# Patient Record
Sex: MALE | Race: WHITE | NOT HISPANIC OR LATINO | Employment: OTHER | URBAN - METROPOLITAN AREA
[De-identification: names, ages, dates, MRNs, and addresses within clinical notes are randomized per-mention and may not be internally consistent; named-entity substitution may affect disease eponyms.]

---

## 2023-01-01 ENCOUNTER — HOSPITAL ENCOUNTER (OUTPATIENT)
Dept: CARDIOLOGY | Facility: CLINIC | Age: 73
Discharge: HOME | End: 2023-12-01
Payer: MEDICARE

## 2023-01-01 ENCOUNTER — OFFICE VISIT (OUTPATIENT)
Dept: PRIMARY CARE | Facility: CLINIC | Age: 73
End: 2023-01-01
Payer: MEDICARE

## 2023-01-01 ENCOUNTER — HOSPITAL ENCOUNTER (OUTPATIENT)
Dept: CARDIOLOGY | Facility: CLINIC | Age: 73
Discharge: HOME | End: 2023-11-30
Payer: MEDICARE

## 2023-01-01 ENCOUNTER — TELEPHONE (OUTPATIENT)
Dept: PRIMARY CARE | Facility: CLINIC | Age: 73
End: 2023-01-01
Payer: MEDICARE

## 2023-01-01 ENCOUNTER — TELEPHONE (OUTPATIENT)
Dept: HEMATOLOGY/ONCOLOGY | Facility: CLINIC | Age: 73
End: 2023-01-01
Payer: MEDICARE

## 2023-01-01 ENCOUNTER — HOSPITAL ENCOUNTER (OUTPATIENT)
Dept: RADIOLOGY | Facility: HOSPITAL | Age: 73
Discharge: HOME | End: 2023-12-08
Payer: MEDICARE

## 2023-01-01 ENCOUNTER — APPOINTMENT (OUTPATIENT)
Dept: RADIOLOGY | Facility: HOSPITAL | Age: 73
End: 2023-01-01
Payer: MEDICARE

## 2023-01-01 ENCOUNTER — APPOINTMENT (OUTPATIENT)
Dept: CARDIOLOGY | Facility: HOSPITAL | Age: 73
End: 2023-01-01
Payer: MEDICARE

## 2023-01-01 ENCOUNTER — APPOINTMENT (OUTPATIENT)
Dept: VASCULAR MEDICINE | Facility: HOSPITAL | Age: 73
End: 2023-01-01
Payer: MEDICARE

## 2023-01-01 ENCOUNTER — HOSPITAL ENCOUNTER (OUTPATIENT)
Dept: CARDIOLOGY | Facility: CLINIC | Age: 73
Discharge: HOME | End: 2023-12-19
Payer: MEDICARE

## 2023-01-01 ENCOUNTER — APPOINTMENT (OUTPATIENT)
Dept: PRIMARY CARE | Facility: CLINIC | Age: 73
End: 2023-01-01
Payer: MEDICARE

## 2023-01-01 ENCOUNTER — CLINICAL SUPPORT (OUTPATIENT)
Dept: PRIMARY CARE | Facility: CLINIC | Age: 73
End: 2023-01-01
Payer: MEDICARE

## 2023-01-01 ENCOUNTER — HOSPITAL ENCOUNTER (OUTPATIENT)
Dept: CARDIOLOGY | Facility: CLINIC | Age: 73
End: 2023-01-01
Payer: MEDICARE

## 2023-01-01 ENCOUNTER — HOSPITAL ENCOUNTER (EMERGENCY)
Facility: HOSPITAL | Age: 73
Discharge: OTHER INSTITUTION | End: 2023-12-27
Attending: EMERGENCY MEDICINE
Payer: MEDICARE

## 2023-01-01 ENCOUNTER — HOSPITAL ENCOUNTER (OUTPATIENT)
Dept: CARDIOLOGY | Facility: CLINIC | Age: 73
Discharge: HOME | End: 2023-10-26
Payer: MEDICARE

## 2023-01-01 ENCOUNTER — HOSPITAL ENCOUNTER (INPATIENT)
Facility: HOSPITAL | Age: 73
LOS: 5 days | Discharge: NURSING, REHAB OR LONG-TERM CARE | DRG: 177 | End: 2024-01-01
Attending: INTERNAL MEDICINE | Admitting: HOSPITALIST
Payer: MEDICARE

## 2023-01-01 ENCOUNTER — APPOINTMENT (OUTPATIENT)
Dept: CARDIOLOGY | Facility: HOSPITAL | Age: 73
DRG: 193 | End: 2023-01-01
Payer: MEDICARE

## 2023-01-01 ENCOUNTER — HOSPITAL ENCOUNTER (INPATIENT)
Facility: HOSPITAL | Age: 73
LOS: 5 days | Discharge: HOME | DRG: 193 | End: 2023-12-19
Attending: FAMILY MEDICINE | Admitting: INTERNAL MEDICINE
Payer: MEDICARE

## 2023-01-01 ENCOUNTER — OFFICE VISIT (OUTPATIENT)
Dept: HEMATOLOGY/ONCOLOGY | Facility: CLINIC | Age: 73
End: 2023-01-01
Payer: MEDICARE

## 2023-01-01 ENCOUNTER — TELEMEDICINE (OUTPATIENT)
Dept: HEMATOLOGY/ONCOLOGY | Facility: CLINIC | Age: 73
End: 2023-01-01
Payer: MEDICARE

## 2023-01-01 ENCOUNTER — APPOINTMENT (OUTPATIENT)
Dept: RADIOLOGY | Facility: HOSPITAL | Age: 73
DRG: 193 | End: 2023-01-01
Payer: MEDICARE

## 2023-01-01 ENCOUNTER — HOSPITAL ENCOUNTER (OUTPATIENT)
Dept: CARDIOLOGY | Facility: CLINIC | Age: 73
Discharge: HOME | End: 2023-12-22
Payer: MEDICARE

## 2023-01-01 ENCOUNTER — LAB (OUTPATIENT)
Dept: LAB | Facility: LAB | Age: 73
End: 2023-01-01
Payer: MEDICARE

## 2023-01-01 VITALS
SYSTOLIC BLOOD PRESSURE: 110 MMHG | HEIGHT: 69 IN | HEART RATE: 77 BPM | TEMPERATURE: 97 F | DIASTOLIC BLOOD PRESSURE: 66 MMHG | RESPIRATION RATE: 18 BRPM | WEIGHT: 166.45 LBS | BODY MASS INDEX: 24.65 KG/M2 | OXYGEN SATURATION: 95 %

## 2023-01-01 VITALS
WEIGHT: 155 LBS | DIASTOLIC BLOOD PRESSURE: 60 MMHG | SYSTOLIC BLOOD PRESSURE: 118 MMHG | RESPIRATION RATE: 16 BRPM | TEMPERATURE: 97.2 F | BODY MASS INDEX: 23.57 KG/M2 | OXYGEN SATURATION: 96 % | HEART RATE: 61 BPM

## 2023-01-01 VITALS
HEART RATE: 90 BPM | DIASTOLIC BLOOD PRESSURE: 64 MMHG | TEMPERATURE: 97.3 F | BODY MASS INDEX: 22.51 KG/M2 | RESPIRATION RATE: 16 BRPM | HEIGHT: 69 IN | SYSTOLIC BLOOD PRESSURE: 118 MMHG | WEIGHT: 152 LBS | OXYGEN SATURATION: 95 %

## 2023-01-01 VITALS
BODY MASS INDEX: 24.44 KG/M2 | HEART RATE: 108 BPM | RESPIRATION RATE: 27 BRPM | TEMPERATURE: 99.9 F | DIASTOLIC BLOOD PRESSURE: 67 MMHG | HEIGHT: 69 IN | OXYGEN SATURATION: 100 % | WEIGHT: 165 LBS | SYSTOLIC BLOOD PRESSURE: 99 MMHG

## 2023-01-01 VITALS
SYSTOLIC BLOOD PRESSURE: 118 MMHG | WEIGHT: 155 LBS | HEIGHT: 69 IN | HEART RATE: 87 BPM | BODY MASS INDEX: 22.96 KG/M2 | TEMPERATURE: 97.5 F | DIASTOLIC BLOOD PRESSURE: 64 MMHG | RESPIRATION RATE: 16 BRPM | OXYGEN SATURATION: 96 %

## 2023-01-01 VITALS
BODY MASS INDEX: 26.28 KG/M2 | TEMPERATURE: 97 F | RESPIRATION RATE: 18 BRPM | SYSTOLIC BLOOD PRESSURE: 119 MMHG | OXYGEN SATURATION: 95 % | HEIGHT: 66 IN | HEART RATE: 86 BPM | DIASTOLIC BLOOD PRESSURE: 73 MMHG | WEIGHT: 163.5 LBS

## 2023-01-01 DIAGNOSIS — Z95.810 BIVENTRICULAR ICD (IMPLANTABLE CARDIOVERTER-DEFIBRILLATOR) IN PLACE: ICD-10-CM

## 2023-01-01 DIAGNOSIS — Z79.4 TYPE 2 DIABETES MELLITUS WITH DIABETIC NEUROPATHY, WITH LONG-TERM CURRENT USE OF INSULIN (MULTI): Primary | ICD-10-CM

## 2023-01-01 DIAGNOSIS — R79.89 ELEVATED BRAIN NATRIURETIC PEPTIDE (BNP) LEVEL: ICD-10-CM

## 2023-01-01 DIAGNOSIS — Z95.810 BIVENTRICULAR ICD (IMPLANTABLE CARDIOVERTER-DEFIBRILLATOR) IN PLACE: Primary | ICD-10-CM

## 2023-01-01 DIAGNOSIS — E11.65 TYPE 2 DIABETES MELLITUS WITH HYPERGLYCEMIA, WITH LONG-TERM CURRENT USE OF INSULIN (MULTI): Primary | ICD-10-CM

## 2023-01-01 DIAGNOSIS — I50.9 CONGESTIVE HEART FAILURE, UNSPECIFIED HF CHRONICITY, UNSPECIFIED HEART FAILURE TYPE (MULTI): ICD-10-CM

## 2023-01-01 DIAGNOSIS — Z23 ENCOUNTER FOR IMMUNIZATION: ICD-10-CM

## 2023-01-01 DIAGNOSIS — J44.9 CHRONIC OBSTRUCTIVE PULMONARY DISEASE, UNSPECIFIED COPD TYPE (MULTI): ICD-10-CM

## 2023-01-01 DIAGNOSIS — D72.829 LEUKOCYTOSIS, UNSPECIFIED TYPE: ICD-10-CM

## 2023-01-01 DIAGNOSIS — R73.9 HYPERGLYCEMIA: ICD-10-CM

## 2023-01-01 DIAGNOSIS — I10 ESSENTIAL (PRIMARY) HYPERTENSION: ICD-10-CM

## 2023-01-01 DIAGNOSIS — D72.829 LEUKOCYTOSIS, UNSPECIFIED TYPE: Primary | ICD-10-CM

## 2023-01-01 DIAGNOSIS — N28.9 RENAL INSUFFICIENCY: ICD-10-CM

## 2023-01-01 DIAGNOSIS — I63.9 CEREBROVASCULAR ACCIDENT (CVA), UNSPECIFIED MECHANISM (MULTI): ICD-10-CM

## 2023-01-01 DIAGNOSIS — I25.5 ISCHEMIC CARDIOMYOPATHY: ICD-10-CM

## 2023-01-01 DIAGNOSIS — U07.1 PNEUMONIA DUE TO COVID-19 VIRUS: Primary | ICD-10-CM

## 2023-01-01 DIAGNOSIS — I21.4 NSTEMI (NON-ST ELEVATED MYOCARDIAL INFARCTION) (MULTI): Primary | ICD-10-CM

## 2023-01-01 DIAGNOSIS — E03.9 ACQUIRED HYPOTHYROIDISM: ICD-10-CM

## 2023-01-01 DIAGNOSIS — J18.9 PNEUMONIA OF BOTH LUNGS DUE TO INFECTIOUS ORGANISM, UNSPECIFIED PART OF LUNG: Primary | ICD-10-CM

## 2023-01-01 DIAGNOSIS — R79.89 ELEVATED TROPONIN: ICD-10-CM

## 2023-01-01 DIAGNOSIS — I42.9 CARDIOMYOPATHY, UNSPECIFIED TYPE (MULTI): ICD-10-CM

## 2023-01-01 DIAGNOSIS — Z79.4 TYPE 2 DIABETES MELLITUS WITH HYPERGLYCEMIA, WITH LONG-TERM CURRENT USE OF INSULIN (MULTI): Primary | ICD-10-CM

## 2023-01-01 DIAGNOSIS — R93.89 ABNORMAL CT OF THE CHEST: ICD-10-CM

## 2023-01-01 DIAGNOSIS — F17.210 CIGARETTE NICOTINE DEPENDENCE WITHOUT COMPLICATION: Primary | ICD-10-CM

## 2023-01-01 DIAGNOSIS — E11.40 TYPE 2 DIABETES MELLITUS WITH DIABETIC NEUROPATHY, WITH LONG-TERM CURRENT USE OF INSULIN (MULTI): Primary | ICD-10-CM

## 2023-01-01 DIAGNOSIS — Z79.4 LONG TERM (CURRENT) USE OF INSULIN (MULTI): ICD-10-CM

## 2023-01-01 DIAGNOSIS — J12.82 PNEUMONIA DUE TO COVID-19 VIRUS: Primary | ICD-10-CM

## 2023-01-01 DIAGNOSIS — F33.0 MILD EPISODE OF RECURRENT MAJOR DEPRESSIVE DISORDER (CMS-HCC): ICD-10-CM

## 2023-01-01 DIAGNOSIS — J18.9 PNEUMONIA DUE TO INFECTIOUS ORGANISM, UNSPECIFIED LATERALITY, UNSPECIFIED PART OF LUNG: ICD-10-CM

## 2023-01-01 DIAGNOSIS — N39.0 RECURRENT UTI: Primary | ICD-10-CM

## 2023-01-01 DIAGNOSIS — E11.65 UNCONTROLLED TYPE 2 DIABETES MELLITUS WITH HYPERGLYCEMIA (MULTI): Primary | ICD-10-CM

## 2023-01-01 DIAGNOSIS — Z79.4 TYPE 2 DIABETES MELLITUS WITH HYPERGLYCEMIA, WITH LONG-TERM CURRENT USE OF INSULIN (MULTI): ICD-10-CM

## 2023-01-01 DIAGNOSIS — R06.02 SHORTNESS OF BREATH: ICD-10-CM

## 2023-01-01 DIAGNOSIS — E03.9 HYPOTHYROIDISM, UNSPECIFIED TYPE: ICD-10-CM

## 2023-01-01 DIAGNOSIS — E11.65 TYPE 2 DIABETES MELLITUS WITH HYPERGLYCEMIA, WITH LONG-TERM CURRENT USE OF INSULIN (MULTI): ICD-10-CM

## 2023-01-01 DIAGNOSIS — Z95.5 HISTORY OF CORONARY ARTERY STENT PLACEMENT: ICD-10-CM

## 2023-01-01 DIAGNOSIS — E55.9 VITAMIN D DEFICIENCY: ICD-10-CM

## 2023-01-01 DIAGNOSIS — J44.9 CHRONIC OBSTRUCTIVE PULMONARY DISEASE, UNSPECIFIED COPD TYPE (MULTI): Primary | ICD-10-CM

## 2023-01-01 DIAGNOSIS — I21.4 NSTEMI (NON-ST ELEVATED MYOCARDIAL INFARCTION) (MULTI): ICD-10-CM

## 2023-01-01 DIAGNOSIS — J02.9 ACUTE PHARYNGITIS, UNSPECIFIED ETIOLOGY: Primary | ICD-10-CM

## 2023-01-01 DIAGNOSIS — I63.9 CEREBROVASCULAR ACCIDENT (CVA), UNSPECIFIED MECHANISM (MULTI): Primary | ICD-10-CM

## 2023-01-01 DIAGNOSIS — Z79.899 LONG TERM CURRENT USE OF ANTIARRHYTHMIC DRUG: ICD-10-CM

## 2023-01-01 DIAGNOSIS — N39.0 RECURRENT UTI: ICD-10-CM

## 2023-01-01 DIAGNOSIS — I50.23 ACUTE ON CHRONIC SYSTOLIC (CONGESTIVE) HEART FAILURE (MULTI): ICD-10-CM

## 2023-01-01 LAB
ALBUMIN SERPL BCP-MCNC: 3.2 G/DL (ref 3.4–5)
ALBUMIN SERPL BCP-MCNC: 3.2 G/DL (ref 3.4–5)
ALBUMIN SERPL BCP-MCNC: 3.5 G/DL (ref 3.4–5)
ALBUMIN SERPL BCP-MCNC: 3.6 G/DL (ref 3.4–5)
ALBUMIN SERPL-MCNC: 2.6 G/DL (ref 3.5–5)
ALBUMIN SERPL-MCNC: 3.7 G/DL (ref 3.5–5)
ALP BLD-CCNC: 179 U/L (ref 35–125)
ALP BLD-CCNC: 258 U/L (ref 35–125)
ALP SERPL-CCNC: 185 U/L (ref 33–136)
ALP SERPL-CCNC: 256 U/L (ref 33–136)
ALP SERPL-CCNC: 266 U/L (ref 33–136)
ALP SERPL-CCNC: 337 U/L (ref 33–136)
ALT SERPL W P-5'-P-CCNC: 4 U/L (ref 10–52)
ALT SERPL W P-5'-P-CCNC: 5 U/L (ref 10–52)
ALT SERPL-CCNC: 7 U/L (ref 5–40)
ALT SERPL-CCNC: <5 U/L (ref 5–40)
ANION GAP IN SER/PLAS: 15 MMOL/L (ref 10–20)
ANION GAP IN SER/PLAS: 20 MMOL/L (ref 10–20)
ANION GAP SERPL CALC-SCNC: 11 MMOL/L (ref 10–20)
ANION GAP SERPL CALC-SCNC: 13 MMOL/L (ref 10–20)
ANION GAP SERPL CALC-SCNC: 14 MMOL/L
ANION GAP SERPL CALC-SCNC: 15 MMOL/L
ANION GAP SERPL CALC-SCNC: 15 MMOL/L
ANION GAP SERPL CALC-SCNC: 16 MMOL/L
ANION GAP SERPL CALC-SCNC: 16 MMOL/L (ref 10–20)
ANION GAP SERPL CALC-SCNC: 16 MMOL/L (ref 10–20)
ANION GAP SERPL CALC-SCNC: 17 MMOL/L
ANION GAP SERPL CALC-SCNC: 17 MMOL/L (ref 10–20)
ANION GAP SERPL CALC-SCNC: 18 MMOL/L (ref 10–20)
ANION GAP SERPL CALC-SCNC: 19 MMOL/L (ref 10–20)
ANION GAP SERPL CALC-SCNC: 20 MMOL/L (ref 10–20)
AORTIC VALVE PEAK VELOCITY: 0.93
APTT PPP: 107.8 SECONDS (ref 22–32.5)
APTT PPP: 41.3 SECONDS (ref 22–32.5)
AST SERPL W P-5'-P-CCNC: 21 U/L (ref 9–39)
AST SERPL W P-5'-P-CCNC: 22 U/L (ref 9–39)
AST SERPL W P-5'-P-CCNC: 23 U/L (ref 9–39)
AST SERPL W P-5'-P-CCNC: 25 U/L (ref 9–39)
AST SERPL-CCNC: 28 U/L (ref 5–40)
AST SERPL-CCNC: 28 U/L (ref 5–40)
AV PEAK GRADIENT: 3.5
AVA (PEAK VEL): 2.41
BACTERIA BLD AEROBE CULT: ABNORMAL
BACTERIA BLD CULT: ABNORMAL
BACTERIA BLD CULT: NORMAL
BACTERIA SPEC RESP CULT: ABNORMAL
BASOPHILS # BLD AUTO: 0.02 X10*3/UL (ref 0–0.1)
BASOPHILS # BLD AUTO: 0.04 X10*3/UL (ref 0–0.1)
BASOPHILS # BLD AUTO: 0.04 X10*3/UL (ref 0–0.1)
BASOPHILS # BLD AUTO: 0.08 X10*3/UL (ref 0–0.1)
BASOPHILS (10*3/UL) IN BLOOD BY AUTOMATED COUNT: 0.06 X10E9/L (ref 0–0.1)
BASOPHILS (10*3/UL) IN BLOOD BY AUTOMATED COUNT: 0.08 X10E9/L (ref 0–0.1)
BASOPHILS NFR BLD AUTO: 0.2 %
BASOPHILS NFR BLD AUTO: 0.2 %
BASOPHILS NFR BLD AUTO: 0.3 %
BASOPHILS NFR BLD AUTO: 0.5 %
BASOPHILS/100 LEUKOCYTES IN BLOOD BY AUTOMATED COUNT: 0.4 % (ref 0–2)
BASOPHILS/100 LEUKOCYTES IN BLOOD BY AUTOMATED COUNT: 0.5 % (ref 0–2)
BILIRUB DIRECT SERPL-MCNC: 0.3 MG/DL (ref 0–0.2)
BILIRUB SERPL-MCNC: 0.4 MG/DL (ref 0.1–1.2)
BILIRUB SERPL-MCNC: 0.5 MG/DL (ref 0–1.2)
BILIRUB SERPL-MCNC: 0.6 MG/DL (ref 0.1–1.2)
BILIRUB SERPL-MCNC: 0.6 MG/DL (ref 0–1.2)
BILIRUB SERPL-MCNC: 0.9 MG/DL (ref 0–1.2)
BILIRUB SERPL-MCNC: 1.1 MG/DL (ref 0–1.2)
BNP SERPL-MCNC: 775 PG/ML (ref 0–99)
BNP SERPL-MCNC: 791 PG/ML (ref 0–99)
BNP SERPL-MCNC: 842 PG/ML (ref 0–99)
BNP SERPL-MCNC: 867 PG/ML (ref 0–99)
BUN SERPL-MCNC: 25 MG/DL (ref 8–25)
BUN SERPL-MCNC: 33 MG/DL (ref 6–23)
BUN SERPL-MCNC: 44 MG/DL (ref 6–23)
BUN SERPL-MCNC: 54 MG/DL (ref 6–23)
BUN SERPL-MCNC: 66 MG/DL (ref 6–23)
BUN SERPL-MCNC: 72 MG/DL (ref 8–25)
BUN SERPL-MCNC: 74 MG/DL (ref 8–25)
BUN SERPL-MCNC: 75 MG/DL (ref 6–23)
BUN SERPL-MCNC: 78 MG/DL (ref 8–25)
BUN SERPL-MCNC: 80 MG/DL (ref 8–25)
BUN SERPL-MCNC: 85 MG/DL (ref 6–23)
BUN SERPL-MCNC: 85 MG/DL (ref 6–23)
BUN SERPL-MCNC: 86 MG/DL (ref 6–23)
CALCIUM (MG/DL) IN SER/PLAS: 9.1 MG/DL (ref 8.6–10.3)
CALCIUM (MG/DL) IN SER/PLAS: 9.2 MG/DL (ref 8.6–10.3)
CALCIUM SERPL-MCNC: 7.7 MG/DL (ref 8.5–10.4)
CALCIUM SERPL-MCNC: 8.1 MG/DL (ref 8.5–10.4)
CALCIUM SERPL-MCNC: 8.2 MG/DL (ref 8.5–10.4)
CALCIUM SERPL-MCNC: 8.3 MG/DL (ref 8.5–10.4)
CALCIUM SERPL-MCNC: 8.3 MG/DL (ref 8.6–10.3)
CALCIUM SERPL-MCNC: 8.3 MG/DL (ref 8.6–10.3)
CALCIUM SERPL-MCNC: 8.4 MG/DL (ref 8.6–10.3)
CALCIUM SERPL-MCNC: 8.6 MG/DL (ref 8.6–10.3)
CALCIUM SERPL-MCNC: 8.6 MG/DL (ref 8.6–10.3)
CALCIUM SERPL-MCNC: 8.7 MG/DL (ref 8.6–10.3)
CALCIUM SERPL-MCNC: 8.8 MG/DL (ref 8.6–10.3)
CALCIUM SERPL-MCNC: 8.8 MG/DL (ref 8.6–10.3)
CALCIUM SERPL-MCNC: 9.2 MG/DL (ref 8.5–10.4)
CARBON DIOXIDE, TOTAL (MMOL/L) IN SER/PLAS: 25 MMOL/L (ref 21–32)
CARBON DIOXIDE, TOTAL (MMOL/L) IN SER/PLAS: 31 MMOL/L (ref 21–32)
CARDIAC TROPONIN I PNL SERPL HS: 207 NG/L (ref 0–20)
CARDIAC TROPONIN I PNL SERPL HS: 314 NG/L (ref 0–20)
CARDIAC TROPONIN I PNL SERPL HS: 62 NG/L (ref 0–20)
CHLORIDE (MMOL/L) IN SER/PLAS: 93 MMOL/L (ref 98–107)
CHLORIDE (MMOL/L) IN SER/PLAS: 97 MMOL/L (ref 98–107)
CHLORIDE SERPL-SCNC: 100 MMOL/L (ref 97–107)
CHLORIDE SERPL-SCNC: 93 MMOL/L (ref 98–107)
CHLORIDE SERPL-SCNC: 94 MMOL/L (ref 98–107)
CHLORIDE SERPL-SCNC: 95 MMOL/L (ref 97–107)
CHLORIDE SERPL-SCNC: 95 MMOL/L (ref 98–107)
CHLORIDE SERPL-SCNC: 95 MMOL/L (ref 98–107)
CHLORIDE SERPL-SCNC: 96 MMOL/L (ref 97–107)
CHLORIDE SERPL-SCNC: 97 MMOL/L (ref 97–107)
CHLORIDE SERPL-SCNC: 97 MMOL/L (ref 98–107)
CHLORIDE SERPL-SCNC: 98 MMOL/L (ref 97–107)
CHLORIDE SERPL-SCNC: 98 MMOL/L (ref 98–107)
CO2 SERPL-SCNC: 23 MMOL/L (ref 21–32)
CO2 SERPL-SCNC: 23 MMOL/L (ref 24–31)
CO2 SERPL-SCNC: 24 MMOL/L (ref 21–32)
CO2 SERPL-SCNC: 24 MMOL/L (ref 24–31)
CO2 SERPL-SCNC: 25 MMOL/L (ref 21–32)
CO2 SERPL-SCNC: 26 MMOL/L (ref 21–32)
CO2 SERPL-SCNC: 28 MMOL/L (ref 24–31)
CO2 SERPL-SCNC: 34 MMOL/L (ref 21–32)
CO2 SERPL-SCNC: 35 MMOL/L (ref 21–32)
CREAT SERPL-MCNC: 1.7 MG/DL (ref 0.4–1.6)
CREAT SERPL-MCNC: 1.9 MG/DL (ref 0.4–1.6)
CREAT SERPL-MCNC: 1.9 MG/DL (ref 0.4–1.6)
CREAT SERPL-MCNC: 1.91 MG/DL (ref 0.5–1.3)
CREAT SERPL-MCNC: 2.05 MG/DL (ref 0.5–1.3)
CREAT SERPL-MCNC: 2.06 MG/DL (ref 0.5–1.3)
CREAT SERPL-MCNC: 2.1 MG/DL (ref 0.4–1.6)
CREAT SERPL-MCNC: 2.12 MG/DL (ref 0.5–1.3)
CREAT SERPL-MCNC: 2.13 MG/DL (ref 0.5–1.3)
CREAT SERPL-MCNC: 2.16 MG/DL (ref 0.5–1.3)
CREAT SERPL-MCNC: 2.24 MG/DL (ref 0.5–1.3)
CREAT SERPL-MCNC: 2.25 MG/DL (ref 0.5–1.3)
CREAT SERPL-MCNC: 2.3 MG/DL (ref 0.4–1.6)
CREATININE (MG/DL) IN SER/PLAS: 1.96 MG/DL (ref 0.5–1.3)
CREATININE (MG/DL) IN SER/PLAS: 2.21 MG/DL (ref 0.5–1.3)
CREATININE (MG/DL) IN SER/PLAS: 2.27 MG/DL (ref 0.5–1.3)
CRP SERPL-MCNC: 2.53 MG/DL
D DIMER PPP FEU-MCNC: 623 NG/ML FEU
EJECTION FRACTION APICAL 4 CHAMBER: 28.3
EJECTION FRACTION: 29
ELECTRONICALLY SIGNED BY: NORMAL
EOSINOPHIL # BLD AUTO: 0 X10*3/UL (ref 0–0.4)
EOSINOPHIL # BLD AUTO: 0 X10*3/UL (ref 0–0.4)
EOSINOPHIL # BLD AUTO: 0.19 X10*3/UL (ref 0–0.4)
EOSINOPHIL # BLD AUTO: 0.39 X10*3/UL (ref 0–0.4)
EOSINOPHIL NFR BLD AUTO: 0 %
EOSINOPHIL NFR BLD AUTO: 0 %
EOSINOPHIL NFR BLD AUTO: 1.2 %
EOSINOPHIL NFR BLD AUTO: 2.2 %
EOSINOPHILS (10*3/UL) IN BLOOD BY AUTOMATED COUNT: 0.32 X10E9/L (ref 0–0.4)
EOSINOPHILS (10*3/UL) IN BLOOD BY AUTOMATED COUNT: 0.47 X10E9/L (ref 0–0.4)
EOSINOPHILS/100 LEUKOCYTES IN BLOOD BY AUTOMATED COUNT: 1.9 % (ref 0–6)
EOSINOPHILS/100 LEUKOCYTES IN BLOOD BY AUTOMATED COUNT: 2.7 % (ref 0–6)
ERYTHROCYTE DISTRIBUTION WIDTH (RATIO) BY AUTOMATED COUNT: 17.2 % (ref 11.5–14.5)
ERYTHROCYTE DISTRIBUTION WIDTH (RATIO) BY AUTOMATED COUNT: 17.7 % (ref 11.5–14.5)
ERYTHROCYTE MEAN CORPUSCULAR HEMOGLOBIN CONCENTRATION (G/DL) BY AUTOMATED: 31.2 G/DL (ref 32–36)
ERYTHROCYTE MEAN CORPUSCULAR HEMOGLOBIN CONCENTRATION (G/DL) BY AUTOMATED: 31.9 G/DL (ref 32–36)
ERYTHROCYTE MEAN CORPUSCULAR VOLUME (FL) BY AUTOMATED COUNT: 91 FL (ref 80–100)
ERYTHROCYTE MEAN CORPUSCULAR VOLUME (FL) BY AUTOMATED COUNT: 91 FL (ref 80–100)
ERYTHROCYTE [DISTWIDTH] IN BLOOD BY AUTOMATED COUNT: 15.7 % (ref 11.5–14.5)
ERYTHROCYTE [DISTWIDTH] IN BLOOD BY AUTOMATED COUNT: 15.8 % (ref 11.5–14.5)
ERYTHROCYTE [DISTWIDTH] IN BLOOD BY AUTOMATED COUNT: 15.9 % (ref 11.5–14.5)
ERYTHROCYTE [DISTWIDTH] IN BLOOD BY AUTOMATED COUNT: 15.9 % (ref 11.5–14.5)
ERYTHROCYTE [DISTWIDTH] IN BLOOD BY AUTOMATED COUNT: 16 % (ref 11.5–14.5)
ERYTHROCYTE [DISTWIDTH] IN BLOOD BY AUTOMATED COUNT: 16 % (ref 11.5–14.5)
ERYTHROCYTE [DISTWIDTH] IN BLOOD BY AUTOMATED COUNT: 16.1 % (ref 11.5–14.5)
ERYTHROCYTE [DISTWIDTH] IN BLOOD BY AUTOMATED COUNT: 16.2 % (ref 11.5–14.5)
ERYTHROCYTE [DISTWIDTH] IN BLOOD BY AUTOMATED COUNT: 16.2 % (ref 11.5–14.5)
ERYTHROCYTE [DISTWIDTH] IN BLOOD BY AUTOMATED COUNT: 16.3 % (ref 11.5–14.5)
ERYTHROCYTE [DISTWIDTH] IN BLOOD BY AUTOMATED COUNT: 16.4 % (ref 11.5–14.5)
ERYTHROCYTE [DISTWIDTH] IN BLOOD BY AUTOMATED COUNT: 16.5 % (ref 11.5–14.5)
ERYTHROCYTE [DISTWIDTH] IN BLOOD BY AUTOMATED COUNT: 16.5 % (ref 11.5–14.5)
ERYTHROCYTE [DISTWIDTH] IN BLOOD BY AUTOMATED COUNT: 16.9 % (ref 11.5–14.5)
ERYTHROCYTE [DISTWIDTH] IN BLOOD BY AUTOMATED COUNT: 18.1 % (ref 11.5–14.5)
ERYTHROCYTE [SEDIMENTATION RATE] IN BLOOD BY WESTERGREN METHOD: 63 MM/H (ref 0–20)
ERYTHROCYTES (10*6/UL) IN BLOOD BY AUTOMATED COUNT: 4.5 X10E12/L (ref 4.5–5.9)
ERYTHROCYTES (10*6/UL) IN BLOOD BY AUTOMATED COUNT: 4.69 X10E12/L (ref 4.5–5.9)
EST. AVERAGE GLUCOSE BLD GHB EST-MCNC: 258 MG/DL
EST. AVERAGE GLUCOSE BLD GHB EST-MCNC: 260 MG/DL
FERRITIN SERPL-MCNC: 103 NG/ML (ref 20–300)
FLUAV RNA RESP QL NAA+PROBE: NOT DETECTED
FLUAV RNA RESP QL NAA+PROBE: NOT DETECTED
FLUBV RNA RESP QL NAA+PROBE: NOT DETECTED
FLUBV RNA RESP QL NAA+PROBE: NOT DETECTED
GFR MALE: 30 ML/MIN/1.73M2
GFR MALE: 31 ML/MIN/1.73M2
GFR MALE: 36 ML/MIN/1.73M2
GFR SERPL CREATININE-BSD FRML MDRD: 29 ML/MIN/1.73M*2
GFR SERPL CREATININE-BSD FRML MDRD: 30 ML/MIN/1.73M*2
GFR SERPL CREATININE-BSD FRML MDRD: 30 ML/MIN/1.73M*2
GFR SERPL CREATININE-BSD FRML MDRD: 32 ML/MIN/1.73M*2
GFR SERPL CREATININE-BSD FRML MDRD: 33 ML/MIN/1.73M*2
GFR SERPL CREATININE-BSD FRML MDRD: 34 ML/MIN/1.73M*2
GFR SERPL CREATININE-BSD FRML MDRD: 34 ML/MIN/1.73M*2
GFR SERPL CREATININE-BSD FRML MDRD: 37 ML/MIN/1.73M*2
GFR SERPL CREATININE-BSD FRML MDRD: 42 ML/MIN/1.73M*2
GLUCOSE (MG/DL) IN SER/PLAS: 199 MG/DL (ref 74–99)
GLUCOSE (MG/DL) IN SER/PLAS: 430 MG/DL (ref 74–99)
GLUCOSE BLD MANUAL STRIP-MCNC: 158 MG/DL (ref 74–99)
GLUCOSE BLD MANUAL STRIP-MCNC: 179 MG/DL (ref 74–99)
GLUCOSE BLD MANUAL STRIP-MCNC: 179 MG/DL (ref 74–99)
GLUCOSE BLD MANUAL STRIP-MCNC: 197 MG/DL (ref 74–99)
GLUCOSE BLD MANUAL STRIP-MCNC: 213 MG/DL (ref 74–99)
GLUCOSE BLD MANUAL STRIP-MCNC: 235 MG/DL (ref 74–99)
GLUCOSE BLD MANUAL STRIP-MCNC: 243 MG/DL (ref 74–99)
GLUCOSE BLD MANUAL STRIP-MCNC: 252 MG/DL (ref 74–99)
GLUCOSE BLD MANUAL STRIP-MCNC: 282 MG/DL (ref 74–99)
GLUCOSE BLD MANUAL STRIP-MCNC: 286 MG/DL (ref 74–99)
GLUCOSE BLD MANUAL STRIP-MCNC: 297 MG/DL (ref 74–99)
GLUCOSE BLD MANUAL STRIP-MCNC: 301 MG/DL (ref 74–99)
GLUCOSE BLD MANUAL STRIP-MCNC: 302 MG/DL (ref 74–99)
GLUCOSE BLD MANUAL STRIP-MCNC: 317 MG/DL (ref 74–99)
GLUCOSE BLD MANUAL STRIP-MCNC: 321 MG/DL (ref 74–99)
GLUCOSE BLD MANUAL STRIP-MCNC: 340 MG/DL (ref 74–99)
GLUCOSE BLD MANUAL STRIP-MCNC: 343 MG/DL (ref 74–99)
GLUCOSE BLD MANUAL STRIP-MCNC: 353 MG/DL (ref 74–99)
GLUCOSE BLD MANUAL STRIP-MCNC: 377 MG/DL (ref 74–99)
GLUCOSE BLD MANUAL STRIP-MCNC: 384 MG/DL (ref 74–99)
GLUCOSE BLD MANUAL STRIP-MCNC: 400 MG/DL (ref 74–99)
GLUCOSE BLD MANUAL STRIP-MCNC: 422 MG/DL (ref 74–99)
GLUCOSE BLD MANUAL STRIP-MCNC: 433 MG/DL (ref 74–99)
GLUCOSE BLD MANUAL STRIP-MCNC: 437 MG/DL (ref 74–99)
GLUCOSE BLD MANUAL STRIP-MCNC: 445 MG/DL (ref 74–99)
GLUCOSE BLD MANUAL STRIP-MCNC: 493 MG/DL (ref 74–99)
GLUCOSE BLD MANUAL STRIP-MCNC: 537 MG/DL (ref 74–99)
GLUCOSE BLD MANUAL STRIP-MCNC: 553 MG/DL (ref 74–99)
GLUCOSE BLD MANUAL STRIP-MCNC: 577 MG/DL (ref 74–99)
GLUCOSE SERPL-MCNC: 176 MG/DL (ref 74–99)
GLUCOSE SERPL-MCNC: 208 MG/DL (ref 74–99)
GLUCOSE SERPL-MCNC: 258 MG/DL (ref 65–99)
GLUCOSE SERPL-MCNC: 299 MG/DL (ref 74–99)
GLUCOSE SERPL-MCNC: 311 MG/DL (ref 65–99)
GLUCOSE SERPL-MCNC: 350 MG/DL (ref 74–99)
GLUCOSE SERPL-MCNC: 352 MG/DL (ref 74–99)
GLUCOSE SERPL-MCNC: 354 MG/DL (ref 65–99)
GLUCOSE SERPL-MCNC: 384 MG/DL (ref 74–99)
GLUCOSE SERPL-MCNC: 394 MG/DL (ref 65–99)
GLUCOSE SERPL-MCNC: 425 MG/DL (ref 65–99)
GLUCOSE SERPL-MCNC: 496 MG/DL (ref 74–99)
GLUCOSE SERPL-MCNC: 628 MG/DL (ref 74–99)
GRAM STN SPEC: ABNORMAL
HBA1C MFR BLD: 10.6 %
HBA1C MFR BLD: 10.7 %
HBV SURFACE AB SER-ACNC: 3.1 MIU/ML
HBV SURFACE AG SERPL QL IA: NONREACTIVE
HCT VFR BLD AUTO: 35.7 % (ref 41–52)
HCT VFR BLD AUTO: 36.8 % (ref 41–52)
HCT VFR BLD AUTO: 37 % (ref 41–52)
HCT VFR BLD AUTO: 37 % (ref 41–52)
HCT VFR BLD AUTO: 37.1 % (ref 41–52)
HCT VFR BLD AUTO: 37.2 % (ref 41–52)
HCT VFR BLD AUTO: 37.5 % (ref 41–52)
HCT VFR BLD AUTO: 38 % (ref 41–52)
HCT VFR BLD AUTO: 38.5 % (ref 41–52)
HCT VFR BLD AUTO: 39.5 % (ref 41–52)
HCT VFR BLD AUTO: 39.6 % (ref 41–52)
HCT VFR BLD AUTO: 40.1 % (ref 41–52)
HCT VFR BLD AUTO: 42.1 % (ref 41–52)
HCT VFR BLD AUTO: 43.5 % (ref 41–52)
HCT VFR BLD AUTO: 50.7 % (ref 41–52)
HCV AB SER QL: NONREACTIVE
HEMATOCRIT (%) IN BLOOD BY AUTOMATED COUNT: 41.1 % (ref 41–52)
HEMATOCRIT (%) IN BLOOD BY AUTOMATED COUNT: 42.6 % (ref 41–52)
HEMOGLOBIN (G/DL) IN BLOOD: 13.1 G/DL (ref 13.5–17.5)
HEMOGLOBIN (G/DL) IN BLOOD: 13.3 G/DL (ref 13.5–17.5)
HGB BLD-MCNC: 11.8 G/DL (ref 13.5–17.5)
HGB BLD-MCNC: 11.8 G/DL (ref 13.5–17.5)
HGB BLD-MCNC: 11.9 G/DL (ref 13.5–17.5)
HGB BLD-MCNC: 12 G/DL (ref 13.5–17.5)
HGB BLD-MCNC: 12 G/DL (ref 13.5–17.5)
HGB BLD-MCNC: 12.1 G/DL (ref 13.5–17.5)
HGB BLD-MCNC: 12.3 G/DL (ref 13.5–17.5)
HGB BLD-MCNC: 12.5 G/DL (ref 13.5–17.5)
HGB BLD-MCNC: 12.5 G/DL (ref 13.5–17.5)
HGB BLD-MCNC: 12.8 G/DL (ref 13.5–17.5)
HGB BLD-MCNC: 13 G/DL (ref 13.5–17.5)
HGB BLD-MCNC: 13.3 G/DL (ref 13.5–17.5)
HGB BLD-MCNC: 13.6 G/DL (ref 13.5–17.5)
HGB BLD-MCNC: 13.7 G/DL (ref 13.5–17.5)
HGB BLD-MCNC: 16 G/DL (ref 13.5–17.5)
HIV 1+2 AB+HIV1 P24 AG SERPL QL IA: NONREACTIVE
IMM GRANULOCYTES # BLD AUTO: 0.05 X10*3/UL (ref 0–0.5)
IMM GRANULOCYTES # BLD AUTO: 0.09 X10*3/UL (ref 0–0.5)
IMM GRANULOCYTES # BLD AUTO: 0.1 X10*3/UL (ref 0–0.5)
IMM GRANULOCYTES # BLD AUTO: 0.26 X10*3/UL (ref 0–0.5)
IMM GRANULOCYTES NFR BLD AUTO: 0.3 % (ref 0–0.9)
IMM GRANULOCYTES NFR BLD AUTO: 0.6 % (ref 0–0.9)
IMM GRANULOCYTES NFR BLD AUTO: 0.8 % (ref 0–0.9)
IMM GRANULOCYTES NFR BLD AUTO: 1 % (ref 0–0.9)
IMMATURE GRANULOCYTES/100 LEUKOCYTES IN BLOOD BY AUTOMATED COUNT: 0.5 % (ref 0–0.9)
IMMATURE GRANULOCYTES/100 LEUKOCYTES IN BLOOD BY AUTOMATED COUNT: 0.5 % (ref 0–0.9)
IRON SATN MFR SERPL: 70 % (ref 25–45)
IRON SERPL-MCNC: 250 UG/DL (ref 35–150)
JAK2 V617F INTERPRETATION: NORMAL
JAK2 V617F RESULT: NOT DETECTED
LACTATE SERPL-SCNC: 1.4 MMOL/L (ref 0.4–2)
LACTATE SERPL-SCNC: 1.5 MMOL/L (ref 0.4–2)
LACTATE SERPL-SCNC: 3.6 MMOL/L (ref 0.4–2)
LEFT ATRIUM VOLUME AREA LENGTH INDEX BSA: 48.7
LEFT VENTRICLE INTERNAL DIMENSION DIASTOLE: 4.75 (ref 3.5–6)
LEFT VENTRICULAR OUTFLOW TRACT DIAMETER: 1.9
LEGIONELLA AG UR QL: NEGATIVE
LEUKOCYTES (10*3/UL) IN BLOOD BY AUTOMATED COUNT: 17 X10E9/L (ref 4.4–11.3)
LEUKOCYTES (10*3/UL) IN BLOOD BY AUTOMATED COUNT: 17.7 X10E9/L (ref 4.4–11.3)
LYMPHOCYTES # BLD AUTO: 0.31 X10*3/UL (ref 0.8–3)
LYMPHOCYTES # BLD AUTO: 0.54 X10*3/UL (ref 0.8–3)
LYMPHOCYTES # BLD AUTO: 3.15 X10*3/UL (ref 0.8–3)
LYMPHOCYTES # BLD AUTO: 4.48 X10*3/UL (ref 0.8–3)
LYMPHOCYTES (10*3/UL) IN BLOOD BY AUTOMATED COUNT: 3.7 X10E9/L (ref 0.8–3)
LYMPHOCYTES (10*3/UL) IN BLOOD BY AUTOMATED COUNT: 5.49 X10E9/L (ref 0.8–3)
LYMPHOCYTES NFR BLD AUTO: 1.2 %
LYMPHOCYTES NFR BLD AUTO: 20.3 %
LYMPHOCYTES NFR BLD AUTO: 25.8 %
LYMPHOCYTES NFR BLD AUTO: 4.1 %
LYMPHOCYTES/100 LEUKOCYTES IN BLOOD BY AUTOMATED COUNT: 21.7 % (ref 13–44)
LYMPHOCYTES/100 LEUKOCYTES IN BLOOD BY AUTOMATED COUNT: 31.1 % (ref 13–44)
MAGNESIUM SERPL-MCNC: 2.5 MG/DL (ref 1.6–3.1)
MCH RBC QN AUTO: 28.4 PG (ref 26–34)
MCH RBC QN AUTO: 28.6 PG (ref 26–34)
MCH RBC QN AUTO: 28.7 PG (ref 26–34)
MCH RBC QN AUTO: 28.9 PG (ref 26–34)
MCH RBC QN AUTO: 29.1 PG (ref 26–34)
MCH RBC QN AUTO: 29.2 PG (ref 26–34)
MCH RBC QN AUTO: 29.4 PG (ref 26–34)
MCH RBC QN AUTO: 29.4 PG (ref 26–34)
MCH RBC QN AUTO: 29.5 PG (ref 26–34)
MCH RBC QN AUTO: 29.7 PG (ref 26–34)
MCH RBC QN AUTO: 29.8 PG (ref 26–34)
MCHC RBC AUTO-ENTMCNC: 31.2 G/DL (ref 32–36)
MCHC RBC AUTO-ENTMCNC: 31.3 G/DL (ref 32–36)
MCHC RBC AUTO-ENTMCNC: 31.4 G/DL (ref 32–36)
MCHC RBC AUTO-ENTMCNC: 31.6 G/DL (ref 32–36)
MCHC RBC AUTO-ENTMCNC: 31.7 G/DL (ref 32–36)
MCHC RBC AUTO-ENTMCNC: 31.7 G/DL (ref 32–36)
MCHC RBC AUTO-ENTMCNC: 32.3 G/DL (ref 32–36)
MCHC RBC AUTO-ENTMCNC: 32.4 G/DL (ref 32–36)
MCHC RBC AUTO-ENTMCNC: 32.4 G/DL (ref 32–36)
MCHC RBC AUTO-ENTMCNC: 32.5 G/DL (ref 32–36)
MCHC RBC AUTO-ENTMCNC: 33.1 G/DL (ref 32–36)
MCHC RBC AUTO-ENTMCNC: 33.2 G/DL (ref 32–36)
MCHC RBC AUTO-ENTMCNC: 33.7 G/DL (ref 32–36)
MCHC RBC AUTO-ENTMCNC: 34 G/DL (ref 32–36)
MCHC RBC AUTO-ENTMCNC: 34.2 G/DL (ref 32–36)
MCV RBC AUTO: 85 FL (ref 80–100)
MCV RBC AUTO: 85 FL (ref 80–100)
MCV RBC AUTO: 87 FL (ref 80–100)
MCV RBC AUTO: 89 FL (ref 80–100)
MCV RBC AUTO: 90 FL (ref 80–100)
MCV RBC AUTO: 91 FL (ref 80–100)
MCV RBC AUTO: 91 FL (ref 80–100)
MCV RBC AUTO: 92 FL (ref 80–100)
MCV RBC AUTO: 92 FL (ref 80–100)
MCV RBC AUTO: 93 FL (ref 80–100)
MCV RBC AUTO: 93 FL (ref 80–100)
MCV RBC AUTO: 94 FL (ref 80–100)
MCV RBC AUTO: 96 FL (ref 80–100)
MITRAL VALVE E/E' RATIO: 18.56
MONOCYTES # BLD AUTO: 0.12 X10*3/UL (ref 0.05–0.8)
MONOCYTES # BLD AUTO: 1.18 X10*3/UL (ref 0.05–0.8)
MONOCYTES # BLD AUTO: 1.43 X10*3/UL (ref 0.05–0.8)
MONOCYTES # BLD AUTO: 1.58 X10*3/UL (ref 0.05–0.8)
MONOCYTES (10*3/UL) IN BLOOD BY AUTOMATED COUNT: 1.24 X10E9/L (ref 0.05–0.8)
MONOCYTES (10*3/UL) IN BLOOD BY AUTOMATED COUNT: 1.25 X10E9/L (ref 0.05–0.8)
MONOCYTES NFR BLD AUTO: 0.9 %
MONOCYTES NFR BLD AUTO: 10.2 %
MONOCYTES NFR BLD AUTO: 4.6 %
MONOCYTES NFR BLD AUTO: 8.2 %
MONOCYTES/100 LEUKOCYTES IN BLOOD BY AUTOMATED COUNT: 7 % (ref 2–10)
MONOCYTES/100 LEUKOCYTES IN BLOOD BY AUTOMATED COUNT: 7.3 % (ref 2–10)
MRSA DNA SPEC QL NAA+PROBE: NOT DETECTED
NEUTROPHILS # BLD AUTO: 10.47 X10*3/UL (ref 1.6–5.5)
NEUTROPHILS # BLD AUTO: 10.93 X10*3/UL (ref 1.6–5.5)
NEUTROPHILS # BLD AUTO: 12.48 X10*3/UL (ref 1.6–5.5)
NEUTROPHILS # BLD AUTO: 23.78 X10*3/UL (ref 1.6–5.5)
NEUTROPHILS (10*3/UL) IN BLOOD BY AUTOMATED COUNT: 10.32 X10E9/L (ref 1.6–5.5)
NEUTROPHILS (10*3/UL) IN BLOOD BY AUTOMATED COUNT: 11.62 X10E9/L (ref 1.6–5.5)
NEUTROPHILS NFR BLD AUTO: 63 %
NEUTROPHILS NFR BLD AUTO: 67.4 %
NEUTROPHILS NFR BLD AUTO: 93 %
NEUTROPHILS NFR BLD AUTO: 94 %
NEUTROPHILS/100 LEUKOCYTES IN BLOOD BY AUTOMATED COUNT: 58.2 % (ref 40–80)
NEUTROPHILS/100 LEUKOCYTES IN BLOOD BY AUTOMATED COUNT: 68.2 % (ref 40–80)
NRBC BLD-RTO: 0 /100 WBCS (ref 0–0)
NRBC BLD-RTO: 0.1 /100 WBCS (ref 0–0)
NRBC BLD-RTO: 0.2 /100 WBCS (ref 0–0)
NRBC BLD-RTO: 0.3 /100 WBCS (ref 0–0)
NRBC BLD-RTO: 0.6 /100 WBCS (ref 0–0)
NRBC BLD-RTO: 1 /100 WBCS (ref 0–0)
NRBC BLD-RTO: 1.1 /100 WBCS (ref 0–0)
NRBC BLD-RTO: 1.5 /100 WBCS (ref 0–0)
PLATELET # BLD AUTO: 350 X10*3/UL (ref 150–450)
PLATELET # BLD AUTO: 399 X10*3/UL (ref 150–450)
PLATELET # BLD AUTO: 407 X10*3/UL (ref 150–450)
PLATELET # BLD AUTO: 409 X10*3/UL (ref 150–450)
PLATELET # BLD AUTO: 430 X10*3/UL (ref 150–450)
PLATELET # BLD AUTO: 434 X10*3/UL (ref 150–450)
PLATELET # BLD AUTO: 452 X10*3/UL (ref 150–450)
PLATELET # BLD AUTO: 454 X10*3/UL (ref 150–450)
PLATELET # BLD AUTO: 463 X10*3/UL (ref 150–450)
PLATELET # BLD AUTO: 473 X10*3/UL (ref 150–450)
PLATELET # BLD AUTO: 485 X10*3/UL (ref 150–450)
PLATELET # BLD AUTO: 486 X10*3/UL (ref 150–450)
PLATELET # BLD AUTO: 493 X10*3/UL (ref 150–450)
PLATELET # BLD AUTO: 533 X10*3/UL (ref 150–450)
PLATELET # BLD AUTO: 539 X10*3/UL (ref 150–450)
PLATELETS (10*3/UL) IN BLOOD AUTOMATED COUNT: 648 X10E9/L (ref 150–450)
PLATELETS (10*3/UL) IN BLOOD AUTOMATED COUNT: 654 X10E9/L (ref 150–450)
PMV BLD AUTO: 11.4 FL (ref 7.5–11.5)
POTASSIUM (MMOL/L) IN SER/PLAS: 4.1 MMOL/L (ref 3.5–5.3)
POTASSIUM (MMOL/L) IN SER/PLAS: 4.6 MMOL/L (ref 3.5–5.3)
POTASSIUM SERPL-SCNC: 4 MMOL/L (ref 3.4–5.1)
POTASSIUM SERPL-SCNC: 4 MMOL/L (ref 3.5–5.3)
POTASSIUM SERPL-SCNC: 4.1 MMOL/L (ref 3.5–5.3)
POTASSIUM SERPL-SCNC: 4.2 MMOL/L (ref 3.5–5.3)
POTASSIUM SERPL-SCNC: 4.4 MMOL/L (ref 3.4–5.1)
POTASSIUM SERPL-SCNC: 4.6 MMOL/L (ref 3.4–5.1)
POTASSIUM SERPL-SCNC: 4.6 MMOL/L (ref 3.5–5.3)
POTASSIUM SERPL-SCNC: 4.6 MMOL/L (ref 3.5–5.3)
POTASSIUM SERPL-SCNC: 4.7 MMOL/L (ref 3.5–5.3)
POTASSIUM SERPL-SCNC: 4.8 MMOL/L (ref 3.5–5.3)
POTASSIUM SERPL-SCNC: 5 MMOL/L (ref 3.4–5.1)
POTASSIUM SERPL-SCNC: 5.1 MMOL/L (ref 3.5–5.3)
POTASSIUM SERPL-SCNC: 5.4 MMOL/L (ref 3.4–5.1)
PROCALCITONIN SERPL-MCNC: 0.48 NG/ML
PROCALCITONIN SERPL-MCNC: 0.53 NG/ML
PROT SERPL-MCNC: 5.7 G/DL (ref 5.9–7.9)
PROT SERPL-MCNC: 7 G/DL (ref 6.4–8.2)
PROT SERPL-MCNC: 7.1 G/DL (ref 5.9–7.9)
PROT SERPL-MCNC: 7.1 G/DL (ref 6.4–8.2)
PROT SERPL-MCNC: 7.1 G/DL (ref 6.4–8.2)
PROT SERPL-MCNC: 7.4 G/DL (ref 6.4–8.2)
RBC # BLD AUTO: 4.01 X10*6/UL (ref 4.5–5.9)
RBC # BLD AUTO: 4.04 X10*6/UL (ref 4.5–5.9)
RBC # BLD AUTO: 4.08 X10*6/UL (ref 4.5–5.9)
RBC # BLD AUTO: 4.12 X10*6/UL (ref 4.5–5.9)
RBC # BLD AUTO: 4.16 X10*6/UL (ref 4.5–5.9)
RBC # BLD AUTO: 4.16 X10*6/UL (ref 4.5–5.9)
RBC # BLD AUTO: 4.2 X10*6/UL (ref 4.5–5.9)
RBC # BLD AUTO: 4.25 X10*6/UL (ref 4.5–5.9)
RBC # BLD AUTO: 4.32 X10*6/UL (ref 4.5–5.9)
RBC # BLD AUTO: 4.34 X10*6/UL (ref 4.5–5.9)
RBC # BLD AUTO: 4.38 X10*6/UL (ref 4.5–5.9)
RBC # BLD AUTO: 4.64 X10*6/UL (ref 4.5–5.9)
RBC # BLD AUTO: 4.69 X10*6/UL (ref 4.5–5.9)
RBC # BLD AUTO: 4.71 X10*6/UL (ref 4.5–5.9)
RBC # BLD AUTO: 5.44 X10*6/UL (ref 4.5–5.9)
RBC MORPHOLOGY IN BLOOD: NORMAL
RIGHT VENTRICLE FREE WALL PEAK S': 9.79
RIGHT VENTRICLE PEAK SYSTOLIC PRESSURE: 43.2
RSV RNA RESP QL NAA+PROBE: NOT DETECTED
SARS-COV-2 RNA RESP QL NAA+PROBE: DETECTED
SARS-COV-2 RNA RESP QL NAA+PROBE: NOT DETECTED
SODIUM (MMOL/L) IN SER/PLAS: 135 MMOL/L (ref 136–145)
SODIUM (MMOL/L) IN SER/PLAS: 137 MMOL/L (ref 136–145)
SODIUM SERPL-SCNC: 130 MMOL/L (ref 136–145)
SODIUM SERPL-SCNC: 131 MMOL/L (ref 136–145)
SODIUM SERPL-SCNC: 132 MMOL/L (ref 136–145)
SODIUM SERPL-SCNC: 133 MMOL/L (ref 133–145)
SODIUM SERPL-SCNC: 133 MMOL/L (ref 133–145)
SODIUM SERPL-SCNC: 133 MMOL/L (ref 136–145)
SODIUM SERPL-SCNC: 134 MMOL/L (ref 136–145)
SODIUM SERPL-SCNC: 137 MMOL/L (ref 133–145)
SODIUM SERPL-SCNC: 137 MMOL/L (ref 136–145)
SODIUM SERPL-SCNC: 138 MMOL/L (ref 136–145)
SODIUM SERPL-SCNC: 140 MMOL/L (ref 133–145)
SODIUM SERPL-SCNC: 141 MMOL/L (ref 133–145)
SODIUM SERPL-SCNC: 141 MMOL/L (ref 136–145)
STAPHYLOCOCCUS SPEC CULT: NORMAL
T4 FREE SERPL-MCNC: 1.3 NG/DL (ref 0.9–1.7)
TIBC SERPL-MCNC: 357 UG/DL (ref 240–445)
TRICUSPID ANNULAR PLANE SYSTOLIC EXCURSION: 1.4
TROPONIN T SERPL-MCNC: 77 NG/L
TROPONIN T SERPL-MCNC: 85 NG/L
TROPONIN T SERPL-MCNC: 85 NG/L
TSH SERPL DL<=0.05 MIU/L-ACNC: 7.54 MIU/L (ref 0.27–4.2)
UIBC SERPL-MCNC: 107 UG/DL (ref 110–370)
UREA NITROGEN (MG/DL) IN SER/PLAS: 28 MG/DL (ref 6–23)
UREA NITROGEN (MG/DL) IN SER/PLAS: 39 MG/DL (ref 6–23)
URINE CULTURE: NORMAL
VANCOMYCIN SERPL-MCNC: 10.6 UG/ML (ref 10–20)
WBC # BLD AUTO: 13.3 X10*3/UL (ref 4.4–11.3)
WBC # BLD AUTO: 15.5 X10*3/UL (ref 4.4–11.3)
WBC # BLD AUTO: 17.4 X10*3/UL (ref 4.4–11.3)
WBC # BLD AUTO: 18.1 X10*3/UL (ref 4.4–11.3)
WBC # BLD AUTO: 18.2 X10*3/UL (ref 4.4–11.3)
WBC # BLD AUTO: 18.9 X10*3/UL (ref 4.4–11.3)
WBC # BLD AUTO: 19.5 X10*3/UL (ref 4.4–11.3)
WBC # BLD AUTO: 20.2 X10*3/UL (ref 4.4–11.3)
WBC # BLD AUTO: 20.3 X10*3/UL (ref 4.4–11.3)
WBC # BLD AUTO: 21.1 X10*3/UL (ref 4.4–11.3)
WBC # BLD AUTO: 21.2 X10*3/UL (ref 4.4–11.3)
WBC # BLD AUTO: 21.5 X10*3/UL (ref 4.4–11.3)
WBC # BLD AUTO: 21.6 X10*3/UL (ref 4.4–11.3)
WBC # BLD AUTO: 22.3 X10*3/UL (ref 4.4–11.3)
WBC # BLD AUTO: 25.6 X10*3/UL (ref 4.4–11.3)

## 2023-01-01 PROCEDURE — 99349 HOME/RES VST EST MOD MDM 40: CPT | Performed by: NURSE PRACTITIONER

## 2023-01-01 PROCEDURE — 87636 SARSCOV2 & INF A&B AMP PRB: CPT | Performed by: FAMILY MEDICINE

## 2023-01-01 PROCEDURE — 9420000001 HC RT PATIENT EDUCATION 5 MIN

## 2023-01-01 PROCEDURE — 93306 TTE W/DOPPLER COMPLETE: CPT | Mod: IPSPLIT

## 2023-01-01 PROCEDURE — 82947 ASSAY GLUCOSE BLOOD QUANT: CPT | Mod: IPSPLIT

## 2023-01-01 PROCEDURE — 3E0333Z INTRODUCTION OF ANTI-INFLAMMATORY INTO PERIPHERAL VEIN, PERCUTANEOUS APPROACH: ICD-10-PCS | Performed by: NURSE PRACTITIONER

## 2023-01-01 PROCEDURE — 80048 BASIC METABOLIC PNL TOTAL CA: CPT | Mod: IPSPLIT

## 2023-01-01 PROCEDURE — 2500000004 HC RX 250 GENERAL PHARMACY W/ HCPCS (ALT 636 FOR OP/ED): Mod: IPSPLIT

## 2023-01-01 PROCEDURE — 36415 COLL VENOUS BLD VENIPUNCTURE: CPT

## 2023-01-01 PROCEDURE — 2500000001 HC RX 250 WO HCPCS SELF ADMINISTERED DRUGS (ALT 637 FOR MEDICARE OP): Performed by: INTERNAL MEDICINE

## 2023-01-01 PROCEDURE — 97530 THERAPEUTIC ACTIVITIES: CPT | Mod: GP

## 2023-01-01 PROCEDURE — 94668 MNPJ CHEST WALL SBSQ: CPT | Mod: IPSPLIT

## 2023-01-01 PROCEDURE — 2500000002 HC RX 250 W HCPCS SELF ADMINISTERED DRUGS (ALT 637 FOR MEDICARE OP, ALT 636 FOR OP/ED): Performed by: HOSPITALIST

## 2023-01-01 PROCEDURE — 2500000004 HC RX 250 GENERAL PHARMACY W/ HCPCS (ALT 636 FOR OP/ED): Performed by: HOSPITALIST

## 2023-01-01 PROCEDURE — 36415 COLL VENOUS BLD VENIPUNCTURE: CPT | Performed by: HOSPITALIST

## 2023-01-01 PROCEDURE — 85027 COMPLETE CBC AUTOMATED: CPT | Mod: IPSPLIT

## 2023-01-01 PROCEDURE — S4991 NICOTINE PATCH NONLEGEND: HCPCS | Mod: IPSPLIT | Performed by: NURSE PRACTITIONER

## 2023-01-01 PROCEDURE — 2500000001 HC RX 250 WO HCPCS SELF ADMINISTERED DRUGS (ALT 637 FOR MEDICARE OP): Performed by: HOSPITALIST

## 2023-01-01 PROCEDURE — 36415 COLL VENOUS BLD VENIPUNCTURE: CPT | Mod: IPSPLIT

## 2023-01-01 PROCEDURE — G0008 ADMIN INFLUENZA VIRUS VAC: HCPCS | Performed by: REGISTERED NURSE

## 2023-01-01 PROCEDURE — 83880 ASSAY OF NATRIURETIC PEPTIDE: CPT | Performed by: EMERGENCY MEDICINE

## 2023-01-01 PROCEDURE — 94640 AIRWAY INHALATION TREATMENT: CPT

## 2023-01-01 PROCEDURE — 36415 COLL VENOUS BLD VENIPUNCTURE: CPT | Performed by: INTERNAL MEDICINE

## 2023-01-01 PROCEDURE — 1159F MED LIST DOCD IN RCRD: CPT | Performed by: NURSE PRACTITIONER

## 2023-01-01 PROCEDURE — 1200000002 HC GENERAL ROOM WITH TELEMETRY DAILY: Mod: IPSPLIT

## 2023-01-01 PROCEDURE — 82947 ASSAY GLUCOSE BLOOD QUANT: CPT

## 2023-01-01 PROCEDURE — 2500000004 HC RX 250 GENERAL PHARMACY W/ HCPCS (ALT 636 FOR OP/ED): Performed by: NURSE PRACTITIONER

## 2023-01-01 PROCEDURE — 97530 THERAPEUTIC ACTIVITIES: CPT | Mod: GO

## 2023-01-01 PROCEDURE — 3074F SYST BP LT 130 MM HG: CPT | Performed by: NURSE PRACTITIONER

## 2023-01-01 PROCEDURE — 2500000001 HC RX 250 WO HCPCS SELF ADMINISTERED DRUGS (ALT 637 FOR MEDICARE OP): Mod: IPSPLIT | Performed by: NURSE PRACTITIONER

## 2023-01-01 PROCEDURE — 2500000005 HC RX 250 GENERAL PHARMACY W/O HCPCS: Mod: IPSPLIT | Performed by: NURSE PRACTITIONER

## 2023-01-01 PROCEDURE — 2500000002 HC RX 250 W HCPCS SELF ADMINISTERED DRUGS (ALT 637 FOR MEDICARE OP, ALT 636 FOR OP/ED): Performed by: FAMILY MEDICINE

## 2023-01-01 PROCEDURE — 36415 COLL VENOUS BLD VENIPUNCTURE: CPT | Performed by: NURSE PRACTITIONER

## 2023-01-01 PROCEDURE — 2500000002 HC RX 250 W HCPCS SELF ADMINISTERED DRUGS (ALT 637 FOR MEDICARE OP, ALT 636 FOR OP/ED): Mod: IPSPLIT | Performed by: NURSE PRACTITIONER

## 2023-01-01 PROCEDURE — 2500000002 HC RX 250 W HCPCS SELF ADMINISTERED DRUGS (ALT 637 FOR MEDICARE OP, ALT 636 FOR OP/ED): Mod: IPSPLIT | Performed by: INTERNAL MEDICINE

## 2023-01-01 PROCEDURE — 80053 COMPREHEN METABOLIC PANEL: CPT | Performed by: FAMILY MEDICINE

## 2023-01-01 PROCEDURE — 2060000001 HC INTERMEDIATE ICU ROOM DAILY

## 2023-01-01 PROCEDURE — 94640 AIRWAY INHALATION TREATMENT: CPT | Mod: IPSPLIT

## 2023-01-01 PROCEDURE — 83540 ASSAY OF IRON: CPT | Performed by: NURSE PRACTITIONER

## 2023-01-01 PROCEDURE — 85730 THROMBOPLASTIN TIME PARTIAL: CPT | Performed by: HOSPITALIST

## 2023-01-01 PROCEDURE — 87040 BLOOD CULTURE FOR BACTERIA: CPT | Mod: GENLAB

## 2023-01-01 PROCEDURE — 96365 THER/PROPH/DIAG IV INF INIT: CPT

## 2023-01-01 PROCEDURE — 99285 EMERGENCY DEPT VISIT HI MDM: CPT | Performed by: FAMILY MEDICINE

## 2023-01-01 PROCEDURE — 83605 ASSAY OF LACTIC ACID: CPT | Performed by: EMERGENCY MEDICINE

## 2023-01-01 PROCEDURE — 71045 X-RAY EXAM CHEST 1 VIEW: CPT

## 2023-01-01 PROCEDURE — G0180 MD CERTIFICATION HHA PATIENT: HCPCS | Performed by: NURSE PRACTITIONER

## 2023-01-01 PROCEDURE — XW033E5 INTRODUCTION OF REMDESIVIR ANTI-INFECTIVE INTO PERIPHERAL VEIN, PERCUTANEOUS APPROACH, NEW TECHNOLOGY GROUP 5: ICD-10-PCS | Performed by: NURSE PRACTITIONER

## 2023-01-01 PROCEDURE — 2500000002 HC RX 250 W HCPCS SELF ADMINISTERED DRUGS (ALT 637 FOR MEDICARE OP, ALT 636 FOR OP/ED): Mod: IPSPLIT

## 2023-01-01 PROCEDURE — 99233 SBSQ HOSP IP/OBS HIGH 50: CPT | Performed by: HOSPITALIST

## 2023-01-01 PROCEDURE — S4991 NICOTINE PATCH NONLEGEND: HCPCS | Performed by: HOSPITALIST

## 2023-01-01 PROCEDURE — 99233 SBSQ HOSP IP/OBS HIGH 50: CPT | Performed by: INTERNAL MEDICINE

## 2023-01-01 PROCEDURE — 97110 THERAPEUTIC EXERCISES: CPT | Mod: GP,CQ,IPSPLIT

## 2023-01-01 PROCEDURE — 85027 COMPLETE CBC AUTOMATED: CPT | Performed by: HOSPITALIST

## 2023-01-01 PROCEDURE — 80053 COMPREHEN METABOLIC PANEL: CPT | Mod: IPSPLIT

## 2023-01-01 PROCEDURE — 2500000004 HC RX 250 GENERAL PHARMACY W/ HCPCS (ALT 636 FOR OP/ED): Performed by: FAMILY MEDICINE

## 2023-01-01 PROCEDURE — 87641 MR-STAPH DNA AMP PROBE: CPT | Performed by: HOSPITALIST

## 2023-01-01 PROCEDURE — 94760 N-INVAS EAR/PLS OXIMETRY 1: CPT

## 2023-01-01 PROCEDURE — 82248 BILIRUBIN DIRECT: CPT | Performed by: NURSE PRACTITIONER

## 2023-01-01 PROCEDURE — 97162 PT EVAL MOD COMPLEX 30 MIN: CPT | Mod: GP,IPSPLIT | Performed by: PHYSICAL THERAPIST

## 2023-01-01 PROCEDURE — 99223 1ST HOSP IP/OBS HIGH 75: CPT | Performed by: NURSE PRACTITIONER

## 2023-01-01 PROCEDURE — 83735 ASSAY OF MAGNESIUM: CPT | Performed by: HOSPITALIST

## 2023-01-01 PROCEDURE — 84145 PROCALCITONIN (PCT): CPT | Mod: GENLAB

## 2023-01-01 PROCEDURE — 84443 ASSAY THYROID STIM HORMONE: CPT

## 2023-01-01 PROCEDURE — 99215 OFFICE O/P EST HI 40 MIN: CPT | Mod: 25 | Performed by: NURSE PRACTITIONER

## 2023-01-01 PROCEDURE — 86803 HEPATITIS C AB TEST: CPT | Performed by: NURSE PRACTITIONER

## 2023-01-01 PROCEDURE — 2500000002 HC RX 250 W HCPCS SELF ADMINISTERED DRUGS (ALT 637 FOR MEDICARE OP, ALT 636 FOR OP/ED): Performed by: INTERNAL MEDICINE

## 2023-01-01 PROCEDURE — 85027 COMPLETE CBC AUTOMATED: CPT

## 2023-01-01 PROCEDURE — 99215 OFFICE O/P EST HI 40 MIN: CPT | Performed by: NURSE PRACTITIONER

## 2023-01-01 PROCEDURE — 84484 ASSAY OF TROPONIN QUANT: CPT | Performed by: EMERGENCY MEDICINE

## 2023-01-01 PROCEDURE — 80048 BASIC METABOLIC PNL TOTAL CA: CPT | Performed by: HOSPITALIST

## 2023-01-01 PROCEDURE — 3066F NEPHROPATHY DOC TX: CPT | Performed by: NURSE PRACTITIONER

## 2023-01-01 PROCEDURE — 99221 1ST HOSP IP/OBS SF/LOW 40: CPT | Performed by: INTERNAL MEDICINE

## 2023-01-01 PROCEDURE — 71045 X-RAY EXAM CHEST 1 VIEW: CPT | Performed by: RADIOLOGY

## 2023-01-01 PROCEDURE — 1126F AMNT PAIN NOTED NONE PRSNT: CPT | Performed by: NURSE PRACTITIONER

## 2023-01-01 PROCEDURE — 84484 ASSAY OF TROPONIN QUANT: CPT | Performed by: HOSPITALIST

## 2023-01-01 PROCEDURE — 2500000004 HC RX 250 GENERAL PHARMACY W/ HCPCS (ALT 636 FOR OP/ED): Mod: IPSPLIT | Performed by: NURSE PRACTITIONER

## 2023-01-01 PROCEDURE — 87449 NOS EACH ORGANISM AG IA: CPT | Mod: GENLAB | Performed by: NURSE PRACTITIONER

## 2023-01-01 PROCEDURE — 87081 CULTURE SCREEN ONLY: CPT | Mod: WESLAB | Performed by: HOSPITALIST

## 2023-01-01 PROCEDURE — 87389 HIV-1 AG W/HIV-1&-2 AB AG IA: CPT | Performed by: NURSE PRACTITIONER

## 2023-01-01 PROCEDURE — 80053 COMPREHEN METABOLIC PANEL: CPT | Performed by: NURSE PRACTITIONER

## 2023-01-01 PROCEDURE — 82374 ASSAY BLOOD CARBON DIOXIDE: CPT | Performed by: HOSPITALIST

## 2023-01-01 PROCEDURE — 85027 COMPLETE CBC AUTOMATED: CPT | Performed by: INTERNAL MEDICINE

## 2023-01-01 PROCEDURE — 71045 X-RAY EXAM CHEST 1 VIEW: CPT | Mod: FY

## 2023-01-01 PROCEDURE — 1160F RVW MEDS BY RX/DR IN RCRD: CPT | Performed by: NURSE PRACTITIONER

## 2023-01-01 PROCEDURE — 83880 ASSAY OF NATRIURETIC PEPTIDE: CPT | Mod: IPSPLIT

## 2023-01-01 PROCEDURE — 87637 SARSCOV2&INF A&B&RSV AMP PRB: CPT | Performed by: EMERGENCY MEDICINE

## 2023-01-01 PROCEDURE — 87040 BLOOD CULTURE FOR BACTERIA: CPT | Mod: GENLAB | Performed by: EMERGENCY MEDICINE

## 2023-01-01 PROCEDURE — 83036 HEMOGLOBIN GLYCOSYLATED A1C: CPT

## 2023-01-01 PROCEDURE — 80053 COMPREHEN METABOLIC PANEL: CPT | Performed by: EMERGENCY MEDICINE

## 2023-01-01 PROCEDURE — 3046F HEMOGLOBIN A1C LEVEL >9.0%: CPT | Performed by: NURSE PRACTITIONER

## 2023-01-01 PROCEDURE — 9420000001 HC RT PATIENT EDUCATION 5 MIN: Mod: IPSPLIT

## 2023-01-01 PROCEDURE — 99233 SBSQ HOSP IP/OBS HIGH 50: CPT | Performed by: NURSE PRACTITIONER

## 2023-01-01 PROCEDURE — 93005 ELECTROCARDIOGRAM TRACING: CPT

## 2023-01-01 PROCEDURE — 71250 CT THORAX DX C-: CPT

## 2023-01-01 PROCEDURE — 80053 COMPREHEN METABOLIC PANEL: CPT | Performed by: HOSPITALIST

## 2023-01-01 PROCEDURE — 94760 N-INVAS EAR/PLS OXIMETRY 1: CPT | Mod: IPSPLIT

## 2023-01-01 PROCEDURE — 83605 ASSAY OF LACTIC ACID: CPT | Mod: IPSPLIT | Performed by: NURSE PRACTITIONER

## 2023-01-01 PROCEDURE — 85025 COMPLETE CBC W/AUTO DIFF WBC: CPT | Performed by: EMERGENCY MEDICINE

## 2023-01-01 PROCEDURE — 84484 ASSAY OF TROPONIN QUANT: CPT | Performed by: INTERNAL MEDICINE

## 2023-01-01 PROCEDURE — 94664 DEMO&/EVAL PT USE INHALER: CPT

## 2023-01-01 PROCEDURE — 36415 COLL VENOUS BLD VENIPUNCTURE: CPT | Mod: IPSPLIT | Performed by: NURSE PRACTITIONER

## 2023-01-01 PROCEDURE — G0452 MOLECULAR PATHOLOGY INTERPR: HCPCS | Performed by: NURSE PRACTITIONER

## 2023-01-01 PROCEDURE — 3078F DIAST BP <80 MM HG: CPT | Performed by: NURSE PRACTITIONER

## 2023-01-01 PROCEDURE — 83880 ASSAY OF NATRIURETIC PEPTIDE: CPT | Mod: IPSPLIT | Performed by: NURSE PRACTITIONER

## 2023-01-01 PROCEDURE — 36415 COLL VENOUS BLD VENIPUNCTURE: CPT | Performed by: EMERGENCY MEDICINE

## 2023-01-01 PROCEDURE — 87205 SMEAR GRAM STAIN: CPT | Mod: GENLAB

## 2023-01-01 PROCEDURE — 94667 MNPJ CHEST WALL 1ST: CPT | Mod: IPSPLIT

## 2023-01-01 PROCEDURE — 97166 OT EVAL MOD COMPLEX 45 MIN: CPT | Mod: GO,IPSPLIT | Performed by: OCCUPATIONAL THERAPIST

## 2023-01-01 PROCEDURE — 99291 CRITICAL CARE FIRST HOUR: CPT | Mod: 59

## 2023-01-01 PROCEDURE — 85652 RBC SED RATE AUTOMATED: CPT | Performed by: NURSE PRACTITIONER

## 2023-01-01 PROCEDURE — 84439 ASSAY OF FREE THYROXINE: CPT

## 2023-01-01 PROCEDURE — 36415 COLL VENOUS BLD VENIPUNCTURE: CPT | Performed by: FAMILY MEDICINE

## 2023-01-01 PROCEDURE — 83036 HEMOGLOBIN GLYCOSYLATED A1C: CPT | Performed by: HOSPITALIST

## 2023-01-01 PROCEDURE — 80048 BASIC METABOLIC PNL TOTAL CA: CPT | Mod: IPSPLIT | Performed by: NURSE PRACTITIONER

## 2023-01-01 PROCEDURE — 97530 THERAPEUTIC ACTIVITIES: CPT | Mod: GP,CQ,IPSPLIT

## 2023-01-01 PROCEDURE — 85379 FIBRIN DEGRADATION QUANT: CPT | Performed by: EMERGENCY MEDICINE

## 2023-01-01 PROCEDURE — 82728 ASSAY OF FERRITIN: CPT | Performed by: NURSE PRACTITIONER

## 2023-01-01 PROCEDURE — 97166 OT EVAL MOD COMPLEX 45 MIN: CPT | Mod: GO

## 2023-01-01 PROCEDURE — 2500000005 HC RX 250 GENERAL PHARMACY W/O HCPCS: Performed by: NURSE PRACTITIONER

## 2023-01-01 PROCEDURE — 80053 COMPREHEN METABOLIC PANEL: CPT

## 2023-01-01 PROCEDURE — 86706 HEP B SURFACE ANTIBODY: CPT | Performed by: NURSE PRACTITIONER

## 2023-01-01 PROCEDURE — 99231 SBSQ HOSP IP/OBS SF/LOW 25: CPT

## 2023-01-01 PROCEDURE — 97163 PT EVAL HIGH COMPLEX 45 MIN: CPT | Mod: GP

## 2023-01-01 PROCEDURE — 83605 ASSAY OF LACTIC ACID: CPT | Performed by: FAMILY MEDICINE

## 2023-01-01 PROCEDURE — 99285 EMERGENCY DEPT VISIT HI MDM: CPT | Performed by: EMERGENCY MEDICINE

## 2023-01-01 PROCEDURE — 96375 TX/PRO/DX INJ NEW DRUG ADDON: CPT

## 2023-01-01 PROCEDURE — 97530 THERAPEUTIC ACTIVITIES: CPT | Mod: GO,IPSPLIT

## 2023-01-01 PROCEDURE — 85025 COMPLETE CBC W/AUTO DIFF WBC: CPT | Performed by: FAMILY MEDICINE

## 2023-01-01 PROCEDURE — 2500000005 HC RX 250 GENERAL PHARMACY W/O HCPCS: Mod: IPSPLIT

## 2023-01-01 PROCEDURE — 87340 HEPATITIS B SURFACE AG IA: CPT | Performed by: NURSE PRACTITIONER

## 2023-01-01 PROCEDURE — 2500000005 HC RX 250 GENERAL PHARMACY W/O HCPCS: Mod: JZ | Performed by: NURSE PRACTITIONER

## 2023-01-01 PROCEDURE — 82374 ASSAY BLOOD CARBON DIOXIDE: CPT | Mod: IPSPLIT | Performed by: NURSE PRACTITIONER

## 2023-01-01 PROCEDURE — 83880 ASSAY OF NATRIURETIC PEPTIDE: CPT | Performed by: FAMILY MEDICINE

## 2023-01-01 PROCEDURE — 85025 COMPLETE CBC W/AUTO DIFF WBC: CPT | Mod: IPSPLIT | Performed by: NURSE PRACTITIONER

## 2023-01-01 PROCEDURE — 99232 SBSQ HOSP IP/OBS MODERATE 35: CPT | Performed by: INTERNAL MEDICINE

## 2023-01-01 PROCEDURE — 85025 COMPLETE CBC W/AUTO DIFF WBC: CPT | Performed by: NURSE PRACTITIONER

## 2023-01-01 PROCEDURE — 80202 ASSAY OF VANCOMYCIN: CPT | Performed by: HOSPITALIST

## 2023-01-01 PROCEDURE — 97116 GAIT TRAINING THERAPY: CPT | Mod: GP,CQ,IPSPLIT

## 2023-01-01 PROCEDURE — 86140 C-REACTIVE PROTEIN: CPT | Performed by: NURSE PRACTITIONER

## 2023-01-01 PROCEDURE — 84145 PROCALCITONIN (PCT): CPT | Mod: GENLAB | Performed by: NURSE PRACTITIONER

## 2023-01-01 PROCEDURE — 1125F AMNT PAIN NOTED PAIN PRSNT: CPT | Performed by: NURSE PRACTITIONER

## 2023-01-01 PROCEDURE — 81270 JAK2 GENE: CPT | Performed by: NURSE PRACTITIONER

## 2023-01-01 RX ORDER — OXYBUTYNIN CHLORIDE 5 MG/1
5 TABLET, EXTENDED RELEASE ORAL DAILY
Qty: 30 TABLET | Refills: 11 | Status: SHIPPED | OUTPATIENT
Start: 2023-01-01 | End: 2024-01-01 | Stop reason: HOSPADM

## 2023-01-01 RX ORDER — DEXTROSE 50 % IN WATER (D50W) INTRAVENOUS SYRINGE
25
Status: DISCONTINUED | OUTPATIENT
Start: 2023-01-01 | End: 2023-01-01 | Stop reason: HOSPADM

## 2023-01-01 RX ORDER — GABAPENTIN 300 MG/1
1 CAPSULE ORAL NIGHTLY
COMMUNITY

## 2023-01-01 RX ORDER — DAPAGLIFLOZIN 10 MG/1
10 TABLET, FILM COATED ORAL DAILY
Status: DISCONTINUED | OUTPATIENT
Start: 2023-01-01 | End: 2023-01-01 | Stop reason: HOSPADM

## 2023-01-01 RX ORDER — GUAIFENESIN 600 MG/1
600 TABLET, EXTENDED RELEASE ORAL EVERY 12 HOURS PRN
Status: CANCELLED | OUTPATIENT
Start: 2023-01-01

## 2023-01-01 RX ORDER — AMOXICILLIN AND CLAVULANATE POTASSIUM 875; 125 MG/1; MG/1
1 TABLET, FILM COATED ORAL 2 TIMES DAILY
Qty: 10 TABLET | Refills: 0 | Status: SHIPPED | OUTPATIENT
Start: 2023-01-01 | End: 2023-01-01 | Stop reason: HOSPADM

## 2023-01-01 RX ORDER — INSULIN LISPRO 100 [IU]/ML
0-15 INJECTION, SOLUTION INTRAVENOUS; SUBCUTANEOUS
Status: DISCONTINUED | OUTPATIENT
Start: 2023-01-01 | End: 2024-01-01 | Stop reason: HOSPADM

## 2023-01-01 RX ORDER — PREDNISONE 10 MG/1
40 TABLET ORAL DAILY
Qty: 10 TABLET | Refills: 0 | Status: SHIPPED | OUTPATIENT
Start: 2023-01-01 | End: 2023-01-01 | Stop reason: HOSPADM

## 2023-01-01 RX ORDER — CEFUROXIME AXETIL 500 MG/1
500 TABLET ORAL 2 TIMES DAILY
Qty: 6 TABLET | Refills: 0 | Status: SHIPPED | OUTPATIENT
Start: 2023-01-01 | End: 2024-01-01 | Stop reason: HOSPADM

## 2023-01-01 RX ORDER — ALBUTEROL SULFATE 0.83 MG/ML
2.5 SOLUTION RESPIRATORY (INHALATION) EVERY 2 HOUR PRN
Status: DISCONTINUED | OUTPATIENT
Start: 2023-01-01 | End: 2023-01-01 | Stop reason: HOSPADM

## 2023-01-01 RX ORDER — ESOMEPRAZOLE MAGNESIUM 40 MG/1
1 CAPSULE, DELAYED RELEASE ORAL DAILY
COMMUNITY
Start: 2017-12-07 | End: 2023-01-01 | Stop reason: WASHOUT

## 2023-01-01 RX ORDER — SERTRALINE HYDROCHLORIDE 100 MG/1
100 TABLET, FILM COATED ORAL DAILY
Status: DISCONTINUED | OUTPATIENT
Start: 2023-01-01 | End: 2024-01-01 | Stop reason: HOSPADM

## 2023-01-01 RX ORDER — INSULIN GLARGINE 100 [IU]/ML
30 INJECTION, SOLUTION SUBCUTANEOUS EVERY 24 HOURS
Status: DISCONTINUED | OUTPATIENT
Start: 2023-01-01 | End: 2023-01-01

## 2023-01-01 RX ORDER — GUAIFENESIN 100 MG/5ML
200 SOLUTION ORAL EVERY 4 HOURS PRN
Status: DISCONTINUED | OUTPATIENT
Start: 2023-01-01 | End: 2024-01-01 | Stop reason: HOSPADM

## 2023-01-01 RX ORDER — SODIUM CHLORIDE 9 MG/ML
INJECTION, SOLUTION INTRAVENOUS
Status: COMPLETED
Start: 2023-01-01 | End: 2023-01-01

## 2023-01-01 RX ORDER — TRIAMCINOLONE ACETONIDE 1 MG/G
CREAM TOPICAL
COMMUNITY
End: 2024-01-01 | Stop reason: HOSPADM

## 2023-01-01 RX ORDER — FLUTICASONE PROPIONATE AND SALMETEROL 250; 50 UG/1; UG/1
1 POWDER RESPIRATORY (INHALATION) 2 TIMES DAILY
Status: DISCONTINUED | OUTPATIENT
Start: 2023-01-01 | End: 2023-01-01

## 2023-01-01 RX ORDER — QUETIAPINE FUMARATE 25 MG/1
25 TABLET, FILM COATED ORAL NIGHTLY
COMMUNITY
Start: 2021-11-22

## 2023-01-01 RX ORDER — CARBIDOPA AND LEVODOPA 25; 100 MG/1; MG/1
1 TABLET, EXTENDED RELEASE ORAL 3 TIMES DAILY
Status: DISCONTINUED | OUTPATIENT
Start: 2023-01-01 | End: 2024-01-01 | Stop reason: HOSPADM

## 2023-01-01 RX ORDER — IBUPROFEN 200 MG
1 TABLET ORAL EVERY 24 HOURS
COMMUNITY

## 2023-01-01 RX ORDER — INSULIN LISPRO 100 [IU]/ML
0-20 INJECTION, SOLUTION INTRAVENOUS; SUBCUTANEOUS
Status: DISCONTINUED | OUTPATIENT
Start: 2023-01-01 | End: 2023-01-01 | Stop reason: HOSPADM

## 2023-01-01 RX ORDER — CARBIDOPA AND LEVODOPA 25; 100 MG/1; MG/1
1 TABLET, EXTENDED RELEASE ORAL 3 TIMES DAILY
COMMUNITY

## 2023-01-01 RX ORDER — QUETIAPINE FUMARATE 25 MG/1
25 TABLET, FILM COATED ORAL NIGHTLY
Status: DISCONTINUED | OUTPATIENT
Start: 2023-01-01 | End: 2023-01-01 | Stop reason: HOSPADM

## 2023-01-01 RX ORDER — METHYLPREDNISOLONE 4 MG/1
TABLET ORAL
Qty: 21 TABLET | Refills: 0 | Status: SHIPPED | OUTPATIENT
Start: 2023-01-01 | End: 2024-01-01 | Stop reason: HOSPADM

## 2023-01-01 RX ORDER — ACETAMINOPHEN 325 MG/1
650 TABLET ORAL EVERY 4 HOURS PRN
Status: CANCELLED | OUTPATIENT
Start: 2023-01-01

## 2023-01-01 RX ORDER — DULOXETIN HYDROCHLORIDE 60 MG/1
1 CAPSULE, DELAYED RELEASE ORAL DAILY
COMMUNITY
Start: 2017-03-03 | End: 2023-01-01 | Stop reason: WASHOUT

## 2023-01-01 RX ORDER — DOCUSATE SODIUM 100 MG/1
100 CAPSULE, LIQUID FILLED ORAL 2 TIMES DAILY PRN
COMMUNITY

## 2023-01-01 RX ORDER — METFORMIN HYDROCHLORIDE 500 MG/1
2 TABLET ORAL
COMMUNITY
Start: 2017-12-19 | End: 2023-01-01 | Stop reason: WASHOUT

## 2023-01-01 RX ORDER — SILDENAFIL 50 MG/1
50 TABLET, FILM COATED ORAL
COMMUNITY
End: 2023-01-01 | Stop reason: WASHOUT

## 2023-01-01 RX ORDER — FUROSEMIDE 10 MG/ML
20 INJECTION INTRAMUSCULAR; INTRAVENOUS 2 TIMES DAILY
Status: CANCELLED | OUTPATIENT
Start: 2023-01-01

## 2023-01-01 RX ORDER — ALUMINUM HYDROXIDE, MAGNESIUM HYDROXIDE, AND SIMETHICONE 1200; 120; 1200 MG/30ML; MG/30ML; MG/30ML
30 SUSPENSION ORAL 4 TIMES DAILY PRN
Status: DISCONTINUED | OUTPATIENT
Start: 2023-01-01 | End: 2024-01-01 | Stop reason: HOSPADM

## 2023-01-01 RX ORDER — HEPARIN SODIUM 10000 [USP'U]/100ML
0-4000 INJECTION, SOLUTION INTRAVENOUS CONTINUOUS
Status: DISCONTINUED | OUTPATIENT
Start: 2023-01-01 | End: 2023-01-01 | Stop reason: ALTCHOICE

## 2023-01-01 RX ORDER — DOCUSATE SODIUM 100 MG/1
100 CAPSULE, LIQUID FILLED ORAL 2 TIMES DAILY PRN
Status: DISCONTINUED | OUTPATIENT
Start: 2023-01-01 | End: 2024-01-01 | Stop reason: HOSPADM

## 2023-01-01 RX ORDER — INSULIN LISPRO 100 [IU]/ML
0-5 INJECTION, SOLUTION INTRAVENOUS; SUBCUTANEOUS
Status: DISCONTINUED | OUTPATIENT
Start: 2023-01-01 | End: 2023-01-01

## 2023-01-01 RX ORDER — LEVOTHYROXINE SODIUM 25 UG/1
25 TABLET ORAL DAILY
Qty: 30 TABLET | Refills: 11 | Status: SHIPPED | OUTPATIENT
Start: 2023-01-01 | End: 2024-01-01 | Stop reason: HOSPADM

## 2023-01-01 RX ORDER — ALBUTEROL SULFATE 0.83 MG/ML
3 SOLUTION RESPIRATORY (INHALATION) 4 TIMES DAILY
Status: DISCONTINUED | OUTPATIENT
Start: 2023-01-01 | End: 2023-01-01 | Stop reason: SDUPTHER

## 2023-01-01 RX ORDER — INSULIN GLARGINE 100 [IU]/ML
35 INJECTION, SOLUTION SUBCUTANEOUS NIGHTLY
Status: DISCONTINUED | OUTPATIENT
Start: 2023-01-01 | End: 2023-01-01

## 2023-01-01 RX ORDER — DAPAGLIFLOZIN 10 MG/1
1 TABLET, FILM COATED ORAL DAILY
COMMUNITY
Start: 2023-01-04 | End: 2023-01-01 | Stop reason: SDUPTHER

## 2023-01-01 RX ORDER — FLASH GLUCOSE SCANNING READER
1 EACH MISCELLANEOUS AS NEEDED
COMMUNITY
Start: 2023-01-01

## 2023-01-01 RX ORDER — ROPINIROLE 0.25 MG/1
0.25 TABLET, FILM COATED ORAL 2 TIMES DAILY
COMMUNITY
End: 2023-01-01 | Stop reason: WASHOUT

## 2023-01-01 RX ORDER — FLUTICASONE PROPIONATE AND SALMETEROL 250; 50 UG/1; UG/1
1 POWDER RESPIRATORY (INHALATION) 2 TIMES DAILY
COMMUNITY
Start: 2023-01-01 | End: 2023-01-01

## 2023-01-01 RX ORDER — POLYETHYLENE GLYCOL 3350 17 G/17G
17 POWDER, FOR SOLUTION ORAL DAILY
Status: CANCELLED | OUTPATIENT
Start: 2023-01-01

## 2023-01-01 RX ORDER — ATORVASTATIN CALCIUM 40 MG/1
80 TABLET, FILM COATED ORAL DAILY
Status: DISCONTINUED | OUTPATIENT
Start: 2023-01-01 | End: 2023-01-01 | Stop reason: HOSPADM

## 2023-01-01 RX ORDER — FLUTICASONE PROPIONATE AND SALMETEROL 250; 50 UG/1; UG/1
1 POWDER RESPIRATORY (INHALATION) 2 TIMES DAILY
Qty: 1 EACH | Refills: 3 | Status: SHIPPED | OUTPATIENT
Start: 2023-01-01 | End: 2024-01-24

## 2023-01-01 RX ORDER — IBUPROFEN 200 MG
1 TABLET ORAL DAILY
COMMUNITY
Start: 2022-11-23 | End: 2023-01-01 | Stop reason: WASHOUT

## 2023-01-01 RX ORDER — CARISOPRODOL 350 MG/1
1 TABLET ORAL EVERY 6 HOURS PRN
COMMUNITY
Start: 2017-10-05 | End: 2023-01-01 | Stop reason: WASHOUT

## 2023-01-01 RX ORDER — SYRING-NEEDL,DISP,INSUL,0.3 ML 31GX15/64"
1 SYRINGE, EMPTY DISPOSABLE MISCELLANEOUS DAILY
Qty: 30 EACH | Refills: 3 | Status: SHIPPED | OUTPATIENT
Start: 2023-01-01 | End: 2024-01-24

## 2023-01-01 RX ORDER — VANCOMYCIN 750 MG/150ML
750 INJECTION, SOLUTION INTRAVENOUS EVERY 24 HOURS
Status: DISCONTINUED | OUTPATIENT
Start: 2023-01-01 | End: 2023-01-01

## 2023-01-01 RX ORDER — DAPAGLIFLOZIN 10 MG/1
10 TABLET, FILM COATED ORAL DAILY
Qty: 30 TABLET | Refills: 11 | Status: SHIPPED | OUTPATIENT
Start: 2023-01-01 | End: 2024-01-24

## 2023-01-01 RX ORDER — DEXTROSE MONOHYDRATE 100 MG/ML
0.3 INJECTION, SOLUTION INTRAVENOUS ONCE AS NEEDED
Status: DISCONTINUED | OUTPATIENT
Start: 2023-01-01 | End: 2024-01-01 | Stop reason: HOSPADM

## 2023-01-01 RX ORDER — METOPROLOL SUCCINATE 25 MG/1
12.5 TABLET, EXTENDED RELEASE ORAL DAILY
Qty: 45 TABLET | Refills: 3 | Status: SHIPPED | OUTPATIENT
Start: 2023-01-01 | End: 2024-01-01 | Stop reason: HOSPADM

## 2023-01-01 RX ORDER — METHYLPREDNISOLONE 4 MG/1
TABLET ORAL
Qty: 21 TABLET | Refills: 0 | Status: SHIPPED | OUTPATIENT
Start: 2023-01-01 | End: 2023-01-01 | Stop reason: SDUPTHER

## 2023-01-01 RX ORDER — INSULIN GLARGINE 100 [IU]/ML
45 INJECTION, SOLUTION SUBCUTANEOUS NIGHTLY
Status: DISCONTINUED | OUTPATIENT
Start: 2023-01-01 | End: 2024-01-01 | Stop reason: HOSPADM

## 2023-01-01 RX ORDER — ACETAMINOPHEN 325 MG/1
650 TABLET ORAL 2 TIMES DAILY
COMMUNITY
End: 2024-01-01 | Stop reason: HOSPADM

## 2023-01-01 RX ORDER — ACETAMINOPHEN 160 MG/5ML
650 SOLUTION ORAL EVERY 4 HOURS PRN
Status: CANCELLED | OUTPATIENT
Start: 2023-01-01

## 2023-01-01 RX ORDER — GABAPENTIN 300 MG/1
300 CAPSULE ORAL NIGHTLY
Status: DISCONTINUED | OUTPATIENT
Start: 2023-01-01 | End: 2023-01-01 | Stop reason: HOSPADM

## 2023-01-01 RX ORDER — HEPARIN SODIUM 5000 [USP'U]/ML
4000 INJECTION, SOLUTION INTRAVENOUS; SUBCUTANEOUS ONCE
Status: COMPLETED | OUTPATIENT
Start: 2023-01-01 | End: 2023-01-01

## 2023-01-01 RX ORDER — CARBIDOPA AND LEVODOPA 25; 100 MG/1; MG/1
1 TABLET, EXTENDED RELEASE ORAL 3 TIMES DAILY
Status: DISCONTINUED | OUTPATIENT
Start: 2023-01-01 | End: 2023-01-01 | Stop reason: HOSPADM

## 2023-01-01 RX ORDER — SYRINGE,SAFETY WITH NEEDLE,1ML 25GX1"
SYRINGE (EA) MISCELLANEOUS
COMMUNITY
Start: 2013-09-03 | End: 2023-01-01 | Stop reason: SDUPTHER

## 2023-01-01 RX ORDER — ONDANSETRON HYDROCHLORIDE 2 MG/ML
4 INJECTION, SOLUTION INTRAVENOUS EVERY 6 HOURS PRN
Status: DISCONTINUED | OUTPATIENT
Start: 2023-01-01 | End: 2024-01-01 | Stop reason: HOSPADM

## 2023-01-01 RX ORDER — QUETIAPINE FUMARATE 25 MG/1
25 TABLET, FILM COATED ORAL NIGHTLY
Status: DISCONTINUED | OUTPATIENT
Start: 2023-01-01 | End: 2024-01-01 | Stop reason: HOSPADM

## 2023-01-01 RX ORDER — PANTOPRAZOLE SODIUM 40 MG/1
40 TABLET, DELAYED RELEASE ORAL
Status: DISCONTINUED | OUTPATIENT
Start: 2023-01-01 | End: 2023-01-01 | Stop reason: HOSPADM

## 2023-01-01 RX ORDER — CYCLOBENZAPRINE HCL 10 MG
10 TABLET ORAL NIGHTLY
Status: DISCONTINUED | OUTPATIENT
Start: 2023-01-01 | End: 2024-01-01 | Stop reason: HOSPADM

## 2023-01-01 RX ORDER — TALC
6 POWDER (GRAM) TOPICAL DAILY
Status: CANCELLED | OUTPATIENT
Start: 2023-01-01

## 2023-01-01 RX ORDER — HEPARIN SODIUM 5000 [USP'U]/ML
INJECTION, SOLUTION INTRAVENOUS; SUBCUTANEOUS AS NEEDED
Status: DISCONTINUED | OUTPATIENT
Start: 2023-01-01 | End: 2023-01-01

## 2023-01-01 RX ORDER — FUROSEMIDE 40 MG/1
1 TABLET ORAL DAILY
COMMUNITY
Start: 2022-12-28 | End: 2024-01-01 | Stop reason: HOSPADM

## 2023-01-01 RX ORDER — BISACODYL 5 MG
100 TABLET, DELAYED RELEASE (ENTERIC COATED) ORAL AS NEEDED
COMMUNITY
End: 2023-01-01 | Stop reason: HOSPADM

## 2023-01-01 RX ORDER — PRASUGREL 10 MG/1
10 TABLET, FILM COATED ORAL DAILY
COMMUNITY
Start: 2014-05-05 | End: 2024-01-01 | Stop reason: HOSPADM

## 2023-01-01 RX ORDER — ALBUTEROL SULFATE 0.83 MG/ML
2.5 SOLUTION RESPIRATORY (INHALATION) EVERY 6 HOURS PRN
Status: DISCONTINUED | OUTPATIENT
Start: 2023-01-01 | End: 2023-01-01

## 2023-01-01 RX ORDER — AZITHROMYCIN 250 MG/1
TABLET, FILM COATED ORAL
Qty: 6 TABLET | Refills: 0 | Status: SHIPPED | OUTPATIENT
Start: 2023-01-01 | End: 2024-01-01 | Stop reason: HOSPADM

## 2023-01-01 RX ORDER — FLUTICASONE FUROATE AND VILANTEROL 100; 25 UG/1; UG/1
1 POWDER RESPIRATORY (INHALATION)
Status: DISCONTINUED | OUTPATIENT
Start: 2023-01-01 | End: 2024-01-01 | Stop reason: HOSPADM

## 2023-01-01 RX ORDER — ALBUTEROL SULFATE 0.83 MG/ML
3 SOLUTION RESPIRATORY (INHALATION)
Status: DISCONTINUED | OUTPATIENT
Start: 2023-01-01 | End: 2023-01-01

## 2023-01-01 RX ORDER — INSULIN HUMAN 100 [IU]/ML
10 INJECTION, SOLUTION PARENTERAL
COMMUNITY
Start: 2018-03-16 | End: 2023-01-01 | Stop reason: WASHOUT

## 2023-01-01 RX ORDER — ALBUTEROL SULFATE 0.83 MG/ML
2.5 SOLUTION RESPIRATORY (INHALATION) EVERY 6 HOURS PRN
COMMUNITY
Start: 2021-11-22 | End: 2023-01-01 | Stop reason: WASHOUT

## 2023-01-01 RX ORDER — PANTOPRAZOLE SODIUM 40 MG/1
40 TABLET, DELAYED RELEASE ORAL
Status: CANCELLED | OUTPATIENT
Start: 2023-01-01

## 2023-01-01 RX ORDER — ONDANSETRON 4 MG/1
4 TABLET, ORALLY DISINTEGRATING ORAL EVERY 8 HOURS PRN
Status: CANCELLED | OUTPATIENT
Start: 2023-01-01

## 2023-01-01 RX ORDER — ONDANSETRON HYDROCHLORIDE 2 MG/ML
4 INJECTION, SOLUTION INTRAVENOUS EVERY 8 HOURS PRN
Status: CANCELLED | OUTPATIENT
Start: 2023-01-01

## 2023-01-01 RX ORDER — INSULIN LISPRO 100 [IU]/ML
8 INJECTION, SOLUTION INTRAVENOUS; SUBCUTANEOUS
COMMUNITY
Start: 2018-06-13 | End: 2024-01-01 | Stop reason: HOSPADM

## 2023-01-01 RX ORDER — METOPROLOL SUCCINATE 25 MG/1
12.5 TABLET, EXTENDED RELEASE ORAL DAILY
Qty: 45 TABLET | Refills: 3 | Status: SHIPPED | OUTPATIENT
Start: 2023-01-01 | End: 2023-01-01 | Stop reason: SDUPTHER

## 2023-01-01 RX ORDER — LANCETS 26 GAUGE
EACH MISCELLANEOUS 3 TIMES DAILY
COMMUNITY
Start: 2014-07-02

## 2023-01-01 RX ORDER — ACETAMINOPHEN 325 MG/1
2 TABLET ORAL 2 TIMES DAILY
COMMUNITY
Start: 2022-01-28 | End: 2023-01-01 | Stop reason: HOSPADM

## 2023-01-01 RX ORDER — METOPROLOL SUCCINATE 25 MG/1
12.5 TABLET, EXTENDED RELEASE ORAL DAILY
Status: DISCONTINUED | OUTPATIENT
Start: 2023-01-01 | End: 2023-01-01

## 2023-01-01 RX ORDER — ATORVASTATIN CALCIUM 80 MG/1
80 TABLET, FILM COATED ORAL NIGHTLY
Status: DISCONTINUED | OUTPATIENT
Start: 2023-01-01 | End: 2024-01-01 | Stop reason: HOSPADM

## 2023-01-01 RX ORDER — METOPROLOL SUCCINATE 50 MG/1
1 TABLET, EXTENDED RELEASE ORAL DAILY
COMMUNITY
Start: 2017-09-01 | End: 2023-01-01 | Stop reason: WASHOUT

## 2023-01-01 RX ORDER — FLUTICASONE FUROATE AND VILANTEROL 200; 25 UG/1; UG/1
1 POWDER RESPIRATORY (INHALATION) DAILY
Status: DISCONTINUED | OUTPATIENT
Start: 2023-01-01 | End: 2023-01-01 | Stop reason: HOSPADM

## 2023-01-01 RX ORDER — GABAPENTIN 300 MG/1
300 CAPSULE ORAL NIGHTLY
Status: DISCONTINUED | OUTPATIENT
Start: 2023-01-01 | End: 2024-01-01 | Stop reason: HOSPADM

## 2023-01-01 RX ORDER — WARFARIN 7.5 MG/1
1 TABLET ORAL DAILY
COMMUNITY
Start: 2017-12-22 | End: 2023-01-01 | Stop reason: WASHOUT

## 2023-01-01 RX ORDER — DOXYCYCLINE 100 MG/1
100 TABLET ORAL DAILY
Qty: 5 TABLET | Refills: 0 | Status: SHIPPED | OUTPATIENT
Start: 2023-01-01 | End: 2023-01-01 | Stop reason: HOSPADM

## 2023-01-01 RX ORDER — ACETAMINOPHEN 650 MG/1
650 SUPPOSITORY RECTAL EVERY 4 HOURS PRN
Status: CANCELLED | OUTPATIENT
Start: 2023-01-01

## 2023-01-01 RX ORDER — INSULIN GLARGINE 100 [IU]/ML
14 INJECTION, SOLUTION SUBCUTANEOUS NIGHTLY
Status: DISCONTINUED | OUTPATIENT
Start: 2023-01-01 | End: 2023-01-01

## 2023-01-01 RX ORDER — CEFUROXIME AXETIL 500 MG/1
500 TABLET ORAL 2 TIMES DAILY
Qty: 20 TABLET | Refills: 0 | Status: SHIPPED | OUTPATIENT
Start: 2023-01-01 | End: 2024-01-01 | Stop reason: HOSPADM

## 2023-01-01 RX ORDER — LEVOTHYROXINE SODIUM 25 UG/1
25 TABLET ORAL
Status: DISCONTINUED | OUTPATIENT
Start: 2023-01-01 | End: 2024-01-01 | Stop reason: HOSPADM

## 2023-01-01 RX ORDER — FERROUS SULFATE 324(65)MG
1 TABLET, DELAYED RELEASE (ENTERIC COATED) ORAL DAILY
COMMUNITY
End: 2023-01-01 | Stop reason: WASHOUT

## 2023-01-01 RX ORDER — SYRING-NEEDL,DISP,INSUL,0.3 ML 31GX15/64"
1 SYRINGE, EMPTY DISPOSABLE MISCELLANEOUS DAILY
COMMUNITY
End: 2023-01-01 | Stop reason: SDUPTHER

## 2023-01-01 RX ORDER — PANTOPRAZOLE SODIUM 20 MG/1
20 TABLET, DELAYED RELEASE ORAL DAILY
Status: DISCONTINUED | OUTPATIENT
Start: 2023-01-01 | End: 2024-01-01 | Stop reason: HOSPADM

## 2023-01-01 RX ORDER — AMIODARONE HYDROCHLORIDE 200 MG/1
200 TABLET ORAL DAILY
Status: DISCONTINUED | OUTPATIENT
Start: 2023-01-01 | End: 2023-01-01

## 2023-01-01 RX ORDER — GLIMEPIRIDE 4 MG/1
1 TABLET ORAL 2 TIMES DAILY
COMMUNITY
Start: 2017-12-19 | End: 2023-01-01 | Stop reason: WASHOUT

## 2023-01-01 RX ORDER — IPRATROPIUM BROMIDE AND ALBUTEROL SULFATE 2.5; .5 MG/3ML; MG/3ML
3 SOLUTION RESPIRATORY (INHALATION)
Status: DISCONTINUED | OUTPATIENT
Start: 2023-01-01 | End: 2023-01-01

## 2023-01-01 RX ORDER — IBUPROFEN 200 MG
1 TABLET ORAL EVERY 24 HOURS
Status: DISCONTINUED | OUTPATIENT
Start: 2023-01-01 | End: 2023-01-01 | Stop reason: HOSPADM

## 2023-01-01 RX ORDER — IBUPROFEN 200 MG
1 TABLET ORAL EVERY 24 HOURS
Qty: 30 PATCH | Refills: 1 | Status: SHIPPED | OUTPATIENT
Start: 2023-01-01 | End: 2024-01-01 | Stop reason: HOSPADM

## 2023-01-01 RX ORDER — OXYBUTYNIN CHLORIDE 5 MG/1
5 TABLET ORAL 2 TIMES DAILY
Status: DISCONTINUED | OUTPATIENT
Start: 2023-01-01 | End: 2023-01-01

## 2023-01-01 RX ORDER — ONDANSETRON 4 MG/1
4 TABLET, ORALLY DISINTEGRATING ORAL 4 TIMES DAILY PRN
Status: DISCONTINUED | OUTPATIENT
Start: 2023-01-01 | End: 2024-01-01 | Stop reason: HOSPADM

## 2023-01-01 RX ORDER — OXYBUTYNIN CHLORIDE 5 MG/1
5 TABLET ORAL DAILY
Status: DISCONTINUED | OUTPATIENT
Start: 2023-01-01 | End: 2023-01-01 | Stop reason: HOSPADM

## 2023-01-01 RX ORDER — PANTOPRAZOLE SODIUM 40 MG/10ML
40 INJECTION, POWDER, LYOPHILIZED, FOR SOLUTION INTRAVENOUS
Status: CANCELLED | OUTPATIENT
Start: 2023-01-01

## 2023-01-01 RX ORDER — CYCLOBENZAPRINE HCL 10 MG
1 TABLET ORAL NIGHTLY
COMMUNITY
Start: 2018-11-30

## 2023-01-01 RX ORDER — FERROUS SULFATE 325(65) MG
325 TABLET ORAL
COMMUNITY
End: 2024-01-01 | Stop reason: HOSPADM

## 2023-01-01 RX ORDER — INSULIN GLARGINE 100 [IU]/ML
30 INJECTION, SOLUTION SUBCUTANEOUS EVERY EVENING
Start: 2023-01-01 | End: 2024-01-24

## 2023-01-01 RX ORDER — IPRATROPIUM BROMIDE AND ALBUTEROL SULFATE 2.5; .5 MG/3ML; MG/3ML
3 SOLUTION RESPIRATORY (INHALATION)
Status: DISCONTINUED | OUTPATIENT
Start: 2023-01-01 | End: 2024-01-01 | Stop reason: HOSPADM

## 2023-01-01 RX ORDER — GUAIFENESIN 600 MG/1
600 TABLET, EXTENDED RELEASE ORAL 2 TIMES DAILY PRN
Status: DISCONTINUED | OUTPATIENT
Start: 2023-01-01 | End: 2024-01-01 | Stop reason: HOSPADM

## 2023-01-01 RX ORDER — AMIODARONE HYDROCHLORIDE 200 MG/1
200 TABLET ORAL 2 TIMES DAILY
Status: DISCONTINUED | OUTPATIENT
Start: 2023-01-01 | End: 2024-01-01 | Stop reason: HOSPADM

## 2023-01-01 RX ORDER — METOPROLOL SUCCINATE 25 MG/1
0.5 TABLET, EXTENDED RELEASE ORAL DAILY
COMMUNITY
Start: 2023-01-04 | End: 2023-01-01 | Stop reason: SDUPTHER

## 2023-01-01 RX ORDER — ALBUTEROL SULFATE 90 UG/1
2 AEROSOL, METERED RESPIRATORY (INHALATION) EVERY 4 HOURS PRN
COMMUNITY
Start: 2016-04-07 | End: 2024-01-01 | Stop reason: HOSPADM

## 2023-01-01 RX ORDER — DEXTROSE 50 % IN WATER (D50W) INTRAVENOUS SYRINGE
25
Status: DISCONTINUED | OUTPATIENT
Start: 2023-01-01 | End: 2024-01-01 | Stop reason: HOSPADM

## 2023-01-01 RX ORDER — ROSUVASTATIN CALCIUM 10 MG/1
1 TABLET, COATED ORAL DAILY
COMMUNITY
Start: 2017-09-15 | End: 2023-01-01 | Stop reason: WASHOUT

## 2023-01-01 RX ORDER — INSULIN LISPRO 100 [IU]/ML
8 INJECTION, SOLUTION INTRAVENOUS; SUBCUTANEOUS
Status: DISCONTINUED | OUTPATIENT
Start: 2023-01-01 | End: 2023-01-01 | Stop reason: HOSPADM

## 2023-01-01 RX ORDER — IPRATROPIUM BROMIDE AND ALBUTEROL SULFATE 2.5; .5 MG/3ML; MG/3ML
3 SOLUTION RESPIRATORY (INHALATION) EVERY 6 HOURS
Status: DISCONTINUED | OUTPATIENT
Start: 2023-01-01 | End: 2023-01-01

## 2023-01-01 RX ORDER — NITROGLYCERIN 0.4 MG/1
TABLET SUBLINGUAL
COMMUNITY
End: 2023-01-01 | Stop reason: HOSPADM

## 2023-01-01 RX ORDER — BLOOD SUGAR DIAGNOSTIC
STRIP MISCELLANEOUS
COMMUNITY
Start: 2017-07-21 | End: 2024-01-01 | Stop reason: HOSPADM

## 2023-01-01 RX ORDER — ACETAMINOPHEN 500 MG
5 TABLET ORAL NIGHTLY PRN
Status: DISCONTINUED | OUTPATIENT
Start: 2023-01-01 | End: 2024-01-01 | Stop reason: HOSPADM

## 2023-01-01 RX ORDER — IPRATROPIUM BROMIDE AND ALBUTEROL SULFATE 2.5; .5 MG/3ML; MG/3ML
6 SOLUTION RESPIRATORY (INHALATION) ONCE
Status: COMPLETED | OUTPATIENT
Start: 2023-01-01 | End: 2023-01-01

## 2023-01-01 RX ORDER — FLUTICASONE FUROATE AND VILANTEROL 100; 25 UG/1; UG/1
1 POWDER RESPIRATORY (INHALATION) DAILY
COMMUNITY
End: 2023-01-01 | Stop reason: WASHOUT

## 2023-01-01 RX ORDER — PRASUGREL 10 MG/1
10 TABLET, FILM COATED ORAL DAILY
Status: DISCONTINUED | OUTPATIENT
Start: 2023-01-01 | End: 2024-01-01

## 2023-01-01 RX ORDER — METOPROLOL SUCCINATE 25 MG/1
12.5 TABLET, EXTENDED RELEASE ORAL DAILY
Status: DISCONTINUED | OUTPATIENT
Start: 2023-01-01 | End: 2023-01-01 | Stop reason: HOSPADM

## 2023-01-01 RX ORDER — IBUPROFEN 200 MG
1 TABLET ORAL EVERY 24 HOURS
Status: DISCONTINUED | OUTPATIENT
Start: 2023-01-01 | End: 2024-01-01 | Stop reason: HOSPADM

## 2023-01-01 RX ORDER — LEVOTHYROXINE SODIUM 25 UG/1
25 TABLET ORAL DAILY
Status: DISCONTINUED | OUTPATIENT
Start: 2023-01-01 | End: 2023-01-01 | Stop reason: HOSPADM

## 2023-01-01 RX ORDER — L. ACIDOPHILUS/L.BULGARICUS 1MM CELL
1 TABLET ORAL DAILY
Status: DISCONTINUED | OUTPATIENT
Start: 2023-01-01 | End: 2024-01-01 | Stop reason: HOSPADM

## 2023-01-01 RX ORDER — OMEPRAZOLE 40 MG/1
40 CAPSULE, DELAYED RELEASE ORAL
COMMUNITY

## 2023-01-01 RX ORDER — INSULIN GLARGINE 100 [IU]/ML
30 INJECTION, SOLUTION SUBCUTANEOUS EVERY EVENING
Qty: 15 ML | Refills: 1 | Status: ON HOLD | OUTPATIENT
Start: 2023-01-01 | End: 2023-01-01 | Stop reason: SDUPTHER

## 2023-01-01 RX ORDER — DAPAGLIFLOZIN 10 MG/1
10 TABLET, FILM COATED ORAL DAILY
Status: DISCONTINUED | OUTPATIENT
Start: 2023-01-01 | End: 2024-01-01 | Stop reason: HOSPADM

## 2023-01-01 RX ORDER — INSULIN GLARGINE 100 [IU]/ML
30 INJECTION, SOLUTION SUBCUTANEOUS NIGHTLY
Status: DISCONTINUED | OUTPATIENT
Start: 2023-01-01 | End: 2023-01-01 | Stop reason: HOSPADM

## 2023-01-01 RX ORDER — METOPROLOL SUCCINATE 25 MG/1
25 TABLET, EXTENDED RELEASE ORAL 2 TIMES DAILY
Status: DISCONTINUED | OUTPATIENT
Start: 2023-01-01 | End: 2024-01-01 | Stop reason: HOSPADM

## 2023-01-01 RX ORDER — GABAPENTIN 600 MG/1
1 TABLET ORAL 3 TIMES DAILY
COMMUNITY
Start: 2018-10-29 | End: 2023-01-01 | Stop reason: WASHOUT

## 2023-01-01 RX ORDER — ASPIRIN 81 MG/1
TABLET ORAL DAILY
COMMUNITY
End: 2023-01-01 | Stop reason: WASHOUT

## 2023-01-01 RX ORDER — GUAIFENESIN 600 MG/1
600 TABLET, EXTENDED RELEASE ORAL 2 TIMES DAILY
Status: DISCONTINUED | OUTPATIENT
Start: 2023-01-01 | End: 2023-01-01 | Stop reason: HOSPADM

## 2023-01-01 RX ORDER — FUROSEMIDE 40 MG/1
40 TABLET ORAL DAILY
Status: DISCONTINUED | OUTPATIENT
Start: 2023-01-01 | End: 2023-01-01 | Stop reason: HOSPADM

## 2023-01-01 RX ORDER — FLASH GLUCOSE SENSOR
1 KIT MISCELLANEOUS
COMMUNITY
Start: 2023-01-01

## 2023-01-01 RX ORDER — IPRATROPIUM BROMIDE AND ALBUTEROL SULFATE 2.5; .5 MG/3ML; MG/3ML
3 SOLUTION RESPIRATORY (INHALATION) 4 TIMES DAILY
Status: DISCONTINUED | OUTPATIENT
Start: 2023-01-01 | End: 2023-01-01 | Stop reason: HOSPADM

## 2023-01-01 RX ORDER — PEN NEEDLE, DIABETIC 32GX 5/32"
1 NEEDLE, DISPOSABLE MISCELLANEOUS 3 TIMES DAILY
COMMUNITY
Start: 2023-01-01

## 2023-01-01 RX ORDER — ONDANSETRON 4 MG/1
4 TABLET, ORALLY DISINTEGRATING ORAL EVERY 6 HOURS PRN
COMMUNITY
Start: 2023-01-01 | End: 2023-01-01 | Stop reason: HOSPADM

## 2023-01-01 RX ORDER — GUAIFENESIN/DEXTROMETHORPHAN 100-10MG/5
5 SYRUP ORAL EVERY 4 HOURS PRN
Status: CANCELLED | OUTPATIENT
Start: 2023-01-01

## 2023-01-01 RX ORDER — AMIODARONE HYDROCHLORIDE 200 MG/1
1 TABLET ORAL DAILY
COMMUNITY
Start: 2021-11-22 | End: 2024-01-01 | Stop reason: HOSPADM

## 2023-01-01 RX ORDER — AMOXICILLIN 250 MG/1
250 CAPSULE ORAL NIGHTLY
Qty: 90 CAPSULE | Refills: 1 | Status: SHIPPED | OUTPATIENT
Start: 2023-01-01 | End: 2024-01-01 | Stop reason: HOSPADM

## 2023-01-01 RX ORDER — CEFTRIAXONE 1 G/50ML
1 INJECTION, SOLUTION INTRAVENOUS EVERY 24 HOURS
Status: DISCONTINUED | OUTPATIENT
Start: 2023-01-01 | End: 2023-01-01 | Stop reason: HOSPADM

## 2023-01-01 RX ORDER — ROPINIROLE 0.5 MG/1
1 TABLET, FILM COATED ORAL 3 TIMES DAILY
COMMUNITY
Start: 2018-03-16 | End: 2023-01-01 | Stop reason: WASHOUT

## 2023-01-01 RX ORDER — SERTRALINE HYDROCHLORIDE 50 MG/1
100 TABLET, FILM COATED ORAL DAILY
Status: DISCONTINUED | OUTPATIENT
Start: 2023-01-01 | End: 2023-01-01 | Stop reason: HOSPADM

## 2023-01-01 RX ORDER — SYRINGE AND NEEDLE,INSULIN,1ML 31GX15/64"
SYRINGE, EMPTY DISPOSABLE MISCELLANEOUS 3 TIMES DAILY
COMMUNITY
Start: 2016-09-08

## 2023-01-01 RX ORDER — FERROUS SULFATE 325(65) MG
1 TABLET ORAL DAILY
COMMUNITY
End: 2023-01-01 | Stop reason: HOSPADM

## 2023-01-01 RX ORDER — AMIODARONE HYDROCHLORIDE 200 MG/1
100 TABLET ORAL DAILY
Status: DISCONTINUED | OUTPATIENT
Start: 2023-01-01 | End: 2023-01-01 | Stop reason: HOSPADM

## 2023-01-01 RX ORDER — LOSARTAN POTASSIUM 50 MG/1
1 TABLET ORAL DAILY
COMMUNITY
Start: 2018-03-19 | End: 2023-01-01 | Stop reason: WASHOUT

## 2023-01-01 RX ORDER — INSULIN LISPRO 100 [IU]/ML
0-10 INJECTION, SOLUTION INTRAVENOUS; SUBCUTANEOUS
Status: DISCONTINUED | OUTPATIENT
Start: 2023-01-01 | End: 2023-01-01

## 2023-01-01 RX ORDER — ACETAMINOPHEN 325 MG/1
650 TABLET ORAL 2 TIMES DAILY
Status: DISCONTINUED | OUTPATIENT
Start: 2023-01-01 | End: 2024-01-01 | Stop reason: HOSPADM

## 2023-01-01 RX ORDER — IPRATROPIUM BROMIDE AND ALBUTEROL SULFATE 2.5; .5 MG/3ML; MG/3ML
3 SOLUTION RESPIRATORY (INHALATION) EVERY 6 HOURS
COMMUNITY
Start: 2022-12-20

## 2023-01-01 RX ORDER — ALBUTEROL SULFATE 0.83 MG/ML
2.5 SOLUTION RESPIRATORY (INHALATION) EVERY 2 HOUR PRN
Status: DISCONTINUED | OUTPATIENT
Start: 2023-01-01 | End: 2024-01-01 | Stop reason: HOSPADM

## 2023-01-01 RX ORDER — AMOXICILLIN AND CLAVULANATE POTASSIUM 875; 125 MG/1; MG/1
1 TABLET, FILM COATED ORAL 2 TIMES DAILY
COMMUNITY
Start: 2023-01-01 | End: 2023-01-01 | Stop reason: SDUPTHER

## 2023-01-01 RX ORDER — FERROUS SULFATE 325(65) MG
1 TABLET ORAL DAILY
Status: DISCONTINUED | OUTPATIENT
Start: 2023-01-01 | End: 2023-01-01

## 2023-01-01 RX ORDER — SYRINGE,SAFETY WITH NEEDLE,1ML 25GX1"
1 SYRINGE (EA) MISCELLANEOUS DAILY
Qty: 100 EACH | Refills: 1 | Status: SHIPPED | OUTPATIENT
Start: 2023-01-01 | End: 2024-01-24

## 2023-01-01 RX ORDER — DEXTROSE MONOHYDRATE 100 MG/ML
0.3 INJECTION, SOLUTION INTRAVENOUS ONCE AS NEEDED
Status: DISCONTINUED | OUTPATIENT
Start: 2023-01-01 | End: 2023-01-01 | Stop reason: HOSPADM

## 2023-01-01 RX ORDER — INSULIN GLARGINE 100 [IU]/ML
14 INJECTION, SOLUTION SUBCUTANEOUS EVERY EVENING
COMMUNITY
Start: 2016-10-13 | End: 2023-01-01 | Stop reason: SDUPTHER

## 2023-01-01 RX ORDER — ACETAMINOPHEN 325 MG/1
650 TABLET ORAL EVERY 6 HOURS PRN
Status: DISCONTINUED | OUTPATIENT
Start: 2023-01-01 | End: 2024-01-01 | Stop reason: HOSPADM

## 2023-01-01 RX ORDER — FUROSEMIDE 20 MG/1
1 TABLET ORAL DAILY
COMMUNITY
End: 2023-01-01 | Stop reason: WASHOUT

## 2023-01-01 RX ORDER — IPRATROPIUM BROMIDE AND ALBUTEROL SULFATE 2.5; .5 MG/3ML; MG/3ML
3 SOLUTION RESPIRATORY (INHALATION) 3 TIMES DAILY
Status: CANCELLED | OUTPATIENT
Start: 2023-01-01

## 2023-01-01 RX ORDER — CYCLOBENZAPRINE HCL 5 MG
10 TABLET ORAL NIGHTLY
Status: DISCONTINUED | OUTPATIENT
Start: 2023-01-01 | End: 2023-01-01 | Stop reason: HOSPADM

## 2023-01-01 RX ORDER — FUROSEMIDE 80 MG/1
1 TABLET ORAL DAILY
COMMUNITY
Start: 2017-08-17 | End: 2023-01-01 | Stop reason: WASHOUT

## 2023-01-01 RX ORDER — INSULIN LISPRO 100 [IU]/ML
10 INJECTION, SOLUTION INTRAVENOUS; SUBCUTANEOUS ONCE
Status: COMPLETED | OUTPATIENT
Start: 2023-01-01 | End: 2023-01-01

## 2023-01-01 RX ORDER — DEXAMETHASONE 6 MG/1
6 TABLET ORAL DAILY
Status: CANCELLED | OUTPATIENT
Start: 2023-01-01 | End: 2024-01-01

## 2023-01-01 RX ORDER — CALCIUM CARBONATE/VITAMIN D3 500-10/5ML
LIQUID (ML) ORAL
COMMUNITY
End: 2023-01-01 | Stop reason: WASHOUT

## 2023-01-01 RX ORDER — DOXYCYCLINE 100 MG/1
100 CAPSULE ORAL 2 TIMES DAILY
Qty: 6 CAPSULE | Refills: 0 | Status: SHIPPED | OUTPATIENT
Start: 2023-01-01 | End: 2024-01-01 | Stop reason: HOSPADM

## 2023-01-01 RX ORDER — SERTRALINE HYDROCHLORIDE 100 MG/1
1 TABLET, FILM COATED ORAL DAILY
COMMUNITY
Start: 2017-07-18

## 2023-01-01 RX ORDER — AMOXICILLIN 250 MG/1
1 CAPSULE ORAL NIGHTLY
COMMUNITY
End: 2023-01-01 | Stop reason: HOSPADM

## 2023-01-01 RX ORDER — PRASUGREL 10 MG/1
10 TABLET, FILM COATED ORAL DAILY
Status: DISCONTINUED | OUTPATIENT
Start: 2023-01-01 | End: 2023-01-01 | Stop reason: HOSPADM

## 2023-01-01 RX ORDER — ATORVASTATIN CALCIUM 80 MG/1
1 TABLET, FILM COATED ORAL DAILY
COMMUNITY
Start: 2021-11-22 | End: 2024-01-01 | Stop reason: HOSPADM

## 2023-01-01 RX ORDER — OXYBUTYNIN CHLORIDE 5 MG/1
5 TABLET, EXTENDED RELEASE ORAL DAILY
Status: ON HOLD | COMMUNITY
Start: 2022-07-08 | End: 2023-01-01

## 2023-01-01 RX ORDER — DEXAMETHASONE SODIUM PHOSPHATE 10 MG/ML
6 INJECTION INTRAMUSCULAR; INTRAVENOUS EVERY 24 HOURS
Status: DISCONTINUED | OUTPATIENT
Start: 2023-01-01 | End: 2024-01-01 | Stop reason: HOSPADM

## 2023-01-01 RX ORDER — DOXYCYCLINE 100 MG/1
100 TABLET ORAL DAILY
COMMUNITY
Start: 2023-01-01 | End: 2023-01-01 | Stop reason: SDUPTHER

## 2023-01-01 RX ADMIN — LEVOTHYROXINE SODIUM 25 MCG: 25 TABLET ORAL at 09:17

## 2023-01-01 RX ADMIN — PERFLUTREN 1.5 ML OF DILUTION: 6.52 INJECTION, SUSPENSION INTRAVENOUS at 11:50

## 2023-01-01 RX ADMIN — SITAGLIPTIN 50 MG: 50 TABLET, FILM COATED ORAL at 09:43

## 2023-01-01 RX ADMIN — CARBIDOPA AND LEVODOPA 1 TABLET: 25; 100 TABLET, EXTENDED RELEASE ORAL at 01:18

## 2023-01-01 RX ADMIN — AMIODARONE HYDROCHLORIDE 200 MG: 200 TABLET ORAL at 08:39

## 2023-01-01 RX ADMIN — GUAIFENESIN 600 MG: 600 TABLET, EXTENDED RELEASE ORAL at 20:24

## 2023-01-01 RX ADMIN — GABAPENTIN 300 MG: 300 CAPSULE ORAL at 23:58

## 2023-01-01 RX ADMIN — DOXYCYCLINE 100 MG: 100 INJECTION, POWDER, LYOPHILIZED, FOR SOLUTION INTRAVENOUS at 11:00

## 2023-01-01 RX ADMIN — CEFTRIAXONE 1 G: 1 INJECTION, SOLUTION INTRAVENOUS at 09:00

## 2023-01-01 RX ADMIN — GUAIFENESIN 600 MG: 600 TABLET, EXTENDED RELEASE ORAL at 09:41

## 2023-01-01 RX ADMIN — SERTRALINE 100 MG: 100 TABLET, FILM COATED ORAL at 09:20

## 2023-01-01 RX ADMIN — AMIODARONE HYDROCHLORIDE 200 MG: 200 TABLET ORAL at 21:30

## 2023-01-01 RX ADMIN — LEVOTHYROXINE SODIUM 25 MCG: 25 TABLET ORAL at 09:55

## 2023-01-01 RX ADMIN — GUAIFENESIN 600 MG: 600 TABLET, EXTENDED RELEASE ORAL at 09:43

## 2023-01-01 RX ADMIN — IPRATROPIUM BROMIDE AND ALBUTEROL SULFATE 3 ML: 2.5; .5 SOLUTION RESPIRATORY (INHALATION) at 07:25

## 2023-01-01 RX ADMIN — FLUTICASONE FUROATE AND VILANTEROL TRIFENATATE 1 PUFF: 200; 25 POWDER RESPIRATORY (INHALATION) at 10:40

## 2023-01-01 RX ADMIN — ACETAMINOPHEN 650 MG: 325 TABLET ORAL at 23:58

## 2023-01-01 RX ADMIN — INSULIN GLARGINE 14 UNITS: 100 INJECTION, SOLUTION SUBCUTANEOUS at 23:49

## 2023-01-01 RX ADMIN — IPRATROPIUM BROMIDE AND ALBUTEROL SULFATE 3 ML: .5; 3 SOLUTION RESPIRATORY (INHALATION) at 09:19

## 2023-01-01 RX ADMIN — CYCLOBENZAPRINE HYDROCHLORIDE 10 MG: 5 TABLET, FILM COATED ORAL at 21:24

## 2023-01-01 RX ADMIN — SERTRALINE HYDROCHLORIDE 100 MG: 50 TABLET ORAL at 08:12

## 2023-01-01 RX ADMIN — QUETIAPINE FUMARATE 25 MG: 25 TABLET ORAL at 20:39

## 2023-01-01 RX ADMIN — IPRATROPIUM BROMIDE AND ALBUTEROL SULFATE 6 ML: .5; 3 SOLUTION RESPIRATORY (INHALATION) at 20:20

## 2023-01-01 RX ADMIN — PRASUGREL 10 MG: 10 TABLET, FILM COATED ORAL at 09:56

## 2023-01-01 RX ADMIN — INSULIN LISPRO 3 UNITS: 100 INJECTION, SOLUTION INTRAVENOUS; SUBCUTANEOUS at 18:06

## 2023-01-01 RX ADMIN — CYCLOBENZAPRINE HYDROCHLORIDE 10 MG: 5 TABLET, FILM COATED ORAL at 01:19

## 2023-01-01 RX ADMIN — SITAGLIPTIN 50 MG: 50 TABLET, FILM COATED ORAL at 09:20

## 2023-01-01 RX ADMIN — INSULIN LISPRO 20 UNITS: 100 INJECTION, SOLUTION INTRAVENOUS; SUBCUTANEOUS at 13:07

## 2023-01-01 RX ADMIN — IPRATROPIUM BROMIDE AND ALBUTEROL SULFATE 3 ML: .5; 3 SOLUTION RESPIRATORY (INHALATION) at 15:56

## 2023-01-01 RX ADMIN — AMIODARONE HYDROCHLORIDE 100 MG: 200 TABLET ORAL at 09:17

## 2023-01-01 RX ADMIN — LEVOTHYROXINE SODIUM 25 MCG: 25 TABLET ORAL at 09:40

## 2023-01-01 RX ADMIN — Medication 1 SPRAY: at 09:21

## 2023-01-01 RX ADMIN — PRASUGREL 10 MG: 10 TABLET, FILM COATED ORAL at 10:07

## 2023-01-01 RX ADMIN — INSULIN LISPRO 10 UNITS: 100 INJECTION, SOLUTION INTRAVENOUS; SUBCUTANEOUS at 21:14

## 2023-01-01 RX ADMIN — PIPERACILLIN SODIUM AND TAZOBACTAM SODIUM 3.38 G: 3; .375 INJECTION, SOLUTION INTRAVENOUS at 21:34

## 2023-01-01 RX ADMIN — CEFEPIME 1 G: 1 INJECTION, POWDER, FOR SOLUTION INTRAMUSCULAR; INTRAVENOUS at 08:46

## 2023-01-01 RX ADMIN — METHYLPREDNISOLONE SODIUM SUCCINATE 40 MG: 40 INJECTION, POWDER, FOR SOLUTION INTRAMUSCULAR; INTRAVENOUS at 22:21

## 2023-01-01 RX ADMIN — ATORVASTATIN CALCIUM 80 MG: 40 TABLET, FILM COATED ORAL at 09:41

## 2023-01-01 RX ADMIN — Medication 100 MG: at 13:18

## 2023-01-01 RX ADMIN — IPRATROPIUM BROMIDE AND ALBUTEROL SULFATE 3 ML: 2.5; .5 SOLUTION RESPIRATORY (INHALATION) at 11:24

## 2023-01-01 RX ADMIN — ATORVASTATIN CALCIUM 80 MG: 40 TABLET, FILM COATED ORAL at 09:44

## 2023-01-01 RX ADMIN — INSULIN LISPRO 8 UNITS: 100 INJECTION, SOLUTION INTRAVENOUS; SUBCUTANEOUS at 10:09

## 2023-01-01 RX ADMIN — AMIODARONE HYDROCHLORIDE 200 MG: 200 TABLET ORAL at 23:58

## 2023-01-01 RX ADMIN — PIPERACILLIN SODIUM AND TAZOBACTAM SODIUM 3.38 G: 3; .375 INJECTION, SOLUTION INTRAVENOUS at 10:51

## 2023-01-01 RX ADMIN — IPRATROPIUM BROMIDE AND ALBUTEROL SULFATE 3 ML: .5; 3 SOLUTION RESPIRATORY (INHALATION) at 20:53

## 2023-01-01 RX ADMIN — LEVOTHYROXINE SODIUM 25 MCG: 25 TABLET ORAL at 09:44

## 2023-01-01 RX ADMIN — INSULIN LISPRO 2 UNITS: 100 INJECTION, SOLUTION INTRAVENOUS; SUBCUTANEOUS at 12:09

## 2023-01-01 RX ADMIN — ACETAMINOPHEN 650 MG: 325 TABLET ORAL at 10:08

## 2023-01-01 RX ADMIN — QUETIAPINE FUMARATE 25 MG: 25 TABLET ORAL at 20:26

## 2023-01-01 RX ADMIN — METHYLPREDNISOLONE SODIUM SUCCINATE 40 MG: 40 INJECTION, POWDER, FOR SOLUTION INTRAMUSCULAR; INTRAVENOUS at 05:47

## 2023-01-01 RX ADMIN — CARBIDOPA AND LEVODOPA 1 TABLET: 25; 100 TABLET, EXTENDED RELEASE ORAL at 16:22

## 2023-01-01 RX ADMIN — AMIODARONE HYDROCHLORIDE 200 MG: 200 TABLET ORAL at 10:43

## 2023-01-01 RX ADMIN — INSULIN LISPRO 8 UNITS: 100 INJECTION, SOLUTION INTRAVENOUS; SUBCUTANEOUS at 13:09

## 2023-01-01 RX ADMIN — RIVAROXABAN 15 MG: 15 TABLET, FILM COATED ORAL at 16:00

## 2023-01-01 RX ADMIN — METHYLPREDNISOLONE SODIUM SUCCINATE 40 MG: 40 INJECTION, POWDER, FOR SOLUTION INTRAMUSCULAR; INTRAVENOUS at 06:43

## 2023-01-01 RX ADMIN — METHYLPREDNISOLONE SODIUM SUCCINATE 40 MG: 40 INJECTION, POWDER, FOR SOLUTION INTRAMUSCULAR; INTRAVENOUS at 06:34

## 2023-01-01 RX ADMIN — METOPROLOL SUCCINATE 25 MG: 25 TABLET, EXTENDED RELEASE ORAL at 23:58

## 2023-01-01 RX ADMIN — IPRATROPIUM BROMIDE AND ALBUTEROL SULFATE 3 ML: 2.5; .5 SOLUTION RESPIRATORY (INHALATION) at 11:38

## 2023-01-01 RX ADMIN — ACETAMINOPHEN 650 MG: 325 TABLET ORAL at 10:43

## 2023-01-01 RX ADMIN — CARBIDOPA AND LEVODOPA 1 TABLET: 25; 100 TABLET, EXTENDED RELEASE ORAL at 20:55

## 2023-01-01 RX ADMIN — ATORVASTATIN CALCIUM 80 MG: 80 TABLET, FILM COATED ORAL at 23:58

## 2023-01-01 RX ADMIN — SERTRALINE HYDROCHLORIDE 100 MG: 50 TABLET ORAL at 09:44

## 2023-01-01 RX ADMIN — IPRATROPIUM BROMIDE AND ALBUTEROL SULFATE 3 ML: 2.5; .5 SOLUTION RESPIRATORY (INHALATION) at 11:22

## 2023-01-01 RX ADMIN — ALBUTEROL SULFATE 2.5 MG: 2.5 SOLUTION RESPIRATORY (INHALATION) at 23:49

## 2023-01-01 RX ADMIN — DAPAGLIFLOZIN 10 MG: 10 TABLET, FILM COATED ORAL at 09:17

## 2023-01-01 RX ADMIN — CYCLOBENZAPRINE 10 MG: 10 TABLET, FILM COATED ORAL at 21:30

## 2023-01-01 RX ADMIN — SODIUM CHLORIDE 250 ML: 9 INJECTION, SOLUTION INTRAVENOUS at 03:02

## 2023-01-01 RX ADMIN — IPRATROPIUM BROMIDE AND ALBUTEROL SULFATE 3 ML: 2.5; .5 SOLUTION RESPIRATORY (INHALATION) at 07:28

## 2023-01-01 RX ADMIN — Medication 1 PATCH: at 22:51

## 2023-01-01 RX ADMIN — PRASUGREL 10 MG: 10 TABLET, FILM COATED ORAL at 09:18

## 2023-01-01 RX ADMIN — METOPROLOL SUCCINATE 12.5 MG: 25 TABLET, FILM COATED, EXTENDED RELEASE ORAL at 09:40

## 2023-01-01 RX ADMIN — INSULIN LISPRO 8 UNITS: 100 INJECTION, SOLUTION INTRAVENOUS; SUBCUTANEOUS at 08:17

## 2023-01-01 RX ADMIN — SITAGLIPTIN 50 MG: 50 TABLET, FILM COATED ORAL at 10:43

## 2023-01-01 RX ADMIN — DEXAMETHASONE SODIUM PHOSPHATE 6 MG: 10 INJECTION INTRAMUSCULAR; INTRAVENOUS at 23:59

## 2023-01-01 RX ADMIN — PRASUGREL 10 MG: 10 TABLET, FILM COATED ORAL at 09:28

## 2023-01-01 RX ADMIN — Medication 1 PATCH: at 21:21

## 2023-01-01 RX ADMIN — AMIODARONE HYDROCHLORIDE 100 MG: 200 TABLET ORAL at 09:57

## 2023-01-01 RX ADMIN — IPRATROPIUM BROMIDE AND ALBUTEROL SULFATE 3 ML: .5; 3 SOLUTION RESPIRATORY (INHALATION) at 09:30

## 2023-01-01 RX ADMIN — CYCLOBENZAPRINE HYDROCHLORIDE 10 MG: 5 TABLET, FILM COATED ORAL at 20:54

## 2023-01-01 RX ADMIN — PIPERACILLIN SODIUM AND TAZOBACTAM SODIUM 3.38 G: 3; .375 INJECTION, SOLUTION INTRAVENOUS at 16:54

## 2023-01-01 RX ADMIN — GUAIFENESIN 600 MG: 600 TABLET, EXTENDED RELEASE ORAL at 09:58

## 2023-01-01 RX ADMIN — GABAPENTIN 300 MG: 300 CAPSULE ORAL at 21:24

## 2023-01-01 RX ADMIN — CARBIDOPA AND LEVODOPA 1 TABLET: 25; 100 TABLET, EXTENDED RELEASE ORAL at 10:43

## 2023-01-01 RX ADMIN — Medication 1 TABLET: at 09:20

## 2023-01-01 RX ADMIN — DAPAGLIFLOZIN 10 MG: 10 TABLET, FILM COATED ORAL at 10:43

## 2023-01-01 RX ADMIN — DOXYCYCLINE 100 MG: 100 INJECTION, POWDER, LYOPHILIZED, FOR SOLUTION INTRAVENOUS at 22:21

## 2023-01-01 RX ADMIN — FLUTICASONE FUROATE AND VILANTEROL TRIFENATATE 1 PUFF: 200; 25 POWDER RESPIRATORY (INHALATION) at 09:30

## 2023-01-01 RX ADMIN — PANTOPRAZOLE SODIUM 20 MG: 20 TABLET, DELAYED RELEASE ORAL at 08:39

## 2023-01-01 RX ADMIN — GUAIFENESIN 600 MG: 600 TABLET, EXTENDED RELEASE ORAL at 09:18

## 2023-01-01 RX ADMIN — Medication 1 CAPSULE: at 09:17

## 2023-01-01 RX ADMIN — DOXYCYCLINE 100 MG: 100 INJECTION, POWDER, LYOPHILIZED, FOR SOLUTION INTRAVENOUS at 21:31

## 2023-01-01 RX ADMIN — INSULIN LISPRO 8 UNITS: 100 INJECTION, SOLUTION INTRAVENOUS; SUBCUTANEOUS at 09:16

## 2023-01-01 RX ADMIN — INSULIN GLARGINE 35 UNITS: 100 INJECTION, SOLUTION SUBCUTANEOUS at 21:55

## 2023-01-01 RX ADMIN — QUETIAPINE FUMARATE 25 MG: 25 TABLET ORAL at 01:18

## 2023-01-01 RX ADMIN — RIVAROXABAN 15 MG: 15 TABLET, FILM COATED ORAL at 17:11

## 2023-01-01 RX ADMIN — Medication: at 10:29

## 2023-01-01 RX ADMIN — FUROSEMIDE 40 MG: 40 TABLET ORAL at 09:17

## 2023-01-01 RX ADMIN — DAPAGLIFLOZIN 10 MG: 10 TABLET, FILM COATED ORAL at 08:38

## 2023-01-01 RX ADMIN — INSULIN LISPRO 8 UNITS: 100 INJECTION, SOLUTION INTRAVENOUS; SUBCUTANEOUS at 11:14

## 2023-01-01 RX ADMIN — PRASUGREL 10 MG: 10 TABLET, FILM COATED ORAL at 09:41

## 2023-01-01 RX ADMIN — ATORVASTATIN CALCIUM 80 MG: 80 TABLET, FILM COATED ORAL at 21:25

## 2023-01-01 RX ADMIN — METHYLPREDNISOLONE SODIUM SUCCINATE 40 MG: 40 INJECTION, POWDER, FOR SOLUTION INTRAMUSCULAR; INTRAVENOUS at 14:21

## 2023-01-01 RX ADMIN — INSULIN LISPRO 8 UNITS: 100 INJECTION, SOLUTION INTRAVENOUS; SUBCUTANEOUS at 16:31

## 2023-01-01 RX ADMIN — INSULIN LISPRO 8 UNITS: 100 INJECTION, SOLUTION INTRAVENOUS; SUBCUTANEOUS at 12:22

## 2023-01-01 RX ADMIN — DOXYCYCLINE 100 MG: 100 INJECTION, POWDER, LYOPHILIZED, FOR SOLUTION INTRAVENOUS at 22:40

## 2023-01-01 RX ADMIN — OXYBUTYNIN CHLORIDE 5 MG: 5 TABLET ORAL at 09:56

## 2023-01-01 RX ADMIN — Medication 1 TABLET: at 08:39

## 2023-01-01 RX ADMIN — CARBIDOPA AND LEVODOPA 1 TABLET: 25; 100 TABLET, EXTENDED RELEASE ORAL at 15:00

## 2023-01-01 RX ADMIN — AMIODARONE HYDROCHLORIDE 200 MG: 200 TABLET ORAL at 09:18

## 2023-01-01 RX ADMIN — PANTOPRAZOLE SODIUM 20 MG: 20 TABLET, DELAYED RELEASE ORAL at 10:07

## 2023-01-01 RX ADMIN — PIPERACILLIN SODIUM AND TAZOBACTAM SODIUM 3.38 G: 3; .375 INJECTION, SOLUTION INTRAVENOUS at 21:26

## 2023-01-01 RX ADMIN — PANTOPRAZOLE SODIUM 20 MG: 20 TABLET, DELAYED RELEASE ORAL at 10:43

## 2023-01-01 RX ADMIN — IPRATROPIUM BROMIDE AND ALBUTEROL SULFATE 3 ML: .5; 3 SOLUTION RESPIRATORY (INHALATION) at 15:46

## 2023-01-01 RX ADMIN — INSULIN LISPRO 6 UNITS: 100 INJECTION, SOLUTION INTRAVENOUS; SUBCUTANEOUS at 13:18

## 2023-01-01 RX ADMIN — FUROSEMIDE 40 MG: 40 TABLET ORAL at 09:44

## 2023-01-01 RX ADMIN — ACETAMINOPHEN 650 MG: 325 TABLET ORAL at 22:51

## 2023-01-01 RX ADMIN — IPRATROPIUM BROMIDE AND ALBUTEROL SULFATE 3 ML: .5; 3 SOLUTION RESPIRATORY (INHALATION) at 15:58

## 2023-01-01 RX ADMIN — METHYLPREDNISOLONE SODIUM SUCCINATE 40 MG: 40 INJECTION, POWDER, FOR SOLUTION INTRAMUSCULAR; INTRAVENOUS at 21:31

## 2023-01-01 RX ADMIN — PIPERACILLIN SODIUM AND TAZOBACTAM SODIUM 3.38 G: 3; .375 INJECTION, SOLUTION INTRAVENOUS at 10:09

## 2023-01-01 RX ADMIN — PIPERACILLIN SODIUM AND TAZOBACTAM SODIUM 3.38 G: 3; .375 INJECTION, SOLUTION INTRAVENOUS at 21:21

## 2023-01-01 RX ADMIN — Medication 1 TABLET: at 10:43

## 2023-01-01 RX ADMIN — PIPERACILLIN SODIUM AND TAZOBACTAM SODIUM 3.38 G: 3; .375 INJECTION, SOLUTION INTRAVENOUS at 03:44

## 2023-01-01 RX ADMIN — CARBIDOPA AND LEVODOPA 1 TABLET: 25; 100 TABLET, EXTENDED RELEASE ORAL at 23:58

## 2023-01-01 RX ADMIN — SITAGLIPTIN 50 MG: 50 TABLET, FILM COATED ORAL at 09:41

## 2023-01-01 RX ADMIN — FUROSEMIDE 40 MG: 40 TABLET ORAL at 09:40

## 2023-01-01 RX ADMIN — INSULIN LISPRO 8 UNITS: 100 INJECTION, SOLUTION INTRAVENOUS; SUBCUTANEOUS at 16:12

## 2023-01-01 RX ADMIN — SITAGLIPTIN 50 MG: 50 TABLET, FILM COATED ORAL at 09:19

## 2023-01-01 RX ADMIN — HEPARIN SODIUM 800 UNITS/HR: 10000 INJECTION, SOLUTION INTRAVENOUS at 22:56

## 2023-01-01 RX ADMIN — PIPERACILLIN SODIUM AND TAZOBACTAM SODIUM 3.38 G: 3; .375 INJECTION, SOLUTION INTRAVENOUS at 04:18

## 2023-01-01 RX ADMIN — INSULIN LISPRO 16 UNITS: 100 INJECTION, SOLUTION INTRAVENOUS; SUBCUTANEOUS at 10:08

## 2023-01-01 RX ADMIN — DOXYCYCLINE 100 MG: 100 INJECTION, POWDER, LYOPHILIZED, FOR SOLUTION INTRAVENOUS at 22:01

## 2023-01-01 RX ADMIN — METOPROLOL SUCCINATE 12.5 MG: 25 TABLET, FILM COATED, EXTENDED RELEASE ORAL at 09:44

## 2023-01-01 RX ADMIN — PANTOPRAZOLE SODIUM 40 MG: 40 TABLET, DELAYED RELEASE ORAL at 06:12

## 2023-01-01 RX ADMIN — CARBIDOPA AND LEVODOPA 1 TABLET: 25; 100 TABLET, EXTENDED RELEASE ORAL at 09:44

## 2023-01-01 RX ADMIN — GABAPENTIN 300 MG: 300 CAPSULE ORAL at 20:24

## 2023-01-01 RX ADMIN — LEVOTHYROXINE SODIUM 25 MCG: 0.03 TABLET ORAL at 06:16

## 2023-01-01 RX ADMIN — OXYBUTYNIN CHLORIDE 5 MG: 5 TABLET ORAL at 08:13

## 2023-01-01 RX ADMIN — CYCLOBENZAPRINE 10 MG: 10 TABLET, FILM COATED ORAL at 20:39

## 2023-01-01 RX ADMIN — Medication 100 MG: at 13:54

## 2023-01-01 RX ADMIN — APIXABAN 5 MG: 5 TABLET, FILM COATED ORAL at 21:30

## 2023-01-01 RX ADMIN — QUETIAPINE FUMARATE 25 MG: 25 TABLET ORAL at 20:24

## 2023-01-01 RX ADMIN — APIXABAN 5 MG: 5 TABLET, FILM COATED ORAL at 09:20

## 2023-01-01 RX ADMIN — PRASUGREL 10 MG: 10 TABLET, FILM COATED ORAL at 09:43

## 2023-01-01 RX ADMIN — PIPERACILLIN SODIUM AND TAZOBACTAM SODIUM 3.38 G: 3; .375 INJECTION, SOLUTION INTRAVENOUS at 16:58

## 2023-01-01 RX ADMIN — DEXAMETHASONE SODIUM PHOSPHATE 6 MG: 10 INJECTION INTRAMUSCULAR; INTRAVENOUS at 22:51

## 2023-01-01 RX ADMIN — Medication 1 PATCH: at 23:58

## 2023-01-01 RX ADMIN — ATORVASTATIN CALCIUM 80 MG: 80 TABLET, FILM COATED ORAL at 20:39

## 2023-01-01 RX ADMIN — VANCOMYCIN 750 MG: 750 INJECTION, SOLUTION INTRAVENOUS at 22:53

## 2023-01-01 RX ADMIN — Medication 100 MG: at 16:56

## 2023-01-01 RX ADMIN — IPRATROPIUM BROMIDE AND ALBUTEROL SULFATE 3 ML: .5; 3 SOLUTION RESPIRATORY (INHALATION) at 20:48

## 2023-01-01 RX ADMIN — SITAGLIPTIN 50 MG: 50 TABLET, FILM COATED ORAL at 08:39

## 2023-01-01 RX ADMIN — ACETAMINOPHEN 650 MG: 325 TABLET ORAL at 21:26

## 2023-01-01 RX ADMIN — PANTOPRAZOLE SODIUM 40 MG: 40 TABLET, DELAYED RELEASE ORAL at 07:00

## 2023-01-01 RX ADMIN — Medication 1 CAPSULE: at 09:58

## 2023-01-01 RX ADMIN — NICOTINE 1 PATCH: 14 PATCH, EXTENDED RELEASE TRANSDERMAL at 13:14

## 2023-01-01 RX ADMIN — SODIUM CHLORIDE: 0.9 INJECTION, SOLUTION INTRAVENOUS at 10:15

## 2023-01-01 RX ADMIN — GABAPENTIN 300 MG: 300 CAPSULE ORAL at 20:39

## 2023-01-01 RX ADMIN — ATORVASTATIN CALCIUM 80 MG: 40 TABLET, FILM COATED ORAL at 08:12

## 2023-01-01 RX ADMIN — OXYBUTYNIN CHLORIDE 5 MG: 5 TABLET ORAL at 09:41

## 2023-01-01 RX ADMIN — DAPAGLIFLOZIN 10 MG: 10 TABLET, FILM COATED ORAL at 10:09

## 2023-01-01 RX ADMIN — ACETAMINOPHEN 650 MG: 325 TABLET ORAL at 21:22

## 2023-01-01 RX ADMIN — SERTRALINE 100 MG: 100 TABLET, FILM COATED ORAL at 10:08

## 2023-01-01 RX ADMIN — NICOTINE 1 PATCH: 14 PATCH, EXTENDED RELEASE TRANSDERMAL at 13:05

## 2023-01-01 RX ADMIN — INSULIN GLARGINE 14 UNITS: 100 INJECTION, SOLUTION SUBCUTANEOUS at 21:40

## 2023-01-01 RX ADMIN — IPRATROPIUM BROMIDE AND ALBUTEROL SULFATE 3 ML: .5; 3 SOLUTION RESPIRATORY (INHALATION) at 21:22

## 2023-01-01 RX ADMIN — FLUTICASONE FUROATE AND VILANTEROL TRIFENATATE 1 PUFF: 200; 25 POWDER RESPIRATORY (INHALATION) at 09:19

## 2023-01-01 RX ADMIN — CARBIDOPA AND LEVODOPA 1 TABLET: 25; 100 TABLET, EXTENDED RELEASE ORAL at 21:30

## 2023-01-01 RX ADMIN — INSULIN LISPRO 5 UNITS: 100 INJECTION, SOLUTION INTRAVENOUS; SUBCUTANEOUS at 12:17

## 2023-01-01 RX ADMIN — ACETAMINOPHEN 650 MG: 325 TABLET ORAL at 09:18

## 2023-01-01 RX ADMIN — CARBIDOPA AND LEVODOPA 1 TABLET: 25; 100 TABLET, EXTENDED RELEASE ORAL at 09:18

## 2023-01-01 RX ADMIN — SODIUM CHLORIDE 250 ML: 9 INJECTION, SOLUTION INTRAVENOUS at 02:11

## 2023-01-01 RX ADMIN — METOPROLOL SUCCINATE 25 MG: 25 TABLET, EXTENDED RELEASE ORAL at 09:20

## 2023-01-01 RX ADMIN — QUETIAPINE FUMARATE 25 MG: 25 TABLET ORAL at 22:51

## 2023-01-01 RX ADMIN — CYCLOBENZAPRINE 10 MG: 10 TABLET, FILM COATED ORAL at 22:51

## 2023-01-01 RX ADMIN — CARBIDOPA AND LEVODOPA 1 TABLET: 25; 100 TABLET, EXTENDED RELEASE ORAL at 14:55

## 2023-01-01 RX ADMIN — FUROSEMIDE 40 MG: 40 TABLET ORAL at 08:12

## 2023-01-01 RX ADMIN — AMIODARONE HYDROCHLORIDE 100 MG: 200 TABLET ORAL at 09:43

## 2023-01-01 RX ADMIN — ATORVASTATIN CALCIUM 80 MG: 80 TABLET, FILM COATED ORAL at 21:30

## 2023-01-01 RX ADMIN — SITAGLIPTIN 50 MG: 50 TABLET, FILM COATED ORAL at 13:14

## 2023-01-01 RX ADMIN — APIXABAN 5 MG: 5 TABLET, FILM COATED ORAL at 20:39

## 2023-01-01 RX ADMIN — OXYBUTYNIN CHLORIDE 5 MG: 5 TABLET ORAL at 09:17

## 2023-01-01 RX ADMIN — GUAIFENESIN 600 MG: 600 TABLET, EXTENDED RELEASE ORAL at 21:00

## 2023-01-01 RX ADMIN — CEFTRIAXONE 1 G: 1 INJECTION, SOLUTION INTRAVENOUS at 08:12

## 2023-01-01 RX ADMIN — INSULIN GLARGINE 30 UNITS: 100 INJECTION, SOLUTION SUBCUTANEOUS at 20:23

## 2023-01-01 RX ADMIN — INSULIN LISPRO 4 UNITS: 100 INJECTION, SOLUTION INTRAVENOUS; SUBCUTANEOUS at 08:41

## 2023-01-01 RX ADMIN — INSULIN LISPRO 20 UNITS: 100 INJECTION, SOLUTION INTRAVENOUS; SUBCUTANEOUS at 09:13

## 2023-01-01 RX ADMIN — CARBIDOPA AND LEVODOPA 1 TABLET: 25; 100 TABLET, EXTENDED RELEASE ORAL at 08:38

## 2023-01-01 RX ADMIN — PIPERACILLIN SODIUM AND TAZOBACTAM SODIUM 3.38 G: 3; .375 INJECTION, SOLUTION INTRAVENOUS at 20:12

## 2023-01-01 RX ADMIN — INSULIN LISPRO 20 UNITS: 100 INJECTION, SOLUTION INTRAVENOUS; SUBCUTANEOUS at 08:16

## 2023-01-01 RX ADMIN — PRASUGREL 10 MG: 10 TABLET, FILM COATED ORAL at 10:43

## 2023-01-01 RX ADMIN — METHYLPREDNISOLONE SODIUM SUCCINATE 125 MG: 125 INJECTION, POWDER, FOR SOLUTION INTRAMUSCULAR; INTRAVENOUS at 20:12

## 2023-01-01 RX ADMIN — GABAPENTIN 300 MG: 300 CAPSULE ORAL at 20:55

## 2023-01-01 RX ADMIN — DAPAGLIFLOZIN 10 MG: 10 TABLET, FILM COATED ORAL at 09:43

## 2023-01-01 RX ADMIN — CYCLOBENZAPRINE 10 MG: 10 TABLET, FILM COATED ORAL at 21:25

## 2023-01-01 RX ADMIN — INSULIN LISPRO 4 UNITS: 100 INJECTION, SOLUTION INTRAVENOUS; SUBCUTANEOUS at 12:54

## 2023-01-01 RX ADMIN — DOXYCYCLINE 100 MG: 100 INJECTION, POWDER, LYOPHILIZED, FOR SOLUTION INTRAVENOUS at 12:23

## 2023-01-01 RX ADMIN — SERTRALINE 100 MG: 100 TABLET, FILM COATED ORAL at 08:39

## 2023-01-01 RX ADMIN — QUETIAPINE FUMARATE 25 MG: 25 TABLET ORAL at 23:57

## 2023-01-01 RX ADMIN — Medication 1 CAPSULE: at 08:12

## 2023-01-01 RX ADMIN — GUAIFENESIN 600 MG: 600 TABLET, EXTENDED RELEASE ORAL at 20:54

## 2023-01-01 RX ADMIN — METHYLPREDNISOLONE SODIUM SUCCINATE 40 MG: 40 INJECTION, POWDER, FOR SOLUTION INTRAMUSCULAR; INTRAVENOUS at 17:18

## 2023-01-01 RX ADMIN — NICOTINE 1 PATCH: 14 PATCH, EXTENDED RELEASE TRANSDERMAL at 12:27

## 2023-01-01 RX ADMIN — IPRATROPIUM BROMIDE AND ALBUTEROL SULFATE 3 ML: .5; 3 SOLUTION RESPIRATORY (INHALATION) at 10:11

## 2023-01-01 RX ADMIN — CYCLOBENZAPRINE HYDROCHLORIDE 10 MG: 5 TABLET, FILM COATED ORAL at 20:24

## 2023-01-01 RX ADMIN — PIPERACILLIN SODIUM AND TAZOBACTAM SODIUM 3.38 G: 3; .375 INJECTION, SOLUTION INTRAVENOUS at 16:16

## 2023-01-01 RX ADMIN — INSULIN LISPRO 10 UNITS: 100 INJECTION, SOLUTION INTRAVENOUS; SUBCUTANEOUS at 11:10

## 2023-01-01 RX ADMIN — Medication 1 CAPSULE: at 09:44

## 2023-01-01 RX ADMIN — APIXABAN 5 MG: 5 TABLET, FILM COATED ORAL at 10:43

## 2023-01-01 RX ADMIN — IPRATROPIUM BROMIDE AND ALBUTEROL SULFATE 3 ML: 2.5; .5 SOLUTION RESPIRATORY (INHALATION) at 07:45

## 2023-01-01 RX ADMIN — DAPAGLIFLOZIN 10 MG: 10 TABLET, FILM COATED ORAL at 08:12

## 2023-01-01 RX ADMIN — Medication 2 L/MIN: at 09:39

## 2023-01-01 RX ADMIN — METHYLPREDNISOLONE SODIUM SUCCINATE 40 MG: 40 INJECTION, POWDER, FOR SOLUTION INTRAMUSCULAR; INTRAVENOUS at 06:13

## 2023-01-01 RX ADMIN — CARBIDOPA AND LEVODOPA 1 TABLET: 25; 100 TABLET, EXTENDED RELEASE ORAL at 13:14

## 2023-01-01 RX ADMIN — NICOTINE 1 PATCH: 14 PATCH, EXTENDED RELEASE TRANSDERMAL at 11:14

## 2023-01-01 RX ADMIN — DOXYCYCLINE 100 MG: 100 INJECTION, POWDER, LYOPHILIZED, FOR SOLUTION INTRAVENOUS at 10:30

## 2023-01-01 RX ADMIN — INSULIN LISPRO 8 UNITS: 100 INJECTION, SOLUTION INTRAVENOUS; SUBCUTANEOUS at 12:23

## 2023-01-01 RX ADMIN — CARBIDOPA AND LEVODOPA 1 TABLET: 25; 100 TABLET, EXTENDED RELEASE ORAL at 20:24

## 2023-01-01 RX ADMIN — FUROSEMIDE 40 MG: 40 TABLET ORAL at 09:58

## 2023-01-01 RX ADMIN — INSULIN LISPRO 8 UNITS: 100 INJECTION, SOLUTION INTRAVENOUS; SUBCUTANEOUS at 17:23

## 2023-01-01 RX ADMIN — CYCLOBENZAPRINE HYDROCHLORIDE 10 MG: 5 TABLET, FILM COATED ORAL at 20:26

## 2023-01-01 RX ADMIN — CARBIDOPA AND LEVODOPA 1 TABLET: 25; 100 TABLET, EXTENDED RELEASE ORAL at 09:58

## 2023-01-01 RX ADMIN — DEXAMETHASONE SODIUM PHOSPHATE 6 MG: 10 INJECTION INTRAMUSCULAR; INTRAVENOUS at 21:32

## 2023-01-01 RX ADMIN — DAPAGLIFLOZIN 10 MG: 10 TABLET, FILM COATED ORAL at 09:56

## 2023-01-01 RX ADMIN — DEXAMETHASONE SODIUM PHOSPHATE 6 MG: 10 INJECTION INTRAMUSCULAR; INTRAVENOUS at 21:21

## 2023-01-01 RX ADMIN — SITAGLIPTIN 50 MG: 50 TABLET, FILM COATED ORAL at 09:55

## 2023-01-01 RX ADMIN — CARBIDOPA AND LEVODOPA 1 TABLET: 25; 100 TABLET, EXTENDED RELEASE ORAL at 14:21

## 2023-01-01 RX ADMIN — INSULIN LISPRO 5 UNITS: 100 INJECTION, SOLUTION INTRAVENOUS; SUBCUTANEOUS at 12:30

## 2023-01-01 RX ADMIN — METOPROLOL SUCCINATE 12.5 MG: 25 TABLET, EXTENDED RELEASE ORAL at 10:43

## 2023-01-01 RX ADMIN — INSULIN GLARGINE 45 UNITS: 100 INJECTION, SOLUTION SUBCUTANEOUS at 21:48

## 2023-01-01 RX ADMIN — IPRATROPIUM BROMIDE AND ALBUTEROL SULFATE 3 ML: 2.5; .5 SOLUTION RESPIRATORY (INHALATION) at 21:14

## 2023-01-01 RX ADMIN — GUAIFENESIN 600 MG: 600 TABLET, EXTENDED RELEASE ORAL at 21:24

## 2023-01-01 RX ADMIN — METOPROLOL SUCCINATE 12.5 MG: 25 TABLET, EXTENDED RELEASE ORAL at 08:39

## 2023-01-01 RX ADMIN — METOPROLOL SUCCINATE 12.5 MG: 25 TABLET, FILM COATED, EXTENDED RELEASE ORAL at 09:57

## 2023-01-01 RX ADMIN — CARBIDOPA AND LEVODOPA 1 TABLET: 25; 100 TABLET, EXTENDED RELEASE ORAL at 14:44

## 2023-01-01 RX ADMIN — CARBIDOPA AND LEVODOPA 1 TABLET: 25; 100 TABLET, EXTENDED RELEASE ORAL at 08:12

## 2023-01-01 RX ADMIN — Medication 1 CAPSULE: at 09:41

## 2023-01-01 RX ADMIN — INSULIN LISPRO 20 UNITS: 100 INJECTION, SOLUTION INTRAVENOUS; SUBCUTANEOUS at 11:13

## 2023-01-01 RX ADMIN — PIPERACILLIN SODIUM AND TAZOBACTAM SODIUM 3.38 G: 3; .375 INJECTION, SOLUTION INTRAVENOUS at 04:22

## 2023-01-01 RX ADMIN — SERTRALINE HYDROCHLORIDE 100 MG: 50 TABLET ORAL at 09:17

## 2023-01-01 RX ADMIN — PANTOPRAZOLE SODIUM 20 MG: 20 TABLET, DELAYED RELEASE ORAL at 09:20

## 2023-01-01 RX ADMIN — PANTOPRAZOLE SODIUM 40 MG: 40 TABLET, DELAYED RELEASE ORAL at 05:26

## 2023-01-01 RX ADMIN — CARBIDOPA AND LEVODOPA 1 TABLET: 25; 100 TABLET, EXTENDED RELEASE ORAL at 21:24

## 2023-01-01 RX ADMIN — SERTRALINE HYDROCHLORIDE 100 MG: 50 TABLET ORAL at 09:56

## 2023-01-01 RX ADMIN — LEVOTHYROXINE SODIUM 25 MCG: 0.03 TABLET ORAL at 05:38

## 2023-01-01 RX ADMIN — ATORVASTATIN CALCIUM 80 MG: 40 TABLET, FILM COATED ORAL at 09:18

## 2023-01-01 RX ADMIN — INSULIN LISPRO 8 UNITS: 100 INJECTION, SOLUTION INTRAVENOUS; SUBCUTANEOUS at 11:11

## 2023-01-01 RX ADMIN — APIXABAN 5 MG: 5 TABLET, FILM COATED ORAL at 21:24

## 2023-01-01 RX ADMIN — LEVOTHYROXINE SODIUM 25 MCG: 0.03 TABLET ORAL at 06:13

## 2023-01-01 RX ADMIN — CYCLOBENZAPRINE 10 MG: 10 TABLET, FILM COATED ORAL at 23:58

## 2023-01-01 RX ADMIN — CARBIDOPA AND LEVODOPA 1 TABLET: 25; 100 TABLET, EXTENDED RELEASE ORAL at 16:56

## 2023-01-01 RX ADMIN — INSULIN LISPRO 8 UNITS: 100 INJECTION, SOLUTION INTRAVENOUS; SUBCUTANEOUS at 17:18

## 2023-01-01 RX ADMIN — IPRATROPIUM BROMIDE AND ALBUTEROL SULFATE 3 ML: 2.5; .5 SOLUTION RESPIRATORY (INHALATION) at 19:53

## 2023-01-01 RX ADMIN — INSULIN LISPRO 3 UNITS: 100 INJECTION, SOLUTION INTRAVENOUS; SUBCUTANEOUS at 16:54

## 2023-01-01 RX ADMIN — CARBIDOPA AND LEVODOPA 1 TABLET: 25; 100 TABLET, EXTENDED RELEASE ORAL at 20:39

## 2023-01-01 RX ADMIN — QUETIAPINE FUMARATE 25 MG: 25 TABLET ORAL at 21:24

## 2023-01-01 RX ADMIN — SITAGLIPTIN 50 MG: 50 TABLET, FILM COATED ORAL at 08:12

## 2023-01-01 RX ADMIN — REMDESIVIR 200 MG: 100 INJECTION, POWDER, LYOPHILIZED, FOR SOLUTION INTRAVENOUS at 14:44

## 2023-01-01 RX ADMIN — GABAPENTIN 300 MG: 300 CAPSULE ORAL at 21:30

## 2023-01-01 RX ADMIN — LEVOTHYROXINE SODIUM 25 MCG: 0.03 TABLET ORAL at 05:43

## 2023-01-01 RX ADMIN — METOPROLOL SUCCINATE 25 MG: 25 TABLET, EXTENDED RELEASE ORAL at 21:30

## 2023-01-01 RX ADMIN — IPRATROPIUM BROMIDE AND ALBUTEROL SULFATE 3 ML: .5; 3 SOLUTION RESPIRATORY (INHALATION) at 21:16

## 2023-01-01 RX ADMIN — Medication 2 L/MIN: at 09:32

## 2023-01-01 RX ADMIN — APIXABAN 5 MG: 5 TABLET, FILM COATED ORAL at 23:58

## 2023-01-01 RX ADMIN — CEFTRIAXONE 1 G: 1 INJECTION, SOLUTION INTRAVENOUS at 09:59

## 2023-01-01 RX ADMIN — Medication 1 TABLET: at 10:08

## 2023-01-01 RX ADMIN — CEFEPIME 1 G: 1 INJECTION, POWDER, FOR SOLUTION INTRAMUSCULAR; INTRAVENOUS at 22:53

## 2023-01-01 RX ADMIN — INSULIN GLARGINE 14 UNITS: 100 INJECTION, SOLUTION SUBCUTANEOUS at 22:59

## 2023-01-01 RX ADMIN — DEXAMETHASONE SODIUM PHOSPHATE 6 MG: 10 INJECTION INTRAMUSCULAR; INTRAVENOUS at 21:24

## 2023-01-01 RX ADMIN — CARBIDOPA AND LEVODOPA 1 TABLET: 25; 100 TABLET, EXTENDED RELEASE ORAL at 21:00

## 2023-01-01 RX ADMIN — APIXABAN 5 MG: 5 TABLET, FILM COATED ORAL at 10:08

## 2023-01-01 RX ADMIN — INSULIN LISPRO 8 UNITS: 100 INJECTION, SOLUTION INTRAVENOUS; SUBCUTANEOUS at 16:14

## 2023-01-01 RX ADMIN — IPRATROPIUM BROMIDE AND ALBUTEROL SULFATE 3 ML: .5; 3 SOLUTION RESPIRATORY (INHALATION) at 10:29

## 2023-01-01 RX ADMIN — GUAIFENESIN 600 MG: 600 TABLET, EXTENDED RELEASE ORAL at 08:12

## 2023-01-01 RX ADMIN — RIVAROXABAN 15 MG: 15 TABLET, FILM COATED ORAL at 17:18

## 2023-01-01 RX ADMIN — METHYLPREDNISOLONE SODIUM SUCCINATE 40 MG: 40 INJECTION, POWDER, FOR SOLUTION INTRAMUSCULAR; INTRAVENOUS at 16:00

## 2023-01-01 RX ADMIN — DOXYCYCLINE 100 MG: 100 INJECTION, POWDER, LYOPHILIZED, FOR SOLUTION INTRAVENOUS at 09:36

## 2023-01-01 RX ADMIN — ACETAMINOPHEN 650 MG: 325 TABLET ORAL at 20:38

## 2023-01-01 RX ADMIN — IPRATROPIUM BROMIDE AND ALBUTEROL SULFATE 3 ML: .5; 3 SOLUTION RESPIRATORY (INHALATION) at 16:15

## 2023-01-01 RX ADMIN — LEVOTHYROXINE SODIUM 25 MCG: 25 TABLET ORAL at 08:13

## 2023-01-01 RX ADMIN — HEPARIN SODIUM 4000 UNITS: 5000 INJECTION, SOLUTION INTRAVENOUS; SUBCUTANEOUS at 22:57

## 2023-01-01 RX ADMIN — INSULIN GLARGINE 30 UNITS: 100 INJECTION, SOLUTION SUBCUTANEOUS at 20:55

## 2023-01-01 RX ADMIN — RIVAROXABAN 15 MG: 15 TABLET, FILM COATED ORAL at 16:12

## 2023-01-01 RX ADMIN — INSULIN LISPRO 8 UNITS: 100 INJECTION, SOLUTION INTRAVENOUS; SUBCUTANEOUS at 09:28

## 2023-01-01 RX ADMIN — PIPERACILLIN SODIUM AND TAZOBACTAM SODIUM 3.38 G: 3; .375 INJECTION, SOLUTION INTRAVENOUS at 23:58

## 2023-01-01 RX ADMIN — PIPERACILLIN SODIUM AND TAZOBACTAM SODIUM 3.38 G: 3; .375 INJECTION, SOLUTION INTRAVENOUS at 16:40

## 2023-01-01 RX ADMIN — ATORVASTATIN CALCIUM 80 MG: 40 TABLET, FILM COATED ORAL at 09:57

## 2023-01-01 RX ADMIN — GABAPENTIN 300 MG: 300 CAPSULE ORAL at 22:51

## 2023-01-01 RX ADMIN — PANTOPRAZOLE SODIUM 40 MG: 40 TABLET, DELAYED RELEASE ORAL at 06:43

## 2023-01-01 RX ADMIN — PANTOPRAZOLE SODIUM 40 MG: 40 TABLET, DELAYED RELEASE ORAL at 06:34

## 2023-01-01 RX ADMIN — INSULIN LISPRO 8 UNITS: 100 INJECTION, SOLUTION INTRAVENOUS; SUBCUTANEOUS at 12:55

## 2023-01-01 RX ADMIN — CARBIDOPA AND LEVODOPA 1 TABLET: 25; 100 TABLET, EXTENDED RELEASE ORAL at 09:17

## 2023-01-01 RX ADMIN — CARBIDOPA AND LEVODOPA 1 TABLET: 25; 100 TABLET, EXTENDED RELEASE ORAL at 23:10

## 2023-01-01 RX ADMIN — DAPAGLIFLOZIN 10 MG: 10 TABLET, FILM COATED ORAL at 09:41

## 2023-01-01 RX ADMIN — PRASUGREL 10 MG: 10 TABLET, FILM COATED ORAL at 08:12

## 2023-01-01 RX ADMIN — QUETIAPINE FUMARATE 25 MG: 25 TABLET ORAL at 21:30

## 2023-01-01 RX ADMIN — QUETIAPINE FUMARATE 25 MG: 25 TABLET ORAL at 20:54

## 2023-01-01 RX ADMIN — ACETAMINOPHEN 650 MG: 325 TABLET ORAL at 08:39

## 2023-01-01 RX ADMIN — FLUTICASONE FUROATE AND VILANTEROL TRIFENATATE 1 PUFF: 200; 25 POWDER RESPIRATORY (INHALATION) at 10:14

## 2023-01-01 RX ADMIN — DAPAGLIFLOZIN 10 MG: 10 TABLET, FILM COATED ORAL at 09:18

## 2023-01-01 RX ADMIN — SERTRALINE 100 MG: 100 TABLET, FILM COATED ORAL at 10:43

## 2023-01-01 RX ADMIN — NICOTINE 1 PATCH: 14 PATCH, EXTENDED RELEASE TRANSDERMAL at 11:48

## 2023-01-01 RX ADMIN — AMIODARONE HYDROCHLORIDE 200 MG: 200 TABLET ORAL at 10:07

## 2023-01-01 RX ADMIN — INSULIN LISPRO 10 UNITS: 100 INJECTION, SOLUTION INTRAVENOUS; SUBCUTANEOUS at 16:30

## 2023-01-01 RX ADMIN — METOPROLOL SUCCINATE 12.5 MG: 25 TABLET, FILM COATED, EXTENDED RELEASE ORAL at 09:19

## 2023-01-01 RX ADMIN — CARBIDOPA AND LEVODOPA 1 TABLET: 25; 100 TABLET, EXTENDED RELEASE ORAL at 15:59

## 2023-01-01 RX ADMIN — IPRATROPIUM BROMIDE AND ALBUTEROL SULFATE 3 ML: 2.5; .5 SOLUTION RESPIRATORY (INHALATION) at 07:11

## 2023-01-01 RX ADMIN — METHYLPREDNISOLONE SODIUM SUCCINATE 40 MG: 40 INJECTION, POWDER, FOR SOLUTION INTRAMUSCULAR; INTRAVENOUS at 22:01

## 2023-01-01 RX ADMIN — Medication 2 L/MIN: at 23:55

## 2023-01-01 RX ADMIN — INSULIN LISPRO 15 UNITS: 100 INJECTION, SOLUTION INTRAVENOUS; SUBCUTANEOUS at 10:10

## 2023-01-01 RX ADMIN — Medication 1 PATCH: at 21:32

## 2023-01-01 RX ADMIN — SERTRALINE HYDROCHLORIDE 100 MG: 50 TABLET ORAL at 09:41

## 2023-01-01 RX ADMIN — APIXABAN 5 MG: 5 TABLET, FILM COATED ORAL at 12:13

## 2023-01-01 RX ADMIN — CEFTRIAXONE 1 G: 1 INJECTION, SOLUTION INTRAVENOUS at 10:01

## 2023-01-01 RX ADMIN — AMIODARONE HYDROCHLORIDE 100 MG: 200 TABLET ORAL at 08:12

## 2023-01-01 RX ADMIN — METHYLPREDNISOLONE SODIUM SUCCINATE 40 MG: 40 INJECTION, POWDER, FOR SOLUTION INTRAMUSCULAR; INTRAVENOUS at 14:55

## 2023-01-01 RX ADMIN — METHYLPREDNISOLONE SODIUM SUCCINATE 40 MG: 40 INJECTION, POWDER, FOR SOLUTION INTRAMUSCULAR; INTRAVENOUS at 05:26

## 2023-01-01 RX ADMIN — GABAPENTIN 300 MG: 300 CAPSULE ORAL at 20:26

## 2023-01-01 RX ADMIN — Medication 1 PATCH: at 21:25

## 2023-01-01 RX ADMIN — PIPERACILLIN SODIUM AND TAZOBACTAM SODIUM 3.38 G: 3; .375 INJECTION, SOLUTION INTRAVENOUS at 10:52

## 2023-01-01 RX ADMIN — METOPROLOL SUCCINATE 12.5 MG: 25 TABLET, FILM COATED, EXTENDED RELEASE ORAL at 08:12

## 2023-01-01 RX ADMIN — AMIODARONE HYDROCHLORIDE 100 MG: 200 TABLET ORAL at 09:40

## 2023-01-01 RX ADMIN — INSULIN LISPRO 8 UNITS: 100 INJECTION, SOLUTION INTRAVENOUS; SUBCUTANEOUS at 09:30

## 2023-01-01 RX ADMIN — ALBUTEROL SULFATE 2.5 MG: 2.5 SOLUTION RESPIRATORY (INHALATION) at 23:24

## 2023-01-01 RX ADMIN — CARBIDOPA AND LEVODOPA 1 TABLET: 25; 100 TABLET, EXTENDED RELEASE ORAL at 09:40

## 2023-01-01 RX ADMIN — ATORVASTATIN CALCIUM 80 MG: 80 TABLET, FILM COATED ORAL at 22:51

## 2023-01-01 RX ADMIN — PRASUGREL 10 MG: 10 TABLET, FILM COATED ORAL at 08:39

## 2023-01-01 RX ADMIN — IPRATROPIUM BROMIDE AND ALBUTEROL SULFATE 3 ML: 2.5; .5 SOLUTION RESPIRATORY (INHALATION) at 11:29

## 2023-01-01 RX ADMIN — GABAPENTIN 300 MG: 300 CAPSULE ORAL at 01:18

## 2023-01-01 SDOH — HEALTH STABILITY: PHYSICAL HEALTH: ON AVERAGE, HOW MANY MINUTES DO YOU ENGAGE IN EXERCISE AT THIS LEVEL?: 0 MIN

## 2023-01-01 SDOH — SOCIAL STABILITY: SOCIAL INSECURITY: WERE YOU ABLE TO COMPLETE ALL THE BEHAVIORAL HEALTH SCREENINGS?: YES

## 2023-01-01 SDOH — SOCIAL STABILITY: SOCIAL INSECURITY: WITHIN THE LAST YEAR, HAVE YOU BEEN AFRAID OF YOUR PARTNER OR EX-PARTNER?: NO

## 2023-01-01 SDOH — ECONOMIC STABILITY: INCOME INSECURITY: IN THE LAST 12 MONTHS, WAS THERE A TIME WHEN YOU WERE NOT ABLE TO PAY THE MORTGAGE OR RENT ON TIME?: NO

## 2023-01-01 SDOH — ECONOMIC STABILITY: FOOD INSECURITY: WITHIN THE PAST 12 MONTHS, YOU WORRIED THAT YOUR FOOD WOULD RUN OUT BEFORE YOU GOT MONEY TO BUY MORE.: NEVER TRUE

## 2023-01-01 SDOH — SOCIAL STABILITY: SOCIAL NETWORK
IN A TYPICAL WEEK, HOW MANY TIMES DO YOU TALK ON THE PHONE WITH FAMILY, FRIENDS, OR NEIGHBORS?: MORE THAN THREE TIMES A WEEK

## 2023-01-01 SDOH — SOCIAL STABILITY: SOCIAL INSECURITY: DO YOU FEEL UNSAFE GOING BACK TO THE PLACE WHERE YOU ARE LIVING?: NO

## 2023-01-01 SDOH — SOCIAL STABILITY: SOCIAL INSECURITY: ARE YOU OR HAVE YOU BEEN THREATENED OR ABUSED PHYSICALLY, EMOTIONALLY, OR SEXUALLY BY ANYONE?: NO

## 2023-01-01 SDOH — SOCIAL STABILITY: SOCIAL INSECURITY: HAVE YOU HAD THOUGHTS OF HARMING ANYONE ELSE?: NO

## 2023-01-01 SDOH — SOCIAL STABILITY: SOCIAL NETWORK: HOW OFTEN DO YOU GET TOGETHER WITH FRIENDS OR RELATIVES?: MORE THAN THREE TIMES A WEEK

## 2023-01-01 SDOH — SOCIAL STABILITY: SOCIAL INSECURITY: DO YOU FEEL ANYONE HAS EXPLOITED OR TAKEN ADVANTAGE OF YOU FINANCIALLY OR OF YOUR PERSONAL PROPERTY?: NO

## 2023-01-01 SDOH — SOCIAL STABILITY: SOCIAL INSECURITY
WITHIN THE LAST YEAR, HAVE TO BEEN RAPED OR FORCED TO HAVE ANY KIND OF SEXUAL ACTIVITY BY YOUR PARTNER OR EX-PARTNER?: NO

## 2023-01-01 SDOH — ECONOMIC STABILITY: TRANSPORTATION INSECURITY
IN THE PAST 12 MONTHS, HAS THE LACK OF TRANSPORTATION KEPT YOU FROM MEDICAL APPOINTMENTS OR FROM GETTING MEDICATIONS?: NO

## 2023-01-01 SDOH — ECONOMIC STABILITY: TRANSPORTATION INSECURITY
IN THE PAST 12 MONTHS, HAS LACK OF TRANSPORTATION KEPT YOU FROM MEETINGS, WORK, OR FROM GETTING THINGS NEEDED FOR DAILY LIVING?: NO

## 2023-01-01 SDOH — SOCIAL STABILITY: SOCIAL INSECURITY: WITHIN THE LAST YEAR, HAVE YOU BEEN HUMILIATED OR EMOTIONALLY ABUSED IN OTHER WAYS BY YOUR PARTNER OR EX-PARTNER?: NO

## 2023-01-01 SDOH — ECONOMIC STABILITY: INCOME INSECURITY: HOW HARD IS IT FOR YOU TO PAY FOR THE VERY BASICS LIKE FOOD, HOUSING, MEDICAL CARE, AND HEATING?: NOT HARD AT ALL

## 2023-01-01 SDOH — SOCIAL STABILITY: SOCIAL INSECURITY: HAS ANYONE EVER THREATENED TO HURT YOUR FAMILY OR YOUR PETS?: NO

## 2023-01-01 SDOH — ECONOMIC STABILITY: FOOD INSECURITY: WITHIN THE PAST 12 MONTHS, THE FOOD YOU BOUGHT JUST DIDN'T LAST AND YOU DIDN'T HAVE MONEY TO GET MORE.: NEVER TRUE

## 2023-01-01 SDOH — SOCIAL STABILITY: SOCIAL INSECURITY: DOES ANYONE TRY TO KEEP YOU FROM HAVING/CONTACTING OTHER FRIENDS OR DOING THINGS OUTSIDE YOUR HOME?: NO

## 2023-01-01 SDOH — HEALTH STABILITY: MENTAL HEALTH: EXPERIENCED ANY OF THE FOLLOWING LIFE EVENTS: DEATH OF FAMILY/FRIEND

## 2023-01-01 SDOH — ECONOMIC STABILITY: INCOME INSECURITY: IN THE PAST 12 MONTHS, HAS THE ELECTRIC, GAS, OIL, OR WATER COMPANY THREATENED TO SHUT OFF SERVICE IN YOUR HOME?: NO

## 2023-01-01 SDOH — SOCIAL STABILITY: SOCIAL NETWORK: ARE YOU MARRIED, WIDOWED, DIVORCED, SEPARATED, NEVER MARRIED, OR LIVING WITH A PARTNER?: MARRIED

## 2023-01-01 SDOH — ECONOMIC STABILITY: HOUSING INSECURITY
IN THE LAST 12 MONTHS, WAS THERE A TIME WHEN YOU DID NOT HAVE A STEADY PLACE TO SLEEP OR SLEPT IN A SHELTER (INCLUDING NOW)?: NO

## 2023-01-01 SDOH — ECONOMIC STABILITY: HOUSING INSECURITY: IN THE LAST 12 MONTHS, HOW MANY PLACES HAVE YOU LIVED?: 1

## 2023-01-01 SDOH — HEALTH STABILITY: PHYSICAL HEALTH: ON AVERAGE, HOW MANY DAYS PER WEEK DO YOU ENGAGE IN MODERATE TO STRENUOUS EXERCISE (LIKE A BRISK WALK)?: 0 DAYS

## 2023-01-01 SDOH — SOCIAL STABILITY: SOCIAL INSECURITY: ABUSE: ADULT

## 2023-01-01 SDOH — SOCIAL STABILITY: SOCIAL INSECURITY
WITHIN THE LAST YEAR, HAVE YOU BEEN KICKED, HIT, SLAPPED, OR OTHERWISE PHYSICALLY HURT BY YOUR PARTNER OR EX-PARTNER?: NO

## 2023-01-01 SDOH — SOCIAL STABILITY: SOCIAL INSECURITY: ARE THERE ANY APPARENT SIGNS OF INJURIES/BEHAVIORS THAT COULD BE RELATED TO ABUSE/NEGLECT?: NO

## 2023-01-01 ASSESSMENT — ENCOUNTER SYMPTOMS
CONSTIPATION: 0
DIARRHEA: 0
PSYCHIATRIC NEGATIVE: 1
GASTROINTESTINAL NEGATIVE: 1
VOMITING: 0
FATIGUE: 1
BLOOD IN STOOL: 0
FEVER: 0
TROUBLE SWALLOWING: 0
DEPRESSION: 0
ENDOCRINE COMMENTS: POSITIVE FOR DIABETES
SHORTNESS OF BREATH: 1
ACTIVITY CHANGE: 1
RHINORRHEA: 0
CHEST TIGHTNESS: 0
ABDOMINAL PAIN: 0
COUGH: 0
FEVER: 0
WHEEZING: 1
ACTIVITY CHANGE: 1
OCCASIONAL FEELINGS OF UNSTEADINESS: 1
CHILLS: 0
DIFFICULTY URINATING: 0
COUGH: 1
SORE THROAT: 0
LOSS OF SENSATION IN FEET: 0
CHEST TIGHTNESS: 0
WHEEZING: 0
APPETITE CHANGE: 0
ROS SKIN COMMENTS: RIGHT PERIORBITAL ECCHYMOSIS
COLOR CHANGE: 1
LOSS OF SENSATION IN FEET: 1
HEADACHES: 0
CARDIOVASCULAR NEGATIVE: 1
OCCASIONAL FEELINGS OF UNSTEADINESS: 0
DIAPHORESIS: 0
HEMATOLOGIC/LYMPHATIC NEGATIVE: 1
CHILLS: 0
NAUSEA: 0
SINUS PRESSURE: 1
PALPITATIONS: 0
DIZZINESS: 0
ABDOMINAL PAIN: 0
DIFFICULTY URINATING: 0
TREMORS: 1
APPETITE CHANGE: 0
SHORTNESS OF BREATH: 0
SHORTNESS OF BREATH: 0
DYSURIA: 0
DEPRESSION: 1
NAUSEA: 0
FEVER: 1
CHILLS: 1
NUMBNESS: 1
COUGH: 1
DIARRHEA: 0
ARTHRALGIAS: 1
VOMITING: 0
LIGHT-HEADEDNESS: 0

## 2023-01-01 ASSESSMENT — COGNITIVE AND FUNCTIONAL STATUS - GENERAL
HELP NEEDED FOR BATHING: TOTAL
STANDING UP FROM CHAIR USING ARMS: A LOT
MOVING FROM LYING ON BACK TO SITTING ON SIDE OF FLAT BED WITH BEDRAILS: A LOT
HELP NEEDED FOR BATHING: TOTAL
WALKING IN HOSPITAL ROOM: TOTAL
MOVING FROM LYING ON BACK TO SITTING ON SIDE OF FLAT BED WITH BEDRAILS: A LITTLE
HELP NEEDED FOR BATHING: A LOT
EATING MEALS: A LITTLE
CLIMB 3 TO 5 STEPS WITH RAILING: A LOT
MOVING FROM LYING ON BACK TO SITTING ON SIDE OF FLAT BED WITH BEDRAILS: A LOT
TOILETING: A LITTLE
DAILY ACTIVITIY SCORE: 12
EATING MEALS: A LOT
WALKING IN HOSPITAL ROOM: TOTAL
CLIMB 3 TO 5 STEPS WITH RAILING: TOTAL
DRESSING REGULAR LOWER BODY CLOTHING: A LITTLE
TURNING FROM BACK TO SIDE WHILE IN FLAT BAD: A LOT
HELP NEEDED FOR BATHING: A LITTLE
TURNING FROM BACK TO SIDE WHILE IN FLAT BAD: A LOT
DRESSING REGULAR LOWER BODY CLOTHING: TOTAL
TURNING FROM BACK TO SIDE WHILE IN FLAT BAD: A LOT
PERSONAL GROOMING: TOTAL
DRESSING REGULAR UPPER BODY CLOTHING: A LOT
TOILETING: TOTAL
WALKING IN HOSPITAL ROOM: TOTAL
CLIMB 3 TO 5 STEPS WITH RAILING: TOTAL
WALKING IN HOSPITAL ROOM: TOTAL
MOBILITY SCORE: 10
HELP NEEDED FOR BATHING: A LOT
CLIMB 3 TO 5 STEPS WITH RAILING: TOTAL
DAILY ACTIVITIY SCORE: 6
MOVING TO AND FROM BED TO CHAIR: A LOT
DRESSING REGULAR UPPER BODY CLOTHING: A LOT
PERSONAL GROOMING: A LOT
PERSONAL GROOMING: A LOT
MOVING FROM LYING ON BACK TO SITTING ON SIDE OF FLAT BED WITH BEDRAILS: A LITTLE
DAILY ACTIVITIY SCORE: 13
PERSONAL GROOMING: A LOT
DRESSING REGULAR UPPER BODY CLOTHING: A LOT
DRESSING REGULAR LOWER BODY CLOTHING: TOTAL
CLIMB 3 TO 5 STEPS WITH RAILING: TOTAL
MOVING TO AND FROM BED TO CHAIR: TOTAL
MOVING FROM LYING ON BACK TO SITTING ON SIDE OF FLAT BED WITH BEDRAILS: A LOT
DAILY ACTIVITIY SCORE: 18
CLIMB 3 TO 5 STEPS WITH RAILING: TOTAL
STANDING UP FROM CHAIR USING ARMS: A LOT
STANDING UP FROM CHAIR USING ARMS: A LOT
MOVING FROM LYING ON BACK TO SITTING ON SIDE OF FLAT BED WITH BEDRAILS: A LITTLE
PERSONAL GROOMING: A LITTLE
STANDING UP FROM CHAIR USING ARMS: TOTAL
HELP NEEDED FOR BATHING: TOTAL
EATING MEALS: A LITTLE
PERSONAL GROOMING: A LITTLE
DAILY ACTIVITIY SCORE: 6
TURNING FROM BACK TO SIDE WHILE IN FLAT BAD: A LOT
TOILETING: TOTAL
CLIMB 3 TO 5 STEPS WITH RAILING: TOTAL
STANDING UP FROM CHAIR USING ARMS: TOTAL
MOBILITY SCORE: 12
DRESSING REGULAR UPPER BODY CLOTHING: TOTAL
STANDING UP FROM CHAIR USING ARMS: A LOT
EATING MEALS: A LITTLE
MOBILITY SCORE: 10
TOILETING: A LOT
DAILY ACTIVITIY SCORE: 8
STANDING UP FROM CHAIR USING ARMS: A LITTLE
HELP NEEDED FOR BATHING: A LOT
MOVING FROM LYING ON BACK TO SITTING ON SIDE OF FLAT BED WITH BEDRAILS: A LOT
HELP NEEDED FOR BATHING: A LOT
TOILETING: TOTAL
PERSONAL GROOMING: A LOT
MOBILITY SCORE: 8
HELP NEEDED FOR BATHING: A LOT
PERSONAL GROOMING: A LOT
PATIENT BASELINE BEDBOUND: NO
DRESSING REGULAR UPPER BODY CLOTHING: A LOT
WALKING IN HOSPITAL ROOM: TOTAL
TURNING FROM BACK TO SIDE WHILE IN FLAT BAD: A LOT
DRESSING REGULAR UPPER BODY CLOTHING: A LOT
DAILY ACTIVITIY SCORE: 11
DAILY ACTIVITIY SCORE: 13
MOVING FROM LYING ON BACK TO SITTING ON SIDE OF FLAT BED WITH BEDRAILS: A LOT
MOVING FROM LYING ON BACK TO SITTING ON SIDE OF FLAT BED WITH BEDRAILS: A LOT
MOBILITY SCORE: 10
WALKING IN HOSPITAL ROOM: TOTAL
STANDING UP FROM CHAIR USING ARMS: A LOT
DRESSING REGULAR LOWER BODY CLOTHING: TOTAL
EATING MEALS: A LITTLE
EATING MEALS: TOTAL
TURNING FROM BACK TO SIDE WHILE IN FLAT BAD: A LOT
MOBILITY SCORE: 8
MOBILITY SCORE: 11
MOVING TO AND FROM BED TO CHAIR: A LOT
TOILETING: TOTAL
TOILETING: A LOT
DRESSING REGULAR LOWER BODY CLOTHING: A LOT
STANDING UP FROM CHAIR USING ARMS: A LOT
DRESSING REGULAR LOWER BODY CLOTHING: A LOT
HELP NEEDED FOR BATHING: A LOT
TOILETING: A LOT
DAILY ACTIVITIY SCORE: 14
MOVING TO AND FROM BED TO CHAIR: A LOT
MOVING FROM LYING ON BACK TO SITTING ON SIDE OF FLAT BED WITH BEDRAILS: A LOT
PERSONAL GROOMING: TOTAL
MOVING FROM LYING ON BACK TO SITTING ON SIDE OF FLAT BED WITH BEDRAILS: A LOT
DRESSING REGULAR UPPER BODY CLOTHING: A LOT
DRESSING REGULAR LOWER BODY CLOTHING: TOTAL
MOVING TO AND FROM BED TO CHAIR: A LOT
STANDING UP FROM CHAIR USING ARMS: TOTAL
MOVING TO AND FROM BED TO CHAIR: A LOT
MOVING TO AND FROM BED TO CHAIR: A LITTLE
EATING MEALS: A LITTLE
MOBILITY SCORE: 14
DAILY ACTIVITIY SCORE: 13
PERSONAL GROOMING: A LOT
MOVING FROM LYING ON BACK TO SITTING ON SIDE OF FLAT BED WITH BEDRAILS: A LOT
DRESSING REGULAR LOWER BODY CLOTHING: TOTAL
MOVING TO AND FROM BED TO CHAIR: A LOT
MOVING TO AND FROM BED TO CHAIR: A LOT
MOVING TO AND FROM BED TO CHAIR: TOTAL
DRESSING REGULAR LOWER BODY CLOTHING: A LOT
PERSONAL GROOMING: TOTAL
PERSONAL GROOMING: A LITTLE
STANDING UP FROM CHAIR USING ARMS: A LOT
TOILETING: A LOT
CLIMB 3 TO 5 STEPS WITH RAILING: TOTAL
TOILETING: A LOT
DRESSING REGULAR UPPER BODY CLOTHING: A LITTLE
HELP NEEDED FOR BATHING: A LOT
TURNING FROM BACK TO SIDE WHILE IN FLAT BAD: A LITTLE
CLIMB 3 TO 5 STEPS WITH RAILING: TOTAL
TURNING FROM BACK TO SIDE WHILE IN FLAT BAD: A LITTLE
DRESSING REGULAR LOWER BODY CLOTHING: A LOT
TOILETING: TOTAL
MOVING FROM LYING ON BACK TO SITTING ON SIDE OF FLAT BED WITH BEDRAILS: A LOT
WALKING IN HOSPITAL ROOM: TOTAL
DAILY ACTIVITIY SCORE: 14
CLIMB 3 TO 5 STEPS WITH RAILING: A LITTLE
WALKING IN HOSPITAL ROOM: TOTAL
EATING MEALS: A LITTLE
TURNING FROM BACK TO SIDE WHILE IN FLAT BAD: A LOT
MOVING TO AND FROM BED TO CHAIR: A LOT
STANDING UP FROM CHAIR USING ARMS: A LOT
MOVING TO AND FROM BED TO CHAIR: A LOT
DRESSING REGULAR LOWER BODY CLOTHING: TOTAL
HELP NEEDED FOR BATHING: A LOT
DRESSING REGULAR UPPER BODY CLOTHING: A LOT
MOBILITY SCORE: 9
MOBILITY SCORE: 8
EATING MEALS: A LITTLE
DRESSING REGULAR UPPER BODY CLOTHING: TOTAL
CLIMB 3 TO 5 STEPS WITH RAILING: TOTAL
TOILETING: A LOT
HELP NEEDED FOR BATHING: A LOT
MOVING FROM LYING ON BACK TO SITTING ON SIDE OF FLAT BED WITH BEDRAILS: A LOT
PATIENT BASELINE BEDBOUND: UNABLE TO ASSESS AT THIS TIME
CLIMB 3 TO 5 STEPS WITH RAILING: TOTAL
TURNING FROM BACK TO SIDE WHILE IN FLAT BAD: A LOT
WALKING IN HOSPITAL ROOM: TOTAL
EATING MEALS: A LITTLE
DRESSING REGULAR LOWER BODY CLOTHING: TOTAL
TURNING FROM BACK TO SIDE WHILE IN FLAT BAD: A LOT
MOBILITY SCORE: 10
WALKING IN HOSPITAL ROOM: A LOT
MOVING TO AND FROM BED TO CHAIR: A LOT
EATING MEALS: A LITTLE
DAILY ACTIVITIY SCORE: 13
DRESSING REGULAR UPPER BODY CLOTHING: A LOT
WALKING IN HOSPITAL ROOM: TOTAL
TURNING FROM BACK TO SIDE WHILE IN FLAT BAD: A LITTLE
DAILY ACTIVITIY SCORE: 11
MOVING TO AND FROM BED TO CHAIR: TOTAL
DRESSING REGULAR LOWER BODY CLOTHING: TOTAL
CLIMB 3 TO 5 STEPS WITH RAILING: TOTAL
HELP NEEDED FOR BATHING: A LOT
WALKING IN HOSPITAL ROOM: A LOT
CLIMB 3 TO 5 STEPS WITH RAILING: TOTAL
STANDING UP FROM CHAIR USING ARMS: TOTAL
TURNING FROM BACK TO SIDE WHILE IN FLAT BAD: A LOT
MOBILITY SCORE: 10
MOBILITY SCORE: 11
EATING MEALS: A LITTLE
TOILETING: TOTAL
TURNING FROM BACK TO SIDE WHILE IN FLAT BAD: A LOT
WALKING IN HOSPITAL ROOM: TOTAL
DRESSING REGULAR LOWER BODY CLOTHING: A LOT
DRESSING REGULAR UPPER BODY CLOTHING: A LOT
EATING MEALS: TOTAL
MOBILITY SCORE: 12
PERSONAL GROOMING: A LITTLE
PERSONAL GROOMING: A LITTLE
MOVING TO AND FROM BED TO CHAIR: A LOT
MOBILITY SCORE: 24
DAILY ACTIVITIY SCORE: 11
CLIMB 3 TO 5 STEPS WITH RAILING: TOTAL
EATING MEALS: A LITTLE
WALKING IN HOSPITAL ROOM: A LITTLE
STANDING UP FROM CHAIR USING ARMS: A LOT
DRESSING REGULAR UPPER BODY CLOTHING: A LOT
DRESSING REGULAR UPPER BODY CLOTHING: TOTAL
WALKING IN HOSPITAL ROOM: A LOT
TOILETING: A LOT
STANDING UP FROM CHAIR USING ARMS: A LOT
MOBILITY SCORE: 20

## 2023-01-01 ASSESSMENT — PAIN SCALES - GENERAL
PAINLEVEL_OUTOF10: 0 - NO PAIN
PAINLEVEL: 0-NO PAIN
PAINLEVEL_OUTOF10: 5 - MODERATE PAIN
PAINLEVEL_OUTOF10: 0 - NO PAIN
PAINLEVEL: 0-NO PAIN
PAINLEVEL_OUTOF10: 0 - NO PAIN
PAINLEVEL_OUTOF10: 3
PAINLEVEL_OUTOF10: 0 - NO PAIN
PAINLEVEL_OUTOF10: 0 - NO PAIN
PAINLEVEL: 5
PAINLEVEL_OUTOF10: 0 - NO PAIN
PAINLEVEL_OUTOF10: 6

## 2023-01-01 ASSESSMENT — COLUMBIA-SUICIDE SEVERITY RATING SCALE - C-SSRS
1. IN THE PAST MONTH, HAVE YOU WISHED YOU WERE DEAD OR WISHED YOU COULD GO TO SLEEP AND NOT WAKE UP?: NO
1. IN THE PAST MONTH, HAVE YOU WISHED YOU WERE DEAD OR WISHED YOU COULD GO TO SLEEP AND NOT WAKE UP?: NO
6. HAVE YOU EVER DONE ANYTHING, STARTED TO DO ANYTHING, OR PREPARED TO DO ANYTHING TO END YOUR LIFE?: NO
1. IN THE PAST MONTH, HAVE YOU WISHED YOU WERE DEAD OR WISHED YOU COULD GO TO SLEEP AND NOT WAKE UP?: NO
2. HAVE YOU ACTUALLY HAD ANY THOUGHTS OF KILLING YOURSELF?: NO
1. IN THE PAST MONTH, HAVE YOU WISHED YOU WERE DEAD OR WISHED YOU COULD GO TO SLEEP AND NOT WAKE UP?: NO
2. HAVE YOU ACTUALLY HAD ANY THOUGHTS OF KILLING YOURSELF?: NO
6. HAVE YOU EVER DONE ANYTHING, STARTED TO DO ANYTHING, OR PREPARED TO DO ANYTHING TO END YOUR LIFE?: NO

## 2023-01-01 ASSESSMENT — ACTIVITIES OF DAILY LIVING (ADL)
TOILETING_ASSISTANCE: MAXIMAL
HEARING - LEFT EAR: FUNCTIONAL
TOILETING: DEPENDENT
BATHING_ASSISTANCE: MAXIMAL
FEEDING YOURSELF: NEEDS ASSISTANCE
WALKS IN HOME: DEPENDENT
LACK_OF_TRANSPORTATION: NO
ADL_ASSISTANCE: NEEDS ASSISTANCE
PATIENT'S MEMORY ADEQUATE TO SAFELY COMPLETE DAILY ACTIVITIES?: YES
ADEQUATE_TO_COMPLETE_ADL: YES
ADL_ASSISTANCE: NEEDS ASSISTANCE
GROOMING: DEPENDENT
GROOMING_ASSISTANCE: MINIMAL
JUDGMENT_ADEQUATE_SAFELY_COMPLETE_DAILY_ACTIVITIES: YES
BATHING: DEPENDENT
PATIENT'S MEMORY ADEQUATE TO SAFELY COMPLETE DAILY ACTIVITIES?: YES
LACK_OF_TRANSPORTATION: NO
FEEDING YOURSELF: NEEDS ASSISTANCE
ADEQUATE_TO_COMPLETE_ADL: YES
TOILETING: DEPENDENT
ADL_ASSISTANCE: NEEDS ASSISTANCE
DRESSING YOURSELF: DEPENDENT
BATHING_ASSISTANCE: MAXIMAL
GROOMING: DEPENDENT
ADL_ASSISTANCE: NEEDS ASSISTANCE
JUDGMENT_ADEQUATE_SAFELY_COMPLETE_DAILY_ACTIVITIES: YES
FEEDING: MINIMAL
HEARING - RIGHT EAR: FUNCTIONAL
DRESSING YOURSELF: DEPENDENT
HEARING - LEFT EAR: FUNCTIONAL
WALKS IN HOME: DEPENDENT
BATHING_ASSISTANCE: MAXIMAL
HEARING - RIGHT EAR: FUNCTIONAL
BATHING: DEPENDENT

## 2023-01-01 ASSESSMENT — PAIN DESCRIPTION - PAIN TYPE: TYPE: CHRONIC PAIN

## 2023-01-01 ASSESSMENT — LIFESTYLE VARIABLES
PRESCIPTION_ABUSE_PAST_12_MONTHS: NO
SUBSTANCE_ABUSE_PAST_12_MONTHS: NO
HOW MANY STANDARD DRINKS CONTAINING ALCOHOL DO YOU HAVE ON A TYPICAL DAY: PATIENT DOES NOT DRINK
SKIP TO QUESTIONS 9-10: 1
HOW OFTEN DO YOU HAVE 6 OR MORE DRINKS ON ONE OCCASION: NEVER
HOW OFTEN DO YOU HAVE A DRINK CONTAINING ALCOHOL: NEVER
AUDIT-C TOTAL SCORE: 0
AUDIT-C TOTAL SCORE: 0
HOW OFTEN DO YOU HAVE 6 OR MORE DRINKS ON ONE OCCASION: NEVER
SUBSTANCE_ABUSE_PAST_12_MONTHS: NO
AUDIT-C TOTAL SCORE: 0
HOW MANY STANDARD DRINKS CONTAINING ALCOHOL DO YOU HAVE ON A TYPICAL DAY: PATIENT DOES NOT DRINK
SKIP TO QUESTIONS 9-10: 1
AUDIT-C TOTAL SCORE: 0
PRESCIPTION_ABUSE_PAST_12_MONTHS: NO
HOW OFTEN DO YOU HAVE A DRINK CONTAINING ALCOHOL: NEVER

## 2023-01-01 ASSESSMENT — PATIENT HEALTH QUESTIONNAIRE - PHQ9
SUM OF ALL RESPONSES TO PHQ9 QUESTIONS 1 & 2: 1
1. LITTLE INTEREST OR PLEASURE IN DOING THINGS: NOT AT ALL
2. FEELING DOWN, DEPRESSED OR HOPELESS: SEVERAL DAYS
SUM OF ALL RESPONSES TO PHQ9 QUESTIONS 1 AND 2: 1
1. LITTLE INTEREST OR PLEASURE IN DOING THINGS: NOT AT ALL
10. IF YOU CHECKED OFF ANY PROBLEMS, HOW DIFFICULT HAVE THESE PROBLEMS MADE IT FOR YOU TO DO YOUR WORK, TAKE CARE OF THINGS AT HOME, OR GET ALONG WITH OTHER PEOPLE: NOT DIFFICULT AT ALL
SUM OF ALL RESPONSES TO PHQ9 QUESTIONS 1 & 2: 1
2. FEELING DOWN, DEPRESSED OR HOPELESS: SEVERAL DAYS
2. FEELING DOWN, DEPRESSED OR HOPELESS: SEVERAL DAYS
1. LITTLE INTEREST OR PLEASURE IN DOING THINGS: NOT AT ALL

## 2023-01-01 ASSESSMENT — PAIN - FUNCTIONAL ASSESSMENT

## 2023-01-01 ASSESSMENT — PAIN SCALES - WONG BAKER
WONGBAKER_NUMERICALRESPONSE: NO HURT
WONGBAKER_NUMERICALRESPONSE: NO HURT

## 2023-01-01 ASSESSMENT — PAIN DESCRIPTION - LOCATION
LOCATION: GENERALIZED
LOCATION: OTHER (COMMENT)

## 2023-09-01 PROBLEM — J44.9 COPD (CHRONIC OBSTRUCTIVE PULMONARY DISEASE) (MULTI): Status: ACTIVE | Noted: 2023-01-01

## 2023-09-01 PROBLEM — E46 PROTEIN-CALORIE MALNUTRITION (MULTI): Status: ACTIVE | Noted: 2023-01-01

## 2023-09-01 PROBLEM — I25.5 ISCHEMIC CARDIOMYOPATHY: Status: ACTIVE | Noted: 2023-01-01

## 2023-09-01 PROBLEM — R09.02 HYPOXIA: Status: ACTIVE | Noted: 2023-01-01

## 2023-09-01 PROBLEM — M48.061 DEGENERATIVE LUMBAR SPINAL STENOSIS: Status: ACTIVE | Noted: 2023-01-01

## 2023-09-01 PROBLEM — D64.9 ANEMIA: Status: ACTIVE | Noted: 2023-01-01

## 2023-09-01 PROBLEM — I95.9 LOW BLOOD PRESSURE: Status: ACTIVE | Noted: 2023-01-01

## 2023-09-01 PROBLEM — R55 SYNCOPE AND COLLAPSE: Status: ACTIVE | Noted: 2023-01-01

## 2023-09-01 PROBLEM — E11.319 DIABETIC RETINOPATHY (MULTI): Status: ACTIVE | Noted: 2023-01-01

## 2023-09-01 PROBLEM — I47.20 VENTRICULAR TACHYCARDIA (MULTI): Status: ACTIVE | Noted: 2023-01-01

## 2023-09-01 PROBLEM — E87.0 HYPERNATREMIA: Status: ACTIVE | Noted: 2023-01-01

## 2023-09-01 PROBLEM — A41.9 SEPSIS (MULTI): Status: ACTIVE | Noted: 2023-01-01

## 2023-09-01 PROBLEM — G25.81 RLS (RESTLESS LEGS SYNDROME): Status: ACTIVE | Noted: 2023-01-01

## 2023-09-01 PROBLEM — C32.9 CANCER OF LARYNX (MULTI): Status: ACTIVE | Noted: 2023-01-01

## 2023-09-01 PROBLEM — D72.829 LEUKOCYTOSIS: Status: ACTIVE | Noted: 2023-01-01

## 2023-09-01 PROBLEM — F29 PSYCHOTIC DISORDER (MULTI): Status: ACTIVE | Noted: 2023-01-01

## 2023-09-01 PROBLEM — E11.40 NEUROPATHY IN DIABETES (MULTI): Status: ACTIVE | Noted: 2023-01-01

## 2023-09-01 PROBLEM — E11.9 DIABETES MELLITUS (MULTI): Status: ACTIVE | Noted: 2023-01-01

## 2023-09-01 PROBLEM — I65.23 ATHEROSCLEROSIS OF BOTH CAROTID ARTERIES: Status: ACTIVE | Noted: 2023-01-01

## 2023-09-01 PROBLEM — F50.9 EATING DISORDER: Status: ACTIVE | Noted: 2023-01-01

## 2023-09-01 PROBLEM — F17.200 SMOKER: Status: ACTIVE | Noted: 2023-01-01

## 2023-09-01 PROBLEM — D75.839 THROMBOCYTOSIS: Status: ACTIVE | Noted: 2023-01-01

## 2023-09-01 PROBLEM — M54.16 LUMBAR RADICULOPATHY: Status: ACTIVE | Noted: 2023-01-01

## 2023-09-01 PROBLEM — R13.10 DYSPHAGIA: Status: ACTIVE | Noted: 2023-01-01

## 2023-09-01 PROBLEM — I42.9 CARDIOMYOPATHY (MULTI): Status: ACTIVE | Noted: 2023-01-01

## 2023-09-01 PROBLEM — G89.29 OTHER CHRONIC PAIN: Status: ACTIVE | Noted: 2023-01-01

## 2023-09-01 PROBLEM — I10 ESSENTIAL (PRIMARY) HYPERTENSION: Status: ACTIVE | Noted: 2023-01-01

## 2023-09-01 PROBLEM — J98.11 ATELECTASIS: Status: ACTIVE | Noted: 2023-01-01

## 2023-09-01 PROBLEM — I50.9 CONGESTIVE HEART FAILURE (MULTI): Status: ACTIVE | Noted: 2023-01-01

## 2023-09-01 PROBLEM — K21.9 GERD (GASTROESOPHAGEAL REFLUX DISEASE): Status: ACTIVE | Noted: 2023-01-01

## 2023-09-01 PROBLEM — I63.9 CVA (CEREBRAL VASCULAR ACCIDENT) (MULTI): Status: ACTIVE | Noted: 2023-01-01

## 2023-09-01 PROBLEM — I25.10 CORONARY ARTERIOSCLEROSIS: Status: ACTIVE | Noted: 2023-01-01

## 2023-09-01 PROBLEM — J96.21 ACUTE ON CHRONIC RESPIRATORY FAILURE WITH HYPOXEMIA (MULTI): Status: ACTIVE | Noted: 2023-01-01

## 2023-09-01 PROBLEM — I25.10 CVD (CARDIOVASCULAR DISEASE): Status: ACTIVE | Noted: 2023-01-01

## 2023-09-01 PROBLEM — M51.9 DISORDER OF INTERVERTEBRAL DISC OF LUMBAR SPINE: Status: ACTIVE | Noted: 2023-01-01

## 2023-09-01 PROBLEM — I25.10 CAD (CORONARY ARTERY DISEASE): Status: ACTIVE | Noted: 2023-01-01

## 2023-09-01 PROBLEM — F32.A DEPRESSION: Status: ACTIVE | Noted: 2023-01-01

## 2023-09-01 PROBLEM — I24.9: Status: ACTIVE | Noted: 2023-01-01

## 2023-09-01 PROBLEM — R94.30 CARDIAC LV EJECTION FRACTION <20%: Status: ACTIVE | Noted: 2023-01-01

## 2023-09-01 PROBLEM — M48.02 CERVICAL STENOSIS OF SPINE: Status: ACTIVE | Noted: 2023-01-01

## 2023-09-01 PROBLEM — G20.A1 PARKINSON'S DISEASE (MULTI): Status: ACTIVE | Noted: 2023-01-01

## 2023-09-01 PROBLEM — E78.5 HYPERLIPIDEMIA: Status: ACTIVE | Noted: 2023-01-01

## 2023-09-01 PROBLEM — Z95.5 HISTORY OF CORONARY ARTERY STENT PLACEMENT: Status: ACTIVE | Noted: 2023-01-01

## 2023-09-01 PROBLEM — I48.91 ATRIAL FIBRILLATION (MULTI): Status: ACTIVE | Noted: 2023-01-01

## 2023-09-01 PROBLEM — Z95.810 BIVENTRICULAR AUTOMATIC IMPLANTABLE CARDIOVERTER DEFIBRILLATOR IN SITU: Status: ACTIVE | Noted: 2023-01-01

## 2023-09-01 PROBLEM — M48.00 SPINAL STENOSIS: Status: ACTIVE | Noted: 2023-01-01

## 2023-09-01 PROBLEM — E11.9 TYPE 2 DIABETES MELLITUS WITHOUT COMPLICATION (MULTI): Status: ACTIVE | Noted: 2023-01-01

## 2023-09-01 PROBLEM — E55.9 VITAMIN D DEFICIENCY: Status: ACTIVE | Noted: 2023-01-01

## 2023-09-01 PROBLEM — I50.22 CHRONIC SYSTOLIC HEART FAILURE (MULTI): Status: ACTIVE | Noted: 2023-01-01

## 2023-09-01 PROBLEM — R73.09 BLOOD GLUCOSE ABNORMAL: Status: ACTIVE | Noted: 2023-01-01

## 2023-09-01 PROBLEM — U07.1 DISEASE DUE TO SEVERE ACUTE RESPIRATORY SYNDROME CORONAVIRUS 2 (SARS-COV-2): Status: ACTIVE | Noted: 2023-01-01

## 2023-10-11 NOTE — PROGRESS NOTES
Subjective   Patient ID: Hai Spaulding is a 73 y.o. male who presents for Follow-up (DM, COPD, Function).    House Calls:          Visit for 74 y/o male seen today in private home, accompanied by wife Jolene for routine follow up. He is sitting up in w/c this afternoon. Alert, oriented, able to answer all questions regarding his health. His spouse does assist with HPI as needed. Patient lives with his spouse and handicap son in a single story condo. He does not drive and will leave the house to go for a car ride but doesn't get out of the car. He has limited mobility and it is difficult to get out for medical appointments. He has difficulty going up and down steps. H/o CVA with right hemiparesis. His spouse assists with all ADL's including meal prep, medication administration, assistance with transfering, dressing, bathing/hygeine. Pt denies pain at present but report issues with chronic back and hip pain with neuropathy to bilateral legs. He has slid out of bed several times this year but denies any recent injuries. He denies appetite changes or signs of weight loss. Continues on mechanical soft diet with thickened liquids. Denies dysphagia or choking during meals.     DM- Has freestyle olaf 2. Lantus was recently increased to 30 units in the evenings. Wife reports improved glucose levels in the morning, mostly in the 120-150 range. His BG levels have ranged from 150s-250s throughout the day. He has not hit 300 recently. He has had 1-2 hypoglycemic events. This happened after he took his insulin but didn't eat at night. He denies dizziness, lightheadedness. Due for A1c today.     COPD- currently on room air. Will occasionally use his oxygen, mostly in the evenings about 1-2 times every 2 weeks. He was treated with Doxycycline for Pneumonia since last follow up. Reports improved cough and congestion. Smoking 1/2 PPD. Denies shortness of breath, wheezing.     Depression- mood stable. Feels sad at times, mostly  "related to health concerns and mobility status. Denies SI.     Home Visit:  Medically necessary due to: Illness or condition that results in activity lmitation or restriction that impacts the ability to leave home such as:, unsteady gait/poor balance.         Current Outpatient Medications:     BD Cynthia 2nd Gen Pen Needle 32 gauge x 5/32\" needle, Inject 1 each under the skin 3 times a day., Disp: , Rfl:     FreeStyle Joan 2 [place] misc, Inject 1 each under the skin if needed., Disp: , Rfl:     FreeStyle Joan 2 Sensor kit, Inject 1 each under the skin every 14 (fourteen) days., Disp: , Rfl:     ondansetron ODT (Zofran-ODT) 4 mg disintegrating tablet, Take 1 tablet (4 mg) by mouth every 6 hours if needed for nausea or vomiting., Disp: , Rfl:     acetaminophen (Tylenol) 325 mg tablet, Take 2 tablets (650 mg) by mouth twice a day., Disp: , Rfl:     albuterol (Ventolin HFA) 90 mcg/actuation inhaler, Inhale 2 puffs every 4 hours if needed., Disp: , Rfl:     amiodarone (Pacerone) 200 mg tablet, Take 0.5 tablets (100 mg) by mouth once daily., Disp: , Rfl:     amoxicillin (Amoxil) 250 mg capsule, Take 1 capsule (250 mg) by mouth once daily at bedtime., Disp: , Rfl:     atorvastatin (Lipitor) 80 mg tablet, Take 1 tablet (80 mg) by mouth once daily., Disp: , Rfl:     Autolet lancing device, 3 times a day., Disp: , Rfl:     carbidopa-levodopa (Sinemet CR)  mg ER tablet, Take 1 tablet by mouth 3 times a day as needed., Disp: , Rfl:     cyclobenzaprine (Flexeril) 10 mg tablet, Take 1 tablet (10 mg) by mouth once daily at bedtime., Disp: , Rfl:     dapagliflozin propanediol (Farxiga) 10 mg, Take 1 tablet (10 mg) by mouth once daily., Disp: 30 tablet, Rfl: 11    ferrous sulfate 325 (65 Fe) MG tablet, Take 1 tablet (325 mg) by mouth once daily., Disp: , Rfl:     fluticasone propion-salmeteroL (Advair Diskus) 250-50 mcg/dose diskus inhaler, Inhale 1 puff 2 times a day., Disp: , Rfl:     furosemide (Lasix) 40 mg tablet, Take 1 " "tablet (40 mg) by mouth once daily., Disp: , Rfl:     gabapentin (Neurontin) 300 mg capsule, Take 1 capsule (300 mg) by mouth once daily at bedtime., Disp: , Rfl:     insulin glargine (Lantus U-100 Insulin) 100 unit/mL injection, Inject 30 Units under the skin once daily in the evening., Disp: 15 mL, Rfl: 1    insulin lispro (HumaLOG KwikPen Insulin) 100 unit/mL injection, Inject 8 Units under the skin 3 times a day with meals. 8 units plus sliding scale three times a day with meals Subcutaneous three times a day, max dose 16u three times a day, Disp: , Rfl:     insulin syr/ndl U100 half lexie (BD Veo Insulin Syr, half unit,) 0.3 mL 31 gauge x 15/64\" syringe, 1 Needle once daily., Disp: , Rfl:     insulin syringe-needle U-100 (BD Veo Insulin Syringe UF) 1 mL 31 gauge x 15/64\" syringe, 3 times a day., Disp: , Rfl:     insulin syringe-needle U-100 31G X 5/16\" 1 mL syringe, Inject 1 each under the skin once daily. As directed daily, Disp: 100 each, Rfl: 1    ipratropium-albuteroL (Duo-Neb) 0.5-2.5 mg/3 mL nebulizer solution, Take 3 mL by nebulization every 6 hours., Disp: , Rfl:     Lactobacillus acidophilus (ACIDOPHILUS ORAL), As directed oral, Disp: , Rfl:     metoprolol succinate XL (Toprol-XL) 25 mg 24 hr tablet, Take 0.5 tablets (12.5 mg) by mouth once daily., Disp: , Rfl:     nitroglycerin (Nitrostat) 0.4 mg SL tablet, 1 tablet under the tongue Sublingual prn every 5 minutes x 3 doses as needed, Disp: , Rfl:     omeprazole (PriLOSEC) 40 mg DR capsule, Take 1 capsule (40 mg) by mouth once daily in the morning. Take before meals., Disp: , Rfl:     OneTouch Ultra Test strip, Test 3-4 times a day, Disp: , Rfl:     oxybutynin XL (Ditropan-XL) 5 mg 24 hr tablet, Take 1 tablet (5 mg) by mouth once daily., Disp: , Rfl:     oxygen (O2) therapy, 2 LPM throughout the night of sleep nasal canula daily, Disp: , Rfl:     prasugrel (Effient) 10 mg tablet, Take 1 tablet (10 mg) by mouth once daily., Disp: , Rfl:     QUEtiapine " "(SEROquel) 25 mg tablet, Take 1 tablet (25 mg) by mouth once daily at bedtime., Disp: , Rfl:     rivaroxaban (Xarelto) 15 mg tablet, Take 1 tablet (15 mg) by mouth once daily. With food, Disp: , Rfl:     sertraline (Zoloft) 100 mg tablet, Take 1 tablet (100 mg) by mouth once daily., Disp: , Rfl:     triamcinolone (Kenalog) 0.1 % cream, Apply topically. Apply topically to affected area prn, As Needed, Disp: , Rfl:      Review of Systems   Constitutional:  Negative for appetite change, chills and fever.   HENT:  Negative for trouble swallowing.    Respiratory:  Positive for cough (chronic). Negative for chest tightness, shortness of breath and wheezing.    Cardiovascular:  Negative for chest pain, palpitations and leg swelling.   Gastrointestinal:  Negative for abdominal pain, constipation, diarrhea, nausea and vomiting.   Endocrine:        Positive for diabetes   Genitourinary:  Negative for difficulty urinating.   Musculoskeletal:  Positive for arthralgias and gait problem.        Chronic lower back, right shoulder    Neurological:  Positive for numbness.   Psychiatric/Behavioral:          Positive for depression     Objective   /64 (BP Location: Left arm, Patient Position: Sitting, BP Cuff Size: Adult)   Pulse 90   Temp 36.3 °C (97.3 °F) (Temporal)   Resp 16   Ht 1.753 m (5' 9\")   Wt 68.9 kg (152 lb)   SpO2 95%   BMI 22.45 kg/m²     Physical Exam  Constitutional:       General: He is not in acute distress.     Appearance: Normal appearance.      Comments: Sitting up in w/c watching TV   HENT:      Head: Normocephalic and atraumatic.      Nose: Nose normal.      Mouth/Throat:      Mouth: Mucous membranes are moist.      Pharynx: Oropharynx is clear.   Eyes:      Extraocular Movements: Extraocular movements intact.      Pupils: Pupils are equal, round, and reactive to light.   Cardiovascular:      Rate and Rhythm: Normal rate and regular rhythm.      Pulses: Normal pulses.      Heart sounds: No murmur " heard.     No friction rub. No gallop.   Pulmonary:      Effort: Pulmonary effort is normal. No respiratory distress.      Breath sounds: Decreased air movement present. No wheezing, rhonchi or rales.   Abdominal:      General: Bowel sounds are normal.      Palpations: Abdomen is soft.      Tenderness: There is no abdominal tenderness.   Musculoskeletal:      Cervical back: Neck supple.      Right lower leg: No edema.      Left lower leg: No edema.   Neurological:      Mental Status: He is alert and oriented to person, place, and time.      Motor: Weakness (right hemiparesis) present.   Psychiatric:         Mood and Affect: Mood normal.         Behavior: Behavior normal.       Assessment/Plan   Diagnoses and all orders for this visit:  Type 2 diabetes mellitus with diabetic neuropathy, with long-term current use of insulin (CMS/[facility])  Comments:  chronic, glucose levels improving per wife, will check A1c today  Orders:  -     Hemoglobin A1c  Chronic obstructive pulmonary disease, unspecified COPD type (CMS/[facility])  Comments:  chronic, respiraotry status stable  Cerebrovascular accident (CVA), unspecified mechanism (CMS/[facility])  Comments:  chronic, right hemiparesis noted, w/c bound, requires assistance with ADLs  Essential (primary) hypertension  Comments:  chronic, vitals stable  Orders:  -     metoprolol succinate XL (Toprol-XL) 25 mg 24 hr tablet; Take 0.5 tablets (12.5 mg) by mouth once daily.  -     CBC  -     Comprehensive metabolic panel  Acquired hypothyroidism  Comments:  chronic, managed with levothyroxine, will check TSH level today  Orders:  -     Tsh With Reflex To Free T4 If Abnormal  Mild episode of recurrent major depressive disorder (CMS/[facility])  Comments:  chronic, mood stable, continue Sertraline, Seroquel     Routine labs obtained in home- CBC, CMP, A1c, TSH level- pt tolerated well  Marilyn Luna, KIP-CNP

## 2023-11-15 PROBLEM — F17.200 CURRENT SMOKER: Status: ACTIVE | Noted: 2022-01-29

## 2023-11-15 PROBLEM — G20.A1 PARKINSON'S DISEASE (MULTI): Status: ACTIVE | Noted: 2023-01-01

## 2023-11-15 PROBLEM — E11.9 TYPE 2 DIABETES MELLITUS WITHOUT COMPLICATIONS (MULTI): Status: ACTIVE | Noted: 2021-11-23

## 2023-11-15 PROBLEM — A41.9 SEPSIS (MULTI): Status: ACTIVE | Noted: 2021-11-18

## 2023-11-15 PROBLEM — R94.2 PULMONARY FUNCTION STUDIES ABNORMAL: Status: ACTIVE | Noted: 2023-01-01

## 2023-11-17 PROBLEM — I63.9 CEREBRAL INFARCTION, UNSPECIFIED (MULTI): Status: ACTIVE | Noted: 2021-11-23

## 2023-11-17 PROBLEM — Z79.4 LONG TERM (CURRENT) USE OF INSULIN (MULTI): Status: ACTIVE | Noted: 2023-01-01

## 2023-11-20 NOTE — PROGRESS NOTES
"Subjective   Patient ID: Hai Spaulding is a 73 y.o. male who presents for Follow-up (Chronic medical conditions).    Visit for 72 y/o male seen today in private home, accompanied by wife Jolene for routine follow up of multiple medical issues. He is sitting on recliner this afternoon watching TV. He is alert, oriented, able to answer all questions regarding his health. His spouse does assist with HPI as needed. Patient lives with his spouse and handicap son in a single story condo. He does not drive and does report difficulty getting out for medical appointments. He is w/c bound. Has H/o CVA with right hemiparesis. He is no longer working with therapy services. He reports that he was in his bathroom several weeks ago and missed the grab bar. He was off balance, ended up going down to the floor and his wife called EMS for a lift assist. No reported head injury or LOC. He did not require evaluation in the ED. There have been no other falls. His spouse assists with all ADL's including meal prep, medication administration, assistance with transfering, dressing, bathing and hygiene. No reported appetite changes or signs of weight loss. He continues on mechanical soft diet with thickened liquids. Denies dysphagia or choking during meals.     DM- patient has freestyle olaf 2. His Lantus was recently increased to 30 units in the evenings. Spouse reports that his glucose levels have improved in the mornings. He is typically been 100-150. He is also on Humalog with meals but spouse reports that she will hold the Humalog when he does not eat meals and just has \"snacks\". He admits to intermittent hypoglycemia, typically occurring if patient takes his insulin and then decides not to eat. His.Last A1c was 10.6. He was started on Januvia and it tolerating medication well.     COPD- currently on room air. Will occasionally use his oxygen, mostly in the evenings. Smoking 1/2 PPD. Admits to chronic cough. Denies shortness of " "breath, wheezing.      Hypothyroid- last TSH 7.54. Pt was started on Levothyroxine 25 mcg daily.     Leukocytosis- continues to be a concern. Last WBC 18.9. Referral was sent to hematology and patient has initial appointment scheduled 11/29. Wife reports that he has had elevated WBC for several years, unsure why. He does not have any active signs of infection. Denies fever, chills. Remains on Amoxicillin in the evenings for h/o recurrent UTIs. Denies any current urinary concerns.     Home Visit:  Medically necessary due to: Illness or condition that results in activity lmitation or restriction that impacts the ability to leave home such as:, unsteady gait/poor balance.         Current Outpatient Medications:     acetaminophen (Tylenol) 325 mg tablet, Take 2 tablets (650 mg) by mouth twice a day., Disp: , Rfl:     albuterol (Ventolin HFA) 90 mcg/actuation inhaler, Inhale 2 puffs every 4 hours if needed., Disp: , Rfl:     amiodarone (Pacerone) 200 mg tablet, Take 0.5 tablets (100 mg) by mouth once daily., Disp: , Rfl:     amoxicillin (Amoxil) 250 mg capsule, Take 1 capsule (250 mg) by mouth once daily at bedtime., Disp: , Rfl:     atorvastatin (Lipitor) 80 mg tablet, Take 1 tablet (80 mg) by mouth once daily., Disp: , Rfl:     Autolet lancing device, 3 times a day., Disp: , Rfl:     BD Cynthia 2nd Gen Pen Needle 32 gauge x 5/32\" needle, Inject 1 each under the skin 3 times a day., Disp: , Rfl:     carbidopa-levodopa (Sinemet CR)  mg ER tablet, Take 1 tablet by mouth 3 times a day as needed., Disp: , Rfl:     cyclobenzaprine (Flexeril) 10 mg tablet, Take 1 tablet (10 mg) by mouth once daily at bedtime., Disp: , Rfl:     dapagliflozin propanediol (Farxiga) 10 mg, Take 1 tablet (10 mg) by mouth once daily., Disp: 30 tablet, Rfl: 11    ferrous sulfate 325 (65 Fe) MG tablet, Take 1 tablet (325 mg) by mouth once daily., Disp: , Rfl:     fluticasone propion-salmeteroL (Advair Diskus) 250-50 mcg/dose diskus inhaler, INHALE " "ONE PUFF BY MOUTH TWO TIMES A DAY, Disp: 1 each, Rfl: 3    FreeStyle Joan 2 [place] misc, Inject 1 each under the skin if needed., Disp: , Rfl:     FreeStyle Joan 2 Sensor kit, Inject 1 each under the skin every 14 (fourteen) days., Disp: , Rfl:     furosemide (Lasix) 40 mg tablet, Take 1 tablet (40 mg) by mouth once daily., Disp: , Rfl:     gabapentin (Neurontin) 300 mg capsule, Take 1 capsule (300 mg) by mouth once daily at bedtime., Disp: , Rfl:     insulin glargine (Lantus U-100 Insulin) 100 unit/mL injection, Inject 30 Units under the skin once daily in the evening., Disp: 15 mL, Rfl: 1    insulin lispro (HumaLOG KwikPen Insulin) 100 unit/mL injection, Inject 8 Units under the skin 3 times a day with meals. 8 units plus sliding scale three times a day with meals Subcutaneous three times a day, max dose 16u three times a day, Disp: , Rfl:     insulin syr/ndl U100 half lexie (BD Veo Insulin Syr, half unit,) 0.3 mL 31 gauge x 15/64\" syringe, 1 Needle once daily., Disp: , Rfl:     insulin syringe-needle U-100 (BD Veo Insulin Syringe UF) 1 mL 31 gauge x 15/64\" syringe, 3 times a day., Disp: , Rfl:     insulin syringe-needle U-100 31G X 5/16\" 1 mL syringe, Inject 1 each under the skin once daily. As directed daily, Disp: 100 each, Rfl: 1    ipratropium-albuteroL (Duo-Neb) 0.5-2.5 mg/3 mL nebulizer solution, Take 3 mL by nebulization every 6 hours., Disp: , Rfl:     Lactobacillus acidophilus (ACIDOPHILUS ORAL), As directed oral, Disp: , Rfl:     levothyroxine (Synthroid, Levoxyl) 25 mcg tablet, Take 1 tablet (25 mcg) by mouth once daily., Disp: 30 tablet, Rfl: 11    metoprolol succinate XL (Toprol-XL) 25 mg 24 hr tablet, Take 0.5 tablets (12.5 mg) by mouth once daily., Disp: 45 tablet, Rfl: 3    nitroglycerin (Nitrostat) 0.4 mg SL tablet, 1 tablet under the tongue Sublingual prn every 5 minutes x 3 doses as needed, Disp: , Rfl:     omeprazole (PriLOSEC) 40 mg DR capsule, Take 1 capsule (40 mg) by mouth once daily in " the morning. Take before meals., Disp: , Rfl:     ondansetron ODT (Zofran-ODT) 4 mg disintegrating tablet, Take 1 tablet (4 mg) by mouth every 6 hours if needed for nausea or vomiting., Disp: , Rfl:     OneTouch Ultra Test strip, Test 3-4 times a day, Disp: , Rfl:     oxybutynin XL (Ditropan-XL) 5 mg 24 hr tablet, Take 1 tablet (5 mg) by mouth once daily., Disp: , Rfl:     oxygen (O2) therapy, 2 LPM throughout the night of sleep nasal canula daily, Disp: , Rfl:     prasugrel (Effient) 10 mg tablet, Take 1 tablet (10 mg) by mouth once daily., Disp: , Rfl:     QUEtiapine (SEROquel) 25 mg tablet, Take 1 tablet (25 mg) by mouth once daily at bedtime., Disp: , Rfl:     rivaroxaban (Xarelto) 15 mg tablet, Take 1 tablet (15 mg) by mouth once daily. With food, Disp: , Rfl:     sertraline (Zoloft) 100 mg tablet, Take 1 tablet (100 mg) by mouth once daily., Disp: , Rfl:     SITagliptin phosphate (Januvia) 50 mg tablet, Take 1 tablet (50 mg) by mouth once daily., Disp: 30 tablet, Rfl: 11    triamcinolone (Kenalog) 0.1 % cream, Apply topically. Apply topically to affected area prn, As Needed, Disp: , Rfl:      Review of Systems  Constitutional:  Negative for appetite change, chills and fever.   HENT:  Negative for trouble swallowing.    Respiratory:  Positive for chronic cough. Negative for chest tightness, shortness of breath and wheezing.    Cardiovascular:  Negative for chest pain, palpitations and leg swelling.   Gastrointestinal:  Negative for abdominal pain, constipation, diarrhea, nausea and vomiting.   Endocrine: Positive for diabetes  Genitourinary:  Negative for difficulty urinating.   Musculoskeletal: Positive for arthralgias, chronic lower back, right shoulder pain, gait problem.    Neurological:  Positive for numbness  Psychiatric/Behavioral: Positive for depression    Objective   /64 (BP Location: Left arm, Patient Position: Sitting, BP Cuff Size: Adult)   Pulse 87   Temp 36.4 °C (97.5 °F) (Temporal)    "Resp 16   Ht 1.753 m (5' 9\")   Wt 70.3 kg (155 lb)   SpO2 96%   BMI 22.89 kg/m²     Physical Exam  Constitutional:       General: He is not in acute distress.     Appearance: Normal appearance.      Comments: Sitting in recliner, legs dependent   HENT:      Head: Normocephalic and atraumatic.      Nose: Nose normal.      Mouth/Throat:      Mouth: Mucous membranes are moist.      Pharynx: Oropharynx is clear.   Eyes:      Extraocular Movements: Extraocular movements intact.      Pupils: Pupils are equal, round, and reactive to light.   Cardiovascular:      Rate and Rhythm: Normal rate and regular rhythm.      Pulses: Normal pulses.      Heart sounds: No murmur heard.     No friction rub. No gallop.   Pulmonary:      Effort: Pulmonary effort is normal. No respiratory distress.      Breath sounds: Decreased air movement. No wheezing, rhonchi or rales.   Abdominal:      General: Bowel sounds are normal.      Palpations: Abdomen is soft.      Tenderness: There is no abdominal tenderness.   Musculoskeletal:      Cervical back: Neck supple.      Right lower leg: No edema.      Left lower leg: No edema.   Neurological:      Mental Status: He is alert and oriented to person, place, and time.      Motor: weakness present. Right hemiparesis  Psychiatric:         Mood and Affect: Mood normal.         Behavior: Behavior normal.     Assessment/Plan   Diagnoses and all orders for this visit:  Type 2 diabetes mellitus with diabetic neuropathy, with long-term current use of insulin (CMS/[facility])  Comments:  chronic, uncontrolled. Pt to continue taking Januvia, Farxiga, Lantus and Humalog as ordered. He should try and reduce carb and sugar intake  Orders:  -     insulin syr/ndl U100 half lexie (BD Veo Insulin Syr, half unit,) 0.3 mL 31 gauge x 15/64\" syringe; 1 Needle once daily.  Cerebrovascular accident (CVA), unspecified mechanism (CMS/HCC)  Comments:  chronic, right weakness noted. No longer working with therapy. He is to continue " using walker with assistance or w/c when ambulating. Continue xarelto  Chronic obstructive pulmonary disease, unspecified COPD type (CMS/HCC)  Comments:  chronic, stable. Continue albuterol inhaler as needed, duonebs as needed, advair daily  Essential (primary) hypertension  Comments:  chronic, vitals stable, continue metoprolol  Acquired hypothyroidism  Comments:  chronic, tolerating levothyroxine well. Will check TSH at next follow up  Leukocytosis, unspecified type  Comments:  chronic. pt to see hematology next week as scheduled       Marilyn Luna, APRN-CNP
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
## 2023-11-29 NOTE — PROGRESS NOTES
"Patient here with wife for NPV with Bobby Buck NP; seen for elevated WBC's.    Patient presents in wheelchair.  Per wife patient has been in and out of the hospitals the last several years recovering from \"cardiogenic shock\".  Wife states patient did not have a stroke.      Per wife patient is weaker on right side than left side.  Visualized small areas of bruised looking skin areas on bilateral forearms.  On left forearm dime sized reddened area with pencil point size scab in center of area.  NP made aware.    Wife denies patient is on physical therapy at home; does see Nurse Practitioner through [facility].  Patient is seen once a month through this program.  Labs that were drawn by NP that created referral to Hematology.    Labs to be drawn today per NP order.    Patient to follow up in 1 month with a phone visit per NP.    No barriers to education noted, pt. Agreed to plan and verbalized understanding using teach back method       "

## 2023-12-06 NOTE — TELEPHONE ENCOUNTER
Yesterday pt was in bed all day with labored breathing. Per wife pt on on 2.5 L of oxygen and pulse ox has been around 90%. Pt has productive cough with yellow phlegm.

## 2023-12-13 NOTE — TELEPHONE ENCOUNTER
"Rosie Das calls requesting smoking patches be sent to [facility], [place] as patient has been diagnosed with pneumonia and is being treated for the same by the \"hemo\" team per Rosie Das.    "

## 2023-12-14 PROBLEM — J18.9 PNEUMONIA DUE TO INFECTIOUS ORGANISM, UNSPECIFIED LATERALITY, UNSPECIFIED PART OF LUNG: Status: ACTIVE | Noted: 2023-01-01

## 2023-12-15 NOTE — CARE PLAN
Problem: Pain - Adult  Goal: Verbalizes/displays adequate comfort level or baseline comfort level  12/15/2023 0046 by Barry Lind RN  Outcome: Progressing  12/14/2023 2338 by Barry Lind RN  Outcome: Progressing     Problem: Safety - Adult  Goal: Free from fall injury  12/15/2023 0357 by Barry Lind RN  Outcome: Progressing  12/15/2023 0340 by Barry Lind RN  Outcome: Progressing     Problem: Discharge Planning  Goal: Discharge to home or other facility with appropriate resources  Outcome: Progressing     Problem: Chronic Conditions and Co-morbidities  Goal: Patient's chronic conditions and co-morbidity symptoms are monitored and maintained or improved  Outcome: Progressing   The patient's goals for the shift include to get better    The clinical goals for the shift include maintain oxygen levels and improve

## 2023-12-15 NOTE — DISCHARGE INSTR - OTHER ORDERS
Thank you for choosing [facility] for your Health Care needs.  Also, thank you for allowing us to take you and your families preferences into account when determining your discharge plan.  Stay well!  Hope you have a healthy Christmas season and New Year!  Your Care Transitions Team Member: Christie Rachel Amanda or Robin [phone]

## 2023-12-15 NOTE — CARE PLAN
The patient's goals for the shift include to get better    The clinical goals for the shift include maintain oxygen levels and improve

## 2023-12-15 NOTE — PROGRESS NOTES
Physical Therapy    Physical Therapy Evaluation    Patient Name: Hai Spaulding  MRN: [id number]  Today's Date: 12/15/2023   Time Calculation  Start Time: 1210  Stop Time: 1230  Time Calculation (min): 20 min    Assessment/Plan   PT Assessment  PT Assessment Results: Decreased strength, Impaired balance, Decreased mobility, Decreased coordination  Rehab Prognosis: Good  End of Session Communication: Bedside nurse  Assessment Comment: Pleasant 73 y.o presents with imparied mobility and weakness. Pt. lives with wife (who assists at baseline) but currently requires maxA x 2 for mobility and would benefit from additional PT to address above  noted limitations and prevent further decline.  End of Session Patient Position: Up in chair, Alarm on (pillows placed on L side as pt. has heavy lean to L side)  IP OR SWING BED PT PLAN  Inpatient or Swing Bed: Inpatient  PT Plan  Treatment/Interventions: Bed mobility, Transfer training, Gait training, Stair training, Balance training, Strengthening, Neuromuscular re-education, Therapeutic exercise, Therapeutic activity, Home exercise program, Positioning, Postural re-education  PT Plan: Skilled PT  PT Frequency: 4 times per week  PT Discharge Recommendations: Moderate intensity level of continued care  PT Recommended Transfer Status: Assist x2  PT - OK to Discharge: Yes Based on completed evaluation and care plan recommendations, no barriers to discharge to next site of care        Subjective   General Visit Information:  General  Reason for Referral: pneumonia, weakness  Referred By: Colette Duffy PA-C  Past Medical History Relevant to Rehab: GERD, A-fib, CHF, CAD, COPD, ICD/pacemaker, previous Hodgkin's lymphoma, hyperlipidemia, and hypertension  Family/Caregiver Present: Yes  Co-Treatment: OT  Patient Position Received: Bed, 3 rail up  General Comment: viola TRIPATHI, 2L 02, heavy lean to L side  Home Living:  Home Living  Type of Home: House  Lives With: Spouse  Home Adaptive  Equipment: Hospital bed (Rollator)  Home Layout: One level  Home Access: Stairs to enter with rails (grab bars. Pt. goes up sideways)  Entrance Stairs-Rails:  (grab bar)  Entrance Stairs-Number of Steps: 1  Bathroom Shower/Tub: Tub/shower unit  Bathroom Toilet: Standard  Bathroom Equipment:  (bench, grab bar)  Home Living Comments: lift chair  Prior Level of Function:  Prior Function Per Pt/Caregiver Report  Level of [place]: Needs assistance with ADLs, Needs assistance with functional transfers  Receives Help From:  (wife)  ADL Assistance: Needs assistance  Ambulatory Assistance: Needs assistance (wife is next to her when he amb. with Rollator (down the hallway))  Precautions:  Precautions  Medical Precautions: Fall precautions  Vital Signs:  Vital Signs  Heart Rate: 98  SpO2: 94 %    Objective   Pain:  Pain Assessment  Pain Assessment: 0-10  Pain Score: 0 - No pain  Cognition:  Cognition  Overall Cognitive Status: Within Functional Limits    General Assessments:     Activity Tolerance  Endurance: Endurance does not limit participation in activity (+weakness)    Coordination  Movements are Fluid and Coordinated: No (heavy lean to L side during mobility (wife states this is his norm). Difficulty using his R UE (baseline))    Postural Control  Posture Comment: leans to L side    Static Sitting Balance  Static Sitting-Comment/Number of Minutes: poor. Heavy lean to L side  Dynamic Sitting Balance  Dynamic Sitting-Comments: poor. MaxA  to maintain upright position    Static Standing Balance  Static Standing-Comment/Number of Minutes: poor. MaxA x 2 to maintain balance  Dynamic Standing Balance  Dynamic Standing-Comments: fair; with BUE support and maxA x 2    Functional Assessments:  Bed Mobility  Bed Mobility: Yes  Bed Mobility 1  Level of Assistance 1: Moderate assistance (x2)  Bed Mobility Comments 1: maxA for scooting    Transfers  Transfer: Yes  Transfer 1  Technique 1: Sit to stand, Stand to sit  Transfer  Device 1: Walker, Gait belt  Transfer Level of Assistance 1:  (maxA x 2)  Transfers 2  Technique 2: Stand pivot  Transfer Device 2: Gait belt  Transfer Level of Assistance 2: Maximum verbal cues, +2    Ambulation/Gait Training  Ambulation/Gait Training Performed: Yes  Ambulation/Gait Training 1  Gait Support Devices: Gait belt  Assistance 1: Maximum assistance (x2)  Quality of Gait 1: Diminished heel strike, Inconsistent stride length  Comments/Distance (ft) 1: 3 steps to chair  Extremity/Trunk Assessments:  R shoulder impairment (at baseline) Defer to OT for further details  BLE ROM WFL. MMT; grossly 4-/5  Outcome Measures:  [facility] Basic Mobility  Turning from your back to your side while in a flat bed without using bedrails: A lot  Moving from lying on your back to sitting on the side of a flat bed without using bedrails: A lot  Moving to and from bed to chair (including a wheelchair): Total  Standing up from a chair using your arms (e.g. wheelchair or bedside chair): Total  To walk in hospital room: Total  Climbing 3-5 steps with railing: Total  Basic Mobility - Total Score: 8    Encounter Problems       Encounter Problems (Active)       Balance       LTG - Patient will maintain standing and sitting balance to allow for completion of daily activities       Start:  12/15/23    Expected End:  12/29/23            STG - Maintains dynamic sitting balance without upper extremity support >=5 min with SBA       Start:  12/15/23    Expected End:  12/29/23               Mobility       LTG - Patient will ambulate household distance with [place]        Start:  12/15/23    Expected End:  12/29/23            STG - Patient will ambulate up and down a curb/step with [place]       Start:  12/15/23    Expected End:  12/29/23               Pain - Adult          Transfers       STG - Transfer from bed to chair with [place] and WW        Start:  12/15/23    Expected End:  12/29/23            STG - Patient will perform bed mobility with SBA         Start:  12/15/23    Expected End:  12/29/23               Transfers       STG - Patient will transfer sit to and from stand with [place] and WW       Start:  12/15/23    Expected End:  12/29/23                   Education Documentation  Mobility Training, taught by Rosie Mon, PT at 12/15/2023  1:19 PM.  Learner: Family, Patient  Readiness: Acceptance  Method: Explanation  Response: Verbalizes Understanding  Comment: Educated pt. on PT POC    Education Comments  No comments found.

## 2023-12-15 NOTE — PROGRESS NOTES
TCC spoke with patient regarding discharge planning and home going needs. Patient lives  with his wife in a 1 story house.  He has DME:  Adjustable bed, walker, rollator, whelchair, walkin shower with shower chair/grab bars, CPAP, nebulizer, concentrator and uses 2L oxygen as needed and at night.  He has a pulse ox to monitor oxygen needs.  Wife confirmed.  She assists him with his ADLs.  Patient says he doesn't anticipate any home care needs.  He's been to rehab in the past and knows the exercises. He doesn't drive  but can get into the car-his wife drives. Confirmed with patient PCP is  Marilyn Luna .  Discharge Plan is for patient to discharge home when medically cleared.  TCC will continue to follow for changes in discharge planning needs.

## 2023-12-15 NOTE — H&P
History and Physical         Hai Spaulding 73 y.o. 1950     History Of Present Illness  Hai Spaulding is a 73 y.o. male presented to [facility] ED from home.  Chief complaint of worsening general  malaise related  to  cough and congestion for past several days.  Patient was seen at his PCP office and treated with Antibiotics. He continued to have increased SOB and General Malaise. His wife was very concerned with his respiratory issues and brought him to the ED.  On Exam patient  has diminished breath sounds on auscultation, mild crackles in bilateral fields, no wheezing or distress, conversational dyspnea, thorax symmetric, SpO2 92% on 2L NC. No accessory muscle use. No tachypnea.  Patient denies SOB Chest Pain, N/V/D        Past Medical History  Past Medical History:   Diagnosis Date    Acid reflux     Atrial fibrillation with RVR (CMS/[facility]) 10/2021    Congestive heart failure (CHF) (CMS/HCC) 10/2021    COPD (chronic obstructive pulmonary disease) (CMS/[facility])     COVID     Diabetes (CMS/HCC)     Dr. Rebollar    Diabetic eye exam (CMS/HCC) 05/08/2019    worsening retinopathy    Hodgkin lymphoma (CMS/[facility])     HTN (hypertension)     Hyperlipidemia     Larynx cancer (CMS/[facility])     Myocardial infarction (CMS/HCC)     Neuropathy     hands & feet        Surgical History  He has a past surgical history that includes Other surgical history; Other surgical history; CT angio abdomen pelvis w and or wo IV IV contrast (01/22/2022); Other surgical history; Tonsillectomy; Other surgical history; Cholecystectomy; Other surgical history; Tumor excision; Cardiac defibrillator placement (05/2022); IR injection epidural steroid; and Tumor removal.     Social History  Social History     Socioeconomic History    Marital status:      Spouse name: Not on file    Number of children: Not on file    Years of education: Not on file    Highest education level: Not on file   Occupational History    Not on file    Tobacco Use    Smoking status: Every Day     Packs/day: 0.50     Years: 60.00     Additional pack years: 0.00     Total pack years: 30.00     Types: Cigarettes     Passive exposure: Past    Smokeless tobacco: Never   Vaping Use    Vaping Use: Never used   Substance and Sexual Activity    Alcohol use: Never    Drug use: Never    Sexual activity: Not on file   Other Topics Concern    Not on file   Social History Narrative    Not on file     Social Determinants of Health     Financial Resource Strain: Low Risk  (12/14/2023)    Overall Financial Resource Strain (CARDIA)     Difficulty of Paying Living Expenses: Not hard at all   Food Insecurity: Not on file   Transportation Needs: No Transportation Needs (12/14/2023)    PRAPARE - Transportation     Lack of Transportation (Medical): No     Lack of Transportation (Non-Medical): No   Physical Activity: Inactive (12/14/2023)    Exercise Vital Sign     Days of Exercise per Week: 0 days     Minutes of Exercise per Session: 0 min   Stress: Not on file   Social Connections: Not on file   Intimate Partner Violence: Not on file   Housing Stability: Low Risk  (12/14/2023)    Housing Stability Vital Sign     Unable to Pay for Housing in the Last Year: No     Number of Places Lived in the Last Year: 1     Unstable Housing in the Last Year: No        Family History  Family History   Problem Relation Name Age of Onset    Diabetes Mother      Other (htn) Mother      Heart disease Mother      Diabetes Father      Heart disease Father      Other (htn) Father          Allergies  Not on File     Vital Signs  Temp:  [36.2 °C (97.2 °F)-36.8 °C (98.2 °F)] 36.8 °C (98.2 °F)  Heart Rate:  [69-88] 83  Resp:  [16-25] 21  BP: (107-123)/(63-77) 120/74  FiO2 (%):  [36 %] 36 %    Home Medications   Prior to Admission Medications   Prescriptions Last Dose Informant Patient Reported? Taking?   Autolet lancing device 12/14/2023  Yes No   Sig: 3 times a day.   BD Cynthia 2nd Gen Pen Needle 32 gauge x  "5/32\" needle   Yes No   Sig: Inject 1 each under the skin 3 times a day.   FreeStyle Joan 2 [place] misc   Yes No   Sig: Inject 1 each under the skin if needed.   FreeStyle Joan 2 Sensor kit   Yes No   Sig: Inject 1 each under the skin every 14 (fourteen) days.   Lactobacillus acidophilus (ACIDOPHILUS ORAL) 12/14/2023  Yes No   Sig: As directed oral   OneTouch Ultra Test strip Past Week  Yes No   Sig: Test 3-4 times a day   QUEtiapine (SEROquel) 25 mg tablet 12/13/2023  Yes No   Sig: Take 1 tablet (25 mg) by mouth once daily at bedtime.   SITagliptin phosphate (Januvia) 50 mg tablet 12/14/2023  No No   Sig: Take 1 tablet (50 mg) by mouth once daily.   acetaminophen (Tylenol) 325 mg tablet 12/14/2023  Yes No   Sig: Take 2 tablets (650 mg) by mouth twice a day.   albuterol (Ventolin HFA) 90 mcg/actuation inhaler 12/13/2023  Yes No   Sig: Inhale 2 puffs every 4 hours if needed.   amiodarone (Pacerone) 200 mg tablet 12/14/2023  Yes No   Sig: Take 0.5 tablets (100 mg) by mouth once daily.   amoxicillin (Amoxil) 250 mg capsule   Yes No   Sig: Take 1 capsule (250 mg) by mouth once daily at bedtime.   amoxicillin-pot clavulanate (Augmentin) 875-125 mg tablet 12/14/2023  No No   Sig: Take 1 tablet (875 mg) by mouth 2 times a day for 5 days.   atorvastatin (Lipitor) 80 mg tablet 12/13/2023  Yes No   Sig: Take 1 tablet (80 mg) by mouth once daily.   bisacodyl (Dulcolax) 5 mg EC tablet Past Month  Yes No   Sig: Take 20 tablets (100 mg) by mouth if needed for constipation. Do not crush, chew, or split.   carbidopa-levodopa (Sinemet CR)  mg ER tablet 12/14/2023  Yes No   Sig: Take 1 tablet by mouth 3 times a day as needed.   cyclobenzaprine (Flexeril) 10 mg tablet 12/13/2023  Yes No   Sig: Take 1 tablet (10 mg) by mouth once daily at bedtime.   dapagliflozin propanediol (Farxiga) 10 mg 12/14/2023  No No   Sig: Take 1 tablet (10 mg) by mouth once daily.   doxycycline (Adoxa) 100 mg tablet 12/14/2023  No No   Sig: Take 1 " "tablet (100 mg) by mouth once daily for 5 days. Take with a full glass of water and do not lie down for at least 30 minutes after   ferrous sulfate 325 (65 Fe) MG tablet More than a month  Yes No   Sig: Take 1 tablet by mouth once daily.   fluticasone propion-salmeteroL (Advair Diskus) 250-50 mcg/dose diskus inhaler 2023  No No   Sig: INHALE ONE PUFF BY MOUTH TWO TIMES A DAY   furosemide (Lasix) 40 mg tablet 2023  Yes No   Sig: Take 1 tablet (40 mg) by mouth once daily.   gabapentin (Neurontin) 300 mg capsule 2023  Yes No   Sig: Take 1 capsule (300 mg) by mouth once daily at bedtime.   insulin glargine (Lantus U-100 Insulin) 100 unit/mL injection 2023  No No   Sig: Inject 30 Units under the skin once daily in the evening.   Patient not taking: Reported on 2023   insulin lispro (HumaLOG KwikPen Insulin) 100 unit/mL injection 2023  Yes No   Sig: Inject 8 Units under the skin 3 times a day with meals. 8 units plus sliding scale three times a day with meals Subcutaneous three times a day, max dose 16u three times a day   insulin syr/ndl U100 half lexie (BD Veo Insulin Syr, half unit,) 0.3 mL 31 gauge x 15/64\" syringe 2023  No No   Si Needle once daily.   insulin syringe-needle U-100 (BD Veo Insulin Syringe UF) 1 mL 31 gauge x 15/64\" syringe 2023  Yes No   Sig: 3 times a day.   insulin syringe-needle U-100 31G X 5/16\" 1 mL syringe 2023  No No   Sig: Inject 1 each under the skin once daily. As directed daily   ipratropium-albuteroL (Duo-Neb) 0.5-2.5 mg/3 mL nebulizer solution 2023  Yes No   Sig: Take 3 mL by nebulization every 6 hours.   levothyroxine (Synthroid, Levoxyl) 25 mcg tablet 2023  No No   Sig: Take 1 tablet (25 mcg) by mouth once daily.   metoprolol succinate XL (Toprol-XL) 25 mg 24 hr tablet 2023  No No   Sig: Take 0.5 tablets (12.5 mg) by mouth once daily.   nicotine (Nicoderm CQ) 14 mg/24 hr patch 2023  No No   Sig: Place 1 " patch over 24 hours on the skin once every 24 hours.   nicotine (Nicoderm CQ) 21 mg/24 hr patch More than a month  Yes No   Sig: Place 1 patch on the skin once every 24 hours.   nitroglycerin (Nitrostat) 0.4 mg SL tablet More than a month  Yes No   Si tablet under the tongue Sublingual prn every 5 minutes x 3 doses as needed   omeprazole (PriLOSEC) 40 mg DR capsule 2023  Yes No   Sig: Take 1 capsule (40 mg) by mouth once daily in the morning. Take before meals.   ondansetron ODT (Zofran-ODT) 4 mg disintegrating tablet 2023  Yes No   Sig: Take 1 tablet (4 mg) by mouth every 6 hours if needed for nausea or vomiting.   oxybutynin XL (Ditropan-XL) 5 mg 24 hr tablet 2023  Yes No   Sig: Take 1 tablet (5 mg) by mouth once daily.   oxygen (O2) therapy 2023  Yes No   Si LPM throughout the night of sleep nasal canula daily   prasugrel (Effient) 10 mg tablet 2023  Yes No   Sig: Take 1 tablet (10 mg) by mouth once daily.   rivaroxaban (Xarelto) 15 mg tablet 2023  Yes No   Sig: Take 1 tablet (15 mg) by mouth once daily. With food   sertraline (Zoloft) 100 mg tablet 2023  Yes No   Sig: Take 1 tablet (100 mg) by mouth once daily.   triamcinolone (Kenalog) 0.1 % cream 2023  Yes No   Sig: Apply topically. Apply topically to affected area prn, As Needed      Facility-Administered Medications: None       New Hospital Orders  Current Facility-Administered Medications   Medication Dose Route Frequency Provider Last Rate Last Admin    albuterol 2.5 mg /3 mL (0.083 %) nebulizer solution 2.5 mg  2.5 mg nebulization q2h PRN Maddy Hernandez MD   2.5 mg at 23 1385    amiodarone (Pacerone) tablet 100 mg  100 mg oral Daily Rahul Raymond, APRN-CNP        atorvastatin (Lipitor) tablet 80 mg  80 mg oral Daily Rahul Raymond, APRN-CNP        azithromycin (Zithromax) in dextrose 5 % in water (D5W) 250 mL  mg  500 mg intravenous q24h KIP Dumont-CNP        carbidopa-levodopa  (Sinemet CR)  mg ER tablet 1 tablet  1 tablet oral TID SHAINA Dumont        cefTRIAXone (Rocephin) IVPB 1 g  1 g intravenous q24h SHAINA Dumont        cyclobenzaprine (Flexeril) tablet 10 mg  10 mg oral Nightly SHAINA Dumont        dapagliflozin propanediol (Farxiga) tablet 10 mg  10 mg oral Daily SHAINA Dumont        ferrous sulfate (325 mg ferrous sulfate) tablet 1 tablet  1 tablet oral Daily SHAINA Dumont        fluticasone propion-salmeteroL (Advair Diskus) 250-50 mcg/dose diskus inhaler 1 puff  1 puff inhalation BID SHAINA Dumont        furosemide (Lasix) tablet 40 mg  40 mg oral Daily SHAINA Dumont        gabapentin (Neurontin) capsule 300 mg  300 mg oral Nightly SHAINA Dumont        insulin lispro (HumaLOG) injection 8 Units  8 Units subcutaneous TID with meals SHAINA Dumont        ipratropium-albuteroL (Duo-Neb) 0.5-2.5 mg/3 mL nebulizer solution 3 mL  3 mL nebulization 4x daily Maddy Hernandez MD        lactobacillus acidophilus capsule 1 capsule  1 capsule oral Daily SHAINA Dumont        levothyroxine (Synthroid, Levoxyl) tablet 25 mcg  25 mcg oral Daily SHAINA Dumont        methylPREDNISolone sod succinate (PF) (SOLU-Medrol) 40 mg/mL injection 40 mg  40 mg intravenous q24h SHAINA Dumont        metoprolol succinate XL (Toprol-XL) 24 hr tablet 12.5 mg  12.5 mg oral Daily SHAINA Dumont        nicotine (Nicoderm CQ) 14 mg/24 hr patch 1 patch  1 patch transdermal q24h SHAINA Dumont        oxybutynin (Ditropan) tablet 5 mg  5 mg oral BID SHAINA Dumont        oxygen (O2) therapy   inhalation Continuous PRN - O2/gases SHAINA Dumont   2 L/min at 12/15/23 0000    oxygen DME/Hospice (O2) therapy  2 L/min inhalation Continuous Rahul Raymond, APRN-CNP        pantoprazole (ProtoNix) EC tablet 40 mg  40 mg oral Daily before breakfast Rahul Raymond,  APRN-CNP        prasugrel (Effient) tablet 10 mg  10 mg oral Daily Rahul TSAI Mandi, APRN-CNP        QUEtiapine (SEROquel) tablet 25 mg  25 mg oral Nightly Stu Mandi, APRN-CNP        rivaroxaban (Xarelto) tablet 15 mg  15 mg oral Daily with evening meal Rahul Raymond, APRN-CNP        sertraline (Zoloft) tablet 100 mg  100 mg oral Daily Rahul Raymond, APRN-CNP        SITagliptin phosphate (Januvia) tablet 50 mg  50 mg oral Daily Rahul Raymond, APRN-CNP        sodium chloride 0.9 % bolus 500 mL  500 mL intravenous Once Bruno Zapien MD               Review of Systems   Constitutional:  Positive for activity change, chills and fever.   HENT:  Positive for congestion and sinus pressure.    Respiratory:  Positive for cough, shortness of breath and wheezing.    Cardiovascular: Negative.    Gastrointestinal: Negative.    Genitourinary: Negative.    Musculoskeletal:  Positive for gait problem.   Skin: Negative.    Neurological:  Positive for tremors.   Hematological: Negative.    Psychiatric/Behavioral: Negative.             Physical Exam  Constitutional:       Appearance: Normal appearance. He is ill-appearing and toxic-appearing.   HENT:      Head: Normocephalic and atraumatic.      Mouth/Throat:      Mouth: Mucous membranes are dry.   Eyes:      Extraocular Movements: Extraocular movements intact.      Pupils: Pupils are equal, round, and reactive to light.   Cardiovascular:      Rate and Rhythm: Rhythm irregular.   Pulmonary:      Breath sounds: Rhonchi present.      Comments: Bilateral coarse crackles   Abdominal:      General: Bowel sounds are normal.      Palpations: Abdomen is soft.   Musculoskeletal:      Cervical back: Normal range of motion and neck supple.      Comments: Decreased ROM   Mild BUE Tremors    Skin:     Coloration: Skin is pale.      Comments: Scattered  ecchymosis   Skin tear on top of head    Neurological:      General: No focal deficit present.      Mental Status: He is alert and oriented to  "person, place, and time.   Psychiatric:         Mood and Affect: Mood normal.         Behavior: Behavior normal.            Last Recorded Vitals  Blood pressure 120/74, pulse 83, temperature 36.8 °C (98.2 °F), temperature source Temporal, resp. rate 21, height 1.753 m (5' 9\"), weight 74.4 kg (164 lb 0.4 oz), SpO2 95 %.    Relevant Results  Results for orders placed or performed during the hospital encounter of 12/14/23   CBC and Auto Differential   Result Value Ref Range    WBC 15.5 (H) 4.4 - 11.3 x10*3/uL    nRBC 0.0 0.0 - 0.0 /100 WBCs    RBC 4.20 (L) 4.50 - 5.90 x10*6/uL    Hemoglobin 12.5 (L) 13.5 - 17.5 g/dL    Hematocrit 40.1 (L) 41.0 - 52.0 %    MCV 96 80 - 100 fL    MCH 29.8 26.0 - 34.0 pg    MCHC 31.2 (L) 32.0 - 36.0 g/dL    RDW 16.9 (H) 11.5 - 14.5 %    Platelets 399 150 - 450 x10*3/uL    Neutrophils % 67.4 40.0 - 80.0 %    Immature Granulocytes %, Automated 0.6 0.0 - 0.9 %    Lymphocytes % 20.3 13.0 - 44.0 %    Monocytes % 10.2 2.0 - 10.0 %    Eosinophils % 1.2 0.0 - 6.0 %    Basophils % 0.3 0.0 - 2.0 %    Neutrophils Absolute 10.47 (H) 1.60 - 5.50 x10*3/uL    Immature Granulocytes Absolute, Automated 0.09 0.00 - 0.50 x10*3/uL    Lymphocytes Absolute 3.15 (H) 0.80 - 3.00 x10*3/uL    Monocytes Absolute 1.58 (H) 0.05 - 0.80 x10*3/uL    Eosinophils Absolute 0.19 0.00 - 0.40 x10*3/uL    Basophils Absolute 0.04 0.00 - 0.10 x10*3/uL   Comprehensive metabolic panel   Result Value Ref Range    Glucose 208 (H) 74 - 99 mg/dL    Sodium 137 136 - 145 mmol/L    Potassium 4.8 3.5 - 5.3 mmol/L    Chloride 97 (L) 98 - 107 mmol/L    Bicarbonate 34 (H) 21 - 32 mmol/L    Anion Gap 11 10 - 20 mmol/L    Urea Nitrogen 44 (H) 6 - 23 mg/dL    Creatinine 2.12 (H) 0.50 - 1.30 mg/dL    eGFR 32 (L) >60 mL/min/1.73m*2    Calcium 8.8 8.6 - 10.3 mg/dL    Albumin 3.2 (L) 3.4 - 5.0 g/dL    Alkaline Phosphatase 337 (H) 33 - 136 U/L    Total Protein 7.4 6.4 - 8.2 g/dL    AST 21 9 - 39 U/L    Bilirubin, Total 1.1 0.0 - 1.2 mg/dL    ALT 4 " (L) 10 - 52 U/L   Influenza A, and B PCR   Result Value Ref Range    Flu A Result Not Detected Not Detected    Flu B Result Not Detected Not Detected   SARS-CoV-2 RT PCR   Result Value Ref Range    Coronavirus 2019, PCR Not Detected Not Detected   B-Type Natriuretic Peptide   Result Value Ref Range     (H) 0 - 99 pg/mL   Lactate   Result Value Ref Range    Lactate 1.4 0.4 - 2.0 mmol/L        Imaging   XR chest 1 view    Result Date: 12/14/2023  Interpreted By:  Galen Lopez, STUDY: XR CHEST 1 VIEW;  12/14/2023 8:17 pm   INDICATION: Signs/Symptoms:sob.   COMPARISON: 07/10/2023   ACCESSION NUMBER(S): [id number]   ORDERING CLINICIAN: YADIRA VALE   FINDINGS: Pacemaker is seen. Heart size is enlarged. Bronchial thickening features suggesting bronchitis. Equivocal edema also noted. There does appear to be a right upper lobe opacity which is obscured by wires. Pneumonia not excluded. Lungs are hyperinflated       1.  Features of bronchitis and pulmonary edema. Lungs are hyperinflated . Equivocal right upper lobe nodular opacity. Follow-up is advised with two-view chest radiograph when feasible. Pneumonia can have this appearance       MACRO: None   Signed by: Galen Lopez 12/14/2023 8:50 PM Dictation workstation:   [id number]    CT chest wo IV contrast    Result Date: 12/9/2023  Interpreted By:  Ellis Rodriguez, STUDY: CT CHEST WO IV CONTRAST;  12/8/2023 12:39 pm   INDICATION: Signs/Symptoms:3 month follow up -  7 mm nodule superior segment left lower lobe. compare to August exam.   COMPARISON: 08/22/2023   ACCESSION NUMBER(S): [id number]   ORDERING CLINICIAN: JOSE ADKINS   TECHNIQUE: Helical data acquisition of the chest was obtained without the use of IV contrast. Images were reformatted in axial, coronal, and sagittal planes.   FINDINGS: POTENTIAL LIMITATIONS OF THE STUDY:   Lack of IV contrast   HEART AND VESSELS:   There are atherosclerotic calcifications of the aorta and its branches. The aorta is  unchanged in course and caliber.   The heart is unchanged in size.   No pericardial effusion is seen.   MEDIASTINUM AND CHRIS, LOWER NECK AND AXILLA: The visualized thyroid gland is within normal limits.   There is mild mediastinal lymphadenopathy. For example, precarinal lymph node measuring approximately 1.3 cm in short axis. Right paratracheal lymph node measuring approximately 1.2 cm in short axis. These are increased in size when compared to the previous study. No definite hilar lymphadenopathy, although evaluation is limited by lack of IV contrast.   Esophagus is stable in course and caliber.   LUNGS AND AIRWAYS: The trachea and central airways are patent. No endobronchial lesion.   There is partial collapse/consolidation of both lower lobes, worse on the left. Mild atelectasis/infiltrate is seen in the lingula. There are mild patchy nonspecific ground-glass opacities scattered within the lungs. There are emphysematous changes, most conspicuous in the upper lobes. There are small bilateral pleural effusions, slightly larger on the left.   UPPER ABDOMEN: The visualized subdiaphragmatic structures demonstrate no acute abnormality. Cholecystectomy. Stable mildly complex cystic lesion in the upper pole of the right kidney.   CHEST WALL AND OSSEOUS STRUCTURES: Degenerative changes. No acute process.       Partial collapse/consolidation of the lower lobes, worse on the left. Additional area of atelectasis or infiltrate in the lingula. Mild scattered patchy nonspecific ground-glass opacities in the lungs. Follow-up recommended to document resolution. Small bilateral pleural effusions, larger on the left.   MACRO: None.   Signed by: Ellis Rodriguez 12/9/2023 9:38 AM Dictation workstation:   [id number]       Assessment/Plan   Principal Problem:    Pneumonia due to infectious organism, unspecified laterality, unspecified part of lung  -Patient c/o worsening SOB and generalized malaise over past several day.   -CXR Features  of bronchitis and pulmonary edema. Lungs are hyperinflated . Equivocal right upper lobe nodular opacity. Follow-up is advised with two-view chest radiograph when feasible. Pneumonia can have this appearance  --COVID negative   - WBC 15.5  - Continue Azithromycin  -Continue Ceftriaxone    - Order SoluMedrol 40 mg IVP   - Mucinex prn   - DuoNebs q6h   - RT to evaluate and treat; additional inhalers/breathing treatments per RT   - Bronchial hygiene   - Wean O2 as tolerated, currently on 2L O2 via NC       ## CHF Hx CAD, Hx Hypertension, Afib      -ICM/HFrEF (LVEF 15-20%), CAD (s/p PCI with CONNER to RCA, LCX, and LAD)  -   -Continue  Lasix  Amiodarone  ASA, Atorvastatin, Carvedilol Isosorbide Warfarin   -Cardiac defibrillator placement (05/2022)    ## Parkinson's Disease  -Continue with Siminet Flexeril      ## DM-2 with Neuropathy      C/w  -Lispro Q  AC   -Neurontin       Phlebitis and thrombophlebitis:   Continue with Xeralto Effient     DVT Prophylaxis Lovenox   Fluids: PRN   Electrolytes: replace as needed  Nutrition: Diabetic Diet   Adjuncts: PIV    Total accumulated time spent face to face and not face to face preparing to see the patient, obtaining and reviewing separately obtained history; performing a medically appropriate examination and/or evaluation; counseling and educating the patient, family; ordering medications, tests, or procedures; referring and communicating with other health care professionals; documenting clinical information in the patient's medical record; independently interpreting results and communicating the results to the patient, family; and care coordination was 40 minutes      KIP Dumont-CNP
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
## 2023-12-15 NOTE — CARE PLAN
The patient's goals for the shift include to get better    The clinical goals for the shift include maintain spo2 with 2 lpm o2 via nc 93% or greater    Goal not met. Spo2 91% at times.

## 2023-12-15 NOTE — ED PROVIDER NOTES
HPI   Chief Complaint   Patient presents with    Illness     Pt states he does not know why he was sent to ED       73-year-old male with history of GERD, A-fib, CHF, CAD, COPD, ICD/pacemaker, previous Hodgkin's lymphoma, hyperlipidemia, and hypertension comes to the ED per his wife with concern of documented pneumonia on CT scan that was performed by his physician a few days earlier.  Patient reports that he was started on antibiotics yesterday and supplemental oxygen but feels symptoms continue to persist and his wife was concerned and she brought him to the ED.  Patient in the ED is alert, cooperative, appears anxious, comfortable, and in no distress.  Patient currently denies headache, neck pain, chest pain, back pain, abdominal pain, fevers, falls, recent travel, sick contacts, nausea/vomiting/diarrhea, dysuria, dizziness, and weakness.      History provided by:  Spouse and patient   used: No                        Christmas Coma Scale Score: 15                  Patient History   Past Medical History:   Diagnosis Date    Acid reflux     Atrial fibrillation with RVR (CMS/[facility]) 10/2021    Congestive heart failure (CHF) (CMS/[facility]) 10/2021    COPD (chronic obstructive pulmonary disease) (CMS/[facility])     COVID     Diabetes (CMS/[facility])     Dr. Rebollar    Diabetic eye exam (CMS/HCC) 05/08/2019    worsening retinopathy    Hodgkin lymphoma (CMS/[facility])     HTN (hypertension)     Hyperlipidemia     Larynx cancer (CMS/[facility])     Myocardial infarction (CMS/[facility])     Neuropathy     hands & feet     Past Surgical History:   Procedure Laterality Date    CARDIAC DEFIBRILLATOR PLACEMENT  05/2022    CHOLECYSTECTOMY      CT ABDOMEN PELVIS ANGIOGRAM W AND/OR WO IV CONTRAST  01/22/2022    CT ABDOMEN PELVIS ANGIOGRAM W AND/OR WO IV CONTRAST 1/22/2022 [facility]    IR INJECTION EPIDURAL STEROID      Dr. Torres    OTHER SURGICAL HISTORY      Splenectomy    OTHER SURGICAL HISTORY      Larynx surgery    OTHER SURGICAL  HISTORY      chemotherapy    OTHER SURGICAL HISTORY      lymph node resection    OTHER SURGICAL HISTORY      heart stent x4    TONSILLECTOMY      TUMOR EXCISION      neck    TUMOR REMOVAL      throat     Family History   Problem Relation Name Age of Onset    Diabetes Mother      Other (htn) Mother      Heart disease Mother      Diabetes Father      Heart disease Father      Other (htn) Father       Social History     Tobacco Use    Smoking status: Every Day     Packs/day: 0.50     Years: 60.00     Additional pack years: 0.00     Total pack years: 30.00     Types: Cigarettes     Passive exposure: Past    Smokeless tobacco: Never   Vaping Use    Vaping Use: Never used   Substance Use Topics    Alcohol use: Never    Drug use: Never       Physical Exam   ED Triage Vitals   Temp Heart Rate Resp BP   12/14/23 1853 12/14/23 1853 12/14/23 1853 12/14/23 1853   36.2 °C (97.2 °F) 84 16 111/73      SpO2 Temp Source Heart Rate Source Patient Position   12/14/23 1853 12/14/23 1853 12/14/23 1853 12/14/23 2252   90 % Temporal Monitor Lying      BP Location FiO2 (%)     12/14/23 1853 12/14/23 2252     Right arm 36 %       Physical Exam  Vitals and nursing note reviewed.   Constitutional:       General: He is not in acute distress.     Appearance: He is well-developed.   HENT:      Head: Normocephalic and atraumatic.   Eyes:      Conjunctiva/sclera: Conjunctivae normal.   Cardiovascular:      Rate and Rhythm: Normal rate and regular rhythm.      Pulses: Normal pulses.      Heart sounds: Normal heart sounds, S1 normal and S2 normal. No murmur heard.  Pulmonary:      Effort: Pulmonary effort is normal. Tachypnea present. No respiratory distress.      Breath sounds: Decreased air movement present. Decreased breath sounds present. No wheezing.   Abdominal:      Palpations: Abdomen is soft.      Tenderness: There is no abdominal tenderness.   Musculoskeletal:         General: No swelling.      Cervical back: Neck supple.   Skin:      General: Skin is warm and dry.      Capillary Refill: Capillary refill takes less than 2 seconds.   Neurological:      General: No focal deficit present.      Mental Status: He is alert and oriented to person, place, and time.      GCS: GCS eye subscore is 4. GCS verbal subscore is 5. GCS motor subscore is 6.   Psychiatric:         Mood and Affect: Mood normal.         ED Course & MDM   Diagnoses as of 12/14/23 2302   Pneumonia due to infectious organism, unspecified laterality, unspecified part of lung   Renal insufficiency   Hyperglycemia     Labs Reviewed   CBC WITH AUTO DIFFERENTIAL - Abnormal       Result Value    WBC 15.5 (*)     nRBC 0.0      RBC 4.20 (*)     Hemoglobin 12.5 (*)     Hematocrit 40.1 (*)     MCV 96      MCH 29.8      MCHC 31.2 (*)     RDW 16.9 (*)     Platelets 399      Neutrophils % 67.4      Immature Granulocytes %, Automated 0.6      Lymphocytes % 20.3      Monocytes % 10.2      Eosinophils % 1.2      Basophils % 0.3      Neutrophils Absolute 10.47 (*)     Immature Granulocytes Absolute, Automated 0.09      Lymphocytes Absolute 3.15 (*)     Monocytes Absolute 1.58 (*)     Eosinophils Absolute 0.19      Basophils Absolute 0.04     COMPREHENSIVE METABOLIC PANEL - Abnormal    Glucose 208 (*)     Sodium 137      Potassium 4.8      Chloride 97 (*)     Bicarbonate 34 (*)     Anion Gap 11      Urea Nitrogen 44 (*)     Creatinine 2.12 (*)     eGFR 32 (*)     Calcium 8.8      Albumin 3.2 (*)     Alkaline Phosphatase 337 (*)     Total Protein 7.4      AST 21      Bilirubin, Total 1.1      ALT 4 (*)    B-TYPE NATRIURETIC PEPTIDE - Abnormal     (*)     Narrative:        <100 pg/mL - Heart failure unlikely  100-299 pg/mL - Intermediate probability of acute heart                  failure exacerbation. Correlate with clinical                  context and patient history.    >=300 pg/mL - Heart Failure likely. Correlate with clinical                  context and patient history.    BNP testing is  performed using different testing methodology at [facility] than at [facility]. Direct result comparisons should only be made within the same method.      INFLUENZA A AND B PCR - Normal    Flu A Result Not Detected      Flu B Result Not Detected      Narrative:     This assay is an in vitro diagnostic multiplex nucleic acid amplification test for the detection and discrimination of Influenza A & B from nasopharyngeal specimens, and has been validated for use at [facility]. Negative results do not preclude Influenza A/B infections, and should not be used as the sole basis for diagnosis, treatment, or other management decisions. If Influenza A/B and RSV PCR results are negative, testing for Parainfluenza virus, Adenovirus and Metapneumovirus is routinely performed for [facility] pediatric oncology and intensive care inpatients, and is available on other patients by placing an add-on request.   SARS-COV-2 PCR, SYMPTOMATIC - Normal    Coronavirus 2019, PCR Not Detected      Narrative:     This assay has received FDA Emergency Use Authorization (EUA) and is only authorized for the duration of time that circumstances exist to justify the authorization of the emergency use of in vitro diagnostic tests for the detection of SARS-CoV-2 virus and/or diagnosis of COVID-19 infection under section 564(b)(1) of the Act, 21 U.S.C. 360bbb-3(b)(1). This assay is an in vitro diagnostic nucleic acid amplification test for the qualitative detection of SARS-CoV-2 from nasopharyngeal specimens and has been validated for use at [facility]. Negative results do not preclude COVID-19 infections and should not be used as the sole basis for diagnosis, treatment, or other management decisions.     LACTATE - Normal    Lactate 1.4      Narrative:     Venipuncture immediately after or during the administration of Metamizole may lead to falsely low results. Testing should be  performed immediately  prior to Metamizole dosing.   LEGIONELLA ANTIGEN, URINE   CBC WITH AUTO DIFFERENTIAL   BASIC METABOLIC PANEL   LACTATE   PROCALCITONIN   B-TYPE NATRIURETIC PEPTIDE     XR chest 1 view   Final Result   1.  Features of bronchitis and pulmonary edema. Lungs are   hyperinflated . Equivocal right upper lobe nodular opacity. Follow-up   is advised with two-view chest radiograph when feasible. Pneumonia   can have this appearance                  MACRO:   None        Signed by: Galen Lopez 12/14/2023 8:50 PM   Dictation workstation:   [id number]        1910 -- NSR rate 79.  Normal axis.  Normal intervals.  No ST-T changes or signs of ischemia.  Normal EKG with no findings of STEMI. Unchanged compared to old EKG    Medical Decision Making  Patient upon arrival to the ED appeared to be anxious and uncomfortable but in no distress with stable vital signs.  Discussed with patient/family presenting complaints and clinical findings.  Reviewed with them patient's epic and counseled them on pneumonia and appropriate approach to management/treatments.  After assessment and evaluation IV fluids are started, labs sent, imaging ordered, EKG reviewed, start on aerosol treatments, given IV Solu-Medrol, placed on supplemental oxygen, placed on cardiac monitor, and observed.  After treatment and a period of rest patient appeared to be more comfortable, oxygenation was appropriate on supplemental oxygen, patient had no new physical plaints, vital signs were appropriate, and vital results were reviewed discussed with patient/family.  At this time after discussion it was recommended patient be admitted for further care and treatment and patient was agreeable with plan of care.  Case discussed with on-call for medicine at [facility] and after discussion was agreed patient admission to the floor for further care and treatment.  Patient stable and transferred to the floor.    Amount and/or Complexity of Data  Reviewed  External Data Reviewed: labs, radiology, ECG and notes.  Labs: ordered. Decision-making details documented in ED Course.  Radiology: ordered. Decision-making details documented in ED Course.  ECG/medicine tests: ordered and independent interpretation performed. Decision-making details documented in ED Course.    Risk  Decision regarding hospitalization.        Procedure  Procedures     Bruno Zapien MD  12/14/23 6134

## 2023-12-15 NOTE — CARE PLAN
Problem: Pain - Adult  Goal: Verbalizes/displays adequate comfort level or baseline comfort level  Outcome: Progressing     Problem: Safety - Adult  Goal: Free from fall injury  Outcome: Progressing     Problem: Discharge Planning  Goal: Discharge to home or other facility with appropriate resources  Outcome: Progressing     Problem: Chronic Conditions and Co-morbidities  Goal: Patient's chronic conditions and co-morbidity symptoms are monitored and maintained or improved  Outcome: Progressing   The patient's goals for the shift include to get better    The clinical goals for the shift include maintain oxygen levels and improve

## 2023-12-15 NOTE — PROGRESS NOTES
Occupational Therapy    Evaluation    Patient Name: Hai Spaulding  MRN: [id number]  Today's Date: 12/15/2023  Time Calculation  Start Time: 1211  Stop Time: 1231  Time Calculation (min): 20 min    Assessment  IP OT Assessment  OT Assessment: Pt. is a 73 y.o. male referred to OT for imparied self-care s/p admisison for pneumonia. Pt displays decreased mobility, balance, transfers, and basic hygiene. Pt. would benefit from skilled OT to address.  Prognosis: Good  Barriers to Discharge: None  Evaluation/Treatment Tolerance: Patient tolerated treatment well  Medical Staff Made Aware: Yes  End of Session Communication: Bedside nurse  End of Session Patient Position: Up in chair, Alarm on (pillows placed to negate L lateral lean.)  Plan:  Treatment Interventions: ADL retraining, Functional transfer training, Endurance training, Patient/family training, Compensatory technique education, Neuromuscular reeducation  OT Frequency: 3 times per week  OT Discharge Recommendations: Moderate intensity level of continued care  OT Recommended Transfer Status: Maximum assist, Assist of 2  OT - OK to Discharge: Yes (Based on completed evaluation and care plan recommendations, no barriers to discharge to next site of care)    Subjective   Current Problem:  1. Pneumonia due to infectious organism, unspecified laterality, unspecified part of lung        2. Renal insufficiency        3. Hyperglycemia          General:  General  Reason for Referral: impaired self-care d/t admission for pneumonia  Referred By: SUGEY Appiah  Past Medical History Relevant to Rehab: A fib with RVR, CHF, COPD, COVID, DM, Retinopathy, Hodgkin Lymphoma, HTN, HLD, larynx CA, MI, neuropathy  Family/Caregiver Present: Yes  Co-Treatment: PT  Patient Position Received: Bed, 3 rail up, Alarm off, not on at start of session  General Comment: IV, 2LPM, heavy lean to L side.  Precautions:  Medical Precautions: Fall precautions  Vital Signs:  Heart Rate: 98  Heart  Rate Source: Monitor  SpO2: 94 %  Pain:  Pain Assessment  Pain Assessment: 0-10  Pain Score: 0 - No pain    Objective   Cognition:  Overall Cognitive Status: Within Functional Limits  Home Living:  Type of Home: House  Lives With: Spouse  Home Adaptive Equipment: Hospital bed, Other (Comment) (rollator)  Home Layout: One level  Home Access: Stairs to enter with rails  Entrance Stairs-Rails: Both (3 rails total)  Entrance Stairs-Number of Steps: 1  Bathroom Shower/Tub: Tub/shower unit  Bathroom Toilet: Standard  Bathroom Equipment: Tub transfer bench, Grab bars in shower, Grab bars around toilet  Home Living Comments: lfit chair   Prior Function:  Level of [place]: Needs assistance with ADLs, Needs assistance with functional transfers  Receives Help From: Family  ADL Assistance: Needs assistance (spouse assists heavily with all tasks)  Bath: Maximal  Toileting: Maximal  Dressing: Maximal  Grooming: Minimal  Feeding: Minimal  Homemaking Assistance: Needs assistance  Ambulatory Assistance: Needs assistance  Transfers: Stand by  Hand Dominance: Right (d/t deficits wtih R hand now using L hand)  IADL History:  Homemaking Responsibilities: No  ADL:  Eating Assistance: Minimal  Grooming Assistance: Moderate (anticpated)  Bathing Assistance: Maximal (anticipated)  UE Dressing Assistance: Maximal (anticipated)  LE Dressing Assistance: Total  Toileting Assistance with Device: Maximal  Activity Tolerance:  Endurance: Tolerates less than 10 min exercise, no significant change in vital signs, Decreased tolerance for upright activites  Bed Mobility/Transfers: Bed Mobility  Bed Mobility: Yes  Bed Mobility 1  Bed Mobility 1: Supine to sitting  Level of Assistance 1: Moderate assistance  Bed Mobility Comments 1: MAX A for scooting; ;MAX VC ing d/t visual deficits and need for better hand placement    Transfers  Transfer: Yes  Transfer 1  Transfer From 1: Bed to  Transfer to 1: Chair with arms  Technique 1: Sit to stand, Stand  to sit  Transfer Device 1: Walker  Transfer Level of Assistance 1: Maximum assistance, +2  Ambulation/Gait Training:  Ambulation/Gait Training  Ambulation/Gait Training Performed: Yes  Ambulation/Gait Training 1  Surface 1: Level tile  Device 1: Rolling walker  Gait Support Devices: Gait belt  Assistance 1: Maximum assistance (+2)  Comments/Distance (ft) 1: Poor balance, heavy L lateral lean, decreased stride  Sitting Balance:  Static Sitting Balance  Static Sitting-Balance Support: Feet supported  Static Sitting-Level of Assistance: Maximum assistance  Dynamic Sitting Balance  Dynamic Sitting-Balance Support: Feet supported  Dynamic Sitting-Comments: unable to balance  Standing Balance:  Static Standing Balance  Static Standing-Balance Support: Bilateral upper extremity supported  Static Standing-Level of Assistance: Maximum assistance (+2)  Static Standing-Comment/Number of Minutes: heavy L lean  Dynamic Standing Balance  Dynamic Standing-Balance Support: Bilateral upper extremity supported  Dynamic Standing-Comments: MAX A +2; heavy L lean  IADL's:   Homemaking Responsibilities: No  Vision: Vision - Basic Assessment  Current Vision: Wears glasses all the time  Strength:  Strength Comments: no use of RUE, LUE appears Wfl  Coordination:  Movements are Fluid and Coordinated: No   Hand Function:  Hand Function  Gross Grasp: Functional (L hand)  Extremities: RUE   RUE : Exceptions to WFL (reports nonfunctional) and LUE   LUE: Exceptions to WFL    Outcome Measures: [facility] Daily Activity  Putting on and taking off regular lower body clothing: Total  Bathing (including washing, rinsing, drying): A lot  Putting on and taking off regular upper body clothing: A lot  Toileting, which includes using toilet, bedpan or urinal: A lot  Taking care of personal grooming such as brushing teeth: A little  Eating Meals: A little  Daily Activity - Total Score: 13      Education Documentation  Precautions, taught by Brooke Ponce OT at  12/15/2023  2:16 PM.  Learner: Patient  Readiness: Acceptance  Method: Explanation  Response: Needs Reinforcement  Comment: Edu on POC and DC rec. Edu on hand placement for transfers.    Home Exercise Program, taught by Brooke Ponce OT at 12/15/2023  2:16 PM.  Learner: Patient  Readiness: Acceptance  Method: Explanation  Response: Needs Reinforcement  Comment: Edu on POC and DC rec. Edu on hand placement for transfers.    ADL Training, taught by Brooke Ponce OT at 12/15/2023  2:16 PM.  Learner: Patient  Readiness: Acceptance  Method: Explanation  Response: Needs Reinforcement  Comment: Edu on POC and DC rec. Edu on hand placement for transfers.    Education Comments  No comments found.      Goals:   Encounter Problems       Encounter Problems (Active)       ADLs       Patient will complete daily grooming tasks brushing teeth, shaving, and washing face/hair with modified independent level of assistance and PRN adaptive equipment while edge of bed .       Start:  12/15/23    Expected End:  12/29/23            Patient will complete toileting including hygiene clothing management/hygiene with moderate assist level of assistance and raised toilet seat, grab bars, and bedside commode.       Start:  12/15/23    Expected End:  12/29/23               BALANCE       Patient will tolerate sitting for 3 minutes to with MIN A In order to improve functional activity tolerance for ADL tasks.       Start:  12/15/23    Expected End:  12/29/23               TRANSFERS       Patient will complete sit to stand transfer with moderate assist level of assistance and front wheeled walker in order to improve safety and prepare for out of bed mobility.       Start:  12/15/23    Expected End:  12/29/23

## 2023-12-15 NOTE — NURSING NOTE
Notified MIGUEL ÁNGEL Stallings CNP patient was not going to eat dinner when insulin was due. MIGUEL ÁNGEL Stallings said to hold insulin sliding scale and order for 8 units.

## 2023-12-15 NOTE — PROGRESS NOTES
"Hai Spaulding is a 73 y.o. male on day 1 of admission presenting with Pneumonia due to infectious organism, unspecified laterality, unspecified part of lung.    Subjective     No overnight events reported.     Patient resting comfortably on 2L O2 this morning. He is a very poor historian and states he is not sure why he came in to the hospital. He denies any fevers, chills, chest pain, shortness of breath, cough, abdominal pain, nausea, vomiting, diarrhea, or dysuria. He says he has been eating and drinking normally for him. He does have home O2 but it is unclear how many liters the patient wears or if he wears it continuously. Discussed plan of care with patient, all questions answered.        Objective     Physical Exam  Constitutional:       General: He is not in acute distress.  HENT:      Head: Normocephalic and atraumatic.      Mouth/Throat:      Mouth: Mucous membranes are moist.   Eyes:      Conjunctiva/sclera: Conjunctivae normal.   Cardiovascular:      Rate and Rhythm: Normal rate and regular rhythm.   Pulmonary:      Breath sounds: Rales present.   Abdominal:      General: There is no distension.      Palpations: Abdomen is soft.      Tenderness: There is no abdominal tenderness.   Musculoskeletal:         General: No swelling.   Skin:     General: Skin is warm and dry.   Neurological:      Mental Status: He is alert and oriented to person, place, and time.   Psychiatric:         Mood and Affect: Mood normal.         Behavior: Behavior normal.         Last Recorded Vitals  Blood pressure 112/60, pulse 83, temperature 36.6 °C (97.9 °F), temperature source Temporal, resp. rate 18, height 1.753 m (5' 9\"), weight 74.4 kg (164 lb 0.4 oz), SpO2 94 %.  Intake/Output last 3 Shifts:  I/O last 3 completed shifts:  In: 170 (2.3 mL/kg) [P.O.:120; IV Piggyback:50]  Out: - (0 mL/kg)   Weight: 74.4 kg     Relevant Results              Scheduled medications  amiodarone, 100 mg, oral, Daily  atorvastatin, 80 mg, oral, " Daily  carbidopa-levodopa, 1 tablet, oral, TID  cefTRIAXone, 1 g, intravenous, q24h  cyclobenzaprine, 10 mg, oral, Nightly  dapagliflozin propanediol, 10 mg, oral, Daily  doxycycline, 100 mg, intravenous, q12h  ferrous sulfate (325 mg ferrous sulfate), 1 tablet, oral, Daily  fluticasone furoate-vilanteroL, 1 puff, inhalation, Daily  furosemide, 40 mg, oral, Daily  gabapentin, 300 mg, oral, Nightly  guaiFENesin, 600 mg, oral, BID  insulin lispro, 0-10 Units, subcutaneous, TID with meals  insulin lispro, 8 Units, subcutaneous, TID with meals  ipratropium-albuteroL, 3 mL, nebulization, 4x daily  lactobacillus acidophilus, 1 capsule, oral, Daily  levothyroxine, 25 mcg, oral, Daily  methylPREDNISolone sodium succinate (PF), 40 mg, intravenous, q8h  metoprolol succinate XL, 12.5 mg, oral, Daily  nicotine, 1 patch, transdermal, q24h  [START ON 12/16/2023] oxybutynin, 5 mg, oral, Daily  pantoprazole, 40 mg, oral, Daily before breakfast  prasugrel, 10 mg, oral, Daily  QUEtiapine, 25 mg, oral, Nightly  rivaroxaban, 15 mg, oral, Daily with evening meal  sertraline, 100 mg, oral, Daily  SITagliptin phosphate, 50 mg, oral, Daily  sodium chloride 0.9%, , ,       Continuous medications  oxygen DME/Hospice, 2 L/min, Last Rate: 2 L/min (12/15/23 0015)      PRN medications  PRN medications: albuterol, dextrose 10 % in water (D10W), dextrose, glucagon, oxygen, sodium chloride 0.9%    Results for orders placed or performed during the hospital encounter of 12/14/23 (from the past 24 hour(s))   CBC and Auto Differential   Result Value Ref Range    WBC 15.5 (H) 4.4 - 11.3 x10*3/uL    nRBC 0.0 0.0 - 0.0 /100 WBCs    RBC 4.20 (L) 4.50 - 5.90 x10*6/uL    Hemoglobin 12.5 (L) 13.5 - 17.5 g/dL    Hematocrit 40.1 (L) 41.0 - 52.0 %    MCV 96 80 - 100 fL    MCH 29.8 26.0 - 34.0 pg    MCHC 31.2 (L) 32.0 - 36.0 g/dL    RDW 16.9 (H) 11.5 - 14.5 %    Platelets 399 150 - 450 x10*3/uL    Neutrophils % 67.4 40.0 - 80.0 %    Immature Granulocytes %,  Automated 0.6 0.0 - 0.9 %    Lymphocytes % 20.3 13.0 - 44.0 %    Monocytes % 10.2 2.0 - 10.0 %    Eosinophils % 1.2 0.0 - 6.0 %    Basophils % 0.3 0.0 - 2.0 %    Neutrophils Absolute 10.47 (H) 1.60 - 5.50 x10*3/uL    Immature Granulocytes Absolute, Automated 0.09 0.00 - 0.50 x10*3/uL    Lymphocytes Absolute 3.15 (H) 0.80 - 3.00 x10*3/uL    Monocytes Absolute 1.58 (H) 0.05 - 0.80 x10*3/uL    Eosinophils Absolute 0.19 0.00 - 0.40 x10*3/uL    Basophils Absolute 0.04 0.00 - 0.10 x10*3/uL   Comprehensive metabolic panel   Result Value Ref Range    Glucose 208 (H) 74 - 99 mg/dL    Sodium 137 136 - 145 mmol/L    Potassium 4.8 3.5 - 5.3 mmol/L    Chloride 97 (L) 98 - 107 mmol/L    Bicarbonate 34 (H) 21 - 32 mmol/L    Anion Gap 11 10 - 20 mmol/L    Urea Nitrogen 44 (H) 6 - 23 mg/dL    Creatinine 2.12 (H) 0.50 - 1.30 mg/dL    eGFR 32 (L) >60 mL/min/1.73m*2    Calcium 8.8 8.6 - 10.3 mg/dL    Albumin 3.2 (L) 3.4 - 5.0 g/dL    Alkaline Phosphatase 337 (H) 33 - 136 U/L    Total Protein 7.4 6.4 - 8.2 g/dL    AST 21 9 - 39 U/L    Bilirubin, Total 1.1 0.0 - 1.2 mg/dL    ALT 4 (L) 10 - 52 U/L   B-Type Natriuretic Peptide   Result Value Ref Range     (H) 0 - 99 pg/mL   Influenza A, and B PCR   Result Value Ref Range    Flu A Result Not Detected Not Detected    Flu B Result Not Detected Not Detected   SARS-CoV-2 RT PCR   Result Value Ref Range    Coronavirus 2019, PCR Not Detected Not Detected   Lactate   Result Value Ref Range    Lactate 1.4 0.4 - 2.0 mmol/L   CBC and Auto Differential   Result Value Ref Range    WBC 13.3 (H) 4.4 - 11.3 x10*3/uL    nRBC 0.2 (H) 0.0 - 0.0 /100 WBCs    RBC 4.01 (L) 4.50 - 5.90 x10*6/uL    Hemoglobin 11.9 (L) 13.5 - 17.5 g/dL    Hematocrit 37.5 (L) 41.0 - 52.0 %    MCV 94 80 - 100 fL    MCH 29.7 26.0 - 34.0 pg    MCHC 31.7 (L) 32.0 - 36.0 g/dL    RDW 16.3 (H) 11.5 - 14.5 %    Platelets 493 (H) 150 - 450 x10*3/uL    Neutrophils % 94.0 40.0 - 80.0 %    Immature Granulocytes %, Automated 0.8 0.0 -  0.9 %    Lymphocytes % 4.1 13.0 - 44.0 %    Monocytes % 0.9 2.0 - 10.0 %    Eosinophils % 0.0 0.0 - 6.0 %    Basophils % 0.2 0.0 - 2.0 %    Neutrophils Absolute 12.48 (H) 1.60 - 5.50 x10*3/uL    Immature Granulocytes Absolute, Automated 0.10 0.00 - 0.50 x10*3/uL    Lymphocytes Absolute 0.54 (L) 0.80 - 3.00 x10*3/uL    Monocytes Absolute 0.12 0.05 - 0.80 x10*3/uL    Eosinophils Absolute 0.00 0.00 - 0.40 x10*3/uL    Basophils Absolute 0.02 0.00 - 0.10 x10*3/uL   Basic Metabolic Panel   Result Value Ref Range    Glucose 350 (H) 74 - 99 mg/dL    Sodium 133 (L) 136 - 145 mmol/L    Potassium 5.1 3.5 - 5.3 mmol/L    Chloride 94 (L) 98 - 107 mmol/L    Bicarbonate 25 21 - 32 mmol/L    Anion Gap 19 10 - 20 mmol/L    Urea Nitrogen 54 (H) 6 - 23 mg/dL    Creatinine 2.13 (H) 0.50 - 1.30 mg/dL    eGFR 32 (L) >60 mL/min/1.73m*2    Calcium 8.7 8.6 - 10.3 mg/dL   Lactate   Result Value Ref Range    Lactate 1.5 0.4 - 2.0 mmol/L   B-Type Natriuretic Peptide   Result Value Ref Range     (H) 0 - 99 pg/mL   POCT GLUCOSE   Result Value Ref Range    POCT Glucose 317 (H) 74 - 99 mg/dL     XR chest 1 view    Result Date: 12/14/2023  Interpreted By:  Galen Lopez, STUDY: XR CHEST 1 VIEW;  12/14/2023 8:17 pm   INDICATION: Signs/Symptoms:sob.   COMPARISON: 07/10/2023   ACCESSION NUMBER(S): [id number]   ORDERING CLINICIAN: YADIRA VALE   FINDINGS: Pacemaker is seen. Heart size is enlarged. Bronchial thickening features suggesting bronchitis. Equivocal edema also noted. There does appear to be a right upper lobe opacity which is obscured by wires. Pneumonia not excluded. Lungs are hyperinflated       1.  Features of bronchitis and pulmonary edema. Lungs are hyperinflated . Equivocal right upper lobe nodular opacity. Follow-up is advised with two-view chest radiograph when feasible. Pneumonia can have this appearance       MACRO: None   Signed by: Galen Lopez 12/14/2023 8:50 PM Dictation workstation:   [id number]    CT chest wo  IV contrast    Result Date: 12/9/2023  Interpreted By:  Ellis Rodriguez, STUDY: CT CHEST WO IV CONTRAST;  12/8/2023 12:39 pm   INDICATION: Signs/Symptoms:3 month follow up -  7 mm nodule superior segment left lower lobe. compare to August exam.   COMPARISON: 08/22/2023   ACCESSION NUMBER(S): [id number]   ORDERING CLINICIAN: JOSE ADKINS   TECHNIQUE: Helical data acquisition of the chest was obtained without the use of IV contrast. Images were reformatted in axial, coronal, and sagittal planes.   FINDINGS: POTENTIAL LIMITATIONS OF THE STUDY:   Lack of IV contrast   HEART AND VESSELS:   There are atherosclerotic calcifications of the aorta and its branches. The aorta is unchanged in course and caliber.   The heart is unchanged in size.   No pericardial effusion is seen.   MEDIASTINUM AND CHRIS, LOWER NECK AND AXILLA: The visualized thyroid gland is within normal limits.   There is mild mediastinal lymphadenopathy. For example, precarinal lymph node measuring approximately 1.3 cm in short axis. Right paratracheal lymph node measuring approximately 1.2 cm in short axis. These are increased in size when compared to the previous study. No definite hilar lymphadenopathy, although evaluation is limited by lack of IV contrast.   Esophagus is stable in course and caliber.   LUNGS AND AIRWAYS: The trachea and central airways are patent. No endobronchial lesion.   There is partial collapse/consolidation of both lower lobes, worse on the left. Mild atelectasis/infiltrate is seen in the lingula. There are mild patchy nonspecific ground-glass opacities scattered within the lungs. There are emphysematous changes, most conspicuous in the upper lobes. There are small bilateral pleural effusions, slightly larger on the left.   UPPER ABDOMEN: The visualized subdiaphragmatic structures demonstrate no acute abnormality. Cholecystectomy. Stable mildly complex cystic lesion in the upper pole of the right kidney.   CHEST WALL AND OSSEOUS  "STRUCTURES: Degenerative changes. No acute process.       Partial collapse/consolidation of the lower lobes, worse on the left. Additional area of atelectasis or infiltrate in the lingula. Mild scattered patchy nonspecific ground-glass opacities in the lungs. Follow-up recommended to document resolution. Small bilateral pleural effusions, larger on the left.   MACRO: None.   Signed by: Ellis Rodriguez 12/9/2023 9:38 AM Dictation workstation:   [id number]              Assessment/Plan   Principal Problem:    Pneumonia due to infectious organism, unspecified laterality, unspecified part of lung    #Acute hypoxic respiratory failure 2/2 community acquired pneumonia   #COPD, in acute exacerbation  #Tobacco use  - Patient was started on Augmentin by his PCP with no significant improvement  - Received Zosyn x 1 dose in the ED   - WBC 15.5 > 13.3   - Lactate 1.4 on admission   - COVID, flu A/B negative   - Procal, BCx, sputum culture pending   - CXR:  Features of bronchitis and pulmonary edema. Lungs are   hyperinflated . Equivocal right upper lobe nodular opacity. Follow-up   is advised with two-view chest radiograph when feasible. Pneumonia   can have this appearance    - Continue ceftriaxone (day 2)   - Azithromycin discontinued (patient is on amiodarone); start doxycycline (day 1)   - Solumedrol IVP q8h   - DuoNebs TID   - Mucinex BID   - Continue Breo   - RT eval and treat protocol  - Bronchial hygiene  - Monitor SpO2 continuously   - Baseline O2 unclear; patient has home O2 but has told different providers that he wears anywhere from 2-4L \" but not all the time\"   - Wean O2 as tolerated; patient currently on 2L O2   - Nicotine patch ordered, patient currently smokes 1/2 ppd  - Encourage tobacco cessation on discharge    #HFrEF, concern for acute exacerbation  -  > 842  - Echo from January 2023 reviewed, LVEF 35-40% with ischemic cardiomyopathy   - Resume home Lasix 40 mg PO every day   - Strict I&Os  - Daily " weights  - 1800 mL daily fluid restriction, 2g Na diet  - Wean oxygen as tolerated; patient currently on 2L O2   - Continuous telemetry monitoring   - Cardiology consulted, appreciate recs     #Atrial fibrillation  #S/p ICD   #HTN  #HLD  #CAD   - Continue amiodarone, atorvastatin, metoprolol succinate, prasugrel, and rivaroxaban   - Telemetry monitoring  - 2g Na diet  - Cardiology consulted, appreciate recs     #Parkinson's disease   #H/o CVA   - Continue carbidopa-levodopa, quetiapine, and cyclobenzaprine   - Delirium precautions: Frequent reorientation, day/night normalization, shades open for sunlight, provide glasses/hearing aids as needed   - PT/OT evals    #CKD   - Cr 2.12 > 2.13  - Baseline Cr 1.7 - 2.2 per chart review  - Avoid nephrotoxins/renally dose meds as needed  - Daily BMP to monitor renal function    #T2DM with neuropathy   - Continue home dapagliflozin, gabapentin, sitagliptin, Humalog TID with meals   - SSI three times a day before meals   - Hypoglycemia protocol  - Accuchecks ac/hs/prn  - Diabetic diet   - HbA1c 10.6 in October 2023    #Hypothyroidism   - Continue levothyroxine    #Anemia, iron deficiency   - Patient no longer on iron supplementation per hem/onc   - H/H stable, 11.9/37.5 today  - No active bleeding on exam   - Daily CBC to trend H/H     #GERD  - Continue PPI    #Depression   - Continue sertraline      DVT PPx: Rivaroxaban    GI PPx: Protonix   Diet: Diabetic, 2g Na, 1800 mL FR   Code Status: Full code     Disposition: Patient requires inpatient management at this time.     Total accumulated time spent face to face and not face to face preparing to see the patient, obtaining and reviewing separately obtained history; performing a medically appropriate examination and/or evaluation; counseling and educating the patient, family; ordering medications, tests, or procedures; referring and communicating with other health care professionals; documenting clinical information in the  patient's medical record; independently interpreting results and communicating the results to the patient, family; and care coordination was 60 minutes.     Colette Duffy PA-C

## 2023-12-16 NOTE — DISCHARGE SUMMARY
"Discharge Diagnosis  Pneumonia due to infectious organism, unspecified laterality, unspecified part of lung    Issues Requiring Follow-Up  Patient signing out against medical advice  Needs to see primary care doctor early next week      Discharge Meds     Your medication list        START taking these medications        Instructions Last Dose Given Next Dose Due   insulin glargine 100 unit/mL injection  Commonly known as: Lantus U-100 Insulin      Inject 30 Units under the skin once daily in the evening.       predniSONE 10 mg tablet  Commonly known as: Deltasone      Take 4 tablets (40 mg) by mouth once daily for 5 days.              CONTINUE taking these medications        Instructions Last Dose Given Next Dose Due   ACIDOPHILUS ORAL           amiodarone 200 mg tablet  Commonly known as: Pacerone           amoxicillin-pot clavulanate 875-125 mg tablet  Commonly known as: Augmentin      Take 1 tablet (875 mg) by mouth 2 times a day for 5 days.       atorvastatin 80 mg tablet  Commonly known as: Lipitor           Autolet lancing device           BD Cynthia 2nd Gen Pen Needle 32 gauge x 5/32\" needle  Generic drug: pen needle, diabetic           BD Veo Insulin Syr (half unit) 0.3 mL 31 gauge x 15/64\" syringe  Generic drug: insulin syr/ndl U100 half lexie      1 Needle once daily.       BD Veo Insulin Syringe UF 1 mL 31 gauge x 15/64\" syringe  Generic drug: insulin syringe-needle U-100           insulin syringe-needle U-100 31G X 5/16\" 1 mL syringe      Inject 1 each under the skin once daily. As directed daily       carbidopa-levodopa  mg ER tablet  Commonly known as: Sinemet CR           cyclobenzaprine 10 mg tablet  Commonly known as: Flexeril           dapagliflozin propanediol 10 mg  Commonly known as: Farxiga      Take 1 tablet (10 mg) by mouth once daily.       doxycycline 100 mg tablet  Commonly known as: Adoxa      Take 1 tablet (100 mg) by mouth once daily for 5 days. Take with a full glass of water and do " not lie down for at least 30 minutes after       Effient 10 mg tablet  Generic drug: prasugrel           ferrous sulfate (325 mg ferrous sulfate) tablet           fluticasone propion-salmeteroL 250-50 mcg/dose diskus inhaler  Commonly known as: Advair Diskus      INHALE ONE PUFF BY MOUTH TWO TIMES A DAY       FreeStyle Joan 2 [place] misc  Generic drug: FreeStyle Joan reader           FreeStyle Joan 2 Sensor kit  Generic drug: FreeStyle Joan sensor system           furosemide 40 mg tablet  Commonly known as: Lasix           gabapentin 300 mg capsule  Commonly known as: Neurontin           HumaLOG KwikPen Insulin 100 unit/mL injection  Generic drug: insulin lispro           ipratropium-albuteroL 0.5-2.5 mg/3 mL nebulizer solution  Commonly known as: Duo-Neb           levothyroxine 25 mcg tablet  Commonly known as: Synthroid, Levoxyl      Take 1 tablet (25 mcg) by mouth once daily.       metoprolol succinate XL 25 mg 24 hr tablet  Commonly known as: Toprol-XL      Take 0.5 tablets (12.5 mg) by mouth once daily.       nicotine 21 mg/24 hr patch  Commonly known as: Nicoderm CQ           nicotine 14 mg/24 hr patch  Commonly known as: Nicoderm CQ      Place 1 patch over 24 hours on the skin once every 24 hours.       omeprazole 40 mg DR capsule  Commonly known as: PriLOSEC           OneTouch Ultra Test strip  Generic drug: blood sugar diagnostic           oxybutynin XL 5 mg 24 hr tablet  Commonly known as: Ditropan-XL           oxygen DME/Hospice therapy  Commonly known as: O2           QUEtiapine 25 mg tablet  Commonly known as: SEROquel           SITagliptin phosphate 50 mg tablet  Commonly known as: Januvia      Take 1 tablet (50 mg) by mouth once daily.       triamcinolone 0.1 % cream  Commonly known as: Kenalog           Ventolin HFA 90 mcg/actuation inhaler  Generic drug: albuterol           Xarelto 15 mg tablet  Generic drug: rivaroxaban           Zoloft 100 mg tablet  Generic drug: sertraline                   STOP taking these medications      acetaminophen 325 mg tablet  Commonly known as: Tylenol        amoxicillin 250 mg capsule  Commonly known as: Amoxil        bisacodyl 5 mg EC tablet  Commonly known as: Dulcolax        Nitrostat 0.4 mg SL tablet  Generic drug: nitroglycerin        ondansetron ODT 4 mg disintegrating tablet  Commonly known as: Zofran-ODT                  Where to Get Your Medications        These medications were sent to [id number] - [place], [place] - [address]  [address] [address]      Phone: [phone]   predniSONE 10 mg tablet         Test Results Pending At Discharge  Pending Labs       Order Current Status    Respiratory Culture/Smear In process    Blood Culture Preliminary result    Blood Culture Preliminary result            Hospital Course   Hai Spaulding is a 73 y.o. male presented to [facility] ED from home.  Chief complaint of worsening general  malaise related  to  cough and congestion for past several days.  Patient was seen at his PCP office and treated with Antibiotics. He continued to have increased SOB and General Malaise. His wife was very concerned with his respiratory issues and brought him to the ED.     #Acute hypoxic respiratory failure 2/2 community acquired pneumonia   #COPD, in acute exacerbation  #Tobacco use  - Patient was started on Augmentin  and Doxy by his PCP with no significant improvement  - Received Zosyn x 1 dose in the ED   - was on Rocephin Doxy here. Will resume Augmentin and Doxy here  - Lactate 1.4 on admission   - COVID, flu A/B negative   - Procal, BCx, sputum culture pending   - CXR:  in ED showed Features of bronchitis and pulmonary edema. Lungs are   hyperinflated . Equivocal right upper lobe nodular opacity. Follow-up   is advised with two-view chest radiograph when feasible.   - discharge on oral steroids  - Continue Breo   - Baseline O2 unclear; patient has home O2 but has told different providers that  "he wears anywhere from 2-4L \" but not all the time\"   - Wean O2 as tolerated; patient currently on 2L O2   - Nicotine patch ordered, patient currently smokes 1/2 ppd  - Encourage tobacco cessation on discharge  - pt signing out ama assuming risks of same     #HFrEF, concern for acute exacerbation  -  > 842  - Echo from January 2023 reviewed, LVEF 35-40% with ischemic cardiomyopathy   - Resume home Lasix 40 mg PO every day   - Strict I&Os  - Daily weights  - 1800 mL daily fluid restriction, 2g Na diet  - Wean oxygen as tolerated; patient currently on 2L O2   - Continuous telemetry monitoring   - Cardiology consulted      #Atrial fibrillation  #S/p ICD   #HTN  #HLD  #CAD   - Continue amiodarone, atorvastatin, metoprolol succinate, prasugrel, and rivaroxaban   - Telemetry monitoring  - 2g Na diet    #Parkinson's disease   #H/o CVA   - Continue carbidopa-levodopa, quetiapine, and cyclobenzaprine   - Delirium precautions: Frequent reorientation, day/night normalization, shades open for sunlight, provide glasses/hearing aids as needed   - PT/OT evals     #CKD   - Baseline Cr 1.7 - 2.2 per chart review  - Avoid nephrotoxins/renally dose meds as needed     #T2DM with neuropathy   - Continue home dapagliflozin, gabapentin, sitagliptin, Humalog TID with meals   - SSI three times a day before meals   - Hypoglycemia protocol  - Accuchecks ac/hs/prn  - Diabetic diet   - HbA1c 10.6 in October 2023     #Hypothyroidism   - Continue levothyroxine     #Anemia, iron deficiency   - Patient no longer on iron supplementation per hem/onc   - H/H stable     #GERD  - Continue PPI     #Depression   - Continue sertraline         DVT PPx: Rivaroxaban    GI PPx: Protonix   Diet: Diabetic, 2g Na, 1800 mL FR   Code Status: Full code     Signing out ama    Pertinent Physical Exam At Time of Discharge  Physical Exam  Vitals and nursing note reviewed.   Constitutional:       Appearance: Normal appearance. He is normal weight.   HENT:      " Head: Normocephalic and atraumatic.      Right Ear: Tympanic membrane normal.      Left Ear: Tympanic membrane normal.      Nose: Congestion present.      Mouth/Throat:      Mouth: Mucous membranes are moist.   Eyes:      Extraocular Movements: Extraocular movements intact.      Conjunctiva/sclera: Conjunctivae normal.      Pupils: Pupils are equal, round, and reactive to light.   Cardiovascular:      Rate and Rhythm: Normal rate and regular rhythm.      Pulses: Normal pulses.      Heart sounds: Normal heart sounds.   Pulmonary:      Effort: Pulmonary effort is normal.      Breath sounds: Wheezing and rhonchi present.   Abdominal:      General: Bowel sounds are normal.      Palpations: Abdomen is soft.   Musculoskeletal:         General: Normal range of motion.   Skin:     General: Skin is warm and dry.      Capillary Refill: Capillary refill takes less than 2 seconds.   Neurological:      General: No focal deficit present.      Mental Status: He is alert and oriented to person, place, and time.   Psychiatric:         Mood and Affect: Mood normal.         Behavior: Behavior normal.         Thought Content: Thought content normal.         Judgment: Judgment normal.         Outpatient Follow-Up  Future Appointments   Date Time Provider Department [place]   12/26/2023 11:00 AM SHAINA Chamorro [id number]   1/12/2024  1:00 PM SHAINA Corcoran [id number] None         SHAINA Pastor  Total accumulated time spent face to face and not face to face preparing to see the patient, obtaining and reviewing separately obtained history; performing a medically appropriate examination and/or evaluation; counseling and educating the patient, family; ordering medications, tests, or procedures; referring and communicating with other health care professionals; documenting clinical information in the patient's medical record; independently interpreting results and communicating the results to the  patient, family; and care coordination was 45 minutes.

## 2023-12-16 NOTE — PROGRESS NOTES
Occupational Therapy    Occupational Therapy Treatment    Name: Hai Spaudling  MRN: [id number]  : 1950  Date: 23  Time Calculation  Start Time: 1150  Stop Time: 1235  Time Calculation (min): 45 min    Assessment:  OT Assessment:Good participation in repeated sit to stand transfers, static standing balance & stand pivot transfers from the bedside chair to a wheelchair.  Pt. required anywhere from modAx2 & cues to maxAx2 & cues for sit to stand from the bedside chair with a FWW & use of dycem to stand on in order to prevent the pt.'s feet from sliding forward while transitioning from sit to stand.  Pt. was able to perform static standing balance at FWW level with Taurus to modA & cues after initial assistance was provided to achieve an upright posture  Plan:  Treatment Interventions: ADL retraining, Functional transfer training, Endurance training, Patient/family training, Cognitive reorientation, Neuromuscular reeducation, Compensatory technique education  OT Frequency: 3 times per week  OT Discharge Recommendations: Moderate intensity level of continued care  OT Recommended Transfer Status: Assist of 2  OT - OK to Discharge: Yes Based on completed evaluation and care plan recommendations, no barriers to discharge to next site of care      Subjective   Previous Visit Info:  OT Last Visit  OT Received On: 23  General:  General  Reason for Referral: Impaired self care skills & functioanl transfers due to pneumonia  Past Medical History Relevant to Rehab: A fib with RVR, CHF, COPD, COVID, DM, Retinopathy, Hodgkin Lymphoma, HTN, HLD, larynx CA, MI, neuropathy  Family/Caregiver Present: Yes  Caregiver Feedback: Pt.'s spouse was present & provided information on transfer techniques that she uses with the pt. at home.  Pt.'s spouse reported that she will bring the pt.'s shoes in from home. Pt.'s son is presently also hospitalized per pt.'s spouse report.  Prior to Session Communication: Bedside  nurse  Patient Position Received: Up in chair, Alarm on  Preferred Learning Style: visual  Precautions:  Precautions Comment: safety precautions, fall risk, chair alarm, O2, R hemiparesis, telemetry  Vitals:  Vital Signs  Heart Rate: 84  Heart Rate Source: Monitor  SpO2: 97 %  Patient Position: Sitting  Pain Assessment:  Pain Assessment  Pain Assessment: 0-10  Pain Score: 0 - No pain     Objective   Activities of Daily Living: Grooming  Grooming Where Assessed: Bedside recliner chair  Grooming Comments: Pt. participated in partial grooming including washing his face & oral hygiene care with use of a mouth swab with Close S & cues.    Bed Mobility/Transfers: Transfers  Transfer: Yes  Transfer 1  Transfer From 1: bedside chair to wheelchair(Modified stand pivot transfer) with maxAx2 & cues.  Technique 1: Stand pivot, Sit to stand, Stand to sit  Transfer Device 1: Gait belt, Walker  Trials/Comments 1: Utilized a FWW for sit to stand transfers, however pt. performed modified stand pivot transfers to the wheelchair without a device with maxAx2 & cues without use of the FWW.  Transfers 2  Transfer Level of Assistance 2:  Repeated sit to stand transfers to reinforce nataliia over of transfer techniques & to facilitate increased ease with transfers with the pt. requiring anywhere from modAx2 & cues to maxAx2 & cues from the bedside chair(Lower chair height) with use of a FWW..  Pt. used his R UE for support on the FWW & his L UE to push off from his sitting surface with max reminders to do so.  Trials/Comments 2: Repeated sit to stand transfers from the bedside chair.  Donned the pt.'s slippers prior to performing transfers & blocked the pt.'s feet to prevent the pt.'s feet from sliding when transitioning from sit to stand. Despite this,the pt.'s feet slid forward.  OT provided the pt. with a piece of dycem to stand on to prevent his feet from sliding forward during sit to stand with positive results.     Transfers  3  Trials/Comments 3: Max cues for safety with hand placement & for placement of B LE's for appropriate base of support    Standing Balance:  Static Standing Balance  Static Standing-Level of Assistance: Pt. demonstrated static standing balance at FWW level with modA x1 & cues to Taurus & cues for 30 seconds to 45 seconds at maximum before requiring a seated rest break after provided with initial assistance to achieve an upright posture.    Outcome Measures:  [facility] Daily Activity  Putting on and taking off regular lower body clothing: Total  Bathing (including washing, rinsing, drying): A lot  Putting on and taking off regular upper body clothing: A lot  Toileting, which includes using toilet, bedpan or urinal: Total  Taking care of personal grooming such as brushing teeth: A little  Eating Meals: A little  Daily Activity - Total Score: 12      Education Documentation  Precautions, taught by Minda Fernando OT at 12/16/2023  1:45 PM.  Learner: Significant Other, Patient  Readiness: Acceptance  Method: Explanation, Demonstration  Response: Demonstrated Understanding, Needs Reinforcement  Comment: Pt.'s spouse was present to observe & participate in transfers with OT & her spouse.    ADL Training, taught by Minda Fernando OT at 12/16/2023  1:45 PM.  Learner: Significant Other, Patient  Readiness: Acceptance  Method: Explanation, Demonstration  Response: Demonstrated Understanding, Needs Reinforcement  Comment: Pt.'s spouse was present to observe & participate in transfers with OT & her spouse.      Goals:  Encounter Problems       Encounter Problems (Active)       ADLs       Patient will complete daily grooming tasks brushing teeth, shaving, and washing face/hair with modified independent level of assistance and PRN adaptive equipment while edge of bed . (Progressing)       Start:  12/15/23    Expected End:  12/29/23            Patient will complete toileting including hygiene clothing management/hygiene with moderate  assist level of assistance and raised toilet seat, grab bars, and bedside commode. (Progressing)       Start:  12/15/23    Expected End:  12/29/23               BALANCE       Patient will tolerate sitting for 3 minutes to with MIN A In order to improve functional activity tolerance for ADL tasks. (Progressing)       Start:  12/15/23    Expected End:  12/29/23               Balance       LTG - Patient will maintain standing and sitting balance to allow for completion of daily activities       Start:  12/15/23    Expected End:  12/29/23            STG - Maintains dynamic sitting balance without upper extremity support >=5 min with SBA       Start:  12/15/23    Expected End:  12/29/23                 Transfers       STG - Patient will transfer sit to and from stand with Taurus and WW       Start:  12/15/23    Expected End:  12/29/23

## 2023-12-16 NOTE — CARE PLAN
The patient's goals for the shift include to get better    The clinical goals for the shift include remain fall free      Problem: Pain - Adult  Goal: Verbalizes/displays adequate comfort level or baseline comfort level  12/16/2023 0047 by Barry Lind RN  Outcome: Progressing  12/16/2023 0044 by Barry Lind RN  Outcome: Progressing     Problem: Safety - Adult  Goal: Free from fall injury  12/16/2023 0047 by Barry Lind RN  Outcome: Progressing  12/16/2023 0044 by Barry Lind RN  Outcome: Progressing     Problem: Discharge Planning  Goal: Discharge to home or other facility with appropriate resources  Outcome: Progressing     Problem: Chronic Conditions and Co-morbidities  Goal: Patient's chronic conditions and co-morbidity symptoms are monitored and maintained or improved  Outcome: Progressing     Problem: Skin  Goal: Participates in plan/prevention/treatment measures  Outcome: Progressing  Goal: Prevent/manage excess moisture  Outcome: Progressing  Goal: Prevent/minimize sheer/friction injuries  Outcome: Progressing  Goal: Promote skin healing  Outcome: Progressing

## 2023-12-16 NOTE — PROGRESS NOTES
Hai Spaulding is a 73 y.o. male on day 2 of admission presenting with Pneumonia due to infectious organism, unspecified laterality, unspecified part of lung.      Subjective     Hai was laying in bed, denies having any shortness of breath at this time, He verbalized that he is eager to go home.     Review of systems:  Constitutional: negative for fever, chills, or malaise  Neuro: negative for dizziness, headache, numbness, tingling  ENT: Negative for nasal congestion or sore throat  Resp: negative for shortness of breath, cough, or wheezing  CV: negative for chest pain, palpitations  GI: negative for abd pain, nausea, vomiting or diarrhea  : negative for dysuria, frequency, or urgency  Skin: negative for lesions, wounds, or rash  Musculoskeletal: Negative for weakness, myalgia, or arthralgia  Endocrine: Negative for polyuria or polydipsia         Objective   Constitutional: Well developed, awake/alert/oriented x3, no distress, alert and cooperative  Eyes: PERRL, EOMI, clear sclera  ENMT: mucous membranes moist, no apparent injury, no lesions seen  Head/Neck: Neck supple, no apparent injury, thyroid without mass or tenderness, No JVD, trachea midline, no bruits  Respiratory/Thorax: Patent airways, CTAB, normal breath sounds with good chest expansion, thorax symmetric  Cardiovascular: Regular, rate and rhythm, no murmurs, 2+ equal pulses of the extremities, normal S 1and S 2  Gastrointestinal: Nondistended, soft, non-tender, no rebound tenderness or guarding, no masses palpable, no organomegaly, +BS, no bruits  Musculoskeletal: ROM intact, no joint swelling, normal strength  Extremities: normal extremities, no cyanosis edema, contusions or wounds, no clubbing  Neurological: alert and oriented x3, intact senses, motor, response and reflexes, normal strength  Lymphatic: No significant lymphadenopathy  Psychological: Appropriate mood and behavior  Skin: Warm and dry, no lesions, no rashes      Last Recorded  "Vitals  /64 (BP Location: Right arm, Patient Position: Lying)   Pulse 79   Temp 36.5 °C (97.7 °F) (Temporal)   Resp 18   Ht 1.753 m (5' 9\")   Wt 73.6 kg (162 lb 4.1 oz)   SpO2 95%   BMI 23.96 kg/m²     Intake/Output last 3 Shifts:  I/O last 3 completed shifts:  In: 680 (9.2 mL/kg) [P.O.:480; IV Piggyback:200]  Out: - (0 mL/kg)   Weight: 73.6 kg   No intake/output data recorded.    Relevant Results  Scheduled medications  amiodarone, 100 mg, oral, Daily  atorvastatin, 80 mg, oral, Daily  carbidopa-levodopa, 1 tablet, oral, TID  cefTRIAXone, 1 g, intravenous, q24h  cyclobenzaprine, 10 mg, oral, Nightly  dapagliflozin propanediol, 10 mg, oral, Daily  doxycycline, 100 mg, intravenous, q12h  fluticasone furoate-vilanteroL, 1 puff, inhalation, Daily  furosemide, 40 mg, oral, Daily  gabapentin, 300 mg, oral, Nightly  guaiFENesin, 600 mg, oral, BID  insulin lispro, 0-10 Units, subcutaneous, TID with meals  insulin lispro, 8 Units, subcutaneous, TID with meals  ipratropium-albuteroL, 3 mL, nebulization, 4x daily  lactobacillus acidophilus, 1 capsule, oral, Daily  levothyroxine, 25 mcg, oral, Daily  methylPREDNISolone sodium succinate (PF), 40 mg, intravenous, q8h  metoprolol succinate XL, 12.5 mg, oral, Daily  nicotine, 1 patch, transdermal, q24h  oxybutynin, 5 mg, oral, Daily  pantoprazole, 40 mg, oral, Daily before breakfast  prasugrel, 10 mg, oral, Daily  QUEtiapine, 25 mg, oral, Nightly  rivaroxaban, 15 mg, oral, Daily with evening meal  sertraline, 100 mg, oral, Daily  SITagliptin phosphate, 50 mg, oral, Daily      Continuous medications  oxygen DME/Hospice, 2 L/min, Last Rate: 2 L/min (12/15/23 0015)      PRN medications  PRN medications: albuterol, dextrose 10 % in water (D10W), dextrose, glucagon, oxygen    Results for orders placed or performed during the hospital encounter of 12/14/23 (from the past 24 hour(s))   Procalcitonin   Result Value Ref Range    Procalcitonin 0.48 (H) <=0.07 ng/mL   Blood " Culture    Specimen: Peripheral Venipuncture; Blood culture   Result Value Ref Range    Blood Culture Loaded on Instrument - Culture in progress    Blood Culture    Specimen: Peripheral Venipuncture; Blood culture   Result Value Ref Range    Blood Culture Loaded on Instrument - Culture in progress    POCT GLUCOSE   Result Value Ref Range    POCT Glucose 301 (H) 74 - 99 mg/dL   POCT GLUCOSE   Result Value Ref Range    POCT Glucose 252 (H) 74 - 99 mg/dL   POCT GLUCOSE   Result Value Ref Range    POCT Glucose 433 (H) 74 - 99 mg/dL   CBC   Result Value Ref Range    WBC 18.1 (H) 4.4 - 11.3 x10*3/uL    nRBC 0.1 (H) 0.0 - 0.0 /100 WBCs    RBC 4.16 (L) 4.50 - 5.90 x10*6/uL    Hemoglobin 12.1 (L) 13.5 - 17.5 g/dL    Hematocrit 38.5 (L) 41.0 - 52.0 %    MCV 93 80 - 100 fL    MCH 29.1 26.0 - 34.0 pg    MCHC 31.4 (L) 32.0 - 36.0 g/dL    RDW 16.1 (H) 11.5 - 14.5 %    Platelets 539 (H) 150 - 450 x10*3/uL   POCT GLUCOSE   Result Value Ref Range    POCT Glucose 537 (H) 74 - 99 mg/dL       XR chest 1 view   Final Result   1.  Features of bronchitis and pulmonary edema. Lungs are   hyperinflated . Equivocal right upper lobe nodular opacity. Follow-up   is advised with two-view chest radiograph when feasible. Pneumonia   can have this appearance                  MACRO:   None        Signed by: Galen Lopez 12/14/2023 8:50 PM   Dictation workstation:   [id number]      Transthoracic Echo (TTE) Complete    (Results Pending)       No echocardiogram results found for the past 12 months         Assessment/Plan   Principal Problem:    Pneumonia due to infectious organism, unspecified laterality, unspecified part of lung      Chronic systolic CHF, clinically euvolemic  COPD  Severe CAD not an interventional candidate, s/p PCI  Chronic kidney disease  Moderate LV systolic dysfunction, EF around 35%  PAF  NSVT  BIVICD in situ     Advise  Agree with broad spectrum antibiotics for pneumonia, steroid, bronchodilators for COPD  exacerbation  Continue oral loop diuretics  Due to renal dysfunction, low BP unlikely to tolerate Entresto or Spironolactone  May benefit from Verquovo which can be started as OP  Advise to quit smoking  Recheck echocardiogram    Caty Ellis, APRN-CNP

## 2023-12-16 NOTE — PROGRESS NOTES
Physical Therapy                 Therapy Communication Note    Patient Name: Hai Spaulding  MRN: [id number]  Today's Date: 12/16/2023     Discipline: Physical Therapy    Missed Visit Reason: Missed Visit Reason: Patient refused    Missed Time: Attempt    Comment: Pt. Refused therapy, reported he had done enough today already with OT. And his IV was going to be changed because  it infiltrated.

## 2023-12-16 NOTE — CARE PLAN
The patient's goals for the shift include to get better    The clinical goals for the shift include maintain spo2 with 2 lpm o2 via nc 93% or greater      Problem: Pain - Adult  Goal: Verbalizes/displays adequate comfort level or baseline comfort level  Outcome: Progressing     Problem: Safety - Adult  Goal: Free from fall injury  Outcome: Progressing     Problem: Discharge Planning  Goal: Discharge to home or other facility with appropriate resources  Outcome: Progressing     Problem: Chronic Conditions and Co-morbidities  Goal: Patient's chronic conditions and co-morbidity symptoms are monitored and maintained or improved  Outcome: Progressing

## 2023-12-16 NOTE — CARE PLAN
Problem: Pain - Adult  Goal: Verbalizes/displays adequate comfort level or baseline comfort level  12/16/2023 0047 by Barry Lind RN  Outcome: Progressing  12/16/2023 0044 by Barry Lind RN  Outcome: Progressing     Problem: Safety - Adult  Goal: Free from fall injury  12/16/2023 0047 by Barry Lind RN  Outcome: Progressing  12/16/2023 0044 by Barry Lind RN  Outcome: Progressing     Problem: Discharge Planning  Goal: Discharge to home or other facility with appropriate resources  Outcome: Progressing     Problem: Chronic Conditions and Co-morbidities  Goal: Patient's chronic conditions and co-morbidity symptoms are monitored and maintained or improved  Outcome: Progressing   The patient's goals for the shift include to get better    The clinical goals for the shift include remain fall free

## 2023-12-17 NOTE — PROGRESS NOTES
Subjective Data:  No cardiopulmonary symptoms    Overnight Events:    None     Objective Data:  Last Recorded Vitals:  Vitals:    12/17/23 0100 12/17/23 0434 12/17/23 0659 12/17/23 0932   BP: 113/64  122/67    BP Location: Right arm  Right arm    Patient Position: Lying  Lying    Pulse: 74  72    Resp: 16  18    Temp: 36.1 °C (97 °F)  36.5 °C (97.7 °F)    TempSrc: Temporal  Temporal    SpO2: 95%  94% 93%   Weight:  75.4 kg (166 lb 3.6 oz)     Height:           Last Labs:  CBC - 12/17/2023:  7:22 AM  18.2 12.8 533    39.6      CMP - 12/17/2023:  7:22 AM  8.6 7.1 25 --- 0.6   _ 3.2 5 256      PTT - 12/31/2022:  3:11 PM  2.3   26.7 44     TROPHS   Date/Time Value Ref Range Status   08/25/2022 06:05 PM 24 0 - 20 ng/L Final     Comment:     .  Less than 99th percentile of normal range cutoff-  Female and children under 18 years old <14 ng/L; Male <21 ng/L: Negative  Repeat testing should be performed if clinically indicated.   .  Female and children under 18 years old 14-50 ng/L; Male 21-50 ng/L:  Consistent with possible cardiac damage and possible increased clinical   risk. Serial measurements may help to assess extent of myocardial damage.   .  >50 ng/L: Consistent with cardiac damage, increased clinical risk and  myocardial infarction. Serial measurements may help assess extent of   myocardial damage.   .   NOTE: Children less than 1 year old may have higher baseline troponin   levels and results should be interpreted in conjunction with the overall   clinical context.   .  NOTE: Troponin I testing is performed using a different   testing methodology at [facility] than at other   [facility]. Direct result comparisons should only   be made within the same method.     08/25/2022 03:40 PM 28 0 - 20 ng/L Final     Comment:     .  Less than 99th percentile of normal range cutoff-  Female and children under 18 years old <14 ng/L; Male <21 ng/L: Negative  Repeat testing should be performed if clinically  indicated.   .  Female and children under 18 years old 14-50 ng/L; Male 21-50 ng/L:  Consistent with possible cardiac damage and possible increased clinical   risk. Serial measurements may help to assess extent of myocardial damage.   .  >50 ng/L: Consistent with cardiac damage, increased clinical risk and  myocardial infarction. Serial measurements may help assess extent of   myocardial damage.   .   NOTE: Children less than 1 year old may have higher baseline troponin   levels and results should be interpreted in conjunction with the overall   clinical context.   .  NOTE: Troponin I testing is performed using a different   testing methodology at [facility] than at other   [facility]. Direct result comparisons should only   be made within the same method.     08/17/2022 09:45 PM 39 0 - 20 ng/L Final     Comment:     .  Less than 99th percentile of normal range cutoff-  Female and children under 18 years old <14 ng/L; Male <21 ng/L: Negative  Repeat testing should be performed if clinically indicated.   .  Female and children under 18 years old 14-50 ng/L; Male 21-50 ng/L:  Consistent with possible cardiac damage and possible increased clinical   risk. Serial measurements may help to assess extent of myocardial damage.   .  >50 ng/L: Consistent with cardiac damage, increased clinical risk and  myocardial infarction. Serial measurements may help assess extent of   myocardial damage.   .   NOTE: Children less than 1 year old may have higher baseline troponin   levels and results should be interpreted in conjunction with the overall   clinical context.   .  NOTE: Troponin I testing is performed using a different   testing methodology at [facility] than at other   [facility]. Direct result comparisons should only   be made within the same method.       BNP   Date/Time Value Ref Range Status   12/17/2023 07:22  0 - 99 pg/mL Final   12/15/2023 06:06  0 - 99 pg/mL Final      HGBA1C   Date/Time Value Ref Range Status   10/11/2023 01:15 PM 10.6 See below % Final   06/02/2023 02:40 PM 10.5 4.0 - 6.0 % Final     Comment:     Hemoglobin A1C levels are related to mean blood glucose during the   preceding 2-3 months. The relationship table below may be used as a   general guide. Each 1% increase in HGB A1C is a reflection of an   increase in mean glucose of approximately 30 mg/dl.   Reference: Diabetes Care, volume 29, supplement 1 Jan. 2006                        HGB A1C ................. Approx. Mean Glucose   _______________________________________________   6%   ...............................  120 mg/dl   7%   ...............................  150 mg/dl   8%   ...............................  180 mg/dl   9%   ...............................  210 mg/dl   10%  ...............................  240 mg/dl  Performed at [address] [address]     08/26/2022 05:45 AM 9.4 % Final     Comment:          Diagnosis of Diabetes-Adults   Non-Diabetic: < or = 5.6%   Increased risk for developing diabetes: 5.7-6.4%   Diagnostic of diabetes: > or = 6.5%  .       Monitoring of Diabetes                Age (y)     Therapeutic Goal (%)   Adults:          >18           <7.0   Pediatrics:    13-18           <7.5                   7-12           <8.0                   0- 6            7.5-8.5   American Diabetes Association. Diabetes Care 33(S1), Jan 2010.     12/07/2021 06:00 AM 8.6 4.7 - 6.4 % Final     Comment:     Interpretation:     Standardized A1c  Good control or normal:  4-6% ( mg/dL avg)  Moderate control:        6.1-8.0% (120-180 mg/dL avg)  Poor control:            >8.0% (180 mg/dL avg)  With 4% as a baseline, each 1% increase = 30 mg/dL increase in average   glucose.  Taken from DCCT (Diabetes Control Complications Trial)     LDLCALC   Date/Time Value Ref Range Status   06/02/2023 02:40 PM 39 65 - 130 MG/DL Final   10/08/2019 12:15 PM 96 65 - 130 MG/DL Final     VLDL    Date/Time Value Ref Range Status   10/22/2021 08:07 PM 18 0 - 40 mg/dL Final   09/28/2020 04:12 PM 46 0 - 40 mg/dL Final   10/02/2018 02:40 PM 50 0 - 40 mg/dL Final      Last I/O:  I/O last 3 completed shifts:  In: 730 (9.7 mL/kg) [P.O.:600; IV Piggyback:130]  Out: 800 (10.6 mL/kg) [Urine:800 (0.3 mL/kg/hr)]  Weight: 75.4 kg       Inpatient Medications:  Scheduled medications   Medication Dose Route Frequency    amiodarone  100 mg oral Daily    atorvastatin  80 mg oral Daily    carbidopa-levodopa  1 tablet oral TID    cefTRIAXone  1 g intravenous q24h    cyclobenzaprine  10 mg oral Nightly    dapagliflozin propanediol  10 mg oral Daily    doxycycline  100 mg intravenous q12h    fluticasone furoate-vilanteroL  1 puff inhalation Daily    furosemide  40 mg oral Daily    gabapentin  300 mg oral Nightly    guaiFENesin  600 mg oral BID    insulin glargine  30 Units subcutaneous Nightly    insulin lispro  0-20 Units subcutaneous TID with meals    insulin lispro  8 Units subcutaneous TID with meals    ipratropium-albuteroL  3 mL nebulization 4x daily    lactobacillus acidophilus  1 capsule oral Daily    levothyroxine  25 mcg oral Daily    methylPREDNISolone sodium succinate (PF)  40 mg intravenous q8h    metoprolol succinate XL  12.5 mg oral Daily    nicotine  1 patch transdermal q24h    oxybutynin  5 mg oral Daily    pantoprazole  40 mg oral Daily before breakfast    prasugrel  10 mg oral Daily    QUEtiapine  25 mg oral Nightly    rivaroxaban  15 mg oral Daily with evening meal    sertraline  100 mg oral Daily    SITagliptin phosphate  50 mg oral Daily     PRN medications   Medication    albuterol    dextrose 10 % in water (D10W)    dextrose    glucagon    oxygen     Continuous Medications   Medication Dose Last Rate    oxygen DME/Hospice  2 L/min 2 L/min (12/15/23 0015)       Physical Exam:  Constitutional: Well developed, awake/alert/oriented x3, no distress, alert and cooperative  Eyes: PERRL, EOMI, clear  sclera  ENMT: mucous membranes moist, no apparent injury, no lesions seen  Head/Neck: Neck supple, no apparent injury, thyroid without mass or tenderness, No JVD, trachea midline, no bruits  Respiratory/Thorax: Patent airways, CTAB, normal breath sounds with good chest expansion, thorax symmetric  Cardiovascular: Regular, rate and rhythm, no murmurs, 2+ equal pulses of the extremities, normal S 1and S 2  Gastrointestinal: Nondistended, soft, non-tender, no rebound tenderness or guarding, no masses palpable, no organomegaly, +BS, no bruits  Musculoskeletal: ROM intact, no joint swelling, normal strength  Extremities: Right arm swollen, no lower extremity edema  Neurological: alert and oriented x3, intact senses, motor, response and reflexes, normal strength  Lymphatic: No significant lymphadenopathy  Psychological: Appropriate mood and behavior  Skin: Warm and dry, no lesions, no rashes      Assessment/Plan     Chronic systolic CHF, clinically euvolemic  COPD  Severe CAD not an interventional candidate, s/p PCI  Chronic kidney disease  Moderate LV systolic dysfunction, EF around 35%  PAF  NSVT  BIVICD in situ     Advise  Agree with broad spectrum antibiotics for pneumonia, steroid, bronchodilators for COPD exacerbation  Continue oral loop diuretics  Due to renal dysfunction, low BP unlikely to tolerate Entresto or Spironolactone  May benefit from Verquovo which can be started as OP  Advise to quit smoking  Recheck echocardiogram       Code Status:  No Order    I spent 15 minutes in the professional and overall care of this patient.        Escobar Mendez MD

## 2023-12-17 NOTE — NURSING NOTE
Assumed care of pt. No needs c/o or distress. Call light In reach.    2300: No change to assessment from previous. Call light in reach.    0300: No change to assessment from previous. Call light in reach.

## 2023-12-17 NOTE — PROGRESS NOTES
"Hai Spaulding is a 73 y.o. male on day 3 of admission presenting with Pneumonia due to infectious organism, unspecified laterality, unspecified part of lung.    Subjective   Patient resting in bed visiting with his wife and son.  Patient does report he is feeling slightly better but continues to have fatigue.  Discussed blood sugars being elevated, wife reports patient is frequently over 300 in the mornings at home and they have been working on adjusting his medications.  Patient educated on importance of IV antibiotics and agrees to remain in the hospital for care.         Objective     Physical Exam  Constitutional:       Appearance: He is ill-appearing.   HENT:      Head: Normocephalic.      Nose: Nose normal.      Mouth/Throat:      Mouth: Mucous membranes are moist.   Eyes:      Extraocular Movements: Extraocular movements intact.   Cardiovascular:      Rate and Rhythm: Normal rate and regular rhythm.      Pulses: Normal pulses.      Heart sounds: Normal heart sounds.   Pulmonary:      Effort: Pulmonary effort is normal.      Breath sounds: Wheezing and rhonchi present.   Abdominal:      General: Bowel sounds are normal.      Palpations: Abdomen is soft.   Musculoskeletal:      Comments: Generalized Weakness   Skin:     General: Skin is warm and dry.      Capillary Refill: Capillary refill takes less than 2 seconds.   Neurological:      Mental Status: He is alert. Mental status is at baseline.      Motor: Weakness present.      Gait: Gait abnormal.   Psychiatric:         Mood and Affect: Mood normal.         Behavior: Behavior normal.         Last Recorded Vitals  Blood pressure 122/67, pulse 72, temperature 36.5 °C (97.7 °F), temperature source Temporal, resp. rate 18, height 1.753 m (5' 9\"), weight 75.4 kg (166 lb 3.6 oz), SpO2 94 %.  Intake/Output last 3 Shifts:  I/O last 3 completed shifts:  In: 730 (9.7 mL/kg) [P.O.:600; IV Piggyback:130]  Out: 800 (10.6 mL/kg) [Urine:800 (0.3 mL/kg/hr)]  Weight: 75.4 kg "     Relevant Results  Scheduled medications  amiodarone, 100 mg, oral, Daily  atorvastatin, 80 mg, oral, Daily  carbidopa-levodopa, 1 tablet, oral, TID  cefTRIAXone, 1 g, intravenous, q24h  cyclobenzaprine, 10 mg, oral, Nightly  dapagliflozin propanediol, 10 mg, oral, Daily  doxycycline, 100 mg, intravenous, q12h  fluticasone furoate-vilanteroL, 1 puff, inhalation, Daily  furosemide, 40 mg, oral, Daily  gabapentin, 300 mg, oral, Nightly  guaiFENesin, 600 mg, oral, BID  insulin glargine, 30 Units, subcutaneous, Nightly  insulin lispro, 0-20 Units, subcutaneous, TID with meals  insulin lispro, 8 Units, subcutaneous, TID with meals  ipratropium-albuteroL, 3 mL, nebulization, 4x daily  lactobacillus acidophilus, 1 capsule, oral, Daily  levothyroxine, 25 mcg, oral, Daily  methylPREDNISolone sodium succinate (PF), 40 mg, intravenous, q8h  metoprolol succinate XL, 12.5 mg, oral, Daily  nicotine, 1 patch, transdermal, q24h  oxybutynin, 5 mg, oral, Daily  pantoprazole, 40 mg, oral, Daily before breakfast  prasugrel, 10 mg, oral, Daily  QUEtiapine, 25 mg, oral, Nightly  rivaroxaban, 15 mg, oral, Daily with evening meal  sertraline, 100 mg, oral, Daily  SITagliptin phosphate, 50 mg, oral, Daily      Continuous medications  oxygen DME/Hospice, 2 L/min, Last Rate: 2 L/min (12/15/23 0015)      PRN medications  PRN medications: albuterol, dextrose 10 % in water (D10W), dextrose, glucagon, oxygen    Results for orders placed or performed during the hospital encounter of 12/14/23 (from the past 24 hour(s))   POCT GLUCOSE   Result Value Ref Range    POCT Glucose 553 (H) 74 - 99 mg/dL   POCT GLUCOSE   Result Value Ref Range    POCT Glucose 577 (H) 74 - 99 mg/dL   POCT GLUCOSE   Result Value Ref Range    POCT Glucose 493 (H) 74 - 99 mg/dL   POCT GLUCOSE   Result Value Ref Range    POCT Glucose 437 (H) 74 - 99 mg/dL   POCT GLUCOSE   Result Value Ref Range    POCT Glucose 445 (H) 74 - 99 mg/dL   CBC   Result Value Ref Range    WBC 18.2  (H) 4.4 - 11.3 x10*3/uL    nRBC 0.2 (H) 0.0 - 0.0 /100 WBCs    RBC 4.34 (L) 4.50 - 5.90 x10*6/uL    Hemoglobin 12.8 (L) 13.5 - 17.5 g/dL    Hematocrit 39.6 (L) 41.0 - 52.0 %    MCV 91 80 - 100 fL    MCH 29.5 26.0 - 34.0 pg    MCHC 32.3 32.0 - 36.0 g/dL    RDW 15.9 (H) 11.5 - 14.5 %    Platelets 533 (H) 150 - 450 x10*3/uL   Basic metabolic panel   Result Value Ref Range    Glucose 496 (HH) 74 - 99 mg/dL    Sodium 131 (L) 136 - 145 mmol/L    Potassium 4.6 3.5 - 5.3 mmol/L    Chloride 95 (L) 98 - 107 mmol/L    Bicarbonate 23 21 - 32 mmol/L    Anion Gap 18 10 - 20 mmol/L    Urea Nitrogen 85 (H) 6 - 23 mg/dL    Creatinine 2.24 (H) 0.50 - 1.30 mg/dL    eGFR 30 (L) >60 mL/min/1.73m*2    Calcium 8.6 8.6 - 10.3 mg/dL   B-type natriuretic peptide   Result Value Ref Range     (H) 0 - 99 pg/mL     XR chest 1 view    Result Date: 12/14/2023  Interpreted By:  Galen Lopez, STUDY: XR CHEST 1 VIEW;  12/14/2023 8:17 pm   INDICATION: Signs/Symptoms:sob.   COMPARISON: 07/10/2023   ACCESSION NUMBER(S): [id number]   ORDERING CLINICIAN: YADIRA VALE   FINDINGS: Pacemaker is seen. Heart size is enlarged. Bronchial thickening features suggesting bronchitis. Equivocal edema also noted. There does appear to be a right upper lobe opacity which is obscured by wires. Pneumonia not excluded. Lungs are hyperinflated       1.  Features of bronchitis and pulmonary edema. Lungs are hyperinflated . Equivocal right upper lobe nodular opacity. Follow-up is advised with two-view chest radiograph when feasible. Pneumonia can have this appearance       MACRO: None   Signed by: Galen Lopez 12/14/2023 8:50 PM Dictation workstation:   [id number]    CT chest wo IV contrast    Result Date: 12/9/2023  Interpreted By:  Ellis Rodriguez, STUDY: CT CHEST WO IV CONTRAST;  12/8/2023 12:39 pm   INDICATION: Signs/Symptoms:3 month follow up -  7 mm nodule superior segment left lower lobe. compare to August exam.   COMPARISON: 08/22/2023   ACCESSION  NUMBER(S): [id number]   ORDERING CLINICIAN: JOSE ADKINS   TECHNIQUE: Helical data acquisition of the chest was obtained without the use of IV contrast. Images were reformatted in axial, coronal, and sagittal planes.   FINDINGS: POTENTIAL LIMITATIONS OF THE STUDY:   Lack of IV contrast   HEART AND VESSELS:   There are atherosclerotic calcifications of the aorta and its branches. The aorta is unchanged in course and caliber.   The heart is unchanged in size.   No pericardial effusion is seen.   MEDIASTINUM AND CHRIS, LOWER NECK AND AXILLA: The visualized thyroid gland is within normal limits.   There is mild mediastinal lymphadenopathy. For example, precarinal lymph node measuring approximately 1.3 cm in short axis. Right paratracheal lymph node measuring approximately 1.2 cm in short axis. These are increased in size when compared to the previous study. No definite hilar lymphadenopathy, although evaluation is limited by lack of IV contrast.   Esophagus is stable in course and caliber.   LUNGS AND AIRWAYS: The trachea and central airways are patent. No endobronchial lesion.   There is partial collapse/consolidation of both lower lobes, worse on the left. Mild atelectasis/infiltrate is seen in the lingula. There are mild patchy nonspecific ground-glass opacities scattered within the lungs. There are emphysematous changes, most conspicuous in the upper lobes. There are small bilateral pleural effusions, slightly larger on the left.   UPPER ABDOMEN: The visualized subdiaphragmatic structures demonstrate no acute abnormality. Cholecystectomy. Stable mildly complex cystic lesion in the upper pole of the right kidney.   CHEST WALL AND OSSEOUS STRUCTURES: Degenerative changes. No acute process.       Partial collapse/consolidation of the lower lobes, worse on the left. Additional area of atelectasis or infiltrate in the lingula. Mild scattered patchy nonspecific ground-glass opacities in the lungs. Follow-up recommended  "to document resolution. Small bilateral pleural effusions, larger on the left.   MACRO: None.   Signed by: Ellis Rodriguez 12/9/2023 9:38 AM Dictation workstation:   [id number]       Assessment/Plan   Principal Problem:    Pneumonia due to infectious organism, unspecified laterality, unspecified part of lung    #Acute hypoxic respiratory failure 2/2 community acquired pneumonia   #COPD, in acute exacerbation  #Tobacco use  - Patient was started on Augmentin by his PCP with no significant improvement  - Received Zosyn x 1 dose in the ED   - WBC 15.5 at admission  ID consulted  - Lactate 1.4 on admission   - COVID, flu A/B negative   - Procal, BCx, sputum culture pending   - CXR:  Features of bronchitis and pulmonary edema. Lungs are   hyperinflated . Equivocal right upper lobe nodular opacity. Follow-up   is advised with two-view chest radiograph when feasible. Pneumonia   can have this appearance    - Continue ceftriaxone   - Azithromycin discontinued (patient is on amiodarone); start doxycycline   - Solumedrol IVP q8h   - DuoNebs TID   - Mucinex BID   - Continue Breo   - RT eval and treat protocol  - Bronchial hygiene  - Monitor SpO2 continuously   - Baseline O2 unclear; patient has home O2 but has told different providers that he wears anywhere from 2-4L \" but not all the time\"   - Wean O2 as tolerated; patient currently on 2L O2   - Nicotine patch ordered, patient currently smokes 1/2 ppd  - Encourage tobacco cessation on discharge     #HFrEF, concern for acute exacerbation  -  > 842 > 775  - Echo from January 2023 reviewed, LVEF 35-40% with ischemic cardiomyopathy   - Resume home Lasix 40 mg PO every day   - Strict I&Os  - Daily weights  - 1800 mL daily fluid restriction, 2g Na diet  - Wean oxygen as tolerated; patient currently on 2L O2   - Continuous telemetry monitoring   - Cardiology consulted, appreciate recs      #Atrial fibrillation  #S/p ICD   #HTN  #HLD  #CAD   - Continue amiodarone, atorvastatin, " metoprolol succinate, prasugrel, and rivaroxaban   - Telemetry monitoring  - 2g Na diet  - Cardiology consulted, appreciate recs      #Parkinson's disease   #H/o CVA   - Continue carbidopa-levodopa, quetiapine, and cyclobenzaprine   - Delirium precautions: Frequent reorientation, day/night normalization, shades open for sunlight, provide glasses/hearing aids as needed   - PT/OT evals     #CKD   - Cr 2.12 > 2.13 >  2.24  - Baseline Cr 1.7 - 2.2 per chart review  - Avoid nephrotoxins/renally dose meds as needed  - Daily BMP to monitor renal function     #T2DM with neuropathy   - Continue home dapagliflozin, gabapentin, sitagliptin, Humalog TID with meals   - SSI three times a day before meals   - Hypoglycemia protocol  - Accuchecks ac/hs/prn  - Diabetic diet   - HbA1c 10.6 in October 2023  - Increased SSI to 0-20 unit scale  -Re-ordered home lantus dose of 30 units subcutaneous at HS    #Hypothyroidism   - Continue levothyroxine     #Anemia, iron deficiency   - Patient no longer on iron supplementation per hem/onc   - RBC/H/H stable, 4.01/11.9/37.5 > 4.34/12.8/39.6  - No active bleeding on exam   - Daily CBC to trend H/H      #GERD  - Continue PPI     #Depression   - Continue sertraline         DVT PPx: Rivaroxaban    GI PPx: Protonix   Diet: Diabetic, 2g Na, 1800 mL FR   Code Status: Full code     Pt requires inpatient stay at this time.  Total accumulated time spent face to face and not face to face preparing to see the patient, obtaining and reviewing separately obtained history; performing a medically appropriate examination and/or evaluation; counseling and educating the patient, family; ordering medications, tests, or procedures; referring and communicating with other health care professionals; documenting clinical information in the patient's medical record; independently interpreting results and communicating the results to the patient, family; and care coordination was 45 minutes.        Laurel Garg,  APRN-CNP

## 2023-12-17 NOTE — CARE PLAN
Problem: Safety - Adult  Goal: Free from fall injury  Outcome: Progressing     Problem: Chronic Conditions and Co-morbidities  Goal: Patient's chronic conditions and co-morbidity symptoms are monitored and maintained or improved  Outcome: Progressing     Problem: Skin  Goal: Prevent/manage excess moisture  Outcome: Progressing     Problem: Skin  Goal: Prevent/minimize sheer/friction injuries  Outcome: Progressing   The patient's goals for the shift include to get better    The clinical goals for the shift include sats > 92% on 2 liters. Pt will tolerate antibiotics  Pt tolerating antibiotics. Sats 94% on 2 liters. Slept wel. Uneventful night

## 2023-12-17 NOTE — CARE PLAN
acute hypoxic respiratory failure   acute versusChronic kidney disease  Pneumonia        Continue ceftriaxone  Continue doxycycline  Monitor renal function  Supportive care  Monitor temperature  and WBC

## 2023-12-17 NOTE — PROGRESS NOTES
"Hai Spaulding is a 73 y.o. male on day 2 of admission presenting with Pneumonia due to infectious organism, unspecified laterality, unspecified part of lung.    Subjective   I changed my mind I guess I will stay for treatment. I need to be there for my wife and son who is now a patient.  My wife can take care of me I don't know why she wants me to stay longer but I know that I am weak.      Objective     Physical Exam  Vitals and nursing note reviewed.   Constitutional:       Appearance: Normal appearance.   HENT:      Head: Normocephalic and atraumatic.      Right Ear: Tympanic membrane normal.      Left Ear: Tympanic membrane normal.      Nose: Congestion present.      Mouth/Throat:      Mouth: Mucous membranes are moist.   Eyes:      Extraocular Movements: Extraocular movements intact.      Conjunctiva/sclera: Conjunctivae normal.      Pupils: Pupils are equal, round, and reactive to light.   Cardiovascular:      Rate and Rhythm: Normal rate and regular rhythm.      Pulses: Normal pulses.      Heart sounds: Normal heart sounds.   Pulmonary:      Effort: Pulmonary effort is normal.      Breath sounds: Wheezing present.   Abdominal:      General: Bowel sounds are normal.      Palpations: Abdomen is soft.   Musculoskeletal:         General: Normal range of motion.      Cervical back: Normal range of motion and neck supple.   Skin:     General: Skin is warm.      Capillary Refill: Capillary refill takes less than 2 seconds.   Neurological:      Mental Status: He is alert. Mental status is at baseline.   Psychiatric:         Mood and Affect: Mood normal.         Behavior: Behavior normal.         Last Recorded Vitals  Blood pressure 113/76, pulse 76, temperature 36.2 °C (97.2 °F), temperature source Temporal, resp. rate 17, height 1.753 m (5' 9\"), weight 73.6 kg (162 lb 4.1 oz), SpO2 96 %.  Intake/Output last 3 Shifts:  I/O last 3 completed shifts:  In: 990 (13.5 mL/kg) [P.O.:840; IV Piggyback:150]  Out: 400 (5.4 " "mL/kg) [Urine:400 (0.2 mL/kg/hr)]  Weight: 73.6 kg     Relevant Results                             Assessment/Plan   Principal Problem:    Pneumonia due to infectious organism, unspecified laterality, unspecified part of lung    Pneumonia due to infectious organism, unspecified laterality, unspecified part of lung     #Acute hypoxic respiratory failure 2/2 community acquired pneumonia   #COPD, in acute exacerbation  #Tobacco use  - Patient was started on Augmentin by his PCP with no significant improvement  - Received Zosyn x 1 dose in the ED   - WBC 15.5 at admission  ID consulted  - Lactate 1.4 on admission   - COVID, flu A/B negative   - Procal, BCx, sputum culture pending   - CXR:  Features of bronchitis and pulmonary edema. Lungs are   hyperinflated . Equivocal right upper lobe nodular opacity. Follow-up   is advised with two-view chest radiograph when feasible. Pneumonia   can have this appearance    - Continue ceftriaxone (day 3)   - Azithromycin discontinued (patient is on amiodarone); start doxycycline (day 2)   - Solumedrol IVP q8h   - DuoNebs TID   - Mucinex BID   - Continue Breo   - RT eval and treat protocol  - Bronchial hygiene  - Monitor SpO2 continuously   - Baseline O2 unclear; patient has home O2 but has told different providers that he wears anywhere from 2-4L \" but not all the time\"   - Wean O2 as tolerated; patient currently on 2L O2   - Nicotine patch ordered, patient currently smokes 1/2 ppd  - Encourage tobacco cessation on discharge     #HFrEF, concern for acute exacerbation  -  > 842  - Echo from January 2023 reviewed, LVEF 35-40% with ischemic cardiomyopathy   - Resume home Lasix 40 mg PO every day   - Strict I&Os  - Daily weights  - 1800 mL daily fluid restriction, 2g Na diet  - Wean oxygen as tolerated; patient currently on 2L O2   - Continuous telemetry monitoring   - Cardiology consulted, appreciate recs      #Atrial fibrillation  #S/p ICD   #HTN  #HLD  #CAD   - Continue " amiodarone, atorvastatin, metoprolol succinate, prasugrel, and rivaroxaban   - Telemetry monitoring  - 2g Na diet  - Cardiology consulted, appreciate recs      #Parkinson's disease   #H/o CVA   - Continue carbidopa-levodopa, quetiapine, and cyclobenzaprine   - Delirium precautions: Frequent reorientation, day/night normalization, shades open for sunlight, provide glasses/hearing aids as needed   - PT/OT evals     #CKD   - Cr 2.12 > 2.13  - Baseline Cr 1.7 - 2.2 per chart review  - Avoid nephrotoxins/renally dose meds as needed  - Daily BMP to monitor renal function     #T2DM with neuropathy   - Continue home dapagliflozin, gabapentin, sitagliptin, Humalog TID with meals   - SSI three times a day before meals   - Hypoglycemia protocol  - Accuchecks ac/hs/prn  - Diabetic diet   - HbA1c 10.6 in October 2023     #Hypothyroidism   - Continue levothyroxine     #Anemia, iron deficiency   - Patient no longer on iron supplementation per hem/onc   - H/H stable, 11.9/37.5 today  - No active bleeding on exam   - Daily CBC to trend H/H      #GERD  - Continue PPI     #Depression   - Continue sertraline         DVT PPx: Rivaroxaban    GI PPx: Protonix   Diet: Diabetic, 2g Na, 1800 mL FR   Code Status: Full code      Disposition: Patient requires inpatient management at this time.      Total accumulated time spent face to face and not face to face preparing to see the patient, obtaining and reviewing separately obtained history; performing a medically appropriate examination and/or evaluation; counseling and educating the patient, family; ordering medications, tests, or procedures; referring and communicating with other health care professionals; documenting clinical information in the patient's medical record; independently interpreting results and communicating the results to the patient, family; and care coordination was 60 minutes.                 Christina Stallings, APRN-CNP

## 2023-12-18 PROBLEM — N39.0 URINARY TRACT INFECTION, SITE NOT SPECIFIED: Status: RESOLVED | Noted: 2023-01-01 | Resolved: 2023-01-01

## 2023-12-18 PROBLEM — N39.0 URINARY TRACT INFECTION: Status: RESOLVED | Noted: 2023-01-04 | Resolved: 2023-01-01

## 2023-12-18 PROBLEM — J69.0 ASPIRATION PNEUMONIA (MULTI): Status: RESOLVED | Noted: 2021-11-23 | Resolved: 2023-01-01

## 2023-12-18 PROBLEM — J95.4: Status: RESOLVED | Noted: 2021-10-25 | Resolved: 2023-01-01

## 2023-12-18 PROBLEM — R13.10 DYSPHAGIA: Status: ACTIVE | Noted: 2021-11-24

## 2023-12-18 PROBLEM — J15.69 PNEUMONIA DUE TO GRAM-NEGATIVE BACTERIA (MULTI): Status: RESOLVED | Noted: 2023-01-01 | Resolved: 2023-01-01

## 2023-12-18 PROBLEM — J69.8: Status: RESOLVED | Noted: 2021-11-25 | Resolved: 2023-01-01

## 2023-12-18 NOTE — PROGRESS NOTES
Wife is now requesting [facility] therapy for patient.   She said they've had them before and PCP [place] over saw the [facility].  Referral initiated via [facility].  [facility] Following.      2:20 pm  [facility] has accepted patient for home care needs.  [facility] following for discharge orders and will send updates when able.

## 2023-12-18 NOTE — CONSULTS
Inpatient consult to Infectious Diseases  Consult performed by: Frank Escalante MD  Consult ordered by: KIP Pastor-CNP            Primary MD: KIP Corcoran-CNP    Reason For Consult   pneumonia    History Of Present Illness  Hai Spaulding is a 73 y.o. male presenting with  cough and congestion.  Onset was a couple of days ago.  It was gradual, with interval worsening.  He was seen at his primary care physician's office and was placed on antibiotic therapy.  He had interval worsening on came to the hospital for further evaluation and monitoring.  Was found to be hypoxic and is on supplemental oxygen.  He denies any fever or chills.  Denies any chest pain.  He is on ceftriaxone and doxycycline  Chest imaging was remarkable for groundglass opacities.     Past Medical History  He has a past medical history of Acid reflux, Atrial fibrillation with RVR (CMS/[facility]) (10/2021), Congestive heart failure (CHF) (CMS/[facility]) (10/2021), COPD (chronic obstructive pulmonary disease) (CMS/[facility]), COVID, Diabetes (CMS/[facility]), Diabetic eye exam (CMS/[facility]) (05/08/2019), Hodgkin lymphoma (CMS/[facility]), HTN (hypertension), Hyperlipidemia, Larynx cancer (CMS/[facility]), Myocardial infarction (CMS/[facility]), and Neuropathy.    Surgical History  He has a past surgical history that includes Other surgical history; Other surgical history; CT angio abdomen pelvis w and or wo IV IV contrast (01/22/2022); Other surgical history; Tonsillectomy; Other surgical history; Cholecystectomy; Other surgical history; Tumor excision; Cardiac defibrillator placement (05/2022); IR injection epidural steroid; and Tumor removal.     Social History     Occupational History    Not on file   Tobacco Use    Smoking status: Every Day     Packs/day: 0.50     Years: 60.00     Additional pack years: 0.00     Total pack years: 30.00     Types: Cigarettes     Passive exposure: Past    Smokeless tobacco: Never   Vaping Use    Vaping Use: Never used   Substance and  Sexual Activity    Alcohol use: Never    Drug use: Never    Sexual activity: Not on file     Travel History   Travel since 11/18/23    No documented travel since 11/18/23        Service:  Branch Years Served Period   [employer]       Comments:        Family History  Family History   Problem Relation Name Age of Onset    Diabetes Mother      Other (htn) Mother      Heart disease Mother      Diabetes Father      Heart disease Father      Other (htn) Father       Allergies  Patient has no allergy information on record.     Immunization History   Administered Date(s) Administered    Flu vaccine (IIV4), preservative free *Check age/dose* 10/21/2015    Flu vaccine, quadrivalent, high-dose, preservative free, age 65y+ (FLUZONE) 09/30/2020, 11/15/2023    Influenza, High Dose Seasonal, Preservative Free 11/30/2018, 11/18/2019    Influenza, Unspecified 11/16/2005, 10/02/2009, 11/27/2012, 09/30/2020    Influenza, injectable, quadrivalent 10/28/2014    Influenza, seasonal, injectable 11/14/2011, 10/07/2013    Influenza, trivalent, adjuvanted 11/15/2019    Moderna SARS-CoV-2 Vaccination 03/13/2021, 04/10/2021    Pfizer Purple Cap SARS-CoV-2 04/19/2022    Pneumococcal conjugate vaccine, 13-valent (PREVNAR 13) 11/05/2015    Pneumococcal polysaccharide vaccine, 23-valent, age 2 years and older (PNEUMOVAX 23) 11/27/2012, 10/08/2019     Medications  Home medications:  Medications Prior to Admission   Medication Sig Dispense Refill Last Dose    acetaminophen (Tylenol) 325 mg tablet Take 2 tablets (650 mg) by mouth twice a day.   12/14/2023    albuterol (Ventolin HFA) 90 mcg/actuation inhaler Inhale 2 puffs every 4 hours if needed.   12/13/2023    amiodarone (Pacerone) 200 mg tablet Take 0.5 tablets (100 mg) by mouth once daily.   12/14/2023    amoxicillin (Amoxil) 250 mg capsule Take 1 capsule (250 mg) by mouth once daily at bedtime.       amoxicillin-pot clavulanate (Augmentin) 875-125 mg tablet Take 1 tablet (875 mg) by mouth 2  "times a day for 5 days. 10 tablet 0 12/14/2023    atorvastatin (Lipitor) 80 mg tablet Take 1 tablet (80 mg) by mouth once daily.   12/13/2023    Autolet lancing device 3 times a day.   12/14/2023    BD Cynthia 2nd Gen Pen Needle 32 gauge x 5/32\" needle Inject 1 each under the skin 3 times a day.       bisacodyl (Dulcolax) 5 mg EC tablet Take 20 tablets (100 mg) by mouth if needed for constipation. Do not crush, chew, or split.   Past Month    carbidopa-levodopa (Sinemet CR)  mg ER tablet Take 1 tablet by mouth 3 times a day as needed.   12/14/2023    cyclobenzaprine (Flexeril) 10 mg tablet Take 1 tablet (10 mg) by mouth once daily at bedtime.   12/13/2023    dapagliflozin propanediol (Farxiga) 10 mg Take 1 tablet (10 mg) by mouth once daily. 30 tablet 11 12/14/2023    doxycycline (Adoxa) 100 mg tablet Take 1 tablet (100 mg) by mouth once daily for 5 days. Take with a full glass of water and do not lie down for at least 30 minutes after 5 tablet 0 12/14/2023    ferrous sulfate 325 (65 Fe) MG tablet Take 1 tablet by mouth once daily.   More than a month    fluticasone propion-salmeteroL (Advair Diskus) 250-50 mcg/dose diskus inhaler INHALE ONE PUFF BY MOUTH TWO TIMES A DAY 1 each 3 12/14/2023    FreeStyle Joan 2 [place] misc Inject 1 each under the skin if needed.       FreeStyle Joan 2 Sensor kit Inject 1 each under the skin every 14 (fourteen) days.       furosemide (Lasix) 40 mg tablet Take 1 tablet (40 mg) by mouth once daily.   12/13/2023    gabapentin (Neurontin) 300 mg capsule Take 1 capsule (300 mg) by mouth once daily at bedtime.   12/13/2023    insulin glargine (Lantus U-100 Insulin) 100 unit/mL injection Inject 30 Units under the skin once daily in the evening. 15 mL 1 12/13/2023    insulin lispro (HumaLOG KwikPen Insulin) 100 unit/mL injection Inject 8 Units under the skin 3 times a day with meals. 8 units plus sliding scale three times a day with meals Subcutaneous three times a day, max dose 16u " "three times a day   12/13/2023    insulin syr/ndl U100 half lexie (BD Veo Insulin Syr, half unit,) 0.3 mL 31 gauge x 15/64\" syringe 1 Needle once daily. 30 each 3 12/13/2023    insulin syringe-needle U-100 (BD Veo Insulin Syringe UF) 1 mL 31 gauge x 15/64\" syringe 3 times a day.   12/13/2023    insulin syringe-needle U-100 31G X 5/16\" 1 mL syringe Inject 1 each under the skin once daily. As directed daily 100 each 1 12/13/2023    ipratropium-albuteroL (Duo-Neb) 0.5-2.5 mg/3 mL nebulizer solution Take 3 mL by nebulization every 6 hours.   12/14/2023    Lactobacillus acidophilus (ACIDOPHILUS ORAL) As directed oral   12/14/2023    levothyroxine (Synthroid, Levoxyl) 25 mcg tablet Take 1 tablet (25 mcg) by mouth once daily. 30 tablet 11 12/13/2023    metoprolol succinate XL (Toprol-XL) 25 mg 24 hr tablet Take 0.5 tablets (12.5 mg) by mouth once daily. 45 tablet 3 12/14/2023    nicotine (Nicoderm CQ) 14 mg/24 hr patch Place 1 patch over 24 hours on the skin once every 24 hours. 30 patch 1 12/14/2023    nicotine (Nicoderm CQ) 21 mg/24 hr patch Place 1 patch on the skin once every 24 hours.   More than a month    nitroglycerin (Nitrostat) 0.4 mg SL tablet 1 tablet under the tongue Sublingual prn every 5 minutes x 3 doses as needed   More than a month    omeprazole (PriLOSEC) 40 mg DR capsule Take 1 capsule (40 mg) by mouth once daily in the morning. Take before meals.   12/14/2023    ondansetron ODT (Zofran-ODT) 4 mg disintegrating tablet Take 1 tablet (4 mg) by mouth every 6 hours if needed for nausea or vomiting.   12/13/2023    OneTouch Ultra Test strip Test 3-4 times a day   Past Week    oxybutynin XL (Ditropan-XL) 5 mg 24 hr tablet Take 1 tablet (5 mg) by mouth once daily.   12/14/2023    oxygen (O2) therapy 2 LPM throughout the night of sleep nasal canula daily   12/14/2023    prasugrel (Effient) 10 mg tablet Take 1 tablet (10 mg) by mouth once daily.   12/14/2023    QUEtiapine (SEROquel) 25 mg tablet Take 1 tablet (25 " mg) by mouth once daily at bedtime.   12/13/2023    rivaroxaban (Xarelto) 15 mg tablet Take 1 tablet (15 mg) by mouth once daily. With food   12/13/2023    sertraline (Zoloft) 100 mg tablet Take 1 tablet (100 mg) by mouth once daily.   12/13/2023    SITagliptin phosphate (Januvia) 50 mg tablet Take 1 tablet (50 mg) by mouth once daily. 30 tablet 11 12/14/2023    triamcinolone (Kenalog) 0.1 % cream Apply topically. Apply topically to affected area prn, As Needed   12/13/2023     Current medications:  Scheduled medications  amiodarone, 100 mg, oral, Daily  atorvastatin, 80 mg, oral, Daily  carbidopa-levodopa, 1 tablet, oral, TID  cefTRIAXone, 1 g, intravenous, q24h  cyclobenzaprine, 10 mg, oral, Nightly  dapagliflozin propanediol, 10 mg, oral, Daily  doxycycline, 100 mg, intravenous, q12h  fluticasone furoate-vilanteroL, 1 puff, inhalation, Daily  furosemide, 40 mg, oral, Daily  gabapentin, 300 mg, oral, Nightly  guaiFENesin, 600 mg, oral, BID  insulin glargine, 30 Units, subcutaneous, Nightly  insulin lispro, 0-20 Units, subcutaneous, TID with meals  insulin lispro, 8 Units, subcutaneous, TID with meals  ipratropium-albuteroL, 3 mL, nebulization, 4x daily  lactobacillus acidophilus, 1 capsule, oral, Daily  levothyroxine, 25 mcg, oral, Daily  methylPREDNISolone sodium succinate (PF), 40 mg, intravenous, q8h  metoprolol succinate XL, 12.5 mg, oral, Daily  nicotine, 1 patch, transdermal, q24h  oxybutynin, 5 mg, oral, Daily  pantoprazole, 40 mg, oral, Daily before breakfast  prasugrel, 10 mg, oral, Daily  QUEtiapine, 25 mg, oral, Nightly  rivaroxaban, 15 mg, oral, Daily with evening meal  sertraline, 100 mg, oral, Daily  SITagliptin phosphate, 50 mg, oral, Daily      Continuous medications  oxygen DME/Hospice, 2 L/min, Last Rate: 2 L/min (12/15/23 0015)      PRN medications  PRN medications: albuterol, dextrose 10 % in water (D10W), dextrose, glucagon, oxygen    Review of Systems   All other systems reviewed and are  negative.       Objective  Range of Vitals (last 24 hours)  Heart Rate:  [63-87]   Temp:  [36.5 °C (97.7 °F)-36.6 °C (97.9 °F)]   Resp:  [18-20]   BP: (102-115)/(53-63)   Weight:  [75.2 kg (165 lb 12.6 oz)]   SpO2:  [93 %-97 %]   Daily Weight  12/18/23 : 75.2 kg (165 lb 12.6 oz)    Body mass index is 24.48 kg/m².     Physical Exam  Constitutional:       Appearance: Normal appearance.   HENT:      Head: Normocephalic and atraumatic.      Nose: Nose normal.   Eyes:      Extraocular Movements: Extraocular movements intact.      Conjunctiva/sclera: Conjunctivae normal.   Pulmonary:      Breath sounds: Decreased breath sounds present.   Abdominal:      General: Abdomen is flat. Bowel sounds are normal.      Palpations: Abdomen is soft.   Skin:     General: Skin is warm and dry.   Neurological:      Mental Status: He is alert.          Relevant Results  Outside Hospital Results    Labs  Results from last 72 hours   Lab Units 12/18/23  0537 12/17/23  0722 12/16/23  0553   WBC AUTO x10*3/uL 19.5* 18.2* 18.1*   HEMOGLOBIN g/dL 12.0* 12.8* 12.1*   HEMATOCRIT % 37.0* 39.6* 38.5*   PLATELETS AUTO x10*3/uL 486* 533* 539*     Results from last 72 hours   Lab Units 12/18/23  0537 12/17/23  0722 12/16/23  0553   SODIUM mmol/L 132* 131* 130*   POTASSIUM mmol/L 4.1 4.6 4.6   CHLORIDE mmol/L 95* 95* 93*   CO2 mmol/L 25 23 25   BUN mg/dL 86* 85* 75*   CREATININE mg/dL 2.16* 2.24* 2.25*   GLUCOSE mg/dL 384* 496* 628*   CALCIUM mg/dL 8.3* 8.6 8.6   ANION GAP mmol/L 16 18 17   EGFR mL/min/1.73m*2 32* 30* 30*     Results from last 72 hours   Lab Units 12/16/23  0553   ALK PHOS U/L 256*   BILIRUBIN TOTAL mg/dL 0.6   PROTEIN TOTAL g/dL 7.1   ALT U/L 5*   AST U/L 25   ALBUMIN g/dL 3.2*     Estimated Creatinine Clearance: 30.9 mL/min (A) (by C-G formula based on SCr of 2.16 mg/dL (H)).  C-Reactive Protein   Date Value Ref Range Status   11/29/2023 2.53 (H) <1.00 mg/dL Final     CRP   Date Value Ref Range Status   12/31/2022 39.72 (A) mg/dL  Final     Comment:     REF VALUE  < 1.00     12/14/2021 2.16 (A) mg/dL Final     Comment:     REF VALUE  < 1.00       Sedimentation Rate   Date Value Ref Range Status   11/29/2023 63 (H) 0 - 20 mm/h Final   12/31/2022 92 (H) 0 - 20 mm/h Final     Comment:     Please note new reference ranges as of 5/9/2022.   06/14/2019 11 0 - 20 mm/h Final     HIV 1/2 Antigen/Antibody Screen with Reflex to Confirmation   Date Value Ref Range Status   11/29/2023 Nonreactive Nonreactive Final     Hepatitis C AB   Date Value Ref Range Status   11/29/2023 Nonreactive Nonreactive Final     Comment:     Results from patients taking biotin supplements or receiving high-dose biotin therapy should be interpreted with caution due to possible interference with this test. Providers may contact their local laboratory for further information.     Microbiology  Susceptibility data from last 90 days.  Collected Specimen Info Organism   12/15/23 Fluid from SPUTUM Candida albicans   12/15/23 Blood culture from Peripheral Venipuncture Coagulase negative staphylococcus     Reviewed   Imaging  XR chest 1 view    Result Date: 12/14/2023  Interpreted By:  Galen Lopez, STUDY: XR CHEST 1 VIEW;  12/14/2023 8:17 pm   INDICATION: Signs/Symptoms:sob.   COMPARISON: 07/10/2023   ACCESSION NUMBER(S): [id number]   ORDERING CLINICIAN: YADIRA VALE   FINDINGS: Pacemaker is seen. Heart size is enlarged. Bronchial thickening features suggesting bronchitis. Equivocal edema also noted. There does appear to be a right upper lobe opacity which is obscured by wires. Pneumonia not excluded. Lungs are hyperinflated       1.  Features of bronchitis and pulmonary edema. Lungs are hyperinflated . Equivocal right upper lobe nodular opacity. Follow-up is advised with two-view chest radiograph when feasible. Pneumonia can have this appearance       MACRO: None   Signed by: Galen Lopez 12/14/2023 8:50 PM Dictation workstation:   [id number]    CT chest wo IV  contrast    Result Date: 12/9/2023  Interpreted By:  Ellis Rodriguez, STUDY: CT CHEST WO IV CONTRAST;  12/8/2023 12:39 pm   INDICATION: Signs/Symptoms:3 month follow up -  7 mm nodule superior segment left lower lobe. compare to August exam.   COMPARISON: 08/22/2023   ACCESSION NUMBER(S): [id number]   ORDERING CLINICIAN: JOSE ADKINS   TECHNIQUE: Helical data acquisition of the chest was obtained without the use of IV contrast. Images were reformatted in axial, coronal, and sagittal planes.   FINDINGS: POTENTIAL LIMITATIONS OF THE STUDY:   Lack of IV contrast   HEART AND VESSELS:   There are atherosclerotic calcifications of the aorta and its branches. The aorta is unchanged in course and caliber.   The heart is unchanged in size.   No pericardial effusion is seen.   MEDIASTINUM AND CHRIS, LOWER NECK AND AXILLA: The visualized thyroid gland is within normal limits.   There is mild mediastinal lymphadenopathy. For example, precarinal lymph node measuring approximately 1.3 cm in short axis. Right paratracheal lymph node measuring approximately 1.2 cm in short axis. These are increased in size when compared to the previous study. No definite hilar lymphadenopathy, although evaluation is limited by lack of IV contrast.   Esophagus is stable in course and caliber.   LUNGS AND AIRWAYS: The trachea and central airways are patent. No endobronchial lesion.   There is partial collapse/consolidation of both lower lobes, worse on the left. Mild atelectasis/infiltrate is seen in the lingula. There are mild patchy nonspecific ground-glass opacities scattered within the lungs. There are emphysematous changes, most conspicuous in the upper lobes. There are small bilateral pleural effusions, slightly larger on the left.   UPPER ABDOMEN: The visualized subdiaphragmatic structures demonstrate no acute abnormality. Cholecystectomy. Stable mildly complex cystic lesion in the upper pole of the right kidney.   CHEST WALL AND OSSEOUS  STRUCTURES: Degenerative changes. No acute process.       Partial collapse/consolidation of the lower lobes, worse on the left. Additional area of atelectasis or infiltrate in the lingula. Mild scattered patchy nonspecific ground-glass opacities in the lungs. Follow-up recommended to document resolution. Small bilateral pleural effusions, larger on the left.   MACRO: None.   Signed by: Ellis Rodriguez 12/9/2023 9:38 AM Dictation workstation:   [id number]      Assessment/Plan   acute hypoxic respiratory failure   leukocytosis-multifactorial   acute versusChronic kidney disease  Pneumonia           Continue ceftriaxone  Continue doxycycline   supplemental oxygen as needed  Monitor renal function  Supportive care  Monitor temperature  and WBC   long-term plan is 7 days of antibiotic therapy-can change to oral upon discharge    Frank Escalante MD

## 2023-12-18 NOTE — PROGRESS NOTES
"Hai Spaulding is a 73 y.o. male on day 4 of admission presenting with Pneumonia due to infectious organism, unspecified laterality, unspecified part of lung.    Subjective   0800 Patient states he is feeling better and would like to go home.  Wife not present, patient agrees to discuss with wife this afternoon regarding discharge and caring for patient at home.       1300 Patient and wife agree to stay for one more day of antibiotics, plan on discharge tomorrow.      Objective     Physical Exam  HENT:      Head: Normocephalic.      Nose: Nose normal.      Mouth/Throat:      Mouth: Mucous membranes are moist.   Eyes:      Extraocular Movements: Extraocular movements intact.   Cardiovascular:      Rate and Rhythm: Normal rate and regular rhythm.      Pulses: Normal pulses.      Heart sounds: Normal heart sounds.   Pulmonary:      Effort: Pulmonary effort is normal.      Breath sounds: Wheezing present.   Abdominal:      General: Bowel sounds are normal.      Palpations: Abdomen is soft.   Musculoskeletal:      Cervical back: Normal range of motion.      Comments: Generalized Weakness   Skin:     General: Skin is warm and dry.      Capillary Refill: Capillary refill takes less than 2 seconds.   Neurological:      Mental Status: He is alert. Mental status is at baseline.      Motor: Weakness present.      Gait: Gait abnormal.   Psychiatric:         Mood and Affect: Mood normal.         Behavior: Behavior normal.         Last Recorded Vitals  Blood pressure 112/63, pulse 63, temperature 36.6 °C (97.9 °F), temperature source Temporal, resp. rate 18, height 1.753 m (5' 9\"), weight 75.2 kg (165 lb 12.6 oz), SpO2 94 %.  Intake/Output last 3 Shifts:  I/O last 3 completed shifts:  In: 1480 (19.7 mL/kg) [P.O.:1220; IV Piggyback:260]  Out: 1850 (24.6 mL/kg) [Urine:1850 (0.7 mL/kg/hr)]  Weight: 75.2 kg     Relevant Results  Scheduled medications  amiodarone, 100 mg, oral, Daily  atorvastatin, 80 mg, oral, " Daily  carbidopa-levodopa, 1 tablet, oral, TID  cefTRIAXone, 1 g, intravenous, q24h  cyclobenzaprine, 10 mg, oral, Nightly  dapagliflozin propanediol, 10 mg, oral, Daily  doxycycline, 100 mg, intravenous, q12h  fluticasone furoate-vilanteroL, 1 puff, inhalation, Daily  furosemide, 40 mg, oral, Daily  gabapentin, 300 mg, oral, Nightly  guaiFENesin, 600 mg, oral, BID  insulin glargine, 30 Units, subcutaneous, Nightly  insulin lispro, 0-20 Units, subcutaneous, TID with meals  insulin lispro, 8 Units, subcutaneous, TID with meals  ipratropium-albuteroL, 3 mL, nebulization, 4x daily  lactobacillus acidophilus, 1 capsule, oral, Daily  levothyroxine, 25 mcg, oral, Daily  methylPREDNISolone sodium succinate (PF), 40 mg, intravenous, q8h  metoprolol succinate XL, 12.5 mg, oral, Daily  nicotine, 1 patch, transdermal, q24h  oxybutynin, 5 mg, oral, Daily  pantoprazole, 40 mg, oral, Daily before breakfast  prasugrel, 10 mg, oral, Daily  QUEtiapine, 25 mg, oral, Nightly  rivaroxaban, 15 mg, oral, Daily with evening meal  sertraline, 100 mg, oral, Daily  SITagliptin phosphate, 50 mg, oral, Daily      Continuous medications  oxygen DME/Hospice, 2 L/min, Last Rate: 2 L/min (12/15/23 0015)      PRN medications  PRN medications: albuterol, dextrose 10 % in water (D10W), dextrose, glucagon, oxygen    Results for orders placed or performed during the hospital encounter of 12/14/23 (from the past 24 hour(s))   POCT GLUCOSE   Result Value Ref Range    POCT Glucose 377 (H) 74 - 99 mg/dL   POCT GLUCOSE   Result Value Ref Range    POCT Glucose 213 (H) 74 - 99 mg/dL   POCT GLUCOSE   Result Value Ref Range    POCT Glucose 179 (H) 74 - 99 mg/dL   CBC   Result Value Ref Range    WBC 19.5 (H) 4.4 - 11.3 x10*3/uL    nRBC 0.2 (H) 0.0 - 0.0 /100 WBCs    RBC 4.04 (L) 4.50 - 5.90 x10*6/uL    Hemoglobin 12.0 (L) 13.5 - 17.5 g/dL    Hematocrit 37.0 (L) 41.0 - 52.0 %    MCV 92 80 - 100 fL    MCH 29.7 26.0 - 34.0 pg    MCHC 32.4 32.0 - 36.0 g/dL    RDW  15.9 (H) 11.5 - 14.5 %    Platelets 486 (H) 150 - 450 x10*3/uL   Basic metabolic panel   Result Value Ref Range    Glucose 384 (H) 74 - 99 mg/dL    Sodium 132 (L) 136 - 145 mmol/L    Potassium 4.1 3.5 - 5.3 mmol/L    Chloride 95 (L) 98 - 107 mmol/L    Bicarbonate 25 21 - 32 mmol/L    Anion Gap 16 10 - 20 mmol/L    Urea Nitrogen 86 (H) 6 - 23 mg/dL    Creatinine 2.16 (H) 0.50 - 1.30 mg/dL    eGFR 32 (L) >60 mL/min/1.73m*2    Calcium 8.3 (L) 8.6 - 10.3 mg/dL   POCT GLUCOSE   Result Value Ref Range    POCT Glucose 343 (H) 74 - 99 mg/dL     XR chest 1 view    Result Date: 12/14/2023  Interpreted By:  Galen Lopez, STUDY: XR CHEST 1 VIEW;  12/14/2023 8:17 pm   INDICATION: Signs/Symptoms:sob.   COMPARISON: 07/10/2023   ACCESSION NUMBER(S): [id number]   ORDERING CLINICIAN: YADIRA VALE   FINDINGS: Pacemaker is seen. Heart size is enlarged. Bronchial thickening features suggesting bronchitis. Equivocal edema also noted. There does appear to be a right upper lobe opacity which is obscured by wires. Pneumonia not excluded. Lungs are hyperinflated       1.  Features of bronchitis and pulmonary edema. Lungs are hyperinflated . Equivocal right upper lobe nodular opacity. Follow-up is advised with two-view chest radiograph when feasible. Pneumonia can have this appearance       MACRO: None   Signed by: Galen Lopez 12/14/2023 8:50 PM Dictation workstation:   [id number]    CT chest wo IV contrast    Result Date: 12/9/2023  Interpreted By:  Ellis Rodriguez, STUDY: CT CHEST WO IV CONTRAST;  12/8/2023 12:39 pm   INDICATION: Signs/Symptoms:3 month follow up -  7 mm nodule superior segment left lower lobe. compare to August exam.   COMPARISON: 08/22/2023   ACCESSION NUMBER(S): [id number]   ORDERING CLINICIAN: JOSE ADKINS   TECHNIQUE: Helical data acquisition of the chest was obtained without the use of IV contrast. Images were reformatted in axial, coronal, and sagittal planes.   FINDINGS: POTENTIAL LIMITATIONS OF THE STUDY:    Lack of IV contrast   HEART AND VESSELS:   There are atherosclerotic calcifications of the aorta and its branches. The aorta is unchanged in course and caliber.   The heart is unchanged in size.   No pericardial effusion is seen.   MEDIASTINUM AND CHRIS, LOWER NECK AND AXILLA: The visualized thyroid gland is within normal limits.   There is mild mediastinal lymphadenopathy. For example, precarinal lymph node measuring approximately 1.3 cm in short axis. Right paratracheal lymph node measuring approximately 1.2 cm in short axis. These are increased in size when compared to the previous study. No definite hilar lymphadenopathy, although evaluation is limited by lack of IV contrast.   Esophagus is stable in course and caliber.   LUNGS AND AIRWAYS: The trachea and central airways are patent. No endobronchial lesion.   There is partial collapse/consolidation of both lower lobes, worse on the left. Mild atelectasis/infiltrate is seen in the lingula. There are mild patchy nonspecific ground-glass opacities scattered within the lungs. There are emphysematous changes, most conspicuous in the upper lobes. There are small bilateral pleural effusions, slightly larger on the left.   UPPER ABDOMEN: The visualized subdiaphragmatic structures demonstrate no acute abnormality. Cholecystectomy. Stable mildly complex cystic lesion in the upper pole of the right kidney.   CHEST WALL AND OSSEOUS STRUCTURES: Degenerative changes. No acute process.       Partial collapse/consolidation of the lower lobes, worse on the left. Additional area of atelectasis or infiltrate in the lingula. Mild scattered patchy nonspecific ground-glass opacities in the lungs. Follow-up recommended to document resolution. Small bilateral pleural effusions, larger on the left.   MACRO: None.   Signed by: Ellis Rodriguez 12/9/2023 9:38 AM Dictation workstation:   [id number]       Assessment/Plan   Principal Problem:    Pneumonia due to infectious organism,  "unspecified laterality, unspecified part of lung    #Acute hypoxic respiratory failure 2/2 community acquired pneumonia   #COPD, in acute exacerbation  #Tobacco use  - Patient was started on Augmentin by his PCP with no significant improvement  - Received Zosyn x 1 dose in the ED   - WBC 15.5 at admission  ID consulted > 19.5 on steroids.  - Lactate 1.4 on admission   - COVID, flu A/B negative   - Procal, BCx, sputum culture pending   - CXR:  Features of bronchitis and pulmonary edema. Lungs are   hyperinflated . Equivocal right upper lobe nodular opacity. Follow-up   is advised with two-view chest radiograph when feasible. Pneumonia   can have this appearance    - Continue ceftriaxone   - Azithromycin discontinued (patient is on amiodarone); start doxycycline   - Solumedrol IVP q8h > Decreased to Q 12  - DuoNebs TID   - Mucinex BID   - Continue Breo   - RT eval and treat protocol  - Bronchial hygiene  - Monitor SpO2 continuously   - Baseline O2 unclear; patient has home O2 but has told different providers that he wears anywhere from 2-4L \" but not all the time\"   - Wean O2 as tolerated; patient currently on 2L O2   - Nicotine patch ordered, patient currently smokes 1/2 ppd  - Encourage tobacco cessation on discharge  -Plan for discharge tomorrow.     #HFrEF, concern for acute exacerbation  -  > 842 > 775  - Echo from January 2023 reviewed, LVEF 35-40% with ischemic cardiomyopathy   - Resume home Lasix 40 mg PO every day   - Strict I&Os  - Daily weights  - 1800 mL daily fluid restriction, 2g Na diet  - Wean oxygen as tolerated; patient currently on 2L O2   - Continuous telemetry monitoring   - Cardiology consulted, appreciate recs      #Atrial fibrillation  #S/p ICD   #HTN  #HLD  #CAD   - Continue amiodarone, atorvastatin, metoprolol succinate, prasugrel, and rivaroxaban   - Telemetry monitoring  - 2g Na diet  - Cardiology consulted, appreciate recs      #Parkinson's disease   #H/o CVA   - Continue " carbidopa-levodopa, quetiapine, and cyclobenzaprine   - Delirium precautions: Frequent reorientation, day/night normalization, shades open for sunlight, provide glasses/hearing aids as needed   - PT/OT evals     #CKD   - Cr 2.12 > 2.13 >  2.24 > 2.16  - Baseline Cr 1.7 - 2.2 per chart review  - Avoid nephrotoxins/renally dose meds as needed  - Daily BMP to monitor renal function     #T2DM with neuropathy   - Continue home dapagliflozin, gabapentin, sitagliptin, Humalog TID with meals   - SSI three times a day before meals   - Hypoglycemia protocol  - Accuchecks ac/hs/prn  - Diabetic diet   - HbA1c 10.6 in October 2023  - Increased SSI to 0-20 unit scale  -Re-ordered home lantus dose of 30 units subcutaneous at HS     #Hypothyroidism   - Continue levothyroxine     #Anemia, iron deficiency   - Patient no longer on iron supplementation per hem/onc   - RBC/H/H stable, 4.01/11.9/37.5 > 4.34/12.8/39.6 > 4.04/12/37  - No active bleeding on exam   - Daily CBC to trend H/H      #GERD  - Continue PPI     #Depression   - Continue sertraline         DVT PPx: Rivaroxaban    GI PPx: Protonix   Diet: Diabetic, 2g Na, 1800 mL FR   Code Status: Full code      Pt requires inpatient stay at this time.  Total accumulated time spent face to face and not face to face preparing to see the patient, obtaining and reviewing separately obtained history; performing a medically appropriate examination and/or evaluation; counseling and educating the patient, family; ordering medications, tests, or procedures; referring and communicating with other health care professionals; documenting clinical information in the patient's medical record; independently interpreting results and communicating the results to the patient, family; and care coordination was 45 minutes.      Laurel Garg, KIP-CNP

## 2023-12-18 NOTE — CARE PLAN
Problem: Pain - Adult  Goal: Verbalizes/displays adequate comfort level or baseline comfort level  Outcome: Progressing     Problem: Safety - Adult  Goal: Free from fall injury  Outcome: Progressing     Problem: Chronic Conditions and Co-morbidities  Goal: Patient's chronic conditions and co-morbidity symptoms are monitored and maintained or improved  Outcome: Progressing   The patient's goals for the shift include to get better    The clinical goals for the shift include remain fall free    Pt tolerates antibiotics. Slept well overnight. Sats in the upper 90's. Uneventful

## 2023-12-18 NOTE — PROGRESS NOTES
Physical Therapy    Physical Therapy Treatment    Patient Name: Hai Spaulding  MRN: [id number]  Today's Date: 12/18/2023  Time Calculation  Start Time: 0840  Stop Time: 0910  Time Calculation (min): 30 min     Assessment/Plan   PT Assessment  PT Assessment Results: Decreased strength, Impaired balance, Decreased mobility, Decreased coordination  End of Session Communication: Bedside nurse, PCT/NA/CTA  End of Session Patient Position: Up in chair, Alarm on (Patient up in wheelchair.)  PT Plan  Inpatient/Swing Bed or Outpatient: Inpatient  PT Plan  Treatment/Interventions: Bed mobility, Transfer training, Gait training, Therapeutic exercise  PT Plan: Skilled PT  PT Frequency: 4 times per week  PT Discharge Recommendations: Moderate intensity level of continued care  PT Recommended Transfer Status: Assist x2  PT - OK to Discharge: Yes    General Visit Information:   PT  Visit  PT Received On: 12/18/23     Precautions:  Precautions  Medical Precautions: Fall precautions  Objective   Pain:  Pain Assessment  Pain Assessment: 0-10  Pain Score: 0 - No pain  Cognition:  Cognition  Overall Cognitive Status: Within Functional Limits  Treatments:  Therapeutic Exercise  Therapeutic Exercise Performed: Yes (see note) Patient performed the following to facilitate increased strength to promote independence with transfers and allow progression of ambulation to allow for safe return home:  Laqs, hip flexion seated, hip abduction, hip adduction, toe raises, heel raises, each x  20.  Vcs required for proper technique with hip flexion, good follow through with cues.    Bed Mobility  Bed Mobility: Yes  Bed Mobility 1  Bed Mobility 1: Supine to sitting  Level of Assistance 1: Moderate assistance    Ambulation/Gait Training  Ambulation/Gait Training Performed: Yes  Ambulation/Gait Training 1  Surface 1: Level tile  Device 1: Rolling walker  Gait Support Devices: Gait belt  Assistance 1: Moderate assistance (assist of 2)  Quality of Gait 1:  Decreased step length, Forward flexed posture, Antalgic (Slime slow, posture forward. Patient able to stand and take a few steps to get to the chair with assist of 2. Patient had difficulty advancing his feet and required assist to turn.)  Comments/Distance (ft) 1: 6  Transfers  Transfer: Yes  Transfer 1  Technique 1: Sit to stand, Stand to sit  Transfer Device 1: Walker, Gait belt  Transfer Level of Assistance 1: Moderate assistance, +2    Outcome Measures:  [facility] Basic Mobility  Turning from your back to your side while in a flat bed without using bedrails: A lot  Moving from lying on your back to sitting on the side of a flat bed without using bedrails: A lot  Moving to and from bed to chair (including a wheelchair): A lot  Standing up from a chair using your arms (e.g. wheelchair or bedside chair): A lot  To walk in hospital room: A lot  Climbing 3-5 steps with railing: Total  Basic Mobility - Total Score: 11    Education Documentation  Precautions, taught by Margy Lai PTA at 12/18/2023 10:13 AM.  Learner: Patient  Readiness: Acceptance  Method: Explanation  Response: Verbalizes Understanding  Comment: Educated patient on safe use of fww.    Home Exercise Program, taught by Margy Lai PTA at 12/18/2023 10:13 AM.  Learner: Patient  Readiness: Acceptance  Method: Explanation  Response: Verbalizes Understanding  Comment: Educated patient on safe use of fww.    Mobility Training, taught by Margy Lai PTA at 12/18/2023 10:13 AM.  Learner: Patient  Readiness: Acceptance  Method: Explanation  Response: Verbalizes Understanding  Comment: Educated patient on safe use of fww.    Education Comments  No comments found.      Encounter Problems       Encounter Problems (Active)       Balance       LTG - Patient will maintain standing and sitting balance to allow for completion of daily activities (Progressing)       Start:  12/15/23    Expected End:  12/29/23            STG - Maintains dynamic  sitting balance without upper extremity support >=5 min with SBA (Progressing)       Start:  12/15/23    Expected End:  12/29/23               Mobility       LTG - Patient will ambulate household distance with [place]  (Progressing)       Start:  12/15/23    Expected End:  12/29/23            STG - Patient will ambulate up and down a curb/step with [place] (Progressing)       Start:  12/15/23    Expected End:  12/29/23               Pain - Adult          Transfers       STG - Transfer from bed to chair with Taurus and WW  (Progressing)       Start:  12/15/23    Expected End:  12/29/23            STG - Patient will perform bed mobility with SBA  (Progressing)       Start:  12/15/23    Expected End:  12/29/23               Transfers       STG - Patient will transfer sit to and from stand with Taurus and WW (Progressing)       Start:  12/15/23    Expected End:  12/29/23

## 2023-12-18 NOTE — CARE PLAN
The patient's goals for the shift include to get better    The clinical goals for the shift include Pt will be up in chair for 2 meals today  Pt was up in wheelchair today. Able to feed self with good set up. Appetite good. Lungs with wheezes and some rhonchi scattered. No c/o pain. Using pure wick when voiding.  PT/OT worked with pt. Blood sugars elevated--covered with insulin.

## 2023-12-19 NOTE — CARE PLAN
The patient's goals for the shift include to get better    The clinical goals for the shift include maintain spo2 levels>93%    Discharged home with spouse. Will continue ATB/steroids. Reviewed discharge medications with spouse. Aware of all follow up appointments. All questions answered.

## 2023-12-19 NOTE — PROGRESS NOTES
Physical Therapy    Physical Therapy Treatment    Patient Name: Hai Spaulding  MRN: [id number]  Today's Date: 12/19/2023  Time Calculation  Start Time: 1122  Stop Time: 1154  Time Calculation (min): 32 min     Assessment/Plan   PT Assessment  PT Assessment Results: Decreased strength, Decreased endurance, Impaired balance, Decreased mobility  End of Session Communication: Bedside nurse, PCT/NA/CTA  End of Session Patient Position: Up in chair (Patient up in wheelchair with spouse pusing to sons room.)  PT Plan  Inpatient/Swing Bed or Outpatient: Inpatient  PT Plan  Treatment/Interventions: Bed mobility, Transfer training  PT Plan: Skilled PT  PT Frequency: 4 times per week  PT Discharge Recommendations: Moderate intensity level of continued care  PT Recommended Transfer Status: Assist x2  PT - OK to Discharge: Yes    General Visit Information:   PT  Visit  PT Received On: 12/19/23     Precautions:  Precautions  Medical Precautions: Fall precautions  Vital Signs:     Objective   Pain:  Pain Assessment  Pain Assessment: 0-10  Pain Score: 0 - No pain  Cognition:  Cognition  Overall Cognitive Status: Within Functional Limits  Treatments:  Bed Mobility  Bed Mobility: Yes  Bed Mobility 1  Bed Mobility 1: Supine to sitting  Level of Assistance 1: Maximum assistance (Max assist of 1 to  initiate and complete supine to sit. Intermittent external support required to maintain sitting balance.)  Bed Mobility Comments 1:  (Pts spouse requesting to perform transfer to simulate the home process. Spouse unable to complete transfer without assist from therapist. Spouse states that she is not concerned about managing at home, will call for a lift assist if needed.)    Transfers  Transfer: Yes  Transfer 1  Transfer From 1: Bed to, Wheelchair to  Transfer Device 1: Gait belt  Transfer Level of Assistance 1: Maximum assistance (Max assist of 1 and mod assist of 1 required to complete safe transfer.)    Outcome Measures:  [facility] Basic  Mobility  Turning from your back to your side while in a flat bed without using bedrails: A lot  Moving from lying on your back to sitting on the side of a flat bed without using bedrails: A lot  Moving to and from bed to chair (including a wheelchair): A lot  Standing up from a chair using your arms (e.g. wheelchair or bedside chair): A lot  To walk in hospital room: A lot  Climbing 3-5 steps with railing: Total  Basic Mobility - Total Score: 11    Education Documentation  Precautions, taught by Margy Lai PTA at 12/19/2023 12:44 PM.  Learner: Patient  Readiness: Acceptance  Method: Explanation  Response: Verbalizes Understanding  Comment: Educated patient on proper hand placement for safe transfer.    Home Exercise Program, taught by Margy Lai PTA at 12/19/2023 12:44 PM.  Learner: Patient  Readiness: Acceptance  Method: Explanation  Response: Verbalizes Understanding  Comment: Educated patient on proper hand placement for safe transfer.    Mobility Training, taught by Margy Lai PTA at 12/19/2023 12:44 PM.  Learner: Patient  Readiness: Acceptance  Method: Explanation  Response: Verbalizes Understanding  Comment: Educated patient on proper hand placement for safe transfer.    Education Comments  No comments found.      Encounter Problems       Encounter Problems (Active)       Balance       LTG - Patient will maintain standing and sitting balance to allow for completion of daily activities (Progressing)       Start:  12/15/23    Expected End:  12/29/23            STG - Maintains dynamic sitting balance without upper extremity support >=5 min with SBA (Progressing)       Start:  12/15/23    Expected End:  12/29/23               Mobility       LTG - Patient will ambulate household distance with [place]  (Progressing)       Start:  12/15/23    Expected End:  12/29/23            STG - Patient will ambulate up and down a curb/step with [place] (Progressing)       Start:  12/15/23    Expected  End:  12/29/23               Pain - Adult          Transfers       STG - Transfer from bed to chair with Taurus and WW  (Progressing)       Start:  12/15/23    Expected End:  12/29/23            STG - Patient will perform bed mobility with SBA  (Progressing)       Start:  12/15/23    Expected End:  12/29/23               Transfers       STG - Patient will transfer sit to and from stand with Taurus and WW (Progressing)       Start:  12/15/23    Expected End:  12/29/23

## 2023-12-19 NOTE — DISCHARGE INSTRUCTIONS
Pneumonia in adults    The Basics  Written by the doctors and editors at [facility]  What is pneumonia? -- Pneumonia is an infection of the lungs that causes coughing, fever, and trouble breathing (figure 1). It is a serious illness, especially in young children, people older than 65, and people with other health problems. Pneumonia can be caused by bacteria, viruses, and other germs.  What are the symptoms of pneumonia? -- Common symptoms include:  ?Cough  ?Fever (temperature higher than 100.4°F or 38°C)  ?Trouble breathing  ?Pain when taking a deep breath  ?Fast heartbeat  ?Shaking chills  When people with pneumonia cough, they often cough up phlegm or mucus.  Should I see a doctor or nurse? -- Yes, if you think that you might have pneumonia, see a doctor or nurse as soon as possible. Pneumonia can be mild. But it can also be very serious, especially if you do not get it treated quickly. See your doctor or nurse right away if:  ?Your cough keeps getting worse.  ?You start having trouble breathing when doing everyday tasks or when resting.  ?You have chest pain when you breathe.  ?You feel suddenly worse after getting better from a cold or the flu.  ?You have a weakened immune system, for example because you have an HIV infection, had an organ transplant or stem cell (bone marrow) transplant, or take medicines that suppress the immune system.  ?You already have a serious lung disease, such as chronic obstructive pulmonary disease or emphysema.  ?You are 65 years of age or older.  If your doctor or nurse thinks that you might have pneumonia, they will probably take an X-ray of your chest. Taking a chest X-ray is the best way to tell if you have pneumonia.  How is pneumonia treated? -- It depends on the cause:  ?Pneumonia that is caused by bacteria is treated with antibiotics. These medicines kill the germs that cause pneumonia. Most people can take antibiotic pills at home, but some people need to be treated in the  "hospital. Take all of your antibiotics, even if you feel better before you finish them.  ?Pneumonia from some viruses, like those that cause the flu or COVID-19, is treated with an \"antiviral\" medicine. For other types of viral pneumonia, there is no specific treatment.  How soon will I feel better? -- You should start to feel better 3 to 5 days after you start taking antibiotics. Most people can go back to their normal routine within a week of starting treatment. Even so, you might feel tired or have a cough for a month or longer after you get treated. Although this cough can take a while to go away, it is usually milder than when you first got sick.  How should I take care of myself until I recover? -- Get lots of rest, and drink lots of fluids.  If you don't need to stay in the hospital, your doctor or nurse might want to see you or talk to you a few days after you begin treatment. This is to make sure that your pneumonia is getting better. They might also want to see you after you get better to make sure that everything is back to normal.  If your symptoms do not improve or get worse after starting treatment, tell your doctor or nurse.  What can I do to keep from getting pneumonia again? -- Wash your hands often with soap and water (figure 2). This will help protect you from germs and help prevent spreading illness.  There is also a vaccine that protects against the most common type of bacterial pneumonia. But the pneumonia vaccine is not recommended for everyone. Ask your doctor if you should have it. You should get the flu and COVID-19 vaccines every year.  If you smoke, quitting will help prevent pneumonia. Quitting smoking will also improve your overall health.  All topics are updated as new evidence becomes available and our peer review process is complete.  This topic retrieved from [employer] on: Nov 28, 2023.  Topic 34312 Version 23.0  Release: 31.4.2 - C31.331  © 2023 UpToDate, Inc. and/or its affiliates. " All rights reserved.  figure 1: Pneumonia    figure 2: How to wash your hands

## 2023-12-19 NOTE — CARE PLAN
Problem: Pain - Adult  Goal: Verbalizes/displays adequate comfort level or baseline comfort level  12/19/2023 0051 by Barry Lind RN  Outcome: Progressing  12/19/2023 0047 by Barry Lind RN  Outcome: Progressing     Problem: Safety - Adult  Goal: Free from fall injury  12/19/2023 0051 by Barry Lind RN  Outcome: Progressing  12/19/2023 0047 by Barry Lind RN  Outcome: Progressing     Problem: Skin  Goal: Decreased wound size/increased tissue granulation at next dressing change  Outcome: Progressing  Goal: Participates in plan/prevention/treatment measures  Outcome: Progressing  Goal: Prevent/manage excess moisture  Outcome: Progressing  Goal: Prevent/minimize sheer/friction injuries  Outcome: Progressing     Problem: Diabetes  Goal: Achieve decreasing blood glucose levels by end of shift  Outcome: Progressing   The patient's goals for the shift include to get better    The clinical goals for the shift include Pt will be up in chair for 2 meals today    O

## 2023-12-19 NOTE — CARE PLAN
The patient's goals for the shift include to get better    The clinical goals for the shift include Pt will be up in chair for 2 meals today

## 2023-12-19 NOTE — DISCHARGE SUMMARY
"Discharge Diagnosis  Pneumonia due to infectious organism, unspecified laterality, unspecified part of lung    Issues Requiring Follow-Up    Discharge Meds     Your medication list        START taking these medications        Instructions Last Dose Given Next Dose Due   cefuroxime 500 mg tablet  Commonly known as: Ceftin      Take 1 tablet (500 mg) by mouth 2 times a day for 3 days.       doxycycline 100 mg capsule  Commonly known as: Vibramycin      Take 1 capsule (100 mg) by mouth 2 times a day for 3 days. Take with at least 8 ounces (large glass) of water, do not lie down for 30 minutes after       methylPREDNISolone 4 mg tablets  Commonly known as: Medrol Dospak      Take as directed on package.              CONTINUE taking these medications        Instructions Last Dose Given Next Dose Due   ACIDOPHILUS ORAL           amiodarone 200 mg tablet  Commonly known as: Pacerone           atorvastatin 80 mg tablet  Commonly known as: Lipitor           Autolet lancing device           BD Cynthia 2nd Gen Pen Needle 32 gauge x 5/32\" needle  Generic drug: pen needle, diabetic           BD Veo Insulin Syr (half unit) 0.3 mL 31 gauge x 15/64\" syringe  Generic drug: insulin syr/ndl U100 half lexie      1 Needle once daily.       BD Veo Insulin Syringe UF 1 mL 31 gauge x 15/64\" syringe  Generic drug: insulin syringe-needle U-100           insulin syringe-needle U-100 31G X 5/16\" 1 mL syringe      Inject 1 each under the skin once daily. As directed daily       carbidopa-levodopa  mg ER tablet  Commonly known as: Sinemet CR           cyclobenzaprine 10 mg tablet  Commonly known as: Flexeril           dapagliflozin propanediol 10 mg  Commonly known as: Farxiga      Take 1 tablet (10 mg) by mouth once daily.       Effient 10 mg tablet  Generic drug: prasugrel           fluticasone propion-salmeteroL 250-50 mcg/dose diskus inhaler  Commonly known as: Advair Diskus      INHALE ONE PUFF BY MOUTH TWO TIMES A DAY       FreeStyle " Joan 2 [place] misc  Generic drug: FreeStyle Joan reader           FreeStyle Joan 2 Sensor kit  Generic drug: FreeStyle Joan sensor system           furosemide 40 mg tablet  Commonly known as: Lasix           gabapentin 300 mg capsule  Commonly known as: Neurontin           HumaLOG KwikPen Insulin 100 unit/mL injection  Generic drug: insulin lispro           insulin glargine 100 unit/mL injection  Commonly known as: Lantus U-100 Insulin      Inject 30 Units under the skin once daily in the evening.       ipratropium-albuteroL 0.5-2.5 mg/3 mL nebulizer solution  Commonly known as: Duo-Neb           levothyroxine 25 mcg tablet  Commonly known as: Synthroid, Levoxyl      Take 1 tablet (25 mcg) by mouth once daily.       metoprolol succinate XL 25 mg 24 hr tablet  Commonly known as: Toprol-XL      Take 0.5 tablets (12.5 mg) by mouth once daily.       nicotine 21 mg/24 hr patch  Commonly known as: Nicoderm CQ           nicotine 14 mg/24 hr patch  Commonly known as: Nicoderm CQ      Place 1 patch over 24 hours on the skin once every 24 hours.       omeprazole 40 mg DR capsule  Commonly known as: PriLOSEC           OneTouch Ultra Test strip  Generic drug: blood sugar diagnostic           oxybutynin XL 5 mg 24 hr tablet  Commonly known as: Ditropan-XL      TAKE ONE TABLET BY MOUTH ONCE DAILY       oxygen DME/Hospice therapy  Commonly known as: O2           QUEtiapine 25 mg tablet  Commonly known as: SEROquel           SITagliptin phosphate 50 mg tablet  Commonly known as: Januvia      Take 1 tablet (50 mg) by mouth once daily.       triamcinolone 0.1 % cream  Commonly known as: Kenalog           Ventolin HFA 90 mcg/actuation inhaler  Generic drug: albuterol           Xarelto 15 mg tablet  Generic drug: rivaroxaban           Zoloft 100 mg tablet  Generic drug: sertraline                  STOP taking these medications      acetaminophen 325 mg tablet  Commonly known as: Tylenol        amoxicillin 250 mg capsule  Commonly  known as: Amoxil        amoxicillin-pot clavulanate 875-125 mg tablet  Commonly known as: Augmentin        bisacodyl 5 mg EC tablet  Commonly known as: Dulcolax        doxycycline 100 mg tablet  Commonly known as: Adoxa        ferrous sulfate (325 mg ferrous sulfate) tablet        Nitrostat 0.4 mg SL tablet  Generic drug: nitroglycerin        ondansetron ODT 4 mg disintegrating tablet  Commonly known as: Zofran-ODT                  Where to Get Your Medications        These medications were sent to [id number] - [place], [place] - [address]  [address] [address]      Phone: [phone]   cefuroxime 500 mg tablet  doxycycline 100 mg capsule  methylPREDNISolone 4 mg tablets  oxybutynin XL 5 mg 24 hr tablet       Information about where to get these medications is not yet available    Ask your nurse or doctor about these medications  insulin glargine 100 unit/mL injection         Test Results Pending At Discharge  Pending Labs       Order Current Status    Blood Culture Preliminary result    Blood Culture Preliminary result            Hospital Course     HPI:    Hai Spaulding is a 73 y.o. male presented to [facility] ED from home.  Chief complaint of worsening general  malaise related  to  cough and congestion for past several days.  Patient was seen at his PCP office and treated with Antibiotics. He continued to have increased SOB and General Malaise. His wife was very concerned with his respiratory issues and brought him to the ED.  On Exam patient  has diminished breath sounds on auscultation, mild crackles in bilateral fields, no wheezing or distress, conversational dyspnea, thorax symmetric, SpO2 92% on 2L NC. No accessory muscle use. No tachypnea.  Patient denies SOB Chest Pain, N/V/D     Hospital Course:    #Acute hypoxic respiratory failure 2/2 community acquired pneumonia (improving)   #COPD, in acute exacerbation  #Tobacco use  - Patient was started on Augmentin by his  "PCP with no significant improvement  - Received Zosyn x 1 dose in the ED   - WBC 15.5 on admission > 22.3 today (on steroids)   - Lactate 1.4 on admission   - COVID, flu A/B negative   - Procal 0.48  - BCx: coagulase negative staphylococcus x 1 set, no growth x 3 days on second set   - CXR:  Features of bronchitis and pulmonary edema. Lungs are   hyperinflated . Equivocal right upper lobe nodular opacity. Follow-up   is advised with two-view chest radiograph when feasible. Pneumonia   can have this appearance    - Continue ceftriaxone   - Azithromycin discontinued (patient is on amiodarone); start doxycycline   - Solumedrol IVP q8h > Decreased to Q 12  - DuoNebs TID   - Mucinex BID   - Continue Breo   - RT eval and treat protocol  - Bronchial hygiene  - Monitor SpO2 continuously   - Baseline O2 unclear; patient has home O2 but has told different providers that he wears anywhere from 2-4L \" but not all the time\"   - Wean O2 as tolerated; patient currently on 2L O2   - Nicotine patch ordered, patient currently smokes 1/2 ppd  - Encourage tobacco cessation on discharge     #HFrEF, concern for acute exacerbation  -  > 842 > 775  - Echo from January 2023 reviewed, LVEF 35-40% with ischemic cardiomyopathy   - Resume home Lasix 40 mg PO every day   - Strict I&Os  - Daily weights  - 1800 mL daily fluid restriction, 2g Na diet  - Wean oxygen as tolerated; patient currently on 2L O2   - Continuous telemetry monitoring   - Cardiology consulted, repeat echo completed prior to discharge      #Atrial fibrillation  #S/p ICD   #HTN  #HLD  #CAD   - Continue amiodarone, atorvastatin, metoprolol succinate, prasugrel, and rivaroxaban   - Telemetry monitoring  - 2g Na diet  - Cardiology consulted, appreciate recs      #Parkinson's disease   #H/o CVA   - Continue carbidopa-levodopa, quetiapine, and cyclobenzaprine   - Delirium precautions: Frequent reorientation, day/night normalization, shades open for sunlight, provide " glasses/hearing aids as needed   - PT/OT evals     #CKD   - Cr 2.12 > 2.13 >  2.24 > 2.16 >2.06   - Baseline Cr 1.7 - 2.2 per chart review  - Avoid nephrotoxins/renally dose meds as needed  - Daily BMP to monitor renal function     #T2DM with neuropathy   - Continue home dapagliflozin, gabapentin, sitagliptin, Humalog TID with meals   - SSI three times a day before meals   - Hypoglycemia protocol  - Accuchecks ac/hs/prn  - Diabetic diet   - HbA1c 10.6 in October 2023  - Re-ordered home lantus dose of 30 units subcutaneous at HS     #Hypothyroidism   - Continue levothyroxine     #Anemia, iron deficiency   - Patient no longer on iron supplementation per hem/onc   - H/H stable, 12.0/37.0 on day of discharge   - No active bleeding on exam   - Daily CBC to trend H/H      #GERD  - Continue PPI     #Depression   - Continue sertraline         DVT PPx: Rivaroxaban    GI PPx: Protonix   Diet: Diabetic, 2g Na, 1800 mL FR   Code Status: Full code     Disposition: Patient stable for discharge home with home health care. He is discharged on cefuroxime and doxycycline to complete a total of 7 days of antibiotics per ID recs. He is also discharged on a steroid taper pack. He remains on 2L O2 with no respiratory distress. He will follow up with his PCP on discharge.     Total cumulative time spent in preparation of this discharge including documentation review, coordination of care with the medical team including PT/SW/care coordinators and treating consultants, discussion with patient and pertinent family members and finalization of prescriptions, follow-up appointments, and this discharge summary was approximately 45 minutes.     Pertinent Physical Exam At Time of Discharge  Physical Exam  Constitutional:       General: He is not in acute distress.  HENT:      Head: Normocephalic and atraumatic.      Mouth/Throat:      Mouth: Mucous membranes are moist.   Eyes:      Conjunctiva/sclera: Conjunctivae normal.   Cardiovascular:       Rate and Rhythm: Normal rate and regular rhythm.   Pulmonary:      Breath sounds: Rales present.      Comments: Diminished breath sounds  Abdominal:      General: There is no distension.      Palpations: Abdomen is soft.      Tenderness: There is no abdominal tenderness.   Musculoskeletal:         General: No swelling.   Skin:     General: Skin is warm and dry.   Neurological:      Mental Status: He is alert and oriented to person, place, and time.   Psychiatric:         Mood and Affect: Mood normal.         Behavior: Behavior normal.         Outpatient Follow-Up  Future Appointments   Date Time Provider Department [place]   12/26/2023 11:00 AM Bobby Buck APRN-CNP [id number] [facility]   1/12/2024  1:00 PM Marilyn Luna APRN-CNP [id number] None         Colette Duffy PA-C

## 2023-12-19 NOTE — PROGRESS NOTES
Sent updates to [facility] and informed them of patient's discharge today.      10:34 PM  Per [facility],  Yes, willing to accept patient I have a issue with a therapist at this time to see him, it may be more than a week before i can get someone out to see him, if the wife does not want to wait for a phys. therapist to come see him then you will need to send to another agency i am sorry, things did not work out with a therapist today like we had planned to have someone by end of week. so it may be late next week before i can get a phys. therapist to see him and we do not have OT  at this time either if he needs that . please let me know soon as you can thanks Meera again i am sorry.    [facility] spoke with wife and she said that is fine for them to start next week. They are familiar with [facility].  Updated [facility].

## 2023-12-20 NOTE — TELEPHONE ENCOUNTER
Patient discharged 12/19/23 from [facility] to home. Phoned wife in follow up to schedule a [facility] post acute visit, no answer at this time, LMOM with [facility] office phone number.

## 2023-12-20 NOTE — TELEPHONE ENCOUNTER
Patient's wife returned call, [facility] visit scheduled with Leti Lazaro NP 12/22/23 at 12:30 pm. Wife stated patient was prescribed Doxycycline, Cefuroxime and steroids upon hospital discharge. She stated he a has 3 more days of antibiotics.   She stated she accidentally threw out his Amoxicillin maintenance prescription for recurrent UTI. She reported it is 250 mg daily. She stated the [facility] RN visited today and advised her to call for a refill as this medication has been effective. She requested a prescription to [facility] in [place].

## 2023-12-26 NOTE — TELEPHONE ENCOUNTER
Contacted patient today for follow-up/lab review telehealth visit.   Patient was seen here for consultation on November 29, 2023 for leukocytosis.  This is a longstanding history in the presence of chronic infections, status post splenectomy, active smoking history.  Please see consult note for complete past medical history.  Since her consult visit patient has been hospitalized on 2 occasions initially December 14 of December 19 for pneumonia after failing outpatient treatment.  He has completed his oral antibiotics.  He was then seen in the emergency room on December 22 after sustaining a fall out of his wheelchair (wife reports nasal fracture, he hematoma healing areas of ecchymosis).  Most recently he tested positive for COVID-19 his wife is managing him at home.  He is on 2 to 3 L of oxygen via nasal cannula keeping his pulse ox around 91%.  He has not had any fevers.  Improving oral intake.    December 29, 2023  WBC 17.4, hemoglobin 13.6, hematocrit 43.5, plts 434,000  RUBIO-2 Not Detected  Flow Cytometry - ? Yet not drawn  CRP 2.53, ESR 63  Viral studies - negative     December 19, 2023  WBC 22.3, hemoglobin 12.0, hematocrit 37.0, plts 454,000    I would like to repeat CBCd in 2 to 3 weeks as we move further away from pneumonia and COVID infection. At that time I would also like to draw NSG panel     His wife reports home health care NP will be coming out on January 12, 2023.  I will contact her to see if labs may be drawn at home. Otherwise we will have patient go to [facility].  They still hope to transfer his care to [facility] closer to their home.

## 2023-12-26 NOTE — TELEPHONE ENCOUNTER
Pt's wife states pt tested positive for Covid yesterday. Pt has been in bed since Saturday and is sleeping most of the day. Wife is giving breathing treatments and keeping pt on 2L of oxygen. She states when she takes his oxygen off to give him a breathing treatment his oxygen will drop to around 87%. Denies fever, chills, or worse cough than normal. Wife is wondering if there is anything else she should be giving.

## 2023-12-26 NOTE — TELEPHONE ENCOUNTER
Pharmacy called stating pt is on amiodarone which is contraindicated with zpak. The two together can cause arrhythmia. Do you want to change med?

## 2023-12-27 PROBLEM — R06.02 SHORTNESS OF BREATH: Status: ACTIVE | Noted: 2023-01-01

## 2023-12-27 PROBLEM — J12.82 PNEUMONIA DUE TO COVID-19 VIRUS: Status: ACTIVE | Noted: 2023-01-01

## 2023-12-27 PROBLEM — I21.4 NSTEMI (NON-ST ELEVATED MYOCARDIAL INFARCTION) (MULTI): Status: ACTIVE | Noted: 2023-01-01

## 2023-12-27 PROBLEM — R79.89 ELEVATED TROPONIN: Status: ACTIVE | Noted: 2023-01-01

## 2023-12-27 NOTE — ED NOTES
Patients ETA for  is 8:40 [address] bed [address]  # [phone]     Yulia Montague RN  12/27/23 3477

## 2023-12-27 NOTE — ED PROVIDER NOTES
[facility]  ED  Provider Note  12/27/2023  7:52 AM  AC05/AC05                          History of Present Illness:   Hai Spaulding is a 73 y.o. male presenting to the ED for dyspnea, beginning last night.  The complaint has been persistent, mild in severity, and worsened by coughing.  Patient is a 73 old male who is known to be COVID-positive.  Both his wife and handicapped son have COVID at home as well.  He takes oxygen at 2 L nasal cannula at home and his pulse ox is 96%. The nurse reports he denies any sputum production, chest pain or pedal edema.  He has had a previous stroke with right-sided paresis, congestive heart failure and previous MI.      Review of Systems:   Pertinent positives and review of systems as noted above.  Remaining 10 review of systems is negative or noncontributory to today's episode of care.  Review of Systems       --------------------------------------------- PAST HISTORY ---------------------------------------------  Past Medical History:  has a past medical history of Acid reflux, Aspiration pneumonia (CMS/Formerly [facility]) (11/23/2021), Aspiration pneumonia resulting from a procedure (10/25/2021), Atrial fibrillation with RVR (CMS/Formerly [facility]) (10/2021), Congestive heart failure (CHF) (CMS/Formerly [facility]) (10/2021), COPD (chronic obstructive pulmonary disease) (CMS/Formerly [facility]), COVID, Diabetes (CMS/[facility]ly [facility]), Diabetic eye exam (CMS/Formerly [facility]) (05/08/2019), Hodgkin lymphoma (CMS/[facility]ly [facility]), HTN (hypertension), Hyperlipidemia, Larynx cancer (CMS/[facility]ly [facility]), Myocardial infarction (CMS/[facility]ly [facility]), Neuropathy, Pneumonia due to gram-negative bacteria (01/01/2023), Pneumonitis due to inhalation of other solids and liquids (CMS/[facility]ly [facility]) (11/25/2021), Urinary tract infection (01/04/2023), and Urinary tract infection, site not specified (12/18/2023).    Past Surgical History:  has a past surgical history that includes Other surgical history; Other surgical history; CT angio abdomen pelvis w and or wo IV IV contrast (01/22/2022); Other surgical  "history; Tonsillectomy; Other surgical history; Cholecystectomy; Other surgical history; Tumor excision; Cardiac defibrillator placement (05/2022); IR injection epidural steroid; and Tumor removal.    Social History:  reports that he has been smoking cigarettes. He has a 30.00 pack-year smoking history. He has been exposed to tobacco smoke. He has never used smokeless tobacco. He reports that he does not drink alcohol and does not use drugs.    Family History: family history includes Diabetes in his father and mother; Heart disease in his father and mother; htn in his father and mother. Unless otherwise noted, family history is non contributory    Patient's Medications   New Prescriptions    No medications on file   Previous Medications    ALBUTEROL (VENTOLIN HFA) 90 MCG/ACTUATION INHALER    Inhale 2 puffs every 4 hours if needed.    AMIODARONE (PACERONE) 200 MG TABLET    Take 0.5 tablets (100 mg) by mouth once daily.    AMOXICILLIN (AMOXIL) 250 MG CAPSULE    Take 1 capsule (250 mg) by mouth once daily at bedtime.    ATORVASTATIN (LIPITOR) 80 MG TABLET    Take 1 tablet (80 mg) by mouth once daily.    AUTOLET LANCING DEVICE    3 times a day.    AZITHROMYCIN (ZITHROMAX) 250 MG TABLET    Take 2 tablets (500 mg) by mouth once daily for 1 day, THEN 1 tablet (250 mg) once daily for 4 days. Take 2 tabs (500 mg) by mouth today, than 1 daily for 4 days..    BD JUAN F 2ND GEN PEN NEEDLE 32 GAUGE X 5/32\" NEEDLE    Inject 1 each under the skin 3 times a day.    CARBIDOPA-LEVODOPA (SINEMET CR)  MG ER TABLET    Take 1 tablet by mouth 3 times a day as needed.    CEFUROXIME (CEFTIN) 500 MG TABLET    Take 1 tablet (500 mg) by mouth 2 times a day for 10 days.    CYCLOBENZAPRINE (FLEXERIL) 10 MG TABLET    Take 1 tablet (10 mg) by mouth once daily at bedtime.    DAPAGLIFLOZIN PROPANEDIOL (FARXIGA) 10 MG    Take 1 tablet (10 mg) by mouth once daily.    FLUTICASONE PROPION-SALMETEROL (ADVAIR DISKUS) 250-50 MCG/DOSE DISKUS INHALER    " "INHALE ONE PUFF BY MOUTH TWO TIMES A DAY    FREESTYLE SESAR 2 READER MISC    Inject 1 each under the skin if needed.    FREESTYLE SESAR 2 SENSOR KIT    Inject 1 each under the skin every 14 (fourteen) days.    FUROSEMIDE (LASIX) 40 MG TABLET    Take 1 tablet (40 mg) by mouth once daily.    GABAPENTIN (NEURONTIN) 300 MG CAPSULE    Take 1 capsule (300 mg) by mouth once daily at bedtime.    INSULIN GLARGINE (LANTUS U-100 INSULIN) 100 UNIT/ML INJECTION    Inject 30 Units under the skin once daily in the evening.    INSULIN LISPRO (HUMALOG KWIKPEN INSULIN) 100 UNIT/ML INJECTION    Inject 8 Units under the skin 3 times a day with meals. 8 units plus sliding scale three times a day with meals Subcutaneous three times a day, max dose 16u three times a day    INSULIN SYR/NDL U100 HALF JESSICA (BD VEO INSULIN SYR, HALF UNIT,) 0.3 ML 31 GAUGE X 15/64\" SYRINGE    1 Needle once daily.    INSULIN SYRINGE-NEEDLE U-100 (BD VEO INSULIN SYRINGE UF) 1 ML 31 GAUGE X 15/64\" SYRINGE    3 times a day.    INSULIN SYRINGE-NEEDLE U-100 31G X 5/16\" 1 ML SYRINGE    Inject 1 each under the skin once daily. As directed daily    IPRATROPIUM-ALBUTEROL (DUO-NEB) 0.5-2.5 MG/3 ML NEBULIZER SOLUTION    Take 3 mL by nebulization every 6 hours.    LACTOBACILLUS ACIDOPHILUS (ACIDOPHILUS ORAL)    As directed oral    LEVOTHYROXINE (SYNTHROID, LEVOXYL) 25 MCG TABLET    Take 1 tablet (25 mcg) by mouth once daily.    METHYLPREDNISOLONE (MEDROL DOSPAK) 4 MG TABLETS    Take as directed on package.    METOPROLOL SUCCINATE XL (TOPROL-XL) 25 MG 24 HR TABLET    Take 0.5 tablets (12.5 mg) by mouth once daily.    NICOTINE (NICODERM CQ) 14 MG/24 HR PATCH    Place 1 patch over 24 hours on the skin once every 24 hours.    NICOTINE (NICODERM CQ) 21 MG/24 HR PATCH    Place 1 patch on the skin once every 24 hours.    OMEPRAZOLE (PRILOSEC) 40 MG DR CAPSULE    Take 1 capsule (40 mg) by mouth once daily in the morning. Take before meals.    ONETOUCH ULTRA TEST STRIP    Test 3-4 " times a day    OXYBUTYNIN XL (DITROPAN-XL) 5 MG 24 HR TABLET    TAKE ONE TABLET BY MOUTH ONCE DAILY    OXYGEN (O2) THERAPY    2 LPM throughout the night of sleep nasal canula daily    PRASUGREL (EFFIENT) 10 MG TABLET    Take 1 tablet (10 mg) by mouth once daily.    QUETIAPINE (SEROQUEL) 25 MG TABLET    Take 1 tablet (25 mg) by mouth once daily at bedtime.    RIVAROXABAN (XARELTO) 15 MG TABLET    Take 1 tablet (15 mg) by mouth once daily. With food    SERTRALINE (ZOLOFT) 100 MG TABLET    Take 1 tablet (100 mg) by mouth once daily.    SITAGLIPTIN PHOSPHATE (JANUVIA) 50 MG TABLET    Take 1 tablet (50 mg) by mouth once daily.    TRIAMCINOLONE (KENALOG) 0.1 % CREAM    Apply topically. Apply topically to affected area prn, As Needed   Modified Medications    No medications on file   Discontinued Medications    No medications on file      The patient’s home medications have been reviewed.    Allergies: Patient has no known allergies.    -------------------------------------------------- RESULTS -------------------------------------------------  All laboratory and radiology results have been personally reviewed by myself   LABS:  Labs Reviewed   TROPONIN I, HIGH SENSITIVITY - Abnormal       Result Value    Troponin I, High Sensitivity 62 (*)     Narrative:     Less than 99th percentile of normal range cutoff-  Female and children under 18 years old <14 ng/L; Male <21 ng/L: Negative  Repeat testing should be performed if clinically indicated.     Female and children under 18 years old 14-50 ng/L; Male 21-50 ng/L:  Consistent with possible cardiac damage and possible increased clinical   risk. Serial measurements may help to assess extent of myocardial damage.     >50 ng/L: Consistent with cardiac damage, increased clinical risk and  myocardial infarction. Serial measurements may help assess extent of   myocardial damage.      NOTE: Children less than 1 year old may have higher baseline troponin   levels and results should be  interpreted in conjunction with the overall   clinical context.     NOTE: Troponin I testing is performed using a different   testing methodology at [facility] than at other   [facility] hospitals. Direct result comparisons should only   be made within the same method.   LACTATE - Abnormal    Lactate 3.6 (*)     Narrative:     Venipuncture immediately after or during the administration of Metamizole may lead to falsely low results. Testing should be performed immediately  prior to Metamizole dosing.   COMPREHENSIVE METABOLIC PANEL - Abnormal    Glucose 352 (*)     Sodium 138      Potassium 4.7      Chloride 97 (*)     Bicarbonate 26      Anion Gap 20      Urea Nitrogen 66 (*)     Creatinine 2.05 (*)     eGFR 34 (*)     Calcium 8.8      Albumin 3.5      Alkaline Phosphatase 266 (*)     Total Protein 7.0      AST 23      Bilirubin, Total 0.9      ALT 5 (*)    CBC WITH AUTO DIFFERENTIAL - Abnormal    WBC 25.6 (*)     nRBC 0.3 (*)     RBC 4.69      Hemoglobin 13.7      Hematocrit 42.1      MCV 90      MCH 29.2      MCHC 32.5      RDW 16.2 (*)     Platelets 473 (*)     Neutrophils % 93.0      Immature Granulocytes %, Automated 1.0 (*)     Lymphocytes % 1.2      Monocytes % 4.6      Eosinophils % 0.0      Basophils % 0.2      Neutrophils Absolute 23.78 (*)     Immature Granulocytes Absolute, Automated 0.26      Lymphocytes Absolute 0.31 (*)     Monocytes Absolute 1.18 (*)     Eosinophils Absolute 0.00      Basophils Absolute 0.04     SARS-COV-2 AND INFLUENZA A/B PCR - Abnormal    Flu A Result Not Detected      Flu B Result Not Detected      Coronavirus 2019, PCR Detected (*)     Narrative:     This assay has received FDA Emergency Use Authorization (EUA) and  is only authorized for the duration of time that circumstances exist to justify the authorization of the emergency use of in vitro diagnostic tests for the detection of SARS-CoV-2 virus and/or diagnosis of COVID-19 infection under section 564(b)(1) of the  Act, 21 U.S.C. 360bbb-3(b)(1). Testing for SARS-CoV-2 is only recommended for patients who meet current clinical and/or epidemiological criteria as defined by federal, state, or local public health directives. This assay is an in vitro diagnostic nucleic acid amplification test for the qualitative detection of SARS-CoV-2, Influenza A, and Influenza B from nasopharyngeal specimens and has been validated for use at [facility]. Negative results do not preclude COVID-19 infections or Influenza A/B infections, and should not be used as the sole basis for diagnosis, treatment, or other management decisions. If Influenza A/B and RSV PCR results are negative, testing for Parainfluenza virus, Adenovirus and Metapneumovirus is routinely performed for [facility] pediatric oncology and intensive care inpatients, and is available on other patients by placing an add-on request.    B-TYPE NATRIURETIC PEPTIDE - Abnormal     (*)     Narrative:        <100 pg/mL - Heart failure unlikely  100-299 pg/mL - Intermediate probability of acute heart                  failure exacerbation. Correlate with clinical                  context and patient history.    >=300 pg/mL - Heart Failure likely. Correlate with clinical                  context and patient history.    BNP testing is performed using different testing methodology at [facility] than at [facility]. Direct result comparisons should only be made within the same method.      D-DIMER, NON VTE - Abnormal    D-Dimer Non VTE, Quant (ng/mL FEU) 623 (*)     Narrative:     The D-Dimer assay is reported in ng/mL Fibrinogen Equivalent Units (FEU). The results of this assay should NOT be used for the exclusion of Deep Vein Thrombosis and/or Pulmonary Embolism.   SERIAL TROPONIN-INITIAL - Abnormal    Troponin I, High Sensitivity 207 (*)     Narrative:     Less than 99th percentile of normal range cutoff-  Female and children under 18 years old  <14 ng/L; Male <21 ng/L: Negative  Repeat testing should be performed if clinically indicated.     Female and children under 18 years old 14-50 ng/L; Male 21-50 ng/L:  Consistent with possible cardiac damage and possible increased clinical   risk. Serial measurements may help to assess extent of myocardial damage.     >50 ng/L: Consistent with cardiac damage, increased clinical risk and  myocardial infarction. Serial measurements may help assess extent of   myocardial damage.      NOTE: Children less than 1 year old may have higher baseline troponin   levels and results should be interpreted in conjunction with the overall   clinical context.     NOTE: Troponin I testing is performed using a different   testing methodology at [facility] than at other   [facility]. Direct result comparisons should only   be made within the same method.   SERIAL TROPONIN, 1 HOUR - Abnormal    Troponin I, High Sensitivity 314 (*)     Narrative:     Less than 99th percentile of normal range cutoff-  Female and children under 18 years old <14 ng/L; Male <21 ng/L: Negative  Repeat testing should be performed if clinically indicated.     Female and children under 18 years old 14-50 ng/L; Male 21-50 ng/L:  Consistent with possible cardiac damage and possible increased clinical   risk. Serial measurements may help to assess extent of myocardial damage.     >50 ng/L: Consistent with cardiac damage, increased clinical risk and  myocardial infarction. Serial measurements may help assess extent of   myocardial damage.      NOTE: Children less than 1 year old may have higher baseline troponin   levels and results should be interpreted in conjunction with the overall   clinical context.     NOTE: Troponin I testing is performed using a different   testing methodology at [facility] than at other   [facility]. Direct result comparisons should only   be made within the same method.   RSV PCR - Normal    RSV PCR  Not Detected      Narrative:     This assay is an FDA-cleared, in vitro diagnostic nucleic acid amplification test for the detection of RSV from nasopharyngeal specimens, and has been validated for use at [facility]. Negative results do not preclude RSV infections, and should not be used as the sole basis for diagnosis, treatment, or other management decisions. If Influenza A/B and RSV PCR results are negative, testing for Parainfluenza virus, Adenovirus and Metapneumovirus is routinely performed for pediatric oncology and intensive care inpatients at [facility], and is available on other patients by placing an add-on request.       BLOOD CULTURE   URINE CULTURE   BLOOD CULTURE   TROPONIN SERIES- (INITIAL, 1 HR)    Narrative:     The following orders were created for panel order Troponin Series, (0, 1 HR).  Procedure                               Abnormality         Status                     ---------                               -----------         ------                     Troponin I, High Sensiti...[[id number]]  Abnormal            Final result               Troponin, High Sensitivi...[[id number]]  Abnormal            Final result                 Please view results for these tests on the individual orders.   URINALYSIS WITH REFLEX CULTURE AND MICROSCOPIC    Narrative:     The following orders were created for panel order Urinalysis with Reflex Culture and Microscopic.  Procedure                               Abnormality         Status                     ---------                               -----------         ------                     Urinalysis with Reflex C...[[id number]]                                                 Extra Urine Gray Tube[[id number]]                                                         Please view results for these tests on the individual orders.   URINALYSIS WITH REFLEX CULTURE AND MICROSCOPIC   EXTRA URINE GRAY TUBE   LACTATE       EKG shows a wide-complex  "tachycardia at 136 bpm, premature supraventricular complexes are noted with occasional premature ventricular complexes as well.  Nonspecific interventricular block possible anteroseptal infarct age undetermined, lateral T wave inversions.  The QRS complexes widened since last EKG.  Sinus rhythm is no longer present.  Interpreted by ARY Pedroza MD    RADIOLOGY:  Interpreted by Radiologist.  XR chest 1 view   Final Result   1. Progressive bilateral predominantly perihilar alveolar opacities   consistent with pulmonary edema or pneumonia.   2. Progressive volume loss and atelectasis left lung base.        MACRO:   None        Signed by: Anahi Fernando 12/27/2023 8:50 AM   Dictation workstation:   [id number]      EKG 1 shows a sinus rhythm at 79 bpm, nonspecific ST changes.  Interpreted by ARY Pedroza MD    Encounter Date: 12/14/23   ECG 12 lead   Result Value    Ventricular Rate 79    Atrial Rate 79    WY Interval 150    QRS Duration 104    QT Interval 348    QTC Calculation(Bazett) 399    P Axis 86    R Axis 39    T Axis 105    QRS Count 13    Q Onset 209    P Onset 134    P Offset 193    T Offset 383    QTC Fredericia 381    Narrative    Normal sinus rhythm  Nonspecific T wave abnormality  Abnormal ECG  When compared with ECG of 31-DEC-2022 15:09,  Previous ECG has undetermined rhythm, needs review  QRS duration has decreased  Nonspecific T wave abnormality has replaced inverted T waves in Inferior leads  QT has shortened   EKG #2 shows an undetermined rhythm with left bundle branch block, occasional PVC, no acute ST elevations.  MMM ARY Pedroza MD  ------------------------- NURSING NOTES AND VITALS REVIEWED ---------------------------   The nursing notes within the ED encounter and vital signs as below have been reviewed.   BP (!) 113/91 (BP Location: Right arm, Patient Position: Lying)   Pulse (!) 130   Temp 37.7 °C (99.9 °F) (Rectal)   Resp 23   Ht 1.753 m (5' 9\")   Wt 74.8 kg (165 lb)   SpO2 96%   BMI " 24.37 kg/m²   Oxygen Saturation Interpretation: Normal      ---------------------------------------------------PHYSICAL EXAM--------------------------------------  Physical Exam   Constitutional/General: Alert, patient answered questions with the nurse but did not answer questions for the doctor.  Head: Normocephalic and atraumatic  Eyes: PERRL, EOMI, conjunctiva normal, sclera non icteric  Mouth: Oropharynx clear, handling secretions, no trismus, no asymmetry of the posterior oropharynx or uvular edema  Neck: Supple, full ROM, non tender to palpation in the midline, no stridor, no crepitus, no meningeal signs  Respiratory: Lungs basilar rales are present, no wheezes or rhonchi. Not in respiratory distress  Cardiovascular:  Regular rate. Regular rhythm. No murmurs, gallops, or rubs. 2+ distal pulses no pedal edema, no JVD  Chest: No chest wall tenderness  GI:  Abdomen Soft, Non tender, Non distended.  +BS. No organomegaly, no palpable masses,  No rebound, guarding, or rigidity.   Musculoskeletal: Moves all extremities x 4. Warm and well perfused, no clubbing, cyanosis, or edema. Capillary refill <3 seconds  Integument: skin warm and dry. No rashes.   Lymphatic: no lymphadenopathy noted  Neurologic: Patient not talking at this time.  He does not move his right side.  He does follow commands  Psychiatric: Normal Affect    Procedures    ------------------------------ ED COURSE/MEDICAL DECISION MAKING----------------------    Medical Decision Making:   Patient initial EKG showed a undetermined rhythm with wide QRS complexes and PVCs.  Initial troponin was in the 60s.  His repeat troponin is greater than 200.  He denies chest pain.  He has been short of breath with his pneumonia and coughing.  His BNP is also elevated.  Patient will require transfer for care of his NSTEMI.  He is COVID-positive.  The transfer center has been called.  Patient's wife request to be transferred to [facility] where he sees Dr. Barrios and   Shaka.  Dr. Mitchell the [facility] accept the patient for admission.  The patient is on  Xarelto and was not given heparin.    Diagnoses as of 12/27/23 1533   Pneumonia due to COVID-19 virus   Elevated troponin   Shortness of breath   Elevated brain natriuretic peptide (BNP) level   NSTEMI (non-ST elevated myocardial infarction) (CMS/[facility])      Counseling:   The emergency provider has spoken with the patient and discussed today’s results, in addition to providing specific details for the plan of care and counseling regarding the diagnosis and prognosis.  Questions are answered at this time and they are agreeable with the plan.      --------------------------------- IMPRESSION AND DISPOSITION ---------------------------------        IMPRESSION  1. Pneumonia due to COVID-19 virus    2. Elevated troponin    3. Shortness of breath    4. Elevated brain natriuretic peptide (BNP) level    5. NSTEMI (non-ST elevated myocardial infarction) (CMS/[facility])        DISPOSITION  Disposition: Admit to telemetry  Patient condition is fair      CRITICAL CARE TIME     CRITICAL CARE :  The high probability of clinically significant deterioration in the patient’s condition required my highest level of medical decision making and my highest level of preparedness to intervene emergently.   I provided minutes 40 of critical care, not including other billable services/procedures.    The patient was reevaluated/re-examined multiple times during the visit. Critical care time includes management at bedside, discussion with other providers and consultants, family counseling and answering questions, and documentation. Care involves decision making of high complexity to assess, manipulate, and support vital organ system failure and/or to prevent further life threatening deterioration of the patient's condition. Failure to initiate these interventions on an urgent basis would likely result in sudden, clinically significant or life threatening  deterioration in the patient's condition of NSTEMI, COVID-19 pneumonia, congestive heart failure       Srinath Pedroza MD  12/27/23 1715       Srinath Pedroza MD  12/27/23 1728

## 2023-12-28 NOTE — PROGRESS NOTES
Occupational Therapy                 Therapy Communication Note    Patient Name: Hai Spaulding  MRN: [id number]  Today's Date: 12/28/2023     Discipline: Occupational Therapy    Missed Visit Reason: Missed Visit Reason: Patient placed on medical hold (.8;  skilled therapy services contraindicated for PTT greater than 106.)    Missed Time: Attempt    Comment:

## 2023-12-28 NOTE — CONSULTS
Inpatient consult to Infectious Diseases  Consult performed by: Charlotte Snider APRN-CNP  Consult ordered by: Seema Bailey DO  Reason for consult: COVID, bacterial pneumonia versus covid pneumonitis        Primary MD: Marilyn Luna, KIP-CNP    History Of Present Illness  Hai Spaulding is a 73 y.o. male presenting with shortness of breath and weakness.  Onset was a few days prior to admission-constant gradual with interval worsening.  He presented to the ED for further evaluation and management.  He was discharge recently from [facility], he was treated for pneumonia.  He states he was recovering well until the last few days.  He fell in his garage, states he was tired and laying in bed for several days.  He then developed shortness of breath, was using his oxygen continuously which he has for as needed purposes only.  He tested positive for coronavirus.  Troponins were elevated, cardiology is following.  He is on 4LNC.  CXR showed pulmonary edema versus pneumonia.  He is afebrile. Denies fever or chills.  Denies SOB-states it is better. Denies a cough currently. Denies chest pain.  Denies nausea, vomiting, diarrhea, abdominal pain.  He was started on vancomycin/cefepime for pneumonia and dexamethasone for hypoxia.       Past Medical History  He has a past medical history of Acid reflux, Aspiration pneumonia (CMS/[facility]) (11/23/2021), Aspiration pneumonia resulting from a procedure (10/25/2021), Atrial fibrillation with RVR (CMS/[facility]) (10/2021), Congestive heart failure (CHF) (CMS/[facility]) (10/2021), COPD (chronic obstructive pulmonary disease) (CMS/[facility]), COVID, Diabetes (CMS/[facility]), Diabetic eye exam (CMS/[facility]) (05/08/2019), Hodgkin lymphoma (CMS/[facility]), HTN (hypertension), Hyperlipidemia, Larynx cancer (CMS/[facility]), Myocardial infarction (CMS/[facility]), Neuropathy, Pneumonia due to gram-negative bacteria (01/01/2023), Pneumonitis due to inhalation of other solids and liquids (CMS/[facility]) (11/25/2021), Urinary tract  infection (01/04/2023), and Urinary tract infection, site not specified (12/18/2023).    Surgical History  He has a past surgical history that includes Other surgical history; Other surgical history; CT angio abdomen pelvis w and or wo IV IV contrast (01/22/2022); Other surgical history; Tonsillectomy; Other surgical history; Cholecystectomy; Other surgical history; Tumor excision; Cardiac defibrillator placement (05/2022); IR injection epidural steroid; and Tumor removal.     Social History     Occupational History    Not on file   Tobacco Use    Smoking status: Every Day     Packs/day: 0.50     Years: 60.00     Additional pack years: 0.00     Total pack years: 30.00     Types: Cigarettes     Passive exposure: Past    Smokeless tobacco: Never   Vaping Use    Vaping Use: Never used   Substance and Sexual Activity    Alcohol use: Never    Drug use: Never    Sexual activity: Not on file     Travel History   Travel since 11/28/23    No documented travel since 11/28/23        Service:  Branch Years Served Period   [employer]       Comments:          Family History  Family History   Problem Relation Name Age of Onset    Diabetes Mother      Other (htn) Mother      Heart disease Mother      Diabetes Father      Heart disease Father      Other (htn) Father       Allergies  Patient has no known allergies.     Immunization History   Administered Date(s) Administered    Flu vaccine (IIV4), preservative free *Check age/dose* 10/21/2015    Flu vaccine, quadrivalent, high-dose, preservative free, age 65y+ (FLUZONE) 09/30/2020, 11/15/2023    Influenza, High Dose Seasonal, Preservative Free 11/30/2018, 11/18/2019    Influenza, Unspecified 11/16/2005, 10/02/2009, 11/27/2012, 09/30/2020    Influenza, injectable, quadrivalent 10/28/2014    Influenza, seasonal, injectable 11/14/2011, 10/07/2013    Influenza, trivalent, adjuvanted 11/15/2019    Moderna SARS-CoV-2 Vaccination 03/13/2021, 04/10/2021    Pfizer Purple Cap SARS-CoV-2  "2022    Pneumococcal conjugate vaccine, 13-valent (PREVNAR 13) 2015    Pneumococcal polysaccharide vaccine, 23-valent, age 2 years and older (PNEUMOVAX 23) 2012, 10/08/2019     Medications  Home medications:  Medications Prior to Admission   Medication Sig Dispense Refill Last Dose    acetaminophen (Tylenol) 325 mg tablet Take 2 tablets (650 mg) by mouth 2 times a day.   2023    albuterol (Ventolin HFA) 90 mcg/actuation inhaler Inhale 2 puffs every 4 hours if needed.       amiodarone (Pacerone) 200 mg tablet Take 1 tablet (200 mg) by mouth once daily.   2023    amoxicillin (Amoxil) 250 mg capsule Take 1 capsule (250 mg) by mouth once daily at bedtime. 90 capsule 1 2023    atorvastatin (Lipitor) 80 mg tablet Take 1 tablet (80 mg) by mouth once daily.   2023    Autolet lancing device 3 times a day.       azithromycin (Zithromax) 250 mg tablet Take 2 tablets (500 mg) by mouth once daily for 1 day, THEN 1 tablet (250 mg) once daily for 4 days. Take 2 tabs (500 mg) by mouth today, than 1 daily for 4 days.. 6 tablet 0 2023    BD Cynthia 2nd Gen Pen Needle 32 gauge x 32\" needle Inject 1 each under the skin 3 times a day.       carbidopa-levodopa (Sinemet CR)  mg ER tablet Take 1 tablet by mouth 3 times a day.   2023    [] cefuroxime (Ceftin) 500 mg tablet Take 1 tablet (500 mg) by mouth 2 times a day for 3 days. 6 tablet 0     cefuroxime (Ceftin) 500 mg tablet Take 1 tablet (500 mg) by mouth 2 times a day for 10 days. 20 tablet 0     cyclobenzaprine (Flexeril) 10 mg tablet Take 1 tablet (10 mg) by mouth once daily at bedtime.   2023    dapagliflozin propanediol (Farxiga) 10 mg Take 1 tablet (10 mg) by mouth once daily. 30 tablet 11 2023    docusate sodium (Colace) 100 mg capsule Take 1 capsule (100 mg) by mouth 2 times a day as needed for constipation.       [] doxycycline (Vibramycin) 100 mg capsule Take 1 capsule (100 mg) by mouth 2 " "times a day for 3 days. Take with at least 8 ounces (large glass) of water, do not lie down for 30 minutes after 6 capsule 0     ferrous sulfate, 325 mg ferrous sulfate, tablet Take 1 tablet by mouth once daily with breakfast.   12/27/2023    fluticasone propion-salmeteroL (Advair Diskus) 250-50 mcg/dose diskus inhaler INHALE ONE PUFF BY MOUTH TWO TIMES A DAY 1 each 3 12/27/2023    FreeStyle Joan 2 [place] misc Inject 1 each under the skin if needed.       FreeStyle Joan 2 Sensor kit Inject 1 each under the skin every 14 (fourteen) days.       furosemide (Lasix) 40 mg tablet Take 1 tablet (40 mg) by mouth once daily.   12/26/2023    gabapentin (Neurontin) 300 mg capsule Take 1 capsule (300 mg) by mouth once daily at bedtime.   12/26/2023    insulin glargine (Lantus U-100 Insulin) 100 unit/mL injection Inject 30 Units under the skin once daily in the evening. (Patient taking differently: Inject 14 Units under the skin once daily in the evening.)   12/26/2023    insulin lispro (HumaLOG KwikPen Insulin) 100 unit/mL injection Inject 8 Units under the skin 3 times a day with meals. 8 units plus sliding scale three times a day with meals Subcutaneous three times a day, max dose 16u three times a day   12/26/2023    insulin syr/ndl U100 half lexie (BD Veo Insulin Syr, half unit,) 0.3 mL 31 gauge x 15/64\" syringe 1 Needle once daily. 30 each 3     insulin syringe-needle U-100 (BD Veo Insulin Syringe UF) 1 mL 31 gauge x 15/64\" syringe 3 times a day.       insulin syringe-needle U-100 31G X 5/16\" 1 mL syringe Inject 1 each under the skin once daily. As directed daily 100 each 1     ipratropium-albuteroL (Duo-Neb) 0.5-2.5 mg/3 mL nebulizer solution Take 3 mL by nebulization every 6 hours.       Lactobacillus acidophilus (ACIDOPHILUS ORAL) As directed oral   12/27/2023    levothyroxine (Synthroid, Levoxyl) 25 mcg tablet Take 1 tablet (25 mcg) by mouth once daily. 30 tablet 11 12/26/2023    methylPREDNISolone (Medrol Dospak) 4 " mg tablets Take as directed on package. 21 tablet 0 Unknown    metoprolol succinate XL (Toprol-XL) 25 mg 24 hr tablet Take 0.5 tablets (12.5 mg) by mouth once daily. 45 tablet 3 12/27/2023    nicotine (Nicoderm CQ) 14 mg/24 hr patch Place 1 patch over 24 hours on the skin once every 24 hours. (Patient not taking: Reported on 12/27/2023) 30 patch 1 Not Taking    nicotine (Nicoderm CQ) 21 mg/24 hr patch Place 1 patch on the skin once every 24 hours.   12/26/2023    omeprazole (PriLOSEC) 40 mg DR capsule Take 1 capsule (40 mg) by mouth once daily in the morning. Take before meals.   12/27/2023    OneTouch Ultra Test strip Test 3-4 times a day       oxybutynin XL (Ditropan-XL) 5 mg 24 hr tablet TAKE ONE TABLET BY MOUTH ONCE DAILY 30 tablet 11 12/26/2023    oxygen (O2) therapy 2 LPM throughout the night of sleep nasal canula daily       prasugrel (Effient) 10 mg tablet Take 1 tablet (10 mg) by mouth once daily.   12/26/2023    QUEtiapine (SEROquel) 25 mg tablet Take 1 tablet (25 mg) by mouth once daily at bedtime.   12/26/2023    rivaroxaban (Xarelto) 15 mg tablet Take 1 tablet (15 mg) by mouth once daily. With food   12/27/2023    sertraline (Zoloft) 100 mg tablet Take 1 tablet (100 mg) by mouth once daily.   12/27/2023    SITagliptin phosphate (Januvia) 50 mg tablet Take 1 tablet (50 mg) by mouth once daily. 30 tablet 11 Unknown    triamcinolone (Kenalog) 0.1 % cream Apply topically. Apply topically to affected area prn, As Needed   Unknown     Current medications:  Scheduled medications  acetaminophen, 650 mg, oral, BID  amiodarone, 200 mg, oral, Daily  apixaban, 5 mg, oral, BID  atorvastatin, 80 mg, oral, Nightly  carbidopa-levodopa, 1 tablet, oral, TID  cyclobenzaprine, 10 mg, oral, Nightly  dapagliflozin propanediol, 10 mg, oral, Daily  dexAMETHasone, 6 mg, intravenous, q24h  [Held by provider] fluticasone furoate-vilanteroL, 1 puff, inhalation, Daily  gabapentin, 300 mg, oral, Nightly  insulin glargine, 14 Units,  "subcutaneous, Nightly  insulin lispro, 0-5 Units, subcutaneous, TID with meals  ipratropium-albuteroL, 3 mL, nebulization, TID  lactobacillus acidophilus, 1 tablet, oral, Daily  levothyroxine, 25 mcg, oral, Daily  metoprolol succinate XL, 12.5 mg, oral, Daily  nicotine, 1 patch, transdermal, q24h  pantoprazole, 20 mg, oral, Daily  piperacillin-tazobactam, 3.375 g, intravenous, q6h  prasugrel, 10 mg, oral, Daily  QUEtiapine, 25 mg, oral, Nightly  remdesivir, 200 mg, intravenous, Once   Followed by  [START ON 12/29/2023] remdesivir, 100 mg, intravenous, q24h  sertraline, 100 mg, oral, Daily  SITagliptin phosphate, 50 mg, oral, Daily      Continuous medications     PRN medications  PRN medications: acetaminophen, albuterol, alum-mag hydroxide-simeth, dextrose 10 % in water (D10W), dextrose, docusate sodium, glucagon, guaiFENesin, guaiFENesin, melatonin, ondansetron, ondansetron ODT, sodium chloride    Review of Systems   Constitutional:  Positive for activity change and fatigue. Negative for appetite change, chills, diaphoresis and fever.   HENT:  Negative for congestion, rhinorrhea and sore throat.    Respiratory:  Negative for cough, chest tightness and shortness of breath.    Cardiovascular:  Negative for chest pain and leg swelling.   Gastrointestinal:  Negative for abdominal pain, blood in stool, diarrhea, nausea and vomiting.   Genitourinary:  Negative for difficulty urinating and dysuria.   Skin:  Positive for color change.        Right periorbital ecchymosis   Neurological:  Negative for dizziness, light-headedness and headaches.   All other systems reviewed and are negative.         Objective  Range of Vitals (last 24 hours)  Heart Rate:  []   Temp:  [35.6 °C (96.1 °F)-37 °C (98.6 °F)]   Resp:  [19-28]   BP: ()/(43-73)   Height:  [175.3 cm (5' 9.02\")]   Weight:  [67.2 kg (148 lb 2.4 oz)-68.9 kg (151 lb 12.8 oz)]   SpO2:  [95 %-100 %]   Daily Weight  12/28/23 : 68.9 kg (151 lb 12.8 oz)    Body mass " index is 22.41 kg/m².     Physical Exam  Constitutional:       Appearance: Normal appearance.   HENT:      Head: Normocephalic.      Nose: Nose normal.      Mouth/Throat:      Mouth: Mucous membranes are moist.      Pharynx: Oropharynx is clear.   Eyes:      Extraocular Movements: Extraocular movements intact.      Conjunctiva/sclera: Conjunctivae normal.      Comments: Right periorbital ecchymosis   Cardiovascular:      Rate and Rhythm: Tachycardia present. Rhythm irregular.   Pulmonary:      Effort: Pulmonary effort is normal.      Breath sounds: Normal breath sounds. No wheezing or rhonchi.   Abdominal:      General: Bowel sounds are normal.      Palpations: Abdomen is soft.      Tenderness: There is no abdominal tenderness.   Musculoskeletal:         General: Normal range of motion.      Cervical back: Normal range of motion and neck supple.   Skin:     General: Skin is warm and dry.      Comments: Right anterior lower leg with hemosiderin staining   Neurological:      General: No focal deficit present.      Mental Status: He is alert and oriented to person, place, and time.   Psychiatric:         Mood and Affect: Mood normal.         Behavior: Behavior normal.              Relevant Results  Outside Hospital Results  Yes - [facility]  Labs  Results from last 72 hours   Lab Units 12/28/23  0548 12/27/23  2159 12/27/23  0840   WBC AUTO x10*3/uL 21.2* 21.5* 25.6*   HEMOGLOBIN g/dL 12.3* 11.8* 13.7   HEMATOCRIT % 37.1* 37.2* 42.1   PLATELETS AUTO x10*3/uL 452* 463* 473*   NEUTROS PCT AUTO %  --   --  93.0   LYMPHS PCT AUTO %  --   --  1.2   MONOS PCT AUTO %  --   --  4.6   EOS PCT AUTO %  --   --  0.0     Results from last 72 hours   Lab Units 12/28/23  0548 12/27/23  0840   SODIUM mmol/L 141 138   POTASSIUM mmol/L 5.0 4.7   CHLORIDE mmol/L 100 97*   CO2 mmol/L 24 26   BUN mg/dL 72* 66*   CREATININE mg/dL 1.90* 2.05*   GLUCOSE mg/dL 394* 352*   CALCIUM mg/dL 8.3* 8.8   ANION GAP mmol/L 17 20   EGFR mL/min/1.73m*2 37*  34*     Results from last 72 hours   Lab Units 12/28/23  0548 12/27/23  0840   ALK PHOS U/L 258* 266*   BILIRUBIN TOTAL mg/dL 0.6 0.9   BILIRUBIN DIRECT mg/dL 0.3*  --    PROTEIN TOTAL g/dL 5.7* 7.0   ALT U/L <5* 5*   AST U/L 28 23   ALBUMIN g/dL 2.6* 3.5     Estimated Creatinine Clearance: 34.2 mL/min (A) (by C-G formula based on SCr of 1.9 mg/dL (H)).  C-Reactive Protein   Date Value Ref Range Status   11/29/2023 2.53 (H) <1.00 mg/dL Final     CRP   Date Value Ref Range Status   12/31/2022 39.72 (A) mg/dL Final     Comment:     REF VALUE  < 1.00     12/14/2021 2.16 (A) mg/dL Final     Comment:     REF VALUE  < 1.00       Sedimentation Rate   Date Value Ref Range Status   11/29/2023 63 (H) 0 - 20 mm/h Final   12/31/2022 92 (H) 0 - 20 mm/h Final     Comment:     Please note new reference ranges as of 5/9/2022.   06/14/2019 11 0 - 20 mm/h Final     HIV 1/2 Antigen/Antibody Screen with Reflex to Confirmation   Date Value Ref Range Status   11/29/2023 Nonreactive Nonreactive Final     Hepatitis C AB   Date Value Ref Range Status   11/29/2023 Nonreactive Nonreactive Final     Comment:     Results from patients taking biotin supplements or receiving high-dose biotin therapy should be interpreted with caution due to possible interference with this test. Providers may contact their local laboratory for further information.     Microbiology  Susceptibility data from last 90 days.  Collected Specimen Info Organism   12/15/23 Fluid from SPUTUM Candida albicans   12/15/23 Blood culture from Peripheral Venipuncture Coagulase negative staphylococcus   MRSA PCR negative  Blood cultures pending       Imaging  ECG 12 lead    Result Date: 12/27/2023  Undetermined rhythm Left bundle branch block Abnormal ECG When compared with ECG of 27-DEC-2023 07:49, (unconfirmed) Current undetermined rhythm precludes rhythm comparison, needs review    ECG 12 Lead    Result Date: 12/27/2023  Wide QRS tachycardia with Premature supraventricular  complexes and with occasional Premature ventricular complexes Nonspecific intraventricular block Cannot rule out Anteroseptal infarct , age undetermined T wave abnormality, consider inferolateral ischemia Abnormal ECG When compared with ECG of 14-DEC-2023 19:10, (unconfirmed) Wide QRS tachycardia has replaced Sinus rhythm Vent. rate has increased BY  57 BPM    XR chest 1 view    Result Date: 12/27/2023  Interpreted By:  Anahi Fernando, STUDY: XR CHEST 1 VIEW;  12/27/2023 8:43 am   INDICATION: Signs/Symptoms:Dypnea.   COMPARISON: 12/14/2023   ACCESSION NUMBER(S): [id number]   ORDERING CLINICIAN: SYLVIA MOORE   FINDINGS: CARDIOMEDIASTINAL SILHOUETTE: The heart is enlarged. There is a left subclavian pacemaker-AICD with a single tip in the right atrium and two tips in the right ventricle.   LUNGS: There are progressive perihilar alveolar opacities, likely pulmonary edema but could also represent pneumonia. There is left basilar atelectasis and volume loss which has progressed. There is no pleural fluid.   ABDOMEN: No remarkable upper abdominal findings.     BONES: No acute osseous changes.       1. Progressive bilateral predominantly perihilar alveolar opacities consistent with pulmonary edema or pneumonia. 2. Progressive volume loss and atelectasis left lung base.   MACRO: None   Signed by: Anahi Fernando 12/27/2023 8:50 AM Dictation workstation:   [id number]     Assessment/Plan   Acute respiratory failure-4LNC  Coronavirus infection  Possible bacterial pneumonia  Elevated troponin-cardiology following    Continue dexamethasone-10 days  Begin remdesivir-up to 5 days while inpatient  Discontinue vancomycin-MRSA PCR negative  Discontinue cefepime  Begin IV zosyn for possible pneumonia  Follow-up blood cultures  Monitor WBC and temperature  Monitor renal and hepatic function  Oxygen as needed  Supportive care    Discussed with Dr Ava PABON Staff, APRN-CNP

## 2023-12-28 NOTE — PROGRESS NOTES
Hai Spaulding is a 73 y.o. male on day 1 of admission presenting with NSTEMI (non-ST elevated myocardial infarction) (CMS/HCC).      Subjective   Patient states he is feeling better.  No shortness of breath at rest.  Cough improved.  Denies chest pain.       Objective   Pt is NAD.  Cooperative with exam.  In no distress at rest.  On 2 L of oxygen  A, Ox3.  Face is symmetrical, periorbital hematoma bilaterally, more on the right side  Skin - no lesions.  Lungs: clear to auscultations B/L.  Minute bilateral no wheezes, rales, rhonchi.  Heart: irregular S1S2.  Abdomen: soft, NT, ND. BS positive.  Extr.: no edema, cords, cyanosis.  Moves all extr.   Last Recorded Vitals  /67 (BP Location: Right arm, Patient Position: Lying)   Pulse 107   Temp 37 °C (98.6 °F) (Temporal)   Resp 21   Wt 68.9 kg (151 lb 12.8 oz)   SpO2 99%   Intake/Output last 3 Shifts:    Intake/Output Summary (Last 24 hours) at 12/28/2023 1211  Last data filed at 12/28/2023 0120  Gross per 24 hour   Intake 219.2 ml   Output --   Net 219.2 ml       Admission Weight  Weight: 67.2 kg (148 lb 2.4 oz) (12/27/23 2049)    Daily Weight  12/28/23 : 68.9 kg (151 lb 12.8 oz)    Image Results  ECG 12 lead  Undetermined rhythm  Left bundle branch block  Abnormal ECG  When compared with ECG of 27-DEC-2023 07:49, (unconfirmed)  Current undetermined rhythm precludes rhythm comparison, needs review  ECG 12 Lead  Wide QRS tachycardia with Premature supraventricular complexes and with occasional Premature ventricular complexes  Nonspecific intraventricular block  Cannot rule out Anteroseptal infarct , age undetermined  T wave abnormality, consider inferolateral ischemia  Abnormal ECG  When compared with ECG of 14-DEC-2023 19:10, (unconfirmed)  Wide QRS tachycardia has replaced Sinus rhythm  Vent. rate has increased BY  57 BPM  XR chest 1 view  Narrative: Interpreted By:  Anahi Fernando,   STUDY:  XR CHEST 1 VIEW;  12/27/2023 8:43 am       INDICATION:  Signs/Symptoms:Dypnea.      COMPARISON:  12/14/2023      ACCESSION NUMBER(S):  [id number]      ORDERING CLINICIAN:  SYLVIA MOORE      FINDINGS:  CARDIOMEDIASTINAL SILHOUETTE:  The heart is enlarged.  There is a left subclavian pacemaker-AICD with a single tip in the  right atrium and two tips in the right ventricle.      LUNGS:  There are progressive perihilar alveolar opacities, likely pulmonary  edema but could also represent pneumonia. There is left basilar  atelectasis and volume loss which has progressed. There is no pleural  fluid.      ABDOMEN:  No remarkable upper abdominal findings.          BONES:  No acute osseous changes.      Impression: 1. Progressive bilateral predominantly perihilar alveolar opacities  consistent with pulmonary edema or pneumonia.  2. Progressive volume loss and atelectasis left lung base.      MACRO:  None      Signed by: Anahi Fernanod 12/27/2023 8:50 AM  Dictation workstation:   [id number]          Relevant Results               Assessment/Plan                  Principal Problem:    NSTEMI (non-ST elevated myocardial infarction) (CMS/HCC)  Likely, time to non-STEMI due to increased oxygen demand from COVID and acute respiratory failure with hypoxemia.  Cardiology consulted.  COVID-19.  Continue dexamethasone.  ID consulted for consideration of possible remdesivir  Pneumonia, possible bacterial.  Continue antibacterial antibiotics  Acute on chronic respiratory failure.  Improved.  Continue supplementary oxygen.  Chronic renal failure, slightly improved.  Monitor renal function.  Atrial fibrillation, chronic.  Continue anticoagulation.  Stop heparin drip.  Recent fall, nasal bone fracture.  Ischemic cardiomyopathy, the most recent ejection fraction 30 to 35% 1 week ago.  Status post pacemaker.  Time spent with patient 31 minutes.                Merlene Burleson MD

## 2023-12-28 NOTE — PROGRESS NOTES
"Vancomycin Dosing by Pharmacy- FOLLOW UP    Hai Spaulding is a 73 y.o. year old male who Pharmacy has been consulted for vancomycin dosing for pneumonia. Based on the patient's indication and renal status this patient is being dosed based on a goal AUC of 400-600.     Renal function is currently stable.    Current vancomycin dose: 750 mg given every 24 hours    Estimated vancomycin AUC on current dose: 448 mg/L.hr     Visit Vitals  /67 (BP Location: Right arm, Patient Position: Lying)   Pulse 107   Temp 37 °C (98.6 °F) (Temporal)   Resp 21        Lab Results   Component Value Date    CREATININE 1.90 (H) 12/28/2023    CREATININE 2.05 (H) 12/27/2023    CREATININE 2.06 (H) 12/19/2023    CREATININE 2.16 (H) 12/18/2023        Patient weight is No results found for: \"PTWEIGHT\"    No results found for: \"CULTURE\"     I/O last 3 completed shifts:  In: 219.2 (3.2 mL/kg) [I.V.:19.2 (0.3 mL/kg); IV Piggyback:200]  Out: - (0 mL/kg)   Weight: 68.9 kg   [unfilled]    Lab Results   Component Value Date    PATIENTTEMP 37.0 01/02/2023    PATIENTTEMP 37.0 01/01/2023    PATIENTTEMP 37.0 12/31/2022        Assessment/Plan    Within goal AUC range. Continue current vancomycin regimen.    This dosing regimen is predicted by [employer] to result in the following pharmacokinetic parameters:  Loading dose: N/A  Regimen: 750 mg IV every 24 hours.  Start time: 10:53 on 12/28/2023  Exposure target: AUC24 (range)400-600 mg/L.hr   AUC24,ss: 448 mg/L.hr  Probability of AUC24 > 400: 70 %  Ctrough,ss: 14 mg/L  Probability of Ctrough,ss > 20: 8 %  Probability of nephrotoxicity (Lodise JOHN 2009): 9 %    The next level will be obtained on 1/4 at 0500. May be obtained sooner if clinically indicated.   Will continue to monitor renal function daily while on vancomycin and order serum creatinine at least every 48 hours if not already ordered.  Follow for continued vancomycin needs, clinical response, and signs/symptoms of toxicity.       Chloe TIDWELL" [place], PharmD

## 2023-12-28 NOTE — H&P
"History Of Present Illness  Hai Spaulding is a 73 y.o. male with history of coronary artery disease, COPD, A-fib, chronic systolic heart failure, diabetes mellitus, history of laryngeal cancer, skin lymphoma, who presents to the emergency room with shortness of breath.  Patient had a recent admission to [facility] for pneumonia.  He was admitted for about 1 week starting on December 14.  He was treated with IV antibiotics.  He was discharged home.  His wife, who provides much of the history, says he was doing well post hospital stay.  He finished his antibiotics.  On December 22 he tripped in the garage, and landed on his face.  He broke some bones in his nose.  She reports that later that day he was feeling fatigued.  Says that he did not get out of bed much over the weekend.  [place] Eve he stayed in bed all day.  He was complaining of feeling short of breath and was requiring oxygen.  He has 2 L nasal cannula at home which he uses as needed.  His wife says he had not been using his oxygen at all since he was discharged from the hospital.  He started wearing the oxygen continuously on Saturday or Sunday.  On Monday the patient and his wife both tested positive for COVID-19 at home.  She called his primary care doctor who sent a Medrol Dosepak and antibiotics.  Patient continued to not eat really anything.  He continued to complain of shortness of breath.  Pulse ox was 87% on room air but otherwise she would keep him on oxygen.  He reports a cough with productive yellow sputum, but patient and his wife both say this is normal for him.  He denied any specific chest pain.  Apparently earlier today he was telling his wife \"help me, help me\".  She ultimately called 911.  In [place] ER patient was found to have elevated troponin levels.  He was transferred here for cardiology assessment.  He normally follows with Dr. Frank.    Of note, patient's wife says that his hematologist is recently working him up " for CLL.     Past Medical History  He has a past medical history of Acid reflux, Aspiration pneumonia (CMS/HCC) (11/23/2021), Aspiration pneumonia resulting from a procedure (10/25/2021), Atrial fibrillation with RVR (CMS/HCC) (10/2021), Congestive heart failure (CHF) (CMS/HCC) (10/2021), COPD (chronic obstructive pulmonary disease) (CMS/HCC), COVID, Diabetes (CMS/HCC), Diabetic eye exam (CMS/HCC) (05/08/2019), Hodgkin lymphoma (CMS/HCC), HTN (hypertension), Hyperlipidemia, Larynx cancer (CMS/HCC), Myocardial infarction (CMS/HCC), Neuropathy, Pneumonia due to gram-negative bacteria (01/01/2023), Pneumonitis due to inhalation of other solids and liquids (CMS/HCC) (11/25/2021), Urinary tract infection (01/04/2023), and Urinary tract infection, site not specified (12/18/2023).    Surgical History  He has a past surgical history that includes Other surgical history; Other surgical history; CT angio abdomen pelvis w and or wo IV IV contrast (01/22/2022); Other surgical history; Tonsillectomy; Other surgical history; Cholecystectomy; Other surgical history; Tumor excision; Cardiac defibrillator placement (05/2022); IR injection epidural steroid; and Tumor removal.     Social History  He reports that he has been smoking cigarettes. He has a 30.00 pack-year smoking history. He has been exposed to tobacco smoke. He has never used smokeless tobacco. He reports that he does not drink alcohol and does not use drugs.    Family History  Family History   Problem Relation Name Age of Onset    Diabetes Mother      Other (htn) Mother      Heart disease Mother      Diabetes Father      Heart disease Father      Other (htn) Father          Allergies  Patient has no known allergies.    Review of Systems  General: + fatigue, + malaise, no fevers/chills   HENT: no rhinorrhea, no sore throat, no ear pain   Eyes: no change in vision, denies eye pain or discharge   Lungs: + SOB, + cough, no hemoptysis   CV: no chest pain, no palpitations, no  leg edema   Abd: no nausea/vomiting, no constipation/diarrhea, no abdominal pain   : no dysuria, no frequency, no nocturia, no flank pain   Endocrine: no polydipsia/polyuria, no hot or cold intolerance   Neuro: no headaches, no syncope, no seizures   MSK: no back pain, no neck pain, no joint problems   Psych: no anxiety, no depression, no hallucinations       Physical Exam    General: alert, no diaphoresis   HENT: mucous membranes moist, external ears normal, no rhinorrhea.  Hoarse voice   Eyes: no icterus or injection, no discharge   Lungs: Scattered rhonchi   Heart: Irregular and fast, no LE edema BL   GI: abdomen soft, nontender, nondistended, BS present   MSK: no joint effusion or deformity   Skin: no rashes, erythema, or ecchymosis   Neuro: grossly normal cognition, motor strength, sensation     Last Recorded Vitals  /68 (BP Location: Right arm, Patient Position: Sitting)   Pulse (!) 115   Temp 36.9 °C (98.5 °F) (Oral)   Resp 22   Wt 67.2 kg (148 lb 2.4 oz)   SpO2 97%     Relevant Results      Results for orders placed or performed during the hospital encounter of 12/27/23 (from the past 24 hour(s))   CBC   Result Value Ref Range    WBC 21.5 (H) 4.4 - 11.3 x10*3/uL    nRBC 0.2 (H) 0.0 - 0.0 /100 WBCs    RBC 4.16 (L) 4.50 - 5.90 x10*6/uL    Hemoglobin 11.8 (L) 13.5 - 17.5 g/dL    Hematocrit 37.2 (L) 41.0 - 52.0 %    MCV 89 80 - 100 fL    MCH 28.4 26.0 - 34.0 pg    MCHC 31.7 (L) 32.0 - 36.0 g/dL    RDW 16.0 (H) 11.5 - 14.5 %    Platelets 463 (H) 150 - 450 x10*3/uL     Scheduled medications  acetaminophen, 650 mg, oral, BID  [START ON 12/28/2023] amiodarone, 200 mg, oral, Daily  atorvastatin, 80 mg, oral, Nightly  carbidopa-levodopa, 1 tablet, oral, TID  cefepime, 1 g, intravenous, q12h  cyclobenzaprine, 10 mg, oral, Nightly  [START ON 12/28/2023] dapagliflozin propanediol, 10 mg, oral, Daily  dexAMETHasone, 6 mg, intravenous, q24h  [Held by provider] fluticasone furoate-vilanteroL, 1 puff,  inhalation, Daily  gabapentin, 300 mg, oral, Nightly  heparin (porcine), 4,000 Units, intravenous, Once  insulin glargine, 14 Units, subcutaneous, Nightly  [START ON 12/28/2023] insulin lispro, 0-5 Units, subcutaneous, TID with meals  [START ON 12/28/2023] ipratropium-albuteroL, 3 mL, nebulization, q6h  [START ON 12/28/2023] lactobacillus acidophilus, 1 tablet, oral, Daily  [START ON 12/28/2023] levothyroxine, 25 mcg, oral, Daily  [START ON 12/28/2023] metoprolol succinate XL, 12.5 mg, oral, Daily  nicotine, 1 patch, transdermal, q24h  [START ON 12/28/2023] pantoprazole, 20 mg, oral, Daily  [START ON 12/28/2023] prasugrel, 10 mg, oral, Daily  QUEtiapine, 25 mg, oral, Nightly  [START ON 12/28/2023] sertraline, 100 mg, oral, Daily  [START ON 12/28/2023] SITagliptin phosphate, 50 mg, oral, Daily  vancomycin-diluent combo no.1, 750 mg, intravenous, q24h      Continuous medications  heparin, 0-4,000 Units/hr      PRN medications  PRN medications: acetaminophen, albuterol, dextrose 10 % in water (D10W), dextrose, docusate sodium, glucagon, heparin (porcine), ondansetron       Assessment/Plan   Principal Problem:    NSTEMI (non-ST elevated myocardial infarction) (CMS/[facility])    NSTEMI  -Troponins have gone from 62-->207-->314.  Could be element of demand ischemia/NSTEMI type II although difficult to tell right now and patient certainly with significant coronary disease and risk factors.  Will check another troponin now.  -Patient's wife tells me that patient had a heart attack in 2021, had a heart cath, ultimately sent downtown for open heart surgery.  He developed cardiogenic shock and plan for surgery was aborted.  Patient has been told that he can never have surgery.  -Normally on prasugrel and Xarelto  -Start heparin drip.  Continue prasugrel  -Consult cardiology--patient follows with Dr. Frank  -Currently chest pain-free    Acute on chronic respiratory failure with hypoxia  -Normally wears 2 L nasal cannula as  needed at night; currently on 4 L nasal cannula with pulse ox 100%.  Patient is not at his baseline oxygen status  -Secondary to COVID pneumonitis versus pneumonia, see below    COVID  -COVID-positive at home on 1225.  Likely his infection started a few days before this.  Start Decadron.  Consult ID  -X-ray showing multifocal opacities--consistent with COVID pneumonitis versus a bacterial multifocal pneumonia    Bacterial pneumonia  -Suspected.  Difficult to ascertain what is COVID pneumonitis versus bacterial pneumonia.  He has risk factors for secondary bacterial infection but also recently had pneumonia that was treated in the hospital from 12/14-12/20.  Chest x-ray is certainly worse in appearance today than previous.  -Consult ID  -Cover broadly with vancomycin and cefepime.  Just completed outpatient course of azithromycin.  -He had sputum culture on last hospitalization showing mostly yeast    Chronic systolic heart failure  -, which is around where his proBNP's normally are  -I feel that his heart failure is compensated at this time, will monitor closely especially given concern for NSTEMI    COPD  -Without exacerbation at the moment.  Getting Decadron regardless for his COVID  -Continue nebulizers and Advair    Diabetes mellitus type 2  -Continue Lantus, sliding scale, Accu-Cheks    CKD stage III  -Baseline creatinine is around 2.  He is at this currently.  Will monitor    Leukocytosis  -Patient with a chronic marked leukocytosis over the past year.  His oncologist plans on working him up for CLL once he recovers from his present illness  -Will monitor     Parkinson disease  -The wife tells me that when he misses a dose of carbidopa levodopa, he tends to get confused, hallucinations, nightmares  -Resume carbidopa levodopa    Code status: full code; confirmed with patient and wife    critical care time: 55 min         Seema Bailey DO

## 2023-12-28 NOTE — PROGRESS NOTES
Physical Therapy                 Therapy Communication Note    Patient Name: Hai Spaulding  MRN: [id number]  Today's Date: 12/28/2023     Discipline: Physical Therapy    Missed Visit Reason: Missed Visit Reason: Patient placed on medical hold (.8;  skilled physical therapy services contraindicated for PTT greater than 106.)    Missed Time: Cancel

## 2023-12-28 NOTE — PROGRESS NOTES
"Vancomycin Dosing by Pharmacy- INITIAL    Hai Spaulding is a 73 y.o. year old male who Pharmacy has been consulted for vancomycin dosing for pneumonia. Based on the patient's indication and renal status this patient will be dosed based on a goal AUC of 400-600.     Renal function is currently stable.    Visit Vitals  /68 (BP Location: Right arm, Patient Position: Sitting)   Pulse (!) 115   Temp 36.9 °C (98.5 °F) (Oral)   Resp 22        Lab Results   Component Value Date    CREATININE 2.05 (H) 12/27/2023    CREATININE 2.06 (H) 12/19/2023    CREATININE 2.16 (H) 12/18/2023    CREATININE 2.24 (H) 12/17/2023        Patient weight is No results found for: \"PTWEIGHT\"    No results found for: \"CULTURE\"     No intake/output data recorded.  [unfilled]    Lab Results   Component Value Date    PATIENTTEMP 37.0 01/02/2023    PATIENTTEMP 37.0 01/01/2023    PATIENTTEMP 37.0 12/31/2022          Assessment/Plan     Patient will not be given a loading dose.  Will initiate vancomycin maintenance,  750 mg every 24 hours.    This dosing regimen is predicted by [employer] to result in the following pharmacokinetic parameters:    Loading dose: N/A  Regimen: 750 mg IV every 24 hours.  Start time: 21:56 on 12/27/2023  Exposure target: AUC24 (range)400-600 mg/L.hr   AUC24,ss: 443 mg/L.hr  Probability of AUC24 > 400: 63 %  Ctrough,ss: 14.7 mg/L  Probability of Ctrough,ss > 20: 17 %  Probability of nephrotoxicity (Lodise JOHN 2009): 10 %    Follow-up level will be ordered on 12/28 at 0500 unless clinically indicated sooner.  Will continue to monitor renal function daily while on vancomycin and order serum creatinine at least every 48 hours if not already ordered.  Follow for continued vancomycin needs, clinical response, and signs/symptoms of toxicity.       Brooke Rojas, PharmD   "

## 2023-12-28 NOTE — CONSULTS
Vancomycin Dosing by Pharmacy- Cessation of Therapy    Consult to pharmacy for vancomycin dosing has been discontinued by the prescriber, pharmacy will sign off at this time.    Please call pharmacy if there are further questions or re-enter a consult if vancomycin is resumed.     Chloe Bear, GalaD

## 2023-12-28 NOTE — CARE PLAN
The patient's goals for the shift include rest    The clinical goals for the shift include safety    Over the shift, the patient did not make progress toward the following goals. Barriers to progression include none. Recommendations to address these barriers include assist with ADLs  Problem: Pain - Adult  Goal: Verbalizes/displays adequate comfort level or baseline comfort level  Outcome: Progressing     Problem: Safety - Adult  Goal: Free from fall injury  Outcome: Progressing     Problem: Discharge Planning  Goal: Discharge to home or other facility with appropriate resources  Outcome: Progressing     Problem: Diabetes  Goal: Achieve decreasing blood glucose levels by end of shift  Outcome: Progressing  Goal: Increase stability of blood glucose readings by end of shift  Outcome: Progressing  Goal: Decrease in ketones present in urine by end of shift  Outcome: Progressing  Goal: Maintain electrolyte levels within acceptable range throughout shift  Outcome: Progressing  Goal: Maintain glucose levels >70mg/dl to <250mg/dl throughout shift  Outcome: Progressing  Goal: No changes in neurological exam by end of shift  Outcome: Progressing  Goal: Learn about and adhere to nutrition recommendations by end of shift  Outcome: Progressing  Goal: Vital signs within normal range for age by end of shift  Outcome: Progressing  Goal: Increase self care and/or family involovement by end of shift  Outcome: Progressing  Goal: Receive DSME education by end of shift  Outcome: Progressing     Problem: Skin  Goal: Decreased wound size/increased tissue granulation at next dressing change  Outcome: Progressing  Goal: Participates in plan/prevention/treatment measures  Outcome: Progressing  Goal: Prevent/manage excess moisture  Outcome: Progressing  Flowsheets (Taken 12/28/2023 1054)  Prevent/manage excess moisture:   Cleanse incontinence/protect with barrier cream   Monitor for/manage infection if present   Moisturize dry skin  Goal:  Prevent/minimize sheer/friction injuries  Outcome: Progressing  Goal: Promote/optimize nutrition  Outcome: Progressing  Goal: Promote skin healing  Outcome: Progressing   .

## 2023-12-28 NOTE — PROGRESS NOTES
[facility] spoke with the pts wife Cata over the phone. Cata states pt with recent hospitalization at [facility] and at [place] they made the joint decision to decline stay at SNF and he went home with [facility]. Cata now stating that she believes it is a good idea for the pt to go to SNF due to his deconditioned state, she states the pt will likely not be on board with this idea initially and states she will talk to him about it. Await PT/OT eval. In the meantime Cata has given the ok for referral to [facility] as pt has been there before. [facility]. No precert would be needed.      12/28/23 4782   Discharge Planning   Patient expects to be discharged to: Per wife SNF - [facility] if bed available, no precert would be needed   Does the patient need discharge transport arranged? Yes   RoundTrip coordination needed? Yes

## 2023-12-28 NOTE — CARE PLAN
The patient's goals for the shift include      The clinical goals for the shift include rest    Over the shift, the patient did not make progress toward the following goals. Barriers to progression include N/A new pt. Recommendations to address these barriers include rest.

## 2023-12-28 NOTE — CONSULTS
Inpatient consult to Cardiology  Consult performed by: Martín Lakhani MD  Consult ordered by: Seema Bailey DO        History Of Present Illness:    Hai Spaulding is a 73 y.o. male Now with a COVID-positive.  History of COPD, A-fib, chronic systolic heart for with diabetes.  Patient was seen in the hospital recently with pneumonia went home.  Patient hydrated IV antibiotic now brought by family due to complaint of shortness of breath dyspnea exertion patient become hypoxic.  Continue wearing oxygen now with a positive for COVID-19 including his wife.  Patient was started on Medrol Dosepak and antibiotic.  No worsening symptoms no active chest pain tightness patient does coughing with yellow sputum.  Elevated troponin more likely due to a possible underlying COVID-19 as well as CKD.  So far stable cardiac wise.  History of CLL.  Currently in isolation.  Discussed with the nurse about treatment plans and goals.  On monitor patient lying A-fib RVR with IVCD.    Last Recorded Vitals:  Vitals:    12/28/23 0326 12/28/23 0718 12/28/23 0728 12/28/23 0800   BP: 95/72   100/67   BP Location: Right arm   Right arm   Patient Position: Lying   Lying   Pulse: 99 (!) 115  107   Resp: 20 19  21   Temp: 36 °C (96.8 °F) 36.1 °C (97 °F)  37 °C (98.6 °F)   TempSrc: Oral Temporal  Temporal   SpO2: 99% 100% 99% 100%   Weight: 68.9 kg (151 lb 12.8 oz)      Height:           Past Medical History:  He has a past medical history of Acid reflux, Aspiration pneumonia (CMS/Formerly [facility]) (11/23/2021), Aspiration pneumonia resulting from a procedure (10/25/2021), Atrial fibrillation with RVR (CMS/Formerly [facility]) (10/2021), Congestive heart failure (CHF) (CMS/Formerly [facility]) (10/2021), COPD (chronic obstructive pulmonary disease) (CMS/Formerly [facility]), COVID, Diabetes (CMS/Formerly [facility]), Diabetic eye exam (CMS/Formerly [facility]) (05/08/2019), Hodgkin lymphoma (CMS/Formerly [facility]), HTN (hypertension), Hyperlipidemia, Larynx cancer (CMS/Formerly [facility]), Myocardial infarction (CMS/Formerly [facility]), Neuropathy, Pneumonia due to  gram-negative bacteria (01/01/2023), Pneumonitis due to inhalation of other solids and liquids (CMS/HCC) (11/25/2021), Urinary tract infection (01/04/2023), and Urinary tract infection, site not specified (12/18/2023).    Past Surgical History:  He has a past surgical history that includes Other surgical history; Other surgical history; CT angio abdomen pelvis w and or wo IV IV contrast (01/22/2022); Other surgical history; Tonsillectomy; Other surgical history; Cholecystectomy; Other surgical history; Tumor excision; Cardiac defibrillator placement (05/2022); IR injection epidural steroid; and Tumor removal.      Social History:  He reports that he has been smoking cigarettes. He has a 30.00 pack-year smoking history. He has been exposed to tobacco smoke. He has never used smokeless tobacco. He reports that he does not drink alcohol and does not use drugs.    Family History:  Family History   Problem Relation Name Age of Onset    Diabetes Mother      Other (htn) Mother      Heart disease Mother      Diabetes Father      Heart disease Father      Other (htn) Father          Allergies:  Patient has no known allergies.    Inpatient Medications:  Scheduled medications   Medication Dose Route Frequency    acetaminophen  650 mg oral BID    amiodarone  200 mg oral Daily    apixaban  5 mg oral BID    atorvastatin  80 mg oral Nightly    carbidopa-levodopa  1 tablet oral TID    cefepime  1 g intravenous q12h    cyclobenzaprine  10 mg oral Nightly    dapagliflozin propanediol  10 mg oral Daily    dexAMETHasone  6 mg intravenous q24h    [Held by provider] fluticasone furoate-vilanteroL  1 puff inhalation Daily    gabapentin  300 mg oral Nightly    insulin glargine  14 Units subcutaneous Nightly    insulin lispro  0-5 Units subcutaneous TID with meals    ipratropium-albuteroL  3 mL nebulization TID    lactobacillus acidophilus  1 tablet oral Daily    levothyroxine  25 mcg oral Daily    metoprolol succinate XL  12.5 mg oral  "Daily    nicotine  1 patch transdermal q24h    pantoprazole  20 mg oral Daily    prasugrel  10 mg oral Daily    QUEtiapine  25 mg oral Nightly    sertraline  100 mg oral Daily    SITagliptin phosphate  50 mg oral Daily    vancomycin-diluent combo no.1  750 mg intravenous q24h     Outpatient Medications:  Current Outpatient Medications   Medication Instructions    acetaminophen (TYLENOL) 650 mg, oral, 2 times daily    albuterol (Ventolin HFA) 90 mcg/actuation inhaler 2 puffs, inhalation, Every 4 hours PRN    amiodarone (Pacerone) 200 mg tablet 1 tablet, oral, Daily    amoxicillin (AMOXIL) 250 mg, oral, Nightly    atorvastatin (Lipitor) 80 mg tablet 1 tablet, oral, Daily    Autolet lancing device 3 times daily    azithromycin (Zithromax) 250 mg tablet Take 2 tablets (500 mg) by mouth once daily for 1 day, THEN 1 tablet (250 mg) once daily for 4 days. Take 2 tabs (500 mg) by mouth today, than 1 daily for 4 days..    BD Cynthia 2nd Gen Pen Needle 32 gauge x 5/32\" needle 1 each, subcutaneous, 3 times daily    carbidopa-levodopa (Sinemet CR)  mg ER tablet 1 tablet, oral, 3 times daily    cefuroxime (CEFTIN) 500 mg, oral, 2 times daily    cyclobenzaprine (Flexeril) 10 mg tablet 1 tablet, oral, Nightly    dapagliflozin propanediol (FARXIGA) 10 mg, oral, Daily    docusate sodium (COLACE) 100 mg, oral, 2 times daily PRN    ferrous sulfate (325 mg ferrous sulfate) 325 mg, oral, Daily with breakfast    fluticasone propion-salmeteroL (Advair Diskus) 250-50 mcg/dose diskus inhaler 1 puff, 2 times daily    FreeStyle Joan 2 [place] misc 1 each, subcutaneous, As needed    FreeStyle Joan 2 Sensor kit 1 each, subcutaneous, Every 14 days    furosemide (Lasix) 40 mg tablet 1 tablet, oral, Daily    gabapentin (Neurontin) 300 mg capsule 1 capsule, oral, Nightly    insulin glargine (LANTUS U-100 INSULIN) 30 Units, subcutaneous, Every evening    insulin lispro (HUMALOG KWIKPEN INSULIN) 8 Units, subcutaneous, 3 times daily with meals, 8 " "units plus sliding scale three times a day with meals Subcutaneous three times a day, max dose 16u three times a day<BR>    insulin syr/ndl U100 half lexie (BD Veo Insulin Syr, half unit,) 0.3 mL 31 gauge x 15/64\" syringe 1 Needle, miscellaneous, Daily    insulin syringe-needle U-100 (BD Veo Insulin Syringe UF) 1 mL 31 gauge x 15/64\" syringe 3 times daily    insulin syringe-needle U-100 31G X 5/16\" 1 mL syringe 1 each, subcutaneous, Daily, As directed daily    ipratropium-albuteroL (Duo-Neb) 0.5-2.5 mg/3 mL nebulizer solution 3 mL, nebulization, Every 6 hours    Lactobacillus acidophilus (ACIDOPHILUS ORAL) As directed oral    levothyroxine (SYNTHROID, LEVOXYL) 25 mcg, oral, Daily    methylPREDNISolone (Medrol Dospak) 4 mg tablets Take as directed on package.    metoprolol succinate XL (TOPROL-XL) 12.5 mg, oral, Daily    nicotine (Nicoderm CQ) 14 mg/24 hr patch 1 patch, transdermal, Every 24 hours    nicotine (Nicoderm CQ) 21 mg/24 hr patch 1 patch, transdermal, Every 24 hours    omeprazole (PRILOSEC) 40 mg, oral, Daily before breakfast    OneTouch Ultra Test strip Test 3-4 times a day    oxybutynin XL (DITROPAN-XL) 5 mg, oral, Daily    oxygen (O2) therapy 2 LPM throughout the night of sleep nasal canula daily    prasugrel (EFFIENT) 10 mg, oral, Daily    QUEtiapine (SEROQUEL) 25 mg, oral, Nightly    rivaroxaban (XARELTO) 15 mg, oral, Daily, With food    sertraline (Zoloft) 100 mg tablet 1 tablet, oral, Daily    SITagliptin phosphate (JANUVIA) 50 mg, oral, Daily    triamcinolone (Kenalog) 0.1 % cream Topical, Apply topically to affected area prn, As Needed       Physical Exam:  Patient was seen via window to conserve PPE as well as discussed with the nurse Fareed about treatment plans goals.  Monitor review as well as lab reviewed.  Last Labs:  CBC - 12/28/2023:  5:48 AM  21.2 12.3 452    37.1      CMP - 12/28/2023:  5:48 AM  8.3 5.7 28 --- 0.6   _ 2.6 <5 258      PTT - 12/28/2023:  5:48 AM  2.3   26.7 107.8 "     Troponin I, High Sensitivity   Date/Time Value Ref Range Status   12/27/2023 03:22  (HH) 0 - 20 ng/L Final     Comment:     Previous result verified on 12/27/2023 0920 on specimen/case [id number] called with component TRPHS for procedure Troponin I, High Sensitivity with value 62 ng/L.   12/27/2023 11:56  (HH) 0 - 20 ng/L Final     Comment:     Previous result verified on 12/27/2023 0920 on specimen/case [id number] called with component TRPHS for procedure Troponin I, High Sensitivity with value 62 ng/L.   12/27/2023 08:40 AM 62 (HH) 0 - 20 ng/L Final     BNP   Date/Time Value Ref Range Status   12/27/2023 10:04  (H) 0 - 99 pg/mL Final   12/17/2023 07:22  (H) 0 - 99 pg/mL Final     LDL Calculated   Date/Time Value Ref Range Status   06/02/2023 02:40 PM 39 (L) 65 - 130 MG/DL Final   10/08/2019 12:15 PM 96 65 - 130 MG/DL Final     VLDL   Date/Time Value Ref Range Status   10/22/2021 08:07 PM 18 0 - 40 mg/dL Final   09/28/2020 04:12 PM 46 (H) 0 - 40 mg/dL Final   10/02/2018 02:40 PM 50 (H) 0 - 40 mg/dL Final          ECG 12 lead  Undetermined rhythm  Left bundle branch block  Abnormal ECG  When compared with ECG of 27-DEC-2023 07:49, (unconfirmed)  Current undetermined rhythm precludes rhythm comparison, needs review  ECG 12 Lead  Wide QRS tachycardia with Premature supraventricular complexes and with occasional Premature ventricular complexes  Nonspecific intraventricular block  Cannot rule out Anteroseptal infarct , age undetermined  T wave abnormality, consider inferolateral ischemia  Abnormal ECG  When compared with ECG of 14-DEC-2023 19:10, (unconfirmed)  Wide QRS tachycardia has replaced Sinus rhythm  Vent. rate has increased BY  57 BPM  XR chest 1 view  Narrative: Interpreted By:  Anahi Fernando,   STUDY:  XR CHEST 1 VIEW;  12/27/2023 8:43 am      INDICATION:  Signs/Symptoms:Dypnea.      COMPARISON:  12/14/2023      ACCESSION NUMBER(S):  [id number]      ORDERING  CLINICIAN:  SYLVIA MOORE      FINDINGS:  CARDIOMEDIASTINAL SILHOUETTE:  The heart is enlarged.  There is a left subclavian pacemaker-AICD with a single tip in the  right atrium and two tips in the right ventricle.      LUNGS:  There are progressive perihilar alveolar opacities, likely pulmonary  edema but could also represent pneumonia. There is left basilar  atelectasis and volume loss which has progressed. There is no pleural  fluid.      ABDOMEN:  No remarkable upper abdominal findings.          BONES:  No acute osseous changes.      Impression: 1. Progressive bilateral predominantly perihilar alveolar opacities  consistent with pulmonary edema or pneumonia.  2. Progressive volume loss and atelectasis left lung base.      MACRO:  None      Signed by: Anahi Fernando 12/27/2023 8:50 AM  Dictation workstation:   [id number]      Principal Problem:    NSTEMI (non-ST elevated myocardial infarction) (CMS/HCC)    Assessment/Plan   Patient has about history of COVID-19.  Patient with now with CKD with A-fib with left bundle branch block EKG  Patient more likely demand ischemia type II MI.  Type II acute/MI.  No active chest pain tightness patient currently on Effient and Xarelto.  Will discontinue Xarelto and start patients on Eliquis 5 mg p.o. twice daily.  Discontinue heparin protocol.  No aggressive cardiac intervention.  Continue treatment for underlying hypoxia as well as a COVID 19.  Continue steroids and antibiotic.  X-ray showing multiple obesity consistent with COVID pneumonitis.  Advised patient for CHF diet to avoid SALTY 6/equals 1,000MG per DAY, Advised patient to watch out for diarrhea, dehydration and dizziness with CHF care plan. Advised patient for CHF diet to avoid SALTY 6/equals 1,000MG per DAY, Guideline directed medical therapy for CHF and CMP.  Critical care time is spent at bedside includes review of diagnostic tests, labs, and radiographs, serial assessments and management of hemodynamics,  EKGs, old echoes, cardiac work-up and coordination of care.  Assessment, impression and plans are reflected in the note above as well as the orders.    Code Status:  Full Code  I spent 45 minutes in the professional and overall care of this patient.  Martín Lakhani MD

## 2023-12-29 NOTE — NURSING NOTE
Patient repositioned.  HOB elevated.  Fresh water at bedside.  RT completed breathing tx.  Wants to trial on RA.  95% on RA at this time.  Encouraged to order breakfast.    AM assessment completed.

## 2023-12-29 NOTE — NURSING NOTE
Assumed care of patient.  Covid isolation in place.  Patient 98% on 2L oxygen via n/c.  Appears to be asleep.  No visible distress observed.

## 2023-12-29 NOTE — PROGRESS NOTES
Hai Spaulding is a 73 y.o. male on day 2 of admission presenting with NSTEMI (non-ST elevated myocardial infarction) (CMS/HCC).      Subjective   Patient states he is feeling better.  No shortness of breath at rest.  Cough improved.  Denies chest pain.       Objective   Pt is NAD.  Cooperative with exam.  In no distress at rest.  Currently on room air with pulse ox 96% at rest  A, Ox3.  Face is symmetrical, periorbital hematoma bilaterally, more on the right side  Skin - no lesions.  Lungs: clear to auscultations B/L.  Minute bilateral no wheezes, rales, rhonchi.  Heart: irregular S1S2.  Abdomen: soft, NT, ND. BS positive.  Extr.: no edema, cords, cyanosis.  Moves all extr.   Last Recorded Vitals  /68 (BP Location: Left arm, Patient Position: Lying)   Pulse 95   Temp 36 °C (96.8 °F) (Temporal)   Resp 19   Wt 68.4 kg (150 lb 12.7 oz)   SpO2 100%   Intake/Output last 3 Shifts:    Intake/Output Summary (Last 24 hours) at 12/29/2023 1000  Last data filed at 12/29/2023 0452  Gross per 24 hour   Intake 579.47 ml   Output --   Net 579.47 ml         Admission Weight  Weight: 67.2 kg (148 lb 2.4 oz) (12/27/23 2049)    Daily Weight  12/29/23 : 68.4 kg (150 lb 12.7 oz)    Image Results  ECG 12 lead  Undetermined rhythm  Left bundle branch block  Abnormal ECG  When compared with ECG of 27-DEC-2023 07:49, (unconfirmed)  Current undetermined rhythm precludes rhythm comparison, needs review  ECG 12 Lead  Wide QRS tachycardia with Premature supraventricular complexes and with occasional Premature ventricular complexes  Nonspecific intraventricular block  Cannot rule out Anteroseptal infarct , age undetermined  T wave abnormality, consider inferolateral ischemia  Abnormal ECG  When compared with ECG of 14-DEC-2023 19:10, (unconfirmed)  Wide QRS tachycardia has replaced Sinus rhythm  Vent. rate has increased BY  57 BPM  XR chest 1 view  Narrative: Interpreted By:  Anahi Fernando,   STUDY:  XR CHEST 1 VIEW;  12/27/2023  8:43 am      INDICATION:  Signs/Symptoms:Dypnea.      COMPARISON:  12/14/2023      ACCESSION NUMBER(S):  [id number]      ORDERING CLINICIAN:  SYLVIA MOORE      FINDINGS:  CARDIOMEDIASTINAL SILHOUETTE:  The heart is enlarged.  There is a left subclavian pacemaker-AICD with a single tip in the  right atrium and two tips in the right ventricle.      LUNGS:  There are progressive perihilar alveolar opacities, likely pulmonary  edema but could also represent pneumonia. There is left basilar  atelectasis and volume loss which has progressed. There is no pleural  fluid.      ABDOMEN:  No remarkable upper abdominal findings.          BONES:  No acute osseous changes.      Impression: 1. Progressive bilateral predominantly perihilar alveolar opacities  consistent with pulmonary edema or pneumonia.  2. Progressive volume loss and atelectasis left lung base.      MACRO:  None      Signed by: Anahi Fernando 12/27/2023 8:50 AM  Dictation workstation:   [id number]          Relevant Results               Assessment/Plan                  Principal Problem:    NSTEMI (non-ST elevated myocardial infarction) (CMS/HCC)  Likely, time to non-STEMI due to increased oxygen demand from COVID and acute respiratory failure with hypoxemia.  Cardiology consulted -no plans for intervention  COVID-19.  Continue dexamethasone.  ID consulted for consideration of possible remdesivir  Pneumonia, possible bacterial.  Continue antibacterial antibiotics. Zosyn.  Acute on chronic respiratory failure.  Improved.  Continue supplementary oxygen.  Chronic renal failure, slightly improved.  Monitor renal function.  Atrial fibrillation, chronic.  Continue anticoagulation.  Stop heparin drip.  Recent fall, nasal bone fracture.  Ischemic cardiomyopathy, the most recent ejection fraction 30 to 35% 1 week ago.  Status post pacemaker.    Leukocytosis, patient may have chronic leukocytic leukemia according to his wife.  Will monitor renal function and  CBC.  Patient's wife wants patient to go to rehab.  Discussed with patient.                Merlene Burleson MD

## 2023-12-29 NOTE — CARE PLAN
The patient's goals for the shift include rest    The clinical goals for the shift include discharge planning    Over the shift, the patient did not make progress toward the following goals. Barriers to progression include waiting for facility to accept. Recommendations to address these barriers include involve .

## 2023-12-29 NOTE — NURSING NOTE
Spoke with wife and update given.  Patient resting quietly in bed. Offers no complaints of pain/discomfort.  Remains on RA at this time with a POX greater than 93%.

## 2023-12-29 NOTE — NURSING NOTE
Patient resting quietly in bed.  Continues on IV ATB therapy with no adverse side effects.  Denies flu-like symptoms.  Repositioning provided.  Call light in reach.

## 2023-12-29 NOTE — PROGRESS NOTES
Hai Spaulding is a 73 y.o. male on day 2 of admission presenting with NSTEMI (non-ST elevated myocardial infarction) (CMS/HCC).    Subjective   Interval History:   Room not entered-limit exposure  Patient observed remotely  On low-flow oxygen      Review of Systems    Objective   Range of Vitals (last 24 hours)  Heart Rate:  []   Temp:  [35.6 °C (96.1 °F)-37.5 °C (99.5 °F)]   Resp:  [19-28]   BP: ()/(43-77)   Weight:  [68.4 kg (150 lb 12.7 oz)]   SpO2:  [94 %-100 %]   Daily Weight  12/29/23 : 68.4 kg (150 lb 12.7 oz)    Body mass index is 22.26 kg/m².    Physical Exam  Awake, alert    Antibiotics  acetaminophen (Tylenol) tablet    docusate sodium (Colace) capsule  nicotine (Nicoderm CQ) 14 mg/24 hr patch 1 patch  acetaminophen (Tylenol) tablet 650 mg  amiodarone (Pacerone) tablet 200 mg  albuterol 2.5 mg /3 mL (0.083 %) nebulizer solution 2.5 mg  atorvastatin (Lipitor) tablet 80 mg  carbidopa-levodopa (Sinemet CR)  mg ER tablet 1 tablet  cyclobenzaprine (Flexeril) tablet 10 mg  dapagliflozin propanediol (Farxiga) tablet 10 mg  docusate sodium (Colace) capsule 100 mg  fluticasone furoate-vilanteroL (Breo Ellipta) 100-25 mcg/dose inhaler 1 puff  gabapentin (Neurontin) capsule 300 mg  ipratropium-albuteroL (Duo-Neb) 0.5-2.5 mg/3 mL nebulizer solution 3 mL  lactobacillus acidophilus tablet 1 tablet  levothyroxine (Synthroid, Levoxyl) tablet 25 mcg  metoprolol succinate XL (Toprol-XL) 24 hr tablet 12.5 mg  pantoprazole (ProtoNix) EC tablet 20 mg  QUEtiapine (SEROquel) tablet 25 mg  sertraline (Zoloft) tablet 100 mg  SITagliptin phosphate (Januvia) tablet 50 mg  prasugrel (Effient) tablet 10 mg  heparin (porcine) injection 4,000 Units  heparin 25,000 Units in dextrose 5% 250 mL (100 Units/mL) infusion (premix)  heparin (porcine) injection 1,500-3,000 Units  dextrose 50 % injection 25 g  glucagon (Glucagen) injection 1 mg  dextrose 10 % in water (D10W) infusion  insulin glargine (Lantus) injection 14  Units  insulin lispro (HumaLOG) injection 0-5 Units  acetaminophen (Tylenol) tablet 650 mg  ondansetron (Zofran) injection 4 mg  dexAMETHasone (PF) (Decadron) injection 6 mg  cefepime (Maxipime) 1 g in dextrose 5 % 50 mL IV  vancomycin-diluent combo no.1 (Xellia) IVPB 750 mg  ipratropium-albuteroL (Duo-Neb) 0.5-2.5 mg/3 mL nebulizer solution 3 mL  albuterol 2.5 mg /3 mL (0.083 %) nebulizer solution 2.5 mg  apixaban (Eliquis) tablet 5 mg  sodium chloride (Ocean) 0.65 % nasal spray 1 spray  guaiFENesin (Robitussin) 100 mg/5 mL syrup 200 mg  melatonin tablet 5 mg  alum-mag hydroxide-simeth (Mylanta) 200-200-20 mg/5 mL oral suspension 30 mL  guaiFENesin (Mucinex) 12 hr tablet 600 mg  ondansetron ODT (Zofran-ODT) disintegrating tablet 4 mg  remdesivir (Veklury) 200 mg in sodium chloride 0.9% 250 mL IV  remdesivir (Veklury) 100 mg in sodium chloride 0.9% 250 mL IV  piperacillin-tazobactam-dextrose (Zosyn) IV 3.375 g      Relevant Results  Labs  Results from last 72 hours   Lab Units 12/29/23  0541 12/28/23  0548 12/27/23  2159 12/27/23  0840   WBC AUTO x10*3/uL 21.6* 21.2* 21.5* 25.6*   HEMOGLOBIN g/dL 11.8* 12.3* 11.8* 13.7   HEMATOCRIT % 35.7* 37.1* 37.2* 42.1   PLATELETS AUTO x10*3/uL 407 452* 463* 473*   NEUTROS PCT AUTO %  --   --   --  93.0   LYMPHS PCT AUTO %  --   --   --  1.2   MONOS PCT AUTO %  --   --   --  4.6   EOS PCT AUTO %  --   --   --  0.0     Results from last 72 hours   Lab Units 12/29/23  0541 12/28/23  0548 12/27/23  0840   SODIUM mmol/L 137 141 138   POTASSIUM mmol/L 4.6 5.0 4.7   CHLORIDE mmol/L 98 100 97*   CO2 mmol/L 23* 24 26   BUN mg/dL 80* 72* 66*   CREATININE mg/dL 1.90* 1.90* 2.05*   GLUCOSE mg/dL 354* 394* 352*   CALCIUM mg/dL 8.1* 8.3* 8.8   ANION GAP mmol/L 16 17 20   EGFR mL/min/1.73m*2 37* 37* 34*     Results from last 72 hours   Lab Units 12/28/23  0548 12/27/23  0840   ALK PHOS U/L 258* 266*   BILIRUBIN TOTAL mg/dL 0.6 0.9   BILIRUBIN DIRECT mg/dL 0.3*  --    PROTEIN TOTAL g/dL 5.7*  7.0   ALT U/L <5* 5*   AST U/L 28 23   ALBUMIN g/dL 2.6* 3.5     Estimated Creatinine Clearance: 34 mL/min (A) (by C-G formula based on SCr of 1.9 mg/dL (H)).  C-Reactive Protein   Date Value Ref Range Status   11/29/2023 2.53 (H) <1.00 mg/dL Final     CRP   Date Value Ref Range Status   12/31/2022 39.72 (A) mg/dL Final     Comment:     REF VALUE  < 1.00     12/14/2021 2.16 (A) mg/dL Final     Comment:     REF VALUE  < 1.00       Microbiology  Susceptibility data from last 14 days.  Collected Specimen Info Organism   12/15/23 Fluid from SPUTUM Candida albicans   12/15/23 Blood culture from Peripheral Venipuncture Coagulase negative staphylococcus       Imaging  ECG 12 lead    Result Date: 12/27/2023  Undetermined rhythm Left bundle branch block Abnormal ECG When compared with ECG of 27-DEC-2023 07:49, (unconfirmed) Current undetermined rhythm precludes rhythm comparison, needs review    ECG 12 Lead    Result Date: 12/27/2023  Wide QRS tachycardia with Premature supraventricular complexes and with occasional Premature ventricular complexes Nonspecific intraventricular block Cannot rule out Anteroseptal infarct , age undetermined T wave abnormality, consider inferolateral ischemia Abnormal ECG When compared with ECG of 14-DEC-2023 19:10, (unconfirmed) Wide QRS tachycardia has replaced Sinus rhythm Vent. rate has increased BY  57 BPM    XR chest 1 view    Result Date: 12/27/2023  Interpreted By:  Anahi Fernando, STUDY: XR CHEST 1 VIEW;  12/27/2023 8:43 am   INDICATION: Signs/Symptoms:Dypnea.   COMPARISON: 12/14/2023   ACCESSION NUMBER(S): [id number]   ORDERING CLINICIAN: SYLVIA MOORE   FINDINGS: CARDIOMEDIASTINAL SILHOUETTE: The heart is enlarged. There is a left subclavian pacemaker-AICD with a single tip in the right atrium and two tips in the right ventricle.   LUNGS: There are progressive perihilar alveolar opacities, likely pulmonary edema but could also represent pneumonia. There is left basilar  atelectasis and volume loss which has progressed. There is no pleural fluid.   ABDOMEN: No remarkable upper abdominal findings.     BONES: No acute osseous changes.       1. Progressive bilateral predominantly perihilar alveolar opacities consistent with pulmonary edema or pneumonia. 2. Progressive volume loss and atelectasis left lung base.   MACRO: None   Signed by: Anahi Martinesier 12/27/2023 8:50 AM Dictation workstation:   [id number]    CT FACIAL BONE/TITO WO IVCON    Result Date: 12/22/2023  * * *Final Report* * * DATE OF EXAM: Dec 22 2023  5:57PM   ASC   0507  -  CT FACIAL BONE/TITO WO IVCON  / ACCESSION #  [id number] PROCEDURE REASON: Maxillofacial pain      * * * * Physician Interpretation * * * *  EXAMINATION:  CT BRAIN WO IVCON, CT FACIAL BONE/TITO WO IVCON, CT CERVICAL SPINE WO IVCON CLINICAL HISTORY: Head trauma, moderate-severe (accession [id number]), Maxillofacial pain, Nasal fracture suspected (accession [id number]), Spine fracture, cervical, traumatic (accession [id number]) TECHNIQUE:  Serial axial images without IV contrast were obtained from the vertex to the foramen magnum.  CT of the cervical spine was also performed with axial sections from the skull base through the thoracic inlet. CT of the face was performed without IV contrast and multiplanar reconstructions were generated. MQ:  CTBWO_3 CT Dose-Length Product (DLP): 1565  mGy*cm CT Dose Reduction Employed: Iterative recon (accession [id number]), Automated exposure control(AEC) and iterative recon (accession [id number]) COMPARISON:  None. RESULT: CT HEAD: No acute intracranial hemorrhage or extra-axial fluid collection. No hydrocephalus, mass effect, or herniation. No acute ischemic infarct detected.  Mild background of white matter hypoattenuation consistent with chronic microvascular ischemic change. Unremarkable dural venous sinus attenuation. No acute osseous abnormality. Clear visualized paranasal sinuses and tympanomastoid cavities. CT face:  Soft Tissues:  Soft tissue swelling is present at the nose and along the right forehead/periorbital region. Facial bones:  Mildly comminuted bilateral nasal bone fractures are present. Orbits:  No evidence of an acute fracture.  The globes are intact.  The soft tissue planes of the orbits are maintained. Paranasal Sinuses:  The paranasal sinuses are clear. Foreign Bodies:  No evidence of radiopaque foreign bodies. Other:  No evidence of a remote fracture.  No lytic or blastic process seen in the facial bones. CT cervical spine: There is reversal of the normal cervical lordosis. There is no fracture or dislocation. Severe multilevel degenerative changes are present.  There is severe spinal canal stenosis at the C4-C5 level from a large posterior disc osteophyte complex.  Moderate spinal canal stenosis present at the C3-C4 level from a posterior disc osteophyte complex.  Severe bilateral neural foraminal narrowing is present at the C3-C4, C4-C5, and C5-C6 levels. Centrilobular emphysema.    IMPRESSION: 1.  No acute intracranial abnormality. 2.  No cervical spine fracture or traumatic subluxation.  Severe multilevel degenerative change. 3.  Nasal bone fractures with adjacent soft tissue swelling. : [facility]   Transcribe Date/Time: Dec 22 2023  6:05P Dictated by : MAIRA STEWART MD This examination was interpreted and the report reviewed and electronically signed by: MAIRA STEWART MD on Dec 22 2023  6:17PM  EST    CT CERVICAL SPINE WO IVCON    Result Date: 12/22/2023  * * *Final Report* * * DATE OF EXAM: Dec 22 2023  5:57PM   ASC   0505  -  CT CERVICAL SPINE WO IVCON  / ACCESSION #  [id number] PROCEDURE REASON: Spine fracture, cervical, traumatic      * * * * Physician Interpretation * * * *  EXAMINATION:  CT BRAIN WO IVCON, CT FACIAL BONE/TITO WO IVCON, CT CERVICAL SPINE WO IVCON CLINICAL HISTORY: Head trauma, moderate-severe (accession [id number]), Maxillofacial pain, Nasal fracture suspected (accession  [id number]), Spine fracture, cervical, traumatic (accession [id number]) TECHNIQUE:  Serial axial images without IV contrast were obtained from the vertex to the foramen magnum.  CT of the cervical spine was also performed with axial sections from the skull base through the thoracic inlet. CT of the face was performed without IV contrast and multiplanar reconstructions were generated. MQ:  CTBWO_3 CT Dose-Length Product (DLP): 1565  mGy*cm CT Dose Reduction Employed: Iterative recon (accession [id number]), Automated exposure control(AEC) and iterative recon (accession [id number]) COMPARISON:  None. RESULT: CT HEAD: No acute intracranial hemorrhage or extra-axial fluid collection. No hydrocephalus, mass effect, or herniation. No acute ischemic infarct detected.  Mild background of white matter hypoattenuation consistent with chronic microvascular ischemic change. Unremarkable dural venous sinus attenuation. No acute osseous abnormality. Clear visualized paranasal sinuses and tympanomastoid cavities. CT face: Soft Tissues:  Soft tissue swelling is present at the nose and along the right forehead/periorbital region. Facial bones:  Mildly comminuted bilateral nasal bone fractures are present. Orbits:  No evidence of an acute fracture.  The globes are intact.  The soft tissue planes of the orbits are maintained. Paranasal Sinuses:  The paranasal sinuses are clear. Foreign Bodies:  No evidence of radiopaque foreign bodies. Other:  No evidence of a remote fracture.  No lytic or blastic process seen in the facial bones. CT cervical spine: There is reversal of the normal cervical lordosis. There is no fracture or dislocation. Severe multilevel degenerative changes are present.  There is severe spinal canal stenosis at the C4-C5 level from a large posterior disc osteophyte complex.  Moderate spinal canal stenosis present at the C3-C4 level from a posterior disc osteophyte complex.  Severe bilateral neural foraminal narrowing is  present at the C3-C4, C4-C5, and C5-C6 levels. Centrilobular emphysema.    IMPRESSION: 1.  No acute intracranial abnormality. 2.  No cervical spine fracture or traumatic subluxation.  Severe multilevel degenerative change. 3.  Nasal bone fractures with adjacent soft tissue swelling. : [facility]   Transcribe Date/Time: Dec 22 2023  6:05P Dictated by : MAIRA STEWART MD This examination was interpreted and the report reviewed and electronically signed by: MAIRA STEWART MD on Dec 22 2023  6:17PM  EST    CT BRAIN WO IVCON    Result Date: 12/22/2023  * * *Final Report* * * DATE OF EXAM: Dec 22 2023  5:57PM   ASC   0504  -  CT BRAIN WO IVCON   / ACCESSION #  [id number] PROCEDURE REASON: Head trauma, moderate-severe      * * * * Physician Interpretation * * * *  EXAMINATION:  CT BRAIN WO IVCON, CT FACIAL BONE/TITO WO IVCON, CT CERVICAL SPINE WO IVCON CLINICAL HISTORY: Head trauma, moderate-severe (accession [id number]), Maxillofacial pain, Nasal fracture suspected (accession [id number]), Spine fracture, cervical, traumatic (accession [id number]) TECHNIQUE:  Serial axial images without IV contrast were obtained from the vertex to the foramen magnum.  CT of the cervical spine was also performed with axial sections from the skull base through the thoracic inlet. CT of the face was performed without IV contrast and multiplanar reconstructions were generated. MQ:  CTBWO_3 CT Dose-Length Product (DLP): 1565  mGy*cm CT Dose Reduction Employed: Iterative recon (accession [id number]), Automated exposure control(AEC) and iterative recon (accession [id number]) COMPARISON:  None. RESULT: CT HEAD: No acute intracranial hemorrhage or extra-axial fluid collection. No hydrocephalus, mass effect, or herniation. No acute ischemic infarct detected.  Mild background of white matter hypoattenuation consistent with chronic microvascular ischemic change. Unremarkable dural venous sinus attenuation. No acute osseous abnormality. Clear  visualized paranasal sinuses and tympanomastoid cavities. CT face: Soft Tissues:  Soft tissue swelling is present at the nose and along the right forehead/periorbital region. Facial bones:  Mildly comminuted bilateral nasal bone fractures are present. Orbits:  No evidence of an acute fracture.  The globes are intact.  The soft tissue planes of the orbits are maintained. Paranasal Sinuses:  The paranasal sinuses are clear. Foreign Bodies:  No evidence of radiopaque foreign bodies. Other:  No evidence of a remote fracture.  No lytic or blastic process seen in the facial bones. CT cervical spine: There is reversal of the normal cervical lordosis. There is no fracture or dislocation. Severe multilevel degenerative changes are present.  There is severe spinal canal stenosis at the C4-C5 level from a large posterior disc osteophyte complex.  Moderate spinal canal stenosis present at the C3-C4 level from a posterior disc osteophyte complex.  Severe bilateral neural foraminal narrowing is present at the C3-C4, C4-C5, and C5-C6 levels. Centrilobular emphysema.    IMPRESSION: 1.  No acute intracranial abnormality. 2.  No cervical spine fracture or traumatic subluxation.  Severe multilevel degenerative change. 3.  Nasal bone fractures with adjacent soft tissue swelling. : [facility]   Transcribe Date/Time: Dec 22 2023  6:05P Dictated by : MAIRA STEWART MD This examination was interpreted and the report reviewed and electronically signed by: MAIRA STEWART MD on Dec 22 2023  6:17PM  EST    ECG 12 lead    Result Date: 12/21/2023  Normal sinus rhythm Nonspecific T wave abnormality Abnormal ECG When compared with ECG of 31-DEC-2022 15:09, Previous ECG has undetermined rhythm, needs review QRS duration has decreased Nonspecific T wave abnormality has replaced inverted T waves in Inferior leads QT has shortened    Transthoracic Echo (TTE) Complete    Result Date: 12/19/2023   [facility], [address],  [place] [address]              Tel [phone] and Fax [phone] TRANSTHORACIC ECHOCARDIOGRAM REPORT  Patient Name:      IJEOMA CRUZ PERLA     Reading Physician:    [id number] Escobar Mendez MD Study Date:        12/18/2023          Ordering Provider:    [id number] GUNNAR HUGO MRN/PID:           [id number]            Fellow: Accession#:        [id number]        Nurse:                Mattie Sosa RN Date of Birth/Age: 1950 / 73 years Sonographer:          Cruz Wood SAMSON Gender:            M                   Additional Staff: Height:            175.26 cm           Admit Date: Weight:            74.84 kg            Admission Status:     Inpatient - Routine BSA:               1.90 m2             Encounter#:           [id number]                                        Department Location:  [facility] Blood Pressure: 112 /63 mmHg Study Type:    TRANSTHORACIC ECHO (TTE) COMPLETE Diagnosis/ICD: Cardiomyopathy, unspecified-I42.9 Indication:    Cardiomyopathy CPT Code:      Echo Complete w Full Doppler-[id number] Patient History: Diabetes:          Yes Pertinent History: A-Fib, CAD, CVA, HTN, Cancer, COPD and Syncope. Ischemic CM,                    cardiac stent. Study Detail: The following Echo studies were performed: 2D, M-Mode, Doppler and               color flow. Technically challenging study due to body habitus.               Definity used as a contrast agent for endocardial border               definition. Total contrast used for this procedure was 1.5 mL via               IV push. The patient was awake.  PHYSICIAN INTERPRETATION: Left Ventricle: The left ventricular systolic function is moderately to severely decreased, with an estimated ejection fraction of 30-35%. Wall motion is abnormal. The left ventricular cavity size is  mildly dilated. There is mild concentric left ventricular hypertrophy. Findings are consistent with ischemic cardiomyopathy. Spectral Doppler shows an impaired relaxation pattern of left ventricular diastolic filling. LV Wall Scoring: The mid inferolateral segment is akinetic. The mid and apical anterior wall, basal and mid anterolateral wall, mid anteroseptal segment, basal inferolateral segment, apical lateral segment, basal inferior segment, and apex are hypokinetic. All remaining scored segments are normal. Left Atrium: The left atrium is mildly dilated. Right Ventricle: The right ventricle is mildly enlarged. There is mildly reduced right ventricular systolic function. A device is visualized in the right ventricle. Right Atrium: The right atrium is mildly dilated. Aortic Valve: The aortic valve is trileaflet. There is mild aortic valve cusp calcification. There is no evidence of aortic valve regurgitation. The peak instantaneous gradient of the aortic valve is 3.5 mmHg. Mitral Valve: The mitral valve is normal in structure. There is mild mitral valve regurgitation. Tricuspid Valve: The tricuspid valve is structurally normal. There is mild tricuspid regurgitation. Pulmonic Valve: The pulmonic valve is structurally normal. There is physiologic pulmonic valve regurgitation. Pericardium: There is a trivial to small pericardial effusion. Aorta: The aortic root is normal. Pulmonary Artery: The tricuspid regurgitant velocity is 3.17 m/s, and with an estimated right atrial pressure of 3 mmHg, the estimated pulmonary artery pressure is mildly elevated with the RVSP at 43.2 mmHg. Pulmonary Veins: There is blunted systolic flow in the pulmonary veins. Systemic Veins: The inferior vena cava appears mildly dilated.  CONCLUSIONS:  1. Left ventricular systolic function is moderately to severely decreased with a 30-35% estimated ejection fraction.  2. Multiple segmental abnormalities exist. See findings.  3. Spectral Doppler  shows an impaired relaxation pattern of left ventricular diastolic filling.  4. There is ischemic cardiomyopathy.  5. There is mildly reduced right ventricular systolic function.  6. Mild pulmonary HTN. CVP is elevated. QUANTITATIVE DATA SUMMARY: 2D MEASUREMENTS:                          Normal Ranges: Ao Root d:     2.60 cm   (2.0-3.7cm) LAs:           4.60 cm   (2.7-4.0cm) IVSd:          1.02 cm   (0.6-1.1cm) LVPWd:         0.96 cm   (0.6-1.1cm) LVIDd:         4.75 cm   (3.9-5.9cm) LVIDs:         4.22 cm LV Mass Index: 86.6 g/m2 LV % FS        11.2 % LA VOLUME:                               Normal Ranges: LA Vol A4C:        104.7 ml   (22+/-6mL/m2) LA Vol A2C:        69.5 ml LA Vol BP:         92.7 ml LA Vol Index A4C:  55.0ml/m2 LA Vol Index A2C:  36.5 ml/m2 LA Vol Index BP:   48.7 ml/m2 LA Area A4C:       28.3 cm2 LA Area A2C:       21.2 cm2 LA Major Axis A4C: 6.5 cm LA Major Axis A2C: 5.5 cm LA Volume Index:   47.0 ml/m2 RA VOLUME BY A/L METHOD:                               Normal Ranges: RA Vol A4C:        62.8 ml    (8.3-19.5ml) RA Vol Index A4C:  33.0 ml/m2 RA Area A4C:       20.7 cm2 RA Major Axis A4C: 5.8 cm M-MODE MEASUREMENTS:                  Normal Ranges: Ao Root: 2.60 cm (2.0-3.7cm) LAs:     4.50 cm (2.7-4.0cm) AORTA MEASUREMENTS:                    Normal Ranges: Asc Ao, d: 2.90 cm (2.1-3.4cm) LV SYSTOLIC FUNCTION BY 2D PLANIMETRY (MOD):                     Normal Ranges: EF-A4C View: 28.3 % (>=55%) EF-A2C View: 30.5 % EF-Biplane:  29.5 % LV DIASTOLIC FUNCTION:                            Normal Ranges: MV Peak E:      0.93 m/s   (0.7-1.2 m/s) MV e'           0.05 m/s   (>8.0) MV lateral e'   0.05 m/s MV medial e'    0.05 m/s E/e' Ratio:     18.56      (<8.0) PulmV Sys Chet:  24.60 cm/s PulmV English Chet: 88.80 cm/s PulmV S/D Chet:  0.30 MITRAL VALVE:                 Normal Ranges: MV DT: 201 msec (150-240msec) AORTIC VALVE:                         Normal Ranges: AoV Vmax:      0.93 m/s (<=1.7m/s) AoV  Peak PG:   3.5 mmHg (<20mmHg) LVOT Max Chet:  0.79 m/s (<=1.1m/s) LVOT VTI:      16.30 cm LVOT Diameter: 1.90 cm  (1.8-2.4cm) AoV Area,Vmax: 2.41 cm2 (2.5-4.5cm2)  RIGHT VENTRICLE: RV Basal 3.70 cm RV Mid   2.90 cm RV Major 7.3 cm TAPSE:   13.7 mm RV s'    0.10 m/s TRICUSPID VALVE/RVSP:                             Normal Ranges: Peak TR Velocity: 3.17 m/s RV Syst Pressure: 43.2 mmHg (< 30mmHg) IVC Diam:         2.20 cm PULMONIC VALVE:                      Normal Ranges: PV Max Chet: 0.7 m/s  (0.6-0.9m/s) PV Max P.1 mmHg Pulmonary Veins: PulmV English Chet: 88.80 cm/s PulmV S/D Chet:  0.30 PulmV Sys Chet:  24.60 cm/s  [id number] Escobar Mendez MD Electronically signed on 2023 at 5:23:07 PM  Wall Scoring  ** Final **     XR chest 1 view    Result Date: 2023  Interpreted By:  Galen Lopez, STUDY: XR CHEST 1 VIEW;  2023 8:17 pm   INDICATION: Signs/Symptoms:sob.   COMPARISON: 07/10/2023   ACCESSION NUMBER(S): [id number]   ORDERING CLINICIAN: YADIRA VALE   FINDINGS: Pacemaker is seen. Heart size is enlarged. Bronchial thickening features suggesting bronchitis. Equivocal edema also noted. There does appear to be a right upper lobe opacity which is obscured by wires. Pneumonia not excluded. Lungs are hyperinflated       1.  Features of bronchitis and pulmonary edema. Lungs are hyperinflated . Equivocal right upper lobe nodular opacity. Follow-up is advised with two-view chest radiograph when feasible. Pneumonia can have this appearance       MACRO: None   Signed by: Galen Lopez 2023 8:50 PM Dictation workstation:   [id number]    CT chest wo IV contrast    Result Date: 2023  Interpreted By:  Ellis Rodriguez, STUDY: CT CHEST WO IV CONTRAST;  2023 12:39 pm   INDICATION: Signs/Symptoms:3 month follow up -  7 mm nodule superior segment left lower lobe. compare to August exam.   COMPARISON: 2023   ACCESSION NUMBER(S): [id number]   ORDERING CLINICIAN: JOSE ADKINS   TECHNIQUE:  Helical data acquisition of the chest was obtained without the use of IV contrast. Images were reformatted in axial, coronal, and sagittal planes.   FINDINGS: POTENTIAL LIMITATIONS OF THE STUDY:   Lack of IV contrast   HEART AND VESSELS:   There are atherosclerotic calcifications of the aorta and its branches. The aorta is unchanged in course and caliber.   The heart is unchanged in size.   No pericardial effusion is seen.   MEDIASTINUM AND CHRIS, LOWER NECK AND AXILLA: The visualized thyroid gland is within normal limits.   There is mild mediastinal lymphadenopathy. For example, precarinal lymph node measuring approximately 1.3 cm in short axis. Right paratracheal lymph node measuring approximately 1.2 cm in short axis. These are increased in size when compared to the previous study. No definite hilar lymphadenopathy, although evaluation is limited by lack of IV contrast.   Esophagus is stable in course and caliber.   LUNGS AND AIRWAYS: The trachea and central airways are patent. No endobronchial lesion.   There is partial collapse/consolidation of both lower lobes, worse on the left. Mild atelectasis/infiltrate is seen in the lingula. There are mild patchy nonspecific ground-glass opacities scattered within the lungs. There are emphysematous changes, most conspicuous in the upper lobes. There are small bilateral pleural effusions, slightly larger on the left.   UPPER ABDOMEN: The visualized subdiaphragmatic structures demonstrate no acute abnormality. Cholecystectomy. Stable mildly complex cystic lesion in the upper pole of the right kidney.   CHEST WALL AND OSSEOUS STRUCTURES: Degenerative changes. No acute process.       Partial collapse/consolidation of the lower lobes, worse on the left. Additional area of atelectasis or infiltrate in the lingula. Mild scattered patchy nonspecific ground-glass opacities in the lungs. Follow-up recommended to document resolution. Small bilateral pleural effusions, larger on the  left.   MACRO: None.   Signed by: Ellis Rodriguez 12/9/2023 9:38 AM Dictation workstation:   [id number]     Assessment/Plan     Acute respiratory failure-4LNC  Coronavirus infection  Chronic kidney disease stage III  Possible bacterial pneumonia  Elevated troponin-cardiology following     Continue dexamethasone-10 days  Continue remdesivir-up to 5 days while inpatient  Continue IV zosyn for possible pneumonia  Follow-up blood cultures  Droplet plus precautions  Monitor WBC and temperature  Monitor renal and hepatic function  Oxygen as needed  Supportive care    Frank Escalante MD

## 2023-12-29 NOTE — CARE PLAN
The patient's goals for the shift include rest    The clinical goals for the shift include monitor respiratory status and rest    Over the shift, the patient did not make progress toward the following goals. Barriers to progression include n/a. Recommendations to address these barriers include rest.

## 2023-12-29 NOTE — PROGRESS NOTES
Pt to be started on remdesivir on this day, to have 4 doses. [facility] spoke with Karen in admissions for [facility], they are ready to accept the pt, anticipate pt might dc to them on 1/2, if sooner will need to reach out to on call care coordinator who can make facility aware.      12/29/23 1140   Discharge Planning   Patient expects to be discharged to: [facility] - no precert needed

## 2023-12-29 NOTE — CARE PLAN
Problem: Respiratory  Goal: Minimize anxiety/maximize coping throughout shift  Outcome: Progressing  Goal: Minimal/no exertional discomfort or dyspnea this shift  Outcome: Progressing  Goal: No signs of respiratory distress (eg. Use of accessory muscles. Peds grunting)  Outcome: Progressing  Goal: Verbalize decreased shortness of breath this shift  Outcome: Progressing

## 2023-12-29 NOTE — PROGRESS NOTES
Physical Therapy    Physical Therapy Evaluation & Treatment    Patient Name: Hai Spaulding  MRN: [id number]  Today's Date: 12/29/2023   Time Calculation  Start Time: 1016  Stop Time: 1040  Time Calculation (min): 24 min    Assessment/Plan   PT Assessment  PT Assessment Results: Decreased endurance, Decreased range of motion, Decreased strength, Impaired balance, Decreased mobility, Decreased coordination, Decreased cognition, Impaired judgement, Decreased safety awareness, Impaired vision, Impaired hearing, Impaired sensation, Impaired tone, Decreased skin integrity, Pain  Rehab Prognosis: Good  Evaluation/Treatment Tolerance: Patient tolerated treatment well, Patient limited by fatigue  Medical Staff Made Aware: Yes  Strengths: Ability to acquire knowledge, Access to adaptive/assistive products, Attitude of self  Barriers to Participation: Comorbidities  End of Session Communication: Bedside nurse  Assessment Comment: The patient is a 73 y.o. male admitted to the hospital for chest pain and COVID+. The patient currently requires maxA for transfers and activity; pt would benefit from skilled therapy services to address residual functional deficits and decrease risk of falls. Pt is appropriate for moderate intensity rehab services on DC.  End of Session Patient Position: Bed, 3 rail up, Alarm on   IP OR SWING BED PT PLAN  Inpatient or Swing Bed: Inpatient  PT Plan  Treatment/Interventions: Bed mobility, Transfer training, Gait training, Balance training, Neuromuscular re-education, Strengthening, Endurance training, Range of motion, Therapeutic exercise, Therapeutic activity, Home exercise program  PT Plan: Skilled PT  PT Frequency: 4 times per week  PT Discharge Recommendations: Moderate intensity level of continued care  Equipment Recommended upon Discharge: Wheeled walker, Wheelchair  PT Recommended Transfer Status: Assist x2  PT - OK to Discharge: Yes (to next appropriate level of care.)      Subjective      General Visit Information:  General  Reason for Referral: mobility impairment  Referred By: Dr. Benjy MD  Past Medical History Relevant to Rehab: CVA, Parkinson's, anemia, CAD, spinal stenosis, CA, a-fib, neuropathy  Co-Treatment: OT  Co-Treatment Reason: Medically/physically complex initial eval  Prior to Session Communication: Bedside nurse  Patient Position Received: Bed, 3 rail up, Alarm on  Preferred Learning Style: verbal, visual  General Comment: Pt is a 72 yo male admitted to the hospital with SOB and elevated troponin levels. Pt apparently tested positive for COVID on Monday with his spouse.  Home Living:  Home Living  Type of Home: House  Lives With: Spouse  Home Adaptive Equipment: Hospital bed (rollator)  Home Layout: One level  Home Access: Stairs to enter with rails  Entrance Stairs-Rails: Right (grab bar)  Entrance Stairs-Number of Steps: 1  Bathroom Shower/Tub: Tub/shower unit  Bathroom Toilet: Standard  Bathroom Equipment: Grab bars in shower, Tub transfer bench  Home Living Comments: chair lift  Prior Level of Function:  Prior Function Per Pt/Caregiver Report  Level of [place]: Needs assistance with ADLs, Needs assistance with functional transfers  Receives Help From: Family (spouse)  ADL Assistance: Needs assistance (spouse assists as needed; able to feed self)  Homemaking Assistance: Needs assistance (spouse completes)  Ambulatory Assistance: Needs assistance (supervision with rollator for household distance)  Prior Function Comments: pt reports h/o recent falls.  Precautions:  Precautions  Medical Precautions: Fall precautions, Infection precautions (COVID+)  Precautions Comment: +COVID droplet precautions  Vital Signs:  Vital Signs  Heart Rate: (!) 115 (to 123 bpm with minimal activity)  Heart Rate Source: Monitor  Resp: 22  SpO2: 95 %  BP: 114/57  MAP (mmHg): 73  BP Location: Left arm  BP Method: Automatic  Patient Position: Lying    Objective   Pain:  Pain Assessment  Pain  Assessment: 0-10  Pain Score: 0 - No pain  Cognition:  Cognition  Overall Cognitive Status: Impaired  Orientation Level: Disoriented to time  Insight: Moderate    General Assessments:  General Observation  General Observation: Pt very pleasant; HR elevates to 120s with minimal activity. SpO2 to high 80s with recovery to 94%. VCs for pursed lip breathing. Pt denies SOB.     Activity Tolerance  Endurance: Decreased tolerance for upright activites    Sensation  Sensation Comment: decreased sensation Rt hand    Strength  Strength Comments: BLE grossly 3/5  Coordination  Coordination Comment: Increased time and effort for mobility initiation    Postural Control  Posture Comment: Heavy Lt trunk lean in supine and in seated EOB position requiring CGA to modA to maintain midline.    Static Sitting Balance  Static Sitting-Balance Support: Left upper extremity supported (on bedside)  Static Sitting-Level of Assistance: Contact guard, Moderate assistance  Static Sitting-Comment/Number of Minutes: Pt tolerated approx. >6 min sitting EOB; Heavy Lt trunk lean with poor trunk control.    Static Standing Balance  Static Standing-Balance Support: Bilateral upper extremity supported (HHA)  Static Standing-Level of Assistance: Maximum assistance  Static Standing-Comment/Number of Minutes: MaxA for static standing at bedside.  Functional Assessments:  Bed Mobility  Bed Mobility: Yes  Bed Mobility 1  Bed Mobility 1: Supine to sitting, Sitting to supine  Level of Assistance 1: Maximum assistance  Bed Mobility Comments 1: Assist for trunk up/down and BLE. Increased time and effort for completion. Difficulty utilizing RUE.    Transfers  Transfer: Yes  Transfer 1  Transfer From 1: Bed to, Stand to  Transfer to 1: Stand, Bed  Technique 1: Sit to stand, Stand to sit  Transfer Level of Assistance 1: Hand held assistance, Maximum assistance  Trials/Comments 1: MaxA for completion of sit <> stand. VCs for safe sequencing. Tolerance to stand  limited due to c/o fatigue and weakness.  Extremity/Trunk Assessments:  RLE   RLE : Exceptions to WFL  Strength RLE  RLE Overall Strength: Deficits (grossly 3-3+/5)  LLE   LLE : Exceptions to WFL  Strength LLE  LLE Overall Strength: Deficits (grossly 3-3+/5)  Treatments:  Bed Mobility  Bed Mobility: Yes  Bed Mobility 1  Bed Mobility 1: Supine to sitting, Sitting to supine  Level of Assistance 1: Maximum assistance  Bed Mobility Comments 1: Assist for trunk up/down and BLE. Increased time and effort for completion. Difficulty utilizing RUE.    Transfers  Transfer: Yes  Transfer 1  Transfer From 1: Bed to, Stand to  Transfer to 1: Stand, Bed  Technique 1: Sit to stand, Stand to sit  Transfer Level of Assistance 1: Hand held assistance, Maximum assistance  Trials/Comments 1: MaxA for completion of sit <> stand. VCs for safe sequencing. Tolerance to stand limited due to c/o fatigue and weakness.  Outcome Measures:  [facility] Basic Mobility  Turning from your back to your side while in a flat bed without using bedrails: A lot  Moving from lying on your back to sitting on the side of a flat bed without using bedrails: A lot  Moving to and from bed to chair (including a wheelchair): A lot  Standing up from a chair using your arms (e.g. wheelchair or bedside chair): A lot  To walk in hospital room: Total  Climbing 3-5 steps with railing: Total  Basic Mobility - Total Score: 10    Encounter Problems       Encounter Problems (Active)       PT Problem       The patient will demonstrate an overall strength of 4/5 in BLE to assist with completion of functional mobility.   (Progressing)       Start:  12/29/23    Expected End:  01/29/24            The patient will demonstrate good static and dynamic standing balance during activity with LRAD.  (Progressing)       Start:  12/29/23    Expected End:  01/29/24            The patient will be able to complete bed mobility at a [place] level with use of bed rails.   (Progressing)       Start:   12/29/23    Expected End:  01/29/24            The patient will be able to complete safe transfer of sit <> stand with minimal VC at a [place] level.   (Progressing)       Start:  12/29/23    Expected End:  01/29/24            The patient will be able to ambulate at a [place] level for 30ftx1 with RW.  (Progressing)       Start:  12/29/23    Expected End:  01/29/24               Pain - Adult              Education Documentation  Precautions, taught by Conchita Garg PT at 12/29/2023  1:05 PM.  Learner: Patient  Readiness: Acceptance  Method: Explanation  Response: Verbalizes Understanding, Needs Reinforcement    Mobility Training, taught by Conchita Garg PT at 12/29/2023  1:05 PM.  Learner: Patient  Readiness: Acceptance  Method: Explanation  Response: Verbalizes Understanding, Needs Reinforcement    Education Comments  No comments found.

## 2023-12-29 NOTE — PROGRESS NOTES
Occupational Therapy    Evaluation/Treatment    Patient Name: Hai Spaulding  MRN: [id number]  : 1950  Today's Date: 23  Time Calculation  Start Time: 1015  Stop Time: 1040  Time Calculation (min): 25 min       Assessment:  OT Assessment: Pt presents on eval with increased overall weakness, poor activity tolerance, cognitive deficits noted, RUE deficits at baseline, and poor overall balance affecting his self-care and functional transfers/mobility. Pt will benefit from continued skilled OT to address these deficits.  Prognosis: Fair  Evaluation/Treatment Tolerance: Patient limited by fatigue  End of Session Communication: Bedside nurse  End of Session Patient Position: Bed, 3 rail up, Alarm on (Needs in reach.)  OT Assessment Results: Decreased ADL status, Decreased upper extremity range of motion, Decreased upper extremity strength, Decreased safe judgment during ADL, Decreased cognition, Decreased endurance, Decreased fine motor control, Decreased functional mobility, Decreased gross motor control, Decreased trunk control for functional activities    Plan:  Treatment Interventions: ADL retraining, Functional transfer training, UE strengthening/ROM, Endurance training, Cognitive reorientation, Patient/family training, Equipment evaluation/education, Neuromuscular reeducation, Fine motor coordination activities, Compensatory technique education, Continued evaluation  OT Frequency: 4 times per week  OT Discharge Recommendations: Moderate intensity level of continued care  OT - OK to Discharge: Yes      Subjective     General:   OT Received On: 23  General  Reason for Referral: NSTEMI, CP, weakness, impaired ADL's, COVID  Referred By: Dr. Benjy MD  Past Medical History Relevant to Rehab: CVA, Parkinson's, anemia, CAD, spinal stenosis, CA, a-fib, neuropathy  Family/Caregiver Present: No  Co-Treatment: PT  Co-Treatment Reason: Medically/physically complex initial eval  Prior to Session  Communication: Bedside nurse  Patient Position Received: Bed, 3 rail up, Alarm on  General Comment: Pt is a 72 yo male admitted to the hospital with SOB and elevated troponin levels. Pt apparently tested positive for COVID on Monday with his spouse.    Precautions:  Medical Precautions: Fall precautions, Infection precautions  Precautions Comment: +COVID droplet precautions    Vital Signs:  Heart Rate: (!) 115  SpO2: 95 %  BP: 91/67    Pain:  Pain Assessment  Pain Assessment: 0-10  Pain Score: 0 - No pain    Objective     Cognition:  Overall Cognitive Status: Impaired  Orientation Level: Disoriented to time  Insight: Moderate    Home Living:  Type of Home: House  Lives With: Spouse  Home Adaptive Equipment: Hospital bed, Other (Comment) (rollator)  Home Layout: One level  Home Access: Stairs to enter with rails  Entrance Stairs-Rails:  (1 grab rail)  Entrance Stairs-Number of Steps: 1  Bathroom Shower/Tub: Tub/shower unit  Bathroom Toilet: Standard  Bathroom Equipment: Tub transfer bench, Grab bars in shower  Home Living Comments: Pt also owns a chair lift.    Prior Function:  Level of [place]: Needs assistance with ADLs, Needs assistance with homemaking  Receives Help From: Family (spouse)  ADL Assistance: Needs assistance (pt only able to feed self)  Homemaking Assistance: Needs assistance  Ambulatory Assistance: Needs assistance (Pt was apparently independent using a rollator prior to recent admits.)  Hand Dominance: Left    ADL:  Eating Assistance: Minimal  Grooming Assistance: Maximal  Bathing Assistance: Maximal  UE Dressing Assistance: Maximal  LE Dressing Assistance: Total  Toileting Assistance with Device: Total    Activity Tolerance:  Activity Tolerance Comments: impaired due to increased overall weakness and COVID diagnosis    Bed Mobility/Transfers: Bed Mobility  Bed Mobility:  (Pt completed supine<>sit in bed with max A x2 required due to increased overall weakness and R sided  deficits.)    Transfers  Transfer:  (Pt completed sit to stand from the EOB with max A due to increased overall weakness and poor standing balance.)    Sitting Balance:  Static Sitting Balance  Static Sitting-Balance Support: Bilateral upper extremity supported  Static Sitting-Level of Assistance: Minimum assistance, Contact guard  Static Sitting-Comment/Number of Minutes: 5 minutes with lateral leaning to the left needing tactile assist to correct intermittently    Therapy/Activity: Therapeutic Activity  Therapeutic Activity Performed:  (See bed mobility, transfers, and static sitting balance.)    Sensation:  Sensation Comment: BUYADIEL's WFL    Strength:  Strength Comments: RUE impaired with rigidity and pt tends to keep UE in extension at elbow at rest (LUE 4-/5)    Coordination:  Coordination Comment: RUE impaired, LUE WFL     Hand Function:  Hand Function  Gross Grasp: Impaired (RUE)  Coordination: Impaired (RUE)      Outcome Measures: [facility] Daily Activity  Putting on and taking off regular lower body clothing: Total  Bathing (including washing, rinsing, drying): A lot  Putting on and taking off regular upper body clothing: A lot  Toileting, which includes using toilet, bedpan or urinal: Total  Taking care of personal grooming such as brushing teeth: A lot  Eating Meals: A little  Daily Activity - Total Score: 11      Education Documentation  Home Exercise Program, taught by Hola Donald Jr., OT at 12/29/2023  1:17 PM.  Learner: Patient  Readiness: Acceptance  Method: Explanation  Response: Needs Reinforcement    ADL Training, taught by Hola Donald Jr., OT at 12/29/2023  1:17 PM.  Learner: Patient  Readiness: Acceptance  Method: Explanation  Response: Needs Reinforcement    Education Comments  No comments found.         Goals:  Encounter Problems       Encounter Problems (Active)       OT Goals       Pt will complete all functional transfers and mobility with min A using a device as recommended by skilled PT.  (Progressing)       Start:  12/29/23    Expected End:  01/26/24            Pt will complete self-feeding with setup using AE as needed. (Progressing)       Start:  12/29/23    Expected End:  01/26/24            Pt will complete all grooming tasks with min A using AE as needed. (Progressing)       Start:  12/29/23    Expected End:  01/26/24            Pt will tolerate sitting EOB unsupported for at least 10-15 minutes with F+ balance in order to enhance ADL's. (Progressing)       Start:  12/29/23    Expected End:  01/26/24

## 2023-12-29 NOTE — PROGRESS NOTES
Subjective Data:  Patient currently COVID-19 positive for type II MI.  Underlying atrial fibrillation continue rate control medication.  Somewhat better with steroid.  Patient with history of stroke with right-sided hemiparesis.  Chest x-ray suggestive of progressive bilateral perihilar irregular opacity pneumonia versus CHF.  Overnight Events:    None  Objective   Last Recorded Vitals  /73 (BP Location: Left arm, Patient Position: Sitting)   Pulse (!) 115   Temp 35.6 °C (96.1 °F) (Temporal)   Resp 20   Wt 68.4 kg (150 lb 12.7 oz)   SpO2 96%     Intake/Output Summary (Last 24 hours) at 12/29/2023 1633  Last data filed at 12/29/2023 1354  Gross per 24 hour   Intake 699.47 ml   Output --   Net 699.47 ml     Physical Exam:  Patient was seen via window.  Discussed with nurse Parisi about the treatment plans goal adjust medication.  Image Results  ECG 12 lead  Undetermined rhythm  Left bundle branch block  Abnormal ECG  When compared with ECG of 27-DEC-2023 07:49, (unconfirmed)  Current undetermined rhythm precludes rhythm comparison, needs review  ECG 12 Lead  Wide QRS tachycardia with Premature supraventricular complexes and with occasional Premature ventricular complexes  Nonspecific intraventricular block  Cannot rule out Anteroseptal infarct , age undetermined  T wave abnormality, consider inferolateral ischemia  Abnormal ECG  When compared with ECG of 14-DEC-2023 19:10, (unconfirmed)  Wide QRS tachycardia has replaced Sinus rhythm  Vent. rate has increased BY  57 BPM  XR chest 1 view  Narrative: Interpreted By:  Anahi Fernando,   STUDY:  XR CHEST 1 VIEW;  12/27/2023 8:43 am      INDICATION:  Signs/Symptoms:Dypnea.      COMPARISON:  12/14/2023      ACCESSION NUMBER(S):  [id number]      ORDERING CLINICIAN:  SYLVIA MARINODAMICHAELLE      FINDINGS:  CARDIOMEDIASTINAL SILHOUETTE:  The heart is enlarged.  There is a left subclavian pacemaker-AICD with a single tip in the  right atrium and two tips in the right  ventricle.      LUNGS:  There are progressive perihilar alveolar opacities, likely pulmonary  edema but could also represent pneumonia. There is left basilar  atelectasis and volume loss which has progressed. There is no pleural  fluid.      ABDOMEN:  No remarkable upper abdominal findings.          BONES:  No acute osseous changes.      Impression: 1. Progressive bilateral predominantly perihilar alveolar opacities  consistent with pulmonary edema or pneumonia.  2. Progressive volume loss and atelectasis left lung base.      MACRO:  None      Signed by: Anahi Fernando 12/27/2023 8:50 AM  Dictation workstation:   [id number]    Last Labs:  CBC - 12/29/2023:  5:41 AM  21.6 11.8 407    35.7      CMP - 12/29/2023:  5:41 AM  8.1 5.7 28 --- 0.6   _ 2.6 <5 258      PTT - 12/28/2023:  5:48 AM  2.3   26.7 107.8     Inpatient Medications:  Scheduled medications   Medication Dose Route Frequency    acetaminophen  650 mg oral BID    amiodarone  200 mg oral BID    apixaban  5 mg oral BID    atorvastatin  80 mg oral Nightly    carbidopa-levodopa  1 tablet oral TID    cyclobenzaprine  10 mg oral Nightly    dapagliflozin propanediol  10 mg oral Daily    dexAMETHasone  6 mg intravenous q24h    [Held by provider] fluticasone furoate-vilanteroL  1 puff inhalation Daily    gabapentin  300 mg oral Nightly    insulin glargine  14 Units subcutaneous Nightly    insulin lispro  0-5 Units subcutaneous TID with meals    ipratropium-albuteroL  3 mL nebulization TID    lactobacillus acidophilus  1 tablet oral Daily    levothyroxine  25 mcg oral Daily    metoprolol succinate XL  25 mg oral BID    nicotine  1 patch transdermal q24h    pantoprazole  20 mg oral Daily    piperacillin-tazobactam  3.375 g intravenous q6h    prasugrel  10 mg oral Daily    QUEtiapine  25 mg oral Nightly    remdesivir  100 mg intravenous q24h    sertraline  100 mg oral Daily    SITagliptin phosphate  50 mg oral Daily     Principal Problem:    NSTEMI (non-ST elevated  myocardial infarction) (CMS/[facility])    Assessment/Plan   Patient history of nonresolution MI.  More likely type II MI with demand ischemia.  Medical therapy at the moment.  Continue statin therapy, Eliquis, rate control occasion.  Continue antibiotic for the pneumonia possible.  Continue rate control medication for atrial fibrillation.  Discontinue heparin drip and start patients on Eliquis.  Will follow as needed.  Critical care time is spent at bedside includes review of diagnostic tests, labs, and radiographs, serial assessments and management of hemodynamics, EKGs, old echoes, cardiac work-up and coordination of care.  Assessment, impression and plans are reflected in the note above as well as the orders.    Code Status:  Full Code  I spent 30 minutes in the professional and overall care of this patient.  Martín Lakhani MD

## 2023-12-30 NOTE — NURSING NOTE
Assumed care of patient from dayshift nurse. Patient resting comfortably in bed at this time with no complaints of pain and or discomfort. Fluids infusing through rt peripheral IV at KVO. On room air at this time. Bed in lowest position and locked, bed alarm on and functioning, and call bell and personal belongings within reach. No further interventions at this time.

## 2023-12-30 NOTE — PROGRESS NOTES
Hai Spaulding is a 73 y.o. male on day 3 of admission presenting with NSTEMI (non-ST elevated myocardial infarction) (CMS/HCC).    Subjective   Interval History:   Afebrile, no chills  Confused  Nonproductive cough  No chest pain  No nausea vomiting        Review of Systems   All other systems reviewed and are negative.      Objective   Range of Vitals (last 24 hours)  Heart Rate:  []   Temp:  [35.6 °C (96.1 °F)-36.9 °C (98.4 °F)]   Resp:  [16-22]   BP: ()/(48-73)   SpO2:  [95 %-98 %]   Daily Weight  12/29/23 : 68.4 kg (150 lb 12.7 oz)    Body mass index is 22.26 kg/m².    Physical Exam  Constitutional:       Appearance: Normal appearance.   HENT:      Head: Normocephalic.      Nose: Nose normal.      Mouth/Throat:      Mouth: Mucous membranes are moist.      Pharynx: Oropharynx is clear.   Eyes:      Extraocular Movements: Extraocular movements intact.      Conjunctiva/sclera: Conjunctivae normal.      Comments: Right periorbital ecchymosis   Cardiovascular:      Rate and Rhythm: Tachycardia present. Rhythm irregular.   Pulmonary:      Effort: Pulmonary effort is normal.      Breath sounds: Normal breath sounds. No wheezing or rhonchi.   Abdominal:      General: Bowel sounds are normal.      Palpations: Abdomen is soft.      Tenderness: There is no abdominal tenderness.   Musculoskeletal:         General: Normal range of motion.      Cervical back: Normal range of motion and neck supple.   Skin:     General: Skin is warm and dry.      Comments: Right anterior lower leg with hemosiderin staining   Neurological:      General: No focal deficit present.      Mental Status: He is alert and oriented to person, place, and time.   Psychiatric:         Mood and Affect: Mood normal.         Behavior: Behavior normal.     Antibiotics  acetaminophen (Tylenol) tablet    docusate sodium (Colace) capsule  nicotine (Nicoderm CQ) 14 mg/24 hr patch 1 patch  acetaminophen (Tylenol) tablet 650 mg  amiodarone (Pacerone)  tablet 200 mg  albuterol 2.5 mg /3 mL (0.083 %) nebulizer solution 2.5 mg  atorvastatin (Lipitor) tablet 80 mg  carbidopa-levodopa (Sinemet CR)  mg ER tablet 1 tablet  cyclobenzaprine (Flexeril) tablet 10 mg  dapagliflozin propanediol (Farxiga) tablet 10 mg  docusate sodium (Colace) capsule 100 mg  fluticasone furoate-vilanteroL (Breo Ellipta) 100-25 mcg/dose inhaler 1 puff  gabapentin (Neurontin) capsule 300 mg  ipratropium-albuteroL (Duo-Neb) 0.5-2.5 mg/3 mL nebulizer solution 3 mL  lactobacillus acidophilus tablet 1 tablet  levothyroxine (Synthroid, Levoxyl) tablet 25 mcg  metoprolol succinate XL (Toprol-XL) 24 hr tablet 12.5 mg  pantoprazole (ProtoNix) EC tablet 20 mg  QUEtiapine (SEROquel) tablet 25 mg  sertraline (Zoloft) tablet 100 mg  SITagliptin phosphate (Januvia) tablet 50 mg  prasugrel (Effient) tablet 10 mg  heparin (porcine) injection 4,000 Units  heparin 25,000 Units in dextrose 5% 250 mL (100 Units/mL) infusion (premix)  heparin (porcine) injection 1,500-3,000 Units  dextrose 50 % injection 25 g  glucagon (Glucagen) injection 1 mg  dextrose 10 % in water (D10W) infusion  insulin glargine (Lantus) injection 14 Units  insulin lispro (HumaLOG) injection 0-5 Units  acetaminophen (Tylenol) tablet 650 mg  ondansetron (Zofran) injection 4 mg  dexAMETHasone (PF) (Decadron) injection 6 mg  cefepime (Maxipime) 1 g in dextrose 5 % 50 mL IV  vancomycin-diluent combo no.1 (Xellia) IVPB 750 mg  ipratropium-albuteroL (Duo-Neb) 0.5-2.5 mg/3 mL nebulizer solution 3 mL  albuterol 2.5 mg /3 mL (0.083 %) nebulizer solution 2.5 mg  apixaban (Eliquis) tablet 5 mg  sodium chloride (Ocean) 0.65 % nasal spray 1 spray  guaiFENesin (Robitussin) 100 mg/5 mL syrup 200 mg  melatonin tablet 5 mg  alum-mag hydroxide-simeth (Mylanta) 200-200-20 mg/5 mL oral suspension 30 mL  guaiFENesin (Mucinex) 12 hr tablet 600 mg  ondansetron ODT (Zofran-ODT) disintegrating tablet 4 mg  remdesivir (Veklury) 200 mg in sodium chloride 0.9%  250 mL IV  remdesivir (Veklury) 100 mg in sodium chloride 0.9% 250 mL IV  piperacillin-tazobactam-dextrose (Zosyn) IV 3.375 g  amiodarone (Pacerone) tablet 200 mg  metoprolol succinate XL (Toprol-XL) 24 hr tablet 25 mg      Relevant Results  Labs  Results from last 72 hours   Lab Units 12/30/23  0533 12/30/23  0030 12/29/23  0541   WBC AUTO x10*3/uL 20.2* 21.1* 21.6*   HEMOGLOBIN g/dL 13.3* 13.0* 11.8*   HEMATOCRIT % 39.5* 38.0* 35.7*   PLATELETS AUTO x10*3/uL 430 409 407     Results from last 72 hours   Lab Units 12/30/23  0533 12/29/23  0541 12/28/23  0548   SODIUM mmol/L 133 137 141   POTASSIUM mmol/L 4.4 4.6 5.0   CHLORIDE mmol/L 95* 98 100   CO2 mmol/L 23* 23* 24   BUN mg/dL 74* 80* 72*   CREATININE mg/dL 2.10* 1.90* 1.90*   GLUCOSE mg/dL 258* 354* 394*   CALCIUM mg/dL 8.2* 8.1* 8.3*   ANION GAP mmol/L 15 16 17   EGFR mL/min/1.73m*2 33* 37* 37*     Results from last 72 hours   Lab Units 12/28/23  0548   ALK PHOS U/L 258*   BILIRUBIN TOTAL mg/dL 0.6   BILIRUBIN DIRECT mg/dL 0.3*   PROTEIN TOTAL g/dL 5.7*   ALT U/L <5*   AST U/L 28   ALBUMIN g/dL 2.6*     Estimated Creatinine Clearance: 30.8 mL/min (A) (by C-G formula based on SCr of 2.1 mg/dL (H)).  C-Reactive Protein   Date Value Ref Range Status   11/29/2023 2.53 (H) <1.00 mg/dL Final     CRP   Date Value Ref Range Status   12/31/2022 39.72 (A) mg/dL Final     Comment:     REF VALUE  < 1.00     12/14/2021 2.16 (A) mg/dL Final     Comment:     REF VALUE  < 1.00       Microbiology  Reviewed  Imaging  ECG 12 lead    Result Date: 12/27/2023  Undetermined rhythm Left bundle branch block Abnormal ECG When compared with ECG of 27-DEC-2023 07:49, (unconfirmed) Current undetermined rhythm precludes rhythm comparison, needs review    ECG 12 Lead    Result Date: 12/27/2023  Wide QRS tachycardia with Premature supraventricular complexes and with occasional Premature ventricular complexes Nonspecific intraventricular block Cannot rule out Anteroseptal infarct , age  undetermined T wave abnormality, consider inferolateral ischemia Abnormal ECG When compared with ECG of 14-DEC-2023 19:10, (unconfirmed) Wide QRS tachycardia has replaced Sinus rhythm Vent. rate has increased BY  57 BPM    XR chest 1 view    Result Date: 12/27/2023  Interpreted By:  Anahi Fernando, STUDY: XR CHEST 1 VIEW;  12/27/2023 8:43 am   INDICATION: Signs/Symptoms:Dypnea.   COMPARISON: 12/14/2023   ACCESSION NUMBER(S): [id number]   ORDERING CLINICIAN: SYLVIA MOORE   FINDINGS: CARDIOMEDIASTINAL SILHOUETTE: The heart is enlarged. There is a left subclavian pacemaker-AICD with a single tip in the right atrium and two tips in the right ventricle.   LUNGS: There are progressive perihilar alveolar opacities, likely pulmonary edema but could also represent pneumonia. There is left basilar atelectasis and volume loss which has progressed. There is no pleural fluid.   ABDOMEN: No remarkable upper abdominal findings.     BONES: No acute osseous changes.       1. Progressive bilateral predominantly perihilar alveolar opacities consistent with pulmonary edema or pneumonia. 2. Progressive volume loss and atelectasis left lung base.   MACRO: None   Signed by: Anahi Fernando 12/27/2023 8:50 AM Dictation workstation:   [id number]    CT FACIAL BONE/TITO WO IVCON    Result Date: 12/22/2023  * * *Final Report* * * DATE OF EXAM: Dec 22 2023  5:57PM   ASC   0507  -  CT FACIAL BONE/TITO WO IVCON  / ACCESSION #  [id number] PROCEDURE REASON: Maxillofacial pain      * * * * Physician Interpretation * * * *  EXAMINATION:  CT BRAIN WO IVCON, CT FACIAL BONE/TITO WO IVCON, CT CERVICAL SPINE WO IVCON CLINICAL HISTORY: Head trauma, moderate-severe (accession [id number]), Maxillofacial pain, Nasal fracture suspected (accession [id number]), Spine fracture, cervical, traumatic (accession [id number]) TECHNIQUE:  Serial axial images without IV contrast were obtained from the vertex to the foramen magnum.  CT of the cervical spine was also  performed with axial sections from the skull base through the thoracic inlet. CT of the face was performed without IV contrast and multiplanar reconstructions were generated. MQ:  CTBWO_3 CT Dose-Length Product (DLP): 1565  mGy*cm CT Dose Reduction Employed: Iterative recon (accession [id number]), Automated exposure control(AEC) and iterative recon (accession [id number]) COMPARISON:  None. RESULT: CT HEAD: No acute intracranial hemorrhage or extra-axial fluid collection. No hydrocephalus, mass effect, or herniation. No acute ischemic infarct detected.  Mild background of white matter hypoattenuation consistent with chronic microvascular ischemic change. Unremarkable dural venous sinus attenuation. No acute osseous abnormality. Clear visualized paranasal sinuses and tympanomastoid cavities. CT face: Soft Tissues:  Soft tissue swelling is present at the nose and along the right forehead/periorbital region. Facial bones:  Mildly comminuted bilateral nasal bone fractures are present. Orbits:  No evidence of an acute fracture.  The globes are intact.  The soft tissue planes of the orbits are maintained. Paranasal Sinuses:  The paranasal sinuses are clear. Foreign Bodies:  No evidence of radiopaque foreign bodies. Other:  No evidence of a remote fracture.  No lytic or blastic process seen in the facial bones. CT cervical spine: There is reversal of the normal cervical lordosis. There is no fracture or dislocation. Severe multilevel degenerative changes are present.  There is severe spinal canal stenosis at the C4-C5 level from a large posterior disc osteophyte complex.  Moderate spinal canal stenosis present at the C3-C4 level from a posterior disc osteophyte complex.  Severe bilateral neural foraminal narrowing is present at the C3-C4, C4-C5, and C5-C6 levels. Centrilobular emphysema.    IMPRESSION: 1.  No acute intracranial abnormality. 2.  No cervical spine fracture or traumatic subluxation.  Severe multilevel  degenerative change. 3.  Nasal bone fractures with adjacent soft tissue swelling. : [facility]   Transcribe Date/Time: Dec 22 2023  6:05P Dictated by : MAIRA STEWART MD This examination was interpreted and the report reviewed and electronically signed by: MAIRA STEWART MD on Dec 22 2023  6:17PM  EST    CT CERVICAL SPINE WO IVCON    Result Date: 12/22/2023  * * *Final Report* * * DATE OF EXAM: Dec 22 2023  5:57PM   ASC   0505  -  CT CERVICAL SPINE WO IVCON  / ACCESSION #  [id number] PROCEDURE REASON: Spine fracture, cervical, traumatic      * * * * Physician Interpretation * * * *  EXAMINATION:  CT BRAIN WO IVCON, CT FACIAL BONE/TITO WO IVCON, CT CERVICAL SPINE WO IVCON CLINICAL HISTORY: Head trauma, moderate-severe (accession [id number]), Maxillofacial pain, Nasal fracture suspected (accession [id number]), Spine fracture, cervical, traumatic (accession [id number]) TECHNIQUE:  Serial axial images without IV contrast were obtained from the vertex to the foramen magnum.  CT of the cervical spine was also performed with axial sections from the skull base through the thoracic inlet. CT of the face was performed without IV contrast and multiplanar reconstructions were generated. MQ:  CTBWO_3 CT Dose-Length Product (DLP): 1565  mGy*cm CT Dose Reduction Employed: Iterative recon (accession [id number]), Automated exposure control(AEC) and iterative recon (accession [id number]) COMPARISON:  None. RESULT: CT HEAD: No acute intracranial hemorrhage or extra-axial fluid collection. No hydrocephalus, mass effect, or herniation. No acute ischemic infarct detected.  Mild background of white matter hypoattenuation consistent with chronic microvascular ischemic change. Unremarkable dural venous sinus attenuation. No acute osseous abnormality. Clear visualized paranasal sinuses and tympanomastoid cavities. CT face: Soft Tissues:  Soft tissue swelling is present at the nose and along the right forehead/periorbital region. Facial  bones:  Mildly comminuted bilateral nasal bone fractures are present. Orbits:  No evidence of an acute fracture.  The globes are intact.  The soft tissue planes of the orbits are maintained. Paranasal Sinuses:  The paranasal sinuses are clear. Foreign Bodies:  No evidence of radiopaque foreign bodies. Other:  No evidence of a remote fracture.  No lytic or blastic process seen in the facial bones. CT cervical spine: There is reversal of the normal cervical lordosis. There is no fracture or dislocation. Severe multilevel degenerative changes are present.  There is severe spinal canal stenosis at the C4-C5 level from a large posterior disc osteophyte complex.  Moderate spinal canal stenosis present at the C3-C4 level from a posterior disc osteophyte complex.  Severe bilateral neural foraminal narrowing is present at the C3-C4, C4-C5, and C5-C6 levels. Centrilobular emphysema.    IMPRESSION: 1.  No acute intracranial abnormality. 2.  No cervical spine fracture or traumatic subluxation.  Severe multilevel degenerative change. 3.  Nasal bone fractures with adjacent soft tissue swelling. : [facility]   Transcribe Date/Time: Dec 22 2023  6:05P Dictated by : MAIRA STEWART MD This examination was interpreted and the report reviewed and electronically signed by: MAIRA STEWART MD on Dec 22 2023  6:17PM  EST    CT BRAIN WO IVCON    Result Date: 12/22/2023  * * *Final Report* * * DATE OF EXAM: Dec 22 2023  5:57PM   ASC   0504  -  CT BRAIN WO IVCON   / ACCESSION #  [id number] PROCEDURE REASON: Head trauma, moderate-severe      * * * * Physician Interpretation * * * *  EXAMINATION:  CT BRAIN WO IVCON, CT FACIAL BONE/TITO WO IVCON, CT CERVICAL SPINE WO IVCON CLINICAL HISTORY: Head trauma, moderate-severe (accession [id number]), Maxillofacial pain, Nasal fracture suspected (accession [id number]), Spine fracture, cervical, traumatic (accession [id number]) TECHNIQUE:  Serial axial images without IV contrast were obtained from  the vertex to the foramen magnum.  CT of the cervical spine was also performed with axial sections from the skull base through the thoracic inlet. CT of the face was performed without IV contrast and multiplanar reconstructions were generated. MQ:  CTBWO_3 CT Dose-Length Product (DLP): 1565  mGy*cm CT Dose Reduction Employed: Iterative recon (accession [id number]), Automated exposure control(AEC) and iterative recon (accession [id number]) COMPARISON:  None. RESULT: CT HEAD: No acute intracranial hemorrhage or extra-axial fluid collection. No hydrocephalus, mass effect, or herniation. No acute ischemic infarct detected.  Mild background of white matter hypoattenuation consistent with chronic microvascular ischemic change. Unremarkable dural venous sinus attenuation. No acute osseous abnormality. Clear visualized paranasal sinuses and tympanomastoid cavities. CT face: Soft Tissues:  Soft tissue swelling is present at the nose and along the right forehead/periorbital region. Facial bones:  Mildly comminuted bilateral nasal bone fractures are present. Orbits:  No evidence of an acute fracture.  The globes are intact.  The soft tissue planes of the orbits are maintained. Paranasal Sinuses:  The paranasal sinuses are clear. Foreign Bodies:  No evidence of radiopaque foreign bodies. Other:  No evidence of a remote fracture.  No lytic or blastic process seen in the facial bones. CT cervical spine: There is reversal of the normal cervical lordosis. There is no fracture or dislocation. Severe multilevel degenerative changes are present.  There is severe spinal canal stenosis at the C4-C5 level from a large posterior disc osteophyte complex.  Moderate spinal canal stenosis present at the C3-C4 level from a posterior disc osteophyte complex.  Severe bilateral neural foraminal narrowing is present at the C3-C4, C4-C5, and C5-C6 levels. Centrilobular emphysema.    IMPRESSION: 1.  No acute intracranial abnormality. 2.  No cervical  spine fracture or traumatic subluxation.  Severe multilevel degenerative change. 3.  Nasal bone fractures with adjacent soft tissue swelling. : [facility]   Transcribe Date/Time: Dec 22 2023  6:05P Dictated by : MAIRA STEWART MD This examination was interpreted and the report reviewed and electronically signed by: MAIRA STEWART MD on Dec 22 2023  6:17PM  EST    ECG 12 lead    Result Date: 12/21/2023  Normal sinus rhythm Nonspecific T wave abnormality Abnormal ECG When compared with ECG of 31-DEC-2022 15:09, Previous ECG has undetermined rhythm, needs review QRS duration has decreased Nonspecific T wave abnormality has replaced inverted T waves in Inferior leads QT has shortened    Transthoracic Echo (TTE) Complete    Result Date: 12/19/2023   [facility], [address] [place] [address]              Tel [phone] and Fax [phone] TRANSTHORACIC ECHOCARDIOGRAM REPORT  Patient Name:      IJEOMA CRUZ PERLA     Reading Physician:    [id number] Escobar Mendez MD Study Date:        12/18/2023          Ordering Provider:    [id number] GUNNAR HUGO MRN/PID:           [id number]            Fellow: Accession#:        [id number]        Nurse:                Mattie Sosa RN Date of Birth/Age: 1950 / 73 years Sonographer:          Cruz Wood RDCS Gender:            M                   Additional Staff: Height:            175.26 cm           Admit Date: Weight:            74.84 kg            Admission Status:     Inpatient - Routine BSA:               1.90 m2             Encounter#:           [id number]                                        Department Location:  [facility] Blood Pressure: 112 /63 mmHg Study Type:    TRANSTHORACIC ECHO (TTE) COMPLETE Diagnosis/ICD: Cardiomyopathy, unspecified-I42.9  Indication:    Cardiomyopathy CPT Code:      Echo Complete w Full Doppler-61450 Patient History: Diabetes:          Yes Pertinent History: A-Fib, CAD, CVA, HTN, Cancer, COPD and Syncope. Ischemic CM,                    cardiac stent. Study Detail: The following Echo studies were performed: 2D, M-Mode, Doppler and               color flow. Technically challenging study due to body habitus.               Definity used as a contrast agent for endocardial border               definition. Total contrast used for this procedure was 1.5 mL via               IV push. The patient was awake.  PHYSICIAN INTERPRETATION: Left Ventricle: The left ventricular systolic function is moderately to severely decreased, with an estimated ejection fraction of 30-35%. Wall motion is abnormal. The left ventricular cavity size is mildly dilated. There is mild concentric left ventricular hypertrophy. Findings are consistent with ischemic cardiomyopathy. Spectral Doppler shows an impaired relaxation pattern of left ventricular diastolic filling. LV Wall Scoring: The mid inferolateral segment is akinetic. The mid and apical anterior wall, basal and mid anterolateral wall, mid anteroseptal segment, basal inferolateral segment, apical lateral segment, basal inferior segment, and apex are hypokinetic. All remaining scored segments are normal. Left Atrium: The left atrium is mildly dilated. Right Ventricle: The right ventricle is mildly enlarged. There is mildly reduced right ventricular systolic function. A device is visualized in the right ventricle. Right Atrium: The right atrium is mildly dilated. Aortic Valve: The aortic valve is trileaflet. There is mild aortic valve cusp calcification. There is no evidence of aortic valve regurgitation. The peak instantaneous gradient of the aortic valve is 3.5 mmHg. Mitral Valve: The mitral valve is normal in structure. There is mild mitral valve regurgitation. Tricuspid Valve: The tricuspid valve is  structurally normal. There is mild tricuspid regurgitation. Pulmonic Valve: The pulmonic valve is structurally normal. There is physiologic pulmonic valve regurgitation. Pericardium: There is a trivial to small pericardial effusion. Aorta: The aortic root is normal. Pulmonary Artery: The tricuspid regurgitant velocity is 3.17 m/s, and with an estimated right atrial pressure of 3 mmHg, the estimated pulmonary artery pressure is mildly elevated with the RVSP at 43.2 mmHg. Pulmonary Veins: There is blunted systolic flow in the pulmonary veins. Systemic Veins: The inferior vena cava appears mildly dilated.  CONCLUSIONS:  1. Left ventricular systolic function is moderately to severely decreased with a 30-35% estimated ejection fraction.  2. Multiple segmental abnormalities exist. See findings.  3. Spectral Doppler shows an impaired relaxation pattern of left ventricular diastolic filling.  4. There is ischemic cardiomyopathy.  5. There is mildly reduced right ventricular systolic function.  6. Mild pulmonary HTN. CVP is elevated. QUANTITATIVE DATA SUMMARY: 2D MEASUREMENTS:                          Normal Ranges: Ao Root d:     2.60 cm   (2.0-3.7cm) LAs:           4.60 cm   (2.7-4.0cm) IVSd:          1.02 cm   (0.6-1.1cm) LVPWd:         0.96 cm   (0.6-1.1cm) LVIDd:         4.75 cm   (3.9-5.9cm) LVIDs:         4.22 cm LV Mass Index: 86.6 g/m2 LV % FS        11.2 % LA VOLUME:                               Normal Ranges: LA Vol A4C:        104.7 ml   (22+/-6mL/m2) LA Vol A2C:        69.5 ml LA Vol BP:         92.7 ml LA Vol Index A4C:  55.0ml/m2 LA Vol Index A2C:  36.5 ml/m2 LA Vol Index BP:   48.7 ml/m2 LA Area A4C:       28.3 cm2 LA Area A2C:       21.2 cm2 LA Major Axis A4C: 6.5 cm LA Major Axis A2C: 5.5 cm LA Volume Index:   47.0 ml/m2 RA VOLUME BY A/L METHOD:                               Normal Ranges: RA Vol A4C:        62.8 ml    (8.3-19.5ml) RA Vol Index A4C:  33.0 ml/m2 RA Area A4C:       20.7 cm2 RA Major Axis  A4C: 5.8 cm M-MODE MEASUREMENTS:                  Normal Ranges: Ao Root: 2.60 cm (2.0-3.7cm) LAs:     4.50 cm (2.7-4.0cm) AORTA MEASUREMENTS:                    Normal Ranges: Asc Ao, d: 2.90 cm (2.1-3.4cm) LV SYSTOLIC FUNCTION BY 2D PLANIMETRY (MOD):                     Normal Ranges: EF-A4C View: 28.3 % (>=55%) EF-A2C View: 30.5 % EF-Biplane:  29.5 % LV DIASTOLIC FUNCTION:                            Normal Ranges: MV Peak E:      0.93 m/s   (0.7-1.2 m/s) MV e'           0.05 m/s   (>8.0) MV lateral e'   0.05 m/s MV medial e'    0.05 m/s E/e' Ratio:     18.56      (<8.0) PulmV Sys Chet:  24.60 cm/s PulmV English Chet: 88.80 cm/s PulmV S/D Chet:  0.30 MITRAL VALVE:                 Normal Ranges: MV DT: 201 msec (150-240msec) AORTIC VALVE:                         Normal Ranges: AoV Vmax:      0.93 m/s (<=1.7m/s) AoV Peak PG:   3.5 mmHg (<20mmHg) LVOT Max Chet:  0.79 m/s (<=1.1m/s) LVOT VTI:      16.30 cm LVOT Diameter: 1.90 cm  (1.8-2.4cm) AoV Area,Vmax: 2.41 cm2 (2.5-4.5cm2)  RIGHT VENTRICLE: RV Basal 3.70 cm RV Mid   2.90 cm RV Major 7.3 cm TAPSE:   13.7 mm RV s'    0.10 m/s TRICUSPID VALVE/RVSP:                             Normal Ranges: Peak TR Velocity: 3.17 m/s RV Syst Pressure: 43.2 mmHg (< 30mmHg) IVC Diam:         2.20 cm PULMONIC VALVE:                      Normal Ranges: PV Max Chet: 0.7 m/s  (0.6-0.9m/s) PV Max P.1 mmHg Pulmonary Veins: PulmV English Chet: 88.80 cm/s PulmV S/D Chet:  0.30 PulmV Sys Chet:  24.60 cm/s  [id number] Escobar Mendez MD Electronically signed on 2023 at 5:23:07 PM  Wall Scoring  ** Final **     XR chest 1 view    Result Date: 2023  Interpreted By:  Galen Lopez, STUDY: XR CHEST 1 VIEW;  2023 8:17 pm   INDICATION: Signs/Symptoms:sob.   COMPARISON: 07/10/2023   ACCESSION NUMBER(S): [id number]   ORDERING CLINICIAN: YADIRA VALE   FINDINGS: Pacemaker is seen. Heart size is enlarged. Bronchial thickening features suggesting bronchitis. Equivocal edema also noted. There  does appear to be a right upper lobe opacity which is obscured by wires. Pneumonia not excluded. Lungs are hyperinflated       1.  Features of bronchitis and pulmonary edema. Lungs are hyperinflated . Equivocal right upper lobe nodular opacity. Follow-up is advised with two-view chest radiograph when feasible. Pneumonia can have this appearance       MACRO: None   Signed by: Galen Lopez 12/14/2023 8:50 PM Dictation workstation:   [id number]    CT chest wo IV contrast    Result Date: 12/9/2023  Interpreted By:  Ellis Rodriguez, STUDY: CT CHEST WO IV CONTRAST;  12/8/2023 12:39 pm   INDICATION: Signs/Symptoms:3 month follow up -  7 mm nodule superior segment left lower lobe. compare to August exam.   COMPARISON: 08/22/2023   ACCESSION NUMBER(S): [id number]   ORDERING CLINICIAN: JOSE ADKINS   TECHNIQUE: Helical data acquisition of the chest was obtained without the use of IV contrast. Images were reformatted in axial, coronal, and sagittal planes.   FINDINGS: POTENTIAL LIMITATIONS OF THE STUDY:   Lack of IV contrast   HEART AND VESSELS:   There are atherosclerotic calcifications of the aorta and its branches. The aorta is unchanged in course and caliber.   The heart is unchanged in size.   No pericardial effusion is seen.   MEDIASTINUM AND CHRIS, LOWER NECK AND AXILLA: The visualized thyroid gland is within normal limits.   There is mild mediastinal lymphadenopathy. For example, precarinal lymph node measuring approximately 1.3 cm in short axis. Right paratracheal lymph node measuring approximately 1.2 cm in short axis. These are increased in size when compared to the previous study. No definite hilar lymphadenopathy, although evaluation is limited by lack of IV contrast.   Esophagus is stable in course and caliber.   LUNGS AND AIRWAYS: The trachea and central airways are patent. No endobronchial lesion.   There is partial collapse/consolidation of both lower lobes, worse on the left. Mild atelectasis/infiltrate  is seen in the lingula. There are mild patchy nonspecific ground-glass opacities scattered within the lungs. There are emphysematous changes, most conspicuous in the upper lobes. There are small bilateral pleural effusions, slightly larger on the left.   UPPER ABDOMEN: The visualized subdiaphragmatic structures demonstrate no acute abnormality. Cholecystectomy. Stable mildly complex cystic lesion in the upper pole of the right kidney.   CHEST WALL AND OSSEOUS STRUCTURES: Degenerative changes. No acute process.       Partial collapse/consolidation of the lower lobes, worse on the left. Additional area of atelectasis or infiltrate in the lingula. Mild scattered patchy nonspecific ground-glass opacities in the lungs. Follow-up recommended to document resolution. Small bilateral pleural effusions, larger on the left.   MACRO: None.   Signed by: Ellis Rodriguez 12/9/2023 9:38 AM Dictation workstation:   [id number]     Assessment/Plan   Acute respiratory [id number], on room air  Coronavirus infection-improving  Possible bacterial pneumonia  Elevated troponin-cardiology following     Continue dexamethasone-10 days  Continue remdesivir-up to 5 days while inpatient  Continue IV zosyn for possible pneumonia-continue to Augmentin to complete total of 7 days  Follow-up blood cultures  Monitor WBC and temperature  Monitor renal and hepatic function  Oxygen as needed  Supportive care  Droplet plus precautions    Frank Escalante MD

## 2023-12-30 NOTE — PROGRESS NOTES
Hai Spaulding is a 73 y.o. male on day 3 of admission presenting with NSTEMI (non-ST elevated myocardial infarction) (CMS/HCC).      Subjective   Patient states he is feeling better.  No shortness of breath at rest.  Cough improved.  Denies chest pain.   Stable on room air at rest.    Objective   Pt is NAD.  Patient's wife is at bedside.  Cooperative with exam.  In no distress at rest.  Currently on room air with pulse ox 96% at rest  A, Ox3.  Face is symmetrical, periorbital hematoma bilaterally, more on the right side  Skin - no lesions.  Lungs: clear to auscultations B/L.  Minute bilateral no wheezes, rales, rhonchi.  Heart: irregular S1S2.  Abdomen: soft, NT, ND. BS positive.  Extr.: no edema, cords, cyanosis.  Moves all extr.   Last Recorded Vitals  BP 91/53 (BP Location: Left arm, Patient Position: Lying)   Pulse 104   Temp 36.4 °C (97.5 °F) (Temporal)   Resp 19   Wt 68.4 kg (150 lb 12.7 oz)   SpO2 97%   Intake/Output last 3 Shifts:    Intake/Output Summary (Last 24 hours) at 12/30/2023 1414  Last data filed at 12/30/2023 0701  Gross per 24 hour   Intake 240 ml   Output 800 ml   Net -560 ml         Admission Weight  Weight: 67.2 kg (148 lb 2.4 oz) (12/27/23 2049)    Daily Weight  12/29/23 : 68.4 kg (150 lb 12.7 oz)    Image Results  ECG 12 lead  Undetermined rhythm  Left bundle branch block  Abnormal ECG  When compared with ECG of 27-DEC-2023 07:49, (unconfirmed)  Current undetermined rhythm precludes rhythm comparison, needs review  ECG 12 Lead  Wide QRS tachycardia with Premature supraventricular complexes and with occasional Premature ventricular complexes  Nonspecific intraventricular block  Cannot rule out Anteroseptal infarct , age undetermined  T wave abnormality, consider inferolateral ischemia  Abnormal ECG  When compared with ECG of 14-DEC-2023 19:10, (unconfirmed)  Wide QRS tachycardia has replaced Sinus rhythm  Vent. rate has increased BY  57 BPM  XR chest 1 view  Narrative: Interpreted By:   Anahi Fernando,   STUDY:  XR CHEST 1 VIEW;  12/27/2023 8:43 am      INDICATION:  Signs/Symptoms:Dypnea.      COMPARISON:  12/14/2023      ACCESSION NUMBER(S):  [id number]      ORDERING CLINICIAN:  SYLVIA MOORE      FINDINGS:  CARDIOMEDIASTINAL SILHOUETTE:  The heart is enlarged.  There is a left subclavian pacemaker-AICD with a single tip in the  right atrium and two tips in the right ventricle.      LUNGS:  There are progressive perihilar alveolar opacities, likely pulmonary  edema but could also represent pneumonia. There is left basilar  atelectasis and volume loss which has progressed. There is no pleural  fluid.      ABDOMEN:  No remarkable upper abdominal findings.          BONES:  No acute osseous changes.      Impression: 1. Progressive bilateral predominantly perihilar alveolar opacities  consistent with pulmonary edema or pneumonia.  2. Progressive volume loss and atelectasis left lung base.      MACRO:  None      Signed by: Anahi Fernando 12/27/2023 8:50 AM  Dictation workstation:   [id number]          Relevant Results               Assessment/Plan                  Principal Problem:    NSTEMI (non-ST elevated myocardial infarction) (CMS/HCC)  Likely, time to non-STEMI due to increased oxygen demand from COVID and acute respiratory failure with hypoxemia.  Cardiology consulted -no plans for intervention  COVID-19.  Continue dexamethasone.  ID consulted for consideration of possible remdesivir  Pneumonia, possible bacterial.  Continue antibacterial antibiotics. Zosyn.  Acute on chronic respiratory failure.  Improved.  Continue supplementary oxygen.  Chronic renal failure, slightly improved.  Monitor renal function.  Atrial fibrillation, chronic.  Continue anticoagulation.  Stop heparin drip.  Recent fall, nasal bone fracture.  Ischemic cardiomyopathy, the most recent ejection fraction 30 to 35% 1 week ago.  Status post pacemaker.    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  Will monitor renal function and CBC.  Patient's wife wants patient to go to rehab.  Discussed with patient.    12/30: Patient improved.  Stable on room air.  Renal function is about patient's baseline.  Plan is to acute rehab on Tuesday.  Continue antibiotics, followed by ID.  Blood sugar is high due to steroids and acute illness.  Will increase insulin.            Merlene Burleson MD

## 2023-12-30 NOTE — CARE PLAN
Problem: Pain - Adult  Goal: Verbalizes/displays adequate comfort level or baseline comfort level  Outcome: Progressing     Problem: Safety - Adult  Goal: Free from fall injury  Outcome: Progressing     Problem: Discharge Planning  Goal: Discharge to home or other facility with appropriate resources  Outcome: Progressing     Problem: Diabetes  Goal: Achieve decreasing blood glucose levels by end of shift  Outcome: Progressing  Goal: Increase stability of blood glucose readings by end of shift  Outcome: Progressing  Goal: Decrease in ketones present in urine by end of shift  Outcome: Progressing  Goal: Maintain electrolyte levels within acceptable range throughout shift  Outcome: Progressing  Goal: Maintain glucose levels >70mg/dl to <250mg/dl throughout shift  Outcome: Progressing  Goal: No changes in neurological exam by end of shift  Outcome: Progressing  Goal: Learn about and adhere to nutrition recommendations by end of shift  Outcome: Progressing  Goal: Vital signs within normal range for age by end of shift  Outcome: Progressing  Goal: Increase self care and/or family involovement by end of shift  Outcome: Progressing  Goal: Receive DSME education by end of shift  Outcome: Progressing     Problem: Skin  Goal: Decreased wound size/increased tissue granulation at next dressing change  Outcome: Progressing  Goal: Participates in plan/prevention/treatment measures  Outcome: Progressing  Goal: Prevent/manage excess moisture  Outcome: Progressing  Goal: Prevent/minimize sheer/friction injuries  Outcome: Progressing  Goal: Promote/optimize nutrition  Outcome: Progressing  Goal: Promote skin healing  Outcome: Progressing     Problem: Respiratory  Goal: Minimize anxiety/maximize coping throughout shift  Outcome: Progressing  Goal: Minimal/no exertional discomfort or dyspnea this shift  Outcome: Progressing  Goal: No signs of respiratory distress (eg. Use of accessory muscles. Peds grunting)  Outcome:  Progressing  Goal: Verbalize decreased shortness of breath this shift  Outcome: Progressing   The patient's goals for the shift include rest    The clinical goals for the shift include monitor bp

## 2023-12-30 NOTE — CARE PLAN
Problem: Pain - Adult  Goal: Verbalizes/displays adequate comfort level or baseline comfort level  Outcome: Progressing     Problem: Safety - Adult  Goal: Free from fall injury  Outcome: Progressing     Problem: Discharge Planning  Goal: Discharge to home or other facility with appropriate resources  Outcome: Progressing     Problem: Diabetes  Goal: Achieve decreasing blood glucose levels by end of shift  Outcome: Progressing  Goal: Increase stability of blood glucose readings by end of shift  Outcome: Progressing  Goal: Decrease in ketones present in urine by end of shift  Outcome: Progressing  Goal: Maintain electrolyte levels within acceptable range throughout shift  Outcome: Progressing  Goal: Maintain glucose levels >70mg/dl to <250mg/dl throughout shift  Outcome: Progressing  Goal: No changes in neurological exam by end of shift  Outcome: Progressing  Goal: Learn about and adhere to nutrition recommendations by end of shift  Outcome: Progressing  Goal: Vital signs within normal range for age by end of shift  Outcome: Progressing  Goal: Increase self care and/or family involovement by end of shift  Outcome: Progressing  Goal: Receive DSME education by end of shift  Outcome: Progressing     Problem: Skin  Goal: Decreased wound size/increased tissue granulation at next dressing change  Outcome: Progressing  Goal: Participates in plan/prevention/treatment measures  Outcome: Progressing  Goal: Prevent/manage excess moisture  Outcome: Progressing  Goal: Prevent/minimize sheer/friction injuries  Outcome: Progressing  Goal: Promote/optimize nutrition  Outcome: Progressing  Goal: Promote skin healing  Outcome: Progressing     Problem: Respiratory  Goal: Minimize anxiety/maximize coping throughout shift  Outcome: Progressing  Goal: Minimal/no exertional discomfort or dyspnea this shift  Outcome: Progressing  Goal: No signs of respiratory distress (eg. Use of accessory muscles. Peds grunting)  Outcome:  Progressing  Goal: Verbalize decreased shortness of breath this shift  Outcome: Progressing   The patient's goals for the shift include rest    The clinical goals for the shift include discharge planning    Over the shift, the patient did not make progress toward the following goals. Barriers to progression include n/a. Recommendations to address these barriers include n/a.

## 2023-12-31 NOTE — PROGRESS NOTES
Hai Spaulding is a 73 y.o. male on day 4 of admission presenting with NSTEMI (non-ST elevated myocardial infarction) (CMS/HCC).      Subjective     No worsening shortness of breath.  Based on 8 L oxygen, but taken off I am in the room and saturating 97% on room air.         Objective     Last Recorded Vitals  BP 96/64 (BP Location: Left arm, Patient Position: Sitting)   Pulse 90   Temp 36 °C (96.8 °F) (Temporal)   Resp 18   Wt 71.4 kg (157 lb 4.8 oz)   SpO2 95%   Intake/Output last 3 Shifts:    Intake/Output Summary (Last 24 hours) at 12/31/2023 1219  Last data filed at 12/31/2023 0448  Gross per 24 hour   Intake 220 ml   Output 800 ml   Net -580 ml       Admission Weight  Weight: 67.2 kg (148 lb 2.4 oz) (12/27/23 2049)    Daily Weight  12/31/23 : 71.4 kg (157 lb 4.8 oz)    Image Results  ECG 12 lead  Undetermined rhythm  Left bundle branch block  Abnormal ECG  When compared with ECG of 27-DEC-2023 07:49, (unconfirmed)  Current undetermined rhythm precludes rhythm comparison, needs review  ECG 12 Lead  Wide QRS tachycardia with Premature supraventricular complexes and with occasional Premature ventricular complexes  Nonspecific intraventricular block  Cannot rule out Anteroseptal infarct , age undetermined  T wave abnormality, consider inferolateral ischemia  Abnormal ECG  When compared with ECG of 14-DEC-2023 19:10, (unconfirmed)  Wide QRS tachycardia has replaced Sinus rhythm  Vent. rate has increased BY  57 BPM  XR chest 1 view  Narrative: Interpreted By:  Anahi Fernando,   STUDY:  XR CHEST 1 VIEW;  12/27/2023 8:43 am      INDICATION:  Signs/Symptoms:Dypnea.      COMPARISON:  12/14/2023      ACCESSION NUMBER(S):  [id number]      ORDERING CLINICIAN:  SYLVIA MOORE      FINDINGS:  CARDIOMEDIASTINAL SILHOUETTE:  The heart is enlarged.  There is a left subclavian pacemaker-AICD with a single tip in the  right atrium and two tips in the right ventricle.      LUNGS:  There are progressive perihilar  alveolar opacities, likely pulmonary  edema but could also represent pneumonia. There is left basilar  atelectasis and volume loss which has progressed. There is no pleural  fluid.      ABDOMEN:  No remarkable upper abdominal findings.          BONES:  No acute osseous changes.      Impression: 1. Progressive bilateral predominantly perihilar alveolar opacities  consistent with pulmonary edema or pneumonia.  2. Progressive volume loss and atelectasis left lung base.      MACRO:  None      Signed by: Anahi Fernando 12/27/2023 8:50 AM  Dictation workstation:   [id number]      Physical Exam  Constitutional:       Appearance: He is ill-appearing.   HENT:      Head: Normocephalic.      Right Ear: External ear normal.      Left Ear: External ear normal.      Mouth/Throat:      Mouth: Mucous membranes are dry.   Cardiovascular:      Rate and Rhythm: Normal rate.   Pulmonary:      Effort: No respiratory distress.      Breath sounds: Normal breath sounds.   Abdominal:      General: Abdomen is flat.   Skin:     General: Skin is warm.   Neurological:      General: No focal deficit present.   Psychiatric:         Mood and Affect: Mood normal.         Relevant Results               Assessment/Plan                  Principal Problem:    NSTEMI (non-ST elevated myocardial infarction) (CMS/[facility])    Acute hypoxemic respiratory failure  -Back to room air.  Resolved.    COVID-19  -Stable.  Continue dexamethasone.  Remdesivir per infectious disease.    Question of bacterial pneumonia  -Zosyn per infectious disease.    Atrial fibrillation  -Heart rate controlled  -Continue anticoagulation.    Systolic heart failure  -No exacerbation.  -EF 30 to 35%.  -Status post AICD.    Leukocytosis  -Mild, in setting of dexamethasone  -Monitor.    Hyperglycemia  -And diabetes mellitus  -Worsened by dexamethasone  -Increase Lantus.    From medical standpoint, once facility arranged, okay for discharge.              Maurice Cha,

## 2023-12-31 NOTE — PROGRESS NOTES
Subjective Data:  Patient is doing better.  Denies having chest pain.  Still complain of shortness of breath    Overnight Events:    Overnight reviewed no events     Objective Data:  Last Recorded Vitals:  Vitals:    12/31/23 0031 12/31/23 0356 12/31/23 0800 12/31/23 0901   BP: (!) 73/46 100/65  96/64   BP Location: Left arm Left arm  Left arm   Patient Position: Lying Lying  Sitting   Pulse: 94 86  90   Resp: 16 20  18   Temp: 36.3 °C (97.3 °F) 36 °C (96.8 °F)  36 °C (96.8 °F)   TempSrc: Oral Axillary  Temporal   SpO2: 98% 98% 97% 100%   Weight:  71.4 kg (157 lb 4.8 oz)     Height:           Last Labs:  CBC - 12/31/2023:  5:49 AM  20.3 12.5 350    36.8      CMP - 12/31/2023:  5:49 AM  7.7 5.7 28 --- 0.6   _ 2.6 <5 258      PTT - 12/28/2023:  5:48 AM  2.3   26.7 107.8     TROPHS   Date/Time Value Ref Range Status   12/27/2023 03:22  0 - 20 ng/L Final     Comment:     Previous result verified on 12/27/2023 0920 on specimen/case [id number] [facility] Troponin I, High Sensitivity with value 62 ng/L.   12/27/2023 11:56  0 - 20 ng/L Final     Comment:     Previous result verified on 12/27/2023 0920 on specimen/case [id number] called with component [facility] for procedure Troponin I, High Sensitivity with value 62 ng/L.   12/27/2023 08:40 AM 62 0 - 20 ng/L Final     BNP   Date/Time Value Ref Range Status   12/27/2023 10:04  0 - 99 pg/mL Final   12/17/2023 07:22  0 - 99 pg/mL Final     HGBA1C   Date/Time Value Ref Range Status   12/27/2023 09:59 PM 10.7 See below % Final   10/11/2023 01:15 PM 10.6 See below % Final     LDLCALC   Date/Time Value Ref Range Status   06/02/2023 02:40 PM 39 65 - 130 MG/DL Final   10/08/2019 12:15 PM 96 65 - 130 MG/DL Final     VLDL   Date/Time Value Ref Range Status   10/22/2021 08:07 PM 18 0 - 40 mg/dL Final   09/28/2020 04:12 PM 46 0 - 40 mg/dL Final   10/02/2018 02:40 PM 50 0 - 40 mg/dL Final      Last I/O:  I/O last 3 completed  shifts:  In: 220 (3.1 mL/kg) [P.O.:120; IV Piggyback:100]  Out: 1600 (22.4 mL/kg) [Urine:1600 (0.6 mL/kg/hr)]  Weight: 71.4 kg     Past Cardiology Tests (Last 3 Years):  EKG:  ECG 12 lead 12/27/2023 (Preliminary)      ECG 12 Lead 12/27/2023 (Preliminary)      ECG 12 lead 12/14/2023 (Preliminary)    Echo:  Transthoracic Echo (TTE) Complete 12/18/2023    Ejection Fractions:  EF   Date/Time Value Ref Range Status   12/18/2023 12:10 PM 29       Cath:  No results found for this or any previous visit from the past 1095 days.    Stress Test:  No results found for this or any previous visit from the past 1095 days.    Cardiac Imaging:  XR CHEST ABDOMEN FOR OG NG PLACEMENT 11/26/2021      XR CHEST ABDOMEN FOR OG NG PLACEMENT 11/26/2021      Inpatient Medications:  Scheduled medications   Medication Dose Route Frequency    acetaminophen  650 mg oral BID    amiodarone  200 mg oral BID    apixaban  5 mg oral BID    atorvastatin  80 mg oral Nightly    carbidopa-levodopa  1 tablet oral TID    cyclobenzaprine  10 mg oral Nightly    dapagliflozin propanediol  10 mg oral Daily    dexAMETHasone  6 mg intravenous q24h    [Held by provider] fluticasone furoate-vilanteroL  1 puff inhalation Daily    gabapentin  300 mg oral Nightly    insulin glargine  35 Units subcutaneous Nightly    insulin lispro  0-15 Units subcutaneous TID with meals    ipratropium-albuteroL  3 mL nebulization TID    lactobacillus acidophilus  1 tablet oral Daily    levothyroxine  25 mcg oral Daily    metoprolol succinate XL  25 mg oral BID    nicotine  1 patch transdermal q24h    pantoprazole  20 mg oral Daily    piperacillin-tazobactam  3.375 g intravenous q6h    prasugrel  10 mg oral Daily    QUEtiapine  25 mg oral Nightly    remdesivir  100 mg intravenous q24h    sertraline  100 mg oral Daily    SITagliptin phosphate  50 mg oral Daily     PRN medications   Medication    acetaminophen    albuterol    alum-mag hydroxide-simeth    dextrose 10 % in water (D10W)     dextrose    docusate sodium    glucagon    guaiFENesin    guaiFENesin    melatonin    ondansetron    ondansetron ODT    sodium chloride     Continuous Medications   Medication Dose Last Rate       Physical Exam:  General: Patient is in no acute distress.  HEENT: atraumatic normocephalic.  Neck: is supple jugular venous pressure within normal limits no thyromegaly.  Cardiovascular regular rate and rhythm normal heart sounds no murmurs rubs or gallops.  Lungs: clear to auscultation bilaterally.  Abdomen: is soft nontender.  Extremities warm to touch no edema.       Assessment/Plan   1 elevated troponin.  Patient has mild elevated troponin but flat.  Reviewed his last cardiac cath which was couple years ago he did have significant LAD and left circumflex disease but I am not sure how it was treated.  He had after that ICD CRT by Dr. Matt.  Probably patient was not candidate for open heart surgery.  He is currently on prasugrel and addition of Eliquis for atrial fibrillation.  I will stop prasugrel and switch him to clopidogrel.  We will address the issue of coronary artery disease.  Currently has no chest pain EKG showing paced rhythm nondiagnostic.  Will monitor.    2.  Chronic systolic heart failure.  Patient has chronic systolic heart failure ejection fraction of 30 to 35%.  Currently with COVID-19 infection.  Will check NT-proBNP.  Will optimize his treatment.    3.  Hypertension.    4.  Hyperlipidemia continue high intensity statin.    Thank you for allowing to participate in his care  Peripheral IV 12/27/23 20 G Left Forearm (Active)   Site Assessment Clean;Dry;Intact 12/31/23 0000   Dressing Status Clean;Dry;Occlusive 12/31/23 0000   Number of days: 4       Peripheral IV 12/27/23 22 G Proximal;Right;Anterior Forearm (Active)   Site Assessment Clean;Dry;Intact 12/31/23 0000   Dressing Status Clean;Dry;Occlusive 12/31/23 0000   Number of days: 4       Code Status:  Full Code    Cinda Pedersen MD

## 2023-12-31 NOTE — CARE PLAN
The patient's goals for the shift include rest    The clinical goals for the shift include monitor oxygen status and rest      Problem: Pain - Adult  Goal: Verbalizes/displays adequate comfort level or baseline comfort level  Outcome: Progressing     Problem: Safety - Adult  Goal: Free from fall injury  Outcome: Progressing     Problem: Discharge Planning  Goal: Discharge to home or other facility with appropriate resources  Outcome: Progressing     Problem: Diabetes  Goal: Achieve decreasing blood glucose levels by end of shift  Outcome: Progressing  Goal: Increase stability of blood glucose readings by end of shift  Outcome: Progressing  Goal: Decrease in ketones present in urine by end of shift  Outcome: Progressing  Goal: Maintain electrolyte levels within acceptable range throughout shift  Outcome: Progressing  Goal: Maintain glucose levels >70mg/dl to <250mg/dl throughout shift  Outcome: Progressing  Goal: No changes in neurological exam by end of shift  Outcome: Progressing  Goal: Learn about and adhere to nutrition recommendations by end of shift  Outcome: Progressing  Goal: Vital signs within normal range for age by end of shift  Outcome: Progressing  Goal: Increase self care and/or family involovement by end of shift  Outcome: Progressing  Goal: Receive DSME education by end of shift  Outcome: Progressing     Problem: Skin  Goal: Decreased wound size/increased tissue granulation at next dressing change  Outcome: Progressing  Goal: Participates in plan/prevention/treatment measures  Outcome: Progressing  Goal: Prevent/manage excess moisture  Outcome: Progressing  Flowsheets (Taken 12/31/2023 0071)  Prevent/manage excess moisture:   Monitor for/manage infection if present   Moisturize dry skin  Goal: Prevent/minimize sheer/friction injuries  Outcome: Progressing  Goal: Promote/optimize nutrition  Outcome: Progressing  Goal: Promote skin healing  Outcome: Progressing     Problem: Respiratory  Goal: Minimize  anxiety/maximize coping throughout shift  Outcome: Progressing  Goal: Minimal/no exertional discomfort or dyspnea this shift  Outcome: Progressing  Goal: No signs of respiratory distress (eg. Use of accessory muscles. Peds grunting)  Outcome: Progressing  Goal: Verbalize decreased shortness of breath this shift  Outcome: Progressing

## 2023-12-31 NOTE — CARE PLAN
Problem: Respiratory  Goal: No signs of respiratory distress (eg. Use of accessory muscles. Peds grunting)  Outcome: Progressing  Goal: Verbalize decreased shortness of breath this shift  Outcome: Progressing

## 2023-12-31 NOTE — PROGRESS NOTES
Hai Spaulding is a 73 y.o. male on day 4 of admission presenting with NSTEMI (non-ST elevated myocardial infarction) (CMS/HCC).    Subjective   Interval History:        room not entered-limit exposure  Patient observed  Afebrile  Resting comfortably    Review of Systems    Objective   Range of Vitals (last 24 hours)  Heart Rate:  []   Temp:  [36 °C (96.8 °F)-36.4 °C (97.5 °F)]   Resp:  [16-20]   BP: ()/(46-68)   Weight:  [71.4 kg (157 lb 4.8 oz)]   SpO2:  [92 %-98 %]   Daily Weight  12/31/23 : 71.4 kg (157 lb 4.8 oz)    Body mass index is 23.22 kg/m².    Physical Exam   awake, alert    Antibiotics  acetaminophen (Tylenol) tablet    docusate sodium (Colace) capsule  nicotine (Nicoderm CQ) 14 mg/24 hr patch 1 patch  acetaminophen (Tylenol) tablet 650 mg  amiodarone (Pacerone) tablet 200 mg  albuterol 2.5 mg /3 mL (0.083 %) nebulizer solution 2.5 mg  atorvastatin (Lipitor) tablet 80 mg  carbidopa-levodopa (Sinemet CR)  mg ER tablet 1 tablet  cyclobenzaprine (Flexeril) tablet 10 mg  dapagliflozin propanediol (Farxiga) tablet 10 mg  docusate sodium (Colace) capsule 100 mg  fluticasone furoate-vilanteroL (Breo Ellipta) 100-25 mcg/dose inhaler 1 puff  gabapentin (Neurontin) capsule 300 mg  ipratropium-albuteroL (Duo-Neb) 0.5-2.5 mg/3 mL nebulizer solution 3 mL  lactobacillus acidophilus tablet 1 tablet  levothyroxine (Synthroid, Levoxyl) tablet 25 mcg  metoprolol succinate XL (Toprol-XL) 24 hr tablet 12.5 mg  pantoprazole (ProtoNix) EC tablet 20 mg  QUEtiapine (SEROquel) tablet 25 mg  sertraline (Zoloft) tablet 100 mg  SITagliptin phosphate (Januvia) tablet 50 mg  prasugrel (Effient) tablet 10 mg  heparin (porcine) injection 4,000 Units  heparin 25,000 Units in dextrose 5% 250 mL (100 Units/mL) infusion (premix)  heparin (porcine) injection 1,500-3,000 Units  dextrose 50 % injection 25 g  glucagon (Glucagen) injection 1 mg  dextrose 10 % in water (D10W) infusion  insulin glargine (Lantus) injection 14  Units  insulin lispro (HumaLOG) injection 0-5 Units  acetaminophen (Tylenol) tablet 650 mg  ondansetron (Zofran) injection 4 mg  dexAMETHasone (PF) (Decadron) injection 6 mg  cefepime (Maxipime) 1 g in dextrose 5 % 50 mL IV  vancomycin-diluent combo no.1 (Xellia) IVPB 750 mg  ipratropium-albuteroL (Duo-Neb) 0.5-2.5 mg/3 mL nebulizer solution 3 mL  albuterol 2.5 mg /3 mL (0.083 %) nebulizer solution 2.5 mg  apixaban (Eliquis) tablet 5 mg  sodium chloride (Ocean) 0.65 % nasal spray 1 spray  guaiFENesin (Robitussin) 100 mg/5 mL syrup 200 mg  melatonin tablet 5 mg  alum-mag hydroxide-simeth (Mylanta) 200-200-20 mg/5 mL oral suspension 30 mL  guaiFENesin (Mucinex) 12 hr tablet 600 mg  ondansetron ODT (Zofran-ODT) disintegrating tablet 4 mg  remdesivir (Veklury) 200 mg in sodium chloride 0.9% 250 mL IV  remdesivir (Veklury) 100 mg in sodium chloride 0.9% 250 mL IV  piperacillin-tazobactam-dextrose (Zosyn) IV 3.375 g  amiodarone (Pacerone) tablet 200 mg  metoprolol succinate XL (Toprol-XL) 24 hr tablet 25 mg  insulin glargine (Lantus) injection 35 Units  insulin lispro (HumaLOG) injection 0-15 Units      Relevant Results  Labs  Results from last 72 hours   Lab Units 12/31/23  0549 12/30/23  0533 12/30/23  0030   WBC AUTO x10*3/uL 20.3* 20.2* 21.1*   HEMOGLOBIN g/dL 12.5* 13.3* 13.0*   HEMATOCRIT % 36.8* 39.5* 38.0*   PLATELETS AUTO x10*3/uL 350 430 409     Results from last 72 hours   Lab Units 12/31/23  0549 12/30/23  0533 12/29/23  0541   SODIUM mmol/L 133 133 137   POTASSIUM mmol/L 4.0 4.4 4.6   CHLORIDE mmol/L 96* 95* 98   CO2 mmol/L 23* 23* 23*   BUN mg/dL 78* 74* 80*   CREATININE mg/dL 2.30* 2.10* 1.90*   GLUCOSE mg/dL 425* 258* 354*   CALCIUM mg/dL 7.7* 8.2* 8.1*   ANION GAP mmol/L 14 15 16   EGFR mL/min/1.73m*2 29* 33* 37*         Estimated Creatinine Clearance: 29 mL/min (A) (by C-G formula based on SCr of 2.3 mg/dL (H)).  C-Reactive Protein   Date Value Ref Range Status   11/29/2023 2.53 (H) <1.00 mg/dL Final      CRP   Date Value Ref Range Status   12/31/2022 39.72 (A) mg/dL Final     Comment:     REF VALUE  < 1.00     12/14/2021 2.16 (A) mg/dL Final     Comment:     REF VALUE  < 1.00       Microbiology   reviewed  Imaging  ECG 12 lead    Result Date: 12/27/2023  Undetermined rhythm Left bundle branch block Abnormal ECG When compared with ECG of 27-DEC-2023 07:49, (unconfirmed) Current undetermined rhythm precludes rhythm comparison, needs review    ECG 12 Lead    Result Date: 12/27/2023  Wide QRS tachycardia with Premature supraventricular complexes and with occasional Premature ventricular complexes Nonspecific intraventricular block Cannot rule out Anteroseptal infarct , age undetermined T wave abnormality, consider inferolateral ischemia Abnormal ECG When compared with ECG of 14-DEC-2023 19:10, (unconfirmed) Wide QRS tachycardia has replaced Sinus rhythm Vent. rate has increased BY  57 BPM    XR chest 1 view    Result Date: 12/27/2023  Interpreted By:  Anahi Fernando, STUDY: XR CHEST 1 VIEW;  12/27/2023 8:43 am   INDICATION: Signs/Symptoms:Dypnea.   COMPARISON: 12/14/2023   ACCESSION NUMBER(S): [id number]   ORDERING CLINICIAN: SYLVIA MOORE   FINDINGS: CARDIOMEDIASTINAL SILHOUETTE: The heart is enlarged. There is a left subclavian pacemaker-AICD with a single tip in the right atrium and two tips in the right ventricle.   LUNGS: There are progressive perihilar alveolar opacities, likely pulmonary edema but could also represent pneumonia. There is left basilar atelectasis and volume loss which has progressed. There is no pleural fluid.   ABDOMEN: No remarkable upper abdominal findings.     BONES: No acute osseous changes.       1. Progressive bilateral predominantly perihilar alveolar opacities consistent with pulmonary edema or pneumonia. 2. Progressive volume loss and atelectasis left lung base.   MACRO: None   Signed by: Anahi Fernando 12/27/2023 8:50 AM Dictation workstation:   [id number]    CT FACIAL  BONE/TITO WO IVCON    Result Date: 12/22/2023  * * *Final Report* * * DATE OF EXAM: Dec 22 2023  5:57PM   ASC   0507  -  CT FACIAL BONE/TITO WO IVCON  / ACCESSION #  [id number] PROCEDURE REASON: Maxillofacial pain      * * * * Physician Interpretation * * * *  EXAMINATION:  CT BRAIN WO IVCON, CT FACIAL BONE/TITO WO IVCON, CT CERVICAL SPINE WO IVCON CLINICAL HISTORY: Head trauma, moderate-severe (accession [id number]), Maxillofacial pain, Nasal fracture suspected (accession [id number]), Spine fracture, cervical, traumatic (accession [id number]) TECHNIQUE:  Serial axial images without IV contrast were obtained from the vertex to the foramen magnum.  CT of the cervical spine was also performed with axial sections from the skull base through the thoracic inlet. CT of the face was performed without IV contrast and multiplanar reconstructions were generated. MQ:  CTBWO_3 CT Dose-Length Product (DLP): 1565  mGy*cm CT Dose Reduction Employed: Iterative recon (accession [id number]), Automated exposure control(AEC) and iterative recon (accession [id number]) COMPARISON:  None. RESULT: CT HEAD: No acute intracranial hemorrhage or extra-axial fluid collection. No hydrocephalus, mass effect, or herniation. No acute ischemic infarct detected.  Mild background of white matter hypoattenuation consistent with chronic microvascular ischemic change. Unremarkable dural venous sinus attenuation. No acute osseous abnormality. Clear visualized paranasal sinuses and tympanomastoid cavities. CT face: Soft Tissues:  Soft tissue swelling is present at the nose and along the right forehead/periorbital region. Facial bones:  Mildly comminuted bilateral nasal bone fractures are present. Orbits:  No evidence of an acute fracture.  The globes are intact.  The soft tissue planes of the orbits are maintained. Paranasal Sinuses:  The paranasal sinuses are clear. Foreign Bodies:  No evidence of radiopaque foreign bodies. Other:  No evidence of a remote  fracture.  No lytic or blastic process seen in the facial bones. CT cervical spine: There is reversal of the normal cervical lordosis. There is no fracture or dislocation. Severe multilevel degenerative changes are present.  There is severe spinal canal stenosis at the C4-C5 level from a large posterior disc osteophyte complex.  Moderate spinal canal stenosis present at the C3-C4 level from a posterior disc osteophyte complex.  Severe bilateral neural foraminal narrowing is present at the C3-C4, C4-C5, and C5-C6 levels. Centrilobular emphysema.    IMPRESSION: 1.  No acute intracranial abnormality. 2.  No cervical spine fracture or traumatic subluxation.  Severe multilevel degenerative change. 3.  Nasal bone fractures with adjacent soft tissue swelling. : [facility]   Transcribe Date/Time: Dec 22 2023  6:05P Dictated by : MAIRA STEWART MD This examination was interpreted and the report reviewed and electronically signed by: MAIRA STEWART MD on Dec 22 2023  6:17PM  EST    CT CERVICAL SPINE WO IVCON    Result Date: 12/22/2023  * * *Final Report* * * DATE OF EXAM: Dec 22 2023  5:57PM   ASC   0505  -  CT CERVICAL SPINE WO IVCON  / ACCESSION #  [id number] PROCEDURE REASON: Spine fracture, cervical, traumatic      * * * * Physician Interpretation * * * *  EXAMINATION:  CT BRAIN WO IVCON, CT FACIAL BONE/TITO WO IVCON, CT CERVICAL SPINE WO IVCON CLINICAL HISTORY: Head trauma, moderate-severe (accession [id number]), Maxillofacial pain, Nasal fracture suspected (accession [id number]), Spine fracture, cervical, traumatic (accession [id number]) TECHNIQUE:  Serial axial images without IV contrast were obtained from the vertex to the foramen magnum.  CT of the cervical spine was also performed with axial sections from the skull base through the thoracic inlet. CT of the face was performed without IV contrast and multiplanar reconstructions were generated. MQ:  CTBWO_3 CT Dose-Length Product (DLP): 1565  mGy*cm CT Dose  Reduction Employed: Iterative recon (accession [id number]), Automated exposure control(AEC) and iterative recon (accession [id number]) COMPARISON:  None. RESULT: CT HEAD: No acute intracranial hemorrhage or extra-axial fluid collection. No hydrocephalus, mass effect, or herniation. No acute ischemic infarct detected.  Mild background of white matter hypoattenuation consistent with chronic microvascular ischemic change. Unremarkable dural venous sinus attenuation. No acute osseous abnormality. Clear visualized paranasal sinuses and tympanomastoid cavities. CT face: Soft Tissues:  Soft tissue swelling is present at the nose and along the right forehead/periorbital region. Facial bones:  Mildly comminuted bilateral nasal bone fractures are present. Orbits:  No evidence of an acute fracture.  The globes are intact.  The soft tissue planes of the orbits are maintained. Paranasal Sinuses:  The paranasal sinuses are clear. Foreign Bodies:  No evidence of radiopaque foreign bodies. Other:  No evidence of a remote fracture.  No lytic or blastic process seen in the facial bones. CT cervical spine: There is reversal of the normal cervical lordosis. There is no fracture or dislocation. Severe multilevel degenerative changes are present.  There is severe spinal canal stenosis at the C4-C5 level from a large posterior disc osteophyte complex.  Moderate spinal canal stenosis present at the C3-C4 level from a posterior disc osteophyte complex.  Severe bilateral neural foraminal narrowing is present at the C3-C4, C4-C5, and C5-C6 levels. Centrilobular emphysema.    IMPRESSION: 1.  No acute intracranial abnormality. 2.  No cervical spine fracture or traumatic subluxation.  Severe multilevel degenerative change. 3.  Nasal bone fractures with adjacent soft tissue swelling. : ALISSA   Transcribe Date/Time: Dec 22 2023  6:05P Dictated by : MAIRA STEWART MD This examination was interpreted and the report reviewed and  electronically signed by: MAIRA STEWART MD on Dec 22 2023  6:17PM  EST    CT BRAIN WO IVCON    Result Date: 12/22/2023  * * *Final Report* * * DATE OF EXAM: Dec 22 2023  5:57PM   ASC   0504  -  CT BRAIN WO IVCON   / ACCESSION #  [id number] PROCEDURE REASON: Head trauma, moderate-severe      * * * * Physician Interpretation * * * *  EXAMINATION:  CT BRAIN WO IVCON, CT FACIAL BONE/TITO WO IVCON, CT CERVICAL SPINE WO IVCON CLINICAL HISTORY: Head trauma, moderate-severe (accession [id number]), Maxillofacial pain, Nasal fracture suspected (accession [id number]), Spine fracture, cervical, traumatic (accession [id number]) TECHNIQUE:  Serial axial images without IV contrast were obtained from the vertex to the foramen magnum.  CT of the cervical spine was also performed with axial sections from the skull base through the thoracic inlet. CT of the face was performed without IV contrast and multiplanar reconstructions were generated. MQ:  CTBWO_3 CT Dose-Length Product (DLP): 1565  mGy*cm CT Dose Reduction Employed: Iterative recon (accession [id number]), Automated exposure control(AEC) and iterative recon (accession [id number]) COMPARISON:  None. RESULT: CT HEAD: No acute intracranial hemorrhage or extra-axial fluid collection. No hydrocephalus, mass effect, or herniation. No acute ischemic infarct detected.  Mild background of white matter hypoattenuation consistent with chronic microvascular ischemic change. Unremarkable dural venous sinus attenuation. No acute osseous abnormality. Clear visualized paranasal sinuses and tympanomastoid cavities. CT face: Soft Tissues:  Soft tissue swelling is present at the nose and along the right forehead/periorbital region. Facial bones:  Mildly comminuted bilateral nasal bone fractures are present. Orbits:  No evidence of an acute fracture.  The globes are intact.  The soft tissue planes of the orbits are maintained. Paranasal Sinuses:  The paranasal sinuses are clear. Foreign Bodies:  No  evidence of radiopaque foreign bodies. Other:  No evidence of a remote fracture.  No lytic or blastic process seen in the facial bones. CT cervical spine: There is reversal of the normal cervical lordosis. There is no fracture or dislocation. Severe multilevel degenerative changes are present.  There is severe spinal canal stenosis at the C4-C5 level from a large posterior disc osteophyte complex.  Moderate spinal canal stenosis present at the C3-C4 level from a posterior disc osteophyte complex.  Severe bilateral neural foraminal narrowing is present at the C3-C4, C4-C5, and C5-C6 levels. Centrilobular emphysema.    IMPRESSION: 1.  No acute intracranial abnormality. 2.  No cervical spine fracture or traumatic subluxation.  Severe multilevel degenerative change. 3.  Nasal bone fractures with adjacent soft tissue swelling. : [facility]   Transcribe Date/Time: Dec 22 2023  6:05P Dictated by : MAIRA STEWART MD This examination was interpreted and the report reviewed and electronically signed by: MAIRA STEWART MD on Dec 22 2023  6:17PM  EST    ECG 12 lead    Result Date: 12/21/2023  Normal sinus rhythm Nonspecific T wave abnormality Abnormal ECG When compared with ECG of 31-DEC-2022 15:09, Previous ECG has undetermined rhythm, needs review QRS duration has decreased Nonspecific T wave abnormality has replaced inverted T waves in Inferior leads QT has shortened    Transthoracic Echo (TTE) Complete    Result Date: 12/19/2023   [facility], [address] [address]              Tel [phone] and Fax [phone] TRANSTHORACIC ECHOCARDIOGRAM REPORT  Patient Name:      IJEOMA DUNCAN     Reading Physician:    [id number] Escobar Mendez MD Study Date:        12/18/2023          Ordering Provider:    [id number] GUNNAR HUGO MRN/PID:           [id number]             Fellow: Accession#:        [id number]        Nurse:                Mattie Sosa RN Date of Birth/Age: 1950 / 73 years Sonographer:          Cruz Wood RDCS Gender:            M                   Additional Staff: Height:            175.26 cm           Admit Date: Weight:            74.84 kg            Admission Status:     Inpatient - Routine BSA:               1.90 m2             Encounter#:           [id number]                                        Department Location:  [facility] Blood Pressure: 112 /63 mmHg Study Type:    TRANSTHORACIC ECHO (TTE) COMPLETE Diagnosis/ICD: Cardiomyopathy, unspecified-I42.9 Indication:    Cardiomyopathy CPT Code:      Echo Complete w Full Doppler-22068 Patient History: Diabetes:          Yes Pertinent History: A-Fib, CAD, CVA, HTN, Cancer, COPD and Syncope. Ischemic CM,                    cardiac stent. Study Detail: The following Echo studies were performed: 2D, M-Mode, Doppler and               color flow. Technically challenging study due to body habitus.               Definity used as a contrast agent for endocardial border               definition. Total contrast used for this procedure was 1.5 mL via               IV push. The patient was awake.  PHYSICIAN INTERPRETATION: Left Ventricle: The left ventricular systolic function is moderately to severely decreased, with an estimated ejection fraction of 30-35%. Wall motion is abnormal. The left ventricular cavity size is mildly dilated. There is mild concentric left ventricular hypertrophy. Findings are consistent with ischemic cardiomyopathy. Spectral Doppler shows an impaired relaxation pattern of left ventricular diastolic filling. LV Wall Scoring: The mid inferolateral segment is akinetic. The mid and apical anterior wall, basal and mid anterolateral wall, mid anteroseptal segment, basal inferolateral segment, apical lateral segment, basal inferior  segment, and apex are hypokinetic. All remaining scored segments are normal. Left Atrium: The left atrium is mildly dilated. Right Ventricle: The right ventricle is mildly enlarged. There is mildly reduced right ventricular systolic function. A device is visualized in the right ventricle. Right Atrium: The right atrium is mildly dilated. Aortic Valve: The aortic valve is trileaflet. There is mild aortic valve cusp calcification. There is no evidence of aortic valve regurgitation. The peak instantaneous gradient of the aortic valve is 3.5 mmHg. Mitral Valve: The mitral valve is normal in structure. There is mild mitral valve regurgitation. Tricuspid Valve: The tricuspid valve is structurally normal. There is mild tricuspid regurgitation. Pulmonic Valve: The pulmonic valve is structurally normal. There is physiologic pulmonic valve regurgitation. Pericardium: There is a trivial to small pericardial effusion. Aorta: The aortic root is normal. Pulmonary Artery: The tricuspid regurgitant velocity is 3.17 m/s, and with an estimated right atrial pressure of 3 mmHg, the estimated pulmonary artery pressure is mildly elevated with the RVSP at 43.2 mmHg. Pulmonary Veins: There is blunted systolic flow in the pulmonary veins. Systemic Veins: The inferior vena cava appears mildly dilated.  CONCLUSIONS:  1. Left ventricular systolic function is moderately to severely decreased with a 30-35% estimated ejection fraction.  2. Multiple segmental abnormalities exist. See findings.  3. Spectral Doppler shows an impaired relaxation pattern of left ventricular diastolic filling.  4. There is ischemic cardiomyopathy.  5. There is mildly reduced right ventricular systolic function.  6. Mild pulmonary HTN. CVP is elevated. QUANTITATIVE DATA SUMMARY: 2D MEASUREMENTS:                          Normal Ranges: Ao Root d:     2.60 cm   (2.0-3.7cm) LAs:           4.60 cm   (2.7-4.0cm) IVSd:          1.02 cm   (0.6-1.1cm) LVPWd:         0.96 cm    (0.6-1.1cm) LVIDd:         4.75 cm   (3.9-5.9cm) LVIDs:         4.22 cm LV Mass Index: 86.6 g/m2 LV % FS        11.2 % LA VOLUME:                               Normal Ranges: LA Vol A4C:        104.7 ml   (22+/-6mL/m2) LA Vol A2C:        69.5 ml LA Vol BP:         92.7 ml LA Vol Index A4C:  55.0ml/m2 LA Vol Index A2C:  36.5 ml/m2 LA Vol Index BP:   48.7 ml/m2 LA Area A4C:       28.3 cm2 LA Area A2C:       21.2 cm2 LA Major Axis A4C: 6.5 cm LA Major Axis A2C: 5.5 cm LA Volume Index:   47.0 ml/m2 RA VOLUME BY A/L METHOD:                               Normal Ranges: RA Vol A4C:        62.8 ml    (8.3-19.5ml) RA Vol Index A4C:  33.0 ml/m2 RA Area A4C:       20.7 cm2 RA Major Axis A4C: 5.8 cm M-MODE MEASUREMENTS:                  Normal Ranges: Ao Root: 2.60 cm (2.0-3.7cm) LAs:     4.50 cm (2.7-4.0cm) AORTA MEASUREMENTS:                    Normal Ranges: Asc Ao, d: 2.90 cm (2.1-3.4cm) LV SYSTOLIC FUNCTION BY 2D PLANIMETRY (MOD):                     Normal Ranges: EF-A4C View: 28.3 % (>=55%) EF-A2C View: 30.5 % EF-Biplane:  29.5 % LV DIASTOLIC FUNCTION:                            Normal Ranges: MV Peak E:      0.93 m/s   (0.7-1.2 m/s) MV e'           0.05 m/s   (>8.0) MV lateral e'   0.05 m/s MV medial e'    0.05 m/s E/e' Ratio:     18.56      (<8.0) PulmV Sys Chet:  24.60 cm/s PulmV English Chet: 88.80 cm/s PulmV S/D Chet:  0.30 MITRAL VALVE:                 Normal Ranges: MV DT: 201 msec (150-240msec) AORTIC VALVE:                         Normal Ranges: AoV Vmax:      0.93 m/s (<=1.7m/s) AoV Peak PG:   3.5 mmHg (<20mmHg) LVOT Max Chet:  0.79 m/s (<=1.1m/s) LVOT VTI:      16.30 cm LVOT Diameter: 1.90 cm  (1.8-2.4cm) AoV Area,Vmax: 2.41 cm2 (2.5-4.5cm2)  RIGHT VENTRICLE: RV Basal 3.70 cm RV Mid   2.90 cm RV Major 7.3 cm TAPSE:   13.7 mm RV s'    0.10 m/s TRICUSPID VALVE/RVSP:                             Normal Ranges: Peak TR Velocity: 3.17 m/s RV Syst Pressure: 43.2 mmHg (< 30mmHg) IVC Diam:         2.20 cm PULMONIC VALVE:                       Normal Ranges: PV Max Chet: 0.7 m/s  (0.6-0.9m/s) PV Max P.1 mmHg Pulmonary Veins: PulmV English Chet: 88.80 cm/s PulmV S/D Chet:  0.30 PulmV Sys Chet:  24.60 cm/s  [id number] Escobar Mendez MD Electronically signed on 2023 at 5:23:07 PM  Wall Scoring  ** Final **     XR chest 1 view    Result Date: 2023  Interpreted By:  Galen Lopez, STUDY: XR CHEST 1 VIEW;  2023 8:17 pm   INDICATION: Signs/Symptoms:sob.   COMPARISON: 07/10/2023   ACCESSION NUMBER(S): [id number]   ORDERING CLINICIAN: YADIRA VALE   FINDINGS: Pacemaker is seen. Heart size is enlarged. Bronchial thickening features suggesting bronchitis. Equivocal edema also noted. There does appear to be a right upper lobe opacity which is obscured by wires. Pneumonia not excluded. Lungs are hyperinflated       1.  Features of bronchitis and pulmonary edema. Lungs are hyperinflated . Equivocal right upper lobe nodular opacity. Follow-up is advised with two-view chest radiograph when feasible. Pneumonia can have this appearance       MACRO: None   Signed by: Galen Lopez 2023 8:50 PM Dictation workstation:   [id number]    CT chest wo IV contrast    Result Date: 2023  Interpreted By:  Ellis Rodriguez, STUDY: CT CHEST WO IV CONTRAST;  2023 12:39 pm   INDICATION: Signs/Symptoms:3 month follow up -  7 mm nodule superior segment left lower lobe. compare to August exam.   COMPARISON: 2023   ACCESSION NUMBER(S): [id number]   ORDERING CLINICIAN: JOSE ADKINS   TECHNIQUE: Helical data acquisition of the chest was obtained without the use of IV contrast. Images were reformatted in axial, coronal, and sagittal planes.   FINDINGS: POTENTIAL LIMITATIONS OF THE STUDY:   Lack of IV contrast   HEART AND VESSELS:   There are atherosclerotic calcifications of the aorta and its branches. The aorta is unchanged in course and caliber.   The heart is unchanged in size.   No pericardial effusion is seen.   MEDIASTINUM AND  CHRIS, LOWER NECK AND AXILLA: The visualized thyroid gland is within normal limits.   There is mild mediastinal lymphadenopathy. For example, precarinal lymph node measuring approximately 1.3 cm in short axis. Right paratracheal lymph node measuring approximately 1.2 cm in short axis. These are increased in size when compared to the previous study. No definite hilar lymphadenopathy, although evaluation is limited by lack of IV contrast.   Esophagus is stable in course and caliber.   LUNGS AND AIRWAYS: The trachea and central airways are patent. No endobronchial lesion.   There is partial collapse/consolidation of both lower lobes, worse on the left. Mild atelectasis/infiltrate is seen in the lingula. There are mild patchy nonspecific ground-glass opacities scattered within the lungs. There are emphysematous changes, most conspicuous in the upper lobes. There are small bilateral pleural effusions, slightly larger on the left.   UPPER ABDOMEN: The visualized subdiaphragmatic structures demonstrate no acute abnormality. Cholecystectomy. Stable mildly complex cystic lesion in the upper pole of the right kidney.   CHEST WALL AND OSSEOUS STRUCTURES: Degenerative changes. No acute process.       Partial collapse/consolidation of the lower lobes, worse on the left. Additional area of atelectasis or infiltrate in the lingula. Mild scattered patchy nonspecific ground-glass opacities in the lungs. Follow-up recommended to document resolution. Small bilateral pleural effusions, larger on the left.   MACRO: None.   Signed by: Ellis Rodriguez 12/9/2023 9:38 AM Dictation workstation:   [id number]     Assessment/Plan   Acute respiratory [id number], on room air  Coronavirus infection-improving  Possible bacterial pneumonia  Elevated troponin-cardiology following     Continue dexamethasone-10 days  Continue remdesivir-up to 5 days while inpatient  Continue IV zosyn for possible pneumonia-continue to Augmentin to complete total  of 7 days-stop on 1/3/2024  Follow-up blood cultures  Monitor WBC and temperature  Monitor renal and hepatic function  Oxygen as needed  Supportive care  Droplet plus precautions      Frank Escalante MD

## 2024-01-01 ENCOUNTER — HOSPITAL ENCOUNTER (EMERGENCY)
Facility: HOSPITAL | Age: 74
Discharge: OTHER INSTITUTION | End: 2024-01-04
Attending: EMERGENCY MEDICINE
Payer: MEDICARE

## 2024-01-01 ENCOUNTER — APPOINTMENT (OUTPATIENT)
Dept: RADIOLOGY | Facility: HOSPITAL | Age: 74
DRG: 189 | End: 2024-01-01
Payer: MEDICARE

## 2024-01-01 ENCOUNTER — APPOINTMENT (OUTPATIENT)
Dept: PRIMARY CARE | Facility: CLINIC | Age: 74
End: 2024-01-01
Payer: MEDICARE

## 2024-01-01 ENCOUNTER — HOSPITAL ENCOUNTER (OUTPATIENT)
Dept: CARDIOLOGY | Facility: CLINIC | Age: 74
Discharge: HOME | End: 2024-01-04
Payer: MEDICARE

## 2024-01-01 ENCOUNTER — HOSPITAL ENCOUNTER (INPATIENT)
Facility: HOSPITAL | Age: 74
LOS: 1 days | Discharge: HOSPICE | DRG: 871 | End: 2024-01-20
Attending: ANESTHESIOLOGY | Admitting: ANESTHESIOLOGY
Payer: MEDICARE

## 2024-01-01 ENCOUNTER — APPOINTMENT (OUTPATIENT)
Dept: CARDIOLOGY | Facility: HOSPITAL | Age: 74
End: 2024-01-01
Payer: MEDICARE

## 2024-01-01 ENCOUNTER — APPOINTMENT (OUTPATIENT)
Dept: RADIOLOGY | Facility: HOSPITAL | Age: 74
End: 2024-01-01
Payer: MEDICARE

## 2024-01-01 ENCOUNTER — NURSING HOME VISIT (OUTPATIENT)
Dept: POST ACUTE CARE | Facility: EXTERNAL LOCATION | Age: 74
End: 2024-01-01
Payer: MEDICARE

## 2024-01-01 ENCOUNTER — HOSPITAL ENCOUNTER (INPATIENT)
Facility: HOSPITAL | Age: 74
LOS: 16 days | Discharge: HOSPICE | DRG: 189 | End: 2024-01-20
Attending: STUDENT IN AN ORGANIZED HEALTH CARE EDUCATION/TRAINING PROGRAM | Admitting: ANESTHESIOLOGY
Payer: MEDICARE

## 2024-01-01 VITALS
HEIGHT: 69 IN | RESPIRATION RATE: 18 BRPM | BODY MASS INDEX: 23.3 KG/M2 | HEART RATE: 87 BPM | WEIGHT: 157.3 LBS | OXYGEN SATURATION: 92 % | TEMPERATURE: 98.1 F | DIASTOLIC BLOOD PRESSURE: 62 MMHG | SYSTOLIC BLOOD PRESSURE: 100 MMHG

## 2024-01-01 VITALS
RESPIRATION RATE: 24 BRPM | OXYGEN SATURATION: 90 % | HEART RATE: 101 BPM | DIASTOLIC BLOOD PRESSURE: 48 MMHG | SYSTOLIC BLOOD PRESSURE: 91 MMHG

## 2024-01-01 VITALS
TEMPERATURE: 98.6 F | HEIGHT: 69 IN | HEART RATE: 117 BPM | BODY MASS INDEX: 24.03 KG/M2 | RESPIRATION RATE: 39 BRPM | OXYGEN SATURATION: 93 % | DIASTOLIC BLOOD PRESSURE: 55 MMHG | WEIGHT: 162.26 LBS | SYSTOLIC BLOOD PRESSURE: 83 MMHG

## 2024-01-01 VITALS
OXYGEN SATURATION: 100 % | TEMPERATURE: 98.6 F | HEART RATE: 97 BPM | DIASTOLIC BLOOD PRESSURE: 65 MMHG | WEIGHT: 167.33 LBS | BODY MASS INDEX: 24.78 KG/M2 | RESPIRATION RATE: 19 BRPM | HEIGHT: 69 IN | SYSTOLIC BLOOD PRESSURE: 102 MMHG

## 2024-01-01 VITALS
HEART RATE: 80 BPM | RESPIRATION RATE: 18 BRPM | SYSTOLIC BLOOD PRESSURE: 124 MMHG | TEMPERATURE: 98.4 F | DIASTOLIC BLOOD PRESSURE: 82 MMHG

## 2024-01-01 DIAGNOSIS — R53.1 WEAKNESS: ICD-10-CM

## 2024-01-01 DIAGNOSIS — A04.72 C. DIFFICILE DIARRHEA: ICD-10-CM

## 2024-01-01 DIAGNOSIS — J96.00 ACUTE RESPIRATORY FAILURE (MULTI): Primary | ICD-10-CM

## 2024-01-01 DIAGNOSIS — I95.89 HYPOTENSION DUE TO HYPOVOLEMIA: ICD-10-CM

## 2024-01-01 DIAGNOSIS — I50.9 CONGESTIVE HEART FAILURE, UNSPECIFIED HF CHRONICITY, UNSPECIFIED HEART FAILURE TYPE (MULTI): ICD-10-CM

## 2024-01-01 DIAGNOSIS — Z91.81 AT RISK FOR FALLING: ICD-10-CM

## 2024-01-01 DIAGNOSIS — J96.90 RESPIRATORY FAILURE, UNSPECIFIED CHRONICITY, UNSPECIFIED WHETHER WITH HYPOXIA OR HYPERCAPNIA (MULTI): ICD-10-CM

## 2024-01-01 DIAGNOSIS — J96.01 ACUTE RESPIRATORY FAILURE WITH HYPOXIA (MULTI): ICD-10-CM

## 2024-01-01 DIAGNOSIS — I48.91 ATRIAL FIBRILLATION, UNSPECIFIED TYPE (MULTI): ICD-10-CM

## 2024-01-01 DIAGNOSIS — J18.9 PNEUMONIA DUE TO INFECTIOUS ORGANISM, UNSPECIFIED LATERALITY, UNSPECIFIED PART OF LUNG: Primary | ICD-10-CM

## 2024-01-01 DIAGNOSIS — E11.9 TYPE 2 DIABETES MELLITUS WITHOUT COMPLICATION, WITH LONG-TERM CURRENT USE OF INSULIN (MULTI): ICD-10-CM

## 2024-01-01 DIAGNOSIS — Z79.51 COPD ON LONG-TERM INHALED STEROID THERAPY (MULTI): ICD-10-CM

## 2024-01-01 DIAGNOSIS — I25.2 HISTORY OF ACUTE MYOCARDIAL INFARCTION: ICD-10-CM

## 2024-01-01 DIAGNOSIS — J44.9 COPD ON LONG-TERM INHALED STEROID THERAPY (MULTI): ICD-10-CM

## 2024-01-01 DIAGNOSIS — E78.5 HYPERLIPIDEMIA, UNSPECIFIED HYPERLIPIDEMIA TYPE: ICD-10-CM

## 2024-01-01 DIAGNOSIS — J18.9 PNEUMONIA DUE TO INFECTIOUS ORGANISM, UNSPECIFIED LATERALITY, UNSPECIFIED PART OF LUNG: ICD-10-CM

## 2024-01-01 DIAGNOSIS — R06.00 ACUTE DYSPNEA: Primary | ICD-10-CM

## 2024-01-01 DIAGNOSIS — I50.9 HEART FAILURE, UNSPECIFIED HF CHRONICITY, UNSPECIFIED HEART FAILURE TYPE (MULTI): ICD-10-CM

## 2024-01-01 DIAGNOSIS — G62.9 NEUROPATHY: ICD-10-CM

## 2024-01-01 DIAGNOSIS — I10 HYPERTENSION, UNSPECIFIED TYPE: ICD-10-CM

## 2024-01-01 DIAGNOSIS — E86.1 HYPOTENSION DUE TO HYPOVOLEMIA: ICD-10-CM

## 2024-01-01 DIAGNOSIS — Z79.4 TYPE 2 DIABETES MELLITUS WITHOUT COMPLICATION, WITH LONG-TERM CURRENT USE OF INSULIN (MULTI): ICD-10-CM

## 2024-01-01 DIAGNOSIS — R79.89 ELEVATED TROPONIN I LEVEL: ICD-10-CM

## 2024-01-01 LAB
ALBUMIN SERPL-MCNC: 2.1 G/DL (ref 3.5–5)
ALBUMIN SERPL-MCNC: 2.2 G/DL (ref 3.5–5)
ALBUMIN SERPL-MCNC: 2.2 G/DL (ref 3.5–5)
ALBUMIN SERPL-MCNC: 2.4 G/DL (ref 3.5–5)
ALBUMIN SERPL-MCNC: 2.5 G/DL (ref 3.5–5)
ALBUMIN SERPL-MCNC: 2.7 G/DL (ref 3.5–5)
ALP BLD-CCNC: 173 U/L (ref 35–125)
ALP BLD-CCNC: 265 U/L (ref 35–125)
ALP BLD-CCNC: 281 U/L (ref 35–125)
ALP BLD-CCNC: 398 U/L (ref 35–125)
ALP BLD-CCNC: 464 U/L (ref 35–125)
ALP BLD-CCNC: 497 U/L (ref 35–125)
ALP BLD-CCNC: 506 U/L (ref 35–125)
ALP BLD-CCNC: 560 U/L (ref 35–125)
ALT SERPL-CCNC: <5 U/L (ref 5–40)
AMMONIA PLAS-SCNC: 47 UMOL/L (ref 16–53)
ANION GAP BLDA CALCULATED.4IONS-SCNC: 12 MMO/L (ref 10–25)
ANION GAP BLDA CALCULATED.4IONS-SCNC: 2 MMO/L (ref 10–25)
ANION GAP BLDA CALCULATED.4IONS-SCNC: 7 MMO/L (ref 10–25)
ANION GAP BLDA CALCULATED.4IONS-SCNC: 9 MMO/L (ref 10–25)
ANION GAP SERPL CALC-SCNC: 10 MMOL/L
ANION GAP SERPL CALC-SCNC: 11 MMOL/L
ANION GAP SERPL CALC-SCNC: 12 MMOL/L
ANION GAP SERPL CALC-SCNC: 13 MMOL/L
ANION GAP SERPL CALC-SCNC: 14 MMOL/L
ANION GAP SERPL CALC-SCNC: 14 MMOL/L
ANION GAP SERPL CALC-SCNC: 15 MMOL/L
ANION GAP SERPL CALC-SCNC: 15 MMOL/L (ref 10–20)
ANION GAP SERPL CALC-SCNC: 9 MMOL/L
APPARATUS: ABNORMAL
APPEARANCE UR: ABNORMAL
ARTERIAL PATENCY WRIST A: POSITIVE
AST SERPL-CCNC: 13 U/L (ref 5–40)
AST SERPL-CCNC: 15 U/L (ref 5–40)
AST SERPL-CCNC: 19 U/L (ref 5–40)
AST SERPL-CCNC: 19 U/L (ref 5–40)
AST SERPL-CCNC: 20 U/L (ref 5–40)
AST SERPL-CCNC: 23 U/L (ref 5–40)
AST SERPL-CCNC: 25 U/L (ref 5–40)
AST SERPL-CCNC: 36 U/L (ref 5–40)
ATRIAL RATE: 150 BPM
ATRIAL RATE: 468 BPM
ATRIAL RATE: 56 BPM
ATRIAL RATE: 61 BPM
ATRIAL RATE: 79 BPM
ATRIAL RATE: 84 BPM
BACTERIA BLD CULT: NORMAL
BACTERIA BLD CULT: NORMAL
BACTERIA FLD CULT: NORMAL
BACTERIA UR CULT: ABNORMAL
BASE EXCESS BLDA CALC-SCNC: -0.7 MMOL/L (ref -2–3)
BASE EXCESS BLDA CALC-SCNC: -1.4 MMOL/L (ref -2–3)
BASE EXCESS BLDA CALC-SCNC: -1.8 MMOL/L (ref -2–3)
BASE EXCESS BLDA CALC-SCNC: 0.3 MMOL/L (ref -2–3)
BASE EXCESS BLDA CALC-SCNC: 4.7 MMOL/L (ref -2–3)
BASOPHILS # BLD AUTO: 0.02 X10*3/UL (ref 0–0.1)
BASOPHILS # BLD AUTO: 0.03 X10*3/UL (ref 0–0.1)
BASOPHILS # BLD MANUAL: 0 X10*3/UL (ref 0–0.1)
BASOPHILS NFR BLD AUTO: 0.1 %
BASOPHILS NFR BLD AUTO: 0.2 %
BASOPHILS NFR BLD MANUAL: 0 %
BILIRUB SERPL-MCNC: 0.3 MG/DL (ref 0.1–1.2)
BILIRUB SERPL-MCNC: 0.3 MG/DL (ref 0.1–1.2)
BILIRUB SERPL-MCNC: 0.4 MG/DL (ref 0.1–1.2)
BILIRUB SERPL-MCNC: 0.7 MG/DL (ref 0.1–1.2)
BILIRUB SERPL-MCNC: 0.8 MG/DL (ref 0.1–1.2)
BILIRUB SERPL-MCNC: 0.9 MG/DL (ref 0.1–1.2)
BILIRUB UR STRIP.AUTO-MCNC: NEGATIVE MG/DL
BNP SERPL-MCNC: 969 PG/ML (ref 0–99)
BODY TEMPERATURE: 37 DEGREES CELSIUS
BODY TEMPERATURE: ABNORMAL
BUN SERPL-MCNC: 34 MG/DL (ref 8–25)
BUN SERPL-MCNC: 35 MG/DL (ref 8–25)
BUN SERPL-MCNC: 38 MG/DL (ref 8–25)
BUN SERPL-MCNC: 40 MG/DL (ref 8–25)
BUN SERPL-MCNC: 42 MG/DL (ref 8–25)
BUN SERPL-MCNC: 42 MG/DL (ref 8–25)
BUN SERPL-MCNC: 43 MG/DL (ref 8–25)
BUN SERPL-MCNC: 44 MG/DL (ref 8–25)
BUN SERPL-MCNC: 45 MG/DL (ref 8–25)
BUN SERPL-MCNC: 47 MG/DL (ref 8–25)
BUN SERPL-MCNC: 47 MG/DL (ref 8–25)
BUN SERPL-MCNC: 49 MG/DL (ref 8–25)
BUN SERPL-MCNC: 52 MG/DL (ref 8–25)
BUN SERPL-MCNC: 63 MG/DL (ref 6–23)
BUN SERPL-MCNC: 71 MG/DL (ref 8–25)
BUN SERPL-MCNC: 72 MG/DL (ref 8–25)
BURR CELLS BLD QL SMEAR: ABNORMAL
C DIF TOX TCDA+TCDB STL QL NAA+PROBE: DETECTED
C DIF TOX TCDA+TCDB STL QL NAA+PROBE: NOT DETECTED
C DIFF TOX A+B STL QL IA: NEGATIVE
CA-I BLDA-SCNC: 1.08 MMOL/L (ref 1.1–1.33)
CA-I BLDA-SCNC: 1.14 MMOL/L (ref 1.1–1.33)
CA-I BLDA-SCNC: 1.17 MMOL/L (ref 1.1–1.33)
CA-I BLDA-SCNC: 1.18 MMOL/L (ref 1.1–1.33)
CALCIUM SERPL-MCNC: 7.4 MG/DL (ref 8.5–10.4)
CALCIUM SERPL-MCNC: 7.4 MG/DL (ref 8.5–10.4)
CALCIUM SERPL-MCNC: 7.5 MG/DL (ref 8.5–10.4)
CALCIUM SERPL-MCNC: 7.5 MG/DL (ref 8.5–10.4)
CALCIUM SERPL-MCNC: 7.6 MG/DL (ref 8.5–10.4)
CALCIUM SERPL-MCNC: 7.6 MG/DL (ref 8.5–10.4)
CALCIUM SERPL-MCNC: 7.6 MG/DL (ref 8.6–10.3)
CALCIUM SERPL-MCNC: 7.7 MG/DL (ref 8.5–10.4)
CALCIUM SERPL-MCNC: 7.8 MG/DL (ref 8.5–10.4)
CALCIUM SERPL-MCNC: 8 MG/DL (ref 8.5–10.4)
CARDIAC TROPONIN I PNL SERPL HS: 148 NG/L (ref 0–20)
CARDIAC TROPONIN I PNL SERPL HS: 161 NG/L (ref 0–20)
CARDIAC TROPONIN I PNL SERPL HS: 161 NG/L (ref 0–20)
CARDIAC TROPONIN I PNL SERPL HS: 162 NG/L (ref 0–20)
CHLORIDE BLDA-SCNC: 101 MMOL/L (ref 98–107)
CHLORIDE BLDA-SCNC: 108 MMOL/L (ref 98–107)
CHLORIDE BLDA-SCNC: 110 MMOL/L (ref 98–107)
CHLORIDE BLDA-SCNC: 111 MMOL/L (ref 98–107)
CHLORIDE SERPL-SCNC: 100 MMOL/L (ref 97–107)
CHLORIDE SERPL-SCNC: 101 MMOL/L (ref 98–107)
CHLORIDE SERPL-SCNC: 102 MMOL/L (ref 97–107)
CHLORIDE SERPL-SCNC: 104 MMOL/L (ref 97–107)
CHLORIDE SERPL-SCNC: 105 MMOL/L (ref 97–107)
CHLORIDE SERPL-SCNC: 106 MMOL/L (ref 97–107)
CHLORIDE SERPL-SCNC: 108 MMOL/L (ref 97–107)
CHLORIDE SERPL-SCNC: 108 MMOL/L (ref 97–107)
CHLORIDE SERPL-SCNC: 109 MMOL/L (ref 97–107)
CHLORIDE SERPL-SCNC: 110 MMOL/L (ref 97–107)
CHLORIDE SERPL-SCNC: 111 MMOL/L (ref 97–107)
CHLORIDE SERPL-SCNC: 113 MMOL/L (ref 97–107)
CHLORIDE SERPL-SCNC: 97 MMOL/L (ref 97–107)
CHLORIDE SERPL-SCNC: 99 MMOL/L (ref 97–107)
CLARITY FLD: CLEAR
CO2 SERPL-SCNC: 21 MMOL/L (ref 24–31)
CO2 SERPL-SCNC: 22 MMOL/L (ref 24–31)
CO2 SERPL-SCNC: 23 MMOL/L (ref 24–31)
CO2 SERPL-SCNC: 24 MMOL/L (ref 21–32)
CO2 SERPL-SCNC: 24 MMOL/L (ref 24–31)
CO2 SERPL-SCNC: 25 MMOL/L (ref 24–31)
CO2 SERPL-SCNC: 26 MMOL/L (ref 24–31)
CO2 SERPL-SCNC: 26 MMOL/L (ref 24–31)
COLOR FLD: NORMAL
COLOR UR: ABNORMAL
CREAT SERPL-MCNC: 1.8 MG/DL (ref 0.4–1.6)
CREAT SERPL-MCNC: 1.9 MG/DL (ref 0.4–1.6)
CREAT SERPL-MCNC: 1.9 MG/DL (ref 0.4–1.6)
CREAT SERPL-MCNC: 2 MG/DL (ref 0.4–1.6)
CREAT SERPL-MCNC: 2 MG/DL (ref 0.4–1.6)
CREAT SERPL-MCNC: 2.1 MG/DL (ref 0.4–1.6)
CREAT SERPL-MCNC: 2.3 MG/DL (ref 0.4–1.6)
CREAT SERPL-MCNC: 2.4 MG/DL (ref 0.4–1.6)
CREAT SERPL-MCNC: 2.5 MG/DL (ref 0.4–1.6)
CREAT SERPL-MCNC: 2.61 MG/DL (ref 0.5–1.3)
EGFRCR SERPLBLD CKD-EPI 2021: 27 ML/MIN/1.73M*2
EGFRCR SERPLBLD CKD-EPI 2021: 27 ML/MIN/1.73M*2
EGFRCR SERPLBLD CKD-EPI 2021: 28 ML/MIN/1.73M*2
EGFRCR SERPLBLD CKD-EPI 2021: 29 ML/MIN/1.73M*2
EGFRCR SERPLBLD CKD-EPI 2021: 33 ML/MIN/1.73M*2
EGFRCR SERPLBLD CKD-EPI 2021: 33 ML/MIN/1.73M*2
EGFRCR SERPLBLD CKD-EPI 2021: 35 ML/MIN/1.73M*2
EGFRCR SERPLBLD CKD-EPI 2021: 35 ML/MIN/1.73M*2
EGFRCR SERPLBLD CKD-EPI 2021: 37 ML/MIN/1.73M*2
EGFRCR SERPLBLD CKD-EPI 2021: 37 ML/MIN/1.73M*2
EGFRCR SERPLBLD CKD-EPI 2021: 39 ML/MIN/1.73M*2
EOSINOPHIL # BLD AUTO: 0 X10*3/UL (ref 0–0.4)
EOSINOPHIL # BLD AUTO: 0.01 X10*3/UL (ref 0–0.4)
EOSINOPHIL # BLD AUTO: 0.02 X10*3/UL (ref 0–0.4)
EOSINOPHIL # BLD AUTO: 0.02 X10*3/UL (ref 0–0.4)
EOSINOPHIL # BLD AUTO: 0.03 X10*3/UL (ref 0–0.4)
EOSINOPHIL # BLD AUTO: 0.04 X10*3/UL (ref 0–0.4)
EOSINOPHIL # BLD AUTO: 0.04 X10*3/UL (ref 0–0.4)
EOSINOPHIL # BLD AUTO: 0.07 X10*3/UL (ref 0–0.4)
EOSINOPHIL # BLD MANUAL: 0.19 X10*3/UL (ref 0–0.4)
EOSINOPHIL NFR BLD AUTO: 0 %
EOSINOPHIL NFR BLD AUTO: 0.1 %
EOSINOPHIL NFR BLD AUTO: 0.3 %
EOSINOPHIL NFR BLD AUTO: 0.6 %
EOSINOPHIL NFR BLD MANUAL: 1 %
EPAP CMH2O: 5 CM H2O
ERYTHROCYTE [DISTWIDTH] IN BLOOD BY AUTOMATED COUNT: 16.3 % (ref 11.5–14.5)
ERYTHROCYTE [DISTWIDTH] IN BLOOD BY AUTOMATED COUNT: 17.4 % (ref 11.5–14.5)
ERYTHROCYTE [DISTWIDTH] IN BLOOD BY AUTOMATED COUNT: 17.8 % (ref 11.5–14.5)
ERYTHROCYTE [DISTWIDTH] IN BLOOD BY AUTOMATED COUNT: 18.1 % (ref 11.5–14.5)
ERYTHROCYTE [DISTWIDTH] IN BLOOD BY AUTOMATED COUNT: 18.5 % (ref 11.5–14.5)
ERYTHROCYTE [DISTWIDTH] IN BLOOD BY AUTOMATED COUNT: 18.6 % (ref 11.5–14.5)
ERYTHROCYTE [DISTWIDTH] IN BLOOD BY AUTOMATED COUNT: 18.9 % (ref 11.5–14.5)
ERYTHROCYTE [DISTWIDTH] IN BLOOD BY AUTOMATED COUNT: 19.1 % (ref 11.5–14.5)
ERYTHROCYTE [DISTWIDTH] IN BLOOD BY AUTOMATED COUNT: 19.2 % (ref 11.5–14.5)
ERYTHROCYTE [DISTWIDTH] IN BLOOD BY AUTOMATED COUNT: 19.4 % (ref 11.5–14.5)
ERYTHROCYTE [DISTWIDTH] IN BLOOD BY AUTOMATED COUNT: 19.5 % (ref 11.5–14.5)
ERYTHROCYTE [DISTWIDTH] IN BLOOD BY AUTOMATED COUNT: 19.6 % (ref 11.5–14.5)
ERYTHROCYTE [DISTWIDTH] IN BLOOD BY AUTOMATED COUNT: 19.7 % (ref 11.5–14.5)
ERYTHROCYTE [DISTWIDTH] IN BLOOD BY AUTOMATED COUNT: 20 % (ref 11.5–14.5)
ERYTHROCYTE [DISTWIDTH] IN BLOOD BY AUTOMATED COUNT: 20.2 % (ref 11.5–14.5)
ERYTHROCYTE [DISTWIDTH] IN BLOOD BY AUTOMATED COUNT: 20.6 % (ref 11.5–14.5)
FLUAV RNA RESP QL NAA+PROBE: NOT DETECTED
FLUBV RNA RESP QL NAA+PROBE: NOT DETECTED
GFR SERPL CREATININE-BSD FRML MDRD: 25 ML/MIN/1.73M*2
GFR SERPL CREATININE-BSD FRML MDRD: 27 ML/MIN/1.73M*2
GFR SERPL CREATININE-BSD FRML MDRD: 29 ML/MIN/1.73M*2
GFR SERPL CREATININE-BSD FRML MDRD: 29 ML/MIN/1.73M*2
GFR SERPL CREATININE-BSD FRML MDRD: 33 ML/MIN/1.73M*2
GLUCOSE BLD MANUAL STRIP-MCNC: 101 MG/DL (ref 74–99)
GLUCOSE BLD MANUAL STRIP-MCNC: 106 MG/DL (ref 74–99)
GLUCOSE BLD MANUAL STRIP-MCNC: 108 MG/DL (ref 74–99)
GLUCOSE BLD MANUAL STRIP-MCNC: 110 MG/DL (ref 74–99)
GLUCOSE BLD MANUAL STRIP-MCNC: 111 MG/DL (ref 74–99)
GLUCOSE BLD MANUAL STRIP-MCNC: 112 MG/DL (ref 74–99)
GLUCOSE BLD MANUAL STRIP-MCNC: 113 MG/DL (ref 74–99)
GLUCOSE BLD MANUAL STRIP-MCNC: 113 MG/DL (ref 74–99)
GLUCOSE BLD MANUAL STRIP-MCNC: 114 MG/DL (ref 74–99)
GLUCOSE BLD MANUAL STRIP-MCNC: 115 MG/DL (ref 74–99)
GLUCOSE BLD MANUAL STRIP-MCNC: 117 MG/DL (ref 74–99)
GLUCOSE BLD MANUAL STRIP-MCNC: 122 MG/DL (ref 74–99)
GLUCOSE BLD MANUAL STRIP-MCNC: 126 MG/DL (ref 74–99)
GLUCOSE BLD MANUAL STRIP-MCNC: 127 MG/DL (ref 74–99)
GLUCOSE BLD MANUAL STRIP-MCNC: 127 MG/DL (ref 74–99)
GLUCOSE BLD MANUAL STRIP-MCNC: 128 MG/DL (ref 74–99)
GLUCOSE BLD MANUAL STRIP-MCNC: 130 MG/DL (ref 74–99)
GLUCOSE BLD MANUAL STRIP-MCNC: 131 MG/DL (ref 74–99)
GLUCOSE BLD MANUAL STRIP-MCNC: 136 MG/DL (ref 74–99)
GLUCOSE BLD MANUAL STRIP-MCNC: 137 MG/DL (ref 74–99)
GLUCOSE BLD MANUAL STRIP-MCNC: 137 MG/DL (ref 74–99)
GLUCOSE BLD MANUAL STRIP-MCNC: 142 MG/DL (ref 74–99)
GLUCOSE BLD MANUAL STRIP-MCNC: 143 MG/DL (ref 74–99)
GLUCOSE BLD MANUAL STRIP-MCNC: 150 MG/DL (ref 74–99)
GLUCOSE BLD MANUAL STRIP-MCNC: 152 MG/DL (ref 74–99)
GLUCOSE BLD MANUAL STRIP-MCNC: 153 MG/DL (ref 74–99)
GLUCOSE BLD MANUAL STRIP-MCNC: 158 MG/DL (ref 74–99)
GLUCOSE BLD MANUAL STRIP-MCNC: 159 MG/DL (ref 74–99)
GLUCOSE BLD MANUAL STRIP-MCNC: 161 MG/DL (ref 74–99)
GLUCOSE BLD MANUAL STRIP-MCNC: 165 MG/DL (ref 74–99)
GLUCOSE BLD MANUAL STRIP-MCNC: 166 MG/DL (ref 74–99)
GLUCOSE BLD MANUAL STRIP-MCNC: 170 MG/DL (ref 74–99)
GLUCOSE BLD MANUAL STRIP-MCNC: 171 MG/DL (ref 74–99)
GLUCOSE BLD MANUAL STRIP-MCNC: 172 MG/DL (ref 74–99)
GLUCOSE BLD MANUAL STRIP-MCNC: 172 MG/DL (ref 74–99)
GLUCOSE BLD MANUAL STRIP-MCNC: 174 MG/DL (ref 74–99)
GLUCOSE BLD MANUAL STRIP-MCNC: 174 MG/DL (ref 74–99)
GLUCOSE BLD MANUAL STRIP-MCNC: 176 MG/DL (ref 74–99)
GLUCOSE BLD MANUAL STRIP-MCNC: 177 MG/DL (ref 74–99)
GLUCOSE BLD MANUAL STRIP-MCNC: 185 MG/DL (ref 74–99)
GLUCOSE BLD MANUAL STRIP-MCNC: 191 MG/DL (ref 74–99)
GLUCOSE BLD MANUAL STRIP-MCNC: 195 MG/DL (ref 74–99)
GLUCOSE BLD MANUAL STRIP-MCNC: 200 MG/DL (ref 74–99)
GLUCOSE BLD MANUAL STRIP-MCNC: 202 MG/DL (ref 74–99)
GLUCOSE BLD MANUAL STRIP-MCNC: 205 MG/DL (ref 74–99)
GLUCOSE BLD MANUAL STRIP-MCNC: 206 MG/DL (ref 74–99)
GLUCOSE BLD MANUAL STRIP-MCNC: 209 MG/DL (ref 74–99)
GLUCOSE BLD MANUAL STRIP-MCNC: 215 MG/DL (ref 74–99)
GLUCOSE BLD MANUAL STRIP-MCNC: 217 MG/DL (ref 74–99)
GLUCOSE BLD MANUAL STRIP-MCNC: 225 MG/DL (ref 74–99)
GLUCOSE BLD MANUAL STRIP-MCNC: 233 MG/DL (ref 74–99)
GLUCOSE BLD MANUAL STRIP-MCNC: 237 MG/DL (ref 74–99)
GLUCOSE BLD MANUAL STRIP-MCNC: 241 MG/DL (ref 74–99)
GLUCOSE BLD MANUAL STRIP-MCNC: 241 MG/DL (ref 74–99)
GLUCOSE BLD MANUAL STRIP-MCNC: 246 MG/DL (ref 74–99)
GLUCOSE BLD MANUAL STRIP-MCNC: 247 MG/DL (ref 74–99)
GLUCOSE BLD MANUAL STRIP-MCNC: 250 MG/DL (ref 74–99)
GLUCOSE BLD MANUAL STRIP-MCNC: 250 MG/DL (ref 74–99)
GLUCOSE BLD MANUAL STRIP-MCNC: 257 MG/DL (ref 74–99)
GLUCOSE BLD MANUAL STRIP-MCNC: 276 MG/DL (ref 74–99)
GLUCOSE BLD MANUAL STRIP-MCNC: 281 MG/DL (ref 74–99)
GLUCOSE BLD MANUAL STRIP-MCNC: 296 MG/DL (ref 74–99)
GLUCOSE BLD MANUAL STRIP-MCNC: 356 MG/DL (ref 74–99)
GLUCOSE BLD MANUAL STRIP-MCNC: 38 MG/DL (ref 74–99)
GLUCOSE BLD MANUAL STRIP-MCNC: 53 MG/DL (ref 74–99)
GLUCOSE BLD MANUAL STRIP-MCNC: 56 MG/DL (ref 74–99)
GLUCOSE BLD MANUAL STRIP-MCNC: 65 MG/DL (ref 74–99)
GLUCOSE BLD MANUAL STRIP-MCNC: 70 MG/DL (ref 74–99)
GLUCOSE BLD MANUAL STRIP-MCNC: 85 MG/DL (ref 74–99)
GLUCOSE BLD MANUAL STRIP-MCNC: 92 MG/DL (ref 74–99)
GLUCOSE BLD MANUAL STRIP-MCNC: 92 MG/DL (ref 74–99)
GLUCOSE BLD MANUAL STRIP-MCNC: 98 MG/DL (ref 74–99)
GLUCOSE BLD MANUAL STRIP-MCNC: 99 MG/DL (ref 74–99)
GLUCOSE BLDA-MCNC: 115 MG/DL (ref 74–99)
GLUCOSE BLDA-MCNC: 205 MG/DL (ref 74–99)
GLUCOSE BLDA-MCNC: 218 MG/DL (ref 74–99)
GLUCOSE BLDA-MCNC: 33 MG/DL (ref 74–99)
GLUCOSE FLD-MCNC: 193 MG/DL
GLUCOSE SERPL-MCNC: 132 MG/DL (ref 65–99)
GLUCOSE SERPL-MCNC: 132 MG/DL (ref 65–99)
GLUCOSE SERPL-MCNC: 136 MG/DL (ref 65–99)
GLUCOSE SERPL-MCNC: 138 MG/DL (ref 65–99)
GLUCOSE SERPL-MCNC: 143 MG/DL (ref 65–99)
GLUCOSE SERPL-MCNC: 151 MG/DL (ref 65–99)
GLUCOSE SERPL-MCNC: 153 MG/DL (ref 65–99)
GLUCOSE SERPL-MCNC: 156 MG/DL (ref 65–99)
GLUCOSE SERPL-MCNC: 161 MG/DL (ref 65–99)
GLUCOSE SERPL-MCNC: 171 MG/DL (ref 65–99)
GLUCOSE SERPL-MCNC: 189 MG/DL (ref 65–99)
GLUCOSE SERPL-MCNC: 192 MG/DL (ref 65–99)
GLUCOSE SERPL-MCNC: 200 MG/DL (ref 65–99)
GLUCOSE SERPL-MCNC: 209 MG/DL (ref 65–99)
GLUCOSE SERPL-MCNC: 219 MG/DL (ref 65–99)
GLUCOSE SERPL-MCNC: 287 MG/DL (ref 65–99)
GLUCOSE SERPL-MCNC: 81 MG/DL (ref 65–99)
GLUCOSE SERPL-MCNC: 90 MG/DL (ref 74–99)
GLUCOSE UR STRIP.AUTO-MCNC: ABNORMAL MG/DL
GRAM STN SPEC: NORMAL
GRAM STN SPEC: NORMAL
HCO3 BLDA-SCNC: 22.8 MMOL/L (ref 22–26)
HCO3 BLDA-SCNC: 23.7 MMOL/L (ref 22–26)
HCO3 BLDA-SCNC: 24.2 MMOL/L (ref 22–26)
HCO3 BLDA-SCNC: 25.4 MMOL/L (ref 22–26)
HCO3 BLDA-SCNC: 32 MMOL/L (ref 22–26)
HCT VFR BLD AUTO: 30.9 % (ref 41–52)
HCT VFR BLD AUTO: 31.5 % (ref 41–52)
HCT VFR BLD AUTO: 33.6 % (ref 41–52)
HCT VFR BLD AUTO: 34 % (ref 41–52)
HCT VFR BLD AUTO: 34.7 % (ref 41–52)
HCT VFR BLD AUTO: 36.5 % (ref 41–52)
HCT VFR BLD AUTO: 36.5 % (ref 41–52)
HCT VFR BLD AUTO: 37.1 % (ref 41–52)
HCT VFR BLD AUTO: 37.2 % (ref 41–52)
HCT VFR BLD AUTO: 37.2 % (ref 41–52)
HCT VFR BLD AUTO: 37.4 % (ref 41–52)
HCT VFR BLD AUTO: 37.9 % (ref 41–52)
HCT VFR BLD AUTO: 38.6 % (ref 41–52)
HCT VFR BLD AUTO: 39.6 % (ref 41–52)
HCT VFR BLD AUTO: 40 % (ref 41–52)
HCT VFR BLD AUTO: 40.2 % (ref 41–52)
HCT VFR BLD AUTO: 42.2 % (ref 41–52)
HCT VFR BLD EST: 37 % (ref 41–52)
HCT VFR BLD EST: 38 % (ref 41–52)
HCT VFR BLD EST: 39 % (ref 41–52)
HCT VFR BLD EST: 43 % (ref 41–52)
HGB BLD-MCNC: 10.4 G/DL (ref 13.5–17.5)
HGB BLD-MCNC: 10.5 G/DL (ref 13.5–17.5)
HGB BLD-MCNC: 11.2 G/DL (ref 13.5–17.5)
HGB BLD-MCNC: 12 G/DL (ref 13.5–17.5)
HGB BLD-MCNC: 12.1 G/DL (ref 13.5–17.5)
HGB BLD-MCNC: 12.3 G/DL (ref 13.5–17.5)
HGB BLD-MCNC: 12.4 G/DL (ref 13.5–17.5)
HGB BLD-MCNC: 12.8 G/DL (ref 13.5–17.5)
HGB BLD-MCNC: 12.8 G/DL (ref 13.5–17.5)
HGB BLD-MCNC: 13.2 G/DL (ref 13.5–17.5)
HGB BLD-MCNC: 13.9 G/DL (ref 13.5–17.5)
HGB BLDA-MCNC: 12.3 G/DL (ref 13.5–17.5)
HGB BLDA-MCNC: 12.6 G/DL (ref 13.5–17.5)
HGB BLDA-MCNC: 12.9 G/DL (ref 13.5–17.5)
HGB BLDA-MCNC: 14.2 G/DL (ref 13.5–17.5)
HOLD SPECIMEN: NORMAL
HYALINE CASTS #/AREA URNS AUTO: ABNORMAL /LPF
HYPOCHROMIA BLD QL SMEAR: ABNORMAL
IMM GRANULOCYTES # BLD AUTO: 0.09 X10*3/UL (ref 0–0.5)
IMM GRANULOCYTES # BLD AUTO: 0.1 X10*3/UL (ref 0–0.5)
IMM GRANULOCYTES # BLD AUTO: 0.11 X10*3/UL (ref 0–0.5)
IMM GRANULOCYTES # BLD AUTO: 0.11 X10*3/UL (ref 0–0.5)
IMM GRANULOCYTES # BLD AUTO: 0.12 X10*3/UL (ref 0–0.5)
IMM GRANULOCYTES # BLD AUTO: 0.13 X10*3/UL (ref 0–0.5)
IMM GRANULOCYTES # BLD AUTO: 0.13 X10*3/UL (ref 0–0.5)
IMM GRANULOCYTES # BLD AUTO: 0.14 X10*3/UL (ref 0–0.5)
IMM GRANULOCYTES # BLD AUTO: 0.18 X10*3/UL (ref 0–0.5)
IMM GRANULOCYTES # BLD AUTO: 0.18 X10*3/UL (ref 0–0.5)
IMM GRANULOCYTES # BLD AUTO: 0.2 X10*3/UL (ref 0–0.5)
IMM GRANULOCYTES NFR BLD AUTO: 0.6 % (ref 0–0.9)
IMM GRANULOCYTES NFR BLD AUTO: 0.7 % (ref 0–0.9)
IMM GRANULOCYTES NFR BLD AUTO: 0.8 % (ref 0–0.9)
IMM GRANULOCYTES NFR BLD AUTO: 0.9 % (ref 0–0.9)
IMM GRANULOCYTES NFR BLD AUTO: 1 % (ref 0–0.9)
IMM GRANULOCYTES NFR BLD AUTO: 1 % (ref 0–0.9)
IMM GRANULOCYTES NFR BLD AUTO: 1.1 % (ref 0–0.9)
IMM GRANULOCYTES NFR BLD AUTO: 1.2 % (ref 0–0.9)
IMM GRANULOCYTES NFR BLD AUTO: 1.5 % (ref 0–0.9)
INHALED O2 CONCENTRATION: 36 %
INHALED O2 CONCENTRATION: 44 %
INHALED O2 CONCENTRATION: 50 %
INR PPP: 2.3 (ref 0.9–1.1)
IPAP CMH2O: 15 CM H2O
KETONES UR STRIP.AUTO-MCNC: NEGATIVE MG/DL
LABORATORY COMMENT REPORT: NORMAL
LABORATORY COMMENT REPORT: NORMAL
LACTATE BLDA-SCNC: 0.9 MMOL/L (ref 0.4–2)
LACTATE BLDA-SCNC: 1.3 MMOL/L (ref 0.4–2)
LACTATE BLDA-SCNC: 1.4 MMOL/L (ref 0.4–2)
LACTATE BLDA-SCNC: 2 MMOL/L (ref 0.4–2)
LACTATE SERPL-SCNC: 1 MMOL/L (ref 0.4–2)
LDH FLD L TO P-CCNC: 63 U/L
LEUKOCYTE ESTERASE UR QL STRIP.AUTO: ABNORMAL
LIPASE SERPL-CCNC: <16 U/L (ref 16–63)
LYMPHOCYTES # BLD AUTO: 0.32 X10*3/UL (ref 0.8–3)
LYMPHOCYTES # BLD AUTO: 0.71 X10*3/UL (ref 0.8–3)
LYMPHOCYTES # BLD AUTO: 0.73 X10*3/UL (ref 0.8–3)
LYMPHOCYTES # BLD AUTO: 0.85 X10*3/UL (ref 0.8–3)
LYMPHOCYTES # BLD AUTO: 0.88 X10*3/UL (ref 0.8–3)
LYMPHOCYTES # BLD AUTO: 0.99 X10*3/UL (ref 0.8–3)
LYMPHOCYTES # BLD AUTO: 1.15 X10*3/UL (ref 0.8–3)
LYMPHOCYTES # BLD AUTO: 1.61 X10*3/UL (ref 0.8–3)
LYMPHOCYTES # BLD MANUAL: 0.19 X10*3/UL (ref 0.8–3)
LYMPHOCYTES # BLD MANUAL: 45 %
LYMPHOCYTES NFR BLD AUTO: 1.4 %
LYMPHOCYTES NFR BLD AUTO: 13.2 %
LYMPHOCYTES NFR BLD AUTO: 3.8 %
LYMPHOCYTES NFR BLD AUTO: 5.5 %
LYMPHOCYTES NFR BLD AUTO: 5.8 %
LYMPHOCYTES NFR BLD AUTO: 6.1 %
LYMPHOCYTES NFR BLD AUTO: 7 %
LYMPHOCYTES NFR BLD AUTO: 7.3 %
LYMPHOCYTES NFR BLD AUTO: 7.7 %
LYMPHOCYTES NFR BLD AUTO: 9.2 %
LYMPHOCYTES NFR BLD MANUAL: 1 %
LYMPHOCYTES NFR FLD MANUAL: 43 %
MACROPHAGES NFR FLD MANUAL: 19 %
MAGNESIUM SERPL-MCNC: 1.9 MG/DL (ref 1.6–3.1)
MAGNESIUM SERPL-MCNC: 2.3 MG/DL (ref 1.6–3.1)
MAGNESIUM SERPL-MCNC: 2.4 MG/DL (ref 1.6–3.1)
MCH RBC QN AUTO: 28.3 PG (ref 26–34)
MCH RBC QN AUTO: 28.4 PG (ref 26–34)
MCH RBC QN AUTO: 28.5 PG (ref 26–34)
MCH RBC QN AUTO: 28.5 PG (ref 26–34)
MCH RBC QN AUTO: 28.6 PG (ref 26–34)
MCH RBC QN AUTO: 28.8 PG (ref 26–34)
MCH RBC QN AUTO: 28.9 PG (ref 26–34)
MCH RBC QN AUTO: 29 PG (ref 26–34)
MCHC RBC AUTO-ENTMCNC: 31.8 G/DL (ref 32–36)
MCHC RBC AUTO-ENTMCNC: 31.9 G/DL (ref 32–36)
MCHC RBC AUTO-ENTMCNC: 32.1 G/DL (ref 32–36)
MCHC RBC AUTO-ENTMCNC: 32.1 G/DL (ref 32–36)
MCHC RBC AUTO-ENTMCNC: 32.3 G/DL (ref 32–36)
MCHC RBC AUTO-ENTMCNC: 32.3 G/DL (ref 32–36)
MCHC RBC AUTO-ENTMCNC: 32.9 G/DL (ref 32–36)
MCHC RBC AUTO-ENTMCNC: 33 G/DL (ref 32–36)
MCHC RBC AUTO-ENTMCNC: 33.1 G/DL (ref 32–36)
MCHC RBC AUTO-ENTMCNC: 33.1 G/DL (ref 32–36)
MCHC RBC AUTO-ENTMCNC: 33.3 G/DL (ref 32–36)
MCHC RBC AUTO-ENTMCNC: 33.3 G/DL (ref 32–36)
MCHC RBC AUTO-ENTMCNC: 33.7 G/DL (ref 32–36)
MCHC RBC AUTO-ENTMCNC: 33.7 G/DL (ref 32–36)
MCV RBC AUTO: 84 FL (ref 80–100)
MCV RBC AUTO: 85 FL (ref 80–100)
MCV RBC AUTO: 86 FL (ref 80–100)
MCV RBC AUTO: 87 FL (ref 80–100)
MCV RBC AUTO: 88 FL (ref 80–100)
MCV RBC AUTO: 89 FL (ref 80–100)
MCV RBC AUTO: 89 FL (ref 80–100)
MCV RBC AUTO: 91 FL (ref 80–100)
MONOCYTES # BLD AUTO: 0.3 X10*3/UL (ref 0.05–0.8)
MONOCYTES # BLD AUTO: 0.44 X10*3/UL (ref 0.05–0.8)
MONOCYTES # BLD AUTO: 0.61 X10*3/UL (ref 0.05–0.8)
MONOCYTES # BLD AUTO: 0.71 X10*3/UL (ref 0.05–0.8)
MONOCYTES # BLD AUTO: 0.8 X10*3/UL (ref 0.05–0.8)
MONOCYTES # BLD AUTO: 0.93 X10*3/UL (ref 0.05–0.8)
MONOCYTES # BLD AUTO: 0.98 X10*3/UL (ref 0.05–0.8)
MONOCYTES # BLD AUTO: 1.02 X10*3/UL (ref 0.05–0.8)
MONOCYTES # BLD AUTO: 1.06 X10*3/UL (ref 0.05–0.8)
MONOCYTES # BLD AUTO: 1.44 X10*3/UL (ref 0.05–0.8)
MONOCYTES # BLD MANUAL: 0.19 X10*3/UL (ref 0.05–0.8)
MONOCYTES NFR BLD AUTO: 1.3 %
MONOCYTES NFR BLD AUTO: 11.8 %
MONOCYTES NFR BLD AUTO: 2.7 %
MONOCYTES NFR BLD AUTO: 3.6 %
MONOCYTES NFR BLD AUTO: 5.6 %
MONOCYTES NFR BLD AUTO: 6.3 %
MONOCYTES NFR BLD AUTO: 6.6 %
MONOCYTES NFR BLD AUTO: 6.8 %
MONOCYTES NFR BLD AUTO: 8.4 %
MONOCYTES NFR BLD AUTO: 9.2 %
MONOCYTES NFR BLD MANUAL: 1 %
MONOS+MACROS NFR FLD MANUAL: 28 %
MRSA DNA SPEC QL NAA+PROBE: NOT DETECTED
MUCOUS THREADS #/AREA URNS AUTO: ABNORMAL /LPF
NEUTROPHILS # BLD AUTO: 10.16 X10*3/UL (ref 1.6–5.5)
NEUTROPHILS # BLD AUTO: 10.4 X10*3/UL (ref 1.6–5.5)
NEUTROPHILS # BLD AUTO: 10.85 X10*3/UL (ref 1.6–5.5)
NEUTROPHILS # BLD AUTO: 10.92 X10*3/UL (ref 1.6–5.5)
NEUTROPHILS # BLD AUTO: 11.54 X10*3/UL (ref 1.6–5.5)
NEUTROPHILS # BLD AUTO: 13.46 X10*3/UL (ref 1.6–5.5)
NEUTROPHILS # BLD AUTO: 20.66 X10*3/UL (ref 1.6–5.5)
NEUTROPHILS # BLD AUTO: 21.6 X10*3/UL (ref 1.6–5.5)
NEUTROPHILS # BLD AUTO: 8.91 X10*3/UL (ref 1.6–5.5)
NEUTROPHILS # BLD AUTO: 9.07 X10*3/UL (ref 1.6–5.5)
NEUTROPHILS NFR BLD AUTO: 72.7 %
NEUTROPHILS NFR BLD AUTO: 80.9 %
NEUTROPHILS NFR BLD AUTO: 81.6 %
NEUTROPHILS NFR BLD AUTO: 84.8 %
NEUTROPHILS NFR BLD AUTO: 86.3 %
NEUTROPHILS NFR BLD AUTO: 86.4 %
NEUTROPHILS NFR BLD AUTO: 86.6 %
NEUTROPHILS NFR BLD AUTO: 89.3 %
NEUTROPHILS NFR BLD AUTO: 92.7 %
NEUTROPHILS NFR BLD AUTO: 96.3 %
NEUTROPHILS NFR FLD MANUAL: 29 %
NEUTROPHILS NFR FLD MANUAL: 35 %
NEUTS SEG # BLD MANUAL: 18.14 X10*3/UL (ref 1.6–5)
NEUTS SEG NFR BLD MANUAL: 97 %
NITRITE UR QL STRIP.AUTO: NEGATIVE
NRBC BLD-RTO: 0.2 /100 WBCS (ref 0–0)
NRBC BLD-RTO: 0.2 /100 WBCS (ref 0–0)
NRBC BLD-RTO: 0.3 /100 WBCS (ref 0–0)
NRBC BLD-RTO: 0.5 /100 WBCS (ref 0–0)
NRBC BLD-RTO: 0.8 /100 WBCS (ref 0–0)
NRBC BLD-RTO: 1.2 /100 WBCS (ref 0–0)
NRBC BLD-RTO: 1.4 /100 WBCS (ref 0–0)
NRBC BLD-RTO: 1.7 /100 WBCS (ref 0–0)
NRBC BLD-RTO: 2 /100 WBCS (ref 0–0)
NRBC BLD-RTO: 2.1 /100 WBCS (ref 0–0)
NRBC BLD-RTO: 2.3 /100 WBCS (ref 0–0)
NRBC BLD-RTO: 2.7 /100 WBCS (ref 0–0)
NRBC BLD-RTO: 4 /100 WBCS (ref 0–0)
NRBC BLD-RTO: 5.9 /100 WBCS (ref 0–0)
OTHER CELLS NFR FLD MANUAL: 1 %
OXYHGB MFR BLDA: 95.2 % (ref 94–98)
OXYHGB MFR BLDA: 95.8 % (ref 94–98)
OXYHGB MFR BLDA: 96.6 % (ref 94–98)
OXYHGB MFR BLDA: 97.6 % (ref 94–98)
OXYHGB MFR BLDA: 97.7 % (ref 94–98)
P AXIS: -11 DEGREES
P AXIS: 103 DEGREES
P AXIS: 86 DEGREES
P OFFSET: 191 MS
P OFFSET: 193 MS
P ONSET: 118 MS
P ONSET: 134 MS
PATH REPORT.FINAL DX SPEC: NORMAL
PATH REPORT.GROSS SPEC: NORMAL
PATH REPORT.RELEVANT HX SPEC: NORMAL
PATH REPORT.TOTAL CANCER: NORMAL
PATH REVIEW-CELL CT,FLUID: NORMAL
PCO2 BLDA: 36 MM HG (ref 38–42)
PCO2 BLDA: 40 MM HG (ref 38–42)
PCO2 BLDA: 42 MM HG (ref 38–42)
PCO2 BLDA: 42 MM HG (ref 38–42)
PCO2 BLDA: 58 MM HG (ref 38–42)
PH BLDA: 7.35 PH (ref 7.38–7.42)
PH BLDA: 7.36 PH (ref 7.38–7.42)
PH BLDA: 7.39 PH (ref 7.38–7.42)
PH BLDA: 7.39 PH (ref 7.38–7.42)
PH BLDA: 7.41 PH (ref 7.38–7.42)
PH FLD: 7.2 [PH]
PH UR STRIP.AUTO: 5 [PH]
PHOSPHATE SERPL-MCNC: 2.6 MG/DL (ref 2.5–4.5)
PHOSPHATE SERPL-MCNC: 2.7 MG/DL (ref 2.5–4.5)
PHOSPHATE SERPL-MCNC: 2.7 MG/DL (ref 2.5–4.5)
PHOSPHATE SERPL-MCNC: 2.8 MG/DL (ref 2.5–4.5)
PHOSPHATE SERPL-MCNC: 2.9 MG/DL (ref 2.5–4.5)
PHOSPHATE SERPL-MCNC: 3.1 MG/DL (ref 2.5–4.5)
PHOSPHATE SERPL-MCNC: 3.2 MG/DL (ref 2.5–4.5)
PHOSPHATE SERPL-MCNC: 3.4 MG/DL (ref 2.5–4.5)
PHOSPHATE SERPL-MCNC: 3.5 MG/DL (ref 2.5–4.5)
PHOSPHATE SERPL-MCNC: 4.2 MG/DL (ref 2.5–4.5)
PHOSPHATE SERPL-MCNC: 4.4 MG/DL (ref 2.5–4.5)
PLATELET # BLD AUTO: 213 X10*3/UL (ref 150–450)
PLATELET # BLD AUTO: 219 X10*3/UL (ref 150–450)
PLATELET # BLD AUTO: 243 X10*3/UL (ref 150–450)
PLATELET # BLD AUTO: 244 X10*3/UL (ref 150–450)
PLATELET # BLD AUTO: 245 X10*3/UL (ref 150–450)
PLATELET # BLD AUTO: 245 X10*3/UL (ref 150–450)
PLATELET # BLD AUTO: 247 X10*3/UL (ref 150–450)
PLATELET # BLD AUTO: 249 X10*3/UL (ref 150–450)
PLATELET # BLD AUTO: 252 X10*3/UL (ref 150–450)
PLATELET # BLD AUTO: 260 X10*3/UL (ref 150–450)
PLATELET # BLD AUTO: 272 X10*3/UL (ref 150–450)
PLATELET # BLD AUTO: 273 X10*3/UL (ref 150–450)
PLATELET # BLD AUTO: 279 X10*3/UL (ref 150–450)
PLATELET # BLD AUTO: 281 X10*3/UL (ref 150–450)
PLATELET # BLD AUTO: 311 X10*3/UL (ref 150–450)
PLATELET # BLD AUTO: 324 X10*3/UL (ref 150–450)
PO2 BLDA: 137 MM HG (ref 85–95)
PO2 BLDA: 164 MM HG (ref 85–95)
PO2 BLDA: 181 MM HG (ref 85–95)
PO2 BLDA: 80 MM HG (ref 85–95)
PO2 BLDA: 92 MM HG (ref 85–95)
POTASSIUM BLDA-SCNC: 2.7 MMOL/L (ref 3.5–5.3)
POTASSIUM BLDA-SCNC: 3.9 MMOL/L (ref 3.5–5.3)
POTASSIUM BLDA-SCNC: 4.1 MMOL/L (ref 3.5–5.3)
POTASSIUM BLDA-SCNC: 4.1 MMOL/L (ref 3.5–5.3)
POTASSIUM SERPL-SCNC: 2.9 MMOL/L (ref 3.4–5.1)
POTASSIUM SERPL-SCNC: 3 MMOL/L (ref 3.4–5.1)
POTASSIUM SERPL-SCNC: 3.4 MMOL/L (ref 3.4–5.1)
POTASSIUM SERPL-SCNC: 3.4 MMOL/L (ref 3.4–5.1)
POTASSIUM SERPL-SCNC: 3.5 MMOL/L (ref 3.4–5.1)
POTASSIUM SERPL-SCNC: 3.6 MMOL/L (ref 3.4–5.1)
POTASSIUM SERPL-SCNC: 3.6 MMOL/L (ref 3.5–5.3)
POTASSIUM SERPL-SCNC: 3.7 MMOL/L (ref 3.4–5.1)
POTASSIUM SERPL-SCNC: 3.7 MMOL/L (ref 3.4–5.1)
POTASSIUM SERPL-SCNC: 3.9 MMOL/L (ref 3.4–5.1)
POTASSIUM SERPL-SCNC: 3.9 MMOL/L (ref 3.4–5.1)
POTASSIUM SERPL-SCNC: 4 MMOL/L (ref 3.4–5.1)
POTASSIUM SERPL-SCNC: 4 MMOL/L (ref 3.4–5.1)
POTASSIUM SERPL-SCNC: 4.1 MMOL/L (ref 3.4–5.1)
POTASSIUM SERPL-SCNC: 4.1 MMOL/L (ref 3.4–5.1)
POTASSIUM SERPL-SCNC: 4.6 MMOL/L (ref 3.4–5.1)
PR INTERVAL: 150 MS
PR INTERVAL: 154 MS
PREALB SERPL-MCNC: 7.6 MG/DL (ref 18–40)
PROT FLD-MCNC: 1.3 G/DL
PROT SERPL-MCNC: 5.1 G/DL (ref 5.9–7.9)
PROT SERPL-MCNC: 5.2 G/DL (ref 5.9–7.9)
PROT SERPL-MCNC: 5.3 G/DL (ref 5.9–7.9)
PROT SERPL-MCNC: 5.4 G/DL (ref 5.9–7.9)
PROT SERPL-MCNC: 5.6 G/DL (ref 5.9–7.9)
PROT UR STRIP.AUTO-MCNC: ABNORMAL MG/DL
PROTHROMBIN TIME: 26.1 SECONDS (ref 9.8–12.8)
Q ONSET: 195 MS
Q ONSET: 206 MS
Q ONSET: 207 MS
Q ONSET: 207 MS
Q ONSET: 209 MS
Q ONSET: 211 MS
QRS COUNT: 13 BEATS
QRS COUNT: 16 BEATS
QRS COUNT: 18 BEATS
QRS COUNT: 19 BEATS
QRS COUNT: 19 BEATS
QRS COUNT: 22 BEATS
QRS DURATION: 104 MS
QRS DURATION: 126 MS
QRS DURATION: 132 MS
QRS DURATION: 150 MS
QRS DURATION: 162 MS
QRS DURATION: 164 MS
QT INTERVAL: 312 MS
QT INTERVAL: 324 MS
QT INTERVAL: 348 MS
QT INTERVAL: 380 MS
QT INTERVAL: 426 MS
QT INTERVAL: 434 MS
QTC CALCULATION(BAZETT): 399 MS
QTC CALCULATION(BAZETT): 430 MS
QTC CALCULATION(BAZETT): 487 MS
QTC CALCULATION(BAZETT): 523 MS
QTC CALCULATION(BAZETT): 535 MS
QTC CALCULATION(BAZETT): 603 MS
QTC FREDERICIA: 381 MS
QTC FREDERICIA: 386 MS
QTC FREDERICIA: 425 MS
QTC FREDERICIA: 470 MS
QTC FREDERICIA: 496 MS
QTC FREDERICIA: 541 MS
R AXIS: -16 DEGREES
R AXIS: -35 DEGREES
R AXIS: -8 DEGREES
R AXIS: 29 DEGREES
R AXIS: 31 DEGREES
R AXIS: 39 DEGREES
RBC # BLD AUTO: 3.66 X10*6/UL (ref 4.5–5.9)
RBC # BLD AUTO: 3.71 X10*6/UL (ref 4.5–5.9)
RBC # BLD AUTO: 3.87 X10*6/UL (ref 4.5–5.9)
RBC # BLD AUTO: 3.91 X10*6/UL (ref 4.5–5.9)
RBC # BLD AUTO: 3.94 X10*6/UL (ref 4.5–5.9)
RBC # BLD AUTO: 4.15 X10*6/UL (ref 4.5–5.9)
RBC # BLD AUTO: 4.23 X10*6/UL (ref 4.5–5.9)
RBC # BLD AUTO: 4.24 X10*6/UL (ref 4.5–5.9)
RBC # BLD AUTO: 4.24 X10*6/UL (ref 4.5–5.9)
RBC # BLD AUTO: 4.25 X10*6/UL (ref 4.5–5.9)
RBC # BLD AUTO: 4.26 X10*6/UL (ref 4.5–5.9)
RBC # BLD AUTO: 4.35 X10*6/UL (ref 4.5–5.9)
RBC # BLD AUTO: 4.44 X10*6/UL (ref 4.5–5.9)
RBC # BLD AUTO: 4.5 X10*6/UL (ref 4.5–5.9)
RBC # BLD AUTO: 4.58 X10*6/UL (ref 4.5–5.9)
RBC # BLD AUTO: 4.82 X10*6/UL (ref 4.5–5.9)
RBC # FLD AUTO: 2000 /UL
RBC # UR STRIP.AUTO: ABNORMAL /UL
RBC #/AREA URNS AUTO: >20 /HPF
RBC MORPH BLD: ABNORMAL
RSV RNA RESP QL NAA+PROBE: NOT DETECTED
SAO2 % BLDA: 100 % (ref 94–100)
SAO2 % BLDA: 100 % (ref 94–100)
SAO2 % BLDA: 98 % (ref 94–100)
SAO2 % BLDA: 98 % (ref 94–100)
SAO2 % BLDA: 99 % (ref 94–100)
SARS-COV-2 RNA RESP QL NAA+PROBE: DETECTED
SODIUM BLDA-SCNC: 132 MMOL/L (ref 136–145)
SODIUM BLDA-SCNC: 139 MMOL/L (ref 136–145)
SODIUM SERPL-SCNC: 135 MMOL/L (ref 133–145)
SODIUM SERPL-SCNC: 136 MMOL/L (ref 133–145)
SODIUM SERPL-SCNC: 136 MMOL/L (ref 136–145)
SODIUM SERPL-SCNC: 138 MMOL/L (ref 133–145)
SODIUM SERPL-SCNC: 138 MMOL/L (ref 133–145)
SODIUM SERPL-SCNC: 139 MMOL/L (ref 133–145)
SODIUM SERPL-SCNC: 140 MMOL/L (ref 133–145)
SODIUM SERPL-SCNC: 141 MMOL/L (ref 133–145)
SODIUM SERPL-SCNC: 142 MMOL/L (ref 133–145)
SODIUM SERPL-SCNC: 142 MMOL/L (ref 133–145)
SODIUM SERPL-SCNC: 143 MMOL/L (ref 133–145)
SODIUM SERPL-SCNC: 144 MMOL/L (ref 133–145)
SODIUM SERPL-SCNC: 144 MMOL/L (ref 133–145)
SODIUM SERPL-SCNC: 146 MMOL/L (ref 133–145)
SODIUM SERPL-SCNC: 147 MMOL/L (ref 133–145)
SODIUM SERPL-SCNC: 149 MMOL/L (ref 133–145)
SP GR UR STRIP.AUTO: 1.01
SPECIMEN DRAWN FROM PATIENT: ABNORMAL
SQUAMOUS #/AREA URNS AUTO: ABNORMAL /HPF
T AXIS: -56 DEGREES
T AXIS: 105 DEGREES
T AXIS: 186 DEGREES
T AXIS: 191 DEGREES
T AXIS: 200 DEGREES
T AXIS: 213 DEGREES
T OFFSET: 363 MS
T OFFSET: 369 MS
T OFFSET: 383 MS
T OFFSET: 401 MS
T OFFSET: 412 MS
T OFFSET: 419 MS
TARGETS BLD QL SMEAR: ABNORMAL
TOTAL CELLS COUNTED BLD: 100
TOTAL CELLS COUNTED FLD: 100
TOTAL CELLS COUNTED FLD: 100
UROBILINOGEN UR STRIP.AUTO-MCNC: <2 MG/DL
VENTILATOR RATE: 12 BPM
VENTILATOR RATE: 12 BPM
VENTRICULAR RATE: 114 BPM
VENTRICULAR RATE: 114 BPM
VENTRICULAR RATE: 116 BPM
VENTRICULAR RATE: 136 BPM
VENTRICULAR RATE: 79 BPM
VENTRICULAR RATE: 95 BPM
WBC # BLD AUTO: 11.1 X10*3/UL (ref 4.4–11.3)
WBC # BLD AUTO: 12.1 X10*3/UL (ref 4.4–11.3)
WBC # BLD AUTO: 12.2 X10*3/UL (ref 4.4–11.3)
WBC # BLD AUTO: 12.5 X10*3/UL (ref 4.4–11.3)
WBC # BLD AUTO: 12.6 X10*3/UL (ref 4.4–11.3)
WBC # BLD AUTO: 12.6 X10*3/UL (ref 4.4–11.3)
WBC # BLD AUTO: 13.2 X10*3/UL (ref 4.4–11.3)
WBC # BLD AUTO: 13.6 X10*3/UL (ref 4.4–11.3)
WBC # BLD AUTO: 14 X10*3/UL (ref 4.4–11.3)
WBC # BLD AUTO: 15.2 X10*3/UL (ref 4.4–11.3)
WBC # BLD AUTO: 15.5 X10*3/UL (ref 4.4–11.3)
WBC # BLD AUTO: 18.7 X10*3/UL (ref 4.4–11.3)
WBC # BLD AUTO: 22.3 X10*3/UL (ref 4.4–11.3)
WBC # BLD AUTO: 22.5 X10*3/UL (ref 4.4–11.3)
WBC # FLD AUTO: 224 /UL
WBC #/AREA URNS AUTO: >50 /HPF
WBC CLUMPS #/AREA URNS AUTO: ABNORMAL /HPF
YEAST BUDDING #/AREA UR COMP ASSIST: PRESENT /HPF

## 2024-01-01 PROCEDURE — 99291 CRITICAL CARE FIRST HOUR: CPT | Performed by: ANESTHESIOLOGY

## 2024-01-01 PROCEDURE — 2500000002 HC RX 250 W HCPCS SELF ADMINISTERED DRUGS (ALT 637 FOR MEDICARE OP, ALT 636 FOR OP/ED): Performed by: HOSPITALIST

## 2024-01-01 PROCEDURE — 71250 CT THORAX DX C-: CPT | Mod: FOREIGN READ | Performed by: RADIOLOGY

## 2024-01-01 PROCEDURE — 2500000004 HC RX 250 GENERAL PHARMACY W/ HCPCS (ALT 636 FOR OP/ED): Performed by: HOSPITALIST

## 2024-01-01 PROCEDURE — 84132 ASSAY OF SERUM POTASSIUM: CPT | Performed by: HOSPITALIST

## 2024-01-01 PROCEDURE — 2060000001 HC INTERMEDIATE ICU ROOM DAILY

## 2024-01-01 PROCEDURE — 2500000001 HC RX 250 WO HCPCS SELF ADMINISTERED DRUGS (ALT 637 FOR MEDICARE OP): Performed by: HOSPITALIST

## 2024-01-01 PROCEDURE — 99233 SBSQ HOSP IP/OBS HIGH 50: CPT | Performed by: NURSE PRACTITIONER

## 2024-01-01 PROCEDURE — 2500000004 HC RX 250 GENERAL PHARMACY W/ HCPCS (ALT 636 FOR OP/ED): Performed by: INTERNAL MEDICINE

## 2024-01-01 PROCEDURE — 70450 CT HEAD/BRAIN W/O DYE: CPT

## 2024-01-01 PROCEDURE — 2500000002 HC RX 250 W HCPCS SELF ADMINISTERED DRUGS (ALT 637 FOR MEDICARE OP, ALT 636 FOR OP/ED): Performed by: INTERNAL MEDICINE

## 2024-01-01 PROCEDURE — 36415 COLL VENOUS BLD VENIPUNCTURE: CPT | Performed by: INTERNAL MEDICINE

## 2024-01-01 PROCEDURE — 2500000005 HC RX 250 GENERAL PHARMACY W/O HCPCS: Performed by: EMERGENCY MEDICINE

## 2024-01-01 PROCEDURE — 87637 SARSCOV2&INF A&B&RSV AMP PRB: CPT | Performed by: EMERGENCY MEDICINE

## 2024-01-01 PROCEDURE — 84484 ASSAY OF TROPONIN QUANT: CPT | Performed by: EMERGENCY MEDICINE

## 2024-01-01 PROCEDURE — 80053 COMPREHEN METABOLIC PANEL: CPT | Performed by: INTERNAL MEDICINE

## 2024-01-01 PROCEDURE — 94640 AIRWAY INHALATION TREATMENT: CPT

## 2024-01-01 PROCEDURE — 94660 CPAP INITIATION&MGMT: CPT

## 2024-01-01 PROCEDURE — 2500000001 HC RX 250 WO HCPCS SELF ADMINISTERED DRUGS (ALT 637 FOR MEDICARE OP): Performed by: INTERNAL MEDICINE

## 2024-01-01 PROCEDURE — 2500000004 HC RX 250 GENERAL PHARMACY W/ HCPCS (ALT 636 FOR OP/ED): Performed by: ANESTHESIOLOGY

## 2024-01-01 PROCEDURE — 80069 RENAL FUNCTION PANEL: CPT | Performed by: INTERNAL MEDICINE

## 2024-01-01 PROCEDURE — 84100 ASSAY OF PHOSPHORUS: CPT | Performed by: INTERNAL MEDICINE

## 2024-01-01 PROCEDURE — 80048 BASIC METABOLIC PNL TOTAL CA: CPT | Performed by: ANESTHESIOLOGY

## 2024-01-01 PROCEDURE — 97530 THERAPEUTIC ACTIVITIES: CPT | Mod: GO,CO

## 2024-01-01 PROCEDURE — 97110 THERAPEUTIC EXERCISES: CPT | Mod: GP

## 2024-01-01 PROCEDURE — 71045 X-RAY EXAM CHEST 1 VIEW: CPT

## 2024-01-01 PROCEDURE — 2500000002 HC RX 250 W HCPCS SELF ADMINISTERED DRUGS (ALT 637 FOR MEDICARE OP, ALT 636 FOR OP/ED): Performed by: EMERGENCY MEDICINE

## 2024-01-01 PROCEDURE — 82947 ASSAY GLUCOSE BLOOD QUANT: CPT

## 2024-01-01 PROCEDURE — 2500000005 HC RX 250 GENERAL PHARMACY W/O HCPCS: Performed by: HOSPITALIST

## 2024-01-01 PROCEDURE — 2500000004 HC RX 250 GENERAL PHARMACY W/ HCPCS (ALT 636 FOR OP/ED): Performed by: NURSE PRACTITIONER

## 2024-01-01 PROCEDURE — 85025 COMPLETE CBC W/AUTO DIFF WBC: CPT | Performed by: INTERNAL MEDICINE

## 2024-01-01 PROCEDURE — 85027 COMPLETE CBC AUTOMATED: CPT | Performed by: HOSPITALIST

## 2024-01-01 PROCEDURE — 84132 ASSAY OF SERUM POTASSIUM: CPT | Performed by: EMERGENCY MEDICINE

## 2024-01-01 PROCEDURE — 84134 ASSAY OF PREALBUMIN: CPT | Mod: WESLAB | Performed by: NURSE PRACTITIONER

## 2024-01-01 PROCEDURE — 83690 ASSAY OF LIPASE: CPT | Performed by: EMERGENCY MEDICINE

## 2024-01-01 PROCEDURE — 82374 ASSAY BLOOD CARBON DIOXIDE: CPT | Performed by: HOSPITALIST

## 2024-01-01 PROCEDURE — 99232 SBSQ HOSP IP/OBS MODERATE 35: CPT | Performed by: NURSE PRACTITIONER

## 2024-01-01 PROCEDURE — 0W9B3ZZ DRAINAGE OF LEFT PLEURAL CAVITY, PERCUTANEOUS APPROACH: ICD-10-PCS | Performed by: INTERNAL MEDICINE

## 2024-01-01 PROCEDURE — 2020000001 HC ICU ROOM DAILY

## 2024-01-01 PROCEDURE — 2500000001 HC RX 250 WO HCPCS SELF ADMINISTERED DRUGS (ALT 637 FOR MEDICARE OP)

## 2024-01-01 PROCEDURE — 88112 CYTOPATH CELL ENHANCE TECH: CPT | Mod: TC | Performed by: INTERNAL MEDICINE

## 2024-01-01 PROCEDURE — 96366 THER/PROPH/DIAG IV INF ADDON: CPT

## 2024-01-01 PROCEDURE — 97530 THERAPEUTIC ACTIVITIES: CPT | Mod: GP

## 2024-01-01 PROCEDURE — 99497 ADVNCD CARE PLAN 30 MIN: CPT | Performed by: NURSE PRACTITIONER

## 2024-01-01 PROCEDURE — 97530 THERAPEUTIC ACTIVITIES: CPT | Mod: GO

## 2024-01-01 PROCEDURE — 93005 ELECTROCARDIOGRAM TRACING: CPT

## 2024-01-01 PROCEDURE — 2500000001 HC RX 250 WO HCPCS SELF ADMINISTERED DRUGS (ALT 637 FOR MEDICARE OP): Performed by: NURSE PRACTITIONER

## 2024-01-01 PROCEDURE — 97162 PT EVAL MOD COMPLEX 30 MIN: CPT | Mod: GP

## 2024-01-01 PROCEDURE — 1150000001 HC HOSPICE PRIVATE ROOM DAILY

## 2024-01-01 PROCEDURE — 87015 SPECIMEN INFECT AGNT CONCNTJ: CPT | Mod: WESLAB | Performed by: INTERNAL MEDICINE

## 2024-01-01 PROCEDURE — 96365 THER/PROPH/DIAG IV INF INIT: CPT

## 2024-01-01 PROCEDURE — 96361 HYDRATE IV INFUSION ADD-ON: CPT

## 2024-01-01 PROCEDURE — 2500000004 HC RX 250 GENERAL PHARMACY W/ HCPCS (ALT 636 FOR OP/ED)

## 2024-01-01 PROCEDURE — 76377 3D RENDER W/INTRP POSTPROCES: CPT

## 2024-01-01 PROCEDURE — 99232 SBSQ HOSP IP/OBS MODERATE 35: CPT | Performed by: INTERNAL MEDICINE

## 2024-01-01 PROCEDURE — 83986 ASSAY PH BODY FLUID NOS: CPT | Performed by: INTERNAL MEDICINE

## 2024-01-01 PROCEDURE — 71045 X-RAY EXAM CHEST 1 VIEW: CPT | Performed by: RADIOLOGY

## 2024-01-01 PROCEDURE — 94668 MNPJ CHEST WALL SBSQ: CPT

## 2024-01-01 PROCEDURE — 89051 BODY FLUID CELL COUNT: CPT | Performed by: INTERNAL MEDICINE

## 2024-01-01 PROCEDURE — 84100 ASSAY OF PHOSPHORUS: CPT | Performed by: EMERGENCY MEDICINE

## 2024-01-01 PROCEDURE — 37799 UNLISTED PX VASCULAR SURGERY: CPT | Performed by: HOSPITALIST

## 2024-01-01 PROCEDURE — 2500000005 HC RX 250 GENERAL PHARMACY W/O HCPCS

## 2024-01-01 PROCEDURE — 92611 MOTION FLUOROSCOPY/SWALLOW: CPT | Mod: GN

## 2024-01-01 PROCEDURE — 83615 LACTATE (LD) (LDH) ENZYME: CPT | Mod: WESLAB | Performed by: INTERNAL MEDICINE

## 2024-01-01 PROCEDURE — 9420000001 HC RT PATIENT EDUCATION 5 MIN

## 2024-01-01 PROCEDURE — 87040 BLOOD CULTURE FOR BACTERIA: CPT | Mod: GENLAB | Performed by: EMERGENCY MEDICINE

## 2024-01-01 PROCEDURE — 84075 ASSAY ALKALINE PHOSPHATASE: CPT | Performed by: HOSPITALIST

## 2024-01-01 PROCEDURE — 87493 C DIFF AMPLIFIED PROBE: CPT | Mod: GENLAB | Performed by: EMERGENCY MEDICINE

## 2024-01-01 PROCEDURE — 82945 GLUCOSE OTHER FLUID: CPT | Mod: WESLAB | Performed by: INTERNAL MEDICINE

## 2024-01-01 PROCEDURE — 94664 DEMO&/EVAL PT USE INHALER: CPT

## 2024-01-01 PROCEDURE — 80053 COMPREHEN METABOLIC PANEL: CPT | Performed by: HOSPITALIST

## 2024-01-01 PROCEDURE — 2500000005 HC RX 250 GENERAL PHARMACY W/O HCPCS: Performed by: INTERNAL MEDICINE

## 2024-01-01 PROCEDURE — 99285 EMERGENCY DEPT VISIT HI MDM: CPT | Performed by: EMERGENCY MEDICINE

## 2024-01-01 PROCEDURE — S4991 NICOTINE PATCH NONLEGEND: HCPCS

## 2024-01-01 PROCEDURE — 36415 COLL VENOUS BLD VENIPUNCTURE: CPT | Performed by: EMERGENCY MEDICINE

## 2024-01-01 PROCEDURE — 36600 WITHDRAWAL OF ARTERIAL BLOOD: CPT

## 2024-01-01 PROCEDURE — 2500000005 HC RX 250 GENERAL PHARMACY W/O HCPCS: Performed by: ANESTHESIOLOGY

## 2024-01-01 PROCEDURE — 94667 MNPJ CHEST WALL 1ST: CPT

## 2024-01-01 PROCEDURE — 94760 N-INVAS EAR/PLS OXIMETRY 1: CPT

## 2024-01-01 PROCEDURE — 87493 C DIFF AMPLIFIED PROBE: CPT | Performed by: INTERNAL MEDICINE

## 2024-01-01 PROCEDURE — 85025 COMPLETE CBC W/AUTO DIFF WBC: CPT | Performed by: EMERGENCY MEDICINE

## 2024-01-01 PROCEDURE — 83735 ASSAY OF MAGNESIUM: CPT | Performed by: EMERGENCY MEDICINE

## 2024-01-01 PROCEDURE — 83735 ASSAY OF MAGNESIUM: CPT | Performed by: ANESTHESIOLOGY

## 2024-01-01 PROCEDURE — 2500000004 HC RX 250 GENERAL PHARMACY W/ HCPCS (ALT 636 FOR OP/ED): Mod: JZ

## 2024-01-01 PROCEDURE — 71250 CT THORAX DX C-: CPT

## 2024-01-01 PROCEDURE — 84157 ASSAY OF PROTEIN OTHER: CPT | Mod: WESLAB | Performed by: INTERNAL MEDICINE

## 2024-01-01 PROCEDURE — 84484 ASSAY OF TROPONIN QUANT: CPT | Mod: 91 | Performed by: EMERGENCY MEDICINE

## 2024-01-01 PROCEDURE — 82947 ASSAY GLUCOSE BLOOD QUANT: CPT | Mod: 59

## 2024-01-01 PROCEDURE — 85007 BL SMEAR W/DIFF WBC COUNT: CPT | Performed by: INTERNAL MEDICINE

## 2024-01-01 PROCEDURE — 87640 STAPH A DNA AMP PROBE: CPT | Mod: 59 | Performed by: EMERGENCY MEDICINE

## 2024-01-01 PROCEDURE — 97167 OT EVAL HIGH COMPLEX 60 MIN: CPT | Mod: GO

## 2024-01-01 PROCEDURE — 87070 CULTURE OTHR SPECIMN AEROBIC: CPT | Mod: WESLAB | Performed by: INTERNAL MEDICINE

## 2024-01-01 PROCEDURE — 85025 COMPLETE CBC W/AUTO DIFF WBC: CPT | Performed by: ANESTHESIOLOGY

## 2024-01-01 PROCEDURE — 74176 CT ABD & PELVIS W/O CONTRAST: CPT | Mod: FOREIGN READ | Performed by: RADIOLOGY

## 2024-01-01 PROCEDURE — 92610 EVALUATE SWALLOWING FUNCTION: CPT | Mod: GN | Performed by: SPEECH-LANGUAGE PATHOLOGIST

## 2024-01-01 PROCEDURE — 87324 CLOSTRIDIUM AG IA: CPT | Mod: WESLAB | Performed by: INTERNAL MEDICINE

## 2024-01-01 PROCEDURE — 82947 ASSAY GLUCOSE BLOOD QUANT: CPT | Mod: 91

## 2024-01-01 PROCEDURE — 85610 PROTHROMBIN TIME: CPT | Performed by: EMERGENCY MEDICINE

## 2024-01-01 PROCEDURE — 88104 CYTOPATH FL NONGYN SMEARS: CPT | Performed by: INTERNAL MEDICINE

## 2024-01-01 PROCEDURE — 99233 SBSQ HOSP IP/OBS HIGH 50: CPT | Performed by: INTERNAL MEDICINE

## 2024-01-01 PROCEDURE — 99497 ADVNCD CARE PLAN 30 MIN: CPT

## 2024-01-01 PROCEDURE — 2500000005 HC RX 250 GENERAL PHARMACY W/O HCPCS: Performed by: FAMILY MEDICINE

## 2024-01-01 PROCEDURE — 82805 BLOOD GASES W/O2 SATURATION: CPT | Performed by: INTERNAL MEDICINE

## 2024-01-01 PROCEDURE — 71250 CT THORAX DX C-: CPT | Mod: FR

## 2024-01-01 PROCEDURE — P9047 ALBUMIN (HUMAN), 25%, 50ML: HCPCS | Mod: JZ

## 2024-01-01 PROCEDURE — 85027 COMPLETE CBC AUTOMATED: CPT | Performed by: INTERNAL MEDICINE

## 2024-01-01 PROCEDURE — 72125 CT NECK SPINE W/O DYE: CPT | Performed by: RADIOLOGY

## 2024-01-01 PROCEDURE — 2500000001 HC RX 250 WO HCPCS SELF ADMINISTERED DRUGS (ALT 637 FOR MEDICARE OP): Performed by: EMERGENCY MEDICINE

## 2024-01-01 PROCEDURE — 70486 CT MAXILLOFACIAL W/O DYE: CPT | Mod: RCN | Performed by: RADIOLOGY

## 2024-01-01 PROCEDURE — 87102 FUNGUS ISOLATION CULTURE: CPT | Mod: WESLAB | Performed by: INTERNAL MEDICINE

## 2024-01-01 PROCEDURE — 2500000004 HC RX 250 GENERAL PHARMACY W/ HCPCS (ALT 636 FOR OP/ED): Performed by: EMERGENCY MEDICINE

## 2024-01-01 PROCEDURE — 97535 SELF CARE MNGMENT TRAINING: CPT | Mod: GO,CO

## 2024-01-01 PROCEDURE — 83880 ASSAY OF NATRIURETIC PEPTIDE: CPT | Performed by: EMERGENCY MEDICINE

## 2024-01-01 PROCEDURE — 97530 THERAPEUTIC ACTIVITIES: CPT | Mod: GP,CQ

## 2024-01-01 PROCEDURE — 74230 X-RAY XM SWLNG FUNCJ C+: CPT

## 2024-01-01 PROCEDURE — 70450 CT HEAD/BRAIN W/O DYE: CPT | Performed by: RADIOLOGY

## 2024-01-01 PROCEDURE — 99223 1ST HOSP IP/OBS HIGH 75: CPT | Performed by: INTERNAL MEDICINE

## 2024-01-01 PROCEDURE — 84100 ASSAY OF PHOSPHORUS: CPT | Performed by: ANESTHESIOLOGY

## 2024-01-01 PROCEDURE — 99232 SBSQ HOSP IP/OBS MODERATE 35: CPT

## 2024-01-01 PROCEDURE — 76377 3D RENDER W/INTRP POSTPROCES: CPT | Performed by: RADIOLOGY

## 2024-01-01 PROCEDURE — 5A09357 ASSISTANCE WITH RESPIRATORY VENTILATION, LESS THAN 24 CONSECUTIVE HOURS, CONTINUOUS POSITIVE AIRWAY PRESSURE: ICD-10-PCS | Performed by: ANESTHESIOLOGY

## 2024-01-01 PROCEDURE — 87086 URINE CULTURE/COLONY COUNT: CPT | Mod: GENLAB | Performed by: EMERGENCY MEDICINE

## 2024-01-01 PROCEDURE — 99233 SBSQ HOSP IP/OBS HIGH 50: CPT

## 2024-01-01 PROCEDURE — 92526 ORAL FUNCTION THERAPY: CPT | Mod: GN | Performed by: SPEECH-LANGUAGE PATHOLOGIST

## 2024-01-01 PROCEDURE — 81001 URINALYSIS AUTO W/SCOPE: CPT | Performed by: EMERGENCY MEDICINE

## 2024-01-01 PROCEDURE — 88305 TISSUE EXAM BY PATHOLOGIST: CPT | Performed by: PATHOLOGY

## 2024-01-01 PROCEDURE — C9113 INJ PANTOPRAZOLE SODIUM, VIA: HCPCS

## 2024-01-01 PROCEDURE — 82140 ASSAY OF AMMONIA: CPT | Performed by: EMERGENCY MEDICINE

## 2024-01-01 PROCEDURE — 94799 UNLISTED PULMONARY SVC/PX: CPT

## 2024-01-01 PROCEDURE — 74018 RADEX ABDOMEN 1 VIEW: CPT

## 2024-01-01 PROCEDURE — 99223 1ST HOSP IP/OBS HIGH 75: CPT | Performed by: NURSE PRACTITIONER

## 2024-01-01 PROCEDURE — 84132 ASSAY OF SERUM POTASSIUM: CPT | Mod: 59 | Performed by: EMERGENCY MEDICINE

## 2024-01-01 PROCEDURE — 02HV33Z INSERTION OF INFUSION DEVICE INTO SUPERIOR VENA CAVA, PERCUTANEOUS APPROACH: ICD-10-PCS | Performed by: HOSPITALIST

## 2024-01-01 PROCEDURE — 97110 THERAPEUTIC EXERCISES: CPT | Mod: GO,CO

## 2024-01-01 PROCEDURE — 85014 HEMATOCRIT: CPT | Performed by: HOSPITALIST

## 2024-01-01 PROCEDURE — 97110 THERAPEUTIC EXERCISES: CPT | Mod: GP,CQ

## 2024-01-01 PROCEDURE — 99309 SBSQ NF CARE MODERATE MDM 30: CPT | Performed by: INTERNAL MEDICINE

## 2024-01-01 PROCEDURE — 72125 CT NECK SPINE W/O DYE: CPT

## 2024-01-01 PROCEDURE — 88112 CYTOPATH CELL ENHANCE TECH: CPT | Performed by: PATHOLOGY

## 2024-01-01 PROCEDURE — 36415 COLL VENOUS BLD VENIPUNCTURE: CPT | Performed by: NURSE PRACTITIONER

## 2024-01-01 PROCEDURE — 70486 CT MAXILLOFACIAL W/O DYE: CPT

## 2024-01-01 PROCEDURE — 2500000002 HC RX 250 W HCPCS SELF ADMINISTERED DRUGS (ALT 637 FOR MEDICARE OP, ALT 636 FOR OP/ED)

## 2024-01-01 PROCEDURE — 83605 ASSAY OF LACTIC ACID: CPT | Performed by: EMERGENCY MEDICINE

## 2024-01-01 PROCEDURE — 37799 UNLISTED PX VASCULAR SURGERY: CPT | Performed by: ANESTHESIOLOGY

## 2024-01-01 PROCEDURE — 99498 ADVNCD CARE PLAN ADDL 30 MIN: CPT | Performed by: NURSE PRACTITIONER

## 2024-01-01 RX ORDER — PANTOPRAZOLE SODIUM 40 MG/10ML
40 INJECTION, POWDER, LYOPHILIZED, FOR SOLUTION INTRAVENOUS DAILY
Status: DISCONTINUED | OUTPATIENT
Start: 2024-01-21 | End: 2024-01-01 | Stop reason: HOSPADM

## 2024-01-01 RX ORDER — VANCOMYCIN HYDROCHLORIDE 1 G/200ML
1 INJECTION, SOLUTION INTRAVENOUS
Status: ACTIVE | OUTPATIENT
Start: 2024-01-01 | End: 2024-01-01

## 2024-01-01 RX ORDER — CLOPIDOGREL BISULFATE 75 MG/1
75 TABLET ORAL DAILY
Status: DISCONTINUED | OUTPATIENT
Start: 2024-01-01 | End: 2024-01-01

## 2024-01-01 RX ORDER — L. ACIDOPHILUS/L.BULGARICUS 1MM CELL
1 TABLET ORAL 2 TIMES DAILY
Status: DISCONTINUED | OUTPATIENT
Start: 2024-01-01 | End: 2024-01-01

## 2024-01-01 RX ORDER — ACETAMINOPHEN 325 MG/1
TABLET ORAL
Status: COMPLETED
Start: 2024-01-01 | End: 2024-01-01

## 2024-01-01 RX ORDER — MORPHINE SULFATE 2 MG/ML
2 INJECTION, SOLUTION INTRAMUSCULAR; INTRAVENOUS
Status: DISCONTINUED | OUTPATIENT
Start: 2024-01-01 | End: 2024-01-01 | Stop reason: HOSPADM

## 2024-01-01 RX ORDER — HYOSCYAMINE SULFATE 0.12 MG/1
0.12 TABLET, ORALLY DISINTEGRATING ORAL EVERY 4 HOURS PRN
Status: DISCONTINUED | OUTPATIENT
Start: 2024-01-01 | End: 2024-01-01 | Stop reason: HOSPADM

## 2024-01-01 RX ORDER — DEXAMETHASONE 6 MG/1
6 TABLET ORAL DAILY
Status: SHIPPED | OUTPATIENT
Start: 2024-01-01 | End: 2024-01-01

## 2024-01-01 RX ORDER — ACETAMINOPHEN 500 MG
5 TABLET ORAL NIGHTLY PRN
Status: CANCELLED | OUTPATIENT
Start: 2024-01-01

## 2024-01-01 RX ORDER — LORAZEPAM 2 MG/ML
1 INJECTION INTRAMUSCULAR EVERY 2 HOUR PRN
Status: CANCELLED | OUTPATIENT
Start: 2024-01-01

## 2024-01-01 RX ORDER — DAPAGLIFLOZIN 10 MG/1
10 TABLET, FILM COATED ORAL DAILY
Status: DISCONTINUED | OUTPATIENT
Start: 2024-01-01 | End: 2024-01-01

## 2024-01-01 RX ORDER — CARBIDOPA AND LEVODOPA 25; 100 MG/1; MG/1
1 TABLET ORAL 3 TIMES DAILY
Status: DISCONTINUED | OUTPATIENT
Start: 2024-01-01 | End: 2024-01-01

## 2024-01-01 RX ORDER — LORAZEPAM 2 MG/ML
1 INJECTION INTRAMUSCULAR EVERY 2 HOUR PRN
Status: DISCONTINUED | OUTPATIENT
Start: 2024-01-01 | End: 2024-01-01 | Stop reason: HOSPADM

## 2024-01-01 RX ORDER — ALBUTEROL SULFATE 0.83 MG/ML
2.5 SOLUTION RESPIRATORY (INHALATION) EVERY 2 HOUR PRN
Status: DISCONTINUED | OUTPATIENT
Start: 2024-01-01 | End: 2024-01-01 | Stop reason: HOSPADM

## 2024-01-01 RX ORDER — DEXTROSE 50 % IN WATER (D50W) INTRAVENOUS SYRINGE
Status: DISCONTINUED
Start: 2024-01-01 | End: 2024-01-01 | Stop reason: HOSPADM

## 2024-01-01 RX ORDER — CARBIDOPA AND LEVODOPA 25; 100 MG/1; MG/1
1 TABLET, EXTENDED RELEASE ORAL 3 TIMES DAILY
Status: DISCONTINUED | OUTPATIENT
Start: 2024-01-01 | End: 2024-01-01

## 2024-01-01 RX ORDER — ACETAMINOPHEN 325 MG/1
650 TABLET ORAL EVERY 6 HOURS PRN
Status: CANCELLED | OUTPATIENT
Start: 2024-01-01

## 2024-01-01 RX ORDER — BUDESONIDE 0.5 MG/2ML
0.5 INHALANT ORAL
Status: DISCONTINUED | OUTPATIENT
Start: 2024-01-01 | End: 2024-01-01 | Stop reason: HOSPADM

## 2024-01-01 RX ORDER — VANCOMYCIN HYDROCHLORIDE 125 MG/1
125 CAPSULE ORAL 4 TIMES DAILY
Status: DISCONTINUED | OUTPATIENT
Start: 2024-01-01 | End: 2024-01-01

## 2024-01-01 RX ORDER — LORAZEPAM 2 MG/ML
1 INJECTION INTRAMUSCULAR EVERY 2 HOUR PRN
Status: DISCONTINUED | OUTPATIENT
Start: 2024-01-01 | End: 2024-01-01

## 2024-01-01 RX ORDER — ONDANSETRON HYDROCHLORIDE 2 MG/ML
4 INJECTION, SOLUTION INTRAVENOUS EVERY 6 HOURS PRN
Status: CANCELLED | OUTPATIENT
Start: 2024-01-01

## 2024-01-01 RX ORDER — DEXTROSE 50 % IN WATER (D50W) INTRAVENOUS SYRINGE
25
Status: DISCONTINUED | OUTPATIENT
Start: 2024-01-01 | End: 2024-01-01

## 2024-01-01 RX ORDER — NOREPINEPHRINE BITARTRATE/D5W 8 MG/250ML
.01-3 PLASTIC BAG, INJECTION (ML) INTRAVENOUS CONTINUOUS
Status: DISCONTINUED | OUTPATIENT
Start: 2024-01-01 | End: 2024-01-01 | Stop reason: HOSPADM

## 2024-01-01 RX ORDER — IPRATROPIUM BROMIDE AND ALBUTEROL SULFATE 2.5; .5 MG/3ML; MG/3ML
3 SOLUTION RESPIRATORY (INHALATION)
Status: DISCONTINUED | OUTPATIENT
Start: 2024-01-01 | End: 2024-01-01 | Stop reason: HOSPADM

## 2024-01-01 RX ORDER — PANTOPRAZOLE SODIUM 40 MG/1
40 TABLET, DELAYED RELEASE ORAL
Status: DISCONTINUED | OUTPATIENT
Start: 2024-01-01 | End: 2024-01-01

## 2024-01-01 RX ORDER — ALBUTEROL SULFATE 0.83 MG/ML
2.5 SOLUTION RESPIRATORY (INHALATION) EVERY 2 HOUR PRN
Qty: 75 ML | Refills: 11 | Status: SHIPPED | OUTPATIENT
Start: 2024-01-01 | End: 2024-01-24

## 2024-01-01 RX ORDER — ACETAMINOPHEN 325 MG/1
650 TABLET ORAL 3 TIMES DAILY
Status: DISCONTINUED | OUTPATIENT
Start: 2024-01-01 | End: 2024-01-01 | Stop reason: HOSPADM

## 2024-01-01 RX ORDER — ACETYLCYSTEINE 200 MG/ML
3 SOLUTION ORAL; RESPIRATORY (INHALATION)
Status: DISCONTINUED | OUTPATIENT
Start: 2024-01-01 | End: 2024-01-01 | Stop reason: HOSPADM

## 2024-01-01 RX ORDER — QUETIAPINE FUMARATE 25 MG/1
25 TABLET, FILM COATED ORAL NIGHTLY
Status: DISCONTINUED | OUTPATIENT
Start: 2024-01-01 | End: 2024-01-01

## 2024-01-01 RX ORDER — SODIUM CHLORIDE 9 MG/ML
50 INJECTION, SOLUTION INTRAVENOUS CONTINUOUS
Status: DISCONTINUED | OUTPATIENT
Start: 2024-01-01 | End: 2024-01-01

## 2024-01-01 RX ORDER — DIGOXIN 0.25 MG/ML
500 INJECTION INTRAMUSCULAR; INTRAVENOUS ONCE
Status: COMPLETED | OUTPATIENT
Start: 2024-01-01 | End: 2024-01-01

## 2024-01-01 RX ORDER — ACETAMINOPHEN 10 MG/ML
1000 INJECTION, SOLUTION INTRAVENOUS EVERY 6 HOURS SCHEDULED
Status: COMPLETED | OUTPATIENT
Start: 2024-01-01 | End: 2024-01-01

## 2024-01-01 RX ORDER — METOPROLOL SUCCINATE 25 MG/1
25 TABLET, EXTENDED RELEASE ORAL 2 TIMES DAILY
Start: 2024-01-01 | End: 2024-01-24

## 2024-01-01 RX ORDER — MORPHINE SULFATE/0.9% NACL/PF 1 MG/ML
0-20 PLASTIC BAG, INJECTION (ML) INTRAVENOUS CONTINUOUS
Status: CANCELLED | OUTPATIENT
Start: 2024-01-01

## 2024-01-01 RX ORDER — POTASSIUM CHLORIDE 14.9 MG/ML
20 INJECTION INTRAVENOUS ONCE
Status: COMPLETED | OUTPATIENT
Start: 2024-01-01 | End: 2024-01-01

## 2024-01-01 RX ORDER — GLYCOPYRROLATE 0.2 MG/ML
0.2 INJECTION INTRAMUSCULAR; INTRAVENOUS EVERY 4 HOURS PRN
Status: DISCONTINUED | OUTPATIENT
Start: 2024-01-01 | End: 2024-01-01 | Stop reason: HOSPADM

## 2024-01-01 RX ORDER — CARBIDOPA AND LEVODOPA 25; 100 MG/1; MG/1
1 TABLET, EXTENDED RELEASE ORAL 3 TIMES DAILY
Status: DISCONTINUED | OUTPATIENT
Start: 2024-01-01 | End: 2024-01-01 | Stop reason: HOSPADM

## 2024-01-01 RX ORDER — ACETAMINOPHEN 500 MG
5 TABLET ORAL NIGHTLY PRN
Status: DISCONTINUED | OUTPATIENT
Start: 2024-01-01 | End: 2024-01-01 | Stop reason: HOSPADM

## 2024-01-01 RX ORDER — FUROSEMIDE 10 MG/ML
40 INJECTION INTRAMUSCULAR; INTRAVENOUS ONCE
Status: COMPLETED | OUTPATIENT
Start: 2024-01-01 | End: 2024-01-01

## 2024-01-01 RX ORDER — POTASSIUM CHLORIDE 14.9 MG/ML
20 INJECTION INTRAVENOUS
Status: COMPLETED | OUTPATIENT
Start: 2024-01-01 | End: 2024-01-01

## 2024-01-01 RX ORDER — ACETAMINOPHEN 650 MG/1
650 SUPPOSITORY RECTAL EVERY 4 HOURS PRN
Status: DISCONTINUED | OUTPATIENT
Start: 2024-01-01 | End: 2024-01-01 | Stop reason: HOSPADM

## 2024-01-01 RX ORDER — DEXTROSE 50 % IN WATER (D50W) INTRAVENOUS SYRINGE
Status: COMPLETED
Start: 2024-01-01 | End: 2024-01-01

## 2024-01-01 RX ORDER — VANCOMYCIN HCL 50 MG/ML
125 SOLUTION, RECONSTITUTED, ORAL ORAL 4 TIMES DAILY
Status: DISCONTINUED | OUTPATIENT
Start: 2024-01-01 | End: 2024-01-01

## 2024-01-01 RX ORDER — DEXTROSE MONOHYDRATE AND SODIUM CHLORIDE 5; .9 G/100ML; G/100ML
125 INJECTION, SOLUTION INTRAVENOUS CONTINUOUS
Status: DISCONTINUED | OUTPATIENT
Start: 2024-01-01 | End: 2024-01-01 | Stop reason: HOSPADM

## 2024-01-01 RX ORDER — MORPHINE SULFATE/0.9% NACL/PF 1 MG/ML
0-20 PLASTIC BAG, INJECTION (ML) INTRAVENOUS CONTINUOUS
Status: DISCONTINUED | OUTPATIENT
Start: 2024-01-01 | End: 2024-01-01 | Stop reason: HOSPADM

## 2024-01-01 RX ORDER — MAGNESIUM SULFATE HEPTAHYDRATE 40 MG/ML
2 INJECTION, SOLUTION INTRAVENOUS ONCE
Status: COMPLETED | OUTPATIENT
Start: 2024-01-01 | End: 2024-01-01

## 2024-01-01 RX ORDER — METRONIDAZOLE 500 MG/100ML
500 INJECTION, SOLUTION INTRAVENOUS EVERY 8 HOURS
Status: DISCONTINUED | OUTPATIENT
Start: 2024-01-01 | End: 2024-01-01

## 2024-01-01 RX ORDER — INSULIN LISPRO 100 [IU]/ML
0-10 INJECTION, SOLUTION INTRAVENOUS; SUBCUTANEOUS
Status: DISCONTINUED | OUTPATIENT
Start: 2024-01-01 | End: 2024-01-01

## 2024-01-01 RX ORDER — IPRATROPIUM BROMIDE AND ALBUTEROL SULFATE 2.5; .5 MG/3ML; MG/3ML
3 SOLUTION RESPIRATORY (INHALATION)
Status: DISCONTINUED | OUTPATIENT
Start: 2024-01-01 | End: 2024-01-01

## 2024-01-01 RX ORDER — IPRATROPIUM BROMIDE AND ALBUTEROL SULFATE 2.5; .5 MG/3ML; MG/3ML
3 SOLUTION RESPIRATORY (INHALATION)
Status: DISPENSED | OUTPATIENT
Start: 2024-01-01 | End: 2024-01-01

## 2024-01-01 RX ORDER — ONDANSETRON HYDROCHLORIDE 2 MG/ML
4 INJECTION, SOLUTION INTRAVENOUS EVERY 4 HOURS PRN
Status: DISCONTINUED | OUTPATIENT
Start: 2024-01-01 | End: 2024-01-01 | Stop reason: HOSPADM

## 2024-01-01 RX ORDER — DEXTROSE 50 % IN WATER (D50W) INTRAVENOUS SYRINGE
12.5 ONCE
Status: COMPLETED | OUTPATIENT
Start: 2024-01-01 | End: 2024-01-01

## 2024-01-01 RX ORDER — ATORVASTATIN CALCIUM 80 MG/1
80 TABLET, FILM COATED ORAL NIGHTLY
Start: 2024-01-01 | End: 2024-01-24

## 2024-01-01 RX ORDER — FLUCONAZOLE 2 MG/ML
200 INJECTION, SOLUTION INTRAVENOUS ONCE
Status: COMPLETED | OUTPATIENT
Start: 2024-01-01 | End: 2024-01-01

## 2024-01-01 RX ORDER — LEVOTHYROXINE SODIUM 25 UG/1
25 TABLET ORAL
Status: DISCONTINUED | OUTPATIENT
Start: 2024-01-01 | End: 2024-01-01

## 2024-01-01 RX ORDER — CLOPIDOGREL BISULFATE 75 MG/1
75 TABLET ORAL DAILY
Start: 2024-01-01 | End: 2024-01-24

## 2024-01-01 RX ORDER — ACETAMINOPHEN 650 MG/1
650 SUPPOSITORY RECTAL EVERY 4 HOURS PRN
Status: CANCELLED | OUTPATIENT
Start: 2024-01-01

## 2024-01-01 RX ORDER — AMIODARONE HYDROCHLORIDE 200 MG/1
400 TABLET ORAL 2 TIMES DAILY
Status: DISCONTINUED | OUTPATIENT
Start: 2024-01-01 | End: 2024-01-01

## 2024-01-01 RX ORDER — IBUPROFEN 200 MG
TABLET ORAL
Status: DISCONTINUED
Start: 2024-01-01 | End: 2024-01-01 | Stop reason: HOSPADM

## 2024-01-01 RX ORDER — ALBUTEROL SULFATE 0.83 MG/ML
2.5 SOLUTION RESPIRATORY (INHALATION) EVERY 2 HOUR PRN
Status: CANCELLED | OUTPATIENT
Start: 2024-01-01

## 2024-01-01 RX ORDER — PANTOPRAZOLE SODIUM 40 MG/10ML
40 INJECTION, POWDER, LYOPHILIZED, FOR SOLUTION INTRAVENOUS DAILY
Status: DISCONTINUED | OUTPATIENT
Start: 2024-01-01 | End: 2024-01-01 | Stop reason: HOSPADM

## 2024-01-01 RX ORDER — DEXTROSE 50 % IN WATER (D50W) INTRAVENOUS SYRINGE
25 ONCE
Status: COMPLETED | OUTPATIENT
Start: 2024-01-01 | End: 2024-01-01

## 2024-01-01 RX ORDER — NOREPINEPHRINE BITARTRATE/D5W 8 MG/250ML
.01-.5 PLASTIC BAG, INJECTION (ML) INTRAVENOUS CONTINUOUS
Status: DISCONTINUED | OUTPATIENT
Start: 2024-01-01 | End: 2024-01-01 | Stop reason: HOSPADM

## 2024-01-01 RX ORDER — MEROPENEM 1 G/1
1 INJECTION, POWDER, FOR SOLUTION INTRAVENOUS EVERY 12 HOURS SCHEDULED
Status: DISCONTINUED | OUTPATIENT
Start: 2024-01-01 | End: 2024-01-01

## 2024-01-01 RX ORDER — IPRATROPIUM BROMIDE AND ALBUTEROL SULFATE 2.5; .5 MG/3ML; MG/3ML
3 SOLUTION RESPIRATORY (INHALATION)
Status: CANCELLED | OUTPATIENT
Start: 2024-01-01

## 2024-01-01 RX ORDER — HEPARIN SODIUM 5000 [USP'U]/ML
5000 INJECTION, SOLUTION INTRAVENOUS; SUBCUTANEOUS EVERY 8 HOURS SCHEDULED
Status: DISCONTINUED | OUTPATIENT
Start: 2024-01-01 | End: 2024-01-01

## 2024-01-01 RX ORDER — ACETAMINOPHEN 325 MG/1
650 TABLET ORAL 3 TIMES DAILY
Status: CANCELLED | OUTPATIENT
Start: 2024-01-01

## 2024-01-01 RX ORDER — ACETAMINOPHEN 325 MG/1
650 TABLET ORAL ONCE
Status: COMPLETED | OUTPATIENT
Start: 2024-01-01 | End: 2024-01-01

## 2024-01-01 RX ORDER — NYSTATIN 100000 [USP'U]/G
1 POWDER TOPICAL 2 TIMES DAILY
Status: DISCONTINUED | OUTPATIENT
Start: 2024-01-01 | End: 2024-01-01 | Stop reason: HOSPADM

## 2024-01-01 RX ORDER — GABAPENTIN 100 MG/1
200 CAPSULE ORAL NIGHTLY
Status: DISCONTINUED | OUTPATIENT
Start: 2024-01-01 | End: 2024-01-01

## 2024-01-01 RX ORDER — DOBUTAMINE HYDROCHLORIDE 400 MG/100ML
5 INJECTION INTRAVENOUS CONTINUOUS
Status: DISCONTINUED | OUTPATIENT
Start: 2024-01-01 | End: 2024-01-01

## 2024-01-01 RX ORDER — HYOSCYAMINE SULFATE 0.12 MG/1
0.12 TABLET, ORALLY DISINTEGRATING ORAL EVERY 4 HOURS PRN
Status: CANCELLED | OUTPATIENT
Start: 2024-01-01

## 2024-01-01 RX ORDER — PIPERACILLIN SODIUM, TAZOBACTAM SODIUM 3; .375 G/15ML; G/15ML
INJECTION, POWDER, LYOPHILIZED, FOR SOLUTION INTRAVENOUS
Status: DISPENSED
Start: 2024-01-01 | End: 2024-01-01

## 2024-01-01 RX ORDER — BUDESONIDE 0.5 MG/2ML
0.5 INHALANT ORAL
Status: CANCELLED | OUTPATIENT
Start: 2024-01-01

## 2024-01-01 RX ORDER — VANCOMYCIN HYDROCHLORIDE 125 MG/1
125 CAPSULE ORAL DAILY
Status: DISCONTINUED | OUTPATIENT
Start: 2024-01-01 | End: 2024-01-01

## 2024-01-01 RX ORDER — ACETAMINOPHEN 325 MG/1
650 TABLET ORAL EVERY 6 HOURS PRN
Status: DISCONTINUED | OUTPATIENT
Start: 2024-01-01 | End: 2024-01-01

## 2024-01-01 RX ORDER — AMOXICILLIN AND CLAVULANATE POTASSIUM 875; 125 MG/1; MG/1
1 TABLET, FILM COATED ORAL 2 TIMES DAILY
Start: 2024-01-01 | End: 2024-01-01

## 2024-01-01 RX ORDER — AMIODARONE HYDROCHLORIDE 200 MG/1
200 TABLET ORAL 2 TIMES DAILY
Refills: 1
Start: 2024-01-01 | End: 2024-01-24

## 2024-01-01 RX ORDER — AMIODARONE HYDROCHLORIDE 200 MG/1
200 TABLET ORAL 2 TIMES DAILY
Status: DISCONTINUED | OUTPATIENT
Start: 2024-01-01 | End: 2024-01-01

## 2024-01-01 RX ORDER — MORPHINE SULFATE 2 MG/ML
2 INJECTION, SOLUTION INTRAMUSCULAR; INTRAVENOUS
Status: CANCELLED | OUTPATIENT
Start: 2024-01-01

## 2024-01-01 RX ORDER — POTASSIUM CHLORIDE 1.5 G/1.58G
40 POWDER, FOR SOLUTION ORAL ONCE
Status: COMPLETED | OUTPATIENT
Start: 2024-01-01 | End: 2024-01-01

## 2024-01-01 RX ORDER — LORAZEPAM 2 MG/ML
1 INJECTION INTRAMUSCULAR EVERY 30 MIN PRN
Status: DISCONTINUED | OUTPATIENT
Start: 2024-01-01 | End: 2024-01-01 | Stop reason: HOSPADM

## 2024-01-01 RX ORDER — FUROSEMIDE 10 MG/ML
40 INJECTION INTRAMUSCULAR; INTRAVENOUS EVERY 12 HOURS
Status: DISCONTINUED | OUTPATIENT
Start: 2024-01-01 | End: 2024-01-01

## 2024-01-01 RX ORDER — INSULIN GLARGINE 100 [IU]/ML
10 INJECTION, SOLUTION SUBCUTANEOUS DAILY
Status: DISCONTINUED | OUTPATIENT
Start: 2024-01-01 | End: 2024-01-01

## 2024-01-01 RX ORDER — PANTOPRAZOLE SODIUM 40 MG/10ML
40 INJECTION, POWDER, LYOPHILIZED, FOR SOLUTION INTRAVENOUS DAILY
Status: CANCELLED | OUTPATIENT
Start: 2024-01-21

## 2024-01-01 RX ORDER — CYCLOBENZAPRINE HCL 10 MG
10 TABLET ORAL NIGHTLY
Status: DISCONTINUED | OUTPATIENT
Start: 2024-01-01 | End: 2024-01-01

## 2024-01-01 RX ORDER — ALBUMIN HUMAN 250 G/1000ML
12.5 SOLUTION INTRAVENOUS ONCE
Status: COMPLETED | OUTPATIENT
Start: 2024-01-01 | End: 2024-01-01

## 2024-01-01 RX ORDER — SERTRALINE HYDROCHLORIDE 100 MG/1
100 TABLET, FILM COATED ORAL DAILY
Status: DISCONTINUED | OUTPATIENT
Start: 2024-01-01 | End: 2024-01-01

## 2024-01-01 RX ORDER — DEXTROSE MONOHYDRATE 100 MG/ML
0.3 INJECTION, SOLUTION INTRAVENOUS ONCE AS NEEDED
Status: DISCONTINUED | OUTPATIENT
Start: 2024-01-01 | End: 2024-01-01

## 2024-01-01 RX ORDER — FLUTICASONE FUROATE AND VILANTEROL 200; 25 UG/1; UG/1
1 POWDER RESPIRATORY (INHALATION)
Status: DISCONTINUED | OUTPATIENT
Start: 2024-01-01 | End: 2024-01-01 | Stop reason: ALTCHOICE

## 2024-01-01 RX ORDER — ATORVASTATIN CALCIUM 80 MG/1
80 TABLET, FILM COATED ORAL NIGHTLY
Status: DISCONTINUED | OUTPATIENT
Start: 2024-01-01 | End: 2024-01-01

## 2024-01-01 RX ORDER — INSULIN LISPRO 100 [IU]/ML
0-10 INJECTION, SOLUTION INTRAVENOUS; SUBCUTANEOUS EVERY 6 HOURS
Status: DISCONTINUED | OUTPATIENT
Start: 2024-01-01 | End: 2024-01-01

## 2024-01-01 RX ORDER — NOREPINEPHRINE BITARTRATE/D5W 8 MG/250ML
PLASTIC BAG, INJECTION (ML) INTRAVENOUS
Status: COMPLETED
Start: 2024-01-01 | End: 2024-01-01

## 2024-01-01 RX ORDER — ONDANSETRON HYDROCHLORIDE 2 MG/ML
4 INJECTION, SOLUTION INTRAVENOUS EVERY 4 HOURS PRN
Status: CANCELLED | OUTPATIENT
Start: 2024-01-01

## 2024-01-01 RX ORDER — NYSTATIN 100000 [USP'U]/G
1 POWDER TOPICAL 2 TIMES DAILY
Status: CANCELLED | OUTPATIENT
Start: 2024-01-01

## 2024-01-01 RX ORDER — CLOPIDOGREL BISULFATE 75 MG/1
75 TABLET ORAL DAILY
Status: DISCONTINUED | OUTPATIENT
Start: 2024-01-01 | End: 2024-01-01 | Stop reason: HOSPADM

## 2024-01-01 RX ORDER — IPRATROPIUM BROMIDE AND ALBUTEROL SULFATE 2.5; .5 MG/3ML; MG/3ML
3 SOLUTION RESPIRATORY (INHALATION)
Status: DISCONTINUED | OUTPATIENT
Start: 2024-01-01 | End: 2024-01-01 | Stop reason: SDUPTHER

## 2024-01-01 RX ORDER — IBUPROFEN 200 MG
1 TABLET ORAL DAILY
Status: DISCONTINUED | OUTPATIENT
Start: 2024-01-01 | End: 2024-01-01 | Stop reason: HOSPADM

## 2024-01-01 RX ORDER — ACETYLCYSTEINE 200 MG/ML
3 SOLUTION ORAL; RESPIRATORY (INHALATION)
Status: CANCELLED | OUTPATIENT
Start: 2024-01-01

## 2024-01-01 RX ORDER — LEVOTHYROXINE SODIUM 25 UG/1
25 TABLET ORAL
Start: 2024-01-01 | End: 2024-01-24

## 2024-01-01 RX ORDER — INSULIN LISPRO 100 [IU]/ML
0-15 INJECTION, SOLUTION INTRAVENOUS; SUBCUTANEOUS
Start: 2024-01-01 | End: 2024-01-24

## 2024-01-01 RX ORDER — FUROSEMIDE 10 MG/ML
40 INJECTION INTRAMUSCULAR; INTRAVENOUS EVERY 8 HOURS
Status: DISCONTINUED | OUTPATIENT
Start: 2024-01-01 | End: 2024-01-01

## 2024-01-01 RX ORDER — MIRTAZAPINE 15 MG/1
15 TABLET, FILM COATED ORAL NIGHTLY
Status: DISCONTINUED | OUTPATIENT
Start: 2024-01-01 | End: 2024-01-01

## 2024-01-01 RX ORDER — POTASSIUM CHLORIDE 14.9 MG/ML
20 INJECTION INTRAVENOUS ONCE
Status: DISCONTINUED | OUTPATIENT
Start: 2024-01-01 | End: 2024-01-01

## 2024-01-01 RX ORDER — VANCOMYCIN HYDROCHLORIDE 1 G/200ML
1 INJECTION, SOLUTION INTRAVENOUS
Status: DISCONTINUED | OUTPATIENT
Start: 2024-01-01 | End: 2024-01-01 | Stop reason: WASHOUT

## 2024-01-01 RX ORDER — NOREPINEPHRINE BITARTRATE/D5W 8 MG/250ML
.01-3 PLASTIC BAG, INJECTION (ML) INTRAVENOUS CONTINUOUS
Status: DISCONTINUED | OUTPATIENT
Start: 2024-01-01 | End: 2024-01-01

## 2024-01-01 RX ORDER — METOPROLOL SUCCINATE 25 MG/1
25 TABLET, EXTENDED RELEASE ORAL 2 TIMES DAILY
Status: DISCONTINUED | OUTPATIENT
Start: 2024-01-01 | End: 2024-01-01

## 2024-01-01 RX ORDER — PIPERACILLIN SODIUM, TAZOBACTAM SODIUM 3; .375 G/15ML; G/15ML
INJECTION, POWDER, LYOPHILIZED, FOR SOLUTION INTRAVENOUS
Status: COMPLETED
Start: 2024-01-01 | End: 2024-01-01

## 2024-01-01 RX ADMIN — IPRATROPIUM BROMIDE AND ALBUTEROL SULFATE 3 ML: 2.5; .5 SOLUTION RESPIRATORY (INHALATION) at 15:14

## 2024-01-01 RX ADMIN — CLOPIDOGREL BISULFATE 75 MG: 75 TABLET ORAL at 08:52

## 2024-01-01 RX ADMIN — CARBIDOPA AND LEVODOPA 1 TABLET: 25; 100 TABLET, EXTENDED RELEASE ORAL at 17:44

## 2024-01-01 RX ADMIN — VANCOMYCIN HYDROCHLORIDE 125 MG: KIT at 13:09

## 2024-01-01 RX ADMIN — PIPERACILLIN SODIUM AND TAZOBACTAM SODIUM 3.38 G: 3; .375 INJECTION, SOLUTION INTRAVENOUS at 22:45

## 2024-01-01 RX ADMIN — CARBIDOPA AND LEVODOPA 1 TABLET: 25; 100 TABLET, EXTENDED RELEASE ORAL at 15:55

## 2024-01-01 RX ADMIN — DIGOXIN 500 MCG: 0.25 INJECTION INTRAMUSCULAR; INTRAVENOUS at 15:10

## 2024-01-01 RX ADMIN — APIXABAN 5 MG: 5 TABLET, FILM COATED ORAL at 09:39

## 2024-01-01 RX ADMIN — VANCOMYCIN HYDROCHLORIDE 125 MG: KIT at 21:53

## 2024-01-01 RX ADMIN — SITAGLIPTIN 50 MG: 50 TABLET, FILM COATED ORAL at 09:20

## 2024-01-01 RX ADMIN — FLUCONAZOLE 200 MG: 2 INJECTION, SOLUTION INTRAVENOUS at 06:00

## 2024-01-01 RX ADMIN — LEVOTHYROXINE SODIUM 25 MCG: 25 TABLET ORAL at 06:43

## 2024-01-01 RX ADMIN — IPRATROPIUM BROMIDE AND ALBUTEROL SULFATE 3 ML: .5; 2.5 SOLUTION RESPIRATORY (INHALATION) at 07:48

## 2024-01-01 RX ADMIN — METRONIDAZOLE 500 MG: 500 INJECTION, SOLUTION INTRAVENOUS at 11:06

## 2024-01-01 RX ADMIN — DAPAGLIFLOZIN 10 MG: 10 TABLET, FILM COATED ORAL at 09:00

## 2024-01-01 RX ADMIN — ACETAMINOPHEN 650 MG: 325 TABLET ORAL at 21:51

## 2024-01-01 RX ADMIN — APIXABAN 5 MG: 5 TABLET, FILM COATED ORAL at 22:49

## 2024-01-01 RX ADMIN — LORAZEPAM 1 MG: 2 INJECTION INTRAMUSCULAR; INTRAVENOUS at 14:20

## 2024-01-01 RX ADMIN — DAPAGLIFLOZIN 10 MG: 10 TABLET, FILM COATED ORAL at 09:13

## 2024-01-01 RX ADMIN — METOPROLOL SUCCINATE 25 MG: 25 TABLET, EXTENDED RELEASE ORAL at 08:20

## 2024-01-01 RX ADMIN — CARBIDOPA AND LEVODOPA 1 TABLET: 25; 100 TABLET, EXTENDED RELEASE ORAL at 10:00

## 2024-01-01 RX ADMIN — SITAGLIPTIN 50 MG: 50 TABLET, FILM COATED ORAL at 10:10

## 2024-01-01 RX ADMIN — Medication 1 ML: at 15:00

## 2024-01-01 RX ADMIN — AMIODARONE HYDROCHLORIDE 0.5 MG/MIN: 1.8 INJECTION, SOLUTION INTRAVENOUS at 23:02

## 2024-01-01 RX ADMIN — PANTOPRAZOLE SODIUM 20 MG: 20 TABLET, DELAYED RELEASE ORAL at 11:03

## 2024-01-01 RX ADMIN — CARBIDOPA AND LEVODOPA 1 TABLET: 25; 100 TABLET, EXTENDED RELEASE ORAL at 09:56

## 2024-01-01 RX ADMIN — NYSTATIN 1 APPLICATION: 100000 POWDER TOPICAL at 08:52

## 2024-01-01 RX ADMIN — Medication 1 TABLET: at 22:25

## 2024-01-01 RX ADMIN — INSULIN GLARGINE 10 UNITS: 100 INJECTION, SOLUTION SUBCUTANEOUS at 08:57

## 2024-01-01 RX ADMIN — PANTOPRAZOLE SODIUM 40 MG: 40 INJECTION, POWDER, FOR SOLUTION INTRAVENOUS at 09:05

## 2024-01-01 RX ADMIN — INSULIN LISPRO 4 UNITS: 100 INJECTION, SOLUTION INTRAVENOUS; SUBCUTANEOUS at 13:05

## 2024-01-01 RX ADMIN — BUDESONIDE INHALATION 0.5 MG: 0.5 SUSPENSION RESPIRATORY (INHALATION) at 08:13

## 2024-01-01 RX ADMIN — ACETYLCYSTEINE 600 MG: 200 INHALANT RESPIRATORY (INHALATION) at 07:32

## 2024-01-01 RX ADMIN — PANTOPRAZOLE SODIUM 40 MG: 40 TABLET, DELAYED RELEASE ORAL at 06:33

## 2024-01-01 RX ADMIN — IPRATROPIUM BROMIDE AND ALBUTEROL SULFATE 3 ML: 2.5; .5 SOLUTION RESPIRATORY (INHALATION) at 14:46

## 2024-01-01 RX ADMIN — METRONIDAZOLE 500 MG: 500 INJECTION, SOLUTION INTRAVENOUS at 02:45

## 2024-01-01 RX ADMIN — FUROSEMIDE 40 MG: 10 INJECTION, SOLUTION INTRAMUSCULAR; INTRAVENOUS at 16:51

## 2024-01-01 RX ADMIN — IPRATROPIUM BROMIDE AND ALBUTEROL SULFATE 3 ML: 2.5; .5 SOLUTION RESPIRATORY (INHALATION) at 07:11

## 2024-01-01 RX ADMIN — APIXABAN 5 MG: 5 TABLET, FILM COATED ORAL at 08:16

## 2024-01-01 RX ADMIN — FUROSEMIDE 40 MG: 10 INJECTION, SOLUTION INTRAMUSCULAR; INTRAVENOUS at 23:50

## 2024-01-01 RX ADMIN — NYSTATIN 1 APPLICATION: 100000 POWDER TOPICAL at 20:40

## 2024-01-01 RX ADMIN — FUROSEMIDE 40 MG: 10 INJECTION, SOLUTION INTRAMUSCULAR; INTRAVENOUS at 09:39

## 2024-01-01 RX ADMIN — ACETAMINOPHEN 650 MG: 325 TABLET ORAL at 15:54

## 2024-01-01 RX ADMIN — IPRATROPIUM BROMIDE AND ALBUTEROL SULFATE 3 ML: 2.5; .5 SOLUTION RESPIRATORY (INHALATION) at 11:10

## 2024-01-01 RX ADMIN — LEVOTHYROXINE SODIUM 25 MCG: 25 TABLET ORAL at 06:33

## 2024-01-01 RX ADMIN — INSULIN GLARGINE 10 UNITS: 100 INJECTION, SOLUTION SUBCUTANEOUS at 10:09

## 2024-01-01 RX ADMIN — Medication 1 SPRAY: at 23:41

## 2024-01-01 RX ADMIN — CARBIDOPA AND LEVODOPA 1 TABLET: 25; 100 TABLET ORAL at 21:54

## 2024-01-01 RX ADMIN — MEROPENEM 1 G: 1 INJECTION, POWDER, FOR SOLUTION INTRAVENOUS at 21:13

## 2024-01-01 RX ADMIN — BUDESONIDE INHALATION 0.5 MG: 0.5 SUSPENSION RESPIRATORY (INHALATION) at 20:41

## 2024-01-01 RX ADMIN — Medication 1 TABLET: at 21:48

## 2024-01-01 RX ADMIN — AMIODARONE HYDROCHLORIDE 200 MG: 200 TABLET ORAL at 21:57

## 2024-01-01 RX ADMIN — CARBIDOPA AND LEVODOPA 1 TABLET: 25; 100 TABLET, EXTENDED RELEASE ORAL at 22:53

## 2024-01-01 RX ADMIN — NYSTATIN 1 APPLICATION: 100000 POWDER TOPICAL at 10:15

## 2024-01-01 RX ADMIN — DEXTROSE 50 % IN WATER (D50W) INTRAVENOUS SYRINGE 25 G: at 01:30

## 2024-01-01 RX ADMIN — IPRATROPIUM BROMIDE AND ALBUTEROL SULFATE 3 ML: 2.5; .5 SOLUTION RESPIRATORY (INHALATION) at 11:53

## 2024-01-01 RX ADMIN — NOREPINEPHRINE BITARTRATE 0.09 MCG/KG/MIN: 8 INJECTION, SOLUTION INTRAVENOUS at 14:50

## 2024-01-01 RX ADMIN — CLOPIDOGREL BISULFATE 75 MG: 75 TABLET ORAL at 09:38

## 2024-01-01 RX ADMIN — FUROSEMIDE 40 MG: 10 INJECTION, SOLUTION INTRAMUSCULAR; INTRAVENOUS at 16:28

## 2024-01-01 RX ADMIN — NYSTATIN 1 APPLICATION: 100000 POWDER TOPICAL at 10:01

## 2024-01-01 RX ADMIN — INSULIN LISPRO 4 UNITS: 100 INJECTION, SOLUTION INTRAVENOUS; SUBCUTANEOUS at 16:41

## 2024-01-01 RX ADMIN — VANCOMYCIN HYDROCHLORIDE 125 MG: 125 CAPSULE ORAL at 09:20

## 2024-01-01 RX ADMIN — SERTRALINE HYDROCHLORIDE 100 MG: 100 TABLET ORAL at 10:11

## 2024-01-01 RX ADMIN — LEVOTHYROXINE SODIUM 25 MCG: 25 TABLET ORAL at 05:43

## 2024-01-01 RX ADMIN — APIXABAN 5 MG: 5 TABLET, FILM COATED ORAL at 21:53

## 2024-01-01 RX ADMIN — APIXABAN 5 MG: 5 TABLET, FILM COATED ORAL at 21:29

## 2024-01-01 RX ADMIN — Medication 1 TABLET: at 08:52

## 2024-01-01 RX ADMIN — ACETYLCYSTEINE 600 MG: 200 INHALANT RESPIRATORY (INHALATION) at 19:48

## 2024-01-01 RX ADMIN — BUDESONIDE INHALATION 0.5 MG: 0.5 SUSPENSION RESPIRATORY (INHALATION) at 19:00

## 2024-01-01 RX ADMIN — ACETAMINOPHEN 650 MG: 325 TABLET ORAL at 20:15

## 2024-01-01 RX ADMIN — Medication 2 MG/HR: at 12:26

## 2024-01-01 RX ADMIN — APIXABAN 5 MG: 5 TABLET, FILM COATED ORAL at 21:57

## 2024-01-01 RX ADMIN — NYSTATIN 1 APPLICATION: 100000 POWDER TOPICAL at 09:00

## 2024-01-01 RX ADMIN — NYSTATIN 1 APPLICATION: 100000 POWDER TOPICAL at 08:57

## 2024-01-01 RX ADMIN — CARBIDOPA AND LEVODOPA 1 TABLET: 25; 100 TABLET, EXTENDED RELEASE ORAL at 09:20

## 2024-01-01 RX ADMIN — CARBIDOPA AND LEVODOPA 1 TABLET: 25; 100 TABLET ORAL at 14:52

## 2024-01-01 RX ADMIN — NYSTATIN 1 APPLICATION: 100000 POWDER TOPICAL at 10:10

## 2024-01-01 RX ADMIN — DIGOXIN 500 MCG: 0.25 INJECTION INTRAMUSCULAR; INTRAVENOUS at 14:45

## 2024-01-01 RX ADMIN — Medication 1 SPRAY: at 21:24

## 2024-01-01 RX ADMIN — DAPAGLIFLOZIN 10 MG: 10 TABLET, FILM COATED ORAL at 10:00

## 2024-01-01 RX ADMIN — Medication 1 TABLET: at 21:52

## 2024-01-01 RX ADMIN — IPRATROPIUM BROMIDE AND ALBUTEROL SULFATE 3 ML: 2.5; .5 SOLUTION RESPIRATORY (INHALATION) at 10:49

## 2024-01-01 RX ADMIN — IPRATROPIUM BROMIDE AND ALBUTEROL SULFATE 3 ML: 2.5; .5 SOLUTION RESPIRATORY (INHALATION) at 19:28

## 2024-01-01 RX ADMIN — FUROSEMIDE 40 MG: 10 INJECTION, SOLUTION INTRAMUSCULAR; INTRAVENOUS at 09:00

## 2024-01-01 RX ADMIN — PANTOPRAZOLE SODIUM 40 MG: 40 TABLET, DELAYED RELEASE ORAL at 06:22

## 2024-01-01 RX ADMIN — DAPAGLIFLOZIN 10 MG: 10 TABLET, FILM COATED ORAL at 09:38

## 2024-01-01 RX ADMIN — MENTHOL, METHYL SALICYLATE: 10; 15 CREAM TOPICAL at 20:30

## 2024-01-01 RX ADMIN — Medication: at 00:42

## 2024-01-01 RX ADMIN — CARBIDOPA AND LEVODOPA 1 TABLET: 25; 100 TABLET, EXTENDED RELEASE ORAL at 21:13

## 2024-01-01 RX ADMIN — GABAPENTIN 200 MG: 100 CAPSULE ORAL at 20:31

## 2024-01-01 RX ADMIN — LEVOTHYROXINE SODIUM 25 MCG: 0.03 TABLET ORAL at 08:52

## 2024-01-01 RX ADMIN — DAPAGLIFLOZIN 10 MG: 10 TABLET, FILM COATED ORAL at 09:20

## 2024-01-01 RX ADMIN — METRONIDAZOLE 500 MG: 500 INJECTION, SOLUTION INTRAVENOUS at 17:36

## 2024-01-01 RX ADMIN — NYSTATIN 1 APPLICATION: 100000 POWDER TOPICAL at 21:32

## 2024-01-01 RX ADMIN — DEXTROSE MONOHYDRATE 25 G: 25 INJECTION, SOLUTION INTRAVENOUS at 01:30

## 2024-01-01 RX ADMIN — IPRATROPIUM BROMIDE AND ALBUTEROL SULFATE 3 ML: 2.5; .5 SOLUTION RESPIRATORY (INHALATION) at 07:55

## 2024-01-01 RX ADMIN — VANCOMYCIN HYDROCHLORIDE 125 MG: 125 CAPSULE ORAL at 17:24

## 2024-01-01 RX ADMIN — SODIUM CHLORIDE 500 ML: 9 INJECTION, SOLUTION INTRAVENOUS at 14:40

## 2024-01-01 RX ADMIN — ACETAMINOPHEN 650 MG: 325 TABLET ORAL at 15:18

## 2024-01-01 RX ADMIN — APIXABAN 5 MG: 5 TABLET, FILM COATED ORAL at 08:52

## 2024-01-01 RX ADMIN — PANTOPRAZOLE SODIUM 40 MG: 40 TABLET, DELAYED RELEASE ORAL at 06:01

## 2024-01-01 RX ADMIN — CYCLOBENZAPRINE 10 MG: 10 TABLET, FILM COATED ORAL at 21:56

## 2024-01-01 RX ADMIN — INSULIN GLARGINE 10 UNITS: 100 INJECTION, SOLUTION SUBCUTANEOUS at 08:55

## 2024-01-01 RX ADMIN — BUDESONIDE INHALATION 0.5 MG: 0.5 SUSPENSION RESPIRATORY (INHALATION) at 19:59

## 2024-01-01 RX ADMIN — BUDESONIDE INHALATION 0.5 MG: 0.5 SUSPENSION RESPIRATORY (INHALATION) at 07:18

## 2024-01-01 RX ADMIN — QUETIAPINE FUMARATE 25 MG: 25 TABLET ORAL at 21:12

## 2024-01-01 RX ADMIN — METRONIDAZOLE 500 MG: 500 INJECTION, SOLUTION INTRAVENOUS at 10:10

## 2024-01-01 RX ADMIN — Medication 1 TABLET: at 09:56

## 2024-01-01 RX ADMIN — BUDESONIDE INHALATION 0.5 MG: 0.5 SUSPENSION RESPIRATORY (INHALATION) at 19:41

## 2024-01-01 RX ADMIN — FUROSEMIDE 40 MG: 10 INJECTION, SOLUTION INTRAMUSCULAR; INTRAVENOUS at 08:53

## 2024-01-01 RX ADMIN — QUETIAPINE FUMARATE 25 MG: 25 TABLET ORAL at 21:29

## 2024-01-01 RX ADMIN — BUDESONIDE INHALATION 0.5 MG: 0.5 SUSPENSION RESPIRATORY (INHALATION) at 20:22

## 2024-01-01 RX ADMIN — METRONIDAZOLE 500 MG: 500 INJECTION, SOLUTION INTRAVENOUS at 01:43

## 2024-01-01 RX ADMIN — IPRATROPIUM BROMIDE AND ALBUTEROL SULFATE 3 ML: 2.5; .5 SOLUTION RESPIRATORY (INHALATION) at 19:48

## 2024-01-01 RX ADMIN — CARBIDOPA AND LEVODOPA 1 TABLET: 25; 100 TABLET, EXTENDED RELEASE ORAL at 08:16

## 2024-01-01 RX ADMIN — APIXABAN 5 MG: 5 TABLET, FILM COATED ORAL at 22:25

## 2024-01-01 RX ADMIN — Medication 1 SPRAY: at 10:15

## 2024-01-01 RX ADMIN — ACETAMINOPHEN 650 MG: 325 TABLET ORAL at 22:25

## 2024-01-01 RX ADMIN — GABAPENTIN 200 MG: 100 CAPSULE ORAL at 21:53

## 2024-01-01 RX ADMIN — AMIODARONE HYDROCHLORIDE 1 MG/MIN: 1.8 INJECTION, SOLUTION INTRAVENOUS at 20:37

## 2024-01-01 RX ADMIN — IPRATROPIUM BROMIDE AND ALBUTEROL SULFATE 3 ML: 2.5; .5 SOLUTION RESPIRATORY (INHALATION) at 07:32

## 2024-01-01 RX ADMIN — NYSTATIN 1 APPLICATION: 100000 POWDER TOPICAL at 08:29

## 2024-01-01 RX ADMIN — QUETIAPINE FUMARATE 25 MG: 25 TABLET ORAL at 21:56

## 2024-01-01 RX ADMIN — METOPROLOL SUCCINATE 25 MG: 25 TABLET, EXTENDED RELEASE ORAL at 09:13

## 2024-01-01 RX ADMIN — APIXABAN 5 MG: 5 TABLET, FILM COATED ORAL at 13:54

## 2024-01-01 RX ADMIN — AMIODARONE HYDROCHLORIDE 200 MG: 200 TABLET ORAL at 09:20

## 2024-01-01 RX ADMIN — AMIODARONE HYDROCHLORIDE 200 MG: 200 TABLET ORAL at 09:38

## 2024-01-01 RX ADMIN — NYSTATIN 1 APPLICATION: 100000 POWDER TOPICAL at 21:55

## 2024-01-01 RX ADMIN — NYSTATIN 1 APPLICATION: 100000 POWDER TOPICAL at 22:00

## 2024-01-01 RX ADMIN — BUDESONIDE INHALATION 0.5 MG: 0.5 SUSPENSION RESPIRATORY (INHALATION) at 20:10

## 2024-01-01 RX ADMIN — CARBIDOPA AND LEVODOPA 1 TABLET: 25; 100 TABLET, EXTENDED RELEASE ORAL at 16:25

## 2024-01-01 RX ADMIN — METRONIDAZOLE 500 MG: 500 INJECTION, SOLUTION INTRAVENOUS at 09:00

## 2024-01-01 RX ADMIN — BARIUM SULFATE 20 ML: 400 PASTE ORAL at 15:00

## 2024-01-01 RX ADMIN — CARBIDOPA AND LEVODOPA 1 TABLET: 25; 100 TABLET ORAL at 15:00

## 2024-01-01 RX ADMIN — LEVOTHYROXINE SODIUM 25 MCG: 25 TABLET ORAL at 05:55

## 2024-01-01 RX ADMIN — SERTRALINE HYDROCHLORIDE 100 MG: 100 TABLET ORAL at 08:46

## 2024-01-01 RX ADMIN — BUDESONIDE INHALATION 0.5 MG: 0.5 SUSPENSION RESPIRATORY (INHALATION) at 07:10

## 2024-01-01 RX ADMIN — SITAGLIPTIN 50 MG: 50 TABLET, FILM COATED ORAL at 10:09

## 2024-01-01 RX ADMIN — AMIODARONE HYDROCHLORIDE 400 MG: 200 TABLET ORAL at 08:46

## 2024-01-01 RX ADMIN — VANCOMYCIN HYDROCHLORIDE 125 MG: 125 CAPSULE ORAL at 12:51

## 2024-01-01 RX ADMIN — METRONIDAZOLE 500 MG: 500 INJECTION, SOLUTION INTRAVENOUS at 11:11

## 2024-01-01 RX ADMIN — INSULIN LISPRO 4 UNITS: 100 INJECTION, SOLUTION INTRAVENOUS; SUBCUTANEOUS at 13:10

## 2024-01-01 RX ADMIN — FUROSEMIDE 40 MG: 10 INJECTION, SOLUTION INTRAMUSCULAR; INTRAVENOUS at 00:30

## 2024-01-01 RX ADMIN — IPRATROPIUM BROMIDE AND ALBUTEROL SULFATE 3 ML: 2.5; .5 SOLUTION RESPIRATORY (INHALATION) at 08:19

## 2024-01-01 RX ADMIN — PANTOPRAZOLE SODIUM 40 MG: 40 TABLET, DELAYED RELEASE ORAL at 08:52

## 2024-01-01 RX ADMIN — LORAZEPAM 1 MG: 2 INJECTION INTRAMUSCULAR; INTRAVENOUS at 12:53

## 2024-01-01 RX ADMIN — IPRATROPIUM BROMIDE AND ALBUTEROL SULFATE 3 ML: 2.5; .5 SOLUTION RESPIRATORY (INHALATION) at 11:23

## 2024-01-01 RX ADMIN — CARBIDOPA AND LEVODOPA 1 TABLET: 25; 100 TABLET, EXTENDED RELEASE ORAL at 15:18

## 2024-01-01 RX ADMIN — SITAGLIPTIN 50 MG: 50 TABLET, FILM COATED ORAL at 13:54

## 2024-01-01 RX ADMIN — INSULIN GLARGINE 10 UNITS: 100 INJECTION, SOLUTION SUBCUTANEOUS at 09:54

## 2024-01-01 RX ADMIN — MORPHINE SULFATE 2 MG: 2 INJECTION, SOLUTION INTRAMUSCULAR; INTRAVENOUS at 13:41

## 2024-01-01 RX ADMIN — AMIODARONE HYDROCHLORIDE 0.5 MG/MIN: 1.8 INJECTION, SOLUTION INTRAVENOUS at 11:13

## 2024-01-01 RX ADMIN — VANCOMYCIN HYDROCHLORIDE 125 MG: 125 CAPSULE ORAL at 09:13

## 2024-01-01 RX ADMIN — MIRTAZAPINE 15 MG: 15 TABLET, FILM COATED ORAL at 21:52

## 2024-01-01 RX ADMIN — ACETAMINOPHEN 650 MG: 325 TABLET ORAL at 14:55

## 2024-01-01 RX ADMIN — BUDESONIDE INHALATION 0.5 MG: 0.5 SUSPENSION RESPIRATORY (INHALATION) at 20:03

## 2024-01-01 RX ADMIN — BUDESONIDE INHALATION 0.5 MG: 0.5 SUSPENSION RESPIRATORY (INHALATION) at 07:11

## 2024-01-01 RX ADMIN — Medication 3 L/MIN: at 08:44

## 2024-01-01 RX ADMIN — QUETIAPINE FUMARATE 25 MG: 25 TABLET ORAL at 20:16

## 2024-01-01 RX ADMIN — IPRATROPIUM BROMIDE AND ALBUTEROL SULFATE 3 ML: 2.5; .5 SOLUTION RESPIRATORY (INHALATION) at 07:21

## 2024-01-01 RX ADMIN — QUETIAPINE FUMARATE 25 MG: 25 TABLET ORAL at 21:53

## 2024-01-01 RX ADMIN — SERTRALINE HYDROCHLORIDE 100 MG: 100 TABLET ORAL at 09:39

## 2024-01-01 RX ADMIN — PIPERACILLIN SODIUM AND TAZOBACTAM SODIUM 3.38 G: 3; .375 INJECTION, SOLUTION INTRAVENOUS at 17:48

## 2024-01-01 RX ADMIN — CARBIDOPA AND LEVODOPA 1 TABLET: 25; 100 TABLET, EXTENDED RELEASE ORAL at 15:10

## 2024-01-01 RX ADMIN — IPRATROPIUM BROMIDE AND ALBUTEROL SULFATE 3 ML: 2.5; .5 SOLUTION RESPIRATORY (INHALATION) at 07:08

## 2024-01-01 RX ADMIN — INSULIN LISPRO 2 UNITS: 100 INJECTION, SOLUTION INTRAVENOUS; SUBCUTANEOUS at 09:36

## 2024-01-01 RX ADMIN — IPRATROPIUM BROMIDE AND ALBUTEROL SULFATE 3 ML: 2.5; .5 SOLUTION RESPIRATORY (INHALATION) at 12:12

## 2024-01-01 RX ADMIN — DEXTROSE AND SODIUM CHLORIDE 125 ML/HR: 5; 900 INJECTION, SOLUTION INTRAVENOUS at 12:25

## 2024-01-01 RX ADMIN — VANCOMYCIN HYDROCHLORIDE 125 MG: 125 CAPSULE ORAL at 06:01

## 2024-01-01 RX ADMIN — NYSTATIN 1 APPLICATION: 100000 POWDER TOPICAL at 09:59

## 2024-01-01 RX ADMIN — AMIODARONE HYDROCHLORIDE 400 MG: 200 TABLET ORAL at 15:10

## 2024-01-01 RX ADMIN — BUDESONIDE INHALATION 0.5 MG: 0.5 SUSPENSION RESPIRATORY (INHALATION) at 19:55

## 2024-01-01 RX ADMIN — AMIODARONE HYDROCHLORIDE 0.5 MG/MIN: 1.8 INJECTION, SOLUTION INTRAVENOUS at 11:05

## 2024-01-01 RX ADMIN — METOPROLOL SUCCINATE 25 MG: 25 TABLET, EXTENDED RELEASE ORAL at 09:20

## 2024-01-01 RX ADMIN — METRONIDAZOLE 500 MG: 500 INJECTION, SOLUTION INTRAVENOUS at 17:29

## 2024-01-01 RX ADMIN — Medication 2 L/MIN: at 03:30

## 2024-01-01 RX ADMIN — NOREPINEPHRINE BITARTRATE 0.32 MCG/KG/MIN: 8 INJECTION, SOLUTION INTRAVENOUS at 03:13

## 2024-01-01 RX ADMIN — BUDESONIDE INHALATION 0.5 MG: 0.5 SUSPENSION RESPIRATORY (INHALATION) at 20:53

## 2024-01-01 RX ADMIN — Medication 1 TABLET: at 20:15

## 2024-01-01 RX ADMIN — SITAGLIPTIN 50 MG: 50 TABLET, FILM COATED ORAL at 08:54

## 2024-01-01 RX ADMIN — SERTRALINE HYDROCHLORIDE 100 MG: 100 TABLET ORAL at 09:20

## 2024-01-01 RX ADMIN — DAPAGLIFLOZIN 10 MG: 10 TABLET, FILM COATED ORAL at 10:09

## 2024-01-01 RX ADMIN — METOPROLOL SUCCINATE 25 MG: 25 TABLET, EXTENDED RELEASE ORAL at 22:25

## 2024-01-01 RX ADMIN — PANTOPRAZOLE SODIUM 40 MG: 40 TABLET, DELAYED RELEASE ORAL at 07:00

## 2024-01-01 RX ADMIN — SODIUM CHLORIDE 75 ML/HR: 900 INJECTION, SOLUTION INTRAVENOUS at 05:41

## 2024-01-01 RX ADMIN — Medication 1 ML: at 21:55

## 2024-01-01 RX ADMIN — ATORVASTATIN CALCIUM 80 MG: 80 TABLET, FILM COATED ORAL at 21:29

## 2024-01-01 RX ADMIN — INSULIN LISPRO 6 UNITS: 100 INJECTION, SOLUTION INTRAVENOUS; SUBCUTANEOUS at 21:51

## 2024-01-01 RX ADMIN — VANCOMYCIN HYDROCHLORIDE 125 MG: 125 CAPSULE ORAL at 08:16

## 2024-01-01 RX ADMIN — CARBIDOPA AND LEVODOPA 1 TABLET: 25; 100 TABLET ORAL at 21:52

## 2024-01-01 RX ADMIN — DAPAGLIFLOZIN 10 MG: 10 TABLET, FILM COATED ORAL at 11:02

## 2024-01-01 RX ADMIN — AMIODARONE HYDROCHLORIDE 0.5 MG/MIN: 1.8 INJECTION, SOLUTION INTRAVENOUS at 23:07

## 2024-01-01 RX ADMIN — ACETYLCYSTEINE 600 MG: 200 INHALANT RESPIRATORY (INHALATION) at 20:03

## 2024-01-01 RX ADMIN — IPRATROPIUM BROMIDE AND ALBUTEROL SULFATE 3 ML: 2.5; .5 SOLUTION RESPIRATORY (INHALATION) at 08:14

## 2024-01-01 RX ADMIN — QUETIAPINE FUMARATE 25 MG: 25 TABLET ORAL at 21:31

## 2024-01-01 RX ADMIN — GABAPENTIN 200 MG: 100 CAPSULE ORAL at 22:25

## 2024-01-01 RX ADMIN — IPRATROPIUM BROMIDE AND ALBUTEROL SULFATE 3 ML: 2.5; .5 SOLUTION RESPIRATORY (INHALATION) at 11:49

## 2024-01-01 RX ADMIN — APIXABAN 5 MG: 5 TABLET, FILM COATED ORAL at 08:53

## 2024-01-01 RX ADMIN — LEVOTHYROXINE SODIUM 25 MCG: 25 TABLET ORAL at 06:00

## 2024-01-01 RX ADMIN — AMIODARONE HYDROCHLORIDE 400 MG: 200 TABLET ORAL at 21:54

## 2024-01-01 RX ADMIN — Medication 1 TABLET: at 21:13

## 2024-01-01 RX ADMIN — CARBIDOPA AND LEVODOPA 1 TABLET: 25; 100 TABLET, EXTENDED RELEASE ORAL at 10:10

## 2024-01-01 RX ADMIN — INSULIN GLARGINE 10 UNITS: 100 INJECTION, SOLUTION SUBCUTANEOUS at 08:56

## 2024-01-01 RX ADMIN — Medication: at 10:45

## 2024-01-01 RX ADMIN — MEROPENEM 1 G: 1 INJECTION, POWDER, FOR SOLUTION INTRAVENOUS at 08:53

## 2024-01-01 RX ADMIN — IPRATROPIUM BROMIDE AND ALBUTEROL SULFATE 3 ML: 2.5; .5 SOLUTION RESPIRATORY (INHALATION) at 20:22

## 2024-01-01 RX ADMIN — CYCLOBENZAPRINE 10 MG: 10 TABLET, FILM COATED ORAL at 21:59

## 2024-01-01 RX ADMIN — CARBIDOPA AND LEVODOPA 1 TABLET: 25; 100 TABLET, EXTENDED RELEASE ORAL at 21:57

## 2024-01-01 RX ADMIN — VANCOMYCIN HYDROCHLORIDE 125 MG: 125 CAPSULE ORAL at 12:11

## 2024-01-01 RX ADMIN — DEXTROSE 50 % IN WATER (D50W) INTRAVENOUS SYRINGE 25 G: at 12:22

## 2024-01-01 RX ADMIN — Medication 1 TABLET: at 09:20

## 2024-01-01 RX ADMIN — MEROPENEM 1 G: 1 INJECTION, POWDER, FOR SOLUTION INTRAVENOUS at 10:11

## 2024-01-01 RX ADMIN — VANCOMYCIN HYDROCHLORIDE 125 MG: 125 CAPSULE ORAL at 22:25

## 2024-01-01 RX ADMIN — CARBIDOPA AND LEVODOPA 1 TABLET: 25; 100 TABLET, EXTENDED RELEASE ORAL at 20:30

## 2024-01-01 RX ADMIN — IPRATROPIUM BROMIDE AND ALBUTEROL SULFATE 3 ML: 2.5; .5 SOLUTION RESPIRATORY (INHALATION) at 11:24

## 2024-01-01 RX ADMIN — Medication 40 L/MIN: at 07:18

## 2024-01-01 RX ADMIN — AMIODARONE HYDROCHLORIDE 400 MG: 200 TABLET ORAL at 09:00

## 2024-01-01 RX ADMIN — SERTRALINE HYDROCHLORIDE 100 MG: 100 TABLET ORAL at 09:00

## 2024-01-01 RX ADMIN — METOPROLOL SUCCINATE 25 MG: 25 TABLET, EXTENDED RELEASE ORAL at 09:59

## 2024-01-01 RX ADMIN — SERTRALINE HYDROCHLORIDE 100 MG: 100 TABLET ORAL at 08:54

## 2024-01-01 RX ADMIN — METOPROLOL SUCCINATE 25 MG: 25 TABLET, EXTENDED RELEASE ORAL at 20:40

## 2024-01-01 RX ADMIN — AMIODARONE HYDROCHLORIDE 200 MG: 200 TABLET ORAL at 11:03

## 2024-01-01 RX ADMIN — CARBIDOPA AND LEVODOPA 1 TABLET: 25; 100 TABLET, EXTENDED RELEASE ORAL at 15:54

## 2024-01-01 RX ADMIN — BUDESONIDE INHALATION 0.5 MG: 0.5 SUSPENSION RESPIRATORY (INHALATION) at 08:19

## 2024-01-01 RX ADMIN — INSULIN GLARGINE 10 UNITS: 100 INJECTION, SOLUTION SUBCUTANEOUS at 09:12

## 2024-01-01 RX ADMIN — VANCOMYCIN HYDROCHLORIDE 125 MG: 125 CAPSULE ORAL at 10:00

## 2024-01-01 RX ADMIN — PRASUGREL 10 MG: 10 TABLET, FILM COATED ORAL at 11:03

## 2024-01-01 RX ADMIN — ACETYLCYSTEINE 600 MG: 200 INHALANT RESPIRATORY (INHALATION) at 20:10

## 2024-01-01 RX ADMIN — SITAGLIPTIN 50 MG: 50 TABLET, FILM COATED ORAL at 11:03

## 2024-01-01 RX ADMIN — PIPERACILLIN SODIUM AND TAZOBACTAM SODIUM 3.38 G: 3; .375 INJECTION, SOLUTION INTRAVENOUS at 04:45

## 2024-01-01 RX ADMIN — APIXABAN 5 MG: 5 TABLET, FILM COATED ORAL at 09:20

## 2024-01-01 RX ADMIN — QUETIAPINE FUMARATE 25 MG: 25 TABLET ORAL at 21:52

## 2024-01-01 RX ADMIN — Medication 1 TABLET: at 21:15

## 2024-01-01 RX ADMIN — ACETAMINOPHEN 650 MG: 325 TABLET ORAL at 10:09

## 2024-01-01 RX ADMIN — BUDESONIDE INHALATION 0.5 MG: 0.5 SUSPENSION RESPIRATORY (INHALATION) at 07:32

## 2024-01-01 RX ADMIN — ATORVASTATIN CALCIUM 80 MG: 80 TABLET, FILM COATED ORAL at 20:40

## 2024-01-01 RX ADMIN — CLOPIDOGREL BISULFATE 75 MG: 75 TABLET ORAL at 13:54

## 2024-01-01 RX ADMIN — INSULIN GLARGINE 10 UNITS: 100 INJECTION, SOLUTION SUBCUTANEOUS at 08:17

## 2024-01-01 RX ADMIN — ACETYLCYSTEINE 200 MG: 200 INHALANT RESPIRATORY (INHALATION) at 19:28

## 2024-01-01 RX ADMIN — CARBIDOPA AND LEVODOPA 1 TABLET: 25; 100 TABLET, EXTENDED RELEASE ORAL at 17:30

## 2024-01-01 RX ADMIN — SERTRALINE HYDROCHLORIDE 100 MG: 100 TABLET ORAL at 08:52

## 2024-01-01 RX ADMIN — QUETIAPINE FUMARATE 25 MG: 25 TABLET ORAL at 21:59

## 2024-01-01 RX ADMIN — ALBUMIN (HUMAN) 12.5 G: 12.5 SOLUTION INTRAVENOUS at 19:00

## 2024-01-01 RX ADMIN — ACETAMINOPHEN 650 MG: 325 TABLET ORAL at 10:10

## 2024-01-01 RX ADMIN — INSULIN LISPRO 4 UNITS: 100 INJECTION, SOLUTION INTRAVENOUS; SUBCUTANEOUS at 17:49

## 2024-01-01 RX ADMIN — INSULIN LISPRO 2 UNITS: 100 INJECTION, SOLUTION INTRAVENOUS; SUBCUTANEOUS at 17:44

## 2024-01-01 RX ADMIN — DAPAGLIFLOZIN 10 MG: 10 TABLET, FILM COATED ORAL at 08:16

## 2024-01-01 RX ADMIN — AMIODARONE HYDROCHLORIDE 400 MG: 200 TABLET ORAL at 21:46

## 2024-01-01 RX ADMIN — IPRATROPIUM BROMIDE AND ALBUTEROL SULFATE 3 ML: 2.5; .5 SOLUTION RESPIRATORY (INHALATION) at 07:18

## 2024-01-01 RX ADMIN — Medication 5 MG: at 21:30

## 2024-01-01 RX ADMIN — CARBIDOPA AND LEVODOPA 1 TABLET: 25; 100 TABLET, EXTENDED RELEASE ORAL at 21:29

## 2024-01-01 RX ADMIN — METOPROLOL SUCCINATE 25 MG: 25 TABLET, EXTENDED RELEASE ORAL at 22:49

## 2024-01-01 RX ADMIN — BUDESONIDE INHALATION 0.5 MG: 0.5 SUSPENSION RESPIRATORY (INHALATION) at 07:33

## 2024-01-01 RX ADMIN — DAPAGLIFLOZIN 10 MG: 10 TABLET, FILM COATED ORAL at 09:39

## 2024-01-01 RX ADMIN — IPRATROPIUM BROMIDE AND ALBUTEROL SULFATE 3 ML: 2.5; .5 SOLUTION RESPIRATORY (INHALATION) at 20:46

## 2024-01-01 RX ADMIN — METRONIDAZOLE 500 MG: 500 INJECTION, SOLUTION INTRAVENOUS at 18:13

## 2024-01-01 RX ADMIN — INSULIN LISPRO 2 UNITS: 100 INJECTION, SOLUTION INTRAVENOUS; SUBCUTANEOUS at 10:03

## 2024-01-01 RX ADMIN — CYCLOBENZAPRINE 10 MG: 10 TABLET, FILM COATED ORAL at 21:12

## 2024-01-01 RX ADMIN — INSULIN LISPRO 2 UNITS: 100 INJECTION, SOLUTION INTRAVENOUS; SUBCUTANEOUS at 18:07

## 2024-01-01 RX ADMIN — CYCLOBENZAPRINE 10 MG: 10 TABLET, FILM COATED ORAL at 21:48

## 2024-01-01 RX ADMIN — METRONIDAZOLE 500 MG: 500 INJECTION, SOLUTION INTRAVENOUS at 01:00

## 2024-01-01 RX ADMIN — POTASSIUM CHLORIDE 20 MEQ: 14.9 INJECTION, SOLUTION INTRAVENOUS at 13:25

## 2024-01-01 RX ADMIN — GABAPENTIN 200 MG: 100 CAPSULE ORAL at 21:13

## 2024-01-01 RX ADMIN — Medication 1 TABLET: at 22:49

## 2024-01-01 RX ADMIN — MIRTAZAPINE 15 MG: 15 TABLET, FILM COATED ORAL at 21:53

## 2024-01-01 RX ADMIN — NYSTATIN 1 APPLICATION: 100000 POWDER TOPICAL at 21:00

## 2024-01-01 RX ADMIN — GABAPENTIN 200 MG: 100 CAPSULE ORAL at 21:54

## 2024-01-01 RX ADMIN — CARBIDOPA AND LEVODOPA 1 TABLET: 25; 100 TABLET, EXTENDED RELEASE ORAL at 09:38

## 2024-01-01 RX ADMIN — NYSTATIN 1 APPLICATION: 100000 POWDER TOPICAL at 10:30

## 2024-01-01 RX ADMIN — GABAPENTIN 200 MG: 100 CAPSULE ORAL at 20:15

## 2024-01-01 RX ADMIN — ATORVASTATIN CALCIUM 80 MG: 80 TABLET, FILM COATED ORAL at 22:49

## 2024-01-01 RX ADMIN — ACETAMINOPHEN 650 MG: 325 TABLET ORAL at 11:02

## 2024-01-01 RX ADMIN — ATORVASTATIN CALCIUM 80 MG: 80 TABLET, FILM COATED ORAL at 21:52

## 2024-01-01 RX ADMIN — APIXABAN 5 MG: 5 TABLET, FILM COATED ORAL at 09:13

## 2024-01-01 RX ADMIN — CYCLOBENZAPRINE 10 MG: 10 TABLET, FILM COATED ORAL at 20:16

## 2024-01-01 RX ADMIN — CLOPIDOGREL BISULFATE 75 MG: 75 TABLET ORAL at 08:16

## 2024-01-01 RX ADMIN — ACETAMINOPHEN 650 MG: 325 TABLET ORAL at 14:47

## 2024-01-01 RX ADMIN — VANCOMYCIN HYDROCHLORIDE 125 MG: 125 CAPSULE ORAL at 21:13

## 2024-01-01 RX ADMIN — ACETAMINOPHEN 650 MG: 325 TABLET ORAL at 15:22

## 2024-01-01 RX ADMIN — CYCLOBENZAPRINE 10 MG: 10 TABLET, FILM COATED ORAL at 21:52

## 2024-01-01 RX ADMIN — POTASSIUM CHLORIDE 40 MEQ: 1.5 FOR SOLUTION ORAL at 22:49

## 2024-01-01 RX ADMIN — Medication 1 TABLET: at 09:38

## 2024-01-01 RX ADMIN — MEROPENEM 1 G: 1 INJECTION, POWDER, FOR SOLUTION INTRAVENOUS at 21:52

## 2024-01-01 RX ADMIN — CYCLOBENZAPRINE 10 MG: 10 TABLET, FILM COATED ORAL at 22:49

## 2024-01-01 RX ADMIN — VANCOMYCIN HYDROCHLORIDE 125 MG: 125 CAPSULE ORAL at 20:30

## 2024-01-01 RX ADMIN — METRONIDAZOLE 500 MG: 500 INJECTION, SOLUTION INTRAVENOUS at 10:02

## 2024-01-01 RX ADMIN — PANTOPRAZOLE SODIUM 40 MG: 40 TABLET, DELAYED RELEASE ORAL at 06:25

## 2024-01-01 RX ADMIN — ONDANSETRON 4 MG: 2 INJECTION INTRAMUSCULAR; INTRAVENOUS at 12:43

## 2024-01-01 RX ADMIN — INSULIN LISPRO 0 UNITS: 100 INJECTION, SOLUTION INTRAVENOUS; SUBCUTANEOUS at 00:50

## 2024-01-01 RX ADMIN — Medication 1 TABLET: at 20:32

## 2024-01-01 RX ADMIN — IPRATROPIUM BROMIDE AND ALBUTEROL SULFATE 3 ML: 2.5; .5 SOLUTION RESPIRATORY (INHALATION) at 12:07

## 2024-01-01 RX ADMIN — BUDESONIDE INHALATION 0.5 MG: 0.5 SUSPENSION RESPIRATORY (INHALATION) at 07:48

## 2024-01-01 RX ADMIN — ACETAMINOPHEN 1000 MG: 10 INJECTION INTRAVENOUS at 12:35

## 2024-01-01 RX ADMIN — CLOPIDOGREL BISULFATE 75 MG: 75 TABLET ORAL at 08:46

## 2024-01-01 RX ADMIN — SITAGLIPTIN 50 MG: 50 TABLET, FILM COATED ORAL at 08:16

## 2024-01-01 RX ADMIN — DEXTROSE MONOHYDRATE 25 G: 25 INJECTION, SOLUTION INTRAVENOUS at 12:22

## 2024-01-01 RX ADMIN — ACETAMINOPHEN 650 MG: 325 TABLET ORAL at 08:46

## 2024-01-01 RX ADMIN — ACETAMINOPHEN 1000 MG: 10 INJECTION INTRAVENOUS at 06:07

## 2024-01-01 RX ADMIN — Medication: at 19:28

## 2024-01-01 RX ADMIN — ATORVASTATIN CALCIUM 80 MG: 80 TABLET, FILM COATED ORAL at 21:53

## 2024-01-01 RX ADMIN — IPRATROPIUM BROMIDE AND ALBUTEROL SULFATE 3 ML: 2.5; .5 SOLUTION RESPIRATORY (INHALATION) at 11:56

## 2024-01-01 RX ADMIN — BUDESONIDE INHALATION 0.5 MG: 0.5 SUSPENSION RESPIRATORY (INHALATION) at 07:08

## 2024-01-01 RX ADMIN — CARBIDOPA AND LEVODOPA 1 TABLET: 25; 100 TABLET ORAL at 08:52

## 2024-01-01 RX ADMIN — CYCLOBENZAPRINE 10 MG: 10 TABLET, FILM COATED ORAL at 21:15

## 2024-01-01 RX ADMIN — IPRATROPIUM BROMIDE AND ALBUTEROL SULFATE 3 ML: 2.5; .5 SOLUTION RESPIRATORY (INHALATION) at 20:47

## 2024-01-01 RX ADMIN — CARBIDOPA AND LEVODOPA 1 TABLET: 25; 100 TABLET, EXTENDED RELEASE ORAL at 14:00

## 2024-01-01 RX ADMIN — ACETYLCYSTEINE 600 MG: 200 INHALANT RESPIRATORY (INHALATION) at 07:10

## 2024-01-01 RX ADMIN — SERTRALINE 100 MG: 100 TABLET, FILM COATED ORAL at 11:02

## 2024-01-01 RX ADMIN — AMIODARONE HYDROCHLORIDE 200 MG: 200 TABLET ORAL at 22:49

## 2024-01-01 RX ADMIN — MAGNESIUM SULFATE HEPTAHYDRATE 2 G: 40 INJECTION, SOLUTION INTRAVENOUS at 09:39

## 2024-01-01 RX ADMIN — IPRATROPIUM BROMIDE AND ALBUTEROL SULFATE 3 ML: 2.5; .5 SOLUTION RESPIRATORY (INHALATION) at 19:55

## 2024-01-01 RX ADMIN — CARBIDOPA AND LEVODOPA 1 TABLET: 25; 100 TABLET, EXTENDED RELEASE ORAL at 20:15

## 2024-01-01 RX ADMIN — IPRATROPIUM BROMIDE AND ALBUTEROL SULFATE 3 ML: 2.5; .5 SOLUTION RESPIRATORY (INHALATION) at 08:13

## 2024-01-01 RX ADMIN — AMIODARONE HYDROCHLORIDE 200 MG: 200 TABLET ORAL at 20:40

## 2024-01-01 RX ADMIN — QUETIAPINE FUMARATE 25 MG: 25 TABLET ORAL at 22:49

## 2024-01-01 RX ADMIN — SERTRALINE HYDROCHLORIDE 100 MG: 100 TABLET ORAL at 09:13

## 2024-01-01 RX ADMIN — Medication 100 MG: at 13:52

## 2024-01-01 RX ADMIN — NOREPINEPHRINE BITARTRATE 0.16 MCG/KG/MIN: 8 INJECTION, SOLUTION INTRAVENOUS at 01:16

## 2024-01-01 RX ADMIN — FUROSEMIDE 40 MG: 10 INJECTION, SOLUTION INTRAMUSCULAR; INTRAVENOUS at 01:41

## 2024-01-01 RX ADMIN — Medication 1 TABLET: at 20:40

## 2024-01-01 RX ADMIN — VANCOMYCIN HYDROCHLORIDE 125 MG: 125 CAPSULE ORAL at 10:01

## 2024-01-01 RX ADMIN — CARBIDOPA AND LEVODOPA 1 TABLET: 25; 100 TABLET, EXTENDED RELEASE ORAL at 10:38

## 2024-01-01 RX ADMIN — INSULIN GLARGINE 10 UNITS: 100 INJECTION, SOLUTION SUBCUTANEOUS at 09:00

## 2024-01-01 RX ADMIN — NOREPINEPHRINE BITARTRATE 0.1 MCG/KG/MIN: 8 INJECTION, SOLUTION INTRAVENOUS at 23:39

## 2024-01-01 RX ADMIN — Medication 1 TABLET: at 08:16

## 2024-01-01 RX ADMIN — ACETYLCYSTEINE 200 MG: 200 INHALANT RESPIRATORY (INHALATION) at 19:55

## 2024-01-01 RX ADMIN — IPRATROPIUM BROMIDE AND ALBUTEROL SULFATE 3 ML: .5; 3 SOLUTION RESPIRATORY (INHALATION) at 12:07

## 2024-01-01 RX ADMIN — Medication: at 00:13

## 2024-01-01 RX ADMIN — ACETAMINOPHEN 1000 MG: 10 INJECTION INTRAVENOUS at 17:08

## 2024-01-01 RX ADMIN — CLOPIDOGREL BISULFATE 75 MG: 75 TABLET ORAL at 09:20

## 2024-01-01 RX ADMIN — IPRATROPIUM BROMIDE AND ALBUTEROL SULFATE 3 ML: 2.5; .5 SOLUTION RESPIRATORY (INHALATION) at 15:26

## 2024-01-01 RX ADMIN — SITAGLIPTIN 50 MG: 50 TABLET, FILM COATED ORAL at 09:39

## 2024-01-01 RX ADMIN — DAPAGLIFLOZIN 10 MG: 10 TABLET, FILM COATED ORAL at 08:55

## 2024-01-01 RX ADMIN — CARBIDOPA AND LEVODOPA 1 TABLET: 25; 100 TABLET ORAL at 14:56

## 2024-01-01 RX ADMIN — DAPAGLIFLOZIN 10 MG: 10 TABLET, FILM COATED ORAL at 08:54

## 2024-01-01 RX ADMIN — LEVOTHYROXINE SODIUM 25 MCG: 0.03 TABLET ORAL at 08:17

## 2024-01-01 RX ADMIN — INSULIN LISPRO 2 UNITS: 100 INJECTION, SOLUTION INTRAVENOUS; SUBCUTANEOUS at 12:11

## 2024-01-01 RX ADMIN — NOREPINEPHRINE BITARTRATE 0.41 MCG/KG/MIN: 8 INJECTION, SOLUTION INTRAVENOUS at 07:25

## 2024-01-01 RX ADMIN — MORPHINE SULFATE 2 MG: 2 INJECTION, SOLUTION INTRAMUSCULAR; INTRAVENOUS at 13:18

## 2024-01-01 RX ADMIN — Medication 1 TABLET: at 21:59

## 2024-01-01 RX ADMIN — BUDESONIDE INHALATION 0.5 MG: 0.5 SUSPENSION RESPIRATORY (INHALATION) at 20:46

## 2024-01-01 RX ADMIN — SODIUM CHLORIDE 75 ML/HR: 900 INJECTION, SOLUTION INTRAVENOUS at 10:04

## 2024-01-01 RX ADMIN — Medication 1 TABLET: at 08:46

## 2024-01-01 RX ADMIN — INSULIN LISPRO 2 UNITS: 100 INJECTION, SOLUTION INTRAVENOUS; SUBCUTANEOUS at 12:35

## 2024-01-01 RX ADMIN — LEVOTHYROXINE SODIUM 25 MCG: 25 TABLET ORAL at 06:28

## 2024-01-01 RX ADMIN — CARBIDOPA AND LEVODOPA 1 TABLET: 25; 100 TABLET, EXTENDED RELEASE ORAL at 15:22

## 2024-01-01 RX ADMIN — ACETAMINOPHEN 650 MG: 325 TABLET ORAL at 12:02

## 2024-01-01 RX ADMIN — SITAGLIPTIN 50 MG: 50 TABLET, FILM COATED ORAL at 09:13

## 2024-01-01 RX ADMIN — APIXABAN 5 MG: 5 TABLET, FILM COATED ORAL at 10:00

## 2024-01-01 RX ADMIN — Medication: at 04:14

## 2024-01-01 RX ADMIN — INSULIN LISPRO 6 UNITS: 100 INJECTION, SOLUTION INTRAVENOUS; SUBCUTANEOUS at 08:50

## 2024-01-01 RX ADMIN — IPRATROPIUM BROMIDE AND ALBUTEROL SULFATE 3 ML: 2.5; .5 SOLUTION RESPIRATORY (INHALATION) at 19:59

## 2024-01-01 RX ADMIN — FUROSEMIDE 40 MG: 10 INJECTION, SOLUTION INTRAMUSCULAR; INTRAVENOUS at 20:15

## 2024-01-01 RX ADMIN — INSULIN LISPRO 4 UNITS: 100 INJECTION, SOLUTION INTRAVENOUS; SUBCUTANEOUS at 17:35

## 2024-01-01 RX ADMIN — NICOTINE 1 PATCH: 21 PATCH, EXTENDED RELEASE TRANSDERMAL at 15:54

## 2024-01-01 RX ADMIN — VANCOMYCIN HYDROCHLORIDE 125 MG: 125 CAPSULE ORAL at 17:27

## 2024-01-01 RX ADMIN — ACETAMINOPHEN 650 MG: 325 TABLET ORAL at 08:52

## 2024-01-01 RX ADMIN — DEXTROSE MONOHYDRATE 25 G: 25 INJECTION, SOLUTION INTRAVENOUS at 20:47

## 2024-01-01 RX ADMIN — Medication 1 SPRAY: at 21:00

## 2024-01-01 RX ADMIN — Medication 1 ML: at 11:36

## 2024-01-01 RX ADMIN — ATORVASTATIN CALCIUM 80 MG: 80 TABLET, FILM COATED ORAL at 20:31

## 2024-01-01 RX ADMIN — INSULIN LISPRO 2 UNITS: 100 INJECTION, SOLUTION INTRAVENOUS; SUBCUTANEOUS at 10:11

## 2024-01-01 RX ADMIN — METOPROLOL SUCCINATE 25 MG: 25 TABLET, EXTENDED RELEASE ORAL at 21:16

## 2024-01-01 RX ADMIN — BARIUM SULFATE 20 ML: 400 SUSPENSION ORAL at 15:00

## 2024-01-01 RX ADMIN — METRONIDAZOLE 500 MG: 500 INJECTION, SOLUTION INTRAVENOUS at 10:53

## 2024-01-01 RX ADMIN — INSULIN LISPRO 2 UNITS: 100 INJECTION, SOLUTION INTRAVENOUS; SUBCUTANEOUS at 09:55

## 2024-01-01 RX ADMIN — IPRATROPIUM BROMIDE AND ALBUTEROL SULFATE 3 ML: 2.5; .5 SOLUTION RESPIRATORY (INHALATION) at 20:03

## 2024-01-01 RX ADMIN — SITAGLIPTIN 50 MG: 50 TABLET, FILM COATED ORAL at 09:38

## 2024-01-01 RX ADMIN — CLOPIDOGREL BISULFATE 75 MG: 75 TABLET ORAL at 13:15

## 2024-01-01 RX ADMIN — METRONIDAZOLE 500 MG: 500 INJECTION, SOLUTION INTRAVENOUS at 17:35

## 2024-01-01 RX ADMIN — FUROSEMIDE 40 MG: 10 INJECTION, SOLUTION INTRAMUSCULAR; INTRAVENOUS at 15:54

## 2024-01-01 RX ADMIN — CLOPIDOGREL BISULFATE 75 MG: 75 TABLET ORAL at 10:00

## 2024-01-01 RX ADMIN — LEVOTHYROXINE SODIUM 25 MCG: 0.03 TABLET ORAL at 06:22

## 2024-01-01 RX ADMIN — Medication 1 SPRAY: at 21:50

## 2024-01-01 RX ADMIN — MEROPENEM 1 G: 1 INJECTION, POWDER, FOR SOLUTION INTRAVENOUS at 20:30

## 2024-01-01 RX ADMIN — ATORVASTATIN CALCIUM 80 MG: 80 TABLET, FILM COATED ORAL at 22:25

## 2024-01-01 RX ADMIN — METRONIDAZOLE 500 MG: 500 INJECTION, SOLUTION INTRAVENOUS at 17:32

## 2024-01-01 RX ADMIN — PANTOPRAZOLE SODIUM 40 MG: 40 TABLET, DELAYED RELEASE ORAL at 05:43

## 2024-01-01 RX ADMIN — Medication 1 TABLET: at 10:00

## 2024-01-01 RX ADMIN — ACETAMINOPHEN 650 MG: 325 TABLET ORAL at 21:46

## 2024-01-01 RX ADMIN — AMIODARONE HYDROCHLORIDE 1 MG/MIN: 1.8 INJECTION, SOLUTION INTRAVENOUS at 14:44

## 2024-01-01 RX ADMIN — Medication 1 SPRAY: at 09:00

## 2024-01-01 RX ADMIN — MEROPENEM 1 G: 1 INJECTION, POWDER, FOR SOLUTION INTRAVENOUS at 10:09

## 2024-01-01 RX ADMIN — METOPROLOL SUCCINATE 25 MG: 25 TABLET, EXTENDED RELEASE ORAL at 09:39

## 2024-01-01 RX ADMIN — METRONIDAZOLE 500 MG: 500 INJECTION, SOLUTION INTRAVENOUS at 02:11

## 2024-01-01 RX ADMIN — Medication 1 TABLET: at 11:03

## 2024-01-01 RX ADMIN — INSULIN LISPRO 4 UNITS: 100 INJECTION, SOLUTION INTRAVENOUS; SUBCUTANEOUS at 12:56

## 2024-01-01 RX ADMIN — INSULIN LISPRO 8 UNITS: 100 INJECTION, SOLUTION INTRAVENOUS; SUBCUTANEOUS at 08:17

## 2024-01-01 RX ADMIN — SITAGLIPTIN 50 MG: 50 TABLET, FILM COATED ORAL at 08:55

## 2024-01-01 RX ADMIN — FUROSEMIDE 40 MG: 10 INJECTION, SOLUTION INTRAMUSCULAR; INTRAVENOUS at 18:05

## 2024-01-01 RX ADMIN — PANTOPRAZOLE SODIUM 40 MG: 40 TABLET, DELAYED RELEASE ORAL at 06:28

## 2024-01-01 RX ADMIN — IPRATROPIUM BROMIDE AND ALBUTEROL SULFATE 3 ML: 2.5; .5 SOLUTION RESPIRATORY (INHALATION) at 20:10

## 2024-01-01 RX ADMIN — CLOPIDOGREL BISULFATE 75 MG: 75 TABLET ORAL at 08:53

## 2024-01-01 RX ADMIN — NYSTATIN 1 APPLICATION: 100000 POWDER TOPICAL at 21:24

## 2024-01-01 RX ADMIN — PIPERACILLIN SODIUM AND TAZOBACTAM SODIUM 3.38 G: 3; .375 INJECTION, SOLUTION INTRAVENOUS at 11:26

## 2024-01-01 RX ADMIN — ACETAMINOPHEN 650 MG: 325 TABLET ORAL at 13:54

## 2024-01-01 RX ADMIN — INSULIN LISPRO 0 UNITS: 100 INJECTION, SOLUTION INTRAVENOUS; SUBCUTANEOUS at 19:00

## 2024-01-01 RX ADMIN — DEXTROSE MONOHYDRATE 12.5 G: 25 INJECTION, SOLUTION INTRAVENOUS at 13:46

## 2024-01-01 RX ADMIN — INSULIN GLARGINE 10 UNITS: 100 INJECTION, SOLUTION SUBCUTANEOUS at 09:26

## 2024-01-01 RX ADMIN — BUDESONIDE INHALATION 0.5 MG: 0.5 SUSPENSION RESPIRATORY (INHALATION) at 08:20

## 2024-01-01 RX ADMIN — AMIODARONE HYDROCHLORIDE 200 MG: 200 TABLET ORAL at 09:13

## 2024-01-01 RX ADMIN — ACETAMINOPHEN 650 MG: 325 TABLET ORAL at 21:12

## 2024-01-01 RX ADMIN — VANCOMYCIN HYDROCHLORIDE 125 MG: KIT at 08:54

## 2024-01-01 RX ADMIN — CARBIDOPA AND LEVODOPA 1 TABLET: 25; 100 TABLET, EXTENDED RELEASE ORAL at 21:59

## 2024-01-01 RX ADMIN — IPRATROPIUM BROMIDE AND ALBUTEROL SULFATE 3 ML: 2.5; .5 SOLUTION RESPIRATORY (INHALATION) at 11:50

## 2024-01-01 RX ADMIN — AMIODARONE HYDROCHLORIDE 400 MG: 200 TABLET ORAL at 08:52

## 2024-01-01 RX ADMIN — IPRATROPIUM BROMIDE AND ALBUTEROL SULFATE 3 ML: 2.5; .5 SOLUTION RESPIRATORY (INHALATION) at 07:10

## 2024-01-01 RX ADMIN — CYCLOBENZAPRINE 10 MG: 10 TABLET, FILM COATED ORAL at 20:40

## 2024-01-01 RX ADMIN — MEROPENEM 1 G: 1 INJECTION, POWDER, FOR SOLUTION INTRAVENOUS at 21:49

## 2024-01-01 RX ADMIN — MIRTAZAPINE 15 MG: 15 TABLET, FILM COATED ORAL at 20:16

## 2024-01-01 RX ADMIN — Medication 1 TABLET: at 21:57

## 2024-01-01 RX ADMIN — Medication 1 TABLET: at 21:29

## 2024-01-01 RX ADMIN — VANCOMYCIN HYDROCHLORIDE 125 MG: KIT at 06:22

## 2024-01-01 RX ADMIN — IPRATROPIUM BROMIDE AND ALBUTEROL SULFATE 3 ML: 2.5; .5 SOLUTION RESPIRATORY (INHALATION) at 11:11

## 2024-01-01 RX ADMIN — POTASSIUM CHLORIDE 20 MEQ: 14.9 INJECTION, SOLUTION INTRAVENOUS at 12:25

## 2024-01-01 RX ADMIN — CARBIDOPA AND LEVODOPA 1 TABLET: 25; 100 TABLET, EXTENDED RELEASE ORAL at 14:02

## 2024-01-01 RX ADMIN — CLOPIDOGREL BISULFATE 75 MG: 75 TABLET ORAL at 10:09

## 2024-01-01 RX ADMIN — VANCOMYCIN HYDROCHLORIDE 125 MG: 125 CAPSULE ORAL at 08:17

## 2024-01-01 RX ADMIN — MEROPENEM 1 G: 1 INJECTION, POWDER, FOR SOLUTION INTRAVENOUS at 08:54

## 2024-01-01 RX ADMIN — AMIODARONE HYDROCHLORIDE 200 MG: 200 TABLET ORAL at 21:54

## 2024-01-01 RX ADMIN — CARBIDOPA AND LEVODOPA 1 TABLET: 25; 100 TABLET, EXTENDED RELEASE ORAL at 15:35

## 2024-01-01 RX ADMIN — CLOPIDOGREL BISULFATE 75 MG: 75 TABLET ORAL at 09:13

## 2024-01-01 RX ADMIN — DAPAGLIFLOZIN 10 MG: 10 TABLET, FILM COATED ORAL at 08:46

## 2024-01-01 RX ADMIN — PIPERACILLIN SODIUM AND TAZOBACTAM SODIUM 3.38 G: 3; .375 INJECTION, SOLUTION INTRAVENOUS at 16:39

## 2024-01-01 RX ADMIN — Medication: at 19:55

## 2024-01-01 RX ADMIN — APIXABAN 5 MG: 5 TABLET, FILM COATED ORAL at 17:32

## 2024-01-01 RX ADMIN — GABAPENTIN 200 MG: 100 CAPSULE ORAL at 21:48

## 2024-01-01 RX ADMIN — DOBUTAMINE HYDROCHLORIDE 5 MCG/KG/MIN: 400 INJECTION INTRAVENOUS at 08:53

## 2024-01-01 RX ADMIN — VANCOMYCIN HYDROCHLORIDE 125 MG: KIT at 17:00

## 2024-01-01 RX ADMIN — ACETAMINOPHEN 1000 MG: 10 INJECTION INTRAVENOUS at 00:00

## 2024-01-01 RX ADMIN — CYCLOBENZAPRINE 10 MG: 10 TABLET, FILM COATED ORAL at 21:31

## 2024-01-01 RX ADMIN — CARBIDOPA AND LEVODOPA 1 TABLET: 25; 100 TABLET ORAL at 08:45

## 2024-01-01 RX ADMIN — Medication 1 ML: at 21:50

## 2024-01-01 RX ADMIN — ACETYLCYSTEINE 600 MG: 200 INHALANT RESPIRATORY (INHALATION) at 20:46

## 2024-01-01 RX ADMIN — PANTOPRAZOLE SODIUM 40 MG: 40 TABLET, DELAYED RELEASE ORAL at 06:43

## 2024-01-01 RX ADMIN — APIXABAN 5 MG: 5 TABLET, FILM COATED ORAL at 21:30

## 2024-01-01 RX ADMIN — LEVOTHYROXINE SODIUM 25 MCG: 25 TABLET ORAL at 06:25

## 2024-01-01 RX ADMIN — METOPROLOL SUCCINATE 25 MG: 25 TABLET, EXTENDED RELEASE ORAL at 21:29

## 2024-01-01 RX ADMIN — APIXABAN 5 MG: 5 TABLET, FILM COATED ORAL at 21:16

## 2024-01-01 RX ADMIN — NOREPINEPHRINE BITARTRATE 0.04 MCG/KG/MIN: 8 INJECTION, SOLUTION INTRAVENOUS at 20:50

## 2024-01-01 RX ADMIN — CARBIDOPA AND LEVODOPA 1 TABLET: 25; 100 TABLET, EXTENDED RELEASE ORAL at 09:13

## 2024-01-01 RX ADMIN — CARBIDOPA AND LEVODOPA 1 TABLET: 25; 100 TABLET, EXTENDED RELEASE ORAL at 21:30

## 2024-01-01 RX ADMIN — INSULIN GLARGINE 10 UNITS: 100 INJECTION, SOLUTION SUBCUTANEOUS at 10:03

## 2024-01-01 RX ADMIN — NYSTATIN 1 APPLICATION: 100000 POWDER TOPICAL at 09:51

## 2024-01-01 RX ADMIN — METRONIDAZOLE 500 MG: 500 INJECTION, SOLUTION INTRAVENOUS at 17:24

## 2024-01-01 RX ADMIN — Medication 1 TABLET: at 08:53

## 2024-01-01 RX ADMIN — INSULIN LISPRO 2 UNITS: 100 INJECTION, SOLUTION INTRAVENOUS; SUBCUTANEOUS at 19:30

## 2024-01-01 RX ADMIN — CARBIDOPA AND LEVODOPA 1 TABLET: 25; 100 TABLET, EXTENDED RELEASE ORAL at 10:04

## 2024-01-01 RX ADMIN — CARBIDOPA AND LEVODOPA 1 TABLET: 25; 100 TABLET, EXTENDED RELEASE ORAL at 20:40

## 2024-01-01 RX ADMIN — ACETAMINOPHEN 650 MG: 325 TABLET ORAL at 20:30

## 2024-01-01 RX ADMIN — VANCOMYCIN HYDROCHLORIDE 125 MG: 125 CAPSULE ORAL at 13:58

## 2024-01-01 RX ADMIN — QUETIAPINE FUMARATE 25 MG: 25 TABLET ORAL at 22:25

## 2024-01-01 RX ADMIN — SITAGLIPTIN 50 MG: 50 TABLET, FILM COATED ORAL at 10:00

## 2024-01-01 RX ADMIN — NOREPINEPHRINE BITARTRATE 0.14 MCG/KG/MIN: 8 INJECTION, SOLUTION INTRAVENOUS at 15:35

## 2024-01-01 RX ADMIN — AMIODARONE HYDROCHLORIDE 0.5 MG/MIN: 1.8 INJECTION, SOLUTION INTRAVENOUS at 06:11

## 2024-01-01 RX ADMIN — SITAGLIPTIN 50 MG: 50 TABLET, FILM COATED ORAL at 08:46

## 2024-01-01 RX ADMIN — ACETAMINOPHEN 650 MG: 325 TABLET ORAL at 14:52

## 2024-01-01 RX ADMIN — VANCOMYCIN HYDROCHLORIDE 125 MG: KIT at 16:39

## 2024-01-01 RX ADMIN — QUETIAPINE FUMARATE 25 MG: 25 TABLET ORAL at 20:39

## 2024-01-01 RX ADMIN — QUETIAPINE FUMARATE 25 MG: 25 TABLET ORAL at 21:54

## 2024-01-01 RX ADMIN — METRONIDAZOLE 500 MG: 500 INJECTION, SOLUTION INTRAVENOUS at 09:05

## 2024-01-01 RX ADMIN — IPRATROPIUM BROMIDE AND ALBUTEROL SULFATE 3 ML: 2.5; .5 SOLUTION RESPIRATORY (INHALATION) at 19:41

## 2024-01-01 RX ADMIN — METRONIDAZOLE 500 MG: 500 INJECTION, SOLUTION INTRAVENOUS at 10:03

## 2024-01-01 RX ADMIN — IPRATROPIUM BROMIDE AND ALBUTEROL SULFATE 3 ML: 2.5; .5 SOLUTION RESPIRATORY (INHALATION) at 21:22

## 2024-01-01 RX ADMIN — NYSTATIN 1 APPLICATION: 100000 POWDER TOPICAL at 21:30

## 2024-01-01 RX ADMIN — MIRTAZAPINE 15 MG: 15 TABLET, FILM COATED ORAL at 20:31

## 2024-01-01 RX ADMIN — AMIODARONE HYDROCHLORIDE 200 MG: 200 TABLET ORAL at 10:00

## 2024-01-01 RX ADMIN — VANCOMYCIN HYDROCHLORIDE 125 MG: KIT at 08:11

## 2024-01-01 RX ADMIN — SITAGLIPTIN 50 MG: 50 TABLET, FILM COATED ORAL at 09:56

## 2024-01-01 RX ADMIN — PIPERACILLIN SODIUM AND TAZOBACTAM SODIUM 3.38 G: 3; .375 INJECTION, SOLUTION INTRAVENOUS at 04:04

## 2024-01-01 RX ADMIN — SERTRALINE HYDROCHLORIDE 100 MG: 100 TABLET ORAL at 10:00

## 2024-01-01 RX ADMIN — SERTRALINE HYDROCHLORIDE 100 MG: 100 TABLET ORAL at 09:38

## 2024-01-01 RX ADMIN — AMIODARONE HYDROCHLORIDE 200 MG: 200 TABLET ORAL at 08:16

## 2024-01-01 RX ADMIN — NOREPINEPHRINE BITARTRATE 0.01 MCG/KG/MIN: 8 INJECTION, SOLUTION INTRAVENOUS at 13:23

## 2024-01-01 RX ADMIN — INSULIN LISPRO 4 UNITS: 100 INJECTION, SOLUTION INTRAVENOUS; SUBCUTANEOUS at 01:02

## 2024-01-01 RX ADMIN — APIXABAN 5 MG: 5 TABLET, FILM COATED ORAL at 11:02

## 2024-01-01 RX ADMIN — ATORVASTATIN CALCIUM 80 MG: 80 TABLET, FILM COATED ORAL at 21:12

## 2024-01-01 RX ADMIN — CLOPIDOGREL BISULFATE 75 MG: 75 TABLET ORAL at 10:11

## 2024-01-01 RX ADMIN — PIPERACILLIN SODIUM AND TAZOBACTAM SODIUM: 3; .375 INJECTION, POWDER, LYOPHILIZED, FOR SOLUTION INTRAVENOUS at 00:40

## 2024-01-01 RX ADMIN — ACETAMINOPHEN 650 MG: 325 TABLET ORAL at 15:10

## 2024-01-01 RX ADMIN — INSULIN LISPRO 2 UNITS: 100 INJECTION, SOLUTION INTRAVENOUS; SUBCUTANEOUS at 12:50

## 2024-01-01 RX ADMIN — APIXABAN 5 MG: 5 TABLET, FILM COATED ORAL at 08:47

## 2024-01-01 RX ADMIN — SODIUM CHLORIDE 75 ML/HR: 900 INJECTION, SOLUTION INTRAVENOUS at 23:17

## 2024-01-01 RX ADMIN — CARBIDOPA AND LEVODOPA 1 TABLET: 25; 100 TABLET, EXTENDED RELEASE ORAL at 16:20

## 2024-01-01 RX ADMIN — ATORVASTATIN CALCIUM 80 MG: 80 TABLET, FILM COATED ORAL at 21:15

## 2024-01-01 RX ADMIN — VANCOMYCIN HYDROCHLORIDE 125 MG: 125 CAPSULE ORAL at 13:35

## 2024-01-01 RX ADMIN — PANTOPRAZOLE SODIUM 40 MG: 40 TABLET, DELAYED RELEASE ORAL at 08:17

## 2024-01-01 RX ADMIN — Medication: at 07:00

## 2024-01-01 RX ADMIN — MEROPENEM 1 G: 1 INJECTION, POWDER, FOR SOLUTION INTRAVENOUS at 20:14

## 2024-01-01 RX ADMIN — IPRATROPIUM BROMIDE AND ALBUTEROL SULFATE 3 ML: 2.5; .5 SOLUTION RESPIRATORY (INHALATION) at 08:39

## 2024-01-01 RX ADMIN — QUETIAPINE FUMARATE 25 MG: 25 TABLET ORAL at 21:15

## 2024-01-01 RX ADMIN — MIRTAZAPINE 15 MG: 15 TABLET, FILM COATED ORAL at 22:25

## 2024-01-01 RX ADMIN — INSULIN LISPRO 2 UNITS: 100 INJECTION, SOLUTION INTRAVENOUS; SUBCUTANEOUS at 12:48

## 2024-01-01 RX ADMIN — APIXABAN 5 MG: 5 TABLET, FILM COATED ORAL at 20:40

## 2024-01-01 RX ADMIN — INSULIN GLARGINE 10 UNITS: 100 INJECTION, SOLUTION SUBCUTANEOUS at 08:53

## 2024-01-01 RX ADMIN — BUDESONIDE INHALATION 0.5 MG: 0.5 SUSPENSION RESPIRATORY (INHALATION) at 07:52

## 2024-01-01 RX ADMIN — LEVOTHYROXINE SODIUM 25 MCG: 0.03 TABLET ORAL at 06:32

## 2024-01-01 RX ADMIN — APIXABAN 5 MG: 5 TABLET, FILM COATED ORAL at 21:51

## 2024-01-01 RX ADMIN — INSULIN GLARGINE 10 UNITS: 100 INJECTION, SOLUTION SUBCUTANEOUS at 09:36

## 2024-01-01 RX ADMIN — DEXTROSE MONOHYDRATE 25 G: 25 INJECTION, SOLUTION INTRAVENOUS at 07:51

## 2024-01-01 RX ADMIN — AMIODARONE HYDROCHLORIDE 200 MG: 200 TABLET ORAL at 21:59

## 2024-01-01 RX ADMIN — SERTRALINE HYDROCHLORIDE 100 MG: 100 TABLET ORAL at 09:56

## 2024-01-01 RX ADMIN — MEROPENEM 1 G: 1 INJECTION, POWDER, FOR SOLUTION INTRAVENOUS at 09:43

## 2024-01-01 RX ADMIN — APIXABAN 5 MG: 5 TABLET, FILM COATED ORAL at 09:38

## 2024-01-01 RX ADMIN — ACETAMINOPHEN 650 MG: 325 TABLET ORAL at 21:53

## 2024-01-01 RX ADMIN — DAPAGLIFLOZIN 10 MG: 10 TABLET, FILM COATED ORAL at 10:10

## 2024-01-01 RX ADMIN — Medication 1 TABLET: at 21:31

## 2024-01-01 RX ADMIN — BUDESONIDE INHALATION 0.5 MG: 0.5 SUSPENSION RESPIRATORY (INHALATION) at 08:14

## 2024-01-01 RX ADMIN — AMIODARONE HYDROCHLORIDE 200 MG: 200 TABLET ORAL at 21:16

## 2024-01-01 RX ADMIN — METRONIDAZOLE 500 MG: 500 INJECTION, SOLUTION INTRAVENOUS at 17:40

## 2024-01-01 RX ADMIN — AMIODARONE HYDROCHLORIDE 400 MG: 200 TABLET ORAL at 20:15

## 2024-01-01 RX ADMIN — APIXABAN 5 MG: 5 TABLET, FILM COATED ORAL at 21:58

## 2024-01-01 RX ADMIN — CYCLOBENZAPRINE 10 MG: 10 TABLET, FILM COATED ORAL at 20:31

## 2024-01-01 RX ADMIN — CYCLOBENZAPRINE 10 MG: 10 TABLET, FILM COATED ORAL at 22:25

## 2024-01-01 RX ADMIN — PIPERACILLIN SODIUM AND TAZOBACTAM SODIUM 4.5 G: 4; .5 INJECTION, SOLUTION INTRAVENOUS at 11:45

## 2024-01-01 RX ADMIN — APIXABAN 5 MG: 5 TABLET, FILM COATED ORAL at 09:56

## 2024-01-01 RX ADMIN — INSULIN LISPRO 2 UNITS: 100 INJECTION, SOLUTION INTRAVENOUS; SUBCUTANEOUS at 09:52

## 2024-01-01 RX ADMIN — SERTRALINE HYDROCHLORIDE 100 MG: 100 TABLET ORAL at 10:09

## 2024-01-01 RX ADMIN — FUROSEMIDE 40 MG: 10 INJECTION, SOLUTION INTRAMUSCULAR; INTRAVENOUS at 16:30

## 2024-01-01 RX ADMIN — SERTRALINE HYDROCHLORIDE 100 MG: 100 TABLET ORAL at 08:16

## 2024-01-01 RX ADMIN — AMIODARONE HYDROCHLORIDE 1 MG/MIN: 1.8 INJECTION, SOLUTION INTRAVENOUS at 14:45

## 2024-01-01 RX ADMIN — BARIUM SULFATE 20 ML: 0.81 POWDER, FOR SUSPENSION ORAL at 15:00

## 2024-01-01 RX ADMIN — BUDESONIDE INHALATION 0.5 MG: 0.5 SUSPENSION RESPIRATORY (INHALATION) at 07:51

## 2024-01-01 RX ADMIN — ATORVASTATIN CALCIUM 80 MG: 80 TABLET, FILM COATED ORAL at 21:56

## 2024-01-01 RX ADMIN — Medication 1 PATCH: at 15:54

## 2024-01-01 RX ADMIN — POTASSIUM CHLORIDE 20 MEQ: 14.9 INJECTION, SOLUTION INTRAVENOUS at 15:36

## 2024-01-01 RX ADMIN — METRONIDAZOLE 500 MG: 500 INJECTION, SOLUTION INTRAVENOUS at 02:43

## 2024-01-01 RX ADMIN — Medication 1 TABLET: at 09:13

## 2024-01-01 RX ADMIN — FUROSEMIDE 40 MG: 10 INJECTION, SOLUTION INTRAMUSCULAR; INTRAVENOUS at 08:52

## 2024-01-01 RX ADMIN — BUDESONIDE INHALATION 0.5 MG: 0.5 SUSPENSION RESPIRATORY (INHALATION) at 19:48

## 2024-01-01 RX ADMIN — ACETAMINOPHEN 650 MG: 325 TABLET ORAL at 08:53

## 2024-01-01 RX ADMIN — LEVOTHYROXINE SODIUM 25 MCG: 0.03 TABLET ORAL at 08:10

## 2024-01-01 RX ADMIN — CARBIDOPA AND LEVODOPA 1 TABLET: 25; 100 TABLET, EXTENDED RELEASE ORAL at 11:02

## 2024-01-01 RX ADMIN — ATORVASTATIN CALCIUM 80 MG: 80 TABLET, FILM COATED ORAL at 20:15

## 2024-01-01 RX ADMIN — MIRTAZAPINE 15 MG: 15 TABLET, FILM COATED ORAL at 21:12

## 2024-01-01 RX ADMIN — Medication 1 SPRAY: at 10:11

## 2024-01-01 RX ADMIN — METOPROLOL SUCCINATE 25 MG: 25 TABLET, EXTENDED RELEASE ORAL at 21:56

## 2024-01-01 RX ADMIN — Medication 1 TABLET: at 21:54

## 2024-01-01 RX ADMIN — CARBIDOPA AND LEVODOPA 1 TABLET: 25; 100 TABLET, EXTENDED RELEASE ORAL at 10:09

## 2024-01-01 RX ADMIN — BUDESONIDE INHALATION 0.5 MG: 0.5 SUSPENSION RESPIRATORY (INHALATION) at 21:19

## 2024-01-01 RX ADMIN — DOBUTAMINE HYDROCHLORIDE 5 MCG/KG/MIN: 400 INJECTION INTRAVENOUS at 09:31

## 2024-01-01 RX ADMIN — CARBIDOPA AND LEVODOPA 1 TABLET: 25; 100 TABLET, EXTENDED RELEASE ORAL at 17:56

## 2024-01-01 RX ADMIN — METRONIDAZOLE 500 MG: 500 INJECTION, SOLUTION INTRAVENOUS at 01:03

## 2024-01-01 RX ADMIN — DEXTROSE MONOHYDRATE 25 ML: 25 INJECTION, SOLUTION INTRAVENOUS at 06:00

## 2024-01-01 RX ADMIN — BUDESONIDE INHALATION 0.5 MG: 0.5 SUSPENSION RESPIRATORY (INHALATION) at 07:54

## 2024-01-01 RX ADMIN — INSULIN LISPRO 2 UNITS: 100 INJECTION, SOLUTION INTRAVENOUS; SUBCUTANEOUS at 12:58

## 2024-01-01 RX ADMIN — CYCLOBENZAPRINE 10 MG: 10 TABLET, FILM COATED ORAL at 21:29

## 2024-01-01 RX ADMIN — ACETYLCYSTEINE 600 MG: 200 INHALANT RESPIRATORY (INHALATION) at 07:11

## 2024-01-01 RX ADMIN — NYSTATIN 1 APPLICATION: 100000 POWDER TOPICAL at 21:50

## 2024-01-01 RX ADMIN — AMIODARONE HYDROCHLORIDE 200 MG: 200 TABLET ORAL at 09:40

## 2024-01-01 RX ADMIN — NOREPINEPHRINE BITARTRATE 0.02 MCG/KG/MIN: 8 INJECTION, SOLUTION INTRAVENOUS at 17:37

## 2024-01-01 RX ADMIN — INSULIN LISPRO 6 UNITS: 100 INJECTION, SOLUTION INTRAVENOUS; SUBCUTANEOUS at 00:52

## 2024-01-01 RX ADMIN — CLOPIDOGREL BISULFATE 75 MG: 75 TABLET ORAL at 09:56

## 2024-01-01 RX ADMIN — INSULIN GLARGINE 10 UNITS: 100 INJECTION, SOLUTION SUBCUTANEOUS at 10:11

## 2024-01-01 RX ADMIN — CARBIDOPA AND LEVODOPA 1 TABLET: 25; 100 TABLET, EXTENDED RELEASE ORAL at 15:33

## 2024-01-01 RX ADMIN — QUETIAPINE FUMARATE 25 MG: 25 TABLET ORAL at 20:31

## 2024-01-01 RX ADMIN — AMIODARONE HYDROCHLORIDE 200 MG: 200 TABLET ORAL at 22:25

## 2024-01-01 RX ADMIN — IPRATROPIUM BROMIDE AND ALBUTEROL SULFATE 3 ML: 2.5; .5 SOLUTION RESPIRATORY (INHALATION) at 10:45

## 2024-01-01 RX ADMIN — Medication 1 TABLET: at 10:10

## 2024-01-01 RX ADMIN — CARBIDOPA AND LEVODOPA 1 TABLET: 25; 100 TABLET, EXTENDED RELEASE ORAL at 21:15

## 2024-01-01 RX ADMIN — PANTOPRAZOLE SODIUM 40 MG: 40 TABLET, DELAYED RELEASE ORAL at 06:00

## 2024-01-01 RX ADMIN — NYSTATIN 1 APPLICATION: 100000 POWDER TOPICAL at 09:30

## 2024-01-01 RX ADMIN — CARBIDOPA AND LEVODOPA 1 TABLET: 25; 100 TABLET, EXTENDED RELEASE ORAL at 22:25

## 2024-01-01 RX ADMIN — VANCOMYCIN HYDROCHLORIDE 125 MG: 125 CAPSULE ORAL at 09:38

## 2024-01-01 RX ADMIN — IPRATROPIUM BROMIDE AND ALBUTEROL SULFATE 3 ML: 2.5; .5 SOLUTION RESPIRATORY (INHALATION) at 11:34

## 2024-01-01 RX ADMIN — IPRATROPIUM BROMIDE AND ALBUTEROL SULFATE 3 ML: 2.5; .5 SOLUTION RESPIRATORY (INHALATION) at 12:25

## 2024-01-01 RX ADMIN — AMIODARONE HYDROCHLORIDE 400 MG: 200 TABLET ORAL at 08:53

## 2024-01-01 RX ADMIN — Medication 1 TABLET: at 10:09

## 2024-01-01 RX ADMIN — IPRATROPIUM BROMIDE AND ALBUTEROL SULFATE 3 ML: 2.5; .5 SOLUTION RESPIRATORY (INHALATION) at 08:20

## 2024-01-01 RX ADMIN — BUDESONIDE INHALATION 0.5 MG: 0.5 SUSPENSION RESPIRATORY (INHALATION) at 19:28

## 2024-01-01 RX ADMIN — IPRATROPIUM BROMIDE AND ALBUTEROL SULFATE 3 ML: 2.5; .5 SOLUTION RESPIRATORY (INHALATION) at 20:54

## 2024-01-01 RX ADMIN — METRONIDAZOLE 500 MG: 500 INJECTION, SOLUTION INTRAVENOUS at 02:04

## 2024-01-01 RX ADMIN — IPRATROPIUM BROMIDE AND ALBUTEROL SULFATE 3 ML: 2.5; .5 SOLUTION RESPIRATORY (INHALATION) at 11:43

## 2024-01-01 RX ADMIN — CLOPIDOGREL BISULFATE 75 MG: 75 TABLET ORAL at 09:39

## 2024-01-01 RX ADMIN — SODIUM CHLORIDE 250 ML: 9 INJECTION, SOLUTION INTRAVENOUS at 19:00

## 2024-01-01 RX ADMIN — AMIODARONE HYDROCHLORIDE 200 MG: 200 TABLET ORAL at 21:29

## 2024-01-01 RX ADMIN — AMIODARONE HYDROCHLORIDE 400 MG: 200 TABLET ORAL at 21:51

## 2024-01-01 RX ADMIN — ATORVASTATIN CALCIUM 80 MG: 80 TABLET, FILM COATED ORAL at 21:59

## 2024-01-01 RX ADMIN — INSULIN GLARGINE 10 UNITS: 100 INJECTION, SOLUTION SUBCUTANEOUS at 09:56

## 2024-01-01 RX ADMIN — MEROPENEM 1 G: 1 INJECTION, POWDER, FOR SOLUTION INTRAVENOUS at 10:18

## 2024-01-01 RX ADMIN — VANCOMYCIN HYDROCHLORIDE 125 MG: KIT at 12:50

## 2024-01-01 RX ADMIN — APIXABAN 5 MG: 5 TABLET, FILM COATED ORAL at 20:15

## 2024-01-01 RX ADMIN — ATORVASTATIN CALCIUM 80 MG: 80 TABLET, FILM COATED ORAL at 21:47

## 2024-01-01 RX ADMIN — Medication 1 ML: at 21:30

## 2024-01-01 RX ADMIN — LEVOTHYROXINE SODIUM 25 MCG: 25 TABLET ORAL at 06:30

## 2024-01-01 RX ADMIN — Medication 5 MG: at 21:16

## 2024-01-01 RX ADMIN — ACETYLCYSTEINE 600 MG: 200 INHALANT RESPIRATORY (INHALATION) at 07:52

## 2024-01-01 RX ADMIN — ATORVASTATIN CALCIUM 80 MG: 80 TABLET, FILM COATED ORAL at 21:31

## 2024-01-01 RX ADMIN — PIPERACILLIN SODIUM AND TAZOBACTAM SODIUM 3.38 G: 3; .375 INJECTION, SOLUTION INTRAVENOUS at 11:03

## 2024-01-01 RX ADMIN — IPRATROPIUM BROMIDE AND ALBUTEROL SULFATE 3 ML: 2.5; .5 SOLUTION RESPIRATORY (INHALATION) at 07:52

## 2024-01-01 RX ADMIN — METOPROLOL SUCCINATE 25 MG: 25 TABLET, EXTENDED RELEASE ORAL at 09:38

## 2024-01-01 RX ADMIN — VANCOMYCIN HYDROCHLORIDE 125 MG: 125 CAPSULE ORAL at 17:32

## 2024-01-01 RX ADMIN — IPRATROPIUM BROMIDE AND ALBUTEROL SULFATE 3 ML: 2.5; .5 SOLUTION RESPIRATORY (INHALATION) at 07:51

## 2024-01-01 RX ADMIN — VANCOMYCIN HYDROCHLORIDE 125 MG: 125 CAPSULE ORAL at 17:35

## 2024-01-01 RX ADMIN — APIXABAN 5 MG: 5 TABLET, FILM COATED ORAL at 21:31

## 2024-01-01 RX ADMIN — ACETYLCYSTEINE 600 MG: 200 INHALANT RESPIRATORY (INHALATION) at 07:18

## 2024-01-01 RX ADMIN — CYCLOBENZAPRINE 10 MG: 10 TABLET, FILM COATED ORAL at 21:54

## 2024-01-01 RX ADMIN — VANCOMYCIN HYDROCHLORIDE 1 G: 1 INJECTION, SOLUTION INTRAVENOUS at 13:45

## 2024-01-01 RX ADMIN — BUDESONIDE INHALATION 0.5 MG: 0.5 SUSPENSION RESPIRATORY (INHALATION) at 08:39

## 2024-01-01 RX ADMIN — IPRATROPIUM BROMIDE AND ALBUTEROL SULFATE 3 ML: 2.5; .5 SOLUTION RESPIRATORY (INHALATION) at 20:41

## 2024-01-01 SDOH — ECONOMIC STABILITY: INCOME INSECURITY: IN THE PAST 12 MONTHS, HAS THE ELECTRIC, GAS, OIL, OR WATER COMPANY THREATENED TO SHUT OFF SERVICE IN YOUR HOME?: NO

## 2024-01-01 SDOH — HEALTH STABILITY: MENTAL HEALTH
STRESS IS WHEN SOMEONE FEELS TENSE, NERVOUS, ANXIOUS, OR CAN'T SLEEP AT NIGHT BECAUSE THEIR MIND IS TROUBLED. HOW STRESSED ARE YOU?: ONLY A LITTLE

## 2024-01-01 SDOH — SOCIAL STABILITY: SOCIAL NETWORK
DO YOU BELONG TO ANY CLUBS OR ORGANIZATIONS SUCH AS CHURCH GROUPS UNIONS, FRATERNAL OR ATHLETIC GROUPS, OR SCHOOL GROUPS?: NO

## 2024-01-01 SDOH — ECONOMIC STABILITY: FOOD INSECURITY: WITHIN THE PAST 12 MONTHS, THE FOOD YOU BOUGHT JUST DIDN'T LAST AND YOU DIDN'T HAVE MONEY TO GET MORE.: NEVER TRUE

## 2024-01-01 SDOH — SOCIAL STABILITY: SOCIAL INSECURITY: DO YOU FEEL UNSAFE GOING BACK TO THE PLACE WHERE YOU ARE LIVING?: NO

## 2024-01-01 SDOH — SOCIAL STABILITY: SOCIAL INSECURITY: WITHIN THE LAST YEAR, HAVE YOU BEEN AFRAID OF YOUR PARTNER OR EX-PARTNER?: NO

## 2024-01-01 SDOH — SOCIAL STABILITY: SOCIAL INSECURITY: ARE THERE ANY APPARENT SIGNS OF INJURIES/BEHAVIORS THAT COULD BE RELATED TO ABUSE/NEGLECT?: NO

## 2024-01-01 SDOH — SOCIAL STABILITY: SOCIAL NETWORK: HOW OFTEN DO YOU ATTEND CHURCH OR RELIGIOUS SERVICES?: NEVER

## 2024-01-01 SDOH — SOCIAL STABILITY: SOCIAL INSECURITY: HAVE YOU HAD THOUGHTS OF HARMING ANYONE ELSE?: NO

## 2024-01-01 SDOH — ECONOMIC STABILITY: FOOD INSECURITY: WITHIN THE PAST 12 MONTHS, YOU WORRIED THAT YOUR FOOD WOULD RUN OUT BEFORE YOU GOT MONEY TO BUY MORE.: NEVER TRUE

## 2024-01-01 SDOH — SOCIAL STABILITY: SOCIAL INSECURITY: HAS ANYONE EVER THREATENED TO HURT YOUR FAMILY OR YOUR PETS?: NO

## 2024-01-01 SDOH — SOCIAL STABILITY: SOCIAL INSECURITY: ARE YOU OR HAVE YOU BEEN THREATENED OR ABUSED PHYSICALLY, EMOTIONALLY, OR SEXUALLY BY ANYONE?: NO

## 2024-01-01 SDOH — SOCIAL STABILITY: SOCIAL INSECURITY: WITHIN THE LAST YEAR, HAVE YOU BEEN HUMILIATED OR EMOTIONALLY ABUSED IN OTHER WAYS BY YOUR PARTNER OR EX-PARTNER?: NO

## 2024-01-01 SDOH — SOCIAL STABILITY: SOCIAL NETWORK: IN A TYPICAL WEEK, HOW MANY TIMES DO YOU TALK ON THE PHONE WITH FAMILY, FRIENDS, OR NEIGHBORS?: TWICE A WEEK

## 2024-01-01 SDOH — HEALTH STABILITY: PHYSICAL HEALTH: ON AVERAGE, HOW MANY MINUTES DO YOU ENGAGE IN EXERCISE AT THIS LEVEL?: 0 MIN

## 2024-01-01 SDOH — HEALTH STABILITY: PHYSICAL HEALTH: ON AVERAGE, HOW MANY DAYS PER WEEK DO YOU ENGAGE IN MODERATE TO STRENUOUS EXERCISE (LIKE A BRISK WALK)?: 0 DAYS

## 2024-01-01 SDOH — SOCIAL STABILITY: SOCIAL NETWORK: HOW OFTEN DO YOU ATTENT MEETINGS OF THE CLUB OR ORGANIZATION YOU BELONG TO?: NEVER

## 2024-01-01 SDOH — SOCIAL STABILITY: SOCIAL INSECURITY: WERE YOU ABLE TO COMPLETE ALL THE BEHAVIORAL HEALTH SCREENINGS?: YES

## 2024-01-01 SDOH — SOCIAL STABILITY: SOCIAL INSECURITY: DOES ANYONE TRY TO KEEP YOU FROM HAVING/CONTACTING OTHER FRIENDS OR DOING THINGS OUTSIDE YOUR HOME?: NO

## 2024-01-01 SDOH — SOCIAL STABILITY: SOCIAL INSECURITY: ABUSE: ADULT

## 2024-01-01 SDOH — SOCIAL STABILITY: SOCIAL NETWORK: ARE YOU MARRIED, WIDOWED, DIVORCED, SEPARATED, NEVER MARRIED, OR LIVING WITH A PARTNER?: MARRIED

## 2024-01-01 SDOH — SOCIAL STABILITY: SOCIAL NETWORK: HOW OFTEN DO YOU GET TOGETHER WITH FRIENDS OR RELATIVES?: TWICE A WEEK

## 2024-01-01 SDOH — SOCIAL STABILITY: SOCIAL INSECURITY: DO YOU FEEL ANYONE HAS EXPLOITED OR TAKEN ADVANTAGE OF YOU FINANCIALLY OR OF YOUR PERSONAL PROPERTY?: NO

## 2024-01-01 ASSESSMENT — PAIN SCALES - GENERAL
PAINLEVEL_OUTOF10: 0 - NO PAIN
PAINLEVEL_OUTOF10: 3
PAINLEVEL_OUTOF10: 0 - NO PAIN
PAINLEVEL_OUTOF10: 4
PAINLEVEL_OUTOF10: 0 - NO PAIN
PAINLEVEL_OUTOF10: 2
PAINLEVEL_OUTOF10: 0 - NO PAIN
PAINLEVEL_OUTOF10: 4
PAINLEVEL_OUTOF10: 0 - NO PAIN
PAINLEVEL_OUTOF10: 4
PAINLEVEL_OUTOF10: 5 - MODERATE PAIN
PAINLEVEL_OUTOF10: 5 - MODERATE PAIN
PAINLEVEL_OUTOF10: 0 - NO PAIN
PAINLEVEL_OUTOF10: 5 - MODERATE PAIN
PAINLEVEL_OUTOF10: 0 - NO PAIN
PAINLEVEL_OUTOF10: 0 - NO PAIN
PAINLEVEL_OUTOF10: 2
PAINLEVEL_OUTOF10: 3
PAINLEVEL_OUTOF10: 0 - NO PAIN
PAINLEVEL_OUTOF10: 0 - NO PAIN
PAINLEVEL_OUTOF10: 6
PAINLEVEL_OUTOF10: 3
PAINLEVEL_OUTOF10: 0 - NO PAIN
PAINLEVEL_OUTOF10: 3
PAINLEVEL_OUTOF10: 3
PAINLEVEL_OUTOF10: 0 - NO PAIN
PAINLEVEL_OUTOF10: 3
PAINLEVEL_OUTOF10: 0 - NO PAIN
PAINLEVEL_OUTOF10: 2
PAINLEVEL_OUTOF10: 0 - NO PAIN
PAINLEVEL_OUTOF10: 7
PAINLEVEL_OUTOF10: 0 - NO PAIN
PAINLEVEL_OUTOF10: 3
PAINLEVEL_OUTOF10: 0 - NO PAIN
PAINLEVEL_OUTOF10: 3
PAINLEVEL_OUTOF10: 0 - NO PAIN
PAINLEVEL_OUTOF10: 3
PAINLEVEL_OUTOF10: 0 - NO PAIN

## 2024-01-01 ASSESSMENT — COGNITIVE AND FUNCTIONAL STATUS - GENERAL
DRESSING REGULAR LOWER BODY CLOTHING: TOTAL
WALKING IN HOSPITAL ROOM: TOTAL
MOBILITY SCORE: 6
EATING MEALS: A LOT
DRESSING REGULAR UPPER BODY CLOTHING: TOTAL
CLIMB 3 TO 5 STEPS WITH RAILING: TOTAL
MOVING FROM LYING ON BACK TO SITTING ON SIDE OF FLAT BED WITH BEDRAILS: TOTAL
CLIMB 3 TO 5 STEPS WITH RAILING: TOTAL
MOVING FROM LYING ON BACK TO SITTING ON SIDE OF FLAT BED WITH BEDRAILS: TOTAL
CLIMB 3 TO 5 STEPS WITH RAILING: TOTAL
TURNING FROM BACK TO SIDE WHILE IN FLAT BAD: TOTAL
TOILETING: TOTAL
TOILETING: TOTAL
STANDING UP FROM CHAIR USING ARMS: TOTAL
TOILETING: TOTAL
DAILY ACTIVITIY SCORE: 8
CLIMB 3 TO 5 STEPS WITH RAILING: TOTAL
TURNING FROM BACK TO SIDE WHILE IN FLAT BAD: A LOT
HELP NEEDED FOR BATHING: TOTAL
DRESSING REGULAR LOWER BODY CLOTHING: TOTAL
STANDING UP FROM CHAIR USING ARMS: TOTAL
DAILY ACTIVITIY SCORE: 6
MOVING TO AND FROM BED TO CHAIR: TOTAL
TOILETING: TOTAL
MOBILITY SCORE: 6
STANDING UP FROM CHAIR USING ARMS: TOTAL
DRESSING REGULAR UPPER BODY CLOTHING: A LOT
HELP NEEDED FOR BATHING: TOTAL
TURNING FROM BACK TO SIDE WHILE IN FLAT BAD: TOTAL
TURNING FROM BACK TO SIDE WHILE IN FLAT BAD: TOTAL
STANDING UP FROM CHAIR USING ARMS: TOTAL
DAILY ACTIVITIY SCORE: 8
TOILETING: TOTAL
DRESSING REGULAR LOWER BODY CLOTHING: TOTAL
PATIENT BASELINE BEDBOUND: YES
PERSONAL GROOMING: TOTAL
EATING MEALS: A LITTLE
PERSONAL GROOMING: TOTAL
TURNING FROM BACK TO SIDE WHILE IN FLAT BAD: TOTAL
DRESSING REGULAR UPPER BODY CLOTHING: TOTAL
CLIMB 3 TO 5 STEPS WITH RAILING: TOTAL
STANDING UP FROM CHAIR USING ARMS: TOTAL
WALKING IN HOSPITAL ROOM: TOTAL
EATING MEALS: TOTAL
TURNING FROM BACK TO SIDE WHILE IN FLAT BAD: TOTAL
PERSONAL GROOMING: A LOT
STANDING UP FROM CHAIR USING ARMS: TOTAL
TURNING FROM BACK TO SIDE WHILE IN FLAT BAD: TOTAL
MOVING FROM LYING ON BACK TO SITTING ON SIDE OF FLAT BED WITH BEDRAILS: A LOT
MOBILITY SCORE: 7
MOVING TO AND FROM BED TO CHAIR: TOTAL
CLIMB 3 TO 5 STEPS WITH RAILING: TOTAL
EATING MEALS: A LITTLE
CLIMB 3 TO 5 STEPS WITH RAILING: TOTAL
TURNING FROM BACK TO SIDE WHILE IN FLAT BAD: TOTAL
TURNING FROM BACK TO SIDE WHILE IN FLAT BAD: A LOT
WALKING IN HOSPITAL ROOM: TOTAL
STANDING UP FROM CHAIR USING ARMS: TOTAL
DRESSING REGULAR LOWER BODY CLOTHING: TOTAL
TURNING FROM BACK TO SIDE WHILE IN FLAT BAD: A LOT
WALKING IN HOSPITAL ROOM: TOTAL
MOBILITY SCORE: 6
TOILETING: TOTAL
EATING MEALS: A LITTLE
MOVING FROM LYING ON BACK TO SITTING ON SIDE OF FLAT BED WITH BEDRAILS: A LOT
PERSONAL GROOMING: A LOT
CLIMB 3 TO 5 STEPS WITH RAILING: TOTAL
EATING MEALS: A LOT
STANDING UP FROM CHAIR USING ARMS: TOTAL
MOVING TO AND FROM BED TO CHAIR: TOTAL
DRESSING REGULAR UPPER BODY CLOTHING: TOTAL
TURNING FROM BACK TO SIDE WHILE IN FLAT BAD: TOTAL
MOVING TO AND FROM BED TO CHAIR: TOTAL
WALKING IN HOSPITAL ROOM: TOTAL
MOBILITY SCORE: 6
DRESSING REGULAR UPPER BODY CLOTHING: TOTAL
DRESSING REGULAR LOWER BODY CLOTHING: TOTAL
TURNING FROM BACK TO SIDE WHILE IN FLAT BAD: A LOT
DAILY ACTIVITIY SCORE: 8
DRESSING REGULAR LOWER BODY CLOTHING: TOTAL
WALKING IN HOSPITAL ROOM: TOTAL
MOVING TO AND FROM BED TO CHAIR: TOTAL
PERSONAL GROOMING: TOTAL
MOVING FROM LYING ON BACK TO SITTING ON SIDE OF FLAT BED WITH BEDRAILS: TOTAL
EATING MEALS: A LOT
STANDING UP FROM CHAIR USING ARMS: TOTAL
HELP NEEDED FOR BATHING: TOTAL
MOVING TO AND FROM BED TO CHAIR: TOTAL
HELP NEEDED FOR BATHING: TOTAL
MOVING TO AND FROM BED TO CHAIR: TOTAL
WALKING IN HOSPITAL ROOM: TOTAL
EATING MEALS: A LOT
DRESSING REGULAR UPPER BODY CLOTHING: TOTAL
TURNING FROM BACK TO SIDE WHILE IN FLAT BAD: TOTAL
MOVING FROM LYING ON BACK TO SITTING ON SIDE OF FLAT BED WITH BEDRAILS: A LOT
DRESSING REGULAR LOWER BODY CLOTHING: TOTAL
DRESSING REGULAR UPPER BODY CLOTHING: TOTAL
EATING MEALS: TOTAL
STANDING UP FROM CHAIR USING ARMS: TOTAL
MOVING TO AND FROM BED TO CHAIR: TOTAL
EATING MEALS: A LOT
HELP NEEDED FOR BATHING: TOTAL
HELP NEEDED FOR BATHING: TOTAL
EATING MEALS: A LOT
TOILETING: TOTAL
DRESSING REGULAR LOWER BODY CLOTHING: TOTAL
PERSONAL GROOMING: A LOT
MOVING FROM LYING ON BACK TO SITTING ON SIDE OF FLAT BED WITH BEDRAILS: TOTAL
EATING MEALS: A LOT
TOILETING: TOTAL
PERSONAL GROOMING: TOTAL
MOBILITY SCORE: 6
EATING MEALS: TOTAL
MOVING FROM LYING ON BACK TO SITTING ON SIDE OF FLAT BED WITH BEDRAILS: A LOT
MOVING FROM LYING ON BACK TO SITTING ON SIDE OF FLAT BED WITH BEDRAILS: TOTAL
CLIMB 3 TO 5 STEPS WITH RAILING: TOTAL
TURNING FROM BACK TO SIDE WHILE IN FLAT BAD: TOTAL
WALKING IN HOSPITAL ROOM: TOTAL
CLIMB 3 TO 5 STEPS WITH RAILING: TOTAL
TURNING FROM BACK TO SIDE WHILE IN FLAT BAD: TOTAL
DAILY ACTIVITIY SCORE: 8
WALKING IN HOSPITAL ROOM: TOTAL
STANDING UP FROM CHAIR USING ARMS: TOTAL
DAILY ACTIVITIY SCORE: 8
DRESSING REGULAR LOWER BODY CLOTHING: TOTAL
WALKING IN HOSPITAL ROOM: TOTAL
MOVING FROM LYING ON BACK TO SITTING ON SIDE OF FLAT BED WITH BEDRAILS: A LOT
MOVING FROM LYING ON BACK TO SITTING ON SIDE OF FLAT BED WITH BEDRAILS: TOTAL
TURNING FROM BACK TO SIDE WHILE IN FLAT BAD: TOTAL
HELP NEEDED FOR BATHING: TOTAL
TOILETING: TOTAL
MOVING FROM LYING ON BACK TO SITTING ON SIDE OF FLAT BED WITH BEDRAILS: A LOT
MOBILITY SCORE: 8
CLIMB 3 TO 5 STEPS WITH RAILING: TOTAL
TOILETING: TOTAL
MOVING FROM LYING ON BACK TO SITTING ON SIDE OF FLAT BED WITH BEDRAILS: TOTAL
MOBILITY SCORE: 8
STANDING UP FROM CHAIR USING ARMS: TOTAL
STANDING UP FROM CHAIR USING ARMS: TOTAL
PERSONAL GROOMING: A LOT
MOVING TO AND FROM BED TO CHAIR: TOTAL
WALKING IN HOSPITAL ROOM: TOTAL
DAILY ACTIVITIY SCORE: 8
DRESSING REGULAR LOWER BODY CLOTHING: TOTAL
STANDING UP FROM CHAIR USING ARMS: TOTAL
STANDING UP FROM CHAIR USING ARMS: TOTAL
MOVING TO AND FROM BED TO CHAIR: TOTAL
MOBILITY SCORE: 6
TOILETING: TOTAL
DAILY ACTIVITIY SCORE: 8
CLIMB 3 TO 5 STEPS WITH RAILING: TOTAL
PERSONAL GROOMING: TOTAL
DRESSING REGULAR UPPER BODY CLOTHING: TOTAL
EATING MEALS: A LOT
DAILY ACTIVITIY SCORE: 8
DAILY ACTIVITIY SCORE: 8
PERSONAL GROOMING: A LOT
MOVING FROM LYING ON BACK TO SITTING ON SIDE OF FLAT BED WITH BEDRAILS: A LOT
PERSONAL GROOMING: A LOT
CLIMB 3 TO 5 STEPS WITH RAILING: TOTAL
HELP NEEDED FOR BATHING: TOTAL
MOBILITY SCORE: 7
MOVING TO AND FROM BED TO CHAIR: TOTAL
WALKING IN HOSPITAL ROOM: TOTAL
STANDING UP FROM CHAIR USING ARMS: TOTAL
DRESSING REGULAR LOWER BODY CLOTHING: TOTAL
MOBILITY SCORE: 8
DRESSING REGULAR UPPER BODY CLOTHING: TOTAL
DRESSING REGULAR UPPER BODY CLOTHING: TOTAL
WALKING IN HOSPITAL ROOM: TOTAL
DRESSING REGULAR UPPER BODY CLOTHING: TOTAL
CLIMB 3 TO 5 STEPS WITH RAILING: TOTAL
TOILETING: TOTAL
MOBILITY SCORE: 7
MOVING TO AND FROM BED TO CHAIR: TOTAL
EATING MEALS: A LOT
MOVING FROM LYING ON BACK TO SITTING ON SIDE OF FLAT BED WITH BEDRAILS: A LOT
CLIMB 3 TO 5 STEPS WITH RAILING: TOTAL
PERSONAL GROOMING: TOTAL
DRESSING REGULAR UPPER BODY CLOTHING: TOTAL
CLIMB 3 TO 5 STEPS WITH RAILING: TOTAL
MOBILITY SCORE: 7
CLIMB 3 TO 5 STEPS WITH RAILING: TOTAL
TOILETING: TOTAL
MOVING TO AND FROM BED TO CHAIR: TOTAL
HELP NEEDED FOR BATHING: TOTAL
WALKING IN HOSPITAL ROOM: TOTAL
MOVING TO AND FROM BED TO CHAIR: TOTAL
MOVING TO AND FROM BED TO CHAIR: A LOT
CLIMB 3 TO 5 STEPS WITH RAILING: TOTAL
DAILY ACTIVITIY SCORE: 8
CLIMB 3 TO 5 STEPS WITH RAILING: TOTAL
TURNING FROM BACK TO SIDE WHILE IN FLAT BAD: TOTAL
MOBILITY SCORE: 6
MOVING FROM LYING ON BACK TO SITTING ON SIDE OF FLAT BED WITH BEDRAILS: A LOT
MOVING TO AND FROM BED TO CHAIR: TOTAL
HELP NEEDED FOR BATHING: TOTAL
MOBILITY SCORE: 9
HELP NEEDED FOR BATHING: TOTAL
WALKING IN HOSPITAL ROOM: TOTAL
DAILY ACTIVITIY SCORE: 8
HELP NEEDED FOR BATHING: TOTAL
CLIMB 3 TO 5 STEPS WITH RAILING: TOTAL
TOILETING: TOTAL
MOVING FROM LYING ON BACK TO SITTING ON SIDE OF FLAT BED WITH BEDRAILS: A LOT
PERSONAL GROOMING: TOTAL
DAILY ACTIVITIY SCORE: 7
HELP NEEDED FOR BATHING: TOTAL
TOILETING: TOTAL
MOVING FROM LYING ON BACK TO SITTING ON SIDE OF FLAT BED WITH BEDRAILS: TOTAL
DRESSING REGULAR UPPER BODY CLOTHING: TOTAL
HELP NEEDED FOR BATHING: TOTAL
MOVING TO AND FROM BED TO CHAIR: TOTAL
DRESSING REGULAR LOWER BODY CLOTHING: TOTAL
MOVING TO AND FROM BED TO CHAIR: TOTAL
MOVING FROM LYING ON BACK TO SITTING ON SIDE OF FLAT BED WITH BEDRAILS: TOTAL
EATING MEALS: TOTAL
WALKING IN HOSPITAL ROOM: TOTAL
DRESSING REGULAR LOWER BODY CLOTHING: TOTAL
HELP NEEDED FOR BATHING: TOTAL
TURNING FROM BACK TO SIDE WHILE IN FLAT BAD: TOTAL
DAILY ACTIVITIY SCORE: 7
HELP NEEDED FOR BATHING: TOTAL
EATING MEALS: A LOT
STANDING UP FROM CHAIR USING ARMS: TOTAL
MOBILITY SCORE: 6
HELP NEEDED FOR BATHING: TOTAL
WALKING IN HOSPITAL ROOM: TOTAL
DRESSING REGULAR UPPER BODY CLOTHING: TOTAL
DAILY ACTIVITIY SCORE: 6
DRESSING REGULAR LOWER BODY CLOTHING: TOTAL
MOBILITY SCORE: 6
PERSONAL GROOMING: A LOT
MOVING TO AND FROM BED TO CHAIR: TOTAL
PERSONAL GROOMING: TOTAL
MOBILITY SCORE: 6
DRESSING REGULAR UPPER BODY CLOTHING: TOTAL
MOBILITY SCORE: 7
DRESSING REGULAR UPPER BODY CLOTHING: TOTAL
TURNING FROM BACK TO SIDE WHILE IN FLAT BAD: TOTAL
MOVING FROM LYING ON BACK TO SITTING ON SIDE OF FLAT BED WITH BEDRAILS: TOTAL
STANDING UP FROM CHAIR USING ARMS: TOTAL
MOBILITY SCORE: 7
DRESSING REGULAR UPPER BODY CLOTHING: TOTAL
STANDING UP FROM CHAIR USING ARMS: TOTAL
DRESSING REGULAR LOWER BODY CLOTHING: TOTAL
MOVING TO AND FROM BED TO CHAIR: TOTAL
PERSONAL GROOMING: A LOT
TURNING FROM BACK TO SIDE WHILE IN FLAT BAD: TOTAL
DRESSING REGULAR UPPER BODY CLOTHING: TOTAL
DAILY ACTIVITIY SCORE: 6
TOILETING: TOTAL
TOILETING: TOTAL
STANDING UP FROM CHAIR USING ARMS: TOTAL
DRESSING REGULAR LOWER BODY CLOTHING: TOTAL
DAILY ACTIVITIY SCORE: 6
DRESSING REGULAR LOWER BODY CLOTHING: TOTAL
HELP NEEDED FOR BATHING: TOTAL
PERSONAL GROOMING: TOTAL
EATING MEALS: A LOT
TOILETING: TOTAL
WALKING IN HOSPITAL ROOM: TOTAL
DAILY ACTIVITIY SCORE: 8
WALKING IN HOSPITAL ROOM: TOTAL
PERSONAL GROOMING: TOTAL
DRESSING REGULAR LOWER BODY CLOTHING: TOTAL
WALKING IN HOSPITAL ROOM: TOTAL

## 2024-01-01 ASSESSMENT — PAIN - FUNCTIONAL ASSESSMENT
PAIN_FUNCTIONAL_ASSESSMENT: 0-10
PAIN_FUNCTIONAL_ASSESSMENT: 0-10
PAIN_FUNCTIONAL_ASSESSMENT: FLACC (FACE, LEGS, ACTIVITY, CRY, CONSOLABILITY)
PAIN_FUNCTIONAL_ASSESSMENT: FLACC (FACE, LEGS, ACTIVITY, CRY, CONSOLABILITY)
PAIN_FUNCTIONAL_ASSESSMENT: 0-10
PAIN_FUNCTIONAL_ASSESSMENT: FLACC (FACE, LEGS, ACTIVITY, CRY, CONSOLABILITY)
PAIN_FUNCTIONAL_ASSESSMENT: 0-10
PAIN_FUNCTIONAL_ASSESSMENT: WONG-BAKER FACES
PAIN_FUNCTIONAL_ASSESSMENT: 0-10
PAIN_FUNCTIONAL_ASSESSMENT: FLACC (FACE, LEGS, ACTIVITY, CRY, CONSOLABILITY)
PAIN_FUNCTIONAL_ASSESSMENT: 0-10
PAIN_FUNCTIONAL_ASSESSMENT: FLACC (FACE, LEGS, ACTIVITY, CRY, CONSOLABILITY)
PAIN_FUNCTIONAL_ASSESSMENT: 0-10
PAIN_FUNCTIONAL_ASSESSMENT: 0-10
PAIN_FUNCTIONAL_ASSESSMENT: FLACC (FACE, LEGS, ACTIVITY, CRY, CONSOLABILITY)
PAIN_FUNCTIONAL_ASSESSMENT: 0-10
PAIN_FUNCTIONAL_ASSESSMENT: FLACC (FACE, LEGS, ACTIVITY, CRY, CONSOLABILITY)
PAIN_FUNCTIONAL_ASSESSMENT: FLACC (FACE, LEGS, ACTIVITY, CRY, CONSOLABILITY)
PAIN_FUNCTIONAL_ASSESSMENT: 0-10
PAIN_FUNCTIONAL_ASSESSMENT: 0-10
PAIN_FUNCTIONAL_ASSESSMENT: FLACC (FACE, LEGS, ACTIVITY, CRY, CONSOLABILITY)
PAIN_FUNCTIONAL_ASSESSMENT: 0-10
PAIN_FUNCTIONAL_ASSESSMENT: FLACC (FACE, LEGS, ACTIVITY, CRY, CONSOLABILITY)
PAIN_FUNCTIONAL_ASSESSMENT: 0-10
PAIN_FUNCTIONAL_ASSESSMENT: FLACC (FACE, LEGS, ACTIVITY, CRY, CONSOLABILITY)
PAIN_FUNCTIONAL_ASSESSMENT: 0-10
PAIN_FUNCTIONAL_ASSESSMENT: 0-10
PAIN_FUNCTIONAL_ASSESSMENT: FLACC (FACE, LEGS, ACTIVITY, CRY, CONSOLABILITY)
PAIN_FUNCTIONAL_ASSESSMENT: 0-10
PAIN_FUNCTIONAL_ASSESSMENT: FLACC (FACE, LEGS, ACTIVITY, CRY, CONSOLABILITY)
PAIN_FUNCTIONAL_ASSESSMENT: WONG-BAKER FACES
PAIN_FUNCTIONAL_ASSESSMENT: 0-10
PAIN_FUNCTIONAL_ASSESSMENT: FLACC (FACE, LEGS, ACTIVITY, CRY, CONSOLABILITY)
PAIN_FUNCTIONAL_ASSESSMENT: 0-10

## 2024-01-01 ASSESSMENT — ACTIVITIES OF DAILY LIVING (ADL)
LACK_OF_TRANSPORTATION: NO
DRESSING YOURSELF: DEPENDENT
HEARING - LEFT EAR: FUNCTIONAL
HOME_MANAGEMENT_TIME_ENTRY: 15
HOME_MANAGEMENT_TIME_ENTRY: 20
BATHING_ASSISTANCE: MAXIMAL
PATIENT'S MEMORY ADEQUATE TO SAFELY COMPLETE DAILY ACTIVITIES?: NO
HEARING - RIGHT EAR: FUNCTIONAL
ADL_ASSISTANCE: NEEDS ASSISTANCE
BATHING_ASSISTANCE: TOTAL
GROOMING: DEPENDENT
FEEDING: MINIMAL
BATHING: DEPENDENT
TOILETING: DEPENDENT
TOILETING_ASSISTANCE: MAXIMAL
JUDGMENT_ADEQUATE_SAFELY_COMPLETE_DAILY_ACTIVITIES: NO
ASSISTIVE_DEVICE: WHEELCHAIR;DENTURES LOWER;DENTURES UPPER
ADL_ASSISTANCE: NEEDS ASSISTANCE
GROOMING_ASSISTANCE: MINIMAL
FEEDING YOURSELF: DEPENDENT
WALKS IN HOME: DEPENDENT
ADEQUATE_TO_COMPLETE_ADL: YES

## 2024-01-01 ASSESSMENT — PAIN DESCRIPTION - DESCRIPTORS
DESCRIPTORS: ACHING
DESCRIPTORS: BURNING
DESCRIPTORS: ACHING
DESCRIPTORS: ACHING
DESCRIPTORS: BURNING;SHARP
DESCRIPTORS: ACHING
DESCRIPTORS: BURNING
DESCRIPTORS: ACHING
DESCRIPTORS: BURNING

## 2024-01-01 ASSESSMENT — PAIN SCALES - PAIN ASSESSMENT IN ADVANCED DEMENTIA (PAINAD)
BREATHING: NORMAL
BREATHING: NORMAL
CONSOLABILITY: NO NEED TO CONSOLE
BODYLANGUAGE: RELAXED
TOTALSCORE: 0
CONSOLABILITY: NO NEED TO CONSOLE
FACIALEXPRESSION: SMILING OR INEXPRESSIVE
BODYLANGUAGE: RELAXED

## 2024-01-01 ASSESSMENT — PAIN SCALES - WONG BAKER

## 2024-01-01 ASSESSMENT — COLUMBIA-SUICIDE SEVERITY RATING SCALE - C-SSRS
6. HAVE YOU EVER DONE ANYTHING, STARTED TO DO ANYTHING, OR PREPARED TO DO ANYTHING TO END YOUR LIFE?: NO
1. IN THE PAST MONTH, HAVE YOU WISHED YOU WERE DEAD OR WISHED YOU COULD GO TO SLEEP AND NOT WAKE UP?: NO
2. HAVE YOU ACTUALLY HAD ANY THOUGHTS OF KILLING YOURSELF?: NO
1. IN THE PAST MONTH, HAVE YOU WISHED YOU WERE DEAD OR WISHED YOU COULD GO TO SLEEP AND NOT WAKE UP?: NO
6. HAVE YOU EVER DONE ANYTHING, STARTED TO DO ANYTHING, OR PREPARED TO DO ANYTHING TO END YOUR LIFE?: NO
2. HAVE YOU ACTUALLY HAD ANY THOUGHTS OF KILLING YOURSELF?: NO

## 2024-01-01 ASSESSMENT — LIFESTYLE VARIABLES
HOW OFTEN DO YOU HAVE A DRINK CONTAINING ALCOHOL: NEVER
HOW OFTEN DO YOU HAVE 6 OR MORE DRINKS ON ONE OCCASION: NEVER
SUBSTANCE_ABUSE_PAST_12_MONTHS: NO
HOW MANY STANDARD DRINKS CONTAINING ALCOHOL DO YOU HAVE ON A TYPICAL DAY: PATIENT DOES NOT DRINK
SKIP TO QUESTIONS 9-10: 1
AUDIT-C TOTAL SCORE: 0
PRESCIPTION_ABUSE_PAST_12_MONTHS: NO
AUDIT-C TOTAL SCORE: 0

## 2024-01-01 ASSESSMENT — ENCOUNTER SYMPTOMS
DIARRHEA: 1
CHILLS: 0
FEVER: 0

## 2024-01-01 ASSESSMENT — PATIENT HEALTH QUESTIONNAIRE - PHQ9
2. FEELING DOWN, DEPRESSED OR HOPELESS: SEVERAL DAYS
SUM OF ALL RESPONSES TO PHQ9 QUESTIONS 1 & 2: 2
1. LITTLE INTEREST OR PLEASURE IN DOING THINGS: SEVERAL DAYS

## 2024-01-01 ASSESSMENT — PAIN DESCRIPTION - LOCATION
LOCATION: BACK
LOCATION: BACK

## 2024-01-01 NOTE — PROGRESS NOTES
Hai Spaulding is a 73 y.o. male on day 5 of admission presenting with NSTEMI (non-ST elevated myocardial infarction) (CMS/[facility]).      [facility] for 1730  to transport to [facility].  Spouse Cata is in patient room and was notified via phone. [phone] completed.  Nurse to nurse report number is [phone].  [place] and discharge summary sent to [facility] in [facility].     PATIENT HAS A SECURE DISCHARGE PLAN                               Padma Colmenares RN

## 2024-01-01 NOTE — PROGRESS NOTES
Hai Spaulding is a 73 y.o. male on day 5 of admission presenting with NSTEMI (non-ST elevated myocardial infarction) (CMS/HCC).      Subjective     Room air.  No distress.         Objective     Last Recorded Vitals  /62 (BP Location: Left arm, Patient Position: Lying)   Pulse 87   Temp 36.4 °C (97.5 °F) (Temporal)   Resp 18   Wt 71.4 kg (157 lb 4.8 oz)   SpO2 97%   Intake/Output last 3 Shifts:    Intake/Output Summary (Last 24 hours) at 1/1/2024 1213  Last data filed at 1/1/2024 0049  Gross per 24 hour   Intake --   Output 1000 ml   Net -1000 ml         Admission Weight  Weight: 67.2 kg (148 lb 2.4 oz) (12/27/23 2049)    Daily Weight  12/31/23 : 71.4 kg (157 lb 4.8 oz)    Image Results  ECG 12 lead  Undetermined rhythm  Left bundle branch block  Abnormal ECG  When compared with ECG of 27-DEC-2023 07:49, (unconfirmed)  Current undetermined rhythm precludes rhythm comparison, needs review  ECG 12 Lead  Wide QRS tachycardia with Premature supraventricular complexes and with occasional Premature ventricular complexes  Nonspecific intraventricular block  Cannot rule out Anteroseptal infarct , age undetermined  T wave abnormality, consider inferolateral ischemia  Abnormal ECG  When compared with ECG of 14-DEC-2023 19:10, (unconfirmed)  Wide QRS tachycardia has replaced Sinus rhythm  Vent. rate has increased BY  57 BPM  XR chest 1 view  Narrative: Interpreted By:  Anahi Fernando,   STUDY:  XR CHEST 1 VIEW;  12/27/2023 8:43 am      INDICATION:  Signs/Symptoms:Dypnea.      COMPARISON:  12/14/2023      ACCESSION NUMBER(S):  [id number]      ORDERING CLINICIAN:  SYLVIA MOORE      FINDINGS:  CARDIOMEDIASTINAL SILHOUETTE:  The heart is enlarged.  There is a left subclavian pacemaker-AICD with a single tip in the  right atrium and two tips in the right ventricle.      LUNGS:  There are progressive perihilar alveolar opacities, likely pulmonary  edema but could also represent pneumonia. There is left  basilar  atelectasis and volume loss which has progressed. There is no pleural  fluid.      ABDOMEN:  No remarkable upper abdominal findings.          BONES:  No acute osseous changes.      Impression: 1. Progressive bilateral predominantly perihilar alveolar opacities  consistent with pulmonary edema or pneumonia.  2. Progressive volume loss and atelectasis left lung base.      MACRO:  None      Signed by: Anahi Fernando 12/27/2023 8:50 AM  Dictation workstation:   [id number]      Physical Exam  HENT:      Head: Normocephalic.      Right Ear: External ear normal.      Left Ear: External ear normal.      Mouth/Throat:      Mouth: Mucous membranes are dry.   Cardiovascular:      Rate and Rhythm: Normal rate.   Pulmonary:      Effort: No respiratory distress.      Breath sounds: Normal breath sounds.   Abdominal:      General: Abdomen is flat.   Skin:     General: Skin is warm.   Neurological:      General: No focal deficit present.   Psychiatric:         Mood and Affect: Mood normal.         Relevant Results               Assessment/Plan                  Principal Problem:    NSTEMI (non-ST elevated myocardial infarction) (CMS/HCC)    Acute hypoxemic respiratory failure  -Back to room air.  Resolved.    COVID-19  -Stable.  Continue dexamethasone.  Remdesivir per infectious disease -last dose today.    Question of bacterial pneumonia  -Zosyn per infectious disease.    Atrial fibrillation  -Heart rate controlled  -Continue anticoagulation.    Systolic heart failure  -No exacerbation.  -EF 30 to 35%.  -Status post AICD.    Leukocytosis  -Mild, in setting of dexamethasone  -Monitor.    Hyperglycemia  -And diabetes mellitus  -Worsened by dexamethasone  -Increase Lantus.    From medical standpoint, once facility arranged, okay for discharge.              aMurice Cha,

## 2024-01-01 NOTE — CARE PLAN
The patient's goals for the shift include rest    The clinical goals for the shift include rest    Over the shift, the patient did not make progress toward the following goals. Barriers to progression include N/A pt progressing. Recommendations to address these barriers include rest.

## 2024-01-01 NOTE — DISCHARGE SUMMARY
Discharge Diagnosis  NSTEMI (non-ST elevated myocardial infarction) (CMS/[facility])    Issues Requiring Follow-Up        Discharge Meds     Your medication list        START taking these medications        Instructions Last Dose Given Next Dose Due   albuterol 2.5 mg /3 mL (0.083 %) nebulizer solution  Replaces: Ventolin HFA 90 mcg/actuation inhaler      Take 3 mL (2.5 mg) by nebulization every 2 hours if needed for wheezing or shortness of breath.       amoxicillin-pot clavulanate 875-125 mg tablet  Commonly known as: Augmentin      Take 1 tablet (875 mg) by mouth 2 times a day for 5 days.       apixaban 5 mg tablet  Commonly known as: Eliquis      Take 1 tablet (5 mg) by mouth 2 times a day.       clopidogrel 75 mg tablet  Commonly known as: Plavix  Start taking on: January 2, 2024      Take 1 tablet (75 mg) by mouth once daily. Do not start before January 2, 2024.              CHANGE how you take these medications        Instructions Last Dose Given Next Dose Due   amiodarone 200 mg tablet  Commonly known as: Pacerone  What changed: when to take this      Take 1 tablet (200 mg) by mouth 2 times a day.       atorvastatin 80 mg tablet  Commonly known as: Lipitor  What changed: when to take this      Take 1 tablet (80 mg) by mouth once daily at bedtime.       insulin glargine 100 unit/mL injection  Commonly known as: Lantus U-100 Insulin  What changed: how much to take      Inject 30 Units under the skin once daily in the evening.       insulin lispro 100 unit/mL injection  Commonly known as: HumaLOG  What changed:   how much to take  additional instructions      Inject 0-0.15 mL (0-15 Units) under the skin 3 times a day with meals. Take as directed per insulin instructions.       levothyroxine 25 mcg tablet  Commonly known as: Synthroid, Levoxyl  Start taking on: January 2, 2024  What changed: when to take this      Take 1 tablet (25 mcg) by mouth early in the morning.. Do not start before January 2, 2024.      "  metoprolol succinate XL 25 mg 24 hr tablet  Commonly known as: Toprol-XL  What changed:   how much to take  when to take this  additional instructions      Take 1 tablet (25 mg) by mouth 2 times a day. Do not crush or chew.       nicotine 21 mg/24 hr patch  Commonly known as: Nicoderm CQ  What changed: Another medication with the same name was removed. Continue taking this medication, and follow the directions you see here.                  CONTINUE taking these medications        Instructions Last Dose Given Next Dose Due   ACIDOPHILUS ORAL           Autolet lancing device           BD Cynthia 2nd Gen Pen Needle 32 gauge x 5/32\" needle  Generic drug: pen needle, diabetic           BD Veo Insulin Syr (half unit) 0.3 mL 31 gauge x 15/64\" syringe  Generic drug: insulin syr/ndl U100 half lexie      1 Needle once daily.       BD Veo Insulin Syringe UF 1 mL 31 gauge x 15/64\" syringe  Generic drug: insulin syringe-needle U-100           insulin syringe-needle U-100 31G X 5/16\" 1 mL syringe      Inject 1 each under the skin once daily. As directed daily       carbidopa-levodopa  mg ER tablet  Commonly known as: Sinemet CR           cyclobenzaprine 10 mg tablet  Commonly known as: Flexeril           dapagliflozin propanediol 10 mg  Commonly known as: Farxiga      Take 1 tablet (10 mg) by mouth once daily.       docusate sodium 100 mg capsule  Commonly known as: Colace           fluticasone propion-salmeteroL 250-50 mcg/dose diskus inhaler  Commonly known as: Advair Diskus      INHALE ONE PUFF BY MOUTH TWO TIMES A DAY       FreeStyle Joan 2 Loring misc  Generic drug: FreeStyle Joan reader           FreeStyle Joan 2 Sensor kit  Generic drug: FreeStyle Joan sensor system           gabapentin 300 mg capsule  Commonly known as: Neurontin           ipratropium-albuteroL 0.5-2.5 mg/3 mL nebulizer solution  Commonly known as: Duo-Neb           omeprazole 40 mg DR capsule  Commonly known as: PriLOSEC           QUEtiapine 25 " mg tablet  Commonly known as: SEROquel           SITagliptin phosphate 50 mg tablet  Commonly known as: Januvia      Take 1 tablet (50 mg) by mouth once daily.       Zoloft 100 mg tablet  Generic drug: sertraline                  STOP taking these medications      acetaminophen 325 mg tablet  Commonly known as: Tylenol        amoxicillin 250 mg capsule  Commonly known as: Amoxil        azithromycin 250 mg tablet  Commonly known as: Zithromax        cefuroxime 500 mg tablet  Commonly known as: Ceftin        doxycycline 100 mg capsule  Commonly known as: Vibramycin        Effient 10 mg tablet  Generic drug: prasugrel        ferrous sulfate (325 mg ferrous sulfate) tablet        furosemide 40 mg tablet  Commonly known as: Lasix        methylPREDNISolone 4 mg tablets  Commonly known as: Medrol Dospak        OneTouch Ultra Test strip  Generic drug: blood sugar diagnostic        oxybutynin XL 5 mg 24 hr tablet  Commonly known as: Ditropan-XL        oxygen DME/Hospice therapy  Commonly known as: O2        triamcinolone 0.1 % cream  Commonly known as: Kenalog        Ventolin HFA 90 mcg/actuation inhaler  Generic drug: albuterol  Replaced by: albuterol 2.5 mg /3 mL (0.083 %) nebulizer solution        Xarelto 15 mg tablet  Generic drug: rivaroxaban                  Where to Get Your Medications        These medications were sent to [id number] [address] [id number]      Phone: [phone]   albuterol 2.5 mg /3 mL (0.083 %) nebulizer solution       Information about where to get these medications is not yet available    Ask your nurse or doctor about these medications  amiodarone 200 mg tablet  amoxicillin-pot clavulanate 875-125 mg tablet  apixaban 5 mg tablet  atorvastatin 80 mg tablet  clopidogrel 75 mg tablet  insulin lispro 100 unit/mL injection  levothyroxine 25 mcg tablet  metoprolol succinate XL 25 mg 24 hr tablet         Test Results Pending At  Discharge  Pending Labs       No current pending labs.            Hospital Course   73-year-old male past medical history of systolic heart failure EF in the 35% range presenting with shortness of breath, found to have COVID-19.  Patient was quickly weaned off of oxygen.  Seen by infectious disease, and started on dexamethasone and remdesivir.  Also suspicion for possible bacterial pneumonia, and started on Zosyn and transition to Augmentin by infectious disease.  Cardiology optimized medications for heart failure, though the patient truly did not appear to be in a exacerbation.  Patient is discharged to acute rehab and will finish a course of Augmentin and dexamethasone.  Remdesivir has completed    Pertinent Physical Exam At Time of Discharge  Physical Exam    Outpatient Follow-Up  Future Appointments   Date Time Provider Department Center   1/12/2024  1:00 PM KIP Corcoran-CNP [id number] None         Maurice Cha DO

## 2024-01-01 NOTE — CARE PLAN
The patient's goals for the shift include rest    The clinical goals for the shift include safety safety and fall precautions.    Problem: Pain - Adult  Goal: Verbalizes/displays adequate comfort level or baseline comfort level  1/1/2024 1450 by Jia Guerrier RN  Outcome: Progressing  1/1/2024 1449 by Jia Guerrier RN  Outcome: Progressing     Problem: Safety - Adult  Goal: Free from fall injury  1/1/2024 1450 by Jia Guerrier RN  Outcome: Progressing  1/1/2024 1449 by Jia Guerrier RN  Outcome: Progressing     Problem: Discharge Planning  Goal: Discharge to home or other facility with appropriate resources  1/1/2024 1450 by Jia Guerrier RN  Outcome: Progressing  1/1/2024 1449 by Jia Guerrier RN  Outcome: Progressing     Problem: Diabetes  Goal: Achieve decreasing blood glucose levels by end of shift  1/1/2024 1450 by Jia Guerrier RN  Outcome: Progressing  1/1/2024 1449 by Jia Guerrier RN  Outcome: Progressing  Goal: Increase stability of blood glucose readings by end of shift  1/1/2024 1450 by Jia Guerrier RN  Outcome: Progressing  1/1/2024 1449 by Jia Guerrier RN  Outcome: Progressing  Goal: Decrease in ketones present in urine by end of shift  1/1/2024 1450 by Jia Guerrier RN  Outcome: Progressing  1/1/2024 1449 by Jia Guerrier RN  Outcome: Progressing  Goal: Maintain electrolyte levels within acceptable range throughout shift  1/1/2024 1450 by Jia Guerrier RN  Outcome: Progressing  1/1/2024 1449 by Jia Guerrier RN  Outcome: Progressing  Goal: Maintain glucose levels >70mg/dl to <250mg/dl throughout shift  1/1/2024 1450 by Jia Guerrier RN  Outcome: Progressing  1/1/2024 1449 by Jia Guerrier RN  Outcome: Progressing  Goal: No changes in neurological exam by end of shift  1/1/2024 1450 by Jia Guerrier RN  Outcome: Progressing  1/1/2024 1449 by Jia S Fareed, RN  Outcome: Progressing  Goal: Learn about and adhere to nutrition  recommendations by end of shift  1/1/2024 1450 by Jia Guerrier RN  Outcome: Progressing  1/1/2024 1449 by Jia Guerrier RN  Outcome: Progressing  Goal: Vital signs within normal range for age by end of shift  1/1/2024 1450 by Jia Guerrier RN  Outcome: Progressing  1/1/2024 1449 by Jia Guerrier RN  Outcome: Progressing  Goal: Increase self care and/or family involovement by end of shift  1/1/2024 1450 by Jia Guerrier RN  Outcome: Progressing  1/1/2024 1449 by Jia Guerrier RN  Outcome: Progressing  Goal: Receive DSME education by end of shift  1/1/2024 1450 by Jia Guerrier RN  Outcome: Progressing  1/1/2024 1449 by Jia Guerrier RN  Outcome: Progressing     Problem: Skin  Goal: Decreased wound size/increased tissue granulation at next dressing change  1/1/2024 1450 by Jia Guerrier RN  Outcome: Progressing  1/1/2024 1449 by Jia Guerrier RN  Outcome: Progressing  Goal: Participates in plan/prevention/treatment measures  1/1/2024 1450 by Jia Guerrier RN  Outcome: Progressing  1/1/2024 1449 by Jia Guerrier RN  Outcome: Progressing  Goal: Prevent/manage excess moisture  1/1/2024 1450 by Jia Guerrier RN  Outcome: Progressing  1/1/2024 1449 by Jia Guerrier RN  Outcome: Progressing  Goal: Prevent/minimize sheer/friction injuries  1/1/2024 1450 by Jia Guerrier RN  Outcome: Progressing  1/1/2024 1449 by Jia Guerrier RN  Outcome: Progressing  Goal: Promote/optimize nutrition  1/1/2024 1450 by Jia Guerrier RN  Outcome: Progressing  1/1/2024 1449 by Jia Guerrier RN  Outcome: Progressing  Goal: Promote skin healing  1/1/2024 1450 by Jia Guerrier RN  Outcome: Progressing  1/1/2024 1449 by Jia Guerrier RN  Outcome: Progressing     Problem: Respiratory  Goal: Minimize anxiety/maximize coping throughout shift  1/1/2024 1450 by Jia Guerrier RN  Outcome: Progressing  1/1/2024 1449 by Jia Guerrier, RN  Outcome: Progressing  Goal:  Minimal/no exertional discomfort or dyspnea this shift  1/1/2024 1450 by Jia Guerrier RN  Outcome: Progressing  1/1/2024 1449 by Jia Guerrier RN  Outcome: Progressing  Goal: No signs of respiratory distress (eg. Use of accessory muscles. Peds grunting)  1/1/2024 1450 by Jia Guerrier RN  Outcome: Progressing  1/1/2024 1449 by Jia Guerrier RN  Outcome: Progressing  Goal: Verbalize decreased shortness of breath this shift  1/1/2024 1450 by Jia Guerrier RN  Outcome: Progressing  1/1/2024 1449 by Jia Guerrier RN  Outcome: Progressing

## 2024-01-01 NOTE — PROGRESS NOTES
Subjective Data:  Patient is feeling better.  Laying comfortable in bed.  Denies any chest pain    Overnight Events:    Telemetry overnight reviewed.     Objective Data:  Last Recorded Vitals:  Vitals:    01/01/24 0700 01/01/24 0721 01/01/24 1100 01/01/24 1225   BP: 98/65  100/62    BP Location: Left arm  Left arm    Patient Position: Lying  Lying    Pulse: 89  87    Resp: 18  18    Temp: 36.4 °C (97.5 °F)      TempSrc: Temporal      SpO2: 96% 94% 97% 92%   Weight:       Height:           Last Labs:  CBC - 1/1/2024:  5:29 AM  22.5 12.3 311    36.5      CMP - 1/1/2024:  5:29 AM  7.7 5.7 28 --- 0.6   4.2 2.7 <5 258      PTT - 12/28/2023:  5:48 AM  _   _ 107.8     TROPHS   Date/Time Value Ref Range Status   12/27/2023 03:22  0 - 20 ng/L Final     Comment:     Previous result verified on 12/27/2023 0920 on specimen/case [id number] called with component TRPHS for procedure Troponin I, High Sensitivity with value 62 ng/L.   12/27/2023 11:56  0 - 20 ng/L Final     Comment:     Previous result verified on 12/27/2023 0920 on specimen/case [id number] called with component TRPHS for procedure Troponin I, High Sensitivity with value 62 ng/L.   12/27/2023 08:40 AM 62 0 - 20 ng/L Final     BNP   Date/Time Value Ref Range Status   12/27/2023 10:04  0 - 99 pg/mL Final   12/17/2023 07:22  0 - 99 pg/mL Final     HGBA1C   Date/Time Value Ref Range Status   12/27/2023 09:59 PM 10.7 See below % Final   10/11/2023 01:15 PM 10.6 See below % Final     LDLCALC   Date/Time Value Ref Range Status   06/02/2023 02:40 PM 39 65 - 130 MG/DL Final   10/08/2019 12:15 PM 96 65 - 130 MG/DL Final     VLDL   Date/Time Value Ref Range Status   10/22/2021 08:07 PM 18 0 - 40 mg/dL Final   09/28/2020 04:12 PM 46 0 - 40 mg/dL Final   10/02/2018 02:40 PM 50 0 - 40 mg/dL Final      Last I/O:  I/O last 3 completed shifts:  In: 220 (3.1 mL/kg) [P.O.:120; IV Piggyback:100]  Out: 1800 (25.2 mL/kg) [Urine:1800 (0.7  mL/kg/hr)]  Weight: 71.4 kg     Past Cardiology Tests (Last 3 Years):  EKG:  ECG 12 lead 12/27/2023 (Preliminary)      ECG 12 Lead 12/27/2023 (Preliminary)      ECG 12 lead 12/14/2023 (Preliminary)    Echo:  Transthoracic Echo (TTE) Complete 12/18/2023    Ejection Fractions:  EF   Date/Time Value Ref Range Status   12/18/2023 12:10 PM 29       Cath:  No results found for this or any previous visit from the past 1095 days.    Stress Test:  No results found for this or any previous visit from the past 1095 days.    Cardiac Imaging:  XR CHEST ABDOMEN FOR OG NG PLACEMENT 11/26/2021      XR CHEST ABDOMEN FOR OG NG PLACEMENT 11/26/2021      Inpatient Medications:  Scheduled medications   Medication Dose Route Frequency    acetaminophen  650 mg oral BID    amiodarone  200 mg oral BID    apixaban  5 mg oral BID    atorvastatin  80 mg oral Nightly    carbidopa-levodopa  1 tablet oral TID    cyclobenzaprine  10 mg oral Nightly    dapagliflozin propanediol  10 mg oral Daily    dexAMETHasone  6 mg intravenous q24h    [Held by provider] fluticasone furoate-vilanteroL  1 puff inhalation Daily    gabapentin  300 mg oral Nightly    insulin glargine  45 Units subcutaneous Nightly    insulin lispro  0-15 Units subcutaneous TID with meals    ipratropium-albuteroL  3 mL nebulization TID    lactobacillus acidophilus  1 tablet oral Daily    levothyroxine  25 mcg oral Daily    metoprolol succinate XL  25 mg oral BID    nicotine  1 patch transdermal q24h    pantoprazole  20 mg oral Daily    piperacillin-tazobactam  3.375 g intravenous q6h    prasugrel  10 mg oral Daily    QUEtiapine  25 mg oral Nightly    remdesivir  100 mg intravenous q24h    sertraline  100 mg oral Daily    SITagliptin phosphate  50 mg oral Daily     PRN medications   Medication    acetaminophen    albuterol    alum-mag hydroxide-simeth    dextrose 10 % in water (D10W)    dextrose    docusate sodium    glucagon    guaiFENesin    guaiFENesin    melatonin    ondansetron     ondansetron ODT    sodium chloride     Continuous Medications   Medication Dose Last Rate       Physical Exam:  General: Patient is in no acute distress.  HEENT: atraumatic normocephalic.  Neck: is supple jugular venous pressure within normal limits no thyromegaly.  Cardiovascular regular rate and rhythm normal heart sounds no murmurs rubs or gallops.  Lungs: clear to auscultation bilaterally.  Abdomen: is soft nontender.  Extremities warm to touch no edema.  Neurologic examination: patient is awake alert oriented to person, place, date/time.  Psychiatric examination: patient has good insight denies feeling suicidal and depressed.  Pulses 2+ intact bilaterally     Assessment/Plan   1 elevated troponin.  Patient has mild elevated troponin but flat.  Reviewed his last cardiac cath which was couple years ago he did have significant LAD and left circumflex disease but I am not sure how it was treated.  He had after that ICD CRT by Dr. Matt.  Probably patient was not candidate for open heart surgery.  He is currently on prasugrel and addition of Eliquis for atrial fibrillation.  I will stop prasugrel and switch him to clopidogrel.  We will address the issue of coronary artery disease.  Currently has no chest pain EKG showing paced rhythm nondiagnostic.  Will monitor.  Dr. Dominguez his cardiologist will follow-up in a.m.     2.  Chronic systolic heart failure.  Patient has chronic systolic heart failure ejection fraction of 30 to 35%.  Currently with COVID-19 infection.  Will check NT-proBNP.  Will optimize his treatment.  Dr. Dominguez who is his cardiologist will follow-up in a.m.     3.  Hypertension.     4.  Hyperlipidemia continue high intensity statin.     Thank you for allowing to participate in his care  Peripheral IV 12/27/23 20 G Left Forearm (Active)   Site Assessment Clean;Dry;Intact 12/31/23 2325   Dressing Status Clean;Dry;Occlusive 12/31/23 2325   Number of days: 5       Peripheral IV 12/27/23  22 G Proximal;Right;Anterior Forearm (Active)   Site Assessment Clean;Dry;Intact 12/31/23 2325   Dressing Status Clean;Dry;Occlusive 12/31/23 2325   Number of days: 5       Code Status:  Full Code    Cinda Pedersen MD

## 2024-01-01 NOTE — DISCHARGE INSTRUCTIONS
At your facility, you will finish up a course of dexamethasone and Augmentin, for COVID-19 and possible bacterial pneumonia respectively.    At your facility, continue to follow CDC recommendations for isolation of COVID.

## 2024-01-03 NOTE — ED PROVIDER NOTES
Department of Emergency Medicine   ED  Provider Note  Admit Date/RoomTime: 1/3/2024 11:37 AM  ED Room: TR12/TR12                  History of Present Illness:   Hai Spaulding is a 73 y.o. male presenting to the ED for SOB at [facility], beginning Yesterday evening.  The complaint has been persistent, severe in severity, and worsened by nothing.  Patient arrives from skilled nursing facility via EMS in respiratory distress.  He was found to have a pulse ox in the low to mid 80% range.  He was started on oxygen.  On arrival here on nasal cannula 5 L he is saturating 95 to 98% pulse ox.  Patient has decreased mentation.  He has some respiratory distress with tachypnea and can only speak 1 or 2 word phrases.  He is a very limited historian secondary to his shortness of breath and alertness at this time.  Per EMS crew the wife said that he had decreased mental status yesterday evening.  He was just recently discharged from Inpatient at another hospital on January 1st 2024.  He was hospitalized for shortness of breath having been found to have COVID-19 infection.  He was weaned off oxygen.  He was seen by infectious disease and started on dexamethasone and remdesivir.  There was concern for possible pneumonia and started on Zosyn and transition to Augmentin.  Cardiology has optimized medications for heart failure.He has had a previous stroke with right-sided paresis, congestive heart failure and previous MI.  After speaking with his wife, patient did suffer a fall prior to his last admission at [facility].      Review of Systems:   Pertinent positives and review of systems as noted above.  Remaining 10 review of systems is negative or noncontributory to today's episode of care.  Review of Systems   A complete review of systems cannot be completed as the patient was too short of breath and not alert enough to answer questions.    --------------------------------------------- PAST HISTORY  ---------------------------------------------  Past Medical History:  has a past medical history of Acid reflux, Aspiration pneumonia (CMS/[facility]) (11/23/2021), Aspiration pneumonia resulting from a procedure (10/25/2021), Atrial fibrillation with RVR (CMS/HCC) (10/2021), Congestive heart failure (CHF) (CMS/HCC) (10/2021), COPD (chronic obstructive pulmonary disease) (CMS/HCC), COVID, Diabetes (CMS/HCC), Diabetic eye exam (CMS/HCC) (05/08/2019), Hodgkin lymphoma (CMS/HCC), HTN (hypertension), Hyperlipidemia, Larynx cancer (CMS/HCC), Myocardial infarction (CMS/HCC), Neuropathy, Pneumonia due to gram-negative bacteria (01/01/2023), Pneumonitis due to inhalation of other solids and liquids (CMS/HCC) (11/25/2021), Urinary tract infection (01/04/2023), and Urinary tract infection, site not specified (12/18/2023).    Past Surgical History:  has a past surgical history that includes Other surgical history; Other surgical history; CT angio abdomen pelvis w and or wo IV IV contrast (01/22/2022); Other surgical history; Tonsillectomy; Other surgical history; Cholecystectomy; Other surgical history; Tumor excision; Cardiac defibrillator placement (05/2022); IR injection epidural steroid; and Tumor removal.    Social History:  reports that he has been smoking cigarettes. He has a 30.00 pack-year smoking history. He has been exposed to tobacco smoke. He has never used smokeless tobacco. He reports that he does not drink alcohol and does not use drugs.    Family History: family history includes Diabetes in his father and mother; Heart disease in his father and mother; htn in his father and mother. Unless otherwise noted, family history is non contributory    Patient's Medications   New Prescriptions    No medications on file   Previous Medications    ALBUTEROL 2.5 MG /3 ML (0.083 %) NEBULIZER SOLUTION    Take 3 mL (2.5 mg) by nebulization every 2 hours if needed for wheezing or shortness of breath.    AMIODARONE (PACERONE) 200 MG  "TABLET    Take 1 tablet (200 mg) by mouth 2 times a day.    AMOXICILLIN-POT CLAVULANATE (AUGMENTIN) 875-125 MG TABLET    Take 1 tablet (875 mg) by mouth 2 times a day for 5 days.    APIXABAN (ELIQUIS) 5 MG TABLET    Take 1 tablet (5 mg) by mouth 2 times a day.    ATORVASTATIN (LIPITOR) 80 MG TABLET    Take 1 tablet (80 mg) by mouth once daily at bedtime.    AUTOLET LANCING DEVICE    3 times a day.    BD JUAN F 2ND GEN PEN NEEDLE 32 GAUGE X 5/32\" NEEDLE    Inject 1 each under the skin 3 times a day.    CARBIDOPA-LEVODOPA (SINEMET CR)  MG ER TABLET    Take 1 tablet by mouth 3 times a day.    CLOPIDOGREL (PLAVIX) 75 MG TABLET    Take 1 tablet (75 mg) by mouth once daily. Do not start before January 2, 2024.    CYCLOBENZAPRINE (FLEXERIL) 10 MG TABLET    Take 1 tablet (10 mg) by mouth once daily at bedtime.    DAPAGLIFLOZIN PROPANEDIOL (FARXIGA) 10 MG    Take 1 tablet (10 mg) by mouth once daily.    DOCUSATE SODIUM (COLACE) 100 MG CAPSULE    Take 1 capsule (100 mg) by mouth 2 times a day as needed for constipation.    FLUTICASONE PROPION-SALMETEROL (ADVAIR DISKUS) 250-50 MCG/DOSE DISKUS INHALER    INHALE ONE PUFF BY MOUTH TWO TIMES A DAY    FREESTYLE SESAR 2 READER MISC    Inject 1 each under the skin if needed.    FREESTYLE SESAR 2 SENSOR KIT    Inject 1 each under the skin every 14 (fourteen) days.    GABAPENTIN (NEURONTIN) 300 MG CAPSULE    Take 1 capsule (300 mg) by mouth once daily at bedtime.    INSULIN GLARGINE (LANTUS U-100 INSULIN) 100 UNIT/ML INJECTION    Inject 30 Units under the skin once daily in the evening.    INSULIN LISPRO (HUMALOG) 100 UNIT/ML INJECTION    Inject 0-0.15 mL (0-15 Units) under the skin 3 times a day with meals. Take as directed per insulin instructions.    INSULIN SYR/NDL U100 HALF JESSICA (BD VEO INSULIN SYR, HALF UNIT,) 0.3 ML 31 GAUGE X 15/64\" SYRINGE    1 Needle once daily.    INSULIN SYRINGE-NEEDLE U-100 (BD VEO INSULIN SYRINGE UF) 1 ML 31 GAUGE X 15/64\" SYRINGE    3 times a day.    " "INSULIN SYRINGE-NEEDLE U-100 31G X 5/16\" 1 ML SYRINGE    Inject 1 each under the skin once daily. As directed daily    IPRATROPIUM-ALBUTEROL (DUO-NEB) 0.5-2.5 MG/3 ML NEBULIZER SOLUTION    Take 3 mL by nebulization every 6 hours.    LACTOBACILLUS ACIDOPHILUS (ACIDOPHILUS ORAL)    As directed oral    LEVOTHYROXINE (SYNTHROID, LEVOXYL) 25 MCG TABLET    Take 1 tablet (25 mcg) by mouth early in the morning.. Do not start before January 2, 2024.    METOPROLOL SUCCINATE XL (TOPROL-XL) 25 MG 24 HR TABLET    Take 1 tablet (25 mg) by mouth 2 times a day. Do not crush or chew.    NICOTINE (NICODERM CQ) 21 MG/24 HR PATCH    Place 1 patch on the skin once every 24 hours.    OMEPRAZOLE (PRILOSEC) 40 MG DR CAPSULE    Take 1 capsule (40 mg) by mouth once daily in the morning. Take before meals.    QUETIAPINE (SEROQUEL) 25 MG TABLET    Take 1 tablet (25 mg) by mouth once daily at bedtime.    SERTRALINE (ZOLOFT) 100 MG TABLET    Take 1 tablet (100 mg) by mouth once daily.    SITAGLIPTIN PHOSPHATE (JANUVIA) 50 MG TABLET    Take 1 tablet (50 mg) by mouth once daily.   Modified Medications    No medications on file   Discontinued Medications    No medications on file      The patient’s home medications have been reviewed.    Allergies: Patient has no known allergies.    -------------------------------------------------- RESULTS -------------------------------------------------  All laboratory and radiology results have been personally reviewed by myself   LABS:  Labs Reviewed   CBC WITH AUTO DIFFERENTIAL - Abnormal       Result Value    WBC 22.3 (*)     nRBC 0.3 (*)     RBC 4.82      Hemoglobin 13.9      Hematocrit 42.2      MCV 88      MCH 28.8      MCHC 32.9      RDW 17.4 (*)     Platelets 245      Neutrophils % 92.7      Immature Granulocytes %, Automated 0.6      Lymphocytes % 3.8      Monocytes % 2.7      Eosinophils % 0.1      Basophils % 0.1      Neutrophils Absolute 20.66 (*)     Immature Granulocytes Absolute, Automated 0.13   "    Lymphocytes Absolute 0.85      Monocytes Absolute 0.61      Eosinophils Absolute 0.02      Basophils Absolute 0.03     COMPREHENSIVE METABOLIC PANEL - Abnormal    Glucose 132 (*)     Sodium 135      Potassium 3.9      Chloride 97      Bicarbonate 23 (*)     Urea Nitrogen 71 (*)     Creatinine 2.30 (*)     eGFR 29 (*)     Calcium 7.7 (*)     Albumin 2.5 (*)     Alkaline Phosphatase 173 (*)     Total Protein 5.1 (*)     AST 13      Bilirubin, Total 0.4      ALT <5 (*)     Anion Gap 15     LIPASE - Abnormal    Lipase <16 (*)    PROTIME-INR - Abnormal    Protime 26.1 (*)     INR 2.3 (*)    B-TYPE NATRIURETIC PEPTIDE - Abnormal     (*)     Narrative:        <100 pg/mL - Heart failure unlikely  100-299 pg/mL - Intermediate probability of acute heart                  failure exacerbation. Correlate with clinical                  context and patient history.    >=300 pg/mL - Heart Failure likely. Correlate with clinical                  context and patient history.    BNP testing is performed using different testing methodology at [facility] than at other [facility]. Direct result comparisons should only be made within the same method.      BLOOD GAS ARTERIAL FULL PANEL - Abnormal    POCT pH, Arterial 7.35 (*)     POCT pCO2, Arterial 58 (*)     POCT pO2, Arterial 92      POCT SO2, Arterial 98      POCT Oxy Hemoglobin, Arterial 95.2      POCT Hematocrit Calculated, Arterial 43.0      POCT Sodium, Arterial 132 (*)     POCT Potassium, Arterial 2.7 (*)     POCT Chloride, Arterial 101      POCT Ionized Calcium, Arterial 1.08 (*)     POCT Glucose, Arterial 33 (*)     POCT Lactate, Arterial 0.9      POCT Base Excess, Arterial 4.7 (*)     POCT HCO3 Calculated, Arterial 32.0 (*)     POCT Hemoglobin, Arterial 14.2      POCT Anion Gap, Arterial 2 (*)     Patient Temperature        FiO2 44      Site of Arterial Puncture Radial Right      Byron's Test Positive     URINALYSIS WITH REFLEX CULTURE AND  MICROSCOPIC - Abnormal    Color, Urine Bianca (*)     Appearance, Urine Hazy (*)     Specific Gravity, Urine 1.015      pH, Urine 5.0      Protein, Urine 100 (2+) (*)     Glucose, Urine >=500 (3+) (*)     Blood, Urine LARGE (3+) (*)     Ketones, Urine NEGATIVE      Bilirubin, Urine NEGATIVE      Urobilinogen, Urine <2.0      Nitrite, Urine NEGATIVE      Leukocyte Esterase, Urine MODERATE (2+) (*)    SERIAL TROPONIN-INITIAL - Abnormal    Troponin I, High Sensitivity 161 (*)     Narrative:     Less than 99th percentile of normal range cutoff-  Female and children under 18 years old <14 ng/L; Male <21 ng/L: Negative  Repeat testing should be performed if clinically indicated.     Female and children under 18 years old 14-50 ng/L; Male 21-50 ng/L:  Consistent with possible cardiac damage and possible increased clinical   risk. Serial measurements may help to assess extent of myocardial damage.     >50 ng/L: Consistent with cardiac damage, increased clinical risk and  myocardial infarction. Serial measurements may help assess extent of   myocardial damage.      NOTE: Children less than 1 year old may have higher baseline troponin   levels and results should be interpreted in conjunction with the overall   clinical context.     NOTE: Troponin I testing is performed using a different   testing methodology at [facility] than at other   [facility]. Direct result comparisons should only   be made within the same method.   SERIAL TROPONIN, 1 HOUR - Abnormal    Troponin I, High Sensitivity 161 (*)     Narrative:     Less than 99th percentile of normal range cutoff-  Female and children under 18 years old <14 ng/L; Male <21 ng/L: Negative  Repeat testing should be performed if clinically indicated.     Female and children under 18 years old 14-50 ng/L; Male 21-50 ng/L:  Consistent with possible cardiac damage and possible increased clinical   risk. Serial measurements may help to assess extent of myocardial  damage.     >50 ng/L: Consistent with cardiac damage, increased clinical risk and  myocardial infarction. Serial measurements may help assess extent of   myocardial damage.      NOTE: Children less than 1 year old may have higher baseline troponin   levels and results should be interpreted in conjunction with the overall   clinical context.     NOTE: Troponin I testing is performed using a different   testing methodology at [facility] than at other   [facility]. Direct result comparisons should only   be made within the same method.   MICROSCOPIC ONLY, URINE - Abnormal    WBC, Urine >50 (*)     WBC Clumps, Urine MANY      RBC, Urine >20 (*)     Squamous Epithelial Cells, Urine 1-9 (SPARSE)      Budding Yeast, Urine PRESENT (*)     Mucus, Urine FEW      Hyaline Casts, Urine 2+ (*)    POCT GLUCOSE - Abnormal    POCT Glucose 111 (*)    PHOSPHORUS - Normal    Phosphorus 4.4     MAGNESIUM - Normal    Magnesium 2.40     AMMONIA - Normal    Ammonia 47     LACTATE - Normal    Lactate 1.0      Narrative:     Venipuncture immediately after or during the administration of Metamizole may lead to falsely low results. Testing should be performed immediately  prior to Metamizole dosing.   BLOOD CULTURE   BLOOD CULTURE   MRSA SURVEILLANCE FOR VANCOMYCIN DE-ESCALATION, PCR   URINE CULTURE   TROPONIN SERIES- (INITIAL, 1 HR)    Narrative:     The following orders were created for panel order Troponin Series, (0, 1 HR).  Procedure                               Abnormality         Status                     ---------                               -----------         ------                     Troponin I, High Sensiti...[[id number]]  Abnormal            Final result               Troponin, High Sensitivi...[[id number]]  Abnormal            Final result                 Please view results for these tests on the individual orders.   URINALYSIS WITH REFLEX CULTURE AND MICROSCOPIC    Narrative:     The following orders were  created for panel order Urinalysis with Reflex Culture and Microscopic.  Procedure                               Abnormality         Status                     ---------                               -----------         ------                     Urinalysis with Reflex C...[[id number]]  Abnormal            Final result               Extra Urine Gray Tube[[id number]]                            In process                   Please view results for these tests on the individual orders.   SARS-COV-2 AND INFLUENZA A/B PCR   RSV PCR   EXTRA URINE GRAY TUBE   TROPONIN I, HIGH SENSITIVITY   BASIC METABOLIC PANEL         RADIOLOGY:  Interpreted by Radiologist.  CT head wo IV contrast   Final Result   Addendum (preliminary) 1 of 1   Interpreted By:  Chuck Argueta,    ADDENDUM:   Subdural collection over the right convexity is new from 12/31/2022   though is of CSF attenuation. There is no evidence of dense acute   hemorrhage at this site.        There is some asymmetry of the lateral ventricles with the left   larger than the right. This in part may be related to some residual   mass effect from the right subdural collection though there is no   significant midline shift or sulcal effacement.        Signed by: Chuck Argueta 1/3/2024 3:48 PM        -------- ORIGINAL REPORT --------   Dictation workstation:   [id number]      Final   Chronic appearing low density extra-axial fluid collections overlying   the frontal convexities, greater on the right. These findings are new   from 12/31/2022.        Generalized parenchymal atrophy and small-vessel ischemic changes of   the cerebral white matter.        Maxillary, ethmoid, and right sphenoid sinusitis with acute fluid   levels.        No sign of acute intracranial hemorrhage or mass effect.        The visualized osseous structures are intact.        Signed by: Chuck Argueta 1/3/2024 1:42 PM   Dictation workstation:   [id number]      CT cervical spine wo IV  contrast   Final Result   Severe multilevel cervical spondylosis with reversal of the cervical   lordosis and mild dextro convexity of the cervical spine.        Multilevel bulging disc with facet and uncovertebral arthrosis. There   is large superimposed extruded disc at C4-5 on the right. Severe   central canal stenosis at this level asymmetric to the right. There   is at least moderate central canal narrowing at C5-6 and C3-4. There   is severe multilevel bilateral neural foraminal narrowing as detailed   above.        Allowing for motion degradation, there is no CT evidence of acute   fracture or traumatic subluxation.        Bilateral pleural effusions and dependent atelectasis are partially   visualized, greater on the left. There are bilateral emphysematous   changes with ground-glass density in the lung apices. Focal opacity   with ill-defined margins is identified in the right apex. Further   workup with dedicated CT thorax is recommended.        Signed by: Chuck Argueta 1/3/2024 1:51 PM   Dictation workstation:   [id number]      CT chest abdomen pelvis wo IV contrast   Final Result   1.Bilateral pleural effusions greater on the left than the right,   these appear decreased from the prior especially on the right.   Adjacent compressive and subsegmental atelectatic changes are noted as   well as some areas of consolidation.   2.Improving groundglass infiltrates in both lungs with mild   residual. There is a small focus of infiltrate in the right upper lobe   anteriorly which appears slightly more prominent.  Pulmonary findings   suggest a combination of pneumonia and pulmonary vascular congestion.    This could also be correlated clinically.  Underlying nodules could be   obscured and follow-up is suggested.   3.Cardiomegaly with AICD in place also present previously.   4.Within the abdomen and pelvis there are multiple fluid and   air-filled loops of small and large bowel as can be seen with  adynamic   ileus. There is a small amount of presacral and pelvic fluid. No   pneumoperitoneum.   5.Abdominal wall edema.   6.  2 cm cyst in the upper pole of the right kidney with small   peripheral calcifications which are more prominent than on the prior.   Follow-up studies are suggested.   Signed by Nancy Hagen DO      CT maxillofacial bones wo IV contrast   Final Result   Soft tissue swelling superior left orbit.        Mild deformity at the distal tip of the nasal bones greater on the   right. This appearance is of uncertain age it should be correlated to   point tenderness.        The remaining visualized osseous structures are intact.        Maxillary, ethmoid, and right sphenoid sinusitis with fluid levels.        Signed by: Chuck Argueta 1/3/2024 1:56 PM   Dictation workstation:   [id number]      XR chest 1 view   Final Result   1. Marked improvement bilateral patchy alveolar opacities consistent   with resolving pneumonia.   2. Underlying moderate vascular congestion, improved.        MACRO:   None        Signed by: Anahi Fernando 1/3/2024 12:00 PM   Dictation workstation:   [id number]      CT 3D reconstruction    (Results Pending)       Encounter Date: 12/27/23   ECG 12 lead   Result Value    Ventricular Rate 114    Atrial Rate 468    QRS Duration 150    QT Interval 380    QTC Calculation(Bazett) 523    R Axis 29    T Axis 200    QRS Count 18    Q Onset 211    T Offset 401    QTC Fredericia 470    Narrative    Undetermined rhythm  Left bundle branch block  Abnormal ECG  When compared with ECG of 27-DEC-2023 07:49, (unconfirmed)  Current undetermined rhythm precludes rhythm comparison, needs review  See ED provider note for full interpretation and clinical correlation  Confirmed by Yomi Lainez ([id number]) on 1/1/2024 3:20:04 PM     ------------------------- NURSING NOTES AND VITALS REVIEWED ---------------------------   The nursing notes within the ED encounter and vital signs as below have  "been reviewed.   /66   Pulse 86   Temp 37 °C (98.6 °F) (Rectal)   Resp 20   Ht 1.753 m (5' 9\")   Wt 75.9 kg (167 lb 5.3 oz)   SpO2 100%   BMI 24.71 kg/m²   Oxygen Saturation Interpretation: Normal      ---------------------------------------------------PHYSICAL EXAM--------------------------------------  Physical Exam  Vitals and nursing note reviewed.   Constitutional:       General: He is in acute distress.      Appearance: He is well-developed. He is ill-appearing and toxic-appearing. He is not diaphoretic.      Comments: Patient appears frail, short of breath, alert and oriented x 1.  He is in moderate respiratory distress and tachypneic.   HENT:      Head: Normocephalic and atraumatic.      Nose: Nose normal.      Mouth/Throat:      Mouth: Mucous membranes are moist.      Pharynx: No oropharyngeal exudate or posterior oropharyngeal erythema.   Eyes:      General: No scleral icterus.        Right eye: No discharge.         Left eye: No discharge.      Extraocular Movements: Extraocular movements intact.      Conjunctiva/sclera: Conjunctivae normal.      Pupils: Pupils are equal, round, and reactive to light.      Comments: Patient has right periorbital ecchymosis.  Unknown history of fall.  He is on a apixaban.   Cardiovascular:      Rate and Rhythm: Normal rate and regular rhythm.      Heart sounds: No murmur heard.  Pulmonary:      Effort: Pulmonary effort is normal. No respiratory distress.      Breath sounds: Normal breath sounds.   Abdominal:      Palpations: Abdomen is soft.      Tenderness: There is no abdominal tenderness.   Musculoskeletal:         General: No swelling.      Cervical back: Neck supple. No rigidity or tenderness.   Lymphadenopathy:      Cervical: No cervical adenopathy.   Skin:     General: Skin is warm and dry.      Capillary Refill: Capillary refill takes less than 2 seconds.      Comments: There is a Sacral decubitus present   Neurological:      Mental Status: He is " alert.   Psychiatric:         Mood and Affect: Mood normal.            Procedures  NONE  ------------------------------ ED COURSE/MEDICAL DECISION MAKING----------------------    Medical Decision Making:   Patient seen and valuated by me.      ED Course as of 01/03/24 1735   Wed Jan 03, 2024   1201 Patient an EKG at 1150 23 interpreted by me at 1153.  Sinus bradycardia with PVCs.  Probable A-fib.  Left axis deviation.   ms,  ms,  ms.  Possible inferior infarct pattern.  RSR prime in lead V5 and V6 consistent with left bundle branch block.  I did compare this with previous EKG December 27, 2023 left bundle branch block is not new.  The overall morphology is similar. [EC]   1202 A second EKG was obtained at 1151.  This was interpreted by me 1153.  Patient has atrial fibrillation with a rate of 114 bpm.  he has left bundle branch block rate 114 bpm.  Left axis.  There are nonspecific ST-T changes.  QRS is 164 ms  ms.  Again the left bundle branch block is old.  The overall morphology is similar to an old EKG dated 12/27/2023 which also has left bundle branch block.  The atrial fibrillation is not new. [EC]   1203 I did review both EKGs above with the on-call interventional cardiologist Dr. Noam Grissom.  He concurs that the patient has atrial fibrillation with left bundle branch block and does not feel this represents acute STEMI. [EC]   1239 CBC and Auto Differential(!)  White blood cell count 22.3, hemoglobin 13.9, medic at 42.2, platelet count 245,000, left shift neutrophils 20.66 [EC]   1240 Comprehensive metabolic panel(!)  Glucose 132, sodium 135, potassium 3.9, chloride 97, bicarb 23,  BUN 71, creatinine 2.30, GFR 29,  LFTs are unremarkable. [EC]   1243 Chest x-ray shows  IMPRESSION:  1. Marked improvement bilateral patchy alveolar opacities consistent  with resolving pneumonia.  2. Underlying moderate vascular congestion, improved.   [EC]   1418 Urinalysis with Reflex Culture and  Microscopic(!)  Urinalysis revealed moderate leukocyte Estrace negative for nitrites.  Greater than 50 white cells.  Budding yeast present. [EC]   1418 Lactate  Lactate is normal at 1.0 [EC]   1418 Protime-INR(!)  INR is therapeutic at 2.3. [EC]   1419 Troponin Series, (0, 1 HR)(!!)  First high-sensitivity troponin was 161 [EC]   1422 CT Head  IMPRESSION:  Chronic appearing low density extra-axial fluid collections overlying  the frontal convexities, greater on the right. These findings are new  from 12/31/2022.      Generalized parenchymal atrophy and small-vessel ischemic changes of  the cerebral white matter.      Maxillary, ethmoid, and right sphenoid sinusitis with acute fluid  levels.      No sign of acute intracranial hemorrhage or mass effect.      The visualized osseous structures are intact.   [EC]   1423 CT C spine  IMPRESSION:  Severe multilevel cervical spondylosis with reversal of the cervical  lordosis and mild dextro convexity of the cervical spine.      Multilevel bulging disc with facet and uncovertebral arthrosis. There  is large superimposed extruded disc at C4-5 on the right. Severe  central canal stenosis at this level asymmetric to the right. There  is at least moderate central canal narrowing at C5-6 and C3-4. There  is severe multilevel bilateral neural foraminal narrowing as detailed  above.      Allowing for motion degradation, there is no CT evidence of acute  fracture or traumatic subluxation.      Bilateral pleural effusions and dependent atelectasis are partially  visualized, greater on the left. There are bilateral emphysematous  changes with ground-glass density in the lung apices. Focal opacity  with ill-defined margins is identified in the right apex. Further  workup with dedicated CT thorax is recommended.      Signed by: Chuck Argueta 1/3/2024 1:51 PM   [EC]   1424 CT Facial bones:  IMPRESSION:  Soft tissue swelling superior left orbit.      Mild deformity at the distal tip  of the nasal bones greater on the  right. This appearance is of uncertain age it should be correlated to  point tenderness.      The remaining visualized osseous structures are intact.      Maxillary, ethmoid, and right sphenoid sinusitis with fluid levels.   [EC]   1544 WBC(!): 22.3 [EC]   1629 Urinalysis with Reflex Culture and Microscopic(!) [EC]   1632 CT chest abdomen pelvis:  IMPRESSION:  1.Bilateral pleural effusions greater on the left than the right,  these appear decreased from the prior especially on the right.  Adjacent compressive and subsegmental atelectatic changes are noted as  well as some areas of consolidation.  2.Improving groundglass infiltrates in both lungs with mild  residual. There is a small focus of infiltrate in the right upper lobe  anteriorly which appears slightly more prominent.  Pulmonary findings  suggest a combination of pneumonia and pulmonary vascular congestion.   This could also be correlated clinically.  Underlying nodules could be  obscured and follow-up is suggested.  3.Cardiomegaly with AICD in place also present previously.  4.Within the abdomen and pelvis there are multiple fluid and  air-filled loops of small and large bowel as can be seen with adynamic  ileus. There is a small amount of presacral and pelvic fluid. No  pneumoperitoneum.  5.Abdominal wall edema.  6.  2 cm cyst in the upper pole of the right kidney with small  peripheral calcifications which are more prominent than on the prior.  Follow-up studies are suggested.  Signed by Nancy Hagen DO   [EC]      ED Course User Index  [EC] Jamir Chamberlain DO         Diagnoses as of 01/03/24 1735   Acute dyspnea   Congestive heart failure, unspecified HF chronicity, unspecified heart failure type (CMS/HCC)   Pneumonia due to infectious organism, unspecified laterality, unspecified part of lung   Acute respiratory failure with hypoxia (CMS/HCC)   Elevated troponin I level      Patient does have an elevated white count  22.3 with a left shift.  H&H is stable.  Lactate is normal at 1.0  BNP is elevated 969  First troponin I is 161, second troponin I is 161.  Urinalysis shows moderate leukocyte Estrace and negative nitrites.  Greater than 50 white cells, 1 through 9 squamous cells, budding yeast present.    CT of the chest abdomen pelvis revealed bilateral pleural effusions greater on the left than the right.  Improving groundglass infiltrates in both lungs with mild residual.  Small focus of infiltrate in the right upper lobe anteriorly.  Findings suggest a combination of pneumonia and pulmonary vascular congestion.  There is cardiomegaly with AICD in place.    CT of the abdomen pelvis revealed multiple fluid and air-filled loops of large and small bowel can be seen with adynamic ileus.  Small amount of presacral pelvic fluid.  No pneumoperitoneum.  2 cm cyst in the upper pole of the right kidney.    CT of the head showed no obvious fracture.  There is small fluid collection on the right side compared to the left consistency of CSF fluid.  There is no evidence of blood.  There is no midline shift.  There is overall parenchymal atrophy and chronic ischemic changes.  No evidence of acute stroke.  CT of the cervical spine revealed no evidence of acute fracture.  There are multiple other changes noted please see report.  See T of the facial bones revealed no evidence of acute gross fracture.    Clinically when the patient arrived he was very moist cough and gurgly respirations.  I do have concerns that perhaps he had some aspiration at that time.    Clinically he has pneumonia, CHF, acute respiratory failure.  Patient has known cardiomyopathy and an AICD/pacemaker.  Elevated white count of 22,000 and has been chronically elevated for weeks according to the wife.  The patient does not have a fever here he does have a normal lactate.  The patient was pancultured and started on Zosyn and vancomycin.  I performed a sepsis reperfusion exam  at 1700.  The patient's mental status is improving.  The patient's vital signs are slowly improving.  His initial lactate is normal.    I discussed the findings with the patient's wife.  The patient is a full code at the patient's wish.  The wife voiced that she would prefer [place] to [facility] [place].  I did talk with the hospitalist Dr. Kelly at [facility] who felt the patient is too complex for their hospital.    I did talk with Dr. Gisele Vasquez at [facility] and the patient is accepted in transfer there to a [facility]    Patient's blood pressure slowly improving here.  Patient has IV Zosyn ordered every 6 hours.  I have ordered a repeat troponin and BMP  Patient's mental status is improving here.  I do not think the patient is likely to be septic.  I have discussed the findings with the patient's wife and she verbalizes understanding.  The wife understands that her  is very ill with multiple health problems.    The patient will be signed out to the oncoming physician on shift change:  Dr ARY Zapien at 1900  Patient is pending transfer to [facility].      Counseling:   The emergency provider has spoken with the  patient's wife  and discussed today’s results, in addition to providing specific details for the plan of care and counseling regarding the diagnosis and prognosis.  Questions are answered at this time and they are agreeable with the plan.      --------------------------------- IMPRESSION AND DISPOSITION ---------------------------------        IMPRESSION  1. Acute dyspnea    2. Congestive heart failure, unspecified HF chronicity, unspecified heart failure type (CMS/HCC)    3. Pneumonia due to infectious organism, unspecified laterality, unspecified part of lung    4. Acute respiratory failure with hypoxia (CMS/HCC)    5. Elevated troponin I level        DISPOSITION  Disposition: Transfer to [facility] to the service of Dr Gisele Vasquez  Patient condition is  guarded/poor      Billing  Provider Critical Care Time: 0 minutes     Jamir Chamberlain,   01/03/24 1733

## 2024-01-05 NOTE — CARE PLAN
The patient's goals for the shift include      The clinical goals for the shift include pt to have effective breathing    Problem: Skin  Goal: Prevent/manage excess moisture  Outcome: Progressing     Problem: Skin  Goal: Prevent/minimize sheer/friction injuries  Outcome: Progressing

## 2024-01-05 NOTE — SIGNIFICANT EVENT
Clinical update to history and physical done by my partner.    Patient is at baseline oxygen.  Discussed with the wife, and I have a suspicion that he had a aspiration event at the facility.  His stools have slowed down, but his initial C. difficile is positive, pending toxin, and vancomycin has been ordered.  Infectious disease consult.

## 2024-01-05 NOTE — PROGRESS NOTES
Physical Therapy    Physical Therapy Evaluation & Treatment    Patient Name: Hai Spaulding  MRN: [id number]  Today's Date: 1/5/2024   Time Calculation  Start Time: 1415  Stop Time: 1451  Time Calculation (min): 36 min    Assessment/Plan   PT Assessment  PT Assessment Results: Decreased strength, Decreased range of motion, Decreased endurance, Impaired balance, Decreased mobility, Decreased coordination, Decreased cognition, Impaired judgement, Decreased safety awareness, Impaired vision, Impaired hearing, Impaired sensation, Impaired tone, Decreased skin integrity, Pain  Rehab Prognosis: Good  Evaluation/Treatment Tolerance: Patient tolerated treatment well, Patient limited by fatigue  Medical Staff Made Aware: Yes  Strengths: Ability to acquire knowledge  Barriers to Participation: Comorbidities  End of Session Communication: Bedside nurse  Assessment Comment: The patient is a 73 y.o. male admitted to the hospital for increasing SOB. The patient currently requires maxAx2 to depAx2 for bed mobility. The patient has experienced a functional decline and would continue to benefit from skilled therapy services to address functional deficits.  End of Session Patient Position: Bed, 3 rail up   IP OR SWING BED PT PLAN  Inpatient or Swing Bed: Inpatient  PT Plan  Treatment/Interventions: Bed mobility, Transfer training, Gait training, Balance training, Neuromuscular re-education, Strengthening, Endurance training, Range of motion, Therapeutic exercise, Therapeutic activity, Home exercise program  PT Plan: Skilled PT  PT Frequency: 4 times per week  PT Discharge Recommendations: Moderate intensity level of continued care  Equipment Recommended upon Discharge: Wheeled walker, Wheelchair  PT Recommended Transfer Status: Assist x2, Total assist  PT - OK to Discharge: Yes (to next appropriate level of care)      Subjective     General Visit Information:  General  Reason for Referral: mobility impairment  Referred By: Maurice PABON  DO Semaj  Past Medical History Relevant to Rehab: laryngeal ca, radiation, CVA, dysphagia  Family/Caregiver Present: Yes  Caregiver Feedback: spouse  Co-Treatment: OT  Co-Treatment Reason: Need for second skilled therapist due to heavy level of assist needed  Prior to Session Communication: Bedside nurse  Patient Position Received: Bed, 2 rail up  Preferred Learning Style: verbal  General Comment: Pt is a 71 yo male admit with SOB and weakness. Pt with hx of CVA, 3 hospital admits recently, and with recent COVID 19 diagnosis  Home Living:  Home Living  Type of Home: House  Lives With: Spouse  Home Adaptive Equipment: Walker rolling or standard, Hospital bed (rollator)  Home Layout: One level  Home Access: Stairs to enter with rails  Entrance Stairs-Rails: Right  Entrance Stairs-Number of Steps: 1  Bathroom Shower/Tub: Tub/shower unit  Bathroom Toilet: Standard  Bathroom Equipment: Grab bars in shower, Tub transfer bench  Home Living Comments: Pt has been at SNF with multiple recent hospital admits.  Prior Level of Function:  Prior Function Per Pt/Caregiver Report  Level of [place]: Needs assistance with ADLs, Needs assistance with functional transfers  Receives Help From: Family  ADL Assistance: Needs assistance (able to feed self)  Homemaking Assistance: Needs assistance (spouse completes)  Ambulatory Assistance: Needs assistance (supervision with rollator)  Precautions:  Precautions  Medical Precautions: Fall precautions, Infection precautions, Oxygen therapy device and L/min (COVID+; 4L O2)    Objective   Pain:  Pain Assessment  Pain Assessment: 0-10  Pain Score: 5 - Moderate pain  Pain Type: Chronic pain  Pain Location: Buttocks  Pain Interventions: Repositioned  Cognition:  Cognition  Overall Cognitive Status: Impaired  Following Commands: Follows one step commands with increased time  Insight: Severe    General Assessments:  General Observation  General Observation: Pt pleasant; impaired skin  integrity; edema RUE     Activity Tolerance  Endurance: Decreased tolerance for upright activites    Sensation  Light Touch: Partial deficits in the RUE, Partial deficits in the RLE    Strength  Strength Comments: RLE grossly 1-2/5; LLE WFL  Coordination  Movements are Fluid and Coordinated: No  Upper Body Coordination: R side neglect  Coordination Comment: Increased time and effort for mobility initiation    Postural Control  Posture Comment: Heavy Lt trunk lean in sitting    Static Sitting Balance  Static Sitting-Balance Support: Bilateral upper extremity supported  Static Sitting-Level of Assistance: Maximum assistance  Static Sitting-Comment/Number of Minutes: MaxA to maintain midline due to heavy Lt trunk lean; approx. 5 min.  Functional Assessments:  Bed Mobility  Bed Mobility: Yes  Bed Mobility 1  Bed Mobility 1: Supine to sitting  Level of Assistance 1: Maximum assistance (x2)  Bed Mobility Comments 1: Assist with BLE to EOB and trunk elevation.  Bed Mobility 2  Bed Mobility  2: Sitting to supine  Level of Assistance 2: Dependent (x2)  Bed Mobility Comments 2: DepAx2 to return to supine.  Bed Mobility 3  Bed Mobility 3: Rolling right, Rolling left  Level of Assistance 3: Maximum assistance  Bed Mobility Comments 3: MaxA for rolling kat x 3; waffle mattress overlay applied to mattress  Extremity/Trunk Assessments:  RLE   RLE :  (grossly 1-2/5; limited AAROM at end range)  LLE   LLE : Within Functional Limits  Treatments:  Bed Mobility  Bed Mobility: Yes  Bed Mobility 1  Bed Mobility 1: Supine to sitting  Level of Assistance 1: Maximum assistance (x2)  Bed Mobility Comments 1: Assist with BLE to EOB and trunk elevation.  Bed Mobility 2  Bed Mobility  2: Sitting to supine  Level of Assistance 2: Dependent (x2)  Bed Mobility Comments 2: DepAx2 to return to supine.  Bed Mobility 3  Bed Mobility 3: Rolling right, Rolling left  Level of Assistance 3: Maximum assistance  Bed Mobility Comments 3: MaxA for rolling kat  x 3; waffle mattress overlay applied to mattress  Outcome Measures:  [facility] Basic Mobility  Turning from your back to your side while in a flat bed without using bedrails: Total  Moving from lying on your back to sitting on the side of a flat bed without using bedrails: Total  Moving to and from bed to chair (including a wheelchair): Total  Standing up from a chair using your arms (e.g. wheelchair or bedside chair): Total  To walk in hospital room: Total  Climbing 3-5 steps with railing: Total  Basic Mobility - Total Score: 6    Encounter Problems       Encounter Problems (Active)       PT Problem       The patient will demonstrate an overall strength of >3+/5 in BLE to assist with completion of functional mobility.   (Progressing)       Start:  01/05/24    Expected End:  02/05/24            The patient will be able to complete bed mobility at a [place] level with use of bed rails.   (Progressing)       Start:  01/05/24    Expected End:  02/05/24            The patient will be able to complete safe transfer of sit <> stand with minimal VC at a [place] level.   (Progressing)       Start:  01/05/24    Expected End:  02/05/24            The patient will be able to ambulate at a [place] level for >15ftx1 with RW.  (Progressing)       Start:  01/05/24    Expected End:  02/05/24                   Education Documentation  Precautions, taught by Conchita Garg PT at 1/5/2024  4:10 PM.  Learner: Patient  Readiness: Acceptance  Method: Explanation  Response: Verbalizes Understanding, Needs Reinforcement    Mobility Training, taught by Conchita Garg PT at 1/5/2024  4:10 PM.  Learner: Patient  Readiness: Acceptance  Method: Explanation  Response: Verbalizes Understanding, Needs Reinforcement    Education Comments  No comments found.

## 2024-01-05 NOTE — CONSULTS
Inpatient consult to Infectious Diseases  Consult performed by: Frank Escalante MD  Consult ordered by: Rahul Craven MD          Primary MD: KIP Corcoran-CNP    Reason For Consult  Coronavirus infection, C. difficile infection  History Of Present Illness  Hai Spaulding is a 73 y.o. male presenting with diarrhea.  He has a background history of laryngeal cancer, aspiration pneumonia.  He was recently diagnosed with coronavirus infection and was treated with dexamethasone, remdesivir and Zosyn.  He was discharged on dexamethasone.  He was reported to have large amount of loose bowel movements.  He had interval positive stool for C. difficile.  He is on oral vancomycin.  He has no reported abdominal pain, nausea or vomiting     Past Medical History  He has a past medical history of Acid reflux, Aspiration pneumonia (CMS/[facility]) (11/23/2021), Aspiration pneumonia resulting from a procedure (10/25/2021), Atrial fibrillation with RVR (CMS/[facility]) (10/2021), Congestive heart failure (CHF) (CMS/[facility]) (10/2021), COPD (chronic obstructive pulmonary disease) (CMS/[facility]), COVID, Diabetes (CMS/[facility]), Diabetic eye exam (CMS/[facility]) (05/08/2019), Hodgkin lymphoma (CMS/[facility]), HTN (hypertension), Hyperlipidemia, Larynx cancer (CMS/[facility]), Myocardial infarction (CMS/[facility]), Neuropathy, Pneumonia due to gram-negative bacteria (01/01/2023), Pneumonitis due to inhalation of other solids and liquids (CMS/[facility]) (11/25/2021), Urinary tract infection (01/04/2023), and Urinary tract infection, site not specified (12/18/2023).    Surgical History  He has a past surgical history that includes Other surgical history; Other surgical history; CT angio abdomen pelvis w and or wo IV IV contrast (01/22/2022); Other surgical history; Tonsillectomy; Other surgical history; Cholecystectomy; Other surgical history; Tumor excision; Cardiac defibrillator placement (05/2022); IR injection epidural steroid; and Tumor removal.     Social History      Occupational History    Not on file   Tobacco Use    Smoking status: Every Day     Packs/day: 0.50     Years: 60.00     Additional pack years: 0.00     Total pack years: 30.00     Types: Cigarettes     Passive exposure: Past    Smokeless tobacco: Never   Vaping Use    Vaping Use: Never used   Substance and Sexual Activity    Alcohol use: Never    Drug use: Never    Sexual activity: Not Currently     Travel History   Travel since 12/05/23    No documented travel since 12/05/23        Service:  Branch Years Served Period   [employer]       Comments:          Family History  Family History   Problem Relation Name Age of Onset    Diabetes Mother      Other (htn) Mother      Heart disease Mother      Diabetes Father      Heart disease Father      Other (htn) Father       Allergies  Patient has no known allergies.     Immunization History   Administered Date(s) Administered    Flu vaccine (IIV4), preservative free *Check age/dose* 10/21/2015    Flu vaccine, quadrivalent, high-dose, preservative free, age 65y+ (FLUZONE) 09/30/2020, 11/15/2023    Influenza, High Dose Seasonal, Preservative Free 11/30/2018, 11/18/2019    Influenza, Unspecified 11/16/2005, 10/02/2009, 11/27/2012, 09/30/2020    Influenza, injectable, quadrivalent 10/28/2014    Influenza, seasonal, injectable 11/14/2011, 10/07/2013    Influenza, trivalent, adjuvanted 11/15/2019    Moderna SARS-CoV-2 Vaccination 03/13/2021, 04/10/2021    Pfizer Purple Cap SARS-CoV-2 04/19/2022    Pneumococcal conjugate vaccine, 13-valent (PREVNAR 13) 11/05/2015    Pneumococcal polysaccharide vaccine, 23-valent, age 2 years and older (PNEUMOVAX 23) 11/27/2012, 10/08/2019     Medications  Home medications:  Facility-Administered Medications Prior to Admission   Medication Dose Route Frequency Provider Last Rate Last Admin    dexAMETHasone (Decadron) tablet 6 mg  6 mg oral Daily Maurice Cha, DO         Medications Prior to Admission   Medication Sig Dispense Refill  "Last Dose    albuterol 2.5 mg /3 mL (0.083 %) nebulizer solution Take 3 mL (2.5 mg) by nebulization every 2 hours if needed for wheezing or shortness of breath. 75 mL 11 Past Week    amiodarone (Pacerone) 200 mg tablet Take 1 tablet (200 mg) by mouth 2 times a day.  1 1/4/2024 at 0800    amoxicillin-pot clavulanate (Augmentin) 875-125 mg tablet Take 1 tablet (875 mg) by mouth 2 times a day for 5 days.   1/3/2024 at 2100    apixaban (Eliquis) 5 mg tablet Take 1 tablet (5 mg) by mouth 2 times a day.   1/4/2024 at 2100    atorvastatin (Lipitor) 80 mg tablet Take 1 tablet (80 mg) by mouth once daily at bedtime.   1/3/2024 at 2100    Autolet lancing device 3 times a day.   1/3/2024 at 2100    BD Cynthia 2nd Gen Pen Needle 32 gauge x 5/32\" needle Inject 1 each under the skin 3 times a day.   Unknown    carbidopa-levodopa (Sinemet CR)  mg ER tablet Take 1 tablet by mouth 3 times a day.   1/3/2024 at 2100    clopidogrel (Plavix) 75 mg tablet Take 1 tablet (75 mg) by mouth once daily. Do not start before January 2, 2024.   Unknown    cyclobenzaprine (Flexeril) 10 mg tablet Take 1 tablet (10 mg) by mouth once daily at bedtime.   1/3/2024 at 2100    dapagliflozin propanediol (Farxiga) 10 mg Take 1 tablet (10 mg) by mouth once daily. 30 tablet 11 1/3/2024 at 0800    docusate sodium (Colace) 100 mg capsule Take 1 capsule (100 mg) by mouth 2 times a day as needed for constipation.   Past Month    fluticasone propion-salmeteroL (Advair Diskus) 250-50 mcg/dose diskus inhaler INHALE ONE PUFF BY MOUTH TWO TIMES A DAY 1 each 3 Unknown    FreeStyle Joan 2 [place] misc Inject 1 each under the skin if needed.   Unknown    FreeStyle Joan 2 Sensor kit Inject 1 each under the skin every 14 (fourteen) days.   Unknown    gabapentin (Neurontin) 300 mg capsule Take 1 capsule (300 mg) by mouth once daily at bedtime.   1/3/2024 at 2100    insulin glargine (Lantus U-100 Insulin) 100 unit/mL injection Inject 30 Units under the skin once daily " "in the evening.   1/3/2024 at 2100    insulin lispro (HumaLOG) 100 unit/mL injection Inject 0-0.15 mL (0-15 Units) under the skin 3 times a day with meals. Take as directed per insulin instructions.   Past Week    insulin syr/ndl U100 half lexie (BD Veo Insulin Syr, half unit,) 0.3 mL 31 gauge x 15/64\" syringe 1 Needle once daily. 30 each 3 Unknown    insulin syringe-needle U-100 (BD Veo Insulin Syringe UF) 1 mL 31 gauge x 15/64\" syringe 3 times a day.   Unknown    insulin syringe-needle U-100 31G X 5/16\" 1 mL syringe Inject 1 each under the skin once daily. As directed daily 100 each 1 Unknown    ipratropium-albuteroL (Duo-Neb) 0.5-2.5 mg/3 mL nebulizer solution Take 3 mL by nebulization every 6 hours.   Past Month    Lactobacillus acidophilus (ACIDOPHILUS ORAL) As directed oral   Past Week    levothyroxine (Synthroid, Levoxyl) 25 mcg tablet Take 1 tablet (25 mcg) by mouth early in the morning.. Do not start before January 2, 2024.   1/3/2024 at 0700    metoprolol succinate XL (Toprol-XL) 25 mg 24 hr tablet Take 1 tablet (25 mg) by mouth 2 times a day. Do not crush or chew.   1/3/2024 at 2100    nicotine (Nicoderm CQ) 21 mg/24 hr patch Place 1 patch on the skin once every 24 hours.   1/3/2024 at 0800    omeprazole (PriLOSEC) 40 mg DR capsule Take 1 capsule (40 mg) by mouth once daily in the morning. Take before meals.   1/3/2024 at 0700    QUEtiapine (SEROquel) 25 mg tablet Take 1 tablet (25 mg) by mouth once daily at bedtime.   1/3/2024 at 2200    sertraline (Zoloft) 100 mg tablet Take 1 tablet (100 mg) by mouth once daily.   1/3/2024 at 0800    SITagliptin phosphate (Januvia) 50 mg tablet Take 1 tablet (50 mg) by mouth once daily. 30 tablet 11 1/3/2024 at 0800     Current medications:  Scheduled medications  amiodarone, 200 mg, oral, BID  apixaban, 5 mg, oral, BID  atorvastatin, 80 mg, oral, Nightly  budesonide, 0.5 mg, nebulization, BID  carbidopa-levodopa, 1 tablet, oral, TID  clopidogrel, 75 mg, oral, " "Daily  dapagliflozin propanediol, 10 mg, oral, Daily  insulin glargine, 10 Units, subcutaneous, Daily  insulin lispro, 0-10 Units, subcutaneous, TID with meals  ipratropium-albuteroL, 3 mL, nebulization, TID  lactobacillus acidophilus, 1 tablet, oral, BID  levothyroxine, 25 mcg, oral, Daily before breakfast  metoprolol succinate XL, 25 mg, oral, BID  nystatin, 1 Application, Topical, BID  QUEtiapine, 25 mg, oral, Nightly  sertraline, 100 mg, oral, Daily  SITagliptin phosphate, 50 mg, oral, Daily  vancomycin, 125 mg, oral, Daily      Continuous medications     PRN medications  PRN medications: dextrose 10 % in water (D10W), dextrose, glucagon    Review of Systems   Gastrointestinal:  Positive for diarrhea.   All other systems reviewed and are negative.       Objective  Range of Vitals (last 24 hours)  Heart Rate:  []   Temp:  [36 °C (96.8 °F)-36.7 °C (98.1 °F)]   Resp:  [15-30]   BP: ()/(57-95)   Height:  [175.3 cm (5' 9.02\")]   Weight:  [77.1 kg (169 lb 15.6 oz)-79.4 kg (175 lb 1.6 oz)]   SpO2:  [92 %-100 %]   Daily Weight  01/05/24 : 77.1 kg (169 lb 15.6 oz)    Body mass index is 25.09 kg/m².     Physical Exam  Constitutional:       Appearance: Normal appearance.   HENT:      Head: Normocephalic and atraumatic.      Nose: Nose normal.   Eyes:      Extraocular Movements: Extraocular movements intact.      Conjunctiva/sclera: Conjunctivae normal.      Comments: Right periorbital ecchymosis   Cardiovascular:      Rate and Rhythm: Normal rate and regular rhythm.      Heart sounds: Normal heart sounds, S1 normal and S2 normal.   Pulmonary:      Breath sounds: Decreased breath sounds present.   Abdominal:      General: Bowel sounds are normal.      Palpations: Abdomen is soft.   Musculoskeletal:      Cervical back: Normal range of motion and neck supple.   Neurological:      Mental Status: He is alert.          Relevant Results  Outside Hospital Results    Labs  Results from last 72 hours   Lab Units " 01/05/24  1343 01/03/24  1213   WBC AUTO x10*3/uL 18.7* 22.3*   HEMOGLOBIN g/dL 12.3* 13.9   HEMATOCRIT % 37.2* 42.2   PLATELETS AUTO x10*3/uL 245 245   NEUTROS PCT AUTO %  --  92.7   LYMPHO PCT MAN % 1.0  --    LYMPHS PCT AUTO %  --  3.8   MONO PCT MAN % 1.0  --    MONOS PCT AUTO %  --  2.7   EOSINO PCT MAN % 1.0  --    EOS PCT AUTO %  --  0.1     Results from last 72 hours   Lab Units 01/05/24  1343 01/03/24  1737   SODIUM mmol/L 138 136   POTASSIUM mmol/L 2.9* 3.6   CHLORIDE mmol/L 99 101   CO2 mmol/L 25 24   BUN mg/dL 43* 63*   CREATININE mg/dL 2.10* 2.61*   GLUCOSE mg/dL 153* 90   CALCIUM mg/dL 7.6* 7.6*   ANION GAP mmol/L 14 15   EGFR mL/min/1.73m*2 33* 25*     Results from last 72 hours   Lab Units 01/05/24  1343   ALK PHOS U/L 265*   BILIRUBIN TOTAL mg/dL 0.9   PROTEIN TOTAL g/dL 5.6*   ALT U/L <5*   AST U/L 20   ALBUMIN g/dL 2.5*     Estimated Creatinine Clearance: 31.8 mL/min (A) (by C-G formula based on SCr of 2.1 mg/dL (H)).  C-Reactive Protein   Date Value Ref Range Status   11/29/2023 2.53 (H) <1.00 mg/dL Final     CRP   Date Value Ref Range Status   12/31/2022 39.72 (A) mg/dL Final     Comment:     REF VALUE  < 1.00     12/14/2021 2.16 (A) mg/dL Final     Comment:     REF VALUE  < 1.00       Sedimentation Rate   Date Value Ref Range Status   11/29/2023 63 (H) 0 - 20 mm/h Final   12/31/2022 92 (H) 0 - 20 mm/h Final     Comment:     Please note new reference ranges as of 5/9/2022.   06/14/2019 11 0 - 20 mm/h Final     HIV 1/2 Antigen/Antibody Screen with Reflex to Confirmation   Date Value Ref Range Status   11/29/2023 Nonreactive Nonreactive Final     Hepatitis C AB   Date Value Ref Range Status   11/29/2023 Nonreactive Nonreactive Final     Comment:     Results from patients taking biotin supplements or receiving high-dose biotin therapy should be interpreted with caution due to possible interference with this test. Providers may contact their local laboratory for further information.      Microbiology  Susceptibility data from last 90 days.  Collected Specimen Info Organism   12/15/23 Fluid from SPUTUM Candida albicans   12/15/23 Blood culture from Peripheral Venipuncture Coagulase negative staphylococcus     Reviewed-positive C. difficile PCR  Imaging  CT 3D reconstruction    Addendum Date: 1/5/2024    Interpreted By:  Chuck Argueta, ADDENDUM: Multiplanar 3D reformations are also generated and reviewed on independent workstation.   Signed by: Chuck Argueta 1/5/2024 2:19 PM   -------- ORIGINAL REPORT -------- Dictation workstation:   [id number]    Result Date: 1/5/2024  Interpreted By:  Chuck Argueta, STUDY: CT FACIAL BONES WO IV CONTRAST;  1/3/2024 1:14 pm   INDICATION: Signs/Symptoms:fall.   COMPARISON: None.   ACCESSION NUMBER(S): [id number]   ORDERING CLINICIAN: FIONA GOMEZ   TECHNIQUE: Contiguous axial CT sections were performed through the bones and orbits and supplemented with coronal and sagittal reformatted images. The study is limited by motion degradation.   FINDINGS: There is mild soft tissue swelling superior to the left orbit.   There is some deformity at the distal nasal bones greater on the right. This appearance is of indeterminate age and should be correlated to point tenderness. The remaining osseous structures demonstrate no sign of acute fracture allowing for motion degradation. There is no overt bone destruction or aggressive periosteal reaction. No lytic or blastic lesion is detected.   There are small fluid levels in both maxillary sinuses, greater on the left. There is a small fluid level in the right sphenoid compartment. There is mucoperiosteal thickening in scattered fluid within the ethmoid air cells, greater on the left posteriorly. The frontal sinuses and mastoid air cells are clear and symmetrically aerated. There may be mild mucoperiosteal thickening at the floor of the left maxillary sinus.   The nasal septum is at the midline. The  ostiomeatal units are patent bilaterally.   The visualized orbital contents are unremarkable. There is no intraorbital air density or fluid collection. The medial and inferior orbital walls are intact.   There are least 2 small foci of air density posterior to the right maxillary sinus though no apparent posterior wall fracture or defect. The significance of these findings is indeterminate.       Soft tissue swelling superior left orbit.   Mild deformity at the distal tip of the nasal bones greater on the right. This appearance is of uncertain age it should be correlated to point tenderness.   The remaining visualized osseous structures are intact.   Maxillary, ethmoid, and right sphenoid sinusitis with fluid levels.   Signed by: Chuck Argueta 1/5/2024 2:19 PM Dictation workstation:   [id number]    CT maxillofacial bones wo IV contrast    Addendum Date: 1/5/2024    Interpreted By:  Chuck Argueta, ADDENDUM: Multiplanar 3D reformations are also generated and reviewed on independent workstation.   Signed by: Chuck Argueta 1/5/2024 9:43 AM   -------- ORIGINAL REPORT -------- Dictation workstation:   [id number]    Result Date: 1/5/2024  Interpreted By:  Chuck Argueta, STUDY: CT FACIAL BONES WO IV CONTRAST;  1/3/2024 1:14 pm   INDICATION: Signs/Symptoms:fall.   COMPARISON: None.   ACCESSION NUMBER(S): [id number]   ORDERING CLINICIAN: FIONA GOMEZ   TECHNIQUE: Contiguous axial CT sections were performed through the bones and orbits and supplemented with coronal and sagittal reformatted images. The study is limited by motion degradation.   FINDINGS: There is mild soft tissue swelling superior to the left orbit.   There is some deformity at the distal nasal bones greater on the right. This appearance is of indeterminate age and should be correlated to point tenderness. The remaining osseous structures demonstrate no sign of acute fracture allowing for motion degradation. There is no overt  bone destruction or aggressive periosteal reaction. No lytic or blastic lesion is detected.   There are small fluid levels in both maxillary sinuses, greater on the left. There is a small fluid level in the right sphenoid compartment. There is mucoperiosteal thickening in scattered fluid within the ethmoid air cells, greater on the left posteriorly. The frontal sinuses and mastoid air cells are clear and symmetrically aerated. There may be mild mucoperiosteal thickening at the floor of the left maxillary sinus.   The nasal septum is at the midline. The ostiomeatal units are patent bilaterally.   The visualized orbital contents are unremarkable. There is no intraorbital air density or fluid collection. The medial and inferior orbital walls are intact.   There are least 2 small foci of air density posterior to the right maxillary sinus though no apparent posterior wall fracture or defect. The significance of these findings is indeterminate.       Soft tissue swelling superior left orbit.   Mild deformity at the distal tip of the nasal bones greater on the right. This appearance is of uncertain age it should be correlated to point tenderness.   The remaining visualized osseous structures are intact.   Maxillary, ethmoid, and right sphenoid sinusitis with fluid levels.   Signed by: Chuck Argueta 1/3/2024 1:56 PM Dictation workstation:   [id number]    ECG 12 lead    Result Date: 1/5/2024  Undetermined rhythm Left bundle branch block Abnormal ECG When compared with ECG of 03-JAN-2024 11:50, (unconfirmed) Current undetermined rhythm precludes rhythm comparison, needs review Questionable change in QRS duration Criteria for Inferior infarct are no longer Present    ECG 12 lead    Result Date: 1/5/2024  Sinus bradycardia with frequent and consecutive Premature ventricular complexes and Fusion complexes Left axis deviation Nonspecific intraventricular block Inferior infarct , age undetermined Lateral injury pattern **  ** ACUTE MI / STEMI ** ** Abnormal ECG When compared with ECG of 27-DEC-2023 15:18, Significant changes have occurred    ECG 12 lead    Result Date: 1/4/2024  Ventricular-paced rhythm with occasional and consecutive sinus complexes and with frequent and consecutive Premature ventricular complexes with junctional escape complexes Abnormal ECG When compared with ECG of 04-JAN-2024 04:21, (unconfirmed) Previous ECG has undetermined rhythm, needs review See ED provider note for full interpretation and clinical correlation Confirmed by Yomi Lainez ([id number]) on 1/4/2024 9:24:49 AM    CT head wo IV contrast    Addendum Date: 1/3/2024    Interpreted By:  Chuck Argueta, ADDENDUM: Subdural collection over the right convexity is new from 12/31/2022 though is of CSF attenuation. There is no evidence of dense acute hemorrhage at this site.   There is some asymmetry of the lateral ventricles with the left larger than the right. This in part may be related to some residual mass effect from the right subdural collection though there is no significant midline shift or sulcal effacement.   Signed by: Chuck Argueta 1/3/2024 3:48 PM   -------- ORIGINAL REPORT -------- Dictation workstation:   [id number]    Result Date: 1/3/2024  Interpreted By:  Chuck Argueta, STUDY: CT HEAD WO IV CONTRAST;  1/3/2024 1:14 pm   INDICATION: Signs/Symptoms:fall, periorbital ecchymosis.   COMPARISON: 12/31/2022   ACCESSION NUMBER(S): [id number]   ORDERING CLINICIAN: FIONA GOMEZ   TECHNIQUE: Contiguous unenhanced axial CT sections are performed from the skull base to the vertex.   FINDINGS: The osseous structures are intact. There is small fluid levels in the maxillary sinuses, greater on the left as well as the right sphenoid compartment. There is partial opacification of the ethmoid air cells with mucoperiosteal thickening and fluid greater on the left. The visualized portions of the mastoid air cells are and frontal sinuses  are clear.   There is severe generalized parenchymal volume loss with enlargement of the cortical sulci and CSF spaces. There is a CSF subdural collection overlying the right frontal convexity extending laterally over the temporoparietal convexity. This finding is new from the previous study and measures 8 mm in maximum thickness. There may be a smaller CF density extra-axial collection over the left frontal convexity anteriorly.   There is hypodensity in the deep cerebral white matter bilaterally compatible with small-vessel ischemia. There is no sign of parenchymal hematoma. There is no dense extra-axial fluid collection. There is no mass effect or midline shift. There is some asymmetry of the lateral ventricles with the left larger than the right.       Chronic appearing low density extra-axial fluid collections overlying the frontal convexities, greater on the right. These findings are new from 12/31/2022.   Generalized parenchymal atrophy and small-vessel ischemic changes of the cerebral white matter.   Maxillary, ethmoid, and right sphenoid sinusitis with acute fluid levels.   No sign of acute intracranial hemorrhage or mass effect.   The visualized osseous structures are intact.   Signed by: Chuck Argueta 1/3/2024 1:42 PM Dictation workstation:   [id number]    CT chest abdomen pelvis wo IV contrast    Result Date: 1/3/2024  STUDY: CT Chest, Abdomen, and Pelvis without IV Contrast; 1/3/2024, 1:15PM. INDICATION: Shortness of breath and abdominal pain.  Leukocytosis. COMPARISON: CXR same day.  CT CAP 12/31/2022. ACCESSION NUMBER(S): [id number] ORDERING CLINICIAN: FIONA GOMEZ TECHNIQUE: CT of the chest, abdomen, and pelvis was performed.  Contiguous axial images were obtained at 3 mm slice thickness through the chest, abdomen, and pelvis.  Coronal and sagittal reconstructions at 3 mm slice thickness were performed.  No intravenous contrast was administered.  FINDINGS: Please note that the evaluation  of vessels, lymph nodes and organs is limited without intravenous contrast. CHEST: MEDIASTINUM: The heart is enlarged in size without pericardial effusion. Biventricular AICD remains in place.  There is no thoracic aortic, aneurysm.  Scattered vascular calcifications are seen along the arterial tree. LUNGS/PLEURA: There are bilateral pleural effusions greater on the left than the right, these appear decreased when compared with the prior especially on the right.  Adjacent compressive and subsegmental atelectatic changes are noted as well as some areas of probable consolidation. There is a focus of patchy infiltrate in the right upper lobe anteriorly in proximity to the anterior aspect of the first rib.  This appears slightly more prominent.  Trachea and mainstem bronchi appear stable, borderline narrowed Groundglass infiltrates noted previously appear improved with mild residual. LYMPH NODES: Mediastinal lymph nodes are mildly enlarged.  There is a pretracheal lymph node with the short axis diameter of 1.4 cm similar to the prior.  Detail is somewhat obscured, by artifact on the current study.  Patient's arms are included within the field-of-view. ABDOMEN:  LIVER: The liver is mildly enlarged, the right lobe measures 17 cm in CC dimension.  Assessment of parenchyma is limited by artifact.  Smooth surface contour.  Normal attenuation.  BILE DUCTS: No intrahepatic or extrahepatic biliary ductal dilatation.  GALLBLADDER: The gallbladder is surgically absent and this was also the case previously.  PANCREAS: No masses or ductal dilatation.  SPLEEN: The spleen is not clearly identified nor was it previously.  ADRENAL GLANDS: No thickening or nodules.  KIDNEYS AND URETERS: Kidneys are normal in size and location.  No renal or ureteral calculi.  There is a 2 cm cyst in the upper pole of the right kidney with small peripheral calcifications which are more prominent than on the prior.  No hydronephrosis of either kidney.   PELVIS:  BLADDER: There is increased bladder distention when compared with the prior. There is extrinsic pressure from the prostate and coarse prostate calcifications also present previously.  Increased attenuation in the dependent bladder appears to be likely artifactual.  REPRODUCTIVE ORGANS: Coarse prostate calcifications are noted also present previously.  BOWEL: There are multiple fluid and air-filled loops of small and large bowel as can be seen with adynamic ileus.  VESSELS: No acute abnormalities identified.  Abdominal aorta is normal in caliber.  There are vascular calcifications along the arterial tree.  PERITONEUM/RETROPERITONEUM/LYMPH NODES: There are mesenteric and pelvic reticulations.  There is a small amount of presacral and pelvic fluid.  No pneumoperitoneum. No lymphadenopathy.  ABDOMINAL WALL AND SOFT TISSUES: Abdominal wall edema is noted especially laterally more so on the right and posteriorly.  BONES: No acute fracture or aggressive osseous lesion.  Lumbar dextroscoliosis is noted and there are degenerative changes of the spine and joints.    1.Bilateral pleural effusions greater on the left than the right, these appear decreased from the prior especially on the right. Adjacent compressive and subsegmental atelectatic changes are noted as well as some areas of consolidation. 2.Improving groundglass infiltrates in both lungs with mild residual. There is a small focus of infiltrate in the right upper lobe anteriorly which appears slightly more prominent.  Pulmonary findings suggest a combination of pneumonia and pulmonary vascular congestion. This could also be correlated clinically.  Underlying nodules could be obscured and follow-up is suggested. 3.Cardiomegaly with AICD in place also present previously. 4.Within the abdomen and pelvis there are multiple fluid and air-filled loops of small and large bowel as can be seen with adynamic ileus. There is a small amount of presacral and pelvic  fluid. No pneumoperitoneum. 5.Abdominal wall edema. 6.  2 cm cyst in the upper pole of the right kidney with small peripheral calcifications which are more prominent than on the prior. Follow-up studies are suggested. Signed by Nancy Hagen DO    CT cervical spine wo IV contrast    Result Date: 1/3/2024  Interpreted By:  Chuck Argueta, STUDY: CT CERVICAL SPINE WO IV CONTRAST;  1/3/2024 1:14 pm   INDICATION: Signs/Symptoms:fall.   COMPARISON: None.   ACCESSION NUMBER(S): [id number]   ORDERING CLINICIAN: FIONA GOMEZ   TECHNIQUE: Contiguous axial CT sections are performed from the skullbase to the upper thoracic spine and supplemented with coronal and sagittal reformatted images. The study is limited by motion degradation.   FINDINGS: There is mild dextro convexity of the cervical spine and reversal of the cervical lordosis. There is anterior subluxation of C2 relative to C3 measuring 3 mm. The facet joints align normally.   There are multilevel discogenic degenerative changes of the cervical spine with disc space narrowing from C3 through C6. There is degenerative endplate sclerosis and lucency is more pronounced at C5-6.   The cervical vertebral body heights are maintained. Allowing for motion degradation, there is no CT evidence of acute cervical spine fracture. There is no bone destruction or aggressive periosteal reaction. No lytic or blastic lesion is detected. The visualized surrounding osseous structures are intact.   The C1-2 relationship is within normal limits. There is C1-2 arthrosis anteriorly with joint space narrowing and marginal spurring.   The C2-3 disc space level demonstrates moderate facet arthrosis on the right and mild facet arthrosis on the left. There is bulging disc and uncovertebral arthrosis with mild narrowing of the left neural foramen and mild to moderate narrowing on the right. There is mild central canal narrowing with bulging disc and mass effect upon the ventral  subarachnoid space.   The C3-4 disc space level demonstrates mild to moderate bilateral facet arthrosis. There is bulging disc and marginal osteophyte asymmetric to the right with severe uncovertebral arthrosis bilaterally. There is severe bilateral neural foraminal narrowing, greater on the right with moderate central canal narrowing.   The C4-5 disc space level demonstrates mild to moderate bilateral facet arthrosis, greater on the left. There is bulging disc and marginal osteophyte with severe bilateral uncovertebral arthrosis. There is severe narrowing of the right neural foramen and moderate to severe narrowing of the left neural foramen. There is superimposed extruded disc on the right with severe central canal narrowing and suspected mass effect upon the spinal cord. A similar finding was described on a MRI examination of 10/13/2022 though it is difficult to compare different modalities with respect to the size and intensity of this finding.   The C5-6 disc space level demonstrates moderate bilateral facet arthrosis. There is bulging disc and marginal osteophyte with moderate central canal narrowing. There is severe bilateral neural foraminal narrowing.   The C6-7 disc space level demonstrates mild to moderate bilateral facet arthrosis. There is bulging disc and marginal osteophyte moderate narrowing left neural foramen and mild narrowing of the right neural foramen. There is mild to moderate central canal narrowing.   The C7-T1 disc space level demonstrates moderate bilateral facet arthrosis. There is bulging disc and marginal osteophyte with moderate bilateral neural foraminal narrowing. There is mild central canal narrowing.   There is no prevertebral soft tissue swelling or retropharyngeal air. Bilateral pleural effusions and dependent atelectasis is partially visualized, greater on the left. There are bilateral emphysematous changes with ground-glass density in both lung apices. There is superimposed  focal opacity in the right apex anteriorly with a few peripheral air densities.       Severe multilevel cervical spondylosis with reversal of the cervical lordosis and mild dextro convexity of the cervical spine.   Multilevel bulging disc with facet and uncovertebral arthrosis. There is large superimposed extruded disc at C4-5 on the right. Severe central canal stenosis at this level asymmetric to the right. There is at least moderate central canal narrowing at C5-6 and C3-4. There is severe multilevel bilateral neural foraminal narrowing as detailed above.   Allowing for motion degradation, there is no CT evidence of acute fracture or traumatic subluxation.   Bilateral pleural effusions and dependent atelectasis are partially visualized, greater on the left. There are bilateral emphysematous changes with ground-glass density in the lung apices. Focal opacity with ill-defined margins is identified in the right apex. Further workup with dedicated CT thorax is recommended.   Signed by: Chuck Argueta 1/3/2024 1:51 PM Dictation workstation:   [id number]    XR chest 1 view    Result Date: 1/3/2024  Interpreted By:  Anahi Fernando, STUDY: XR CHEST 1 VIEW;  1/3/2024 11:43 am   INDICATION: Signs/Symptoms:SOB.   COMPARISON: 12/27/2023   ACCESSION NUMBER(S): [id number]   ORDERING CLINICIAN: FIONA GOMEZ   FINDINGS: CARDIOMEDIASTINAL SILHOUETTE: The heart is enlarged. There is a left subclavian pacemaker-AICD with a single tip in the right atrium and two tips in the right ventricle.     LUNGS: There is improvement in bilateral perihilar alveolar opacities consistent with resolving pulmonary edema. There is underlying moderate vascular congestion, improved. There is no pleural fluid.   ABDOMEN: No remarkable upper abdominal findings.     BONES: No acute osseous changes.       1. Marked improvement bilateral patchy alveolar opacities consistent with resolving pneumonia. 2. Underlying moderate vascular congestion,  improved.   MACRO: None   Signed by: Anahi Fernando 1/3/2024 12:00 PM Dictation workstation:   [id number]    ECG 12 lead    Result Date: 1/1/2024  Undetermined rhythm Left bundle branch block Abnormal ECG When compared with ECG of 27-DEC-2023 07:49, (unconfirmed) Current undetermined rhythm precludes rhythm comparison, needs review See ED provider note for full interpretation and clinical correlation Confirmed by Yomi Lainez ([id number]) on 1/1/2024 3:20:04 PM    ECG 12 Lead    Result Date: 12/27/2023  Wide QRS tachycardia with Premature supraventricular complexes and with occasional Premature ventricular complexes Nonspecific intraventricular block Cannot rule out Anteroseptal infarct , age undetermined T wave abnormality, consider inferolateral ischemia Abnormal ECG When compared with ECG of 14-DEC-2023 19:10, (unconfirmed) Wide QRS tachycardia has replaced Sinus rhythm Vent. rate has increased BY  57 BPM    XR chest 1 view    Result Date: 12/27/2023  Interpreted By:  Anahi Fernando, STUDY: XR CHEST 1 VIEW;  12/27/2023 8:43 am   INDICATION: Signs/Symptoms:Dypnea.   COMPARISON: 12/14/2023   ACCESSION NUMBER(S): [id number]   ORDERING CLINICIAN: SYLVIA MOORE   FINDINGS: CARDIOMEDIASTINAL SILHOUETTE: The heart is enlarged. There is a left subclavian pacemaker-AICD with a single tip in the right atrium and two tips in the right ventricle.   LUNGS: There are progressive perihilar alveolar opacities, likely pulmonary edema but could also represent pneumonia. There is left basilar atelectasis and volume loss which has progressed. There is no pleural fluid.   ABDOMEN: No remarkable upper abdominal findings.     BONES: No acute osseous changes.       1. Progressive bilateral predominantly perihilar alveolar opacities consistent with pulmonary edema or pneumonia. 2. Progressive volume loss and atelectasis left lung base.   MACRO: None   Signed by: Anahi Fernando 12/27/2023 8:50 AM Dictation workstation:    [id number]    CT FACIAL BONE/TITO WO IVCON    Result Date: 12/22/2023  * * *Final Report* * * DATE OF EXAM: Dec 22 2023  5:57PM   ASC   0507  -  CT FACIAL BONE/TITO WO IVCON  / ACCESSION #  [id number] PROCEDURE REASON: Maxillofacial pain      * * * * Physician Interpretation * * * *  EXAMINATION:  CT BRAIN WO IVCON, CT FACIAL BONE/TITO WO IVCON, CT CERVICAL SPINE WO IVCON CLINICAL HISTORY: Head trauma, moderate-severe (accession [id number]), Maxillofacial pain, Nasal fracture suspected (accession [id number]), Spine fracture, cervical, traumatic (accession [id number]) TECHNIQUE:  Serial axial images without IV contrast were obtained from the vertex to the foramen magnum.  CT of the cervical spine was also performed with axial sections from the skull base through the thoracic inlet. CT of the face was performed without IV contrast and multiplanar reconstructions were generated. MQ:  CTBWO_3 CT Dose-Length Product (DLP): 1565  mGy*cm CT Dose Reduction Employed: Iterative recon (accession [id number]), Automated exposure control(AEC) and iterative recon (accession [id number]) COMPARISON:  None. RESULT: CT HEAD: No acute intracranial hemorrhage or extra-axial fluid collection. No hydrocephalus, mass effect, or herniation. No acute ischemic infarct detected.  Mild background of white matter hypoattenuation consistent with chronic microvascular ischemic change. Unremarkable dural venous sinus attenuation. No acute osseous abnormality. Clear visualized paranasal sinuses and tympanomastoid cavities. CT face: Soft Tissues:  Soft tissue swelling is present at the nose and along the right forehead/periorbital region. Facial bones:  Mildly comminuted bilateral nasal bone fractures are present. Orbits:  No evidence of an acute fracture.  The globes are intact.  The soft tissue planes of the orbits are maintained. Paranasal Sinuses:  The paranasal sinuses are clear. Foreign Bodies:  No evidence of radiopaque foreign bodies. Other:  No  evidence of a remote fracture.  No lytic or blastic process seen in the facial bones. CT cervical spine: There is reversal of the normal cervical lordosis. There is no fracture or dislocation. Severe multilevel degenerative changes are present.  There is severe spinal canal stenosis at the C4-C5 level from a large posterior disc osteophyte complex.  Moderate spinal canal stenosis present at the C3-C4 level from a posterior disc osteophyte complex.  Severe bilateral neural foraminal narrowing is present at the C3-C4, C4-C5, and C5-C6 levels. Centrilobular emphysema.    IMPRESSION: 1.  No acute intracranial abnormality. 2.  No cervical spine fracture or traumatic subluxation.  Severe multilevel degenerative change. 3.  Nasal bone fractures with adjacent soft tissue swelling. : [facility]   Transcribe Date/Time: Dec 22 2023  6:05P Dictated by : MAIRA STEWART MD This examination was interpreted and the report reviewed and electronically signed by: MAIRA STEWART MD on Dec 22 2023  6:17PM  EST    CT CERVICAL SPINE WO IVCON    Result Date: 12/22/2023  * * *Final Report* * * DATE OF EXAM: Dec 22 2023  5:57PM   ASC   0505  -  CT CERVICAL SPINE WO IVCON  / ACCESSION #  [id number] PROCEDURE REASON: Spine fracture, cervical, traumatic      * * * * Physician Interpretation * * * *  EXAMINATION:  CT BRAIN WO IVCON, CT FACIAL BONE/TITO WO IVCON, CT CERVICAL SPINE WO IVCON CLINICAL HISTORY: Head trauma, moderate-severe (accession [id number]), Maxillofacial pain, Nasal fracture suspected (accession [id number]), Spine fracture, cervical, traumatic (accession [id number]) TECHNIQUE:  Serial axial images without IV contrast were obtained from the vertex to the foramen magnum.  CT of the cervical spine was also performed with axial sections from the skull base through the thoracic inlet. CT of the face was performed without IV contrast and multiplanar reconstructions were generated. MQ:  CTBWO_3 CT Dose-Length Product (DLP): 1565   mGy*cm CT Dose Reduction Employed: Iterative recon (accession [id number]), Automated exposure control(AEC) and iterative recon (accession [id number]) COMPARISON:  None. RESULT: CT HEAD: No acute intracranial hemorrhage or extra-axial fluid collection. No hydrocephalus, mass effect, or herniation. No acute ischemic infarct detected.  Mild background of white matter hypoattenuation consistent with chronic microvascular ischemic change. Unremarkable dural venous sinus attenuation. No acute osseous abnormality. Clear visualized paranasal sinuses and tympanomastoid cavities. CT face: Soft Tissues:  Soft tissue swelling is present at the nose and along the right forehead/periorbital region. Facial bones:  Mildly comminuted bilateral nasal bone fractures are present. Orbits:  No evidence of an acute fracture.  The globes are intact.  The soft tissue planes of the orbits are maintained. Paranasal Sinuses:  The paranasal sinuses are clear. Foreign Bodies:  No evidence of radiopaque foreign bodies. Other:  No evidence of a remote fracture.  No lytic or blastic process seen in the facial bones. CT cervical spine: There is reversal of the normal cervical lordosis. There is no fracture or dislocation. Severe multilevel degenerative changes are present.  There is severe spinal canal stenosis at the C4-C5 level from a large posterior disc osteophyte complex.  Moderate spinal canal stenosis present at the C3-C4 level from a posterior disc osteophyte complex.  Severe bilateral neural foraminal narrowing is present at the C3-C4, C4-C5, and C5-C6 levels. Centrilobular emphysema.    IMPRESSION: 1.  No acute intracranial abnormality. 2.  No cervical spine fracture or traumatic subluxation.  Severe multilevel degenerative change. 3.  Nasal bone fractures with adjacent soft tissue swelling. : ALISSA   Transcribe Date/Time: Dec 22 2023  6:05P Dictated by : MAIRA STEWART MD This examination was interpreted and the report  reviewed and electronically signed by: MAIRA STEWART MD on Dec 22 2023  6:17PM  EST    CT BRAIN WO IVCON    Result Date: 12/22/2023  * * *Final Report* * * DATE OF EXAM: Dec 22 2023  5:57PM   ASC   0504  -  CT BRAIN WO IVCON   / ACCESSION #  [id number] PROCEDURE REASON: Head trauma, moderate-severe      * * * * Physician Interpretation * * * *  EXAMINATION:  CT BRAIN WO IVCON, CT FACIAL BONE/TITO WO IVCON, CT CERVICAL SPINE WO IVCON CLINICAL HISTORY: Head trauma, moderate-severe (accession [id number]), Maxillofacial pain, Nasal fracture suspected (accession [id number]), Spine fracture, cervical, traumatic (accession [id number]) TECHNIQUE:  Serial axial images without IV contrast were obtained from the vertex to the foramen magnum.  CT of the cervical spine was also performed with axial sections from the skull base through the thoracic inlet. CT of the face was performed without IV contrast and multiplanar reconstructions were generated. MQ:  CTBWO_3 CT Dose-Length Product (DLP): 1565  mGy*cm CT Dose Reduction Employed: Iterative recon (accession [id number]), Automated exposure control(AEC) and iterative recon (accession [id number]) COMPARISON:  None. RESULT: CT HEAD: No acute intracranial hemorrhage or extra-axial fluid collection. No hydrocephalus, mass effect, or herniation. No acute ischemic infarct detected.  Mild background of white matter hypoattenuation consistent with chronic microvascular ischemic change. Unremarkable dural venous sinus attenuation. No acute osseous abnormality. Clear visualized paranasal sinuses and tympanomastoid cavities. CT face: Soft Tissues:  Soft tissue swelling is present at the nose and along the right forehead/periorbital region. Facial bones:  Mildly comminuted bilateral nasal bone fractures are present. Orbits:  No evidence of an acute fracture.  The globes are intact.  The soft tissue planes of the orbits are maintained. Paranasal Sinuses:  The paranasal sinuses are clear. Foreign  Bodies:  No evidence of radiopaque foreign bodies. Other:  No evidence of a remote fracture.  No lytic or blastic process seen in the facial bones. CT cervical spine: There is reversal of the normal cervical lordosis. There is no fracture or dislocation. Severe multilevel degenerative changes are present.  There is severe spinal canal stenosis at the C4-C5 level from a large posterior disc osteophyte complex.  Moderate spinal canal stenosis present at the C3-C4 level from a posterior disc osteophyte complex.  Severe bilateral neural foraminal narrowing is present at the C3-C4, C4-C5, and C5-C6 levels. Centrilobular emphysema.    IMPRESSION: 1.  No acute intracranial abnormality. 2.  No cervical spine fracture or traumatic subluxation.  Severe multilevel degenerative change. 3.  Nasal bone fractures with adjacent soft tissue swelling. : [facility]   Transcribe Date/Time: Dec 22 2023  6:05P Dictated by : MAIRA STEWART MD This examination was interpreted and the report reviewed and electronically signed by: MAIRA STEWART MD on Dec 22 2023  6:17PM  EST    ECG 12 lead    Result Date: 12/21/2023  Normal sinus rhythm Nonspecific T wave abnormality Abnormal ECG When compared with ECG of 31-DEC-2022 15:09, Previous ECG has undetermined rhythm, needs review QRS duration has decreased Nonspecific T wave abnormality has replaced inverted T waves in Inferior leads QT has shortened    Transthoracic Echo (TTE) Complete    Result Date: 12/19/2023   [facility], [address] [address]              Tel [phone] and Fax [phone] TRANSTHORACIC ECHOCARDIOGRAM REPORT  Patient Name:      IJEOMA DUNCAN     Reading Physician:    [id number] Escobar Mendez MD Study Date:        12/18/2023          Ordering Provider:    [id number] GUNNAR HUGO MRN/PID:           [id number]             Fellow: Accession#:        [id number]        Nurse:                Mattie Sosa RN Date of Birth/Age: 1950 / 73 years Sonographer:          Cruz Wood RDCS Gender:            M                   Additional Staff: Height:            175.26 cm           Admit Date: Weight:            74.84 kg            Admission Status:     Inpatient - Routine BSA:               1.90 m2             Encounter#:           [id number]                                        Department Location:  [facility] Blood Pressure: 112 /63 mmHg Study Type:    TRANSTHORACIC ECHO (TTE) COMPLETE Diagnosis/ICD: Cardiomyopathy, unspecified-I42.9 Indication:    Cardiomyopathy CPT Code:      Echo Complete w Full Doppler-[id number] Patient History: Diabetes:          Yes Pertinent History: A-Fib, CAD, CVA, HTN, Cancer, COPD and Syncope. Ischemic CM,                    cardiac stent. Study Detail: The following Echo studies were performed: 2D, M-Mode, Doppler and               color flow. Technically challenging study due to body habitus.               Definity used as a contrast agent for endocardial border               definition. Total contrast used for this procedure was 1.5 mL via               IV push. The patient was awake.  PHYSICIAN INTERPRETATION: Left Ventricle: The left ventricular systolic function is moderately to severely decreased, with an estimated ejection fraction of 30-35%. Wall motion is abnormal. The left ventricular cavity size is mildly dilated. There is mild concentric left ventricular hypertrophy. Findings are consistent with ischemic cardiomyopathy. Spectral Doppler shows an impaired relaxation pattern of left ventricular diastolic filling. LV Wall Scoring: The mid inferolateral segment is akinetic. The mid and apical anterior wall, basal and mid anterolateral wall, mid anteroseptal segment, basal inferolateral segment, apical lateral segment, basal  inferior segment, and apex are hypokinetic. All remaining scored segments are normal. Left Atrium: The left atrium is mildly dilated. Right Ventricle: The right ventricle is mildly enlarged. There is mildly reduced right ventricular systolic function. A device is visualized in the right ventricle. Right Atrium: The right atrium is mildly dilated. Aortic Valve: The aortic valve is trileaflet. There is mild aortic valve cusp calcification. There is no evidence of aortic valve regurgitation. The peak instantaneous gradient of the aortic valve is 3.5 mmHg. Mitral Valve: The mitral valve is normal in structure. There is mild mitral valve regurgitation. Tricuspid Valve: The tricuspid valve is structurally normal. There is mild tricuspid regurgitation. Pulmonic Valve: The pulmonic valve is structurally normal. There is physiologic pulmonic valve regurgitation. Pericardium: There is a trivial to small pericardial effusion. Aorta: The aortic root is normal. Pulmonary Artery: The tricuspid regurgitant velocity is 3.17 m/s, and with an estimated right atrial pressure of 3 mmHg, the estimated pulmonary artery pressure is mildly elevated with the RVSP at 43.2 mmHg. Pulmonary Veins: There is blunted systolic flow in the pulmonary veins. Systemic Veins: The inferior vena cava appears mildly dilated.  CONCLUSIONS:  1. Left ventricular systolic function is moderately to severely decreased with a 30-35% estimated ejection fraction.  2. Multiple segmental abnormalities exist. See findings.  3. Spectral Doppler shows an impaired relaxation pattern of left ventricular diastolic filling.  4. There is ischemic cardiomyopathy.  5. There is mildly reduced right ventricular systolic function.  6. Mild pulmonary HTN. CVP is elevated. QUANTITATIVE DATA SUMMARY: 2D MEASUREMENTS:                          Normal Ranges: Ao Root d:     2.60 cm   (2.0-3.7cm) LAs:           4.60 cm   (2.7-4.0cm) IVSd:          1.02 cm   (0.6-1.1cm) LVPWd:          0.96 cm   (0.6-1.1cm) LVIDd:         4.75 cm   (3.9-5.9cm) LVIDs:         4.22 cm LV Mass Index: 86.6 g/m2 LV % FS        11.2 % LA VOLUME:                               Normal Ranges: LA Vol A4C:        104.7 ml   (22+/-6mL/m2) LA Vol A2C:        69.5 ml LA Vol BP:         92.7 ml LA Vol Index A4C:  55.0ml/m2 LA Vol Index A2C:  36.5 ml/m2 LA Vol Index BP:   48.7 ml/m2 LA Area A4C:       28.3 cm2 LA Area A2C:       21.2 cm2 LA Major Axis A4C: 6.5 cm LA Major Axis A2C: 5.5 cm LA Volume Index:   47.0 ml/m2 RA VOLUME BY A/L METHOD:                               Normal Ranges: RA Vol A4C:        62.8 ml    (8.3-19.5ml) RA Vol Index A4C:  33.0 ml/m2 RA Area A4C:       20.7 cm2 RA Major Axis A4C: 5.8 cm M-MODE MEASUREMENTS:                  Normal Ranges: Ao Root: 2.60 cm (2.0-3.7cm) LAs:     4.50 cm (2.7-4.0cm) AORTA MEASUREMENTS:                    Normal Ranges: Asc Ao, d: 2.90 cm (2.1-3.4cm) LV SYSTOLIC FUNCTION BY 2D PLANIMETRY (MOD):                     Normal Ranges: EF-A4C View: 28.3 % (>=55%) EF-A2C View: 30.5 % EF-Biplane:  29.5 % LV DIASTOLIC FUNCTION:                            Normal Ranges: MV Peak E:      0.93 m/s   (0.7-1.2 m/s) MV e'           0.05 m/s   (>8.0) MV lateral e'   0.05 m/s MV medial e'    0.05 m/s E/e' Ratio:     18.56      (<8.0) PulmV Sys Chet:  24.60 cm/s PulmV English Chet: 88.80 cm/s PulmV S/D Chet:  0.30 MITRAL VALVE:                 Normal Ranges: MV DT: 201 msec (150-240msec) AORTIC VALVE:                         Normal Ranges: AoV Vmax:      0.93 m/s (<=1.7m/s) AoV Peak PG:   3.5 mmHg (<20mmHg) LVOT Max Chet:  0.79 m/s (<=1.1m/s) LVOT VTI:      16.30 cm LVOT Diameter: 1.90 cm  (1.8-2.4cm) AoV Area,Vmax: 2.41 cm2 (2.5-4.5cm2)  RIGHT VENTRICLE: RV Basal 3.70 cm RV Mid   2.90 cm RV Major 7.3 cm TAPSE:   13.7 mm RV s'    0.10 m/s TRICUSPID VALVE/RVSP:                             Normal Ranges: Peak TR Velocity: 3.17 m/s RV Syst Pressure: 43.2 mmHg (< 30mmHg) IVC Diam:         2.20 cm PULMONIC  VALVE:                      Normal Ranges: PV Max Chet: 0.7 m/s  (0.6-0.9m/s) PV Max P.1 mmHg Pulmonary Veins: PulmV English Chet: 88.80 cm/s PulmV S/D Chet:  0.30 PulmV Sys Chet:  24.60 cm/s  [id number] Escobar Mendez MD Electronically signed on 2023 at 5:23:07 PM  Wall Scoring  ** Final **     XR chest 1 view    Result Date: 2023  Interpreted By:  Galen Lopez, STUDY: XR CHEST 1 VIEW;  2023 8:17 pm   INDICATION: Signs/Symptoms:sob.   COMPARISON: 07/10/2023   ACCESSION NUMBER(S): [id number]   ORDERING CLINICIAN: YADIRA VALE   FINDINGS: Pacemaker is seen. Heart size is enlarged. Bronchial thickening features suggesting bronchitis. Equivocal edema also noted. There does appear to be a right upper lobe opacity which is obscured by wires. Pneumonia not excluded. Lungs are hyperinflated       1.  Features of bronchitis and pulmonary edema. Lungs are hyperinflated . Equivocal right upper lobe nodular opacity. Follow-up is advised with two-view chest radiograph when feasible. Pneumonia can have this appearance       MACRO: None   Signed by: Galen Lopez 2023 8:50 PM Dictation workstation:   [id number]    CT chest wo IV contrast    Result Date: 2023  Interpreted By:  Ellis Rodriguez, STUDY: CT CHEST WO IV CONTRAST;  2023 12:39 pm   INDICATION: Signs/Symptoms:3 month follow up -  7 mm nodule superior segment left lower lobe. compare to August exam.   COMPARISON: 2023   ACCESSION NUMBER(S): [id number]   ORDERING CLINICIAN: JOSE ADKINS   TECHNIQUE: Helical data acquisition of the chest was obtained without the use of IV contrast. Images were reformatted in axial, coronal, and sagittal planes.   FINDINGS: POTENTIAL LIMITATIONS OF THE STUDY:   Lack of IV contrast   HEART AND VESSELS:   There are atherosclerotic calcifications of the aorta and its branches. The aorta is unchanged in course and caliber.   The heart is unchanged in size.   No pericardial effusion is seen.    MEDIASTINUM AND CHRIS, LOWER NECK AND AXILLA: The visualized thyroid gland is within normal limits.   There is mild mediastinal lymphadenopathy. For example, precarinal lymph node measuring approximately 1.3 cm in short axis. Right paratracheal lymph node measuring approximately 1.2 cm in short axis. These are increased in size when compared to the previous study. No definite hilar lymphadenopathy, although evaluation is limited by lack of IV contrast.   Esophagus is stable in course and caliber.   LUNGS AND AIRWAYS: The trachea and central airways are patent. No endobronchial lesion.   There is partial collapse/consolidation of both lower lobes, worse on the left. Mild atelectasis/infiltrate is seen in the lingula. There are mild patchy nonspecific ground-glass opacities scattered within the lungs. There are emphysematous changes, most conspicuous in the upper lobes. There are small bilateral pleural effusions, slightly larger on the left.   UPPER ABDOMEN: The visualized subdiaphragmatic structures demonstrate no acute abnormality. Cholecystectomy. Stable mildly complex cystic lesion in the upper pole of the right kidney.   CHEST WALL AND OSSEOUS STRUCTURES: Degenerative changes. No acute process.       Partial collapse/consolidation of the lower lobes, worse on the left. Additional area of atelectasis or infiltrate in the lingula. Mild scattered patchy nonspecific ground-glass opacities in the lungs. Follow-up recommended to document resolution. Small bilateral pleural effusions, larger on the left.   MACRO: None.   Signed by: Ellis Rodriguez 12/9/2023 9:38 AM Dictation workstation:   [id number]     Assessment/Plan   Coronavirus infection-treated with at least 10 days of dexamethasone  Possible pneumonia-treated with at least 7 days of antibiotics  C. difficile infection  Chronic respiratory failure-stable    Oral vancomycin  Monitor stool  Droplet plus precautions to 1/10/2024  Supplemental oxygen as needed  Monitor  temperature and WBC      Frank Escalante MD

## 2024-01-05 NOTE — NURSING NOTE
Assumed care of pt, pt is awake lying in bed, pt has chronic pain, on isolation precautions, HR is 106 ST/paced on tele, bed locked and lowered, shift report given by DEVIN Pedro, call light w/in reach.

## 2024-01-05 NOTE — H&P
"History Of Present Illness  Hai Spaulding is a 73 y.o. male presenting with diarrhea.    This man who is debilitated from a stroke and a history of laryngeal cancer and repeat aspiration pneumonia has spent more time in the hospital than out of the hospital for the last 2 months.  He was most recently hospitalized here December 25 with COVID and aspiration pneumonia.  Treated as such with antibiotics and steroids.  Far as I can tell he is still on Augmentin and dexamethasone.  Presented to an outside ER supposedly with worsening hypoxemia and shortness of breath.  Admits to sure about that.  He is breathing comfortably right now on 2 L which is his baseline.  His chest x-ray if anything looks better than previous chest x-rays.  Patient has no idea why he is here.  \"They told me to go to the ER \"    What is for certain is that he is having voluminous amounts of watery diarrhea.  He is unaware of any nausea vomiting or abdominal pain     Past Medical History  He has a past medical history of Acid reflux, Aspiration pneumonia (CMS/[facility]) (11/23/2021), Aspiration pneumonia resulting from a procedure (10/25/2021), Atrial fibrillation with RVR (CMS/[facility]) (10/2021), Congestive heart failure (CHF) (CMS/[facility]) (10/2021), COPD (chronic obstructive pulmonary disease) (CMS/[facility]), COVID, Diabetes (CMS/[facility]), Diabetic eye exam (CMS/[facility]) (05/08/2019), Hodgkin lymphoma (CMS/[facility]), HTN (hypertension), Hyperlipidemia, Larynx cancer (CMS/[facility]), Myocardial infarction (CMS/[facility]), Neuropathy, Pneumonia due to gram-negative bacteria (01/01/2023), Pneumonitis due to inhalation of other solids and liquids (CMS/[facility]) (11/25/2021), Urinary tract infection (01/04/2023), and Urinary tract infection, site not specified (12/18/2023).    Surgical History  He has a past surgical history that includes Other surgical history; Other surgical history; CT angio abdomen pelvis w and or wo IV IV contrast (01/22/2022); Other surgical history; Tonsillectomy; Other " surgical history; Cholecystectomy; Other surgical history; Tumor excision; Cardiac defibrillator placement (05/2022); IR injection epidural steroid; and Tumor removal.     Social History  He reports that he has been smoking cigarettes. He has a 30.00 pack-year smoking history. He has been exposed to tobacco smoke. He has never used smokeless tobacco. He reports that he does not drink alcohol and does not use drugs.    Family History  Family History   Problem Relation Name Age of Onset    Diabetes Mother      Other (htn) Mother      Heart disease Mother      Diabetes Father      Heart disease Father      Other (htn) Father          Allergies  Patient has no known allergies.    Review of Systems-obtainable patient too confused     Physical Exam  He is alert able to speak clearly but not a good historian  Head atraumatic normocephalic  Pupils equal round reactive to light extraocular motion intact  Mouth normal-appearing tongue and oropharynx  Neck supple without thyromegaly  Lymph nodes no cervical or axillary lymphadenopathy  Heart regular rate and rhythm without murmurs rubs or gallops  Lungs bibasilar crackles no wheezing  Abdomen soft nontender nondistended  Extremities no significant edema  Neuro cranial nerves grossly intact good tone and strength in left arm can lift both legs up off the bed right arm is flaccid  Skin-scaly erythematous rash around his anus and scrotum.  Musculoskeletal normal passive range of motion shoulders elbows hips and knees     Last Recorded Vitals  /74 (BP Location: Left arm, Patient Position: Lying)   Pulse 103   Temp 36 °C (96.8 °F) (Temporal)   Resp 18   Wt 77.1 kg (169 lb 15.6 oz)   SpO2 97%     Relevant Results    Assessment/Plan   #1-pneumonia and COVID.  I am not convinced that he has had a setback in this department.  He is scheduled to finish his Augmentin and dexamethasone any day now and given that he is probably here with C. difficile and he has significant  dermatophyte infection and that he is at his baseline respiratory function and his chest x-ray he appears to be improved, I think he is had enough antibiotics and steroids.  He does have a urine culture pending.  I will consult infectious disease who followed him while he was here last time and they can decide on what were doing with antibiotics.  Has a history of aspiration.  I am putting him on a clear liquid diet for now and consulting speech path    #2-diarrhea very likely C. difficile.  I am putting him on-oral vancomycin and as mentioned above I am stopping his antibiotics    #3-rash around buttocks and scrotum.-Dermatophyte infection.  I will give him a single dose of fluconazole consult wound care, order topical nystatin and as mentioned above I am putting a hold on his antibiotics and steroids    Other than that I am ordering all of his regular medications.  This man was excepted here as a transfer 2 days ago and showed up in the wee hours of the morning.  I called his wife to discuss things and she did not answer  Rahul Craven MD

## 2024-01-05 NOTE — CONSULTS
Wound Care Consult     Visit Date: 1/5/2024      Patient Name: Hai Spaulding         MRN: [id number]           YOB: 1950     Reason for Consult: Bilateral buttock/Coccyx/Scrotal redness      Wound History: Present on admission. Patient with Fungal rash to Bilateral buttock/Coccyx/Scrotum. Was recently discharged from hospital to Rehab on 1/1/24 for COVID on antibiotics. Patient began having diarrhea making redness and soreness worse to groin and buttock.      A 73 y.o. year old male admitted for Active Problems:  There are no active Hospital Problems.      Past Medical History:   Diagnosis Date    Acid reflux     Aspiration pneumonia (CMS/HCC) 11/23/2021    Aspiration pneumonia resulting from a procedure 10/25/2021    Atrial fibrillation with RVR (CMS/HCC) 10/2021    Congestive heart failure (CHF) (CMS/HCC) 10/2021    COPD (chronic obstructive pulmonary disease) (CMS/HCC)     COVID     Diabetes (CMS/HCC)     Dr. Rebollar    Diabetic eye exam (CMS/HCC) 05/08/2019    worsening retinopathy    Hodgkin lymphoma (CMS/HCC)     HTN (hypertension)     Hyperlipidemia     Larynx cancer (CMS/HCC)     Myocardial infarction (CMS/HCC)     Neuropathy     hands & feet    Pneumonia due to gram-negative bacteria 01/01/2023    Pneumonitis due to inhalation of other solids and liquids (CMS/HCC) 11/25/2021    Urinary tract infection 01/04/2023    Urinary tract infection, site not specified 12/18/2023    Comment on above: G93.40 ACUTE ENCEPHALOPATHY N39.0 UTI  G93.40 ACUTE ENCEPHALOPATHY N39.0 UTI.      Past Surgical History:   Procedure Laterality Date    CARDIAC DEFIBRILLATOR PLACEMENT  05/2022    CHOLECYSTECTOMY      CT ABDOMEN PELVIS ANGIOGRAM W AND/OR WO IV CONTRAST  01/22/2022    CT ABDOMEN PELVIS ANGIOGRAM W AND/OR WO IV CONTRAST 1/22/2022 [facility]    IR INJECTION EPIDURAL STEROID      Dr. Torres    OTHER SURGICAL HISTORY      Splenectomy    OTHER SURGICAL HISTORY      Larynx surgery    OTHER SURGICAL  HISTORY      chemotherapy    OTHER SURGICAL HISTORY      lymph node resection    OTHER SURGICAL HISTORY      heart stent x4    TONSILLECTOMY      TUMOR EXCISION      neck    TUMOR REMOVAL      throat       Scheduled medications  amiodarone, 200 mg, oral, BID  apixaban, 5 mg, oral, BID  atorvastatin, 80 mg, oral, Nightly  budesonide, 0.5 mg, nebulization, BID  carbidopa-levodopa, 1 tablet, oral, TID  clopidogrel, 75 mg, oral, Daily  dapagliflozin propanediol, 10 mg, oral, Daily  insulin glargine, 10 Units, subcutaneous, Daily  insulin lispro, 0-10 Units, subcutaneous, TID with meals  ipratropium-albuteroL, 3 mL, nebulization, TID  lactobacillus acidophilus, 1 tablet, oral, BID  levothyroxine, 25 mcg, oral, Daily before breakfast  metoprolol succinate XL, 25 mg, oral, BID  nystatin, 1 Application, Topical, BID  QUEtiapine, 25 mg, oral, Nightly  sertraline, 100 mg, oral, Daily  SITagliptin phosphate, 50 mg, oral, Daily  vancomycin, 125 mg, oral, Daily      Continuous medications     PRN medications  PRN medications: dextrose 10 % in water (D10W), dextrose, glucagon    No Known Allergies      Pertinent Labs:   [place], Urine   Date Value Ref Range Status   03/11/2019 12 0 - 23 MG/L Final     Comment:     LESS THAN     Albumin   Date Value Ref Range Status   01/05/2024 2.5 (L) 3.5 - 5.0 g/dL Preliminary     ALBUMIN (MG/L) IN URINE   Date Value Ref Range Status   09/29/2020 7.8 Not Established mg/L Final       Wound Assessment:  Wound 01/01/24 Buttocks (Active)       Wound 01/04/24 Scrotum (Active)       Wound Team Summary Assessment:     Exam conducted on day 1 of stay with knowledge of Floor Nurse. Introductions made to patient and wife. On exam patient sitting fowlers in bed on O2 nasal cannula. Thomas catheter in place. Heel borders in place, peeled back. Heels clean, dry, intact. Hyperpigmentation to Right lower extremity. Chronic Right upper extremity edema, hand cool to touch with vascular discoloration. With assist  patient turned to Right side. Sacral border in place, peeled back. Patient with generalized redness to Bilateral Buttock, Nonblanchable to Coccyx. Recommend Mixture of Nystatin and Calazime cream, border dressing daily. Patient is on an Advanta 2 bedframe with Accumax mattress. Recommend Waffle mattress overlay. Alisson Cerad RN updated, to continue pressure injury prevention interventions, Woundcare, PT to apply Waffle mattress when gets patient up to chair, and nursing to continue to follow providers orders. Reconsult Wound RN PRN. Harper Cole BSN RN       Wound Team Plan: Bilateral Buttock/Coccyx/Scrotum- cleanse with soap and water, pat dry, apply mixture of Nystatin powder and Calazime cream, Cover with border dressing daily and prn. Continue to offload.      Harper Cole RN  1/5/2024  2:26 PM

## 2024-01-05 NOTE — PROGRESS NOTES
Hai Spaulding is a 73 y.o. male on day 1 of admission presenting with No Principal Problem: There is no principal problem currently on the Problem List. Please update the Problem List and refresh..    TCC spoke to pt spouse on unit states she would like him to return to [facility] upon discharge states pt PCP is Marilyn Luna no POA paperwork established at this time     Will send referral at this time   Lady Dowell RN

## 2024-01-05 NOTE — PROGRESS NOTES
Occupational Therapy    Evaluation/Treatment    Patient Name: Hai Spaulding  MRN: [id number]  : 1950  Today's Date: 24  Time Calculation  Start Time: 1413  Stop Time: 1450  Time Calculation (min): 37 min       Assessment:  OT Assessment: OT order received, chart reviewed, evaluation completed. Pt demonstrated impaired ADLs and functional mobility requiring max A x2 for bed mobility. Pt would benefit from acute OT services.  Prognosis: Fair  Evaluation/Treatment Tolerance: Patient limited by fatigue  Medical Staff Made Aware: Yes  OT Assessment Results: Decreased ADL status, Decreased upper extremity range of motion, Decreased upper extremity strength, Decreased cognition, Decreased endurance, Decreased functional mobility, Decreased fine motor control, Decreased gross motor control, Decreased IADLs, Non-functional right upper extremity  Prognosis: Fair  Evaluation/Treatment Tolerance: Patient limited by fatigue  Medical Staff Made Aware: Yes  Plan:  Treatment Interventions: ADL retraining, Functional transfer training, UE strengthening/ROM, Endurance training, Cognitive reorientation, Patient/family training, Equipment evaluation/education  OT Frequency: 3 times per week  OT Discharge Recommendations: Moderate intensity level of continued care  OT Recommended Transfer Status: Maximum assist, Assist of 2  OT - OK to Discharge: Yes  Treatment Interventions: ADL retraining, Functional transfer training, UE strengthening/ROM, Endurance training, Cognitive reorientation, Patient/family training, Equipment evaluation/education    Subjective   Current Problem:  No diagnosis found.  General:   OT Received On: 24  General  Reason for Referral: activities of daily living  Referred By: ricky LYNCH  Past Medical History Relevant to Rehab: laryngeal ca, radiation, CVA, dysphagia  Family/Caregiver Present: Yes  Caregiver Feedback: spouse  Co-Treatment: PT  Co-Treatment Reason: Need for second skilled  therspist due to heavy level of assist needed  Prior to Session Communication: Bedside nurse  Patient Position Received: Bed, 2 rail up  Preferred Learning Style: verbal  General Comment: Pt is a 71 yo male admit with SOB and weakness. Pt with hx of CVA, 3 hospital admits recently, and with recent COVID 19 diagnosis  Precautions:  Medical Precautions: Fall precautions, Infection precautions, Oxygen therapy device and L/min (covid 19, 4 L O2)  Pain:  Pain Assessment  Pain Assessment: 0-10  Pain Score: 5 - Moderate pain  Pain Type: Chronic pain  Pain Location: Buttocks  Pain Interventions: Repositioned    Objective   Cognition:  Overall Cognitive Status: Impaired  Following Commands: Follows one step commands with repetition  Cognition Comments: impaired memory  Insight: Severe     Home Living:  Type of Home: Condo  Lives With: Spouse  Home Adaptive Equipment: Walker rolling or standard, Hospital bed  Home Layout: One level  Entrance Stairs-Rails: Right  Entrance Stairs-Number of Steps: 1  Bathroom Shower/Tub: Tub/shower unit  Home Living Comments: Pt has been at SNF and multiple hospital admits recently  Prior Function:  Level of [place]: Needs assistance with ADLs, Needs assistance with functional transfers  Receives Help From: Family  ADL Assistance: Needs assistance (able to feed self)  Bath: Maximal  Toileting: Maximal  Dressing: Maximal  Grooming: Minimal  Feeding: Minimal  Homemaking Assistance: Needs assistance  IADL History:     ADL:  Eating Deficit: Bringing food to mouth assist, Increased time to complete, Other (Comment) (liquid diet, assist to bring water jug to mouth)  Grooming Assistance: Maximal  Bathing Assistance: Total  UE Dressing Assistance: Maximal  LE Dressing Assistance: Total  LE Dressing Deficit: Don/doff R sock, Don/doff L sock  Toileting Assistance with Device: Total    Activity Tolerance:  Endurance: Decreased tolerance for upright activites  Functional Standing Tolerance:     Bed  Mobility/Transfers: Bed Mobility  Bed Mobility: Yes  Bed Mobility 1  Bed Mobility 1: Supine to sitting  Level of Assistance 1: Maximum assistance (x2)  Bed Mobility Comments 1: Assist for B LEs and trunk up and dep scoot to EOB. Pt pushing backwards and to L side, unable to bring trunk forward despite cues. Max A to maintain static sitting balance at EOB.  Bed Mobility 2  Bed Mobility  2: Sitting to supine  Level of Assistance 2: Dependent (x2)  Bed Mobility Comments 2: Dep for B LEs and trunk into supine, Pt then required dep assist for rolling R,L multiple times for application of waffle overlay to mattress per wound care RN. Pt attmpting to assist using L UE, max A x2 for rolling multiple times.    Transfers  Transfer: No  Vision:Vision - Basic Assessment  Current Vision: Wears glasses all the time  Sensation:  Light Touch: Partial deficits in the RUE, Partial deficits in the RLE  Strength:  Strength Comments: 1/5 R UE, 3/5 L UE    Perception:  Inattention/Neglect: Cues to attend to right side of body  Initiation: Hand over hand to initiate tasks  Coordination:  Movements are Fluid and Coordinated: No  Upper Body Coordination: R side neglect   Hand Function:  Hand Function  Gross Grasp: Impaired  Coordination: Impaired  Extremities: RUE   RUE :  (CVA deficits, approx 1/5 strength all joints minimal movement) and LUE   LUE: Within Functional Limits    Outcome Measures: [facility] Daily Activity  Putting on and taking off regular lower body clothing: Total  Bathing (including washing, rinsing, drying): Total  Putting on and taking off regular upper body clothing: Total  Toileting, which includes using toilet, bedpan or urinal: Total  Taking care of personal grooming such as brushing teeth: Total  Eating Meals: A little  Daily Activity - Total Score: 8      Education Documentation  Home Exercise Program, taught by Emiliana Lentz OT at 1/5/2024  4:02 PM.  Learner: Significant Other, Patient  Readiness:  Acceptance  Method: Explanation  Response: Verbalizes Understanding  Comment: edu on OT POC    ADL Training, taught by Emiliana Lentz OT at 1/5/2024  4:02 PM.  Learner: Significant Other, Patient  Readiness: Acceptance  Method: Explanation  Response: Verbalizes Understanding  Comment: edu on OT POC    Education Comments  No comments found.        Goals:  Encounter Problems       Encounter Problems (Active)       OT Goals       Pt will complete upper body ADL tasks with min A using AE as needed, in order to complete self-care tasks.  (Progressing)       Start:  01/05/24    Expected End:  01/26/24            Pt will perform functional transfers at max A x1 level  (Progressing)       Start:  01/05/24    Expected End:  01/26/24            Pt will perform upper body therapeutic exercises all joints/planes of motion with mod A for R UE, close S for L UE (Progressing)       Start:  01/05/24    Expected End:  01/26/24

## 2024-01-05 NOTE — CONSULTS
"Nutrition Assessment Note  Screened for MST 2. Admitted from SNF w/SOB, Covid19+ on admission. Patient with voluminous amounts of diarrhea, possible C-diff. Spoke with RN, patient on clear liquid diet. RN suggests holding off on nutritional supplements until diarrhea resolves.    Nutrition Assessment    Reason for Assessment: Dietitian discretion (MST 2)    Reason for Hospital Admission:  Hai Spaulding is a 73 y.o. male who is admitted for SOB  Nutrition History:  Food and Nutrient History: NA     Food Allergies/Intolerances:  None  GI Symptoms: Diarrhea  Oral Problems: None    Anthropometrics:  Ht: 175.3 cm (5' 9.02\"), Wt: 77.1 kg (169 lb 15.6 oz), BMI: 25.09     Weight Change:  Weight History / % Weight Change: Unknown     Nutrition Focused Physical Exam Findings:   Subcutaneous Fat Loss  Orbital Fat Pads: Defer  Buccal Fat Pads: Defer  Triceps: Defer  Ribs: Defer    Muscle Wasting  Temporalis: Defer  Pectoralis (Clavicular Region): Defer  Deltoid/Trapezius: Defer  Interosseous: Defer  Trapezius/Infraspinatus/Supraspinatus (Scapular Region): Defer  Quadriceps: Defer  Gastrocnemius: Defer    Nutrition Significant Labs:  Lab Results   Component Value Date    WBC 22.3 (H) 01/03/2024    HGB 13.9 01/03/2024    HCT 42.2 01/03/2024     01/03/2024    CHOL 98 (L) 06/02/2023    TRIG 122 06/02/2023    HDL 35 (L) 06/02/2023    ALT <5 (L) 01/01/2024    AST 13 01/01/2024     01/03/2024    K 3.6 01/03/2024     01/03/2024    CREATININE 2.61 (H) 01/03/2024    BUN 63 (H) 01/03/2024    CO2 24 01/03/2024    TSH 7.54 (H) 10/11/2023    PSA 1.8 09/30/2020    INR 2.3 (H) 01/03/2024    HGBA1C 10.7 (H) 12/27/2023    ALBUR 12 03/11/2019       Current Facility-Administered Medications:     amiodarone (Pacerone) tablet 200 mg, 200 mg, oral, BID, Rahul Craven MD, 200 mg at 01/05/24 0940    apixaban (Eliquis) tablet 5 mg, 5 mg, oral, BID, Rahul Craven MD, 5 mg at 01/05/24 0939    atorvastatin (Lipitor) tablet 80 mg, 80 " mg, oral, Nightly, Rahul Craven MD    budesonide (Pulmicort) 0.5 mg/2 mL nebulizer solution 0.5 mg, 0.5 mg, nebulization, BID, Rahul Craven MD, 0.5 mg at 01/05/24 0748    carbidopa-levodopa (Sinemet CR)  mg ER tablet 1 tablet, 1 tablet, oral, TID, Rahul Craven MD, 1 tablet at 01/05/24 1004    clopidogrel (Plavix) tablet 75 mg, 75 mg, oral, Daily, Rahul Craven MD, 75 mg at 01/05/24 0939    dapagliflozin propanediol (Farxiga) tablet 10 mg, 10 mg, oral, Daily, Rahul Craven MD, 10 mg at 01/05/24 0939    dextrose 10 % in water (D10W) infusion, 0.3 g/kg/hr, intravenous, Once PRN, Rahul Craven MD    dextrose 50 % injection 25 g, 25 g, intravenous, q15 min PRN, Rahul Craven MD    glucagon (Glucagen) injection 1 mg, 1 mg, intramuscular, q15 min PRN, Rahul Craven MD    insulin glargine (Lantus) injection 10 Units, 10 Units, subcutaneous, Daily, Rahul Craven MD    insulin lispro (HumaLOG) injection 0-10 Units, 0-10 Units, subcutaneous, TID with meals, Rahul Craven MD    ipratropium-albuteroL (Duo-Neb) 0.5-2.5 mg/3 mL nebulizer solution 3 mL, 3 mL, nebulization, TID, Rahul Craven MD    lactobacillus acidophilus tablet 1 tablet, 1 tablet, oral, BID, Rahul Craven MD, 1 tablet at 01/05/24 0938    levothyroxine (Synthroid, Levoxyl) tablet 25 mcg, 25 mcg, oral, Daily before breakfast, Rahul Craven MD, 25 mcg at 01/05/24 0600    metoprolol succinate XL (Toprol-XL) 24 hr tablet 25 mg, 25 mg, oral, BID, Rahul Craven MD, 25 mg at 01/05/24 0939    nystatin (Mycostatin) 100,000 unit/gram powder 1 Application, 1 Application, Topical, BID, Rahul Craven MD, 1 Application at 01/05/24 1001    QUEtiapine (SEROquel) tablet 25 mg, 25 mg, oral, Nightly, Rahul Craven MD    sertraline (Zoloft) tablet 100 mg, 100 mg, oral, Daily, Rahul Craven MD, 100 mg at 01/05/24 0939    SITagliptin phosphate (Januvia) tablet 50 mg, 50 mg, oral, Daily, Rahul Craven MD, 50 mg at 01/05/24 0939    vancomycin (Vancocin) capsule 125 mg, 125 mg,  oral, Daily, Rahul Craven MD, 125 mg at 01/05/24 1001    Dietary Orders (From admission, onward)       Start     Ordered    01/05/24 0510  Adult diet Clear Liquid  Diet effective now        Question:  Diet type  Answer:  Clear Liquid    01/05/24 0512                   Estimated Needs:   Estimated Energy Needs  Total Energy Estimated Needs (kCal): 1925 kCal  Total Estimated Energy Need per Day (kCal/kg): 25 kCal/kg  Method for Estimating Needs: Actual Wt    Estimated Protein Needs  Total Protein Estimated Needs (g): 77 g  Total Protein Estimated Needs (g/kg): 1 g/kg  Method for Estimating Needs: Actual Wt    Estimated Fluid Needs  Total Fluid Estimated Needs (mL): 1925 mL  Method for Estimating Needs: 1 mL/kcal      Nutrition Diagnosis   Nutrition Diagnosis:     Nutrition Diagnosis  Patient has Nutrition Diagnosis: Yes  Diagnosis Status (1): New  Nutrition Diagnosis 1: Inadequate energy intake  Related to (1): Decreased ability to consume sufficient energy  As Evidenced by (1): Clear liquid diet       Nutrition Interventions/Recommendations   Nutrition Interventions and Recommendations:    Nutrition Prescription:  Individualized Nutrition Prescription Provided for : 1925 calories, 77 gm protein when diet advances    Nutrition Interventions:   Food and/or Nutrient Delivery Interventions  Interventions: Meals and snacks  Meals and Snacks: General healthful diet  Goal: Advance as able    Education Documentation  No documentation found.         Nutrition Monitoring and Evaluation   Monitoring/Evaluation:   Food/Nutrient Related History Monitoring  Monitoring and Evaluation Plan: Energy intake  Energy Intake: Estimated energy intake  Criteria: Monitoring for diet advancement       Time Spent/Follow-up:   Follow Up  Time Spent (min): 30 minutes  Last Date of Nutrition Visit: 01/05/24  Nutrition Follow-Up Needed?: 3-5 days  Follow up Comment: 1/8/24

## 2024-01-05 NOTE — PROGRESS NOTES
Speech-Language Pathology    Inpatient Speech-Language Pathology Clinical Swallow Evaluation    Patient Name: Hai Spaulding  MRN: [id number]  Today's Date: 1/5/2024   Time Calculation  Start Time: 1140  Stop Time: 1200  Time Calculation (min): 20 min       Admitted from SNF w/SOB, Covid19+ on admission. Patient with voluminous amounts of diarrhea, possible C-diff.This man who is debilitated from a stroke and a history of laryngeal cancer and repeat aspiration pneumonia has spent more time in the hospital than out of the hospital for the last 2 months. He was most recently hospitalized here December 25 with COVID and aspiration pneumonia    Current Problem:   No diagnosis found.    Recommendations:  Ok to continue with thin clear liquids @ this time, monitor for s/s aspiraiton,                Additional Recommendations: Dysphagia treatment, MBS to r/o aspiration d/t h/o dysphagia, CVA, laryngeal ca/radiation and concern for aspiration PNA  Liquid Diet Recommendations:  ok to continue with clear thin liquids  Compensatory Swallowing Strategies: Upright 90 degrees as possible for all oral intake, Single sips, No straws  Medication Administration Recommendations: With Liquid  Follow up treatments: Diet tolerance monitoring, Patient/family education (PO trials when cleared by MD), MBS when appropriate  Assessment:  Assessment Results: no s/s aspiration with thin liquids  Prognosis: Good  Treatment Provided: No  Medical Staff Made Aware: Yes  Strengths: Family/Caregiver Suppport  Barriers: Cognition, Comorbidities      Plan:  Inpatient/Swing Bed or Outpatient: Inpatient  Treatment/Interventions: Bolus trials, Diet recommendations, Complete MBSS (when diet advanced)  SLP Plan: Skilled SLP  SLP Frequency: 2x per week  Duration: Current admission  SLP Discharge Recommendations: Continue skilled speech therapy services post discharge  Diet Recommendations: Liquid  Liquid Consistency: Thin (IDDSI Level 0) (clear)  Next  Treatment Priority: po trials with medical clearance, compensatory straterahat, SANDRINE,  Discussed POC: Patient, Caregiver/family, Nursing  Discussed Risks/Benefits: Yes, Patient, Caregiver/Family, Nursing  Patient/Caregiver Agreeable: Yes  Dysphagia Goals: Patient will tolerate recommended diet without observed clinical signs of aspiration, Patient will progress to advanced diet, Patient will demonstrate appropriate strategies for swallowing safety    Subjective   Pt currently on clear liquids d/t diarrhea, possible C-diff. Only able to assess clear liquids, no solids, Wife @ bedside, provided the following PMH re dysphagia: Pt had MBS @ [facility] 12/1[facility]: laryngeal penetration with thin liquids: REC moist mech soft and nectar thick liquids, discharged to rehab, received dysphagia tx was advanced to thin liquids, cont with soft solids, this is current diet per wife.  It was also recommended that pt not use straws, he uses a sippy cup @ home    General Visit Information:  Arrival: Family/caregiver present  Reason for Referral: assess swallow d/t concern for aspiraiton, h/o dysphagia  Past Medical History Relevant to Rehab: laryngeal ca, radiation, CVA, dysphagia  Prior to Session Communication: Bedside nurse (janine acosta RN)  Date of Onset: 01/04/24  Date of Order: 01/05/24  [employer] Diet: soft solids, thin liquids  Current Diet : clear liquids    Pain:  Pain Assessment: 0-10  Pain Score: 7  Pain Type: Chronic pain  Pain Location: Other (Comment)  Pain Orientation:  (all over)     Objective   Baseline Assessment:  Behavior/Cognition: Alert, Cooperative, Pleasant mood  Vision: Functional for self-feeding  Patient Positioning: Upright in Bed  Baseline Vocal Quality: Dysphonic (hoarse @ baseline, s/p laryngeal ca)  Volitional Cough: Strong  Oral/Motor Assessment:  Oral Hygiene: oral mucosa moist  Dentition: Edentulous  Oral Motor: Within Functional Limits  Consistencies Trialed: 4 oz thin liquids via cup     Clinical  Observations:  Patient Positioning: Upright in Bed  Clinical Observation Comment: swallow appeared timely, laryngeal elevation palapated, no overt s/s aspiration. Unable to assess puree, solids @this time      Inpatient:  Education Documentation  Pt/family/nursing education provided re: regarding role of ST, purpose of assessment, clinical impressions, recommendations, POC,safe swallow strategies, Pt/caregiver/nurse verbalized understanding and agreement, pt requires reinforcement/assisstance

## 2024-01-06 NOTE — PROGRESS NOTES
Hai Spaulding is a 73 y.o. male on day 2 of admission presenting with No Principal Problem: There is no principal problem currently on the Problem List. Please update the Problem List and refresh..    Subjective   Remains afebrile, no chills  Denies SOB or cough  Denies nausea, vomiting, abdominal pain  States diarrhea is resolving  Discussed with nursing-no diarrhea reported today so far    Objective   Range of Vitals (last 24 hours)  Heart Rate:  []   Temp:  [36.5 °C (97.7 °F)-36.7 °C (98.1 °F)]   Resp:  [12-26]   BP: ()/(45-81)   SpO2:  [97 %-100 %]   Daily Weight  01/05/24 : 77.1 kg (169 lb 15.6 oz)    Body mass index is 25.09 kg/m².    Physical Exam  Constitutional:       Appearance: Normal appearance.   HENT:      Head: Normocephalic.      Nose: Nose normal.      Mouth/Throat:      Mouth: Mucous membranes are moist.      Pharynx: Oropharynx is clear.   Eyes:      Extraocular Movements: Extraocular movements intact.      Conjunctiva/sclera: Conjunctivae normal.      Comments: Right periorbital ecchymosis   Cardiovascular:      Rate and Rhythm: Normal rate and regular rhythm.   Pulmonary:      Effort: Pulmonary effort is normal.      Breath sounds: Normal breath sounds.   Abdominal:      General: Bowel sounds are normal.      Palpations: Abdomen is soft.      Tenderness: There is no abdominal tenderness.   Musculoskeletal:         General: Normal range of motion.      Cervical back: Normal range of motion and neck supple.   Skin:     General: Skin is warm and dry.   Neurological:      General: No focal deficit present.      Mental Status: He is alert and oriented to person, place, and time.   Psychiatric:         Mood and Affect: Mood normal.         Behavior: Behavior normal.           Antibiotics  amiodarone (Pacerone) tablet 200 mg  apixaban (Eliquis) tablet 5 mg  atorvastatin (Lipitor) tablet 80 mg  carbidopa-levodopa (Sinemet CR)  mg ER tablet 1 tablet  clopidogrel (Plavix) tablet 75  mg  dapagliflozin propanediol (Farxiga) tablet 10 mg  lactobacillus acidophilus capsule 1 capsule  levothyroxine (Synthroid, Levoxyl) tablet 25 mcg  metoprolol succinate XL (Toprol-XL) 24 hr tablet 25 mg  QUEtiapine (SEROquel) tablet 25 mg  sertraline (Zoloft) tablet 100 mg  SITagliptin phosphate (Januvia) tablet 50 mg  insulin glargine (Lantus) injection 10 Units  dextrose 50 % injection 25 g  glucagon (Glucagen) injection 1 mg  dextrose 10 % in water (D10W) infusion  insulin lispro (HumaLOG) injection 0-10 Units  vancomycin (Vancocin) capsule 125 mg  fluconazole in NaCl (iso-osm) (Diflucan) IVPB 200 mg  nystatin (Mycostatin) 100,000 unit/gram powder 1 Application  ipratropium-albuteroL (Duo-Neb) 0.5-2.5 mg/3 mL nebulizer solution 3 mL  budesonide (Pulmicort) 0.5 mg/2 mL nebulizer solution 0.5 mg  lactobacillus acidophilus tablet 1 tablet  vancomycin (Vancocin) capsule 125 mg  ipratropium-albuteroL (Duo-Neb) 0.5-2.5 mg/3 mL nebulizer solution 3 mL  acetaminophen (Tylenol) tablet 650 mg  ondansetron (Zofran) injection 4 mg  melatonin tablet 5 mg  cyclobenzaprine (Flexeril) tablet 10 mg  fluticasone furoate-vilanteroL (Breo Ellipta) 200-25 mcg/dose inhaler 1 puff  ipratropium-albuteroL (Duo-Neb) 0.5-2.5 mg/3 mL nebulizer solution 3 mL  pantoprazole (ProtoNix) EC tablet 40 mg  albuterol 2.5 mg /3 mL (0.083 %) nebulizer solution 2.5 mg  acetaminophen (Tylenol) tablet 650 mg  ondansetron (Zofran) injection 4 mg  melatonin tablet 5 mg  potassium chloride (Klor-Con) packet 40 mEq  potassium chloride 20 mEq in 100 mL IV premix      Relevant Results  Labs  Results from last 72 hours   Lab Units 01/06/24  0500 01/05/24  1343   WBC AUTO x10*3/uL 15.5* 18.7*   HEMOGLOBIN g/dL 12.1* 12.3*   HEMATOCRIT % 37.9* 37.2*   PLATELETS AUTO x10*3/uL 243 245   NEUTROS PCT AUTO % 86.6  --    LYMPHO PCT MAN %  --  1.0   LYMPHS PCT AUTO % 5.5  --    MONO PCT MAN %  --  1.0   MONOS PCT AUTO % 6.3  --    EOSINO PCT MAN %  --  1.0   EOS PCT  AUTO % 0.3  --      Results from last 72 hours   Lab Units 01/06/24  0500 01/05/24  1343 01/03/24  1737   SODIUM mmol/L 138 138 136   POTASSIUM mmol/L 3.0* 2.9* 3.6   CHLORIDE mmol/L 102 99 101   CO2 mmol/L 22* 25 24   BUN mg/dL 42* 43* 63*   CREATININE mg/dL 2.10* 2.10* 2.61*   GLUCOSE mg/dL 189* 153* 90   CALCIUM mg/dL 7.8* 7.6* 7.6*   ANION GAP mmol/L 14 14 15   EGFR mL/min/1.73m*2 33* 33* 25*   PHOSPHORUS mg/dL 3.1  --   --      Results from last 72 hours   Lab Units 01/06/24  0500 01/05/24  1343   ALK PHOS U/L 281* 265*   BILIRUBIN TOTAL mg/dL 0.8 0.9   PROTEIN TOTAL g/dL 5.4* 5.6*   ALT U/L <5* <5*   AST U/L 15 20   ALBUMIN g/dL 2.2* 2.5*     Estimated Creatinine Clearance: 31.8 mL/min (A) (by C-G formula based on SCr of 2.1 mg/dL (H)).  C-Reactive Protein   Date Value Ref Range Status   11/29/2023 2.53 (H) <1.00 mg/dL Final     CRP   Date Value Ref Range Status   12/31/2022 39.72 (A) mg/dL Final     Comment:     REF VALUE  < 1.00     12/14/2021 2.16 (A) mg/dL Final     Comment:     REF VALUE  < 1.00       Microbiology  Positive C.diff PCR/negative EIA  MRSA PCR negative  Urine culture pending   Blood cultures pending with no growth   Imaging  ECG 12 lead    Result Date: 1/5/2024  Undetermined rhythm Left bundle branch block Abnormal ECG When compared with ECG of 03-JAN-2024 11:50, (unconfirmed) Current undetermined rhythm precludes rhythm comparison, needs review Questionable change in QRS duration Criteria for Inferior infarct are no longer Present See ED provider note for full interpretation and clinical correlation Confirmed by Yomi Lainez ([id number]) on 1/5/2024 9:12:07 PM     Assessment/Plan   Coronavirus infection-treated with at least 10 days of dexamethasone  Possible pneumonia-treated with at least 7 days of antibiotics  C. difficile infection-positive PCR/negative EIA  Chronic respiratory failure-stable     Oral vancomycin  Monitor stool  Droplet plus precautions to 1/10/2024  Supplemental oxygen as  needed  Monitor temperature and WBC      Charlotte PABON Staff, APRN-CNP

## 2024-01-06 NOTE — PROGRESS NOTES
Hai Spaulding is a 73 y.o. male on day 2 of admission presenting with No Principal Problem: There is no principal problem currently on the Problem List. Please update the Problem List and refresh..      Subjective     Feels well overall.  Nursing reports loose stools over night, none today.         Objective     Last Recorded Vitals  /81 (BP Location: Left arm, Patient Position: Lying)   Pulse 100   Temp 36.6 °C (97.9 °F) (Oral)   Resp 19   Wt 77.1 kg (169 lb 15.6 oz)   SpO2 98%   Intake/Output last 3 Shifts:    Intake/Output Summary (Last 24 hours) at 1/6/2024 1317  Last data filed at 1/6/2024 0722  Gross per 24 hour   Intake --   Output 900 ml   Net -900 ml       Admission Weight  Weight: 79.4 kg (175 lb 1.6 oz) (01/04/24 2300)    Daily Weight  01/05/24 : 77.1 kg (169 lb 15.6 oz)    Image Results  ECG 12 lead  Sinus bradycardia with frequent and consecutive Premature ventricular complexes and Fusion complexes  Left axis deviation  Nonspecific intraventricular block  Inferior infarct , age undetermined  Lateral injury pattern  ** ** ACUTE MI / STEMI ** **  Abnormal ECG  When compared with ECG of 27-DEC-2023 15:18,  Significant changes have occurred  See ED provider note for full interpretation and clinical correlation  Confirmed by Yomi Lainez ([id number]) on 1/5/2024 9:13:12 PM  ECG 12 lead  Undetermined rhythm  Left bundle branch block  Abnormal ECG  When compared with ECG of 03-JAN-2024 11:50, (unconfirmed)  Current undetermined rhythm precludes rhythm comparison, needs review  Questionable change in QRS duration  Criteria for Inferior infarct are no longer Present  See ED provider note for full interpretation and clinical correlation  Confirmed by Yomi Lainez ([id number]) on 1/5/2024 9:12:07 PM  CT 3D reconstruction  Addendum: Interpreted By:  Chuck Argueta,    ADDENDUM:   Multiplanar 3D reformations are also generated and reviewed on   independent workstation.        Signed by: Chuck  Ellie 1/5/2024 2:19 PM        -------- ORIGINAL REPORT --------   Dictation workstation:   [id number]  Narrative: Interpreted By:  Chuck Argueta,   STUDY:  CT FACIAL BONES WO IV CONTRAST;  1/3/2024 1:14 pm      INDICATION:  Signs/Symptoms:fall.      COMPARISON:  None.      ACCESSION NUMBER(S):  [id number]      ORDERING CLINICIAN:  FIONA GOMEZ      TECHNIQUE:  Contiguous axial CT sections were performed through the bones and  orbits and supplemented with coronal and sagittal reformatted images.  The study is limited by motion degradation.      FINDINGS:  There is mild soft tissue swelling superior to the left orbit.      There is some deformity at the distal nasal bones greater on the  right. This appearance is of indeterminate age and should be  correlated to point tenderness. The remaining osseous structures  demonstrate no sign of acute fracture allowing for motion  degradation. There is no overt bone destruction or aggressive  periosteal reaction. No lytic or blastic lesion is detected.      There are small fluid levels in both maxillary sinuses, greater on  the left. There is a small fluid level in the right sphenoid  compartment. There is mucoperiosteal thickening in scattered fluid  within the ethmoid air cells, greater on the left posteriorly. The  frontal sinuses and mastoid air cells are clear and symmetrically  aerated. There may be mild mucoperiosteal thickening at the floor of  the left maxillary sinus.      The nasal septum is at the midline. The ostiomeatal units are patent  bilaterally.      The visualized orbital contents are unremarkable. There is no  intraorbital air density or fluid collection. The medial and inferior  orbital walls are intact.      There are least 2 small foci of air density posterior to the right  maxillary sinus though no apparent posterior wall fracture or defect.  The significance of these findings is indeterminate.      Impression: Soft tissue swelling  superior left orbit.      Mild deformity at the distal tip of the nasal bones greater on the  right. This appearance is of uncertain age it should be correlated to  point tenderness.      The remaining visualized osseous structures are intact.      Maxillary, ethmoid, and right sphenoid sinusitis with fluid levels.      Signed by: Chuck Argueta 1/5/2024 2:19 PM  Dictation workstation:   [id number]  CT maxillofacial bones wo IV contrast  Addendum: Interpreted By:  Chuck Argueta,    ADDENDUM:   Multiplanar 3D reformations are also generated and reviewed on   independent workstation.        Signed by: Chuck Argueta 1/5/2024 9:43 AM        -------- ORIGINAL REPORT --------   Dictation workstation:   [id number]  Narrative: Interpreted By:  Chuck Argueta,   STUDY:  CT FACIAL BONES WO IV CONTRAST;  1/3/2024 1:14 pm      INDICATION:  Signs/Symptoms:fall.      COMPARISON:  None.      ACCESSION NUMBER(S):  [id number]      ORDERING CLINICIAN:  FIONA GOMEZ      TECHNIQUE:  Contiguous axial CT sections were performed through the bones and  orbits and supplemented with coronal and sagittal reformatted images.  The study is limited by motion degradation.      FINDINGS:  There is mild soft tissue swelling superior to the left orbit.      There is some deformity at the distal nasal bones greater on the  right. This appearance is of indeterminate age and should be  correlated to point tenderness. The remaining osseous structures  demonstrate no sign of acute fracture allowing for motion  degradation. There is no overt bone destruction or aggressive  periosteal reaction. No lytic or blastic lesion is detected.      There are small fluid levels in both maxillary sinuses, greater on  the left. There is a small fluid level in the right sphenoid  compartment. There is mucoperiosteal thickening in scattered fluid  within the ethmoid air cells, greater on the left posteriorly. The  frontal sinuses and  mastoid air cells are clear and symmetrically  aerated. There may be mild mucoperiosteal thickening at the floor of  the left maxillary sinus.      The nasal septum is at the midline. The ostiomeatal units are patent  bilaterally.      The visualized orbital contents are unremarkable. There is no  intraorbital air density or fluid collection. The medial and inferior  orbital walls are intact.      There are least 2 small foci of air density posterior to the right  maxillary sinus though no apparent posterior wall fracture or defect.  The significance of these findings is indeterminate.      Impression: Soft tissue swelling superior left orbit.      Mild deformity at the distal tip of the nasal bones greater on the  right. This appearance is of uncertain age it should be correlated to  point tenderness.      The remaining visualized osseous structures are intact.      Maxillary, ethmoid, and right sphenoid sinusitis with fluid levels.      Signed by: Chuck Argueta 1/3/2024 1:56 PM  Dictation workstation:   [id number]      Physical Exam  Constitutional:       Appearance: He is ill-appearing.   HENT:      Head: Normocephalic.      Right Ear: External ear normal.      Left Ear: External ear normal.   Cardiovascular:      Rate and Rhythm: Normal rate.   Pulmonary:      Effort: Pulmonary effort is normal.   Abdominal:      General: Abdomen is flat.   Musculoskeletal:      Cervical back: Normal range of motion.   Skin:     General: Skin is warm.   Neurological:      Mental Status: Mental status is at baseline.      Coordination: Coordination abnormal.   Psychiatric:         Behavior: Behavior normal.         Relevant Results               Assessment/Plan          This patient has a urinary catheter   Reason for the urinary catheter remaining today? critically ill patient who need accurate urinary output measurements          Active Problems:  There are no active Hospital Problems.    C. Difficile  -Recent  antibiotic use, positive acid, negative toxin  -With loose stools, agree with infectious disease and continued by mouth vancomycin.    Acute hypoxemic respiratory failure  -Continue to wean oxygen as able  -Suspect that his episodes of respiratory failure are related to aspiration  -Appreciate ongoing care from speech therapy.  Anticipate modified barium swallow after the weekend    COVID-19  -Complete course of dexamethasone.    History of CVA  -Recent stroke at the end of the year  -Continue aspirin Plavix and atorvastatin.    Systolic heart failure  -EF of 30 to 35%.  -No current exacerbation.  -Continue Farxiga and metoprolol.  -Creatinine is stable, if remains stable, anticipate will start a low-dose ACE    Placement back at his rehab.              Maurice Cha, DO

## 2024-01-06 NOTE — CARE PLAN
Problem: Respiratory  Goal: Minimal/no exertional discomfort or dyspnea this shift  Outcome: Progressing

## 2024-01-07 NOTE — CARE PLAN
The patient's goals for the shift include      The clinical goals for the shift include pt to have effective breathing    Over the shift, the patient did not make progress toward the following goals. Barriers to progression include maintaining skin integrity. Recommendations to address these barriers include q2 our turn.

## 2024-01-07 NOTE — PROGRESS NOTES
Hai Spaulding is a 73 y.o. male on day 3 of admission presenting with No Principal Problem: There is no principal problem currently on the Problem List. Please update the Problem List and refresh..      Subjective     No new complaints.       Objective     Last Recorded Vitals  BP 97/65   Pulse 100   Temp 37 °C (98.6 °F) (Oral)   Resp 21   Wt 77.1 kg (169 lb 15.6 oz)   SpO2 94%   Intake/Output last 3 Shifts:    Intake/Output Summary (Last 24 hours) at 1/7/2024 1145  Last data filed at 1/7/2024 0400  Gross per 24 hour   Intake --   Output 550 ml   Net -550 ml         Admission Weight  Weight: 79.4 kg (175 lb 1.6 oz) (01/04/24 2300)    Daily Weight  01/05/24 : 77.1 kg (169 lb 15.6 oz)    Image Results  ECG 12 lead  Sinus bradycardia with frequent and consecutive Premature ventricular complexes and Fusion complexes  Left axis deviation  Nonspecific intraventricular block  Inferior infarct , age undetermined  Lateral injury pattern  ** ** ACUTE MI / STEMI ** **  Abnormal ECG  When compared with ECG of 27-DEC-2023 15:18,  Significant changes have occurred  See ED provider note for full interpretation and clinical correlation  Confirmed by Yomi Lainez ([id number]) on 1/5/2024 9:13:12 PM  ECG 12 lead  Undetermined rhythm  Left bundle branch block  Abnormal ECG  When compared with ECG of 03-JAN-2024 11:50, (unconfirmed)  Current undetermined rhythm precludes rhythm comparison, needs review  Questionable change in QRS duration  Criteria for Inferior infarct are no longer Present  See ED provider note for full interpretation and clinical correlation  Confirmed by Yomi Lainez ([id number]) on 1/5/2024 9:12:07 PM  CT 3D reconstruction  Addendum: Interpreted By:  Chuck Argueta,    ADDENDUM:   Multiplanar 3D reformations are also generated and reviewed on   independent workstation.        Signed by: Chuck Argueta 1/5/2024 2:19 PM        -------- ORIGINAL REPORT --------   Dictation workstation:    [id number]  Narrative: Interpreted By:  Chuck Argueta,   STUDY:  CT FACIAL BONES WO IV CONTRAST;  1/3/2024 1:14 pm      INDICATION:  Signs/Symptoms:fall.      COMPARISON:  None.      ACCESSION NUMBER(S):  [id number]      ORDERING CLINICIAN:  FIONA GOMEZ      TECHNIQUE:  Contiguous axial CT sections were performed through the bones and  orbits and supplemented with coronal and sagittal reformatted images.  The study is limited by motion degradation.      FINDINGS:  There is mild soft tissue swelling superior to the left orbit.      There is some deformity at the distal nasal bones greater on the  right. This appearance is of indeterminate age and should be  correlated to point tenderness. The remaining osseous structures  demonstrate no sign of acute fracture allowing for motion  degradation. There is no overt bone destruction or aggressive  periosteal reaction. No lytic or blastic lesion is detected.      There are small fluid levels in both maxillary sinuses, greater on  the left. There is a small fluid level in the right sphenoid  compartment. There is mucoperiosteal thickening in scattered fluid  within the ethmoid air cells, greater on the left posteriorly. The  frontal sinuses and mastoid air cells are clear and symmetrically  aerated. There may be mild mucoperiosteal thickening at the floor of  the left maxillary sinus.      The nasal septum is at the midline. The ostiomeatal units are patent  bilaterally.      The visualized orbital contents are unremarkable. There is no  intraorbital air density or fluid collection. The medial and inferior  orbital walls are intact.      There are least 2 small foci of air density posterior to the right  maxillary sinus though no apparent posterior wall fracture or defect.  The significance of these findings is indeterminate.      Impression: Soft tissue swelling superior left orbit.      Mild deformity at the distal tip of the nasal bones greater on the  right.  This appearance is of uncertain age it should be correlated to  point tenderness.      The remaining visualized osseous structures are intact.      Maxillary, ethmoid, and right sphenoid sinusitis with fluid levels.      Signed by: Chuck Argueta 1/5/2024 2:19 PM  Dictation workstation:   [id number]  CT maxillofacial bones wo IV contrast  Addendum: Interpreted By:  Chuck Argueta,    ADDENDUM:   Multiplanar 3D reformations are also generated and reviewed on   independent workstation.        Signed by: Chuck Argueta 1/5/2024 9:43 AM        -------- ORIGINAL REPORT --------   Dictation workstation:   [id number]  Narrative: Interpreted By:  Chuck Argueta,   STUDY:  CT FACIAL BONES WO IV CONTRAST;  1/3/2024 1:14 pm      INDICATION:  Signs/Symptoms:fall.      COMPARISON:  None.      ACCESSION NUMBER(S):  [id number]      ORDERING CLINICIAN:  FIONA GOMEZ      TECHNIQUE:  Contiguous axial CT sections were performed through the bones and  orbits and supplemented with coronal and sagittal reformatted images.  The study is limited by motion degradation.      FINDINGS:  There is mild soft tissue swelling superior to the left orbit.      There is some deformity at the distal nasal bones greater on the  right. This appearance is of indeterminate age and should be  correlated to point tenderness. The remaining osseous structures  demonstrate no sign of acute fracture allowing for motion  degradation. There is no overt bone destruction or aggressive  periosteal reaction. No lytic or blastic lesion is detected.      There are small fluid levels in both maxillary sinuses, greater on  the left. There is a small fluid level in the right sphenoid  compartment. There is mucoperiosteal thickening in scattered fluid  within the ethmoid air cells, greater on the left posteriorly. The  frontal sinuses and mastoid air cells are clear and symmetrically  aerated. There may be mild mucoperiosteal thickening at the  floor of  the left maxillary sinus.      The nasal septum is at the midline. The ostiomeatal units are patent  bilaterally.      The visualized orbital contents are unremarkable. There is no  intraorbital air density or fluid collection. The medial and inferior  orbital walls are intact.      There are least 2 small foci of air density posterior to the right  maxillary sinus though no apparent posterior wall fracture or defect.  The significance of these findings is indeterminate.      Impression: Soft tissue swelling superior left orbit.      Mild deformity at the distal tip of the nasal bones greater on the  right. This appearance is of uncertain age it should be correlated to  point tenderness.      The remaining visualized osseous structures are intact.      Maxillary, ethmoid, and right sphenoid sinusitis with fluid levels.      Signed by: Chuck Argueta 1/3/2024 1:56 PM  Dictation workstation:   [id number]      Physical Exam  Constitutional:       Appearance: He is ill-appearing.   HENT:      Head: Normocephalic.      Right Ear: External ear normal.      Left Ear: External ear normal.   Cardiovascular:      Rate and Rhythm: Normal rate.   Pulmonary:      Effort: Pulmonary effort is normal.   Abdominal:      General: Abdomen is flat.   Musculoskeletal:      Cervical back: Normal range of motion.   Skin:     General: Skin is warm.   Neurological:      Mental Status: Mental status is at baseline.   Psychiatric:         Behavior: Behavior normal.         Relevant Results               Assessment/Plan          This patient has a urinary catheter   Reason for the urinary catheter remaining today? critically ill patient who need accurate urinary output measurements          Active Problems:  There are no active Hospital Problems.    C. Difficile  -Recent antibiotic use, positive acid, negative toxin  -With loose stools, agree with infectious disease and continued by mouth vancomycin.    Acute hypoxemic  respiratory failure  -Continue to wean oxygen as able  -Suspect that his repeat episodes of respiratory failure are related to aspiration  -Appreciate ongoing care from speech therapy.  Anticipate modified barium swallow after the weekend    COVID-19  -Complete course of dexamethasone.    History of CVA  -Recent stroke at the end of the year  -Continue aspirin Plavix and atorvastatin.    Systolic heart failure  -EF of 30 to 35%.  -No current exacerbation.  -Continue Farxiga and metoprolol.      Placement back at his rehab.              Maurice Cha,

## 2024-01-07 NOTE — LETTER
Patient: Hai Spaulding  : 1950    Encounter Date: 2024    PLACE OF SERVICE: [facility]     This is a subsequent visit.    Subjective  Patient ID: Hai Spaulding is a 73 y.o. male who presents for Follow-up.    Mr. Hai Spaulding is a 73-year-old male with history of prior stroke and history of laryngeal carcinoma.  He has had recent aspiration pneumonia with respiratory failure.  He is unable to care for himself and requires supportive care.    Review of Systems   Constitutional:  Negative for chills and fever.   Cardiovascular:  Negative for chest pain.   All other systems reviewed and are negative.    Objective  /82   Pulse 80   Temp 36.9 °C (98.4 °F)   Resp 18     Physical Exam  Vitals reviewed.   Constitutional:       General: He is not in acute distress.     Comments: This is a well-developed, well-nourished male, sitting in chair,   HENT:      Right Ear: Tympanic membrane, ear canal and external ear normal.      Left Ear: Tympanic membrane, ear canal and external ear normal.   Eyes:      General: No scleral icterus.     Pupils: Pupils are equal, round, and reactive to light.   Neck:      Vascular: No carotid bruit.   Cardiovascular:      Heart sounds: Normal heart sounds, S1 normal and S2 normal. No murmur heard.     No friction rub.   Pulmonary:      Effort: Pulmonary effort is normal.      Breath sounds: Decreased breath sounds (throughout) present.   Abdominal:      Palpations: There is no hepatomegaly, splenomegaly or mass.   Musculoskeletal:         General: No swelling or deformity. Normal range of motion.      Cervical back: Neck supple.      Right lower leg: Edema present.      Left lower leg: Edema present.   Lymphadenopathy:      Cervical: No cervical adenopathy.      Upper Body:      Right upper body: No axillary adenopathy.      Left upper body: No axillary adenopathy.      Lower Body: No right inguinal adenopathy. No left inguinal adenopathy.   Neurological:       Mental Status: He is oriented to person, place, and time.      Cranial Nerves: Cranial nerves 2-12 are intact. No cranial nerve deficit.      Sensory: No sensory deficit.      Motor: Motor function is intact. No weakness.      Gait: Gait is intact.      Deep Tendon Reflexes: Reflexes normal.   Psychiatric:         Mood and Affect: Mood normal. Mood is not anxious or depressed. Affect is not angry.         Behavior: Behavior is not agitated.         Thought Content: Thought content normal.         Judgment: Judgment normal.     LAB WORK: Laboratory studies reviewed.    Assessment/Plan  Problem List Items Addressed This Visit             ICD-10-CM       Cardiac and Vasculature    Atrial fibrillation (CMS/HCC) I48.91    Hyperlipidemia E78.5       Endocrine/Metabolic    Diabetes mellitus (CMS/HCC) E11.9       Pulmonary and Pneumonias    Pneumonia due to infectious organism, unspecified laterality, unspecified part of lung - Primary J18.9     Other Visit Diagnoses         Codes    Respiratory failure, unspecified chronicity, unspecified whether with hypoxia or hypercapnia (CMS/HCC)     J96.90    COPD on long-term inhaled steroid therapy (CMS/HCC)     J44.9, Z79.51    Heart failure, unspecified HF chronicity, unspecified heart failure type (CMS/HCC)     I50.9    Hypertension, unspecified type     I10    Neuropathy     G62.9    Weakness     R53.1    At risk for falling     Z91.81    History of acute myocardial infarction     I25.2        1. Pneumonia, on antibiotic.  2. Respiratory failure, on oxygen as needed.  3. COPD, on bronchodilator therapy.  4. Heart failure, on diuretic.  5. Atrial fibrillation, on rate control.  6. Hyperlipidemia, on statin.  7. Hypertension, medically controlled.  8. History of AMI, on aspirin.  9. Neuropathy, on gabapentin.  10. Weakness, on PT/OT.  11. Fall risk, fall precautions.  12. Diabetes, on insulin.    Scribe Attestation  By signing my name below, ITova, Scrriya attest that  this documentation has been prepared under the direction and in the presence of Coby Hamm MD.   All medical record entries made by the scribe were personally dictated by me I have reviewed the chart and agree the record accurately reflects my personal performance of his history physical examination and management       Electronically Signed By: Coby Hamm MD   1/16/24  1:19 PM

## 2024-01-08 PROBLEM — J96.00 ACUTE RESPIRATORY FAILURE (MULTI): Status: ACTIVE | Noted: 2024-01-01

## 2024-01-08 NOTE — PROGRESS NOTES
Physical Therapy    Physical Therapy Treatment    Patient Name: Hai Spaulding  MRN: [id number]  Today's Date: 1/8/2024  Time Calculation  Start Time: 1102  Stop Time: 1125  Time Calculation (min): 23 min    Assessment/Plan   PT Assessment  End of Session Communication: Bedside nurse  Assessment Comment: pt requiring dependent x 2 for mobility this date. pt with impaired seated balance, bed mobility, and safety awareness. minimal participation from pt this date.  End of Session Patient Position: Bed, 3 rail up, Alarm on    PT Plan  Treatment/Interventions: Bed mobility, Transfer training, Gait training, Balance training, Neuromuscular re-education, Strengthening, Endurance training, Range of motion, Therapeutic exercise, Therapeutic activity, Home exercise program  PT Plan: Skilled PT  PT Frequency: 3 times per week  PT Discharge Recommendations: Moderate intensity level of continued care  Equipment Recommended upon Discharge: Wheeled walker, Wheelchair  PT Recommended Transfer Status: Assist x2, Total assist  PT - OK to Discharge: Yes    General Visit Information:   PT  Visit  PT Received On: 01/08/24  Response to Previous Treatment: Patient unable to report, no changes reported from family or staff  General  Family/Caregiver Present: Yes  Caregiver Feedback: daughter present  Co-Treatment: OT  Co-Treatment Reason: d/t pt heavy assist level and decreased functional tolerance. Co-tx provided this date to maximize safety and functional outcomes.  Prior to Session Communication: Bedside nurse  Patient Position Received: Bed, 3 rail up, Alarm on  Preferred Learning Style: verbal  General Comment: pt reluctantly agreeable to try therapy, reports fatigue this date.    Subjective   Precautions:  Precautions  Medical Precautions: Fall precautions, Infection precautions  Precautions Comment: COVID+, C diff+, R hemiplegia  Vital Signs:  Vital Signs  Heart Rate: 93  Heart Rate Source: Monitor  Resp: 20  SpO2: 96 %  BP:  93/66  MAP (mmHg): 77  BP Location: Left arm  BP Method: Automatic  Patient Position: Lying    Objective   Pain:  Pain Assessment  Pain Score: 0 - No pain  Cognition:  Cognition  Overall Cognitive Status: Impaired  Cognition Comments: delay in response to questions and commands, minimal verbalizations  Postural Control:  Postural Control  Posture Comment: Heavy L trunk lean in sitting, head down posture    Activity Tolerance:  Activity Tolerance  Activity Tolerance Comments: poor  Treatments:  Therapeutic Exercise  Therapeutic Exercise Performed:  (max A to move B LEs through available ROM in supine)    Therapeutic Activity  Therapeutic Activity Performed:  (constant verbal cues for psoture and lifting head while sitting EOB, poor ability to pick head up when sitting.)    Balance/Neuromuscular Re-Education  Balance/Neuromuscular Re-Education Activity Performed:  (sat EOB x 8 minutes with max A to dependent assistance x 1. Attempted stretching left flank to promote midline sitting.)    Bed Mobility  Bed Mobility:  (dependent x 2 for trunk and B LE management for supine<->sit and all aspects of transfer. Rolling side to side with max A x 2, pt did hold on to bed rail with left hand when in right sidelying.)    Outcome Measures:  [facility] Basic Mobility  Turning from your back to your side while in a flat bed without using bedrails: A lot  Moving from lying on your back to sitting on the side of a flat bed without using bedrails: Total  Moving to and from bed to chair (including a wheelchair): Total  Standing up from a chair using your arms (e.g. wheelchair or bedside chair): Total  To walk in hospital room: Total  Climbing 3-5 steps with railing: Total  Basic Mobility - Total Score: 7    IP EDUCATION:  Individual(s) Educated: Patient, Child  Education Provided: Body Mechanics, Fall Risk  Risk and Benefits Discussed with Patient/Caregiver/Other: yes  Patient/Caregiver Demonstrated Understanding: yes  Plan of Care  Discussed and Agreed Upon: yes  Patient Response to Education: Patient/Caregiver Verbalized Understanding of Information  Education Comment: pt needs reinforcement    Encounter Problems       Encounter Problems (Active)       PT Problem       The patient will demonstrate an overall strength of >3+/5 in BLE to assist with completion of functional mobility.   (Progressing)       Start:  01/05/24    Expected End:  02/05/24            The patient will be able to complete bed mobility at a [place] level with use of bed rails.   (Progressing)       Start:  01/05/24    Expected End:  02/05/24            The patient will be able to complete safe transfer of sit <> stand with minimal VC at a [place] level.   (Progressing)       Start:  01/05/24    Expected End:  02/05/24            The patient will be able to ambulate at a [place] level for >15ftx1 with RW.  (Progressing)       Start:  01/05/24    Expected End:  02/05/24

## 2024-01-08 NOTE — NURSING NOTE
Incontinent of a soft green stool, cleaned and repositioned in bed using the tap system.  Unable/refused to complete his breathing tx. Assessment completed, bed in low and locked position, call bell within reach.

## 2024-01-08 NOTE — PROGRESS NOTES
Hai Spaulding is a 73 y.o. male on day 4 of admission presenting with Acute respiratory failure (CMS/HCC).      Subjective   Patient wakes up easily. Denies pain or SOB. Asks me repeatedly who I am.    Wife is bedside, says patient is sleepier than normal and that this is not like him. He does wake up easily.       Objective     Last Recorded Vitals  BP 93/66 (BP Location: Left arm, Patient Position: Lying)   Pulse 86   Temp 36.3 °C (97.3 °F) (Oral)   Resp 19   Wt 77.1 kg (169 lb 15.6 oz)   SpO2 98%   Intake/Output last 3 Shifts:    Intake/Output Summary (Last 24 hours) at 1/8/2024 1438  Last data filed at 1/8/2024 0900  Gross per 24 hour   Intake 200 ml   Output --   Net 200 ml       Admission Weight  Weight: 79.4 kg (175 lb 1.6 oz) (01/04/24 2300)    Daily Weight  01/05/24 : 77.1 kg (169 lb 15.6 oz)    Image Results  ECG 12 lead  Sinus bradycardia with frequent and consecutive Premature ventricular complexes and Fusion complexes  Left axis deviation  Nonspecific intraventricular block  Inferior infarct , age undetermined  Lateral injury pattern  ** ** ACUTE MI / STEMI ** **  Abnormal ECG  When compared with ECG of 27-DEC-2023 15:18,  Significant changes have occurred  See ED provider note for full interpretation and clinical correlation  Confirmed by Yomi Lainez ([id number]) on 1/5/2024 9:13:12 PM  ECG 12 lead  Undetermined rhythm  Left bundle branch block  Abnormal ECG  When compared with ECG of 03-JAN-2024 11:50, (unconfirmed)  Current undetermined rhythm precludes rhythm comparison, needs review  Questionable change in QRS duration  Criteria for Inferior infarct are no longer Present  See ED provider note for full interpretation and clinical correlation  Confirmed by Yomi Lainez ([id number]) on 1/5/2024 9:12:07 PM  CT 3D reconstruction  Addendum: Interpreted By:  Chuck Argueta,    ADDENDUM:   Multiplanar 3D reformations are also generated and reviewed on   independent workstation.        Signed by:  Chuck Argueta 1/5/2024 2:19 PM        -------- ORIGINAL REPORT --------   Dictation workstation:   [id number]  Narrative: Interpreted By:  Chuck Argueta,   STUDY:  CT FACIAL BONES WO IV CONTRAST;  1/3/2024 1:14 pm      INDICATION:  Signs/Symptoms:fall.      COMPARISON:  None.      ACCESSION NUMBER(S):  [id number]      ORDERING CLINICIAN:  FIONA GOMEZ      TECHNIQUE:  Contiguous axial CT sections were performed through the bones and  orbits and supplemented with coronal and sagittal reformatted images.  The study is limited by motion degradation.      FINDINGS:  There is mild soft tissue swelling superior to the left orbit.      There is some deformity at the distal nasal bones greater on the  right. This appearance is of indeterminate age and should be  correlated to point tenderness. The remaining osseous structures  demonstrate no sign of acute fracture allowing for motion  degradation. There is no overt bone destruction or aggressive  periosteal reaction. No lytic or blastic lesion is detected.      There are small fluid levels in both maxillary sinuses, greater on  the left. There is a small fluid level in the right sphenoid  compartment. There is mucoperiosteal thickening in scattered fluid  within the ethmoid air cells, greater on the left posteriorly. The  frontal sinuses and mastoid air cells are clear and symmetrically  aerated. There may be mild mucoperiosteal thickening at the floor of  the left maxillary sinus.      The nasal septum is at the midline. The ostiomeatal units are patent  bilaterally.      The visualized orbital contents are unremarkable. There is no  intraorbital air density or fluid collection. The medial and inferior  orbital walls are intact.      There are least 2 small foci of air density posterior to the right  maxillary sinus though no apparent posterior wall fracture or defect.  The significance of these findings is indeterminate.      Impression: Soft tissue  swelling superior left orbit.      Mild deformity at the distal tip of the nasal bones greater on the  right. This appearance is of uncertain age it should be correlated to  point tenderness.      The remaining visualized osseous structures are intact.      Maxillary, ethmoid, and right sphenoid sinusitis with fluid levels.      Signed by: Chuck Argueta 1/5/2024 2:19 PM  Dictation workstation:   [id number]  CT maxillofacial bones wo IV contrast  Addendum: Interpreted By:  Chuck Argueta,    ADDENDUM:   Multiplanar 3D reformations are also generated and reviewed on   independent workstation.        Signed by: Chuck Argueta 1/5/2024 9:43 AM        -------- ORIGINAL REPORT --------   Dictation workstation:   [id number]  Narrative: Interpreted By:  Chuck Argueta,   STUDY:  CT FACIAL BONES WO IV CONTRAST;  1/3/2024 1:14 pm      INDICATION:  Signs/Symptoms:fall.      COMPARISON:  None.      ACCESSION NUMBER(S):  [id number]      ORDERING CLINICIAN:  FIONA GOMEZ      TECHNIQUE:  Contiguous axial CT sections were performed through the bones and  orbits and supplemented with coronal and sagittal reformatted images.  The study is limited by motion degradation.      FINDINGS:  There is mild soft tissue swelling superior to the left orbit.      There is some deformity at the distal nasal bones greater on the  right. This appearance is of indeterminate age and should be  correlated to point tenderness. The remaining osseous structures  demonstrate no sign of acute fracture allowing for motion  degradation. There is no overt bone destruction or aggressive  periosteal reaction. No lytic or blastic lesion is detected.      There are small fluid levels in both maxillary sinuses, greater on  the left. There is a small fluid level in the right sphenoid  compartment. There is mucoperiosteal thickening in scattered fluid  within the ethmoid air cells, greater on the left posteriorly. The  frontal sinuses  and mastoid air cells are clear and symmetrically  aerated. There may be mild mucoperiosteal thickening at the floor of  the left maxillary sinus.      The nasal septum is at the midline. The ostiomeatal units are patent  bilaterally.      The visualized orbital contents are unremarkable. There is no  intraorbital air density or fluid collection. The medial and inferior  orbital walls are intact.      There are least 2 small foci of air density posterior to the right  maxillary sinus though no apparent posterior wall fracture or defect.  The significance of these findings is indeterminate.      Impression: Soft tissue swelling superior left orbit.      Mild deformity at the distal tip of the nasal bones greater on the  right. This appearance is of uncertain age it should be correlated to  point tenderness.      The remaining visualized osseous structures are intact.      Maxillary, ethmoid, and right sphenoid sinusitis with fluid levels.      Signed by: Chuck Argueta 1/3/2024 1:56 PM  Dictation workstation:   [id number]      Physical Exam  General:  no diaphoresis, drowsy but easily awakens and then is alert   HENT: mucous membranes moist, external ears normal, no rhinorrhea   Eyes: no icterus or injection, no discharge   Lungs: CTA BL   Heart: RRR, no LE edema BL   GI: abdomen soft, nontender, nondistended, BS present   MSK: no joint effusion or deformity   Skin: no rashes, erythema, or ecchymosis   Neuro: grossly normal cognition, motor strength, sensation. Mildly confused.      Relevant Results               Results for orders placed or performed during the hospital encounter of 01/04/24 (from the past 24 hour(s))   POCT GLUCOSE   Result Value Ref Range    POCT Glucose 206 (H) 74 - 99 mg/dL   Renal Function Panel   Result Value Ref Range    Glucose 151 (H) 65 - 99 mg/dL    Sodium 140 133 - 145 mmol/L    Potassium 3.4 3.4 - 5.1 mmol/L    Chloride 104 97 - 107 mmol/L    Bicarbonate 24 24 - 31 mmol/L    Urea  Nitrogen 44 (H) 8 - 25 mg/dL    Creatinine 2.50 (H) 0.40 - 1.60 mg/dL    eGFR 27 (L) >60 mL/min/1.73m*2    Calcium 7.7 (L) 8.5 - 10.4 mg/dL    Phosphorus 2.9 2.5 - 4.5 mg/dL    Albumin 2.4 (L) 3.5 - 5.0 g/dL    Anion Gap 12 <=19 mmol/L   CBC and Auto Differential   Result Value Ref Range    WBC 12.2 (H) 4.4 - 11.3 x10*3/uL    nRBC 0.8 (H) 0.0 - 0.0 /100 WBCs    RBC 4.23 (L) 4.50 - 5.90 x10*6/uL    Hemoglobin 12.0 (L) 13.5 - 17.5 g/dL    Hematocrit 37.4 (L) 41.0 - 52.0 %    MCV 88 80 - 100 fL    MCH 28.4 26.0 - 34.0 pg    MCHC 32.1 32.0 - 36.0 g/dL    RDW 18.1 (H) 11.5 - 14.5 %    Platelets 273 150 - 450 x10*3/uL    Neutrophils % 72.7 40.0 - 80.0 %    Immature Granulocytes %, Automated 1.5 (H) 0.0 - 0.9 %    Lymphocytes % 13.2 13.0 - 44.0 %    Monocytes % 11.8 2.0 - 10.0 %    Eosinophils % 0.6 0.0 - 6.0 %    Basophils % 0.2 0.0 - 2.0 %    Neutrophils Absolute 8.91 (H) 1.60 - 5.50 x10*3/uL    Immature Granulocytes Absolute, Automated 0.18 0.00 - 0.50 x10*3/uL    Lymphocytes Absolute 1.61 0.80 - 3.00 x10*3/uL    Monocytes Absolute 1.44 (H) 0.05 - 0.80 x10*3/uL    Eosinophils Absolute 0.07 0.00 - 0.40 x10*3/uL    Basophils Absolute 0.02 0.00 - 0.10 x10*3/uL   POCT GLUCOSE   Result Value Ref Range    POCT Glucose 159 (H) 74 - 99 mg/dL   POCT GLUCOSE   Result Value Ref Range    POCT Glucose 166 (H) 74 - 99 mg/dL         Scheduled medications  amiodarone, 200 mg, oral, BID  apixaban, 5 mg, oral, BID  atorvastatin, 80 mg, oral, Nightly  budesonide, 0.5 mg, nebulization, BID  carbidopa-levodopa, 1 tablet, oral, TID  clopidogrel, 75 mg, oral, Daily  cyclobenzaprine, 10 mg, oral, Nightly  dapagliflozin propanediol, 10 mg, oral, Daily  insulin glargine, 10 Units, subcutaneous, Daily  insulin lispro, 0-10 Units, subcutaneous, TID with meals  ipratropium-albuteroL, 3 mL, nebulization, 4x daily  lactobacillus acidophilus, 1 tablet, oral, BID  levothyroxine, 25 mcg, oral, Daily before breakfast  metoprolol succinate XL, 25 mg,  oral, BID  nystatin, 1 Application, Topical, BID  pantoprazole, 40 mg, oral, Daily before breakfast  QUEtiapine, 25 mg, oral, Nightly  sertraline, 100 mg, oral, Daily  SITagliptin phosphate, 50 mg, oral, Daily  vancomycin, 125 mg, oral, Daily      Continuous medications     PRN medications  PRN medications: acetaminophen, albuterol, dextrose 10 % in water (D10W), dextrose, glucagon, melatonin, ondansetron    Assessment/Plan                  Principal Problem:    Acute respiratory failure (CMS/HCC)    Acute hypoxic respiratory failure  - weaning O2 as able, on 3L today  - concern for underlying aspiration  - MBS ordered based off note from speech therapy on 1/5    Metabolic encephalopathy  - suspect delirium from hospital stays, infection  - waxing/waning lethargy and confusion. Will monitor for improvement    C diff colitis  - PO vancomycin    COVID 19  - completed course of decadron    History of stroke  - recent stroke at end of the year, aspirin, plavix, statin    Chronic systolic heart failure  - EF 30-35%; continue farxiga and metoprolol    DM type 2  - on insulin                Seema Bailey DO

## 2024-01-08 NOTE — CARE PLAN
The patient's goals for the shift include      The clinical goals for the shift include maintian skin integrety    Problem: Diabetes  Goal: Achieve decreasing blood glucose levels by end of shift  Outcome: Progressing  Goal: Increase stability of blood glucose readings by end of shift  Outcome: Progressing  Goal: Decrease in ketones present in urine by end of shift  Outcome: Progressing  Goal: Maintain electrolyte levels within acceptable range throughout shift  Outcome: Progressing  Goal: Maintain glucose levels >70mg/dl to <250mg/dl throughout shift  Outcome: Progressing  Goal: No changes in neurological exam by end of shift  Outcome: Progressing  Goal: Learn about and adhere to nutrition recommendations by end of shift  Outcome: Progressing  Goal: Vital signs within normal range for age by end of shift  Outcome: Progressing  Goal: Increase self care and/or family involovement by end of shift  Outcome: Progressing  Goal: Receive DSME education by end of shift  Outcome: Progressing     Problem: Skin  Goal: Decreased wound size/increased tissue granulation at next dressing change  Outcome: Progressing  Flowsheets (Taken 1/8/2024 1356)  Decreased wound size/increased tissue granulation at next dressing change:   Promote sleep for wound healing   Protective dressings over bony prominences  Goal: Participates in plan/prevention/treatment measures  Outcome: Progressing  Flowsheets (Taken 1/8/2024 1356)  Participates in plan/prevention/treatment measures:   Discuss with provider PT/OT consult   Elevate heels  Goal: Prevent/manage excess moisture  Outcome: Progressing  Flowsheets (Taken 1/8/2024 1356)  Prevent/manage excess moisture:   Use wicking fabric (obtain order)   Follow provider orders for dressing changes  Goal: Prevent/minimize sheer/friction injuries  Outcome: Progressing  Flowsheets (Taken 1/8/2024 1356)  Prevent/minimize sheer/friction injuries:   Increase activity/out of bed for meals   Use pull sheet  Goal:  Promote/optimize nutrition  Outcome: Progressing  Flowsheets (Taken 1/8/2024 1356)  Promote/optimize nutrition:   Monitor/record intake including meals   Assist with feeding  Goal: Promote skin healing  Outcome: Progressing  Flowsheets (Taken 1/8/2024 1356)  Promote skin healing:   Protective dressings over bony prominences   Turn/reposition every 2 hours/use positioning/transfer devices     Problem: Respiratory  Goal: Clear secretions with interventions this shift  Outcome: Progressing  Goal: Minimize anxiety/maximize coping throughout shift  Outcome: Progressing  Goal: Minimal/no exertional discomfort or dyspnea this shift  Outcome: Progressing  Goal: No signs of respiratory distress (eg. Use of accessory muscles. Peds grunting)  Outcome: Progressing  Goal: Patent airway maintained this shift  Outcome: Progressing  Goal: Tolerate mechanical ventilation evidenced by VS/agitation level this shift  Outcome: Progressing  Goal: Tolerate pulmonary toileting this shift  Outcome: Progressing  Goal: Verbalize decreased shortness of breath this shift  Outcome: Progressing  Goal: Wean oxygen to maintain O2 saturation per order/standard this shift  Outcome: Progressing  Goal: Increase self care and/or family involvement in next 24 hours  Outcome: Progressing

## 2024-01-08 NOTE — PROCEDURES
"    Modified Barium Swallow Study     Patient Name: Hai Spaulding  MRN: [id number]  : 1950  Today's Date: 24  Time Calculation  Start Time: 1450  Stop Time: 1510  Time Calculation (min): 20 min       Recommendations:  Puree/nectar thick liquids. Crush medication in puree or nectar thick liquids. Sit upright at 90 degrees for meals and medications.    Assessment/Impression:    Full detailed SLP/Radiologist Modified Barium Swallow study report can be found under Chart Review tab, Imaging tab and  titled \"[place] Modified Barium Swallow Study\"      Pt. Presenting with Silent aspiration with thin liquids. Laryngeal penetration noted with puree and honey thick liquids. Tolerated nectar thick liquids during testing constraints without difficulty. Unable to masticate solids. Unclear if this is due to edentulous status, or other comorbidities. Patient would benefit from continuation of dysphagia therapy to improve overall oral and pharyngeal strength, improve mastication and upgrade diet to least restrictive without pulmonary compromise.    Plan:  Treatment/Interventions: Pharyngeal exercises, Oral motor exercises, Patient/family education, Bolus trials  SLP Plan: Skilled SLP warranted  SLP Frequency: 2x per week  Duration: length of admission    Discussed POC: Patient, Nursing   Discussed Risks/Benefits: Yes  Patient/Caregiver Agreeable: Yes    Pain:   Rating 0-10: 0    GOALS:  1. Patient will tolerate recommended diet without pulmonary compromise  2. Patient will participate in oropharyngeal strengthening exercises.  3. SLP will assess for diet upgrade as appropriate.   4. Due to silent aspiration, patient will need a repeat MBS prior to liquid diet upgrade to thin.    Education:   Pt. Educated on results of MBS study, recommended diet and recommended safe swallow strategies      "

## 2024-01-08 NOTE — PROGRESS NOTES
"Nutrition Follow up Note    Nutrition Assessment      Pt remains in Covid19/C-diif isloatin. Still w/diarrhea, on clear liquid diet. MD notes likely MBS this week. RN unavailable at ebony of visit, po intake continues to be poor per records.    Nutrition History:     Energy Intake: Poor < 50 %  Food Allergies/Intolerances:  None  GI Symptoms: Diarrhea  Oral Problems: None    Anthropometrics:  Ht: 175.3 cm (5' 9.02\"), Wt: 77.1 kg (169 lb 15.6 oz), BMI: 25.09  IBW/kg (Dietitian Calculated): 75.45 kg     Weight Change:  Weight History / % Weight Change: Unknown     Nutrition Focused Physical Exam Findings:   Subcutaneous Fat Loss  Orbital Fat Pads: Defer  Buccal Fat Pads: Defer  Triceps: Defer  Ribs: Defer    Muscle Wasting  Temporalis: Defer  Pectoralis (Clavicular Region): Defer  Deltoid/Trapezius: Defer  Interosseous: Defer  Trapezius/Infraspinatus/Supraspinatus (Scapular Region): Defer  Quadriceps: Defer  Gastrocnemius: Defer    Nutrition Significant Labs:  Lab Results   Component Value Date    WBC 12.2 (H) 01/08/2024    HGB 12.0 (L) 01/08/2024    HCT 37.4 (L) 01/08/2024     01/08/2024    CHOL 98 (L) 06/02/2023    TRIG 122 06/02/2023    HDL 35 (L) 06/02/2023    ALT <5 (L) 01/06/2024    AST 15 01/06/2024     01/08/2024    K 3.4 01/08/2024     01/08/2024    CREATININE 2.50 (H) 01/08/2024    BUN 44 (H) 01/08/2024    CO2 24 01/08/2024    TSH 7.54 (H) 10/11/2023    PSA 1.8 09/30/2020    INR 2.3 (H) 01/03/2024    HGBA1C 10.7 (H) 12/27/2023    ALBUR 12 03/11/2019       Current Facility-Administered Medications:     acetaminophen (Tylenol) tablet 650 mg, 650 mg, oral, q6h PRN, Maurice Cha DO, 650 mg at 01/05/24 1518    albuterol 2.5 mg /3 mL (0.083 %) nebulizer solution 2.5 mg, 2.5 mg, nebulization, q2h PRN, Maurice Cha DO    amiodarone (Pacerone) tablet 200 mg, 200 mg, oral, BID, Rahul Craven MD, 200 mg at 01/08/24 0938    apixaban (Eliquis) tablet 5 mg, 5 mg, oral, BID, Rahul Craven MD, " 5 mg at 01/08/24 0938    atorvastatin (Lipitor) tablet 80 mg, 80 mg, oral, Nightly, Rahul Craven MD, 80 mg at 01/07/24 2040    budesonide (Pulmicort) 0.5 mg/2 mL nebulizer solution 0.5 mg, 0.5 mg, nebulization, BID, Rahul Craven MD, 0.5 mg at 01/08/24 0751    carbidopa-levodopa (Sinemet CR)  mg ER tablet 1 tablet, 1 tablet, oral, TID, Rahul Craven MD, 1 tablet at 01/08/24 0938    clopidogrel (Plavix) tablet 75 mg, 75 mg, oral, Daily, Rahul Craven MD, 75 mg at 01/08/24 0938    cyclobenzaprine (Flexeril) tablet 10 mg, 10 mg, oral, Nightly, Maurice Cha DO, 10 mg at 01/07/24 2040    dapagliflozin propanediol (Farxiga) tablet 10 mg, 10 mg, oral, Daily, Rahul Craven MD, 10 mg at 01/08/24 0938    dextrose 10 % in water (D10W) infusion, 0.3 g/kg/hr, intravenous, Once PRN, Rahul Craven MD    dextrose 50 % injection 25 g, 25 g, intravenous, q15 min PRN, Rahul Craven MD    glucagon (Glucagen) injection 1 mg, 1 mg, intramuscular, q15 min PRN, Rahul Craven MD    insulin glargine (Lantus) injection 10 Units, 10 Units, subcutaneous, Daily, Rahul Craven MD, 10 Units at 01/08/24 0936    insulin lispro (HumaLOG) injection 0-10 Units, 0-10 Units, subcutaneous, TID with meals, Rahul Craven MD, 2 Units at 01/08/24 0936    ipratropium-albuteroL (Duo-Neb) 0.5-2.5 mg/3 mL nebulizer solution 3 mL, 3 mL, nebulization, 4x daily, Maurice Cha DO, 3 mL at 01/08/24 1123    lactobacillus acidophilus tablet 1 tablet, 1 tablet, oral, BID, Rahul Craven MD, 1 tablet at 01/08/24 0938    levothyroxine (Synthroid, Levoxyl) tablet 25 mcg, 25 mcg, oral, Daily before breakfast, Rahul Craven MD, 25 mcg at 01/08/24 0633    melatonin tablet 5 mg, 5 mg, oral, Nightly PRN, Maurice Cha DO    metoprolol succinate XL (Toprol-XL) 24 hr tablet 25 mg, 25 mg, oral, BID, Rahul Craven MD, 25 mg at 01/08/24 0938    nystatin (Mycostatin) 100,000 unit/gram powder 1 Application, 1 Application, Topical, BID, Rahul Craven MD, 1  Application at 01/08/24 0900    ondansetron (Zofran) injection 4 mg, 4 mg, intravenous, q4h PRN, Maurice Cha DO    pantoprazole (ProtoNix) EC tablet 40 mg, 40 mg, oral, Daily before breakfast, Maurice Cha DO, 40 mg at 01/08/24 0633    QUEtiapine (SEROquel) tablet 25 mg, 25 mg, oral, Nightly, Rahul Craven MD, 25 mg at 01/07/24 2039    sertraline (Zoloft) tablet 100 mg, 100 mg, oral, Daily, Rahul Craven MD, 100 mg at 01/08/24 0938    SITagliptin phosphate (Januvia) tablet 50 mg, 50 mg, oral, Daily, Rahul Craven MD, 50 mg at 01/08/24 0938    vancomycin (Vancocin) capsule 125 mg, 125 mg, oral, Daily, Rahul Craven MD, 125 mg at 01/08/24 0938    Dietary Orders (From admission, onward)       Start     Ordered    01/07/24 0902  Adult diet Clear Liquid; Thin 0  Diet effective now        Question Answer Comment   Diet type Clear Liquid    Fluid consistency Thin 0        01/07/24 0902                   Estimated Needs:   Estimated Energy Needs  Total Energy Estimated Needs (kCal): 1925 kCal  Total Estimated Energy Need per Day (kCal/kg): 25 kCal/kg  Method for Estimating Needs: Actual Wt    Estimated Protein Needs  Total Protein Estimated Needs (g): 77 g  Total Protein Estimated Needs (g/kg): 1 g/kg  Method for Estimating Needs: Actual Wt    Estimated Fluid Needs  Total Fluid Estimated Needs (mL): 1925 mL  Method for Estimating Needs: 1 mL/kcal        Nutrition Diagnosis   Nutrition Diagnosis:     Nutrition Diagnosis  Patient has Nutrition Diagnosis: Yes  Diagnosis Status (1): Ongoing  Nutrition Diagnosis 1: Inadequate energy intake  Related to (1): Decreased ability to consume sufficient energy  As Evidenced by (1): Clear liquid diet     Nutrition Interventions/Recommendations   Nutrition Interventions and Recommendations:    Nutrition Prescription:  Individualized Nutrition Prescription Provided for : 1925 calories, 77 gm protein when diet advances    Nutrition Interventions:   Food and/or Nutrient  Delivery Interventions  Interventions: Meals and snacks  Meals and Snacks: General healthful diet  Goal: Advance as able    Education Documentation  No documentation found.         Nutrition Monitoring and Evaluation   Monitoring/Evaluation:   Food/Nutrient Related History Monitoring  Monitoring and Evaluation Plan: Energy intake  Energy Intake: Estimated energy intake  Criteria: Monitoring for diet advancement       Time Spent/Follow-up:   Follow Up  Time Spent (min): 30 minutes  Last Date of Nutrition Visit: 01/08/24  Nutrition Follow-Up Needed?: 3-5 days  Follow up Comment: 1/11/24

## 2024-01-08 NOTE — PROGRESS NOTES
Hai Spaulding is a 73 y.o. male on day 4 of admission presenting with Acute respiratory failure (CMS/HCC).    Patient to return to [facility] once medically clear updates sent at this time     MD WILL NEED TO SIGN/CERTIFY GOLDENROD AT THE TIME OF DISCHARGE FOR SNF.       Lady Dowell RN

## 2024-01-08 NOTE — PROGRESS NOTES
Occupational Therapy    OT Treatment    Patient Name: Hai Spaulding  MRN: [id number]  Today's Date: 1/8/2024  Time Calculation  Start Time: 1101  Stop Time: 1124  Time Calculation (min): 23 min         Assessment:  OT Assessment: Limited progress made towards OT goals. Continue with current OT POC to increase strength, balance and functional tolerance to maximize safety and independence during ADLs.  Evaluation/Treatment Tolerance: Patient limited by fatigue  End of Session Communication: Bedside nurse  End of Session Patient Position: Bed, 3 rail up, Alarm on  OT Assessment Results: Decreased ADL status, Decreased upper extremity range of motion, Decreased upper extremity strength, Decreased cognition, Decreased endurance, Decreased functional mobility, Decreased fine motor control, Decreased gross motor control, Decreased IADLs, Non-functional right upper extremity  Evaluation/Treatment Tolerance: Patient limited by fatigue  Plan:  Treatment Interventions: ADL retraining, Functional transfer training, UE strengthening/ROM, Endurance training, Cognitive reorientation, Patient/family training, Equipment evaluation/education  OT Frequency: 3 times per week  OT Discharge Recommendations: Moderate intensity level of continued care  OT Recommended Transfer Status: Maximum assist, Assist of 2  OT - OK to Discharge: Yes  Treatment Interventions: ADL retraining, Functional transfer training, UE strengthening/ROM, Endurance training, Cognitive reorientation, Patient/family training, Equipment evaluation/education    Subjective   Previous Visit Info:  OT Last Visit  OT Received On: 01/08/24  General:  General  Reason for Referral: ADLs s/p Covid+, Cdiff+  Past Medical History Relevant to Rehab: laryngeal ca, radiation, CVA, dysphagia  Family/Caregiver Present: Yes  Co-Treatment: PT  Co-Treatment Reason: d/t pt heavy assist level and decreased functional tolerance. Co-tx provided this date to maximize safety and functional  outcomes.  Prior to Session Communication: Bedside nurse  Patient Position Received: Bed, 3 rail up, Alarm on  General Comment: Pt cleared for therapy session per nursing, cooperative throughout session.  Precautions:  Medical Precautions: Fall precautions, Infection precautions (Covid+, Cdiff+)  Precautions Comment: CVA- R side weakness  Vital Signs:     Pain:  Pain Assessment  Pain Assessment: 0-10  Clinical Progression: Not changed    Objective    Cognition:  Cognition  Overall Cognitive Status: Impaired  Following Commands: Follows one step commands with repetition (inconsistently follows commands)  Safety/Judgement: Exceptions to WFL  Insight: Severe  Processing Speed: Delayed  Coordination:  Movements are Fluid and Coordinated: No  Upper Body Coordination: slower rate of movement of RUE  Coordination Comment: inconsistent use of RUE observed throughout session.     Bed Mobility/Transfers: Bed Mobility  Bed Mobility: Yes  Bed Mobility 1  Bed Mobility 1: Supine to sitting, Sitting to supine, Scooting  Level of Assistance 1: Dependent (x2)  Bed Mobility Comments 1: limited participation observed during bed mobility tasks    Transfers  Transfer: No    Sitting Balance:  Static Sitting Balance  Static Sitting-Balance Support: Bilateral upper extremity supported, Feet supported  Static Sitting-Level of Assistance: Maximum assistance (1-2)  Static Sitting-Comment/Number of Minutes: x6min to increase functional tolerance and challenge sitting balance for safety and ease of ADL/ADL transfer completion. Constant verbal/ tactile cues required to facilitate upright seated posture. R lateral lean.    Therapy/Activity: Therapeutic Exercise  Therapeutic Exercise Performed: Yes  Therapeutic Exercise Activity 1: AAROM completed 1x8 in all planes to increase ROM and strength for safety and ease of ADL/ADL transfer completion. Verbal instruction and hand over hand assistance required for ROM completion while seated EOB.            Outcome Measures:[facility] Daily Activity  Putting on and taking off regular lower body clothing: Total  Bathing (including washing, rinsing, drying): Total  Putting on and taking off regular upper body clothing: Total  Toileting, which includes using toilet, bedpan or urinal: Total  Taking care of personal grooming such as brushing teeth: A lot  Eating Meals: A lot  Daily Activity - Total Score: 8        Education Documentation  Body Mechanics, taught by SOLIS Aponte at 1/8/2024  3:04 PM.  Learner: Family, Patient  Readiness: Acceptance  Method: Explanation, Demonstration  Response: Needs Reinforcement    Precautions, taught by SOLIS Aponte at 1/8/2024  3:04 PM.  Learner: Family, Patient  Readiness: Acceptance  Method: Explanation, Demonstration  Response: Needs Reinforcement    Home Exercise Program, taught by SOLIS Aponte at 1/8/2024  3:04 PM.  Learner: Family, Patient  Readiness: Acceptance  Method: Explanation, Demonstration  Response: Needs Reinforcement    ADL Training, taught by SOLIS Aponte at 1/8/2024  3:04 PM.  Learner: Family, Patient  Readiness: Acceptance  Method: Explanation, Demonstration  Response: Needs Reinforcement    Education Comments  Education reviewed on role of OT/ POC, importance of OT to maximize safety and independence during functional tasks and safety awareness throughout functional tasks/ transfers. Questions, comments and concerns addressed regarding OT.         Goals:  Encounter Problems       Encounter Problems (Active)       OT Goals       Pt will complete upper body ADL tasks with min A using AE as needed, in order to complete self-care tasks.  (Progressing)       Start:  01/05/24    Expected End:  01/26/24            Pt will perform functional transfers at max A x1 level  (Progressing)       Start:  01/05/24    Expected End:  01/26/24            Pt will perform upper body therapeutic exercises all joints/planes of motion with mod A for R  UE, close S for L UE (Progressing)       Start:  01/05/24    Expected End:  01/26/24

## 2024-01-09 NOTE — CARE PLAN
The patient's goals for the shift include      The clinical goals for the shift include patient will receive adequate sleep this shift    Over the shift, the patient did not make progress toward the following goals. Barriers to progression include incontinence. Recommendations to address these barriers include apply barrier cream.

## 2024-01-09 NOTE — NURSING NOTE
Patient yelling out wanting to leave, wanting to get in a chair stating that he can not breath.  SpO2 98% on 3L NC, HOB elevated to improve respirations.  RAYA Johnson found a rahul chair and chair alarm on another floor for patient to use. Patient is currently asleep.

## 2024-01-09 NOTE — CARE PLAN
The patient's goals for the shift include      The clinical goals for the shift include pt will remain hemodynamically stable this shift      Problem: Diabetes  Goal: Achieve decreasing blood glucose levels by end of shift  Outcome: Progressing  Goal: Increase stability of blood glucose readings by end of shift  Outcome: Progressing  Goal: Decrease in ketones present in urine by end of shift  Outcome: Progressing  Goal: Maintain electrolyte levels within acceptable range throughout shift  Outcome: Progressing  Goal: Maintain glucose levels >70mg/dl to <250mg/dl throughout shift  Outcome: Progressing  Goal: No changes in neurological exam by end of shift  Outcome: Progressing  Goal: Learn about and adhere to nutrition recommendations by end of shift  Outcome: Progressing  Goal: Vital signs within normal range for age by end of shift  Outcome: Progressing  Goal: Increase self care and/or family involovement by end of shift  Outcome: Progressing  Goal: Receive DSME education by end of shift  Outcome: Progressing     Problem: Skin  Goal: Decreased wound size/increased tissue granulation at next dressing change  Outcome: Progressing  Flowsheets (Taken 1/9/2024 1043)  Decreased wound size/increased tissue granulation at next dressing change:   Promote sleep for wound healing   Protective dressings over bony prominences  Goal: Participates in plan/prevention/treatment measures  Outcome: Progressing  Flowsheets (Taken 1/9/2024 1043)  Participates in plan/prevention/treatment measures:   Discuss with provider PT/OT consult   Elevate heels  Goal: Prevent/manage excess moisture  Outcome: Progressing  Flowsheets (Taken 1/9/2024 1043)  Prevent/manage excess moisture:   Moisturize dry skin   Monitor for/manage infection if present   Cleanse incontinence/protect with barrier cream  Goal: Prevent/minimize sheer/friction injuries  Outcome: Progressing  Flowsheets (Taken 1/9/2024 1043)  Prevent/minimize sheer/friction injuries:   HOB  30 degrees or less   Turn/reposition every 2 hours/use positioning/transfer devices  Goal: Promote/optimize nutrition  Outcome: Progressing  Flowsheets (Taken 1/9/2024 1043)  Promote/optimize nutrition:   Assist with feeding   Offer water/supplements/favorite foods  Goal: Promote skin healing  Outcome: Progressing  Flowsheets (Taken 1/9/2024 1043)  Promote skin healing:   Turn/reposition every 2 hours/use positioning/transfer devices   Protective dressings over bony prominences     Problem: Respiratory  Goal: Clear secretions with interventions this shift  Outcome: Progressing  Goal: Minimize anxiety/maximize coping throughout shift  Outcome: Progressing  Goal: Minimal/no exertional discomfort or dyspnea this shift  Outcome: Progressing  Goal: No signs of respiratory distress (eg. Use of accessory muscles. Peds grunting)  Outcome: Progressing  Goal: Patent airway maintained this shift  Outcome: Progressing  Goal: Tolerate mechanical ventilation evidenced by VS/agitation level this shift  Outcome: Progressing  Goal: Tolerate pulmonary toileting this shift  Outcome: Progressing  Goal: Verbalize decreased shortness of breath this shift  Outcome: Progressing  Goal: Wean oxygen to maintain O2 saturation per order/standard this shift  Outcome: Progressing  Goal: Increase self care and/or family involvement in next 24 hours  Outcome: Progressing

## 2024-01-09 NOTE — PROGRESS NOTES
"Hai Spaulding is a 73 y.o. male on day 5 of admission presenting with Acute respiratory failure (CMS/HCC).      Subjective   Patient wakes up easily. More alert and less confused for me, but still sleepy. He was up all night watching tv, tells me \" I couldn't sleep last night but now I'm tired\".     Wife bedside.         Objective     Last Recorded Vitals  BP 93/67 (BP Location: Right arm, Patient Position: Lying)   Pulse 94   Temp 36.8 °C (98.2 °F) (Axillary)   Resp 20   Wt 77.1 kg (169 lb 15.6 oz)   SpO2 97%   Intake/Output last 3 Shifts:    Intake/Output Summary (Last 24 hours) at 1/9/2024 1811  Last data filed at 1/9/2024 1000  Gross per 24 hour   Intake 250 ml   Output 200 ml   Net 50 ml         Admission Weight  Weight: 79.4 kg (175 lb 1.6 oz) (01/04/24 2300)    Daily Weight  01/05/24 : 77.1 kg (169 lb 15.6 oz)    Image Results  FL modified barium swallow study  Narrative: Interpreted By:  Chuck Montoya and Janezic Kimberly   STUDY:  FL MODIFIED BARIUM SWALLOW STUDY;; 1/8/2024 3:34 pm      INDICATION:  Signs/Symptoms:dysphagia.      COMPARISON:  None.      ACCESSION NUMBER(S):  [id number]      ORDERING CLINICIAN:  CRISTAL MARCH      TECHNIQUE:  MBSS completed. Informed verbal consent obtained prior to completion  of exam. Trials of thin, nectar thick, honey thick, puree,  and  regular solids given. Total fluoroscopy time:  2 minutes 52 seconds  Radiation exposure (Reference Air Kerma):  34.37 mGy  Images:  9 series          SLP: Emiliana Collins M.A., CCC-SLP, [facility]  Phone/Pager: [phone] Option 2      SPEECH FINDINGS:  Reason for referral: Aspiration due to comorbidities  Patient hx: Per chart:  This man who is debilitated from a stroke and a history of laryngeal  cancer and repeat aspiration pneumonia has spent more time in the  hospital than out of the hospital for the last 2 months.  He was most  recently hospitalized here December 25 with COVID and aspiration  pneumonia. Respiratory " status: Nasal cannula 2L  Previous diet: Regular/thin liquids      FINAL SPEECH RECOMMENDATIONS      Diet recommendations/feeding strategies: Puree/nectar thick liquids.  Crush medication in puree or nectar thick liquids. Sit upright at 90  degrees for meals and medications.      Follow-up speech therapy recommended: Yes. Patient will benefit from  continued dysphagia therapy for oropharyngeal exercises to allow for  diet progression.      Short term goals: 1. Patient will tolerate recommended diet without  pulmonary compromise  2. Patient will participate in oropharyngeal strengthening exercises.  3. SLP will assess for diet upgrade as appropriate.  4. Due to silent aspiration, patient will need a repeat MBS prior to  liquid diet upgrade to thin.      Long term goals: Tolerated least restrictive diet without pulmonary  compromise      Education provided: Reviewed results with nursing.      Treatment Provided today: No.          Repeat study/ dc plan: Repeat MBS prior to upgrade to thin liquids,  due to silent aspriation with thin liquids.      Mechanics of the swallow summary:      Oral phase:  1. Patient is unable to masticate solids, thus manually removed.  Attempt at mashing with tongue was not successful.  2. Repetitive tongue motion with puree. Oral holding with all liquids  and puree prior to swallow onset.  3. Swallow initiation at the pyriforms with thin liquids. At the  valleculae with nectar and honey thick, and puree.  4. Trace oral residue, clears with reswallow.      Pharyngeal phase:  1. Partial superior laryngeal elevation.  2. Partial anterior hyoid excursion.  3. Partial epiglottic inversion.  4. Narrow column of contrast above vocal cords with  honey thick  liquids and puree. Contrast enters airway above vocal cords (just  underneath epiglottis) with no effort to eject. Narrow column of  contrast enters airway below vocal cords with no effort to eject with  straw. Contrast enters airway above  vocal cords with teaspoon trial  without effort to eject.  5. Collection of residue thoughout pharynx, mild and clears with  spontaneous reswallows.  6. Narrow column of contrast between tongue base and pharyngeal wall.          SLP impressions with severity rating:      Silent aspiration with thin liquids. Laryngeal penetration noted with  puree and honey thick liquids. Tolerated nectar thick liquids during  testing constraints without difficulty. Unable to masticate solids.  Unclear if this is due to edentulous status, or other comorbidities.  Patient would benefit from continuation of dysphagia therapy to  improve overall oral and pharyngeal strength, improve mastication and  upgrade diet to least restrictive without pulmonary compromise.      Thin Liquids (MBSS)  Rosenbek's Penetration Aspiration Scale, Thin Liquids (MBSS):  8. SILENT ASPIRATION - contrast passes glottis, visible residue, NO  pt response      Response to Aspiration, Thin Liquid (MBSS):  absent response, silent aspiration,      Response to Pharyngeal Residue, Thin Liquid (MBSS):  spontaneous clearing,      Nectar Thick Liquids (MBSS)  Rosenbek's Penetration Aspiration Scale, Nectar thick liquids (MBSS):  1. NO ASPIRATION & NO PENETRATION - no aspiration, contrast does  not enter airway      Response to Pharyngeal Residue, Nectar thick liquids (MBSS):  spontaneous clearing,      Honey Thick Liquids (MBSS)  Rosenbek's Penetration Aspiration Scale, Honey thick liquids (MBSS):  3. PENETRATION with LOW ASPIRATION risk - contrast remains above  vocal cords, visible residue      Response to Pharyngeal Residue, Honey thick liquids (MBSS):  spontaneous clearing,      Purees (MBSS)  Rosenbek's Penetration Aspiration Scale, Purees (MBSS):  3. PENETRATION with LOW ASPIRATION risk - contrast remains above  vocal cords, visible residue      Response to Pharyngeal Residue, Purees (MBSS):  spontaneous clearing,          Speech Therapy section of this report  signed by Emiliana Collins M.A.  CCC-SLP, [employer] on 1/8/2024 at 3:54 pm.      RADIOLOGY FINDINGS:  There is laryngeal penetration and aspiration with thin liquid  barium. There is laryngeal penetration without aspiration with nectar  consistency, honey consistency, pudding consistency, and cookie  consistency barium.      Radiology section of this report signed by Jan.      Impression: There is laryngeal penetration and aspiration with thin liquid  barium. There is laryngeal penetration without aspiration with nectar  consistency, honey consistency, pudding consistency, and cookie  consistency barium.      MACRO:  None      Signed by: Chuck Montoya 1/9/2024 5:02 PM  Dictation workstation:   [id number]  ECG 12 lead  Normal sinus rhythm  Nonspecific T wave abnormality  Abnormal ECG  When compared with ECG of 31-DEC-2022 15:09,  Previous ECG has undetermined rhythm, needs review  QRS duration has decreased  Nonspecific T wave abnormality has replaced inverted T waves in Inferior leads  QT has shortened  See ED provider note for full interpretation and clinical correlation  Confirmed by Shimon Kwong ([id number]) on 1/9/2024 4:45:16 PM      Physical Exam  General:  no diaphoresis, drowsy but easily awakens and then is alert   Lungs: CTA BL   Heart: RRR, no LE edema BL   GI: abdomen soft, nontender, nondistended, BS present   MSK: no joint effusion or deformity   Skin: no rashes, erythema, or ecchymosis   Neuro: grossly normal cognition, motor strength, sensation.       Relevant Results               Results for orders placed or performed during the hospital encounter of 01/04/24 (from the past 24 hour(s))   Renal Function Panel   Result Value Ref Range    Glucose 156 (H) 65 - 99 mg/dL    Sodium 140 133 - 145 mmol/L    Potassium 3.6 3.4 - 5.1 mmol/L    Chloride 104 97 - 107 mmol/L    Bicarbonate 24 24 - 31 mmol/L    Urea Nitrogen 47 (H) 8 - 25 mg/dL    Creatinine 2.40 (H) 0.40 - 1.60 mg/dL    eGFR 28 (L) >60  mL/min/1.73m*2    Calcium 7.8 (L) 8.5 - 10.4 mg/dL    Phosphorus 3.2 2.5 - 4.5 mg/dL    Albumin 2.4 (L) 3.5 - 5.0 g/dL    Anion Gap 12 <=19 mmol/L   CBC and Auto Differential   Result Value Ref Range    WBC 12.6 (H) 4.4 - 11.3 x10*3/uL    nRBC 0.8 (H) 0.0 - 0.0 /100 WBCs    RBC 4.25 (L) 4.50 - 5.90 x10*6/uL    Hemoglobin 12.3 (L) 13.5 - 17.5 g/dL    Hematocrit 37.2 (L) 41.0 - 52.0 %    MCV 88 80 - 100 fL    MCH 28.9 26.0 - 34.0 pg    MCHC 33.1 32.0 - 36.0 g/dL    RDW 19.1 (H) 11.5 - 14.5 %    Platelets 272 150 - 450 x10*3/uL    Neutrophils % 80.9 40.0 - 80.0 %    Immature Granulocytes %, Automated 1.0 (H) 0.0 - 0.9 %    Lymphocytes % 9.2 13.0 - 44.0 %    Monocytes % 8.4 2.0 - 10.0 %    Eosinophils % 0.3 0.0 - 6.0 %    Basophils % 0.2 0.0 - 2.0 %    Neutrophils Absolute 10.16 (H) 1.60 - 5.50 x10*3/uL    Immature Granulocytes Absolute, Automated 0.12 0.00 - 0.50 x10*3/uL    Lymphocytes Absolute 1.15 0.80 - 3.00 x10*3/uL    Monocytes Absolute 1.06 (H) 0.05 - 0.80 x10*3/uL    Eosinophils Absolute 0.04 0.00 - 0.40 x10*3/uL    Basophils Absolute 0.03 0.00 - 0.10 x10*3/uL   POCT GLUCOSE   Result Value Ref Range    POCT Glucose 128 (H) 74 - 99 mg/dL   POCT GLUCOSE   Result Value Ref Range    POCT Glucose 106 (H) 74 - 99 mg/dL         Scheduled medications  amiodarone, 200 mg, oral, BID  apixaban, 5 mg, oral, BID  atorvastatin, 80 mg, oral, Nightly  budesonide, 0.5 mg, nebulization, BID  carbidopa-levodopa, 1 tablet, oral, TID  clopidogrel, 75 mg, oral, Daily  cyclobenzaprine, 10 mg, oral, Nightly  dapagliflozin propanediol, 10 mg, oral, Daily  insulin glargine, 10 Units, subcutaneous, Daily  insulin lispro, 0-10 Units, subcutaneous, TID with meals  ipratropium-albuteroL, 3 mL, nebulization, 4x daily  lactobacillus acidophilus, 1 tablet, oral, BID  levothyroxine, 25 mcg, oral, Daily before breakfast  metoprolol succinate XL, 25 mg, oral, BID  nystatin, 1 Application, Topical, BID  pantoprazole, 40 mg, oral, Daily before  breakfast  QUEtiapine, 25 mg, oral, Nightly  sertraline, 100 mg, oral, Daily  SITagliptin phosphate, 50 mg, oral, Daily  vancomycin, 125 mg, oral, Daily      Continuous medications     PRN medications  PRN medications: acetaminophen, albuterol, dextrose 10 % in water (D10W), dextrose, glucagon, melatonin, ondansetron    Assessment/Plan                  Principal Problem:    Acute respiratory failure (CMS/HCC)    Acute hypoxic respiratory failure  - weaning O2 as able, on 3L today  - concern for underlying aspiration did okay with thickened liquids and pureed food on MBS-- this diet is ordered. He will need to follow with speech therapy here and when he goes to SNF    Metabolic encephalopathy- improved  - suspect delirium from hospital stays, infection, poor sleep/wake cycle  - waxing/waning lethargy and confusion. Will monitor for improvement    C diff colitis  - PO vancomycin    COVID 19  - completed course of decadron    History of stroke  - recent stroke at end of the year, aspirin, plavix, statin    Chronic systolic heart failure  - EF 30-35%; continue farxiga and metoprolol    DM type 2  - on insulin    CKD 3  - creatinine a little higher over past 48 hr, though downtrending. Baseline around 2.0  - recheck in am.       Starting diet today. If tolerating, anticipate discharge tomorrow or next day to SNF. Also monitoring renal funnction.                Seema Bailey DO

## 2024-01-09 NOTE — NURSING NOTE
Pulled up and repositioned in bed, PO medications administered, scrotal area has decreased redness and slightly less pain.

## 2024-01-10 PROBLEM — A04.72 C. DIFFICILE DIARRHEA: Status: ACTIVE | Noted: 2024-01-01

## 2024-01-10 NOTE — NURSING NOTE
Assumed care of patient.  Patient in bed resting.  POX showing 95% on 3L NC.  Call light in reach.

## 2024-01-10 NOTE — PROGRESS NOTES
Speech-Language Pathology                 Therapy Communication Note    Patient Name: Hai Spaulding  MRN: [id number]  Today's Date: 1/10/2024     Discipline: Speech Language Pathology    Missed Visit Reason:  pt receiving a breathing  tx @ this time, will attempt later as schedule allows    Missed Time: Attempt    Comment:

## 2024-01-10 NOTE — PROGRESS NOTES
Hai Spaulding is a 73 y.o. male on day 6 of admission presenting with Acute respiratory failure (CMS/HCC).    Subjective   Remains afebrile, no chills  Denies SOB or cough  Denies nausea, vomiting, abdominal pain, diarrhea  Discussed with nursing-no diarrhea reported     Objective   Range of Vitals (last 24 hours)  Heart Rate:  [75-95]   Temp:  [35.8 °C (96.4 °F)-36.8 °C (98.2 °F)]   Resp:  [16-22]   BP: ()/(58-84)   SpO2:  [95 %-98 %]   Daily Weight  01/05/24 : 77.1 kg (169 lb 15.6 oz)    Body mass index is 25.09 kg/m².    Physical Exam  Constitutional:       Appearance: Normal appearance.   HENT:      Head: Normocephalic.      Nose: Nose normal.      Mouth/Throat:      Mouth: Mucous membranes are moist.      Pharynx: Oropharynx is clear.   Eyes:      Extraocular Movements: Extraocular movements intact.      Conjunctiva/sclera: Conjunctivae normal.      Comments: Right periorbital ecchymosis   Cardiovascular:      Rate and Rhythm: Normal rate and regular rhythm.   Pulmonary:      Effort: Pulmonary effort is normal.      Breath sounds: Normal breath sounds.   Abdominal:      General: Bowel sounds are normal.      Palpations: Abdomen is soft.      Tenderness: There is no abdominal tenderness.   Musculoskeletal:         General: Normal range of motion.      Cervical back: Normal range of motion and neck supple.   Skin:     General: Skin is warm and dry.   Neurological:      General: No focal deficit present.      Mental Status: He is alert and oriented to person, place, and time.   Psychiatric:         Mood and Affect: Mood normal.         Behavior: Behavior normal.           Antibiotics  amiodarone (Pacerone) tablet 200 mg  apixaban (Eliquis) tablet 5 mg  atorvastatin (Lipitor) tablet 80 mg  carbidopa-levodopa (Sinemet CR)  mg ER tablet 1 tablet  clopidogrel (Plavix) tablet 75 mg  dapagliflozin propanediol (Farxiga) tablet 10 mg  lactobacillus acidophilus capsule 1 capsule  levothyroxine (Synthroid,  Levoxyl) tablet 25 mcg  metoprolol succinate XL (Toprol-XL) 24 hr tablet 25 mg  QUEtiapine (SEROquel) tablet 25 mg  sertraline (Zoloft) tablet 100 mg  SITagliptin phosphate (Januvia) tablet 50 mg  insulin glargine (Lantus) injection 10 Units  dextrose 50 % injection 25 g  glucagon (Glucagen) injection 1 mg  dextrose 10 % in water (D10W) infusion  insulin lispro (HumaLOG) injection 0-10 Units  vancomycin (Vancocin) capsule 125 mg  fluconazole in NaCl (iso-osm) (Diflucan) IVPB 200 mg  nystatin (Mycostatin) 100,000 unit/gram powder 1 Application  ipratropium-albuteroL (Duo-Neb) 0.5-2.5 mg/3 mL nebulizer solution 3 mL  budesonide (Pulmicort) 0.5 mg/2 mL nebulizer solution 0.5 mg  lactobacillus acidophilus tablet 1 tablet  vancomycin (Vancocin) capsule 125 mg  ipratropium-albuteroL (Duo-Neb) 0.5-2.5 mg/3 mL nebulizer solution 3 mL  acetaminophen (Tylenol) tablet 650 mg  ondansetron (Zofran) injection 4 mg  melatonin tablet 5 mg  cyclobenzaprine (Flexeril) tablet 10 mg  fluticasone furoate-vilanteroL (Breo Ellipta) 200-25 mcg/dose inhaler 1 puff  ipratropium-albuteroL (Duo-Neb) 0.5-2.5 mg/3 mL nebulizer solution 3 mL  pantoprazole (ProtoNix) EC tablet 40 mg  albuterol 2.5 mg /3 mL (0.083 %) nebulizer solution 2.5 mg  acetaminophen (Tylenol) tablet 650 mg  ondansetron (Zofran) injection 4 mg  melatonin tablet 5 mg  potassium chloride (Klor-Con) packet 40 mEq  potassium chloride 20 mEq in 100 mL IV premix      Relevant Results  Labs  Results from last 72 hours   Lab Units 01/10/24  0526 01/09/24  0518 01/08/24  0446   WBC AUTO x10*3/uL 13.6* 12.6* 12.2*   HEMOGLOBIN g/dL 12.4* 12.3* 12.0*   HEMATOCRIT % 38.6* 37.2* 37.4*   PLATELETS AUTO x10*3/uL 279 272 273   NEUTROS PCT AUTO % 84.8 80.9 72.7   LYMPHS PCT AUTO % 7.3 9.2 13.2   MONOS PCT AUTO % 6.8 8.4 11.8   EOS PCT AUTO % 0.1 0.3 0.6       Results from last 72 hours   Lab Units 01/10/24  0526 01/09/24  0518 01/08/24  0446   SODIUM mmol/L 139 140 140   POTASSIUM mmol/L 4.1  3.6 3.4   CHLORIDE mmol/L 104 104 104   CO2 mmol/L 25 24 24   BUN mg/dL 49* 47* 44*   CREATININE mg/dL 2.50* 2.40* 2.50*   GLUCOSE mg/dL 136* 156* 151*   CALCIUM mg/dL 8.0* 7.8* 7.7*   ANION GAP mmol/L 10 12 12   EGFR mL/min/1.73m*2 27* 28* 27*   PHOSPHORUS mg/dL 3.4 3.2 2.9       Results from last 72 hours   Lab Units 01/10/24  0526 01/09/24  0518 01/08/24  0446   ALBUMIN g/dL 2.4* 2.4* 2.4*       Estimated Creatinine Clearance: 26.7 mL/min (A) (by C-G formula based on SCr of 2.5 mg/dL (H)).  C-Reactive Protein   Date Value Ref Range Status   11/29/2023 2.53 (H) <1.00 mg/dL Final     CRP   Date Value Ref Range Status   12/31/2022 39.72 (A) mg/dL Final     Comment:     REF VALUE  < 1.00     12/14/2021 2.16 (A) mg/dL Final     Comment:     REF VALUE  < 1.00       Microbiology  Positive C.diff PCR/negative EIA  MRSA PCR negative  Urine culture with candida-typically colonization  Blood cultures finalized no growth   Imaging  FL modified barium swallow study    Result Date: 1/9/2024  Interpreted By:  Chuck Montoya and Janezic Kimberly STUDY: FL MODIFIED BARIUM SWALLOW STUDY;; 1/8/2024 3:34 pm   INDICATION: Signs/Symptoms:dysphagia.   COMPARISON: None.   ACCESSION NUMBER(S): [id number]   ORDERING CLINICIAN: CRISTAL MARCH   TECHNIQUE: MBSS completed. Informed verbal consent obtained prior to completion of exam. Trials of thin, nectar thick, honey thick, puree,  and regular solids given. Total fluoroscopy time:  2 minutes 52 seconds Radiation exposure (Reference Air Kerma):  34.37 mGy Images:  9 series     SLP: Emiliana Collins M.A., CCC-SLP, [facility] Phone/Pager: [phone] Option 2   SPEECH FINDINGS: Reason for referral: Aspiration due to comorbidities Patient hx: Per chart: This man who is debilitated from a stroke and a history of laryngeal cancer and repeat aspiration pneumonia has spent more time in the hospital than out of the hospital for the last 2 months.  He was most recently hospitalized here December  25 with COVID and aspiration pneumonia. Respiratory status: Nasal cannula 2L Previous diet: Regular/thin liquids   FINAL SPEECH RECOMMENDATIONS   Diet recommendations/feeding strategies: Puree/nectar thick liquids. Crush medication in puree or nectar thick liquids. Sit upright at 90 degrees for meals and medications.   Follow-up speech therapy recommended: Yes. Patient will benefit from continued dysphagia therapy for oropharyngeal exercises to allow for diet progression.   Short term goals: 1. Patient will tolerate recommended diet without pulmonary compromise 2. Patient will participate in oropharyngeal strengthening exercises. 3. SLP will assess for diet upgrade as appropriate. 4. Due to silent aspiration, patient will need a repeat MBS prior to liquid diet upgrade to thin.   Long term goals: Tolerated least restrictive diet without pulmonary compromise   Education provided: Reviewed results with nursing.   Treatment Provided today: No.     Repeat study/ dc plan: Repeat MBS prior to upgrade to thin liquids, due to silent aspriation with thin liquids.   Mechanics of the swallow summary:   Oral phase: 1. Patient is unable to masticate solids, thus manually removed. Attempt at mashing with tongue was not successful. 2. Repetitive tongue motion with puree. Oral holding with all liquids and puree prior to swallow onset. 3. Swallow initiation at the pyriforms with thin liquids. At the valleculae with nectar and honey thick, and puree. 4. Trace oral residue, clears with reswallow.   Pharyngeal phase: 1. Partial superior laryngeal elevation. 2. Partial anterior hyoid excursion. 3. Partial epiglottic inversion. 4. Narrow column of contrast above vocal cords with  honey thick liquids and puree. Contrast enters airway above vocal cords (just underneath epiglottis) with no effort to eject. Narrow column of contrast enters airway below vocal cords with no effort to eject with straw. Contrast enters airway above vocal cords  with teaspoon trial without effort to eject. 5. Collection of residue thoughout pharynx, mild and clears with spontaneous reswallows. 6. Narrow column of contrast between tongue base and pharyngeal wall.     SLP impressions with severity rating:   Silent aspiration with thin liquids. Laryngeal penetration noted with puree and honey thick liquids. Tolerated nectar thick liquids during testing constraints without difficulty. Unable to masticate solids. Unclear if this is due to edentulous status, or other comorbidities. Patient would benefit from continuation of dysphagia therapy to improve overall oral and pharyngeal strength, improve mastication and upgrade diet to least restrictive without pulmonary compromise.   Thin Liquids (MBSS) Rosenbek's Penetration Aspiration Scale, Thin Liquids (MBSS): 8. SILENT ASPIRATION - contrast passes glottis, visible residue, NO pt response   Response to Aspiration, Thin Liquid (MBSS): absent response, silent aspiration,   Response to Pharyngeal Residue, Thin Liquid (MBSS): spontaneous clearing,   Nectar Thick Liquids (MBSS) Rosenbek's Penetration Aspiration Scale, Nectar thick liquids (MBSS): 1. NO ASPIRATION & NO PENETRATION - no aspiration, contrast does not enter airway   Response to Pharyngeal Residue, Nectar thick liquids (MBSS): spontaneous clearing,   Honey Thick Liquids (MBSS) Rosenbek's Penetration Aspiration Scale, Honey thick liquids (MBSS): 3. PENETRATION with LOW ASPIRATION risk - contrast remains above vocal cords, visible residue   Response to Pharyngeal Residue, Honey thick liquids (MBSS): spontaneous clearing,   Purees (MBSS) Rosenbek's Penetration Aspiration Scale, Purees (MBSS): 3. PENETRATION with LOW ASPIRATION risk - contrast remains above vocal cords, visible residue   Response to Pharyngeal Residue, Purees (MBSS): spontaneous clearing,     Speech Therapy section of this report signed by Emiliana Collins M.A., CCC-SLP, [employer] on 1/8/2024 at 3:54 pm.   RADIOLOGY  FINDINGS: There is laryngeal penetration and aspiration with thin liquid barium. There is laryngeal penetration without aspiration with nectar consistency, honey consistency, pudding consistency, and cookie consistency barium.   Radiology section of this report signed by Jan.       There is laryngeal penetration and aspiration with thin liquid barium. There is laryngeal penetration without aspiration with nectar consistency, honey consistency, pudding consistency, and cookie consistency barium.   MACRO: None   Signed by: Chuck Montoya 1/9/2024 5:02 PM Dictation workstation:   [id number]       Assessment/Plan   Coronavirus infection-treated with at least 10 days of dexamethasone  Possible pneumonia-treated with at least 7 days of antibiotics  C. difficile infection-positive PCR/negative EIA  Chronic respiratory failure-stable     Oral vancomycin for a total of 14 days   Monitor stool  Discontinue droplet plus precautions  Continue contact plus precautions for c.diff  Supplemental oxygen as needed  Monitor temperature and WBC    Discharge plan is oral vancomycin for a total of 14 days    Discussed with Dr. Ava PABON Staff, APRN-CNP

## 2024-01-10 NOTE — CARE PLAN
Problem: Diabetes  Goal: Achieve decreasing blood glucose levels by end of shift  Outcome: Progressing  Goal: Increase stability of blood glucose readings by end of shift  Outcome: Progressing  Goal: Decrease in ketones present in urine by end of shift  Outcome: Progressing  Goal: Maintain electrolyte levels within acceptable range throughout shift  Outcome: Progressing  Goal: Maintain glucose levels >70mg/dl to <250mg/dl throughout shift  Outcome: Progressing  Goal: No changes in neurological exam by end of shift  Outcome: Progressing  Goal: Learn about and adhere to nutrition recommendations by end of shift  Outcome: Progressing  Goal: Vital signs within normal range for age by end of shift  Outcome: Progressing  Goal: Increase self care and/or family involovement by end of shift  Outcome: Progressing  Goal: Receive DSME education by end of shift  Outcome: Progressing   The patient's goals for the shift include      The clinical goals for the shift include Pt will remain hemodynamic stable    Over the shift, the patient did not make progress toward the following goals. Barriers to progression include soft BP's. Recommendations to address these barriers include monitor for trends.

## 2024-01-10 NOTE — NURSING NOTE
Report received, care assumed.  Resting quietly in bed, respirations easy, bed in low and locked position call bell within reach.

## 2024-01-10 NOTE — PROGRESS NOTES
Hai Spaulding is a 73 y.o. male on day 6 of admission presenting with Acute respiratory failure (CMS/HCC).      Subjective   More awake today. No complaints of pain or SOB. Says he wants to go home. Refusing to eat/drink his modified diet.    Wife is bedside.         Objective     Last Recorded Vitals  /84 (BP Location: Left arm, Patient Position: Lying)   Pulse 86   Temp 35.8 °C (96.4 °F) (Temporal)   Resp 19   Wt 77.1 kg (169 lb 15.6 oz)   SpO2 96%   Intake/Output last 3 Shifts:    Intake/Output Summary (Last 24 hours) at 1/10/2024 1429  Last data filed at 1/10/2024 1412  Gross per 24 hour   Intake 560 ml   Output 100 ml   Net 460 ml         Admission Weight  Weight: 79.4 kg (175 lb 1.6 oz) (01/04/24 2300)    Daily Weight  01/05/24 : 77.1 kg (169 lb 15.6 oz)    Image Results  FL modified barium swallow study  Narrative: Interpreted By:  Chuck Montoya and Janezic Kimberly   STUDY:  FL MODIFIED BARIUM SWALLOW STUDY;; 1/8/2024 3:34 pm      INDICATION:  Signs/Symptoms:dysphagia.      COMPARISON:  None.      ACCESSION NUMBER(S):  [id number]      ORDERING CLINICIAN:  CRISTAL MARCH      TECHNIQUE:  MBSS completed. Informed verbal consent obtained prior to completion  of exam. Trials of thin, nectar thick, honey thick, puree,  and  regular solids given. Total fluoroscopy time:  2 minutes 52 seconds  Radiation exposure (Reference Air Kerma):  34.37 mGy  Images:  9 series          SLP: Emiliana Collins M.A. [facility]-SLP, [facility]  Phone/Pager: [phone] Option 2      SPEECH FINDINGS:  Reason for referral: Aspiration due to comorbidities  Patient hx: Per chart:  This man who is debilitated from a stroke and a history of laryngeal  cancer and repeat aspiration pneumonia has spent more time in the  hospital than out of the hospital for the last 2 months.  He was most  recently hospitalized here December 25 with COVID and aspiration  pneumonia. Respiratory status: Nasal cannula 2L  Previous diet: Regular/thin  liquids      FINAL SPEECH RECOMMENDATIONS      Diet recommendations/feeding strategies: Puree/nectar thick liquids.  Crush medication in puree or nectar thick liquids. Sit upright at 90  degrees for meals and medications.      Follow-up speech therapy recommended: Yes. Patient will benefit from  continued dysphagia therapy for oropharyngeal exercises to allow for  diet progression.      Short term goals: 1. Patient will tolerate recommended diet without  pulmonary compromise  2. Patient will participate in oropharyngeal strengthening exercises.  3. SLP will assess for diet upgrade as appropriate.  4. Due to silent aspiration, patient will need a repeat MBS prior to  liquid diet upgrade to thin.      Long term goals: Tolerated least restrictive diet without pulmonary  compromise      Education provided: Reviewed results with nursing.      Treatment Provided today: No.          Repeat study/ dc plan: Repeat MBS prior to upgrade to thin liquids,  due to silent aspriation with thin liquids.      Mechanics of the swallow summary:      Oral phase:  1. Patient is unable to masticate solids, thus manually removed.  Attempt at mashing with tongue was not successful.  2. Repetitive tongue motion with puree. Oral holding with all liquids  and puree prior to swallow onset.  3. Swallow initiation at the pyriforms with thin liquids. At the  valleculae with nectar and honey thick, and puree.  4. Trace oral residue, clears with reswallow.      Pharyngeal phase:  1. Partial superior laryngeal elevation.  2. Partial anterior hyoid excursion.  3. Partial epiglottic inversion.  4. Narrow column of contrast above vocal cords with  honey thick  liquids and puree. Contrast enters airway above vocal cords (just  underneath epiglottis) with no effort to eject. Narrow column of  contrast enters airway below vocal cords with no effort to eject with  straw. Contrast enters airway above vocal cords with teaspoon trial  without effort to  eject.  5. Collection of residue thoughout pharynx, mild and clears with  spontaneous reswallows.  6. Narrow column of contrast between tongue base and pharyngeal wall.          SLP impressions with severity rating:      Silent aspiration with thin liquids. Laryngeal penetration noted with  puree and honey thick liquids. Tolerated nectar thick liquids during  testing constraints without difficulty. Unable to masticate solids.  Unclear if this is due to edentulous status, or other comorbidities.  Patient would benefit from continuation of dysphagia therapy to  improve overall oral and pharyngeal strength, improve mastication and  upgrade diet to least restrictive without pulmonary compromise.      Thin Liquids (MBSS)  Rosenbek's Penetration Aspiration Scale, Thin Liquids (MBSS):  8. SILENT ASPIRATION - contrast passes glottis, visible residue, NO  pt response      Response to Aspiration, Thin Liquid (MBSS):  absent response, silent aspiration,      Response to Pharyngeal Residue, Thin Liquid (MBSS):  spontaneous clearing,      Nectar Thick Liquids (MBSS)  Rosenbek's Penetration Aspiration Scale, Nectar thick liquids (MBSS):  1. NO ASPIRATION & NO PENETRATION - no aspiration, contrast does  not enter airway      Response to Pharyngeal Residue, Nectar thick liquids (MBSS):  spontaneous clearing,      Honey Thick Liquids (MBSS)  Rosenbek's Penetration Aspiration Scale, Honey thick liquids (MBSS):  3. PENETRATION with LOW ASPIRATION risk - contrast remains above  vocal cords, visible residue      Response to Pharyngeal Residue, Honey thick liquids (MBSS):  spontaneous clearing,      Purees (MBSS)  Rosenbek's Penetration Aspiration Scale, Purees (MBSS):  3. PENETRATION with LOW ASPIRATION risk - contrast remains above  vocal cords, visible residue      Response to Pharyngeal Residue, Purees (MBSS):  spontaneous clearing,          Speech Therapy section of this report signed by Emiliana Collins M.A., CCC-SLP, [employer] on  1/8/2024 at 3:54 pm.      RADIOLOGY FINDINGS:  There is laryngeal penetration and aspiration with thin liquid  barium. There is laryngeal penetration without aspiration with nectar  consistency, honey consistency, pudding consistency, and cookie  consistency barium.      Radiology section of this report signed by Jan.      Impression: There is laryngeal penetration and aspiration with thin liquid  barium. There is laryngeal penetration without aspiration with nectar  consistency, honey consistency, pudding consistency, and cookie  consistency barium.      MACRO:  None      Signed by: Chuck Montoya 1/9/2024 5:02 PM  Dictation workstation:   [id number]  ECG 12 lead  Normal sinus rhythm  Nonspecific T wave abnormality  Abnormal ECG  When compared with ECG of 31-DEC-2022 15:09,  Previous ECG has undetermined rhythm, needs review  QRS duration has decreased  Nonspecific T wave abnormality has replaced inverted T waves in Inferior leads  QT has shortened  See ED provider note for full interpretation and clinical correlation  Confirmed by Shimon Kwong ([id number]) on 1/9/2024 4:45:16 PM      Physical Exam  General:  no diaphoresis, drowsy but easily awakens and then is alert   Lungs: CTA BL   Heart: RRR, no LE edema BL   GI: abdomen soft, nontender, nondistended, BS present   MSK: no joint effusion or deformity   Skin: no rashes, erythema, or ecchymosis   Neuro: grossly normal cognition, motor strength, sensation.       Relevant Results               Results for orders placed or performed during the hospital encounter of 01/04/24 (from the past 24 hour(s))   POCT GLUCOSE   Result Value Ref Range    POCT Glucose 122 (H) 74 - 99 mg/dL   POCT GLUCOSE   Result Value Ref Range    POCT Glucose 137 (H) 74 - 99 mg/dL   Renal Function Panel   Result Value Ref Range    Glucose 136 (H) 65 - 99 mg/dL    Sodium 139 133 - 145 mmol/L    Potassium 4.1 3.4 - 5.1 mmol/L    Chloride 104 97 - 107 mmol/L    Bicarbonate 25 24 - 31  mmol/L    Urea Nitrogen 49 (H) 8 - 25 mg/dL    Creatinine 2.50 (H) 0.40 - 1.60 mg/dL    eGFR 27 (L) >60 mL/min/1.73m*2    Calcium 8.0 (L) 8.5 - 10.4 mg/dL    Phosphorus 3.4 2.5 - 4.5 mg/dL    Albumin 2.4 (L) 3.5 - 5.0 g/dL    Anion Gap 10 <=19 mmol/L   CBC and Auto Differential   Result Value Ref Range    WBC 13.6 (H) 4.4 - 11.3 x10*3/uL    nRBC 1.2 (H) 0.0 - 0.0 /100 WBCs    RBC 4.35 (L) 4.50 - 5.90 x10*6/uL    Hemoglobin 12.4 (L) 13.5 - 17.5 g/dL    Hematocrit 38.6 (L) 41.0 - 52.0 %    MCV 89 80 - 100 fL    MCH 28.5 26.0 - 34.0 pg    MCHC 32.1 32.0 - 36.0 g/dL    RDW 18.9 (H) 11.5 - 14.5 %    Platelets 279 150 - 450 x10*3/uL    Neutrophils % 84.8 40.0 - 80.0 %    Immature Granulocytes %, Automated 0.8 0.0 - 0.9 %    Lymphocytes % 7.3 13.0 - 44.0 %    Monocytes % 6.8 2.0 - 10.0 %    Eosinophils % 0.1 0.0 - 6.0 %    Basophils % 0.2 0.0 - 2.0 %    Neutrophils Absolute 11.54 (H) 1.60 - 5.50 x10*3/uL    Immature Granulocytes Absolute, Automated 0.11 0.00 - 0.50 x10*3/uL    Lymphocytes Absolute 0.99 0.80 - 3.00 x10*3/uL    Monocytes Absolute 0.93 (H) 0.05 - 0.80 x10*3/uL    Eosinophils Absolute 0.02 0.00 - 0.40 x10*3/uL    Basophils Absolute 0.03 0.00 - 0.10 x10*3/uL   POCT GLUCOSE   Result Value Ref Range    POCT Glucose 115 (H) 74 - 99 mg/dL   POCT GLUCOSE   Result Value Ref Range    POCT Glucose 126 (H) 74 - 99 mg/dL   POCT GLUCOSE   Result Value Ref Range    POCT Glucose 131 (H) 74 - 99 mg/dL         Scheduled medications  amiodarone, 200 mg, oral, BID  apixaban, 5 mg, oral, BID  atorvastatin, 80 mg, oral, Nightly  budesonide, 0.5 mg, nebulization, BID  carbidopa-levodopa, 1 tablet, oral, TID  clopidogrel, 75 mg, oral, Daily  cyclobenzaprine, 10 mg, oral, Nightly  dapagliflozin propanediol, 10 mg, oral, Daily  insulin glargine, 10 Units, subcutaneous, Daily  insulin lispro, 0-10 Units, subcutaneous, TID with meals  ipratropium-albuteroL, 3 mL, nebulization, 4x daily  lactobacillus acidophilus, 1 tablet, oral,  BID  levothyroxine, 25 mcg, oral, Daily before breakfast  metoprolol succinate XL, 25 mg, oral, BID  nystatin, 1 Application, Topical, BID  pantoprazole, 40 mg, oral, Daily before breakfast  QUEtiapine, 25 mg, oral, Nightly  sertraline, 100 mg, oral, Daily  SITagliptin phosphate, 50 mg, oral, Daily  vancomycin, 125 mg, oral, Daily      Continuous medications  sodium chloride 0.9%, 75 mL/hr, Last Rate: 75 mL/hr (01/10/24 1412)    PRN medications  PRN medications: acetaminophen, albuterol, dextrose 10 % in water (D10W), dextrose, glucagon, melatonin, ondansetron    Assessment/Plan                  Principal Problem:    Acute respiratory failure (CMS/HCC)    Acute hypoxic respiratory failure  - weaning O2 as able, on 1L today  - concern for underlying aspiration did okay with thickened liquids and pureed food on MBS-- this diet is ordered. He will need to follow with speech therapy here and when he goes to SNF    Dysphagia  - as above, currently refusing modified diet-- does not like  - will order magic cups which he liked in past  - discussed with speech therapy-- okay for smoothies and/or milkshakes with banana added-- wife says he likes these things. She is allowed to bring them in for him- discussed with nurse    Metabolic encephalopathy- improved  - suspect delirium from hospital stays, infection, poor sleep/wake cycle  - much better today    C diff colitis  - PO vancomycin for total of 14 days    COVID 19  - completed course of decadron    History of stroke  - recent stroke at end of the year, aspirin, plavix, statin    Chronic systolic heart failure  - EF 30-35%; continue farxiga and metoprolol    Parkinsonism  - on levodopa carbidopa    DM type 2  - on insulin    CKD 3 with MATEO  - creatinine uptrending. Has had little to no intake over past few days dd/t modified diet. Start gentle fluids  - encouraging PO intake, as mentioned above      Not having any intake PO. Start fluids. Check renal function tomorrow.  Wife can bring in smoothies/milkshakes as mentioned above. Start magic cup.                  Seema Bailey, DO

## 2024-01-11 NOTE — PROGRESS NOTES
Speech-Language Pathology                 Therapy Communication Note    Patient Name: Hai Spaulding  MRN: [id number]  Today's Date: 1/11/2024     Discipline: Speech Language Pathology    Missed Visit Reason:  pt receiving a breathing tx @ this time will attempt later as schedule allows    Missed Time: Attempt    Comment:

## 2024-01-11 NOTE — PROGRESS NOTES
Hai Spaulding is a 73 y.o. male on day 7 of admission presenting with Acute respiratory failure (CMS/HCC).    Subjective   Remains afebrile  Denies SOB or cough  Denies nausea, vomiting, abdominal pain, diarrhea  Discussed with nursing-patient refused dose of PO vancomycin this morning     Objective   Range of Vitals (last 24 hours)  Heart Rate:  []   Temp:  [36 °C (96.8 °F)-36.7 °C (98.1 °F)]   Resp:  [17-24]   BP: ()/(53-75)   SpO2:  [92 %-100 %]   Daily Weight  01/05/24 : 77.1 kg (169 lb 15.6 oz)    Body mass index is 25.09 kg/m².    Physical Exam  Constitutional:       Appearance: Normal appearance.   HENT:      Head: Normocephalic.      Nose: Nose normal.      Mouth/Throat:      Mouth: Mucous membranes are moist.      Pharynx: Oropharynx is clear.   Eyes:      Extraocular Movements: Extraocular movements intact.      Conjunctiva/sclera: Conjunctivae normal.      Comments: Resolving right periorbital ecchymosis   Cardiovascular:      Rate and Rhythm: Normal rate and regular rhythm.   Pulmonary:      Effort: Pulmonary effort is normal.      Breath sounds: Normal breath sounds.   Abdominal:      General: Bowel sounds are normal.      Palpations: Abdomen is soft.      Tenderness: There is no abdominal tenderness.   Musculoskeletal:         General: Normal range of motion.      Cervical back: Normal range of motion and neck supple.   Skin:     General: Skin is warm and dry.   Neurological:      General: No focal deficit present.      Mental Status: He is alert and oriented to person, place, and time.   Psychiatric:         Mood and Affect: Mood normal.         Behavior: Behavior normal.           Antibiotics  amiodarone (Pacerone) tablet 200 mg  apixaban (Eliquis) tablet 5 mg  atorvastatin (Lipitor) tablet 80 mg  carbidopa-levodopa (Sinemet CR)  mg ER tablet 1 tablet  clopidogrel (Plavix) tablet 75 mg  dapagliflozin propanediol (Farxiga) tablet 10 mg  lactobacillus acidophilus capsule 1  capsule  levothyroxine (Synthroid, Levoxyl) tablet 25 mcg  metoprolol succinate XL (Toprol-XL) 24 hr tablet 25 mg  QUEtiapine (SEROquel) tablet 25 mg  sertraline (Zoloft) tablet 100 mg  SITagliptin phosphate (Januvia) tablet 50 mg  insulin glargine (Lantus) injection 10 Units  dextrose 50 % injection 25 g  glucagon (Glucagen) injection 1 mg  dextrose 10 % in water (D10W) infusion  insulin lispro (HumaLOG) injection 0-10 Units  vancomycin (Vancocin) capsule 125 mg  fluconazole in NaCl (iso-osm) (Diflucan) IVPB 200 mg  nystatin (Mycostatin) 100,000 unit/gram powder 1 Application  ipratropium-albuteroL (Duo-Neb) 0.5-2.5 mg/3 mL nebulizer solution 3 mL  budesonide (Pulmicort) 0.5 mg/2 mL nebulizer solution 0.5 mg  lactobacillus acidophilus tablet 1 tablet  vancomycin (Vancocin) capsule 125 mg  ipratropium-albuteroL (Duo-Neb) 0.5-2.5 mg/3 mL nebulizer solution 3 mL  acetaminophen (Tylenol) tablet 650 mg  ondansetron (Zofran) injection 4 mg  melatonin tablet 5 mg  cyclobenzaprine (Flexeril) tablet 10 mg  fluticasone furoate-vilanteroL (Breo Ellipta) 200-25 mcg/dose inhaler 1 puff  ipratropium-albuteroL (Duo-Neb) 0.5-2.5 mg/3 mL nebulizer solution 3 mL  pantoprazole (ProtoNix) EC tablet 40 mg  albuterol 2.5 mg /3 mL (0.083 %) nebulizer solution 2.5 mg  acetaminophen (Tylenol) tablet 650 mg  ondansetron (Zofran) injection 4 mg  melatonin tablet 5 mg  potassium chloride (Klor-Con) packet 40 mEq  potassium chloride 20 mEq in 100 mL IV premix      Relevant Results  Labs  Results from last 72 hours   Lab Units 01/11/24  0513 01/10/24  0526 01/09/24  0518   WBC AUTO x10*3/uL 12.1* 13.6* 12.6*   HEMOGLOBIN g/dL 12.8* 12.4* 12.3*   HEMATOCRIT % 40.2* 38.6* 37.2*   PLATELETS AUTO x10*3/uL 249 279 272   NEUTROS PCT AUTO % 86.3 84.8 80.9   LYMPHS PCT AUTO % 6.1 7.3 9.2   MONOS PCT AUTO % 6.6 6.8 8.4   EOS PCT AUTO % 0.1 0.1 0.3       Results from last 72 hours   Lab Units 01/11/24  0513 01/10/24  0526 01/09/24  0518   SODIUM mmol/L  141 139 140   POTASSIUM mmol/L 4.0 4.1 3.6   CHLORIDE mmol/L 106 104 104   CO2 mmol/L 22* 25 24   BUN mg/dL 47* 49* 47*   CREATININE mg/dL 2.30* 2.50* 2.40*   GLUCOSE mg/dL 161* 136* 156*   CALCIUM mg/dL 7.7* 8.0* 7.8*   ANION GAP mmol/L 13 10 12   EGFR mL/min/1.73m*2 29* 27* 28*   PHOSPHORUS mg/dL 2.7 3.4 3.2       Results from last 72 hours   Lab Units 01/11/24  0513 01/10/24  0526 01/09/24  0518   ALBUMIN g/dL 2.5* 2.4* 2.4*       Estimated Creatinine Clearance: 29 mL/min (A) (by C-G formula based on SCr of 2.3 mg/dL (H)).  C-Reactive Protein   Date Value Ref Range Status   11/29/2023 2.53 (H) <1.00 mg/dL Final     CRP   Date Value Ref Range Status   12/31/2022 39.72 (A) mg/dL Final     Comment:     REF VALUE  < 1.00     12/14/2021 2.16 (A) mg/dL Final     Comment:     REF VALUE  < 1.00       Microbiology  Positive C.diff PCR/negative EIA  MRSA PCR negative  Urine culture with candida-typically colonization  Blood cultures finalized no growth   Imaging  FL modified barium swallow study    Result Date: 1/9/2024  Interpreted By:  Chuck Montoya and Janezic Kimberly STUDY: FL MODIFIED BARIUM SWALLOW STUDY;; 1/8/2024 3:34 pm   INDICATION: Signs/Symptoms:dysphagia.   COMPARISON: None.   ACCESSION NUMBER(S): [id number]   ORDERING CLINICIAN: CRISTAL MARCH   TECHNIQUE: MBSS completed. Informed verbal consent obtained prior to completion of exam. Trials of thin, nectar thick, honey thick, puree,  and regular solids given. Total fluoroscopy time:  2 minutes 52 seconds Radiation exposure (Reference Air Kerma):  34.37 mGy Images:  9 series     SLP: Emiliana Collins M.A., CCC-SLP, [facility] Phone/Pager: [phone] Option 2   SPEECH FINDINGS: Reason for referral: Aspiration due to comorbidities Patient hx: Per chart: This man who is debilitated from a stroke and a history of laryngeal cancer and repeat aspiration pneumonia has spent more time in the hospital than out of the hospital for the last 2 months.  He was most  recently hospitalized here December 25 with COVID and aspiration pneumonia. Respiratory status: Nasal cannula 2L Previous diet: Regular/thin liquids   FINAL SPEECH RECOMMENDATIONS   Diet recommendations/feeding strategies: Puree/nectar thick liquids. Crush medication in puree or nectar thick liquids. Sit upright at 90 degrees for meals and medications.   Follow-up speech therapy recommended: Yes. Patient will benefit from continued dysphagia therapy for oropharyngeal exercises to allow for diet progression.   Short term goals: 1. Patient will tolerate recommended diet without pulmonary compromise 2. Patient will participate in oropharyngeal strengthening exercises. 3. SLP will assess for diet upgrade as appropriate. 4. Due to silent aspiration, patient will need a repeat MBS prior to liquid diet upgrade to thin.   Long term goals: Tolerated least restrictive diet without pulmonary compromise   Education provided: Reviewed results with nursing.   Treatment Provided today: No.     Repeat study/ dc plan: Repeat MBS prior to upgrade to thin liquids, due to silent aspriation with thin liquids.   Mechanics of the swallow summary:   Oral phase: 1. Patient is unable to masticate solids, thus manually removed. Attempt at mashing with tongue was not successful. 2. Repetitive tongue motion with puree. Oral holding with all liquids and puree prior to swallow onset. 3. Swallow initiation at the pyriforms with thin liquids. At the valleculae with nectar and honey thick, and puree. 4. Trace oral residue, clears with reswallow.   Pharyngeal phase: 1. Partial superior laryngeal elevation. 2. Partial anterior hyoid excursion. 3. Partial epiglottic inversion. 4. Narrow column of contrast above vocal cords with  honey thick liquids and puree. Contrast enters airway above vocal cords (just underneath epiglottis) with no effort to eject. Narrow column of contrast enters airway below vocal cords with no effort to eject with straw.  Contrast enters airway above vocal cords with teaspoon trial without effort to eject. 5. Collection of residue thoughout pharynx, mild and clears with spontaneous reswallows. 6. Narrow column of contrast between tongue base and pharyngeal wall.     SLP impressions with severity rating:   Silent aspiration with thin liquids. Laryngeal penetration noted with puree and honey thick liquids. Tolerated nectar thick liquids during testing constraints without difficulty. Unable to masticate solids. Unclear if this is due to edentulous status, or other comorbidities. Patient would benefit from continuation of dysphagia therapy to improve overall oral and pharyngeal strength, improve mastication and upgrade diet to least restrictive without pulmonary compromise.   Thin Liquids (MBSS) Rosenbek's Penetration Aspiration Scale, Thin Liquids (MBSS): 8. SILENT ASPIRATION - contrast passes glottis, visible residue, NO pt response   Response to Aspiration, Thin Liquid (MBSS): absent response, silent aspiration,   Response to Pharyngeal Residue, Thin Liquid (MBSS): spontaneous clearing,   Nectar Thick Liquids (MBSS) Rosenbek's Penetration Aspiration Scale, Nectar thick liquids (MBSS): 1. NO ASPIRATION & NO PENETRATION - no aspiration, contrast does not enter airway   Response to Pharyngeal Residue, Nectar thick liquids (MBSS): spontaneous clearing,   Honey Thick Liquids (MBSS) Rosenbek's Penetration Aspiration Scale, Honey thick liquids (MBSS): 3. PENETRATION with LOW ASPIRATION risk - contrast remains above vocal cords, visible residue   Response to Pharyngeal Residue, Honey thick liquids (MBSS): spontaneous clearing,   Purees (MBSS) Rosenbek's Penetration Aspiration Scale, Purees (MBSS): 3. PENETRATION with LOW ASPIRATION risk - contrast remains above vocal cords, visible residue   Response to Pharyngeal Residue, Purees (MBSS): spontaneous clearing,     Speech Therapy section of this report signed by Emiliana Collins M.A., CCC-SLP,  BCS-S on 1/8/2024 at 3:54 pm.   RADIOLOGY FINDINGS: There is laryngeal penetration and aspiration with thin liquid barium. There is laryngeal penetration without aspiration with nectar consistency, honey consistency, pudding consistency, and cookie consistency barium.   Radiology section of this report signed by Jan.       There is laryngeal penetration and aspiration with thin liquid barium. There is laryngeal penetration without aspiration with nectar consistency, honey consistency, pudding consistency, and cookie consistency barium.   MACRO: None   Signed by: Chuck Montoya 1/9/2024 5:02 PM Dictation workstation:   [id number]       Assessment/Plan   Coronavirus infection-treated with at least 10 days of dexamethasone  Possible pneumonia-treated with at least 7 days of antibiotics  C. difficile infection-positive PCR/negative EIA  Chronic respiratory failure-stable     Oral vancomycin for a total of 14 days   Monitor stool  Discontinue droplet plus precautions  Continue contact plus precautions for c.diff  Supplemental oxygen as needed  Monitor temperature and WBC  Discharge plan is oral vancomycin for a total of 14 days  If patient continues to refuse PO vancomycin, can consider IV flagyl as alternative        Charlotte Snider, APRN-CNP

## 2024-01-11 NOTE — PROGRESS NOTES
"Nutrition Follow up Note    Nutrition Assessment      Attempted to speak with Pt. Spoke with RN who states that Pt doesn't like the textured-modified diet, and is only drinking smoothies brought in by Pt's wife.    Nutrition History:  Food and Nutrient History: Per RN Pt doesn't like the pureed and thicken food here. Per RN Pt's wife given permission to bring smoothies in for Pt.  Energy Intake: Poor < 50 %      Anthropometrics:  Ht: 175.3 cm (5' 9.02\"), Wt: 77.1 kg (169 lb 15.6 oz), BMI: 25.09  IBW/kg (Dietitian Calculated): 75.45 kg     Weight Change:  Weight History / % Weight Change: Unknown     Nutrition Focused Physical Exam Findings:   Subcutaneous Fat Loss  Orbital Fat Pads: Defer  Buccal Fat Pads: Defer  Triceps: Defer  Ribs: Defer    Muscle Wasting  Temporalis: Defer  Pectoralis (Clavicular Region): Defer  Deltoid/Trapezius: Defer  Interosseous: Defer  Trapezius/Infraspinatus/Supraspinatus (Scapular Region): Defer  Quadriceps: Defer  Gastrocnemius: Defer      Nutrition Significant Labs:  Lab Results   Component Value Date    WBC 12.1 (H) 01/11/2024    HGB 12.8 (L) 01/11/2024    HCT 40.2 (L) 01/11/2024     01/11/2024    CHOL 98 (L) 06/02/2023    TRIG 122 06/02/2023    HDL 35 (L) 06/02/2023    ALT <5 (L) 01/06/2024    AST 15 01/06/2024     01/11/2024    K 4.0 01/11/2024     01/11/2024    CREATININE 2.30 (H) 01/11/2024    BUN 47 (H) 01/11/2024    CO2 22 (L) 01/11/2024    TSH 7.54 (H) 10/11/2023    PSA 1.8 09/30/2020    INR 2.3 (H) 01/03/2024    HGBA1C 10.7 (H) 12/27/2023    ALBUR 12 03/11/2019       Current Facility-Administered Medications:     acetaminophen (Tylenol) tablet 650 mg, 650 mg, oral, q6h PRN, Maurice Cha DO, 650 mg at 01/05/24 1518    albuterol 2.5 mg /3 mL (0.083 %) nebulizer solution 2.5 mg, 2.5 mg, nebulization, q2h PRN, Maurice Cha DO    amiodarone (Pacerone) tablet 200 mg, 200 mg, oral, BID, Rhaul Craven MD, 200 mg at 01/10/24 0556    apixaban (Eliquis) " tablet 5 mg, 5 mg, oral, BID, Rahul Craven MD, 5 mg at 01/10/24 2131    atorvastatin (Lipitor) tablet 80 mg, 80 mg, oral, Nightly, Rahul Craven MD, 80 mg at 01/10/24 2131    budesonide (Pulmicort) 0.5 mg/2 mL nebulizer solution 0.5 mg, 0.5 mg, nebulization, BID, Rahul Craven MD, 0.5 mg at 01/11/24 0813    carbidopa-levodopa (Sinemet CR)  mg ER tablet 1 tablet, 1 tablet, oral, TID, Rahul Craven MD, 1 tablet at 01/10/24 2130    clopidogrel (Plavix) tablet 75 mg, 75 mg, oral, Daily, Rahul Craven MD, 75 mg at 01/10/24 0956    cyclobenzaprine (Flexeril) tablet 10 mg, 10 mg, oral, Nightly, Maurice Cha DO, 10 mg at 01/10/24 2131    dapagliflozin propanediol (Farxiga) tablet 10 mg, 10 mg, oral, Daily, Rahul Craven MD, 10 mg at 01/10/24 1000    dextrose 10 % in water (D10W) infusion, 0.3 g/kg/hr, intravenous, Once PRN, Rahul Craven MD    dextrose 50 % injection 25 g, 25 g, intravenous, q15 min PRN, Rahul Craven MD    glucagon (Glucagen) injection 1 mg, 1 mg, intramuscular, q15 min PRN, Rahul Craven MD    insulin glargine (Lantus) injection 10 Units, 10 Units, subcutaneous, Daily, Rahul Craven MD, 10 Units at 01/11/24 0900    insulin lispro (HumaLOG) injection 0-10 Units, 0-10 Units, subcutaneous, TID with meals, Rhaul Craven MD, 2 Units at 01/08/24 1744    ipratropium-albuteroL (Duo-Neb) 0.5-2.5 mg/3 mL nebulizer solution 3 mL, 3 mL, nebulization, 4x daily, Maurice Cha DO, 3 mL at 01/11/24 0813    lactobacillus acidophilus tablet 1 tablet, 1 tablet, oral, BID, Rahul Craven MD, 1 tablet at 01/10/24 2131    levothyroxine (Synthroid, Levoxyl) tablet 25 mcg, 25 mcg, oral, Daily before breakfast, Rahul Craven MD, 25 mcg at 01/11/24 0628    melatonin tablet 5 mg, 5 mg, oral, Nightly PRN, Maurice Cha DO, 5 mg at 01/09/24 2130    metoprolol succinate XL (Toprol-XL) 24 hr tablet 25 mg, 25 mg, oral, BID, Rahul Craven MD, 25 mg at 01/09/24 2129    nystatin (Mycostatin) 100,000 unit/gram  powder 1 Application, 1 Application, Topical, BID, Rahul Craven MD, 1 Application at 01/11/24 0900    ondansetron (Zofran) injection 4 mg, 4 mg, intravenous, q4h PRN, Maurice Cha DO    pantoprazole (ProtoNix) EC tablet 40 mg, 40 mg, oral, Daily before breakfast, Maurice Cha DO, 40 mg at 01/11/24 0628    QUEtiapine (SEROquel) tablet 25 mg, 25 mg, oral, Nightly, Rahul Craven MD, 25 mg at 01/10/24 2131    sertraline (Zoloft) tablet 100 mg, 100 mg, oral, Daily, Rahul Craven MD, 100 mg at 01/10/24 0956    SITagliptin phosphate (Januvia) tablet 50 mg, 50 mg, oral, Daily, Rahul Craven MD, 50 mg at 01/10/24 0956    sodium chloride 0.9% infusion, 75 mL/hr, intravenous, Continuous, Seema Bailey DO, Last Rate: 75 mL/hr at 01/10/24 2317, 75 mL/hr at 01/10/24 2317    vancomycin (Vancocin) capsule 125 mg, 125 mg, oral, Daily, Rahul Craven MD, 125 mg at 01/10/24 1000    Dietary Orders (From admission, onward)       Start     Ordered    01/10/24 1219  Oral nutritional supplements  Until discontinued        Comments: chocolate   Question Answer Comment   Deliver with All meals    Select supplement: Magic Cup        01/10/24 1218    01/09/24 1230  Adult diet Regular; Pureed 4; Mild thick 2  Diet effective now        Question Answer Comment   Diet type Regular    Texture Pureed 4    Fluid consistency Mild thick 2        01/09/24 1230                  Estimated Needs:   Estimated Energy Needs  Total Energy Estimated Needs (kCal): 1925 kCal  Total Estimated Energy Need per Day (kCal/kg): 25 kCal/kg  Method for Estimating Needs: Actual Wt    Estimated Protein Needs  Total Protein Estimated Needs (g): 77 g  Total Protein Estimated Needs (g/kg): 1 g/kg  Method for Estimating Needs: Actual Wt    Estimated Fluid Needs  Total Fluid Estimated Needs (mL): 1925 mL  Method for Estimating Needs: 1 mL/kcal        Nutrition Diagnosis   Nutrition Diagnosis:       Nutrition Diagnosis  Patient has Nutrition Diagnosis:  Yes  Diagnosis Status (1): Resolved  Nutrition Diagnosis 1: Inadequate energy intake  Related to (1): Decreased ability to consume sufficient energy  As Evidenced by (1): Clear liquid diet  Additional Nutrition Diagnosis: Diagnosis 2  Diagnosis Status (2): New  Nutrition Diagnosis 2: Inadequate energy intake  Related to (2): Pt doesn't like the texture-modifid diet  As Evidenced by (2): poor PO intake       Nutrition Interventions/Recommendations   Nutrition Interventions and Recommendations:    Nutrition Prescription:  Individualized Nutrition Prescription Provided for : 1925 calories, 77 gm protein when diet advances    Nutrition Interventions:   Food and/or Nutrient Delivery Interventions  Interventions: Meals and snacks, Medical food supplement  Meals and Snacks: Texture-modified diet  Goal: Provide diet as ordered  Medical Food Supplement: Modified beverage  Goal: Magic Cup TID, provides 290 kcals and 9g Protein per serving  Additional Interventions: If Pt doesn't like Magic Cup, recommend switch to Might Shake TID, which would provide 200 kcals and 7g Protein per carton    Education Documentation  No documentation found.           Nutrition Monitoring and Evaluation   Monitoring/Evaluation:   Food/Nutrient Related History Monitoring  Monitoring and Evaluation Plan: Energy intake  Energy Intake: Estimated energy intake  Criteria: Patient will tolerate >/= 50% of meals and snacks, and supplements through PO intake    __       Time Spent/Follow-up:   Follow Up  Time Spent (min): 30 minutes  Last Date of Nutrition Visit: 01/11/24  Nutrition Follow-Up Needed?: 3-5 days  Follow up Comment: 1/15/24

## 2024-01-11 NOTE — PROGRESS NOTES
Hai Spaulding is a 73 y.o. male on day 7 of admission presenting with Acute respiratory failure (CMS/HCC).      Subjective   Patient reports that he doesn't feel good today. Says he is nauseated. Refused am labs and refusing to eat. Initially refusing zofran but agreeable after I talked to him.    Wife bedside. She is concerned that he refused his carbidopa-levodopa this morning. Says he tends to get confused, agitated, hallucinates when he skips.    He drank half a smoothie from [employer] last night when his wife brought it in.          Objective     Last Recorded Vitals  BP 91/75 (BP Location: Right arm, Patient Position: Lying)   Pulse (!) 119   Temp 36.2 °C (97.2 °F) (Temporal)   Resp 20   Wt 77.1 kg (169 lb 15.6 oz)   SpO2 100%   Intake/Output last 3 Shifts:    Intake/Output Summary (Last 24 hours) at 1/11/2024 1325  Last data filed at 1/10/2024 1757  Gross per 24 hour   Intake 310 ml   Output 200 ml   Net 110 ml         Admission Weight  Weight: 79.4 kg (175 lb 1.6 oz) (01/04/24 2300)    Daily Weight  01/05/24 : 77.1 kg (169 lb 15.6 oz)    Image Results  ECG 12 Lead  Wide QRS tachycardia with Premature supraventricular complexes and with occasional Premature ventricular complexes  Nonspecific intraventricular block  Cannot rule out Anteroseptal infarct , age undetermined  T wave abnormality, consider inferolateral ischemia  Abnormal ECG  When compared with ECG of 14-DEC-2023 19:10, (unconfirmed)  Wide QRS tachycardia has replaced Sinus rhythm  Vent. rate has increased BY  57 BPM  See ED provider note for full interpretation and clinical correlation  Confirmed by Alyssa Brown ([id number]) on 1/10/2024 4:16:56 PM      Physical Exam  General:  no diaphoresis, drowsy but easily awakens and then is alert   Lungs: CTA BL   Heart: RRR, no LE edema BL   GI: abdomen soft, nontender, nondistended, BS present   MSK: no joint effusion or deformity   Skin: no rashes, erythema, or ecchymosis   Neuro: grossly normal  cognition, motor strength, sensation.       Relevant Results               Results for orders placed or performed during the hospital encounter of 01/04/24 (from the past 24 hour(s))   POCT GLUCOSE   Result Value Ref Range    POCT Glucose 143 (H) 74 - 99 mg/dL   Renal Function Panel   Result Value Ref Range    Glucose 161 (H) 65 - 99 mg/dL    Sodium 141 133 - 145 mmol/L    Potassium 4.0 3.4 - 5.1 mmol/L    Chloride 106 97 - 107 mmol/L    Bicarbonate 22 (L) 24 - 31 mmol/L    Urea Nitrogen 47 (H) 8 - 25 mg/dL    Creatinine 2.30 (H) 0.40 - 1.60 mg/dL    eGFR 29 (L) >60 mL/min/1.73m*2    Calcium 7.7 (L) 8.5 - 10.4 mg/dL    Phosphorus 2.7 2.5 - 4.5 mg/dL    Albumin 2.5 (L) 3.5 - 5.0 g/dL    Anion Gap 13 <=19 mmol/L   CBC and Auto Differential   Result Value Ref Range    WBC 12.1 (H) 4.4 - 11.3 x10*3/uL    nRBC 2.3 (H) 0.0 - 0.0 /100 WBCs    RBC 4.44 (L) 4.50 - 5.90 x10*6/uL    Hemoglobin 12.8 (L) 13.5 - 17.5 g/dL    Hematocrit 40.2 (L) 41.0 - 52.0 %    MCV 91 80 - 100 fL    MCH 28.8 26.0 - 34.0 pg    MCHC 31.8 (L) 32.0 - 36.0 g/dL    RDW 19.7 (H) 11.5 - 14.5 %    Platelets 249 150 - 450 x10*3/uL    Neutrophils % 86.3 40.0 - 80.0 %    Immature Granulocytes %, Automated 0.7 0.0 - 0.9 %    Lymphocytes % 6.1 13.0 - 44.0 %    Monocytes % 6.6 2.0 - 10.0 %    Eosinophils % 0.1 0.0 - 6.0 %    Basophils % 0.2 0.0 - 2.0 %    Neutrophils Absolute 10.40 (H) 1.60 - 5.50 x10*3/uL    Immature Granulocytes Absolute, Automated 0.09 0.00 - 0.50 x10*3/uL    Lymphocytes Absolute 0.73 (L) 0.80 - 3.00 x10*3/uL    Monocytes Absolute 0.80 0.05 - 0.80 x10*3/uL    Eosinophils Absolute 0.01 0.00 - 0.40 x10*3/uL    Basophils Absolute 0.02 0.00 - 0.10 x10*3/uL   POCT GLUCOSE   Result Value Ref Range    POCT Glucose 142 (H) 74 - 99 mg/dL   POCT GLUCOSE   Result Value Ref Range    POCT Glucose 150 (H) 74 - 99 mg/dL         Scheduled medications  amiodarone, 200 mg, oral, BID  apixaban, 5 mg, oral, BID  atorvastatin, 80 mg, oral, Nightly  budesonide,  0.5 mg, nebulization, BID  carbidopa-levodopa, 1 tablet, oral, TID  clopidogrel, 75 mg, oral, Daily  cyclobenzaprine, 10 mg, oral, Nightly  dapagliflozin propanediol, 10 mg, oral, Daily  insulin glargine, 10 Units, subcutaneous, Daily  insulin lispro, 0-10 Units, subcutaneous, TID with meals  ipratropium-albuteroL, 3 mL, nebulization, 4x daily  lactobacillus acidophilus, 1 tablet, oral, BID  levothyroxine, 25 mcg, oral, Daily before breakfast  metoprolol succinate XL, 25 mg, oral, BID  nystatin, 1 Application, Topical, BID  pantoprazole, 40 mg, oral, Daily before breakfast  QUEtiapine, 25 mg, oral, Nightly  sertraline, 100 mg, oral, Daily  SITagliptin phosphate, 50 mg, oral, Daily  vancomycin, 125 mg, oral, Daily      Continuous medications  sodium chloride 0.9%, 75 mL/hr, Last Rate: 75 mL/hr (01/10/24 7085)    PRN medications  PRN medications: acetaminophen, albuterol, dextrose 10 % in water (D10W), dextrose, glucagon, melatonin, ondansetron    Assessment/Plan                  Principal Problem:    Acute respiratory failure (CMS/HCC)  Active Problems:    Atrial fibrillation (CMS/HCC)    CAD (coronary artery disease)    Parkinson's disease    Cerebral infarction, unspecified (CMS/HCC)    C. difficile diarrhea    Acute hypoxic respiratory failure  - weaning O2 as able, on 1L today  - concern for underlying aspiration did okay with thickened liquids and pureed food on MBS-- this diet is ordered. He will need to follow with speech therapy here and when he goes to SNF    Dysphagia  - as above, did okay with smoothie last night but today is nauseated  - will order magic cups which he liked in past  - discussed with speech therapy-- okay for smoothies and/or milkshakes with banana added-- he tolerated some smoothie last night but now refusing  - speech therapy on consult although haven't seen patient for a few days    Metabolic encephalopathy- improved  - suspect delirium from hospital stays, infection, poor sleep/wake  cycle  - much better today    C diff colitis  - PO vancomycin for total of 14 days    COVID 19  - completed course of decadron    History of stroke  - recent stroke at end of the year, aspirin, plavix, statin    Chronic systolic heart failure  - EF 30-35%; continue farxiga and metoprolol    Parkinsonism  - on levodopa carbidopa- very important patient takes this for his mental status. Patient agrees to have it later if he's nauseated. Zofran now. Spoke with nurse.    DM type 2  - on insulin    CKD 3 with MATEO- improving  - continue fluids. Needs hydration given poor PO intake                      Seema Bailey DO

## 2024-01-11 NOTE — PROGRESS NOTES
Physical Therapy                 Therapy Communication Note    Patient Name: Hai Spaulding  MRN: [id number]  Today's Date: 1/11/2024     Discipline: Physical Therapy    Missed Visit Reason: Missed Visit Reason: Patient refused (pt very confused, not making any sense. Spouse present stating he refused his medication earlier so patient has not had his Parkinson's meds. spouse states pt will be very confused until he accepts his medication.)    Missed Time: Attempt    Comment:

## 2024-01-11 NOTE — CARE PLAN
The clinical goals for the shift include maintain safety and hemodynamically stable  Overnight he was able to achieve this goal, his blood pressure and heart rate improved throughout the night. See the flowsheets. No signs of acute distress or assessment changes were identified.  Over the shift, the patient did make progress toward the following goals:      Problem: Skin  Goal: Promote skin healing  Outcome: Progressing     Problem: Respiratory  Goal: Minimize anxiety/maximize coping throughout shift  Outcome: Progressing     Problem: Respiratory  Goal: Minimal/no exertional discomfort or dyspnea this shift  Outcome: Progressing     Problem: Respiratory  Goal: No signs of respiratory distress (eg. Use of accessory muscles. Peds grunting)  Outcome: Progressing     Problem: Respiratory  Goal: Patent airway maintained this shift  Outcome: Progressing     Problem: Respiratory  Goal: Verbalize decreased shortness of breath this shift  Outcome: Progressing

## 2024-01-11 NOTE — NURSING NOTE
Assumed care of patient.  Patient in bed watching tv.  Bedside shift report received.  POX 95% on 1LNC.  Call light in reach.

## 2024-01-11 NOTE — NURSING NOTE
"Reported to Dr. Bailey and NP Charlotte Staff that patient refused all medications this am.  Patient stated 'It makes me feel sick.\"  Zofran was offered to give with medications.  Patient still refused.   NNO at this time.   "

## 2024-01-11 NOTE — PROGRESS NOTES
Hai Spaulding is a 73 y.o. male on day 7 of admission presenting with Acute respiratory failure (CMS/HCC).    Patient to return to [facility] when medically clear       Lady Dowell RN

## 2024-01-11 NOTE — PROGRESS NOTES
Occupational Therapy                 Therapy Communication Note    Patient Name: Hai Spaulding  MRN: [id number]  Today's Date: 1/11/2024     Discipline: Occupational Therapy    Missed Visit Reason: Missed Visit Reason: Patient refused (Education and encouragement provided however pt continued to decline.)    Missed Time: Attempt    Comment: Attempt at 13:46

## 2024-01-11 NOTE — PROGRESS NOTES
Speech-Language Pathology                 Therapy Communication Note    Patient Name: Hai Spaulding  MRN: [id number]  Today's Date: 1/11/2024     Discipline: Speech Language Pathology    Missed Visit Reason:  pt refusing PO @ this time, nauseated, nursing just gave pt zofran per wife, will attempt tx 1/12/24 as appropriate    Missed Time: Attempt    Comment:

## 2024-01-12 NOTE — CONSULTS
Inpatient consult to Palliative Care  Consult performed by: Elizabeth Pearce, APRN-CNP  Consult ordered by: Seema aBiley DO  Reason for consult: Goals of Care          Reason For Consult  Reason for Consult: communication / medical decision making     History Of Present Illness  Hai Spaulding is a 73 y.o. male with past medical history of Chronic Obstructive Pulmonary Disease (COPD) is presenting with Acute Respiratory Failure   Patient with history of COPD, SHF, neuropathy GERD A-fib prior stroke laryngeal cancer presents with acute on chronic respiratory failure patient was recently hospitalized with COVID aspiration pneumonia he was discharged to rehab facility and reported back with altered mental status, shortness of breath, also noted to have large amounts of watery diarrhea and tested positive for C. difficile.  He was seen by factious disease, treated with oral vancomycin.  Since admission he has continued to have very very poor p.o. intake, noted to have some worsening respiratory failure, IV fluids were held.  For this reason we are seeing patient.  Rightly he complains of discomfort and pain to both lower extremities he is unable to describe or rate the pain, it seems to be worse with light touch or range of motion.  Wife at bedside reports history of chronic back issues as well areas sacral decubitus ulcer.     Symptoms (0 - 10, Best to Worst)  Inez Symptom Assessment System  Pain Score: 0 - No pain    BM in last 48 hours? no  Personal/Social History    He reports that he has been smoking cigarettes. He has a 30.00 pack-year smoking history. He has been exposed to tobacco smoke. He has never used smokeless tobacco. He reports that he does not drink alcohol and does not use drugs.    Functional Status    Wheelchair-bound at baseline is able to stand and pivot    Caregiving/Caregiver Support  Does the patient require assistance in some or all components of his care, including coordination of  medical care? Yes  If Yes, which person serves that role?  spouse   Caregiver emotional or practical needs:      Past Medical History  He has a past medical history of Acid reflux, Aspiration pneumonia (CMS/Formerly [facility]) (11/23/2021), Aspiration pneumonia resulting from a procedure (10/25/2021), Atrial fibrillation with RVR (CMS/Formerly [facility]) (10/2021), Congestive heart failure (CHF) (CMS/HCC) (10/2021), COPD (chronic obstructive pulmonary disease) (CMS/HCC), COVID, Diabetes (CMS/HCC), Diabetic eye exam (CMS/HCC) (05/08/2019), Hodgkin lymphoma (CMS/HCC), HTN (hypertension), Hyperlipidemia, Larynx cancer (CMS/HCC), Myocardial infarction (CMS/HCC), Neuropathy, Pneumonia due to gram-negative bacteria (01/01/2023), Pneumonitis due to inhalation of other solids and liquids (CMS/Formerly [facility]) (11/25/2021), Urinary tract infection (01/04/2023), and Urinary tract infection, site not specified (12/18/2023).    Surgical History  He has a past surgical history that includes Other surgical history; Other surgical history; CT angio abdomen pelvis w and or wo IV IV contrast (01/22/2022); Other surgical history; Tonsillectomy; Other surgical history; Cholecystectomy; Other surgical history; Tumor excision; Cardiac defibrillator placement (05/2022); IR injection epidural steroid; and Tumor removal.     Family History  Family History   Problem Relation Name Age of Onset    Diabetes Mother      Other (htn) Mother      Heart disease Mother      Diabetes Father      Heart disease Father      Other (htn) Father       Allergies  Patient has no known allergies.    Review of Systems   Unable to perform ROS: Mental status change        Physical Exam  Vitals and nursing note reviewed.   Constitutional:       General: He is in acute distress.      Appearance: He is ill-appearing.   HENT:      Head: Normocephalic and atraumatic.      Comments: Bruising r orbital socket  Septal deviation to Left     Nose: Nose normal.      Comments: Nares patent, turbinates not  "visualized     Mouth/Throat:      Mouth: Mucous membranes are moist.      Pharynx: Oropharynx is clear.      Comments: Thickened dry dark brown/black exudate noted in posterior pharynx.     Eyes:      Extraocular Movements: Extraocular movements intact.      Pupils: Pupils are equal, round, and reactive to light.   Cardiovascular:      Rate and Rhythm: Normal rate and regular rhythm.      Pulses: Normal pulses.      Heart sounds: No murmur heard.  Pulmonary:      Effort: Pulmonary effort is normal. No respiratory distress.      Comments: Poor inspiratory effort, NC oxygen  Accessory muscle use  Abdominal:      General: Abdomen is flat. Bowel sounds are normal. There is no distension.      Palpations: Abdomen is soft.      Tenderness: There is no abdominal tenderness.   Musculoskeletal:         General: No swelling, tenderness, deformity or signs of injury. Normal range of motion.      Cervical back: Normal range of motion and neck supple.      Comments: Muscle atrophy to B/L LE   Skin:     General: Skin is warm and dry.      Capillary Refill: Capillary refill takes 2 to 3 seconds.   Neurological:      General: No focal deficit present.      Mental Status: He is alert and oriented to person, place, and time.      Motor: Weakness present.      Comments: Limited verbalization, follows commands, answers basic questions appropriately, some disorientation noted when asking about more details, slowed responses   Psychiatric:         Mood and Affect: Mood normal.      Comments: Limited verbalization, flat affect         Last Recorded Vitals  Blood pressure 90/58, pulse 101, temperature 36.5 °C (97.7 °F), temperature source Oral, resp. rate 24, height 1.753 m (5' 9.02\"), weight 77.1 kg (169 lb 15.6 oz), SpO2 96 %.    Relevant Results  Results for orders placed or performed during the hospital encounter of 01/04/24 (from the past 96 hour(s))   Renal Function Panel   Result Value Ref Range    Glucose 156 (H) 65 - 99 mg/dL    " Sodium 140 133 - 145 mmol/L    Potassium 3.6 3.4 - 5.1 mmol/L    Chloride 104 97 - 107 mmol/L    Bicarbonate 24 24 - 31 mmol/L    Urea Nitrogen 47 (H) 8 - 25 mg/dL    Creatinine 2.40 (H) 0.40 - 1.60 mg/dL    eGFR 28 (L) >60 mL/min/1.73m*2    Calcium 7.8 (L) 8.5 - 10.4 mg/dL    Phosphorus 3.2 2.5 - 4.5 mg/dL    Albumin 2.4 (L) 3.5 - 5.0 g/dL    Anion Gap 12 <=19 mmol/L   CBC and Auto Differential   Result Value Ref Range    WBC 12.6 (H) 4.4 - 11.3 x10*3/uL    nRBC 0.8 (H) 0.0 - 0.0 /100 WBCs    RBC 4.25 (L) 4.50 - 5.90 x10*6/uL    Hemoglobin 12.3 (L) 13.5 - 17.5 g/dL    Hematocrit 37.2 (L) 41.0 - 52.0 %    MCV 88 80 - 100 fL    MCH 28.9 26.0 - 34.0 pg    MCHC 33.1 32.0 - 36.0 g/dL    RDW 19.1 (H) 11.5 - 14.5 %    Platelets 272 150 - 450 x10*3/uL    Neutrophils % 80.9 40.0 - 80.0 %    Immature Granulocytes %, Automated 1.0 (H) 0.0 - 0.9 %    Lymphocytes % 9.2 13.0 - 44.0 %    Monocytes % 8.4 2.0 - 10.0 %    Eosinophils % 0.3 0.0 - 6.0 %    Basophils % 0.2 0.0 - 2.0 %    Neutrophils Absolute 10.16 (H) 1.60 - 5.50 x10*3/uL    Immature Granulocytes Absolute, Automated 0.12 0.00 - 0.50 x10*3/uL    Lymphocytes Absolute 1.15 0.80 - 3.00 x10*3/uL    Monocytes Absolute 1.06 (H) 0.05 - 0.80 x10*3/uL    Eosinophils Absolute 0.04 0.00 - 0.40 x10*3/uL    Basophils Absolute 0.03 0.00 - 0.10 x10*3/uL   POCT GLUCOSE   Result Value Ref Range    POCT Glucose 128 (H) 74 - 99 mg/dL   POCT GLUCOSE   Result Value Ref Range    POCT Glucose 106 (H) 74 - 99 mg/dL   POCT GLUCOSE   Result Value Ref Range    POCT Glucose 122 (H) 74 - 99 mg/dL   POCT GLUCOSE   Result Value Ref Range    POCT Glucose 137 (H) 74 - 99 mg/dL   Renal Function Panel   Result Value Ref Range    Glucose 136 (H) 65 - 99 mg/dL    Sodium 139 133 - 145 mmol/L    Potassium 4.1 3.4 - 5.1 mmol/L    Chloride 104 97 - 107 mmol/L    Bicarbonate 25 24 - 31 mmol/L    Urea Nitrogen 49 (H) 8 - 25 mg/dL    Creatinine 2.50 (H) 0.40 - 1.60 mg/dL    eGFR 27 (L) >60 mL/min/1.73m*2     Calcium 8.0 (L) 8.5 - 10.4 mg/dL    Phosphorus 3.4 2.5 - 4.5 mg/dL    Albumin 2.4 (L) 3.5 - 5.0 g/dL    Anion Gap 10 <=19 mmol/L   CBC and Auto Differential   Result Value Ref Range    WBC 13.6 (H) 4.4 - 11.3 x10*3/uL    nRBC 1.2 (H) 0.0 - 0.0 /100 WBCs    RBC 4.35 (L) 4.50 - 5.90 x10*6/uL    Hemoglobin 12.4 (L) 13.5 - 17.5 g/dL    Hematocrit 38.6 (L) 41.0 - 52.0 %    MCV 89 80 - 100 fL    MCH 28.5 26.0 - 34.0 pg    MCHC 32.1 32.0 - 36.0 g/dL    RDW 18.9 (H) 11.5 - 14.5 %    Platelets 279 150 - 450 x10*3/uL    Neutrophils % 84.8 40.0 - 80.0 %    Immature Granulocytes %, Automated 0.8 0.0 - 0.9 %    Lymphocytes % 7.3 13.0 - 44.0 %    Monocytes % 6.8 2.0 - 10.0 %    Eosinophils % 0.1 0.0 - 6.0 %    Basophils % 0.2 0.0 - 2.0 %    Neutrophils Absolute 11.54 (H) 1.60 - 5.50 x10*3/uL    Immature Granulocytes Absolute, Automated 0.11 0.00 - 0.50 x10*3/uL    Lymphocytes Absolute 0.99 0.80 - 3.00 x10*3/uL    Monocytes Absolute 0.93 (H) 0.05 - 0.80 x10*3/uL    Eosinophils Absolute 0.02 0.00 - 0.40 x10*3/uL    Basophils Absolute 0.03 0.00 - 0.10 x10*3/uL   POCT GLUCOSE   Result Value Ref Range    POCT Glucose 115 (H) 74 - 99 mg/dL   POCT GLUCOSE   Result Value Ref Range    POCT Glucose 126 (H) 74 - 99 mg/dL   POCT GLUCOSE   Result Value Ref Range    POCT Glucose 131 (H) 74 - 99 mg/dL   POCT GLUCOSE   Result Value Ref Range    POCT Glucose 143 (H) 74 - 99 mg/dL   Renal Function Panel   Result Value Ref Range    Glucose 161 (H) 65 - 99 mg/dL    Sodium 141 133 - 145 mmol/L    Potassium 4.0 3.4 - 5.1 mmol/L    Chloride 106 97 - 107 mmol/L    Bicarbonate 22 (L) 24 - 31 mmol/L    Urea Nitrogen 47 (H) 8 - 25 mg/dL    Creatinine 2.30 (H) 0.40 - 1.60 mg/dL    eGFR 29 (L) >60 mL/min/1.73m*2    Calcium 7.7 (L) 8.5 - 10.4 mg/dL    Phosphorus 2.7 2.5 - 4.5 mg/dL    Albumin 2.5 (L) 3.5 - 5.0 g/dL    Anion Gap 13 <=19 mmol/L   CBC and Auto Differential   Result Value Ref Range    WBC 12.1 (H) 4.4 - 11.3 x10*3/uL    nRBC 2.3 (H) 0.0 - 0.0  /100 WBCs    RBC 4.44 (L) 4.50 - 5.90 x10*6/uL    Hemoglobin 12.8 (L) 13.5 - 17.5 g/dL    Hematocrit 40.2 (L) 41.0 - 52.0 %    MCV 91 80 - 100 fL    MCH 28.8 26.0 - 34.0 pg    MCHC 31.8 (L) 32.0 - 36.0 g/dL    RDW 19.7 (H) 11.5 - 14.5 %    Platelets 249 150 - 450 x10*3/uL    Neutrophils % 86.3 40.0 - 80.0 %    Immature Granulocytes %, Automated 0.7 0.0 - 0.9 %    Lymphocytes % 6.1 13.0 - 44.0 %    Monocytes % 6.6 2.0 - 10.0 %    Eosinophils % 0.1 0.0 - 6.0 %    Basophils % 0.2 0.0 - 2.0 %    Neutrophils Absolute 10.40 (H) 1.60 - 5.50 x10*3/uL    Immature Granulocytes Absolute, Automated 0.09 0.00 - 0.50 x10*3/uL    Lymphocytes Absolute 0.73 (L) 0.80 - 3.00 x10*3/uL    Monocytes Absolute 0.80 0.05 - 0.80 x10*3/uL    Eosinophils Absolute 0.01 0.00 - 0.40 x10*3/uL    Basophils Absolute 0.02 0.00 - 0.10 x10*3/uL   POCT GLUCOSE   Result Value Ref Range    POCT Glucose 142 (H) 74 - 99 mg/dL   POCT GLUCOSE   Result Value Ref Range    POCT Glucose 150 (H) 74 - 99 mg/dL   POCT GLUCOSE   Result Value Ref Range    POCT Glucose 177 (H) 74 - 99 mg/dL   POCT GLUCOSE   Result Value Ref Range    POCT Glucose 161 (H) 74 - 99 mg/dL   Renal Function Panel   Result Value Ref Range    Glucose 138 (H) 65 - 99 mg/dL    Sodium 142 133 - 145 mmol/L    Potassium 4.6 3.4 - 5.1 mmol/L    Chloride 108 (H) 97 - 107 mmol/L    Bicarbonate 22 (L) 24 - 31 mmol/L    Urea Nitrogen 43 (H) 8 - 25 mg/dL    Creatinine 2.10 (H) 0.40 - 1.60 mg/dL    eGFR 33 (L) >60 mL/min/1.73m*2    Calcium 7.7 (L) 8.5 - 10.4 mg/dL    Phosphorus 2.7 2.5 - 4.5 mg/dL    Albumin 2.4 (L) 3.5 - 5.0 g/dL    Anion Gap 12 <=19 mmol/L   CBC and Auto Differential   Result Value Ref Range    WBC 12.6 (H) 4.4 - 11.3 x10*3/uL    nRBC 2.7 (H) 0.0 - 0.0 /100 WBCs    RBC 4.50 4.50 - 5.90 x10*6/uL    Hemoglobin 12.8 (L) 13.5 - 17.5 g/dL    Hematocrit 39.6 (L) 41.0 - 52.0 %    MCV 88 80 - 100 fL    MCH 28.4 26.0 - 34.0 pg    MCHC 32.3 32.0 - 36.0 g/dL    RDW 19.6 (H) 11.5 - 14.5 %     Platelets 252 150 - 450 x10*3/uL    Neutrophils % 86.4 40.0 - 80.0 %    Immature Granulocytes %, Automated 0.8 0.0 - 0.9 %    Lymphocytes % 7.0 13.0 - 44.0 %    Monocytes % 5.6 2.0 - 10.0 %    Eosinophils % 0.0 0.0 - 6.0 %    Basophils % 0.2 0.0 - 2.0 %    Neutrophils Absolute 10.85 (H) 1.60 - 5.50 x10*3/uL    Immature Granulocytes Absolute, Automated 0.10 0.00 - 0.50 x10*3/uL    Lymphocytes Absolute 0.88 0.80 - 3.00 x10*3/uL    Monocytes Absolute 0.71 0.05 - 0.80 x10*3/uL    Eosinophils Absolute 0.00 0.00 - 0.40 x10*3/uL    Basophils Absolute 0.03 0.00 - 0.10 x10*3/uL   POCT GLUCOSE   Result Value Ref Range    POCT Glucose 114 (H) 74 - 99 mg/dL   POCT GLUCOSE   Result Value Ref Range    POCT Glucose 152 (H) 74 - 99 mg/dL   Blood Gas Arterial Full Panel   Result Value Ref Range    POCT pH, Arterial 7.41 7.38 - 7.42 pH    POCT pCO2, Arterial 36 (L) 38 - 42 mm Hg    POCT pO2, Arterial 80 (L) 85 - 95 mm Hg    POCT SO2, Arterial 98 94 - 100 %    POCT Oxy Hemoglobin, Arterial 95.8 94.0 - 98.0 %    POCT Hematocrit Calculated, Arterial 37.0 (L) 41.0 - 52.0 %    POCT Sodium, Arterial 139 136 - 145 mmol/L    POCT Potassium, Arterial 4.1 3.5 - 5.3 mmol/L    POCT Chloride, Arterial 108 (H) 98 - 107 mmol/L    POCT Ionized Calcium, Arterial 1.14 1.10 - 1.33 mmol/L    POCT Glucose, Arterial 218 (H) 74 - 99 mg/dL    POCT Lactate, Arterial 1.4 0.4 - 2.0 mmol/L    POCT Base Excess, Arterial -1.4 -2.0 - 3.0 mmol/L    POCT HCO3 Calculated, Arterial 22.8 22.0 - 26.0 mmol/L    POCT Hemoglobin, Arterial 12.3 (L) 13.5 - 17.5 g/dL    POCT Anion Gap, Arterial 12 10 - 25 mmo/L    Patient Temperature 37.0 degrees Celsius    FiO2 36 %    Apparatus CANNULA     Site of Arterial Puncture Radial Right     Byron's Test Positive     XR chest 1 view    Result Date: 1/12/2024  Interpreted By:  Sugey Tinoco, STUDY: XR CHEST 1 VIEW 1/12/2024 3:49 pm   INDICATION: Signs/Symptoms:hypoxia   COMPARISON: 01/03/2024   ACCESSION NUMBER(S): [id number]    ORDERING CLINICIAN: CRISTAL MARCH   TECHNIQUE: AP erect view of the chest   FINDINGS: The cardiac size is mildly enlarged with biventricular ICD leads observed in right atrium, right ventricle, and coronary sinus.   Pulmonary edema is present with worsening of bilateral pulmonary infiltrates and with bilateral pleural effusion seen. Left effusion appears larger than the right with left effusion increased in size.       Pulmonary edema is now seen with increased size of left pleural effusion which is now moderate in amount. Small right pleural effusion is also present.   Signed by: Sugey Tinoco 1/12/2024 4:27 PM Dictation workstation:   [id number]       Assessment/Plan   IMP:    Acute respiratory failure  Aspiration pneumonia  C. Difficile  MATEO on CKD  Dysphagia-added saline nasal spray to the nares as patient appears to have crusted old blood in the back of his throat this may be contributing to swallowing issues.  Systolic heart failure  Malnutrition-started Remeron and Ensure clear, which can be thickened, patient likes fruit punch at home.  Continue PPI.  Neuropathy  Parkinsonism  COPD  Palliative care    DNR CCA/DNI  Patient is not capable, no judgment or insight today limited verbalization.  Wife is legal surrogate, no advanced directives.    Chart reviewed, discussed with attending service.    This is a chronically ill-appearing 73-year-old male with a known history of systolic heart failure, limited functionality at baseline presenting with multiple hospitalizations since December of this year, with malnutrition progressive weakness and overall worsened appearance in the setting of aspiration pneumonia recent COVID and C. difficile.     Wife very much understands patient's fragile condition, her goal would be to treat the acute reversible conditions and attempt to achieve prior level of function which was essentially wheelchair-bound and stand and pivot so that patient can return home to their condo in  [facility].  Wife is retired and is the patient's full-time caregiver.  Wife is also very clear that should the patient's nutritional status remained poor he has stated he would never want to have a permanent feeding tube placed as he had experienced to this during the radiation of his laryngeal cancer and he had stated to her he would never do that again.  Temporary use of feeding to would need further discussion. She also is very aware that patient would do quite poorly with CPR and intubation, and that this would not fall in line with the patient's goals to maintain quality of life.  Patient would never want to be in a nursing home long term(ok with rehab),he would never want to have a tracheostomy and he would not want to have further compromise to his quality of life beyond what he experiences currently.  A signed copy of the DNR CCA/DNI was placed in the chart.    Regarding the patient's pain, I suspect a certain degree of muscle discomfort from his poor hydration, but there is also a component of neuropathic pain that the patient has experienced for years.  Will resume gabapentin but at a lower dose due to his renal function.  He will also start some routine Tylenol, I did offer lidocaine patches which the patient and wife deferred at this time as he has no specific areas of pain. Can utilize at later point if patient is able to pinpoint pain.       Symptom Management  Pain: Moderate  Medications recommended for pain?  Yes  Tiredness: Moderate  Nausea: None today  Depression: Moderate  Anxiety: None  Drowsiness: Mild  Appetite: Poor  Wellbeing: Poor  Dyspnea: Progressively worse  Intervention recommended for dyspnea?  yes  Other: N/A  Intervention recommended for constipation?  No    I spent 75 minutes in the professional and overall care of this patient.      Elizabeth Pearce, APRN-CNP

## 2024-01-12 NOTE — CARE PLAN
The clinical goals for the shift include Maintain safety and hemodynamic stability  He struggled to achieve this goal, but his blood pressure did improve overnight. See flowsheets.  Over the shift, the patient did not make progress toward the following goals:  Problem: Respiratory  Goal: Verbalize decreased shortness of breath this shift  Outcome: Not Progressing  Goal: Wean oxygen to maintain O2 saturation per order/standard this shift  Outcome: Not Progressing   The patient's Sp02 maintained on room air for a short period but eventually declined into the 80's. 2 liters of oxygen via nasal canula had to be applied to maintain an SP02 above 90%.  No distress was observed.    Otherwise no other assessment changes or events were identified over night.

## 2024-01-12 NOTE — CARE PLAN
Problem: Diabetes  Goal: Achieve decreasing blood glucose levels by end of shift  Outcome: Progressing  Goal: Increase stability of blood glucose readings by end of shift  Outcome: Progressing  Goal: Decrease in ketones present in urine by end of shift  Outcome: Progressing  Goal: Maintain electrolyte levels within acceptable range throughout shift  Outcome: Progressing  Goal: Maintain glucose levels >70mg/dl to <250mg/dl throughout shift  Outcome: Progressing  Goal: No changes in neurological exam by end of shift  Outcome: Progressing  Goal: Learn about and adhere to nutrition recommendations by end of shift  Outcome: Progressing  Goal: Vital signs within normal range for age by end of shift  Outcome: Progressing  Goal: Increase self care and/or family involovement by end of shift  Outcome: Progressing  Goal: Receive DSME education by end of shift  Outcome: Progressing     Problem: Skin  Goal: Decreased wound size/increased tissue granulation at next dressing change  Outcome: Progressing  Flowsheets (Taken 1/12/2024 0801)  Decreased wound size/increased tissue granulation at next dressing change:   Promote sleep for wound healing   Protective dressings over bony prominences  Goal: Participates in plan/prevention/treatment measures  Outcome: Progressing  Goal: Prevent/manage excess moisture  Outcome: Progressing  Goal: Prevent/minimize sheer/friction injuries  Outcome: Progressing  Goal: Promote/optimize nutrition  Outcome: Progressing  Goal: Promote skin healing  Outcome: Progressing     Problem: Respiratory  Goal: Clear secretions with interventions this shift  Outcome: Progressing  Goal: Minimize anxiety/maximize coping throughout shift  Outcome: Progressing  Goal: Minimal/no exertional discomfort or dyspnea this shift  Outcome: Progressing  Goal: No signs of respiratory distress (eg. Use of accessory muscles. Peds grunting)  Outcome: Progressing  Goal: Patent airway maintained this shift  Outcome:  Progressing  Goal: Tolerate mechanical ventilation evidenced by VS/agitation level this shift  Outcome: Progressing  Goal: Tolerate pulmonary toileting this shift  Outcome: Progressing  Goal: Verbalize decreased shortness of breath this shift  Outcome: Progressing  Goal: Wean oxygen to maintain O2 saturation per order/standard this shift  Outcome: Progressing  Goal: Increase self care and/or family involvement in next 24 hours  Outcome: Progressing

## 2024-01-12 NOTE — PROGRESS NOTES
Hai Spaulding is a 73 y.o. male on day 8 of admission presenting with Acute respiratory failure (CMS/HCC).      Subjective   Patient reports he's okay. Asks for tylenol because his legs hurt. Drank a little of his smoothie today. Ate a magic cup this morning.    Breathing appears more labored to me. On 2L again, pulsox 89-91%. Yesterday was on 1L.         Objective     Last Recorded Vitals  BP 91/62 (BP Location: Left arm, Patient Position: Lying)   Pulse 104   Temp 37 °C (98.6 °F) (Oral)   Resp (!) 31   Wt 77.1 kg (169 lb 15.6 oz)   SpO2 92%   Intake/Output last 3 Shifts:    Intake/Output Summary (Last 24 hours) at 1/12/2024 1533  Last data filed at 1/12/2024 1300  Gross per 24 hour   Intake 2941.67 ml   Output 300 ml   Net 2641.67 ml         Admission Weight  Weight: 79.4 kg (175 lb 1.6 oz) (01/04/24 2300)    Daily Weight  01/05/24 : 77.1 kg (169 lb 15.6 oz)    Image Results  ECG 12 Lead  Wide QRS tachycardia with Premature supraventricular complexes and with occasional Premature ventricular complexes  Nonspecific intraventricular block  Cannot rule out Anteroseptal infarct , age undetermined  T wave abnormality, consider inferolateral ischemia  Abnormal ECG  When compared with ECG of 14-DEC-2023 19:10, (unconfirmed)  Wide QRS tachycardia has replaced Sinus rhythm  Vent. rate has increased BY  57 BPM  See ED provider note for full interpretation and clinical correlation  Confirmed by Alyssa Brown ([id number]) on 1/10/2024 4:16:56 PM      Physical Exam  General:  no diaphoresis, drowsy but easily awakens and then is alert   Lungs: diminished, tachypneic   Heart: RRR, no LE edema BL   GI: abdomen soft, nontender, nondistended, BS present   MSK: no joint effusion or deformity   Skin: no rashes, erythema, or ecchymosis   Neuro: drowsy, answers questions      Relevant Results               Results for orders placed or performed during the hospital encounter of 01/04/24 (from the past 24 hour(s))   POCT GLUCOSE    Result Value Ref Range    POCT Glucose 177 (H) 74 - 99 mg/dL   POCT GLUCOSE   Result Value Ref Range    POCT Glucose 161 (H) 74 - 99 mg/dL   Renal Function Panel   Result Value Ref Range    Glucose 138 (H) 65 - 99 mg/dL    Sodium 142 133 - 145 mmol/L    Potassium 4.6 3.4 - 5.1 mmol/L    Chloride 108 (H) 97 - 107 mmol/L    Bicarbonate 22 (L) 24 - 31 mmol/L    Urea Nitrogen 43 (H) 8 - 25 mg/dL    Creatinine 2.10 (H) 0.40 - 1.60 mg/dL    eGFR 33 (L) >60 mL/min/1.73m*2    Calcium 7.7 (L) 8.5 - 10.4 mg/dL    Phosphorus 2.7 2.5 - 4.5 mg/dL    Albumin 2.4 (L) 3.5 - 5.0 g/dL    Anion Gap 12 <=19 mmol/L   CBC and Auto Differential   Result Value Ref Range    WBC 12.6 (H) 4.4 - 11.3 x10*3/uL    nRBC 2.7 (H) 0.0 - 0.0 /100 WBCs    RBC 4.50 4.50 - 5.90 x10*6/uL    Hemoglobin 12.8 (L) 13.5 - 17.5 g/dL    Hematocrit 39.6 (L) 41.0 - 52.0 %    MCV 88 80 - 100 fL    MCH 28.4 26.0 - 34.0 pg    MCHC 32.3 32.0 - 36.0 g/dL    RDW 19.6 (H) 11.5 - 14.5 %    Platelets 252 150 - 450 x10*3/uL    Neutrophils % 86.4 40.0 - 80.0 %    Immature Granulocytes %, Automated 0.8 0.0 - 0.9 %    Lymphocytes % 7.0 13.0 - 44.0 %    Monocytes % 5.6 2.0 - 10.0 %    Eosinophils % 0.0 0.0 - 6.0 %    Basophils % 0.2 0.0 - 2.0 %    Neutrophils Absolute 10.85 (H) 1.60 - 5.50 x10*3/uL    Immature Granulocytes Absolute, Automated 0.10 0.00 - 0.50 x10*3/uL    Lymphocytes Absolute 0.88 0.80 - 3.00 x10*3/uL    Monocytes Absolute 0.71 0.05 - 0.80 x10*3/uL    Eosinophils Absolute 0.00 0.00 - 0.40 x10*3/uL    Basophils Absolute 0.03 0.00 - 0.10 x10*3/uL   POCT GLUCOSE   Result Value Ref Range    POCT Glucose 114 (H) 74 - 99 mg/dL   POCT GLUCOSE   Result Value Ref Range    POCT Glucose 152 (H) 74 - 99 mg/dL         Scheduled medications  amiodarone, 200 mg, oral, BID  apixaban, 5 mg, oral, BID  atorvastatin, 80 mg, oral, Nightly  budesonide, 0.5 mg, nebulization, BID  carbidopa-levodopa, 1 tablet, oral, TID  clopidogrel, 75 mg, oral, Daily  cyclobenzaprine, 10 mg,  oral, Nightly  dapagliflozin propanediol, 10 mg, oral, Daily  insulin glargine, 10 Units, subcutaneous, Daily  insulin lispro, 0-10 Units, subcutaneous, TID with meals  ipratropium-albuteroL, 3 mL, nebulization, TID  lactobacillus acidophilus, 1 tablet, oral, BID  levothyroxine, 25 mcg, oral, Daily before breakfast  metoprolol succinate XL, 25 mg, oral, BID  nystatin, 1 Application, Topical, BID  pantoprazole, 40 mg, oral, Daily before breakfast  QUEtiapine, 25 mg, oral, Nightly  sertraline, 100 mg, oral, Daily  SITagliptin phosphate, 50 mg, oral, Daily  vancomycin, 125 mg, oral, 4x daily      Continuous medications     PRN medications  PRN medications: acetaminophen, albuterol, dextrose 10 % in water (D10W), dextrose, glucagon, melatonin, ondansetron, oxygen    Assessment/Plan                  Principal Problem:    Acute respiratory failure (CMS/HCC)  Active Problems:    Atrial fibrillation (CMS/HCC)    CAD (coronary artery disease)    Parkinson's disease    Cerebral infarction, unspecified (CMS/HCC)    C. difficile diarrhea    Acute hypoxic respiratory failure  - worse today, increased effort. I ordered CXR stat, stopped IVF. CXR with some BL opacities. Will get ABG.   - concern for underlying aspiration did okay with thickened liquids and pureed food on MBS-- this diet is ordered. Apparently with thin liquids found bedside this morning. Unclear if current respiratory changes related to further aspiration or pulmonary edema. Will discuss with pulm after I see ABG    Dysphagia  - as above, seems to do okay with thickened liquids but doesn't really like his modified diet. Had a magic cup this morning and had some milkshake from his wife, though not very much  - discussed with speech therapy-- okay for smoothies and/or milkshakes with banana added-- he tolerated some smoothie last night but now refusing  - speech therapy following    Metabolic encephalopathy- improved though still present  - suspect delirium from  hospital stays, infection, poor sleep/wake cycle    C diff colitis  - PO vancomycin for total of 14 days    COVID 19  - completed course of decadron    History of stroke  - recent stroke at end of the year, aspirin, plavix, statin    Chronic systolic heart failure  - EF 30-35%; continue farxiga and metoprolol    Parkinsonism  - on levodopa carbidopa- very important patient takes this for his mental status. Patient agrees to have it later if he's nauseated. Zofran now. Spoke with nurse.    DM type 2  - on insulin    CKD 3 with MATEO- improving  - continue fluids. Needs hydration given poor PO intake    Pain  Sacral wound  - tylenol      Overall patient not progressive during course of week. He has pain, poor appetite, seems depressed. I have asked pall med to see patient and see if she can help with these problems. I discussed with Elizabeth Pearce CNP.     CXR, ABG results pending.                  Seema Bailey, DO

## 2024-01-12 NOTE — PROGRESS NOTES
Hai Spaulding is a 73 y.o. male on day 8 of admission presenting with Acute respiratory failure (CMS/HCC).    Subjective   Remains afebrile  Denies SOB   Denies nausea, vomiting, abdominal pain, diarrhea      Objective   Range of Vitals (last 24 hours)  Heart Rate:  [102-119]   Temp:  [36 °C (96.8 °F)-37 °C (98.6 °F)]   Resp:  [20-31]   BP: ()/(66-78)   SpO2:  [86 %-100 %]   Daily Weight  01/05/24 : 77.1 kg (169 lb 15.6 oz)    Body mass index is 25.09 kg/m².    Physical Exam  Constitutional:       Appearance: Normal appearance.   HENT:      Head: Normocephalic.      Nose: Nose normal.      Mouth/Throat:      Mouth: Mucous membranes are moist.      Pharynx: Oropharynx is clear.   Eyes:      Extraocular Movements: Extraocular movements intact.      Conjunctiva/sclera: Conjunctivae normal.      Comments: Resolving right periorbital ecchymosis   Cardiovascular:      Rate and Rhythm: Normal rate and regular rhythm.   Pulmonary:      Effort: Pulmonary effort is normal.      Breath sounds: Normal breath sounds.   Abdominal:      General: Bowel sounds are normal.      Palpations: Abdomen is soft.      Tenderness: There is no abdominal tenderness.   Musculoskeletal:         General: Normal range of motion.      Cervical back: Normal range of motion and neck supple.   Skin:     General: Skin is warm and dry.   Neurological:      General: No focal deficit present.      Mental Status: He is alert and oriented to person, place, and time.   Psychiatric:         Mood and Affect: Mood normal.         Behavior: Behavior normal.           Antibiotics  amiodarone (Pacerone) tablet 200 mg  apixaban (Eliquis) tablet 5 mg  atorvastatin (Lipitor) tablet 80 mg  carbidopa-levodopa (Sinemet CR)  mg ER tablet 1 tablet  clopidogrel (Plavix) tablet 75 mg  dapagliflozin propanediol (Farxiga) tablet 10 mg  lactobacillus acidophilus capsule 1 capsule  levothyroxine (Synthroid, Levoxyl) tablet 25 mcg  metoprolol succinate XL  (Toprol-XL) 24 hr tablet 25 mg  QUEtiapine (SEROquel) tablet 25 mg  sertraline (Zoloft) tablet 100 mg  SITagliptin phosphate (Januvia) tablet 50 mg  insulin glargine (Lantus) injection 10 Units  dextrose 50 % injection 25 g  glucagon (Glucagen) injection 1 mg  dextrose 10 % in water (D10W) infusion  insulin lispro (HumaLOG) injection 0-10 Units  vancomycin (Vancocin) capsule 125 mg  fluconazole in NaCl (iso-osm) (Diflucan) IVPB 200 mg  nystatin (Mycostatin) 100,000 unit/gram powder 1 Application  ipratropium-albuteroL (Duo-Neb) 0.5-2.5 mg/3 mL nebulizer solution 3 mL  budesonide (Pulmicort) 0.5 mg/2 mL nebulizer solution 0.5 mg  lactobacillus acidophilus tablet 1 tablet  vancomycin (Vancocin) capsule 125 mg  ipratropium-albuteroL (Duo-Neb) 0.5-2.5 mg/3 mL nebulizer solution 3 mL  acetaminophen (Tylenol) tablet 650 mg  ondansetron (Zofran) injection 4 mg  melatonin tablet 5 mg  cyclobenzaprine (Flexeril) tablet 10 mg  fluticasone furoate-vilanteroL (Breo Ellipta) 200-25 mcg/dose inhaler 1 puff  ipratropium-albuteroL (Duo-Neb) 0.5-2.5 mg/3 mL nebulizer solution 3 mL  pantoprazole (ProtoNix) EC tablet 40 mg  albuterol 2.5 mg /3 mL (0.083 %) nebulizer solution 2.5 mg  acetaminophen (Tylenol) tablet 650 mg  ondansetron (Zofran) injection 4 mg  melatonin tablet 5 mg  potassium chloride (Klor-Con) packet 40 mEq  potassium chloride 20 mEq in 100 mL IV premix      Relevant Results  Labs  Results from last 72 hours   Lab Units 01/12/24  0545 01/11/24  0513 01/10/24  0526   WBC AUTO x10*3/uL 12.6* 12.1* 13.6*   HEMOGLOBIN g/dL 12.8* 12.8* 12.4*   HEMATOCRIT % 39.6* 40.2* 38.6*   PLATELETS AUTO x10*3/uL 252 249 279   NEUTROS PCT AUTO % 86.4 86.3 84.8   LYMPHS PCT AUTO % 7.0 6.1 7.3   MONOS PCT AUTO % 5.6 6.6 6.8   EOS PCT AUTO % 0.0 0.1 0.1       Results from last 72 hours   Lab Units 01/12/24  0545 01/11/24  0513 01/10/24  0526   SODIUM mmol/L 142 141 139   POTASSIUM mmol/L 4.6 4.0 4.1   CHLORIDE mmol/L 108* 106 104   CO2  mmol/L 22* 22* 25   BUN mg/dL 43* 47* 49*   CREATININE mg/dL 2.10* 2.30* 2.50*   GLUCOSE mg/dL 138* 161* 136*   CALCIUM mg/dL 7.7* 7.7* 8.0*   ANION GAP mmol/L 12 13 10   EGFR mL/min/1.73m*2 33* 29* 27*   PHOSPHORUS mg/dL 2.7 2.7 3.4       Results from last 72 hours   Lab Units 01/12/24  0545 01/11/24  0513 01/10/24  0526   ALBUMIN g/dL 2.4* 2.5* 2.4*       Estimated Creatinine Clearance: 31.8 mL/min (A) (by C-G formula based on SCr of 2.1 mg/dL (H)).  C-Reactive Protein   Date Value Ref Range Status   11/29/2023 2.53 (H) <1.00 mg/dL Final     CRP   Date Value Ref Range Status   12/31/2022 39.72 (A) mg/dL Final     Comment:     REF VALUE  < 1.00     12/14/2021 2.16 (A) mg/dL Final     Comment:     REF VALUE  < 1.00       Microbiology  Positive C.diff PCR/negative EIA  MRSA PCR negative  Urine culture with candida-typically colonization  Blood cultures finalized no growth   Imaging  FL modified barium swallow study    Result Date: 1/9/2024  Interpreted By:  Chuck Montoya and Janezic Kimberly STUDY: FL MODIFIED BARIUM SWALLOW STUDY;; 1/8/2024 3:34 pm   INDICATION: Signs/Symptoms:dysphagia.   COMPARISON: None.   ACCESSION NUMBER(S): [id number]   ORDERING CLINICIAN: CRISTAL MARCH   TECHNIQUE: MBSS completed. Informed verbal consent obtained prior to completion of exam. Trials of thin, nectar thick, honey thick, puree,  and regular solids given. Total fluoroscopy time:  2 minutes 52 seconds Radiation exposure (Reference Air Kerma):  34.37 mGy Images:  9 series     SLP: Emiliana Collins M.A., CCC-SLP, [facility] Phone/Pager: [phone] Option 2   SPEECH FINDINGS: Reason for referral: Aspiration due to comorbidities Patient hx: Per chart: This man who is debilitated from a stroke and a history of laryngeal cancer and repeat aspiration pneumonia has spent more time in the hospital than out of the hospital for the last 2 months.  He was most recently hospitalized here December 25 with COVID and aspiration pneumonia.  Respiratory status: Nasal cannula 2L Previous diet: Regular/thin liquids   FINAL SPEECH RECOMMENDATIONS   Diet recommendations/feeding strategies: Puree/nectar thick liquids. Crush medication in puree or nectar thick liquids. Sit upright at 90 degrees for meals and medications.   Follow-up speech therapy recommended: Yes. Patient will benefit from continued dysphagia therapy for oropharyngeal exercises to allow for diet progression.   Short term goals: 1. Patient will tolerate recommended diet without pulmonary compromise 2. Patient will participate in oropharyngeal strengthening exercises. 3. SLP will assess for diet upgrade as appropriate. 4. Due to silent aspiration, patient will need a repeat MBS prior to liquid diet upgrade to thin.   Long term goals: Tolerated least restrictive diet without pulmonary compromise   Education provided: Reviewed results with nursing.   Treatment Provided today: No.     Repeat study/ dc plan: Repeat MBS prior to upgrade to thin liquids, due to silent aspriation with thin liquids.   Mechanics of the swallow summary:   Oral phase: 1. Patient is unable to masticate solids, thus manually removed. Attempt at mashing with tongue was not successful. 2. Repetitive tongue motion with puree. Oral holding with all liquids and puree prior to swallow onset. 3. Swallow initiation at the pyriforms with thin liquids. At the valleculae with nectar and honey thick, and puree. 4. Trace oral residue, clears with reswallow.   Pharyngeal phase: 1. Partial superior laryngeal elevation. 2. Partial anterior hyoid excursion. 3. Partial epiglottic inversion. 4. Narrow column of contrast above vocal cords with  honey thick liquids and puree. Contrast enters airway above vocal cords (just underneath epiglottis) with no effort to eject. Narrow column of contrast enters airway below vocal cords with no effort to eject with straw. Contrast enters airway above vocal cords with teaspoon trial without effort to  eject. 5. Collection of residue thoughout pharynx, mild and clears with spontaneous reswallows. 6. Narrow column of contrast between tongue base and pharyngeal wall.     SLP impressions with severity rating:   Silent aspiration with thin liquids. Laryngeal penetration noted with puree and honey thick liquids. Tolerated nectar thick liquids during testing constraints without difficulty. Unable to masticate solids. Unclear if this is due to edentulous status, or other comorbidities. Patient would benefit from continuation of dysphagia therapy to improve overall oral and pharyngeal strength, improve mastication and upgrade diet to least restrictive without pulmonary compromise.   Thin Liquids (MBSS) Rosenbek's Penetration Aspiration Scale, Thin Liquids (MBSS): 8. SILENT ASPIRATION - contrast passes glottis, visible residue, NO pt response   Response to Aspiration, Thin Liquid (MBSS): absent response, silent aspiration,   Response to Pharyngeal Residue, Thin Liquid (MBSS): spontaneous clearing,   Nectar Thick Liquids (MBSS) Rosenbek's Penetration Aspiration Scale, Nectar thick liquids (MBSS): 1. NO ASPIRATION & NO PENETRATION - no aspiration, contrast does not enter airway   Response to Pharyngeal Residue, Nectar thick liquids (MBSS): spontaneous clearing,   Honey Thick Liquids (MBSS) Rosenbek's Penetration Aspiration Scale, Honey thick liquids (MBSS): 3. PENETRATION with LOW ASPIRATION risk - contrast remains above vocal cords, visible residue   Response to Pharyngeal Residue, Honey thick liquids (MBSS): spontaneous clearing,   Purees (MBSS) Rosenbek's Penetration Aspiration Scale, Purees (MBSS): 3. PENETRATION with LOW ASPIRATION risk - contrast remains above vocal cords, visible residue   Response to Pharyngeal Residue, Purees (MBSS): spontaneous clearing,     Speech Therapy section of this report signed by Emiliana Collins M.A., CCC-SLP, [employer] on 1/8/2024 at 3:54 pm.   RADIOLOGY FINDINGS: There is laryngeal  penetration and aspiration with thin liquid barium. There is laryngeal penetration without aspiration with nectar consistency, honey consistency, pudding consistency, and cookie consistency barium.   Radiology section of this report signed by Jan.       There is laryngeal penetration and aspiration with thin liquid barium. There is laryngeal penetration without aspiration with nectar consistency, honey consistency, pudding consistency, and cookie consistency barium.   MACRO: None   Signed by: Chuck Montoya 1/9/2024 5:02 PM Dictation workstation:   [id number]       Assessment/Plan   Coronavirus infection-treated with at least 10 days of dexamethasone, no further isolation precautions indicated  Possible pneumonia-treated with at least 7 days of antibiotics  C. difficile infection-positive PCR/negative EIA  Chronic respiratory failure-stable     Oral vancomycin for a total of 14 days   Monitor stool  Continue contact plus precautions for c.diff  Supplemental oxygen as needed  Monitor temperature and WBC  Discharge plan is oral vancomycin for a total of 14 days  If patient continues to refuse PO vancomycin, can consider IV flagyl as alternative        Charlotte Snider, APRN-CNP

## 2024-01-12 NOTE — PROGRESS NOTES
Physical Therapy    Physical Therapy Treatment    Patient Name: Hai Spaulding  MRN: [id number]  Today's Date: 1/12/2024  Time Calculation  Start Time: 1138  Stop Time: 1207  Time Calculation (min): 29 min       Assessment/Plan   PT Assessment  End of Session Communication: Bedside nurse  End of Session Patient Position: Bed, 3 rail up, Alarm on  PT Plan  Inpatient/Swing Bed or Outpatient: Inpatient  PT Plan  Treatment/Interventions: Bed mobility, Transfer training, Gait training, Balance training, Neuromuscular re-education, Strengthening, Endurance training, Range of motion, Therapeutic exercise, Therapeutic activity, Home exercise program  PT Plan: Skilled PT  PT Frequency: 3 times per week  PT Discharge Recommendations: Moderate intensity level of continued care  Equipment Recommended upon Discharge: Wheeled walker, Wheelchair  PT Recommended Transfer Status: Assist x2, Total assist  PT - OK to Discharge: Yes      General Visit Information:   PT  Visit  PT Received On: 01/12/24  General  Family/Caregiver Present: Yes  Caregiver Feedback: Family member present  Co-Treatment: OT  Co-Treatment Reason: d/t pt heavy assist level and decreased functional tolerance. Co-tx provided this date to maximize safety and functional outcomes.  Prior to Session Communication: Bedside nurse  General Comment: Cleared by nursing to be seen for therapy, pt agreeable with tx, supine in bed upon arrival.    Subjective   Precautions:  Precautions  Precautions Comment: Contact Precautions       Objective   Pain:  Pain Assessment  Pain Assessment: 0-10  Pain Score: 0 - No pain  Cognition:  Cognition  Orientation Level: Disoriented to time, Disoriented to situation, Disoriented to place    Treatments:  Therapeutic Exercise  Therapeutic Exercise Performed: Yes  Therapeutic Exercise Activity 1: AAROM bilateral ankle pumps x10  Therapeutic Exercise Activity 2: AAROM bilateral hip flexion x10  Therapeutic Exercise Activity 3: AAROM bilateral  knee extension x10    Therapeutic Activity  Therapeutic Activity Performed: Yes  Therapeutic Activity 1: EOB x10 minutes, poor seated static balance presents with a right/posterior lean requiring max assist to maintain midline.    Balance/Neuromuscular Re-Education  Balance/Neuromuscular Re-Education Activity Performed: No    Bed Mobility  Bed Mobility: Yes  Bed Mobility 1  Bed Mobility 1: Supine to sitting, Sitting to supine  Level of Assistance 1: Maximum assistance  Bed Mobility Comments 1: Max assist x2 for trunk/bilateral LE's during supine <> sit  Bed Mobility 2  Bed Mobility  2: Rolling right, Rolling left  Level of Assistance 2: Maximum assistance  Bed Mobility Comments 2: Rolling R/L max assist x2 for dependent clenase/whole linen change due to incontinent of BM.    Ambulation/Gait Training  Ambulation/Gait Training Performed: No  Transfers  Transfer: No    Stairs  Stairs: No    Outcome Measures:  [facility] Basic Mobility  Turning from your back to your side while in a flat bed without using bedrails: Total  Moving from lying on your back to sitting on the side of a flat bed without using bedrails: Total  Moving to and from bed to chair (including a wheelchair): Total  Standing up from a chair using your arms (e.g. wheelchair or bedside chair): Total  To walk in hospital room: Total  Climbing 3-5 steps with railing: Total  Basic Mobility - Total Score: 6      Encounter Problems       Encounter Problems (Active)       PT Problem       The patient will demonstrate an overall strength of >3+/5 in BLE to assist with completion of functional mobility.   (Progressing)       Start:  01/05/24    Expected End:  02/05/24            The patient will be able to complete bed mobility at a [place] level with use of bed rails.   (Progressing)       Start:  01/05/24    Expected End:  02/05/24            The patient will be able to complete safe transfer of sit <> stand with minimal VC at a [place] level.   (Progressing)       Start:   01/05/24    Expected End:  02/05/24            The patient will be able to ambulate at a [place] level for >15ftx1 with RW.  (Progressing)       Start:  01/05/24    Expected End:  02/05/24

## 2024-01-12 NOTE — NURSING NOTE
Report received. Patient resting in bed watching TV. Denies needs. No distress noted. Call light within reach.

## 2024-01-12 NOTE — PROGRESS NOTES
Hai Spaulding is a 73 y.o. male on day 8 of admission presenting with Acute respiratory failure (CMS/HCC).    Patient can return to [place] [facility] rehab once medically clear no precert needed. Will need 7000 completed upon discharge     ** do not discharge without speaking to care coordination**  MD WILL NEED TO SIGN/CERTIFY GOLDENROD AT THE TIME OF DISCHARGE FOR SNF.       Lady Dowell RN

## 2024-01-12 NOTE — PROGRESS NOTES
Speech-Language Pathology    Inpatient  Speech-Language Pathology Treatment     Patient Name: Hai Spaulding  MRN: [id number]  Today's Date: 1/12/2024  Time Calculation  Start Time: 0900  Stop Time: 0917  Time Calculation (min): 17 min         Current Problem:   1. Acute respiratory failure (CMS/HCC)          SLP Assessment:  SLP TX Intervention Outcome: Making Progress Towards Goals  Prognosis: Fair  Treatment Provided: Yes   Treatment Tolerance: Patient limited by fatigue  Medical Staff Made Aware: Yes  Strengths: Family/Caregiver Suppport  Barriers: Cognition, Comorbidities     Plan:  Inpatient/Swing Bed or Outpatient: Inpatient  Treatment/Interventions: Patient/family education, Oral motor exercises (diet tolerance, pharyngeal ex)  SLP TX Plan: Continue Plan of Care  SLP Plan: Skilled SLP  SLP Frequency: 2x per week  Duration: Current admission  SLP Discharge Recommendations: Continue skilled speech therapy services post discharge  Next Treatment Priority: diet tolerance, PO trials, oropharyngeal ex, family edcucation  Discussed POC: Patient, Nursing  Discussed Risks/Benefits: Yes, Patient, Nursing  Patient/Caregiver Agreeable: Yes    Subjective   Pt alert, oriented to self, seated upright in bed, soft voice, encouragement to participate, fed by SLP, edentulous, no dentures present    General Visit Information:   Prior to Session Communication: Bedside nurse (cosme BELTRAN)    Pain Assessment:   Pain Assessment: 0-10  Pain Score: 0 - No pain    Objective   Therapeutic Swallow:  Therapeutic Swallow Intervention : PO Trials, Pharyngeal Strengthening Techniques, Oral Strengthening Techniques  Pharyngeal Strengthening Techniques: Effortful Swallow  Solid Diet Recommendations: Pureed/extremely thick  (IDDSI Level 4)  Liquid Diet Recommendations: Nectar thick/mildly thick (IDDS Level 2)  Swallow Comments: Pt accepted approx 2 oz majic cup (nectar thick ), refused all other items on breakfast tray. Adequate oral  manipulation, adequate oral clearance, laryngeal elevation present, No overt s/s aspiration. Pt completed effort swallow with each trial, given max cues, Completed minimal oral ex, lingual protrusion decreased overall. Did not pursue soft solid trials d/t edentulous status and decreased oral strength  GOALS:  1. Patient will tolerate recommended diet without pulmonary compromise  2. Patient will participate in oropharyngeal strengthening exercises.  3. SLP will assess for diet upgrade as appropriate.   4. Due to silent aspiration, patient will need a repeat MBS prior to liquid diet upgrade to thin.  Inpatient:  Education Documentation  Pt/nursing education provided re: regarding role of ST, purpose of treatment, clinical impressions, recommendations, POC,safe swallow strategies, Pt/nurse verbalized understanding and agreement pt requires reinforcement/assisstance

## 2024-01-12 NOTE — PROGRESS NOTES
Occupational Therapy    OT Treatment    Patient Name: Hai Spaulding  MRN: [id number]  Today's Date: 1/12/2024  Time Calculation  Start Time: 1137  Stop Time: 1207  Time Calculation (min): 30 min         Assessment:  OT Assessment: Gradual progress made towards OT goals.Continue with current OT POC to increase strength, balance and functional tolerance to maximize safety and independence during ADLs.  Evaluation/Treatment Tolerance: Patient limited by fatigue  End of Session Communication: Bedside nurse  End of Session Patient Position: Bed, 3 rail up, Alarm on (all needs in reach)  OT Assessment Results: Decreased ADL status, Decreased upper extremity range of motion, Decreased upper extremity strength, Decreased cognition, Decreased endurance, Decreased functional mobility, Decreased fine motor control, Decreased gross motor control, Decreased IADLs, Non-functional right upper extremity  Evaluation/Treatment Tolerance: Patient limited by fatigue  Plan:  Treatment Interventions: ADL retraining, Functional transfer training, UE strengthening/ROM, Endurance training, Cognitive reorientation, Patient/family training, Equipment evaluation/education  OT Frequency: 3 times per week  OT Discharge Recommendations: Moderate intensity level of continued care  OT Recommended Transfer Status: Maximum assist, Assist of 2  OT - OK to Discharge: Yes  Treatment Interventions: ADL retraining, Functional transfer training, UE strengthening/ROM, Endurance training, Cognitive reorientation, Patient/family training, Equipment evaluation/education    Subjective   Previous Visit Info:  OT Last Visit  OT Received On: 01/12/24  General:  General  Reason for Referral: inmpaired ADLs s/p Covid+, Cdiff+  Past Medical History Relevant to Rehab: laryngeal ca, radiation, CVA, dysphagia  Family/Caregiver Present: Yes  Caregiver Feedback: Family present  Co-Treatment: PT  Co-Treatment Reason: d/t pt heavy assist level and decreased functional  tolerance. Co-tx provided this date to maximize safety and functional outcomes.  General Comment: Pt cleared for therapy session per nursing, cooperative throughout session, however fatigues easily.  Precautions:  Medical Precautions: Fall precautions, Infection precautions  Precautions Comment: Contact precautions- Cdiff+, R side weakness  Vital Signs:     Pain:  Pain Assessment  Pain Assessment: 0-10  Pain Score: 0 - No pain  Clinical Progression: Not changed    Objective    Cognition:  Cognition  Overall Cognitive Status: Impaired  Orientation Level: Disoriented to time, Disoriented to situation, Disoriented to place  Following Commands:  (inconsistent command following)  Safety/Judgement: Exceptions to WFL  Insight: Severe  Processing Speed: Delayed  Coordination:  Movements are Fluid and Coordinated: No  Upper Body Coordination: slower rate of movement of RUE  Trunk Coordination: poor trunk coordination  Activities of Daily Living: UE Dressing  UE Dressing Comments: maxA required to don/doff hospital gown while supine in bed    Toileting  Toileting Comments: pt heavily soiled requiring complete bedding change and maxAx2 for posterior hygiene completion at bed level. Pt intermittently resistive to rolling R<>L with maxAx2.  Functional Standing Tolerance:     Bed Mobility/Transfers: Bed Mobility  Bed Mobility: Yes  Bed Mobility 1  Bed Mobility 1: Supine to sitting, Sitting to supine, Rolling right, Rolling left, Scooting  Level of Assistance 1: Maximum assistance, Maximum verbal cues, Maximum tactile cues  Bed Mobility Comments 1: maxAx2 required for all task completion with increased time.    Transfers  Transfer: No    Therapy/Activity: Therapeutic Activity  Therapeutic Activity Performed: Yes  Therapeutic Activity 1: To increase functional tolerance for safety and ease of ADL completion pt tolerated sitting EOB ~10min with maxA. Poor sitting balance observed with BUE and B foot supported. Constant verbal/  tactile cues required to facvilitate upright posture. R lateral lean observed with fatigue.      Outcome Measures:[facility] Daily Activity  Putting on and taking off regular lower body clothing: Total  Bathing (including washing, rinsing, drying): Total  Putting on and taking off regular upper body clothing: Total  Toileting, which includes using toilet, bedpan or urinal: Total  Taking care of personal grooming such as brushing teeth: A lot  Eating Meals: A lot  Daily Activity - Total Score: 8        Education Documentation  Precautions, taught by SOLIS Aponte at 1/12/2024  1:55 PM.  Learner: Family, Patient  Readiness: Acceptance  Method: Explanation, Demonstration  Response: Needs Reinforcement, No Evidence of Learning    ADL Training, taught by SOLIS Aponte at 1/12/2024  1:55 PM.  Learner: Family, Patient  Readiness: Acceptance  Method: Explanation, Demonstration  Response: Needs Reinforcement, No Evidence of Learning    Education Comments  Education reviewed on role of OT/ POC, importance of OT to maximize safety and independence during functional tasks and safety awareness throughout functional tasks/ transfers. Questions, comments and concerns addressed regarding OT.      Goals:  Encounter Problems       Encounter Problems (Active)       OT Goals       Pt will complete upper body ADL tasks with min A using AE as needed, in order to complete self-care tasks.  (Progressing)       Start:  01/05/24    Expected End:  01/26/24            Pt will perform functional transfers at max A x1 level  (Progressing)       Start:  01/05/24    Expected End:  01/26/24            Pt will perform upper body therapeutic exercises all joints/planes of motion with mod A for R UE, close S for L UE (Progressing)       Start:  01/05/24    Expected End:  01/26/24

## 2024-01-12 NOTE — CARE PLAN
Problem: Respiratory  Goal: Clear secretions with interventions this shift  Outcome: Progressing  Goal: Minimize anxiety/maximize coping throughout shift  Outcome: Progressing  Goal: Minimal/no exertional discomfort or dyspnea this shift  Outcome: Progressing  Goal: No signs of respiratory distress (eg. Use of accessory muscles. Peds grunting)  Outcome: Progressing  Goal: Patent airway maintained this shift  Outcome: Progressing  Goal: Tolerate mechanical ventilation evidenced by VS/agitation level this shift  Outcome: Progressing  Goal: Tolerate pulmonary toileting this shift  Outcome: Progressing  Goal: Verbalize decreased shortness of breath this shift  Outcome: Progressing  Goal: Wean oxygen to maintain O2 saturation per order/standard this shift  Outcome: Progressing

## 2024-01-13 NOTE — PROCEDURES
Ultrasound-guided thoracentesis performed at bedside in the ICU.  From the exam table from wife prior to procedure.  Patient on BiPAP procedure performed without any acute position.  Ultrasound left chest demonstrated large amount of pleural fluid.  Area was marked.  Using standard care and technique approximate 1 L of clear yellow fluid removed.  Patient tolerated procedure well.  Fluid was sent for studies and culture.  Small amount of bleeding was noted at the site after procedure.  Chest x-ray was ordered.

## 2024-01-13 NOTE — PROCEDURES
Central Line    Date/Time: 1/13/2024 2:37 PM    Performed by: Seema Bailey DO  Authorized by: Seema Bailey DO    Consent:     Consent obtained:  Written    Consent given by:  Spouse    Risks, benefits, and alternatives were discussed: yes    Pre-procedure details:     Indication(s): central venous access and insufficient peripheral access      Hand hygiene: Hand hygiene performed prior to insertion      Sterile barrier technique: All elements of maximal sterile technique followed      Skin preparation:  Chlorhexidine    Skin preparation agent: Skin preparation agent completely dried prior to procedure    Sedation:     Sedation type:  None  Anesthesia:     Anesthesia method:  Local infiltration    Local anesthetic:  Lidocaine 1% w/o epi  Procedure details:     Location:  R internal jugular    Patient position:  Trendelenburg    Procedural supplies:  Triple lumen    Landmarks identified: yes      Ultrasound guidance: yes      Ultrasound guidance timing: real time      Sterile ultrasound techniques: Sterile gel and sterile probe covers were used      Number of attempts:  1    Successful placement: yes    Post-procedure details:     Post-procedure:  Line sutured and dressing applied    Assessment:  Blood return through all ports    Procedure completion:  Tolerated  Comments:      My hands were  washed immediately prior to the procedure. I wore a surgical cap, mask  with protective eyewear, full gown and sterile gloves throughout the  procedure. The patient was placed in Trendelenburg position. RIGHT neck region was prepped using chlorhexidine scrub and draped in  sterile fashion using a full drape and sterile probe cover and sterile gel employed. The Internal Jugular  vein was identified using the ultrasound. Anesthesia was achieved over  the vein using 1% lidocaine. Using real-time out of plane guidance, the  introducer needle was inserted into the Internal Jugular vein under  direct ultrasound  visualization. Venous blood was withdrawn. The syringe  was removed and a guidewire was advanced into the introducer needle.  The guidewire was visualized in the Internal Jugular Vein by ultrasound.  A small incision was made at the skin surface with a scalpel and the  introducer needle was exchanged for a dilator over the guidewire. After  appropriate dilation was obtained, the dilator was exchanged over the  wire for a triple lumen central venous catheter. The wire was removed and the  catheter was sutured in place at 18 cm. A sterile CHG dressing was  placed over the catheter at the insertion site. The patient tolerated  the procedure without any hemodynamic compromise. At time of procedure  completion, all ports aspirated and flushed properly. Post-procedure  chest x-ray is pending at this time. Estimated blood loss is scant.

## 2024-01-13 NOTE — CARE PLAN
The patient's goals for the shift include  Unable to state    The clinical goals for the shift include Patient will have decreasing O2 requirements    Over the shift, the patient made progress toward the following goals. Barriers to progression include possible need for continued Bipap. Blood loss, pressors.       Problem: Diabetes  Goal: Increase stability of blood glucose readings by end of shift  Outcome: Progressing  Flowsheets (Taken 1/13/2024 1609)  Increase stability of blood glucose readings by end of shift: Med administration/monitoring of effect  Goal: Maintain electrolyte levels within acceptable range throughout shift  Outcome: Progressing  Flowsheets (Taken 1/13/2024 1609)  Maintain electrolyte levels within acceptable range throughout shift:   Med administration/monitoring of effect   Monitor urine output  Goal: Maintain glucose levels >70mg/dl to <250mg/dl throughout shift  Outcome: Progressing  Flowsheets (Taken 1/13/2024 1609)  Maintain glucose levels >70mg/dl to <250mg/dl throughout shift: Med administration/monitoring of effect  Goal: No changes in neurological exam by end of shift  Outcome: Progressing  Flowsheets (Taken 1/13/2024 1609)  No changes in neurological exam by end of shift: Complete frequent neurological assessments  Goal: Learn about and adhere to nutrition recommendations by end of shift  Outcome: Progressing  Flowsheets (Taken 1/13/2024 1609)  Learn about and adhere to nutrition recommendations by end of shift: Ensure/encourage compliance with appropriate diet  Goal: Vital signs within normal range for age by end of shift  Outcome: Progressing  Flowsheets (Taken 1/13/2024 1604)  Vital signs within normal range for age by end of shift: Med administration/monitoring of effect  Goal: Increase self care and/or family involovement by end of shift  Outcome: Progressing  Goal: Receive DSME education by end of shift  Outcome: Progressing  Flowsheets (Taken 1/13/2024 1609)  Receive DSME  education by end of shift: Provide patient centered education on Diabetic Self Management Education     Problem: Skin  Goal: Decreased wound size/increased tissue granulation at next dressing change  Outcome: Progressing  Flowsheets (Taken 1/13/2024 1609)  Decreased wound size/increased tissue granulation at next dressing change:   Protective dressings over bony prominences   Promote sleep for wound healing   Utilize specialty bed per algorithm  Goal: Participates in plan/prevention/treatment measures  Outcome: Progressing  Flowsheets (Taken 1/13/2024 1609)  Participates in plan/prevention/treatment measures:   Discuss with provider PT/OT consult   Elevate heels  Goal: Prevent/manage excess moisture  Outcome: Progressing  Flowsheets (Taken 1/13/2024 1609)  Prevent/manage excess moisture:   Cleanse incontinence/protect with barrier cream   Moisturize dry skin   Follow provider orders for dressing changes   Monitor for/manage infection if present  Goal: Prevent/minimize sheer/friction injuries  Outcome: Progressing  Flowsheets (Taken 1/13/2024 1609)  Prevent/minimize sheer/friction injuries:   Complete micro-shifts as needed if patient unable. Adjust patient position to relieve pressure points, not a full turn   HOB 30 degrees or less   Turn/reposition every 2 hours/use positioning/transfer devices   Use pull sheet  Goal: Promote/optimize nutrition  Outcome: Progressing  Flowsheets (Taken 1/13/2024 1609)  Promote/optimize nutrition:   Discuss with provider if NPO > 2 days   Offer water/supplements/favorite foods   Consume > 50% meals/supplements   Reassess MST if dietician not consulted  Goal: Promote skin healing  Outcome: Progressing  Flowsheets (Taken 1/13/2024 1609)  Promote skin healing:   Assess skin/pad under line(s)/device(s)   Ensure correct size (line/device) and apply per  instructions   Protective dressings over bony prominences   Rotate device position/do not position patient on device    Turn/reposition every 2 hours/use positioning/transfer devices     Problem: Respiratory  Goal: Clear secretions with interventions this shift  Outcome: Progressing  Flowsheets (Taken 1/13/2024 1609)  Clear secretions with interventions this shift:   Encourage/provide pulmonary hygiene/secretion clearance   Med administration/monitoring of effect  Goal: Minimize anxiety/maximize coping throughout shift  Outcome: Progressing  Goal: Minimal/no exertional discomfort or dyspnea this shift  Outcome: Progressing  Flowsheets (Taken 1/13/2024 1609)  Minimal/no exertional discomfort or dyspnea this shift: Positioning to promote ventilation/comfort  Goal: No signs of respiratory distress (eg. Use of accessory muscles. Peds grunting)  Outcome: Progressing  Goal: Patent airway maintained this shift  Outcome: Progressing  Goal: Tolerate pulmonary toileting this shift  Outcome: Progressing  Flowsheets (Taken 1/13/2024 1609)  Tolerate pulmonary toileting this shift: Positioning to promote ventilation/comfort  Goal: Verbalize decreased shortness of breath this shift  Outcome: Progressing  Flowsheets (Taken 1/13/2024 1609)  Verbalize decreased shortness of breath this shift: Encourage/provide pulmonary hygiene/secretion clearance  Goal: Wean oxygen to maintain O2 saturation per order/standard this shift  Outcome: Progressing  Flowsheets (Taken 1/13/2024 1609)  Wean oxygen to maintain O2 saturation per order/standard this shift: Encourage activity/mobility  Goal: Increase self care and/or family involvement in next 24 hours  Outcome: Progressing  Flowsheets (Taken 1/13/2024 1609)  Increase self care and/or family involvement in next 24 hours: Encourage activity/mobility     Problem: Pain - Adult  Goal: Verbalizes/displays adequate comfort level or baseline comfort level  Outcome: Progressing  Flowsheets (Taken 1/13/2024 1609)  Verbalizes/displays adequate comfort level or baseline comfort level:   Encourage patient to monitor pain and  request assistance   Assess pain using appropriate pain scale   Administer analgesics based on type and severity of pain and evaluate response   Implement non-pharmacological measures as appropriate and evaluate response   Notify Licensed Independent Practitioner if interventions unsuccessful or patient reports new pain     Problem: Safety - Adult  Goal: Free from fall injury  Outcome: Progressing  Flowsheets (Taken 1/13/2024 1609)  Free from fall injury:   Instruct family/caregiver on patient safety   Based on caregiver fall risk screen, instruct family/caregiver to ask for assistance with transferring infant if caregiver noted to have fall risk factors     Problem: Discharge Planning  Goal: Discharge to home or other facility with appropriate resources  Outcome: Progressing  Flowsheets (Taken 1/13/2024 1609)  Discharge to home or other facility with appropriate resources:   Identify barriers to discharge with patient and caregiver   Arrange for needed discharge resources and transportation as appropriate   Identify discharge learning needs (meds, wound care, etc)   Refer to discharge planning if patient needs post-hospital services based on physician order or complex needs related to functional status, cognitive ability or social support system     Problem: Chronic Conditions and Co-morbidities  Goal: Patient's chronic conditions and co-morbidity symptoms are monitored and maintained or improved  Outcome: Progressing  Flowsheets (Taken 1/13/2024 1609)  Care Plan - Patient's Chronic Conditions and Co-Morbidity Symptoms are Monitored and Maintained or Improved:   Monitor and assess patient's chronic conditions and comorbid symptoms for stability, deterioration, or improvement   Collaborate with multidisciplinary team to address chronic and comorbid conditions and prevent exacerbation or deterioration   Update acute care plan with appropriate goals if chronic or comorbid symptoms are exacerbated and prevent overall  improvement and discharge     Problem: Nutrition  Goal: Less than 5 days NPO/clear liquids  Outcome: Progressing  Goal: Oral intake greater than 50%  Outcome: Progressing  Goal: Consume prescribed supplement  Outcome: Progressing  Goal: Adequate PO fluid intake  Outcome: Progressing  Goal: Nutrition support goals are met within 48 hrs  Outcome: Progressing  Goal: Nutrition support is meeting 75% of nutrient needs  Outcome: Progressing  Goal: BG  mg/dL  Outcome: Progressing  Goal: Lab values WNL  Outcome: Progressing  Goal: Electrolytes WNL  Outcome: Progressing  Goal: Promote healing  Outcome: Progressing  Goal: Maintain stable weight  Outcome: Progressing  Goal: Reduce weight from edema/fluid  Outcome: Progressing     Problem: General Stroke  Goal: Establish a mutual long term goal with patient by discharge  Outcome: Progressing  Flowsheets (Taken 1/13/2024 4263)  Establish a mutual long term goal with patient by discharge: Monitor blood pressure  Goal: Demonstrate improvement in neurological exam throughout the shift  Outcome: Progressing  Goal: Maintain BP within ordered limits throughout shift  Outcome: Progressing  Goal: Participate in treatment (ie., meds, therapy) throughout shift  Outcome: Progressing  Goal: No symptoms of aspiration throughout shift  Outcome: Progressing  Goal: Controlled blood glucose throughout shift  Outcome: Progressing

## 2024-01-13 NOTE — PROGRESS NOTES
"Hai Spaulding is a 73 y.o. male on day 9 of admission presenting with Acute respiratory failure (CMS/HCC).    Subjective   Patient transferred to the ICU today. On continuous bipap. Lethargic. Family present at bedsie.     Objective     Physical Exam  Vitals and nursing note reviewed.   Constitutional:       Appearance: He is ill-appearing.   HENT:      Head: Normocephalic and atraumatic.      Mouth/Throat:      Mouth: Mucous membranes are moist.   Eyes:      Pupils: Pupils are equal, round, and reactive to light.   Cardiovascular:      Rate and Rhythm: Tachycardia present. Rhythm irregular.      Heart sounds: No murmur heard.     No friction rub. No gallop.   Abdominal:      General: There is no distension.      Tenderness: There is no abdominal tenderness. There is no guarding or rebound.   Genitourinary:     Comments: [email or web address]  Musculoskeletal:      Cervical back: Neck supple.   Skin:     General: Skin is warm and dry.   Neurological:      Mental Status: He is disoriented.         Last Recorded Vitals  Blood pressure 95/70, pulse 100, temperature 35.6 °C (96.1 °F), temperature source Temporal, resp. rate 17, height 1.753 m (5' 9.02\"), weight 77.4 kg (170 lb 10.2 oz), SpO2 99 %.  Intake/Output last 3 Shifts:  I/O last 3 completed shifts:  In: 2941.7 (38.2 mL/kg) [I.V.:2941.7 (38.2 mL/kg)]  Out: 300 (3.9 mL/kg) [Urine:300 (0.1 mL/kg/hr)]  Weight: 77.1 kg     Relevant Results  Results for orders placed or performed during the hospital encounter of 01/04/24 (from the past 24 hour(s))   Blood Gas Arterial Full Panel   Result Value Ref Range    POCT pH, Arterial 7.41 7.38 - 7.42 pH    POCT pCO2, Arterial 36 (L) 38 - 42 mm Hg    POCT pO2, Arterial 80 (L) 85 - 95 mm Hg    POCT SO2, Arterial 98 94 - 100 %    POCT Oxy Hemoglobin, Arterial 95.8 94.0 - 98.0 %    POCT Hematocrit Calculated, Arterial 37.0 (L) 41.0 - 52.0 %    POCT Sodium, Arterial 139 136 - 145 mmol/L    POCT Potassium, Arterial 4.1 3.5 - 5.3 mmol/L    POCT " Chloride, Arterial 108 (H) 98 - 107 mmol/L    POCT Ionized Calcium, Arterial 1.14 1.10 - 1.33 mmol/L    POCT Glucose, Arterial 218 (H) 74 - 99 mg/dL    POCT Lactate, Arterial 1.4 0.4 - 2.0 mmol/L    POCT Base Excess, Arterial -1.4 -2.0 - 3.0 mmol/L    POCT HCO3 Calculated, Arterial 22.8 22.0 - 26.0 mmol/L    POCT Hemoglobin, Arterial 12.3 (L) 13.5 - 17.5 g/dL    POCT Anion Gap, Arterial 12 10 - 25 mmo/L    Patient Temperature 37.0 degrees Celsius    FiO2 36 %    Apparatus CANNULA     Site of Arterial Puncture Radial Right     Byron's Test Positive    POCT GLUCOSE   Result Value Ref Range    POCT Glucose 217 (H) 74 - 99 mg/dL   Lavender Top   Result Value Ref Range    Extra Tube Hold for add-ons.    PST Top   Result Value Ref Range    Extra Tube Hold for add-ons.    Basic Metabolic Panel   Result Value Ref Range    Glucose 209 (H) 65 - 99 mg/dL    Sodium 144 133 - 145 mmol/L    Potassium 4.0 3.4 - 5.1 mmol/L    Chloride 110 (H) 97 - 107 mmol/L    Bicarbonate 21 (L) 24 - 31 mmol/L    Urea Nitrogen 45 (H) 8 - 25 mg/dL    Creatinine 2.00 (H) 0.40 - 1.60 mg/dL    eGFR 35 (L) >60 mL/min/1.73m*2    Calcium 7.7 (L) 8.5 - 10.4 mg/dL    Anion Gap 13 <=19 mmol/L   POCT GLUCOSE   Result Value Ref Range    POCT Glucose 176 (H) 74 - 99 mg/dL   Blood Gas Arterial Full Panel   Result Value Ref Range    POCT pH, Arterial 7.36 (L) 7.38 - 7.42 pH    POCT pCO2, Arterial 42 38 - 42 mm Hg    POCT pO2, Arterial 137 (H) 85 - 95 mm Hg    POCT SO2, Arterial 99 94 - 100 %    POCT Oxy Hemoglobin, Arterial 96.6 94.0 - 98.0 %    POCT Hematocrit Calculated, Arterial 39.0 (L) 41.0 - 52.0 %    POCT Sodium, Arterial 139 136 - 145 mmol/L    POCT Potassium, Arterial 4.1 3.5 - 5.3 mmol/L    POCT Chloride, Arterial 110 (H) 98 - 107 mmol/L    POCT Ionized Calcium, Arterial 1.17 1.10 - 1.33 mmol/L    POCT Glucose, Arterial 205 (H) 74 - 99 mg/dL    POCT Lactate, Arterial 1.3 0.4 - 2.0 mmol/L    POCT Base Excess, Arterial -1.8 -2.0 - 3.0 mmol/L    POCT HCO3  Calculated, Arterial 23.7 22.0 - 26.0 mmol/L    POCT Hemoglobin, Arterial 12.9 (L) 13.5 - 17.5 g/dL    POCT Anion Gap, Arterial 9 (L) 10 - 25 mmo/L    Patient Temperature 37.0 degrees Celsius    FiO2 50 %    Ipap CMH2O 15.0 cm H2O    Epap CMH2O 5.0 cm H2O    Site of Arterial Puncture Radial Right     Byron's Test Positive    POCT GLUCOSE   Result Value Ref Range    POCT Glucose 195 (H) 74 - 99 mg/dL     *Note: Due to a large number of results and/or encounters for the requested time period, some results have not been displayed. A complete set of results can be found in Results Review.      CT chest wo IV contrast  Result Date: 1/13/2024  1. Interval increase in bilateral pleural effusions with large bilateral effusions left-greater-than-right. There is resultant left lower lobar collapse with compressive atelectasis in particular in the left upper lobe as well as right lower lobe.   2. Patchy areas of airspace consolidation intervally developed from prior imaging with more focal septal thickening in the areas of consolidation may reflect alveolar component of pulmonary edema. However, superimposed infectious infiltrate is not excluded. No dense airspace consolidation      XR chest 1 view  Result Date: 1/13/2024  1. Interval increase in left-sided pleural effusion with compressive atelectasis with moderate left-sided pleural effusion   2. Patchy alveolar airspace infiltrate in bilateral lung fields with alveolar component of pulmonary edema suggested.       Scheduled medications  acetaminophen, 1,000 mg, intravenous, q6h [id number]  acetaminophen, 650 mg, oral, TID  acetylcysteine, 3 mL, nebulization, BID  atorvastatin, 80 mg, oral, Nightly  budesonide, 0.5 mg, nebulization, BID  carbidopa-levodopa, 1 tablet, oral, TID  clopidogrel, 75 mg, oral, Daily  cyclobenzaprine, 10 mg, oral, Nightly  dapagliflozin propanediol, 10 mg, oral, Daily  digoxin, 500 mcg, intravenous, Once  gabapentin, 200 mg, oral, Nightly  insulin  glargine, 10 Units, subcutaneous, Daily  insulin lispro, 0-10 Units, subcutaneous, TID with meals  ipratropium-albuteroL, 3 mL, nebulization, TID  lactobacillus acidophilus, 1 tablet, oral, BID  levothyroxine, 25 mcg, oral, Daily before breakfast  metroNIDAZOLE, 500 mg, intravenous, q8h  mirtazapine, 15 mg, oral, Nightly  nystatin, 1 Application, Topical, BID  pantoprazole, 40 mg, oral, Daily before breakfast  QUEtiapine, 25 mg, oral, Nightly  sertraline, 100 mg, oral, Daily  SITagliptin phosphate, 50 mg, oral, Daily  sodium chloride, 1 spray, Each Nostril, TID  vancomycin, 125 mg, oral, 4x daily      Continuous medications  amiodarone, 1 mg/min   Followed by  amiodarone, 0.5 mg/min  norepinephrine, 0.01-3 mcg/kg/min, Last Rate: 0.01 mcg/kg/min (01/13/24 1323)      PRN medications  PRN medications: albuterol, dextrose 10 % in water (D10W), dextrose, glucagon, melatonin, ondansetron, oxygen, oxygen         Assessment/Plan   Principal Problem:    Acute respiratory failure (CMS/HCC)  Active Problems:    Atrial fibrillation (CMS/HCC)    CAD (coronary artery disease)    Parkinson's disease    Cerebral infarction, unspecified (CMS/HCC)    C. difficile diarrhea    Acute hypoxic respiratory failure  - CT with increasing pleural effusions  - Suspect d/t aspiration  - On continuous bipap at 60% FiO2  - Pulmonology following  MATEO on CKD  - Cr 2.0  Dysphagia   - MBS shows aspiration of thin liquids.   - Recommendations for puree/nectar thick - which is diet ordered before NPO.   - Now NPO due to continuous bipap and lethargy  Metabolic encephalopathy  - currently lethargic on bipap  - suspect multifactorial  CHF   - ECHO on 12/16/23 was 30-35%.  - Farxiga   C-diff   - Was on PO Vanco  - Now on IV Flagyl due to NPO  Parkinsonism  A-fib   - ICD   - On Digoxin  COPD   Pall Care   The Patient is a DNR CCA DNI  He is currently not capable  His wife, Cata Spaulding, is his surrogate decision maker    1/13/2024  -The patient is  currently not a capable decision maker as he is lethargic and not fully arousable.   - The patient is also unable to swallow and needs PO meds such as Sinemet and PO Vanco. Discussion with wife who is agreeable to a short term NG tube.   - The wife also states that she would consider a PEG tube if the patient needed. She states that the patient recently stated that he did not want a PEG tube but was not sure if he was fully aware of the decision he was making. States that he has had a PEG tube in the past temporarily due to cancer and he was determined to be able to swallow again.   - We did discuss how his progression of illness looks different now than in the past and this may not be congruent with his wishes.   - Discussion also had about the risks of a PEG tube in his currently state of illness and how these are considerations to take into account when making this decision.   - Tylenol IV ordered for 24hrs due to the patient being NPO. Will reassess for effectiveness tomorrow.         I spent 60 minutes in the professional and overall care of this patient.  Total ACP time was 20 minutes.       KIP Leach-CNP

## 2024-01-13 NOTE — NURSING NOTE
Called by RN to assess this pt. Pt WOB increased, pt desatting, Dr. Bailey at bedside. Pt placed on continuous bipap. ICU tx orders to be placed.   Pt has low BP but stable. Asymptomatic. Staying with pt until transfer to ICU

## 2024-01-13 NOTE — PROGRESS NOTES
I team called- NIPPV started per bedside provder for WOB/ SOB. Pt BS clear in uppper lobes with crackles/ rhonchi lower lobes. Pt transported to ICU PT VSS.

## 2024-01-13 NOTE — PROGRESS NOTES
Hai Spaulding is a 73 y.o. male on day 9 of admission presenting with Acute respiratory failure (CMS/HCC).    Subjective   Interval History:   Interval transfer to ICU  Patient seen with daughter present  On BiPAP  Minimally responsive  Placed on IV Flagyl        Review of Systems  Unable to obtain-minimally verbal    Objective   Range of Vitals (last 24 hours)  Heart Rate:  []   Temp:  [36 °C (96.8 °F)-37 °C (98.6 °F)]   Resp:  [21-31]   BP: ()/(58-69)   SpO2:  [92 %-99 %]   Daily Weight  01/05/24 : 77.1 kg (169 lb 15.6 oz)    Body mass index is 25.09 kg/m².    Physical Exam  Constitutional:       Appearance: Awake, minimally verbal  HENT:      Head: Normocephalic.      Nose: Nose normal.      Mouth/Throat:      Mouth: Mucous membranes are moist.      Pharynx: Oropharynx is clear.   Eyes:      Extraocular Movements: Extraocular movements intact.      Conjunctiva/sclera: Conjunctivae normal.      Comments: Resolving right periorbital ecchymosis   Cardiovascular:      Rate and Rhythm: Normal rate and regular rhythm.   Pulmonary:      Effort: Pulmonary effort is normal.      Breath sounds: Normal breath sounds.   Abdominal:      General: Bowel sounds are normal.      Palpations: Abdomen is soft.      Tenderness: There is no abdominal tenderness.   Musculoskeletal:         General: Normal range of motion.      Cervical back: Normal range of motion and neck supple.   Skin:     General: Skin is warm and dry.   Neurological:      General: No focal deficit present.      Mental Status: He is alert and oriented to person, place, and time.   Psychiatric:         Mood and Affect: Not agitated        Behavior: Behavior not agitated    Antibiotics  amiodarone (Pacerone) tablet 200 mg  apixaban (Eliquis) tablet 5 mg  atorvastatin (Lipitor) tablet 80 mg  carbidopa-levodopa (Sinemet CR)  mg ER tablet 1 tablet  clopidogrel (Plavix) tablet 75 mg  dapagliflozin propanediol (Farxiga) tablet 10 mg  lactobacillus  acidophilus capsule 1 capsule  levothyroxine (Synthroid, Levoxyl) tablet 25 mcg  metoprolol succinate XL (Toprol-XL) 24 hr tablet 25 mg  QUEtiapine (SEROquel) tablet 25 mg  sertraline (Zoloft) tablet 100 mg  SITagliptin phosphate (Januvia) tablet 50 mg  insulin glargine (Lantus) injection 10 Units  dextrose 50 % injection 25 g  glucagon (Glucagen) injection 1 mg  dextrose 10 % in water (D10W) infusion  insulin lispro (HumaLOG) injection 0-10 Units  vancomycin (Vancocin) capsule 125 mg  fluconazole in NaCl (iso-osm) (Diflucan) IVPB 200 mg  nystatin (Mycostatin) 100,000 unit/gram powder 1 Application  ipratropium-albuteroL (Duo-Neb) 0.5-2.5 mg/3 mL nebulizer solution 3 mL  budesonide (Pulmicort) 0.5 mg/2 mL nebulizer solution 0.5 mg  lactobacillus acidophilus tablet 1 tablet  vancomycin (Vancocin) capsule 125 mg  ipratropium-albuteroL (Duo-Neb) 0.5-2.5 mg/3 mL nebulizer solution 3 mL  acetaminophen (Tylenol) tablet 650 mg  ondansetron (Zofran) injection 4 mg  melatonin tablet 5 mg  cyclobenzaprine (Flexeril) tablet 10 mg  fluticasone furoate-vilanteroL (Breo Ellipta) 200-25 mcg/dose inhaler 1 puff  ipratropium-albuteroL (Duo-Neb) 0.5-2.5 mg/3 mL nebulizer solution 3 mL  pantoprazole (ProtoNix) EC tablet 40 mg  albuterol 2.5 mg /3 mL (0.083 %) nebulizer solution 2.5 mg  acetaminophen (Tylenol) tablet 650 mg  ondansetron (Zofran) injection 4 mg  melatonin tablet 5 mg  potassium chloride (Klor-Con) packet 40 mEq  potassium chloride 20 mEq in 100 mL IV premix  furosemide (Lasix) injection 40 mg  barium sulfate (Varibar Pudding) 40 % (w/v), 30% (w/w) oral paste 20 mL  barium sulfate (Varibar Nectar, Varibar Honey) 40 % (w/v) suspension 20 mL  barium sulfate (Varibar Honey) 40 % (w/v) 29% (w/w) suspension 20 mL  barium sulfate (Varibar THIN Liquid) 81 % (w/w) suspension 20 mL  sodium chloride 0.9% infusion  oxygen (O2) therapy  vancomycin (Firvanq) solution 125 mg  vancomycin (Vancocin) capsule 125 mg  ipratropium-albuteroL  (Duo-Neb) 0.5-2.5 mg/3 mL nebulizer solution 3 mL  gabapentin (Neurontin) capsule 200 mg  sodium chloride (Ocean) 0.65 % nasal spray 1 spray  mirtazapine (Remeron) tablet 15 mg  sodium chloride (Ocean) 0.65 % nasal spray 1 spray  acetaminophen (Tylenol) tablet 650 mg  oxygen (O2) therapy  acetylcysteine (Mucomyst) 200 mg/mL (20 %) nebulizer solution 600 mg  metroNIDAZOLE (Flagyl) 500 mg in NaCl (iso-os) 100 mL      Relevant Results  Labs  Results from last 72 hours   Lab Units 01/12/24  0545 01/11/24  0513   WBC AUTO x10*3/uL 12.6* 12.1*   HEMOGLOBIN g/dL 12.8* 12.8*   HEMATOCRIT % 39.6* 40.2*   PLATELETS AUTO x10*3/uL 252 249   NEUTROS PCT AUTO % 86.4 86.3   LYMPHS PCT AUTO % 7.0 6.1   MONOS PCT AUTO % 5.6 6.6   EOS PCT AUTO % 0.0 0.1     Results from last 72 hours   Lab Units 01/13/24  0500 01/12/24  0545 01/11/24  0513   SODIUM mmol/L 144 142 141   POTASSIUM mmol/L 4.0 4.6 4.0   CHLORIDE mmol/L 110* 108* 106   CO2 mmol/L 21* 22* 22*   BUN mg/dL 45* 43* 47*   CREATININE mg/dL 2.00* 2.10* 2.30*   GLUCOSE mg/dL 209* 138* 161*   CALCIUM mg/dL 7.7* 7.7* 7.7*   ANION GAP mmol/L 13 12 13   EGFR mL/min/1.73m*2 35* 33* 29*   PHOSPHORUS mg/dL  --  2.7 2.7     Results from last 72 hours   Lab Units 01/12/24  0545 01/11/24  0513   ALBUMIN g/dL 2.4* 2.5*     Estimated Creatinine Clearance: 33.4 mL/min (A) (by C-G formula based on SCr of 2 mg/dL (H)).  C-Reactive Protein   Date Value Ref Range Status   11/29/2023 2.53 (H) <1.00 mg/dL Final     CRP   Date Value Ref Range Status   12/31/2022 39.72 (A) mg/dL Final     Comment:     REF VALUE  < 1.00     12/14/2021 2.16 (A) mg/dL Final     Comment:     REF VALUE  < 1.00       Microbiology  Susceptibility data from last 14 days.  Collected Specimen Info Organism   01/03/24 Urine from Straight Catheter Candida glabrata     Reviewed  Imaging  XR chest 1 view    Result Date: 1/13/2024  Interpreted By:  Rahul Staples, STUDY: XR CHEST 1 VIEW; 1/13/2024 2:51 pm   INDICATION: CLINICAL  INFORMATION: Signs/Symptoms:central line and thoracentesis.   COMPARISON: 01/13/2024 at 824 hours   ACCESSION NUMBER(S): [id number]   ORDERING CLINICIAN: CRISTAL MARCH   TECHNIQUE: Portable chest one view.   FINDINGS: The cardiac size is indeterminate in view of the AP projection.  A cardiac pacemaker with biventricular as well as right atrial leads is noted. There is a right internal jugular venous catheter now present, new compared to the prior study with the tip somewhat obscured by the cardiac pacemaker leads. The tip however appears to terminate above the right atrium in the SVC distribution. There is no evidence for pneumothorax.   Decrease in the pleural effusion on the left in this patient with history of thoracentesis. There is no definite evidence for pneumothorax within limits of this study.   There are bilateral infiltrates and effusions present, possibly secondary to pulmonary edema and CHF.       1. Status post right-sided central line insertion without pneumothorax as above. 2. Status post left thoracentesis without evidence for pneumothorax. 3. Persistent infiltrates and effusions bilaterally suggesting possibility of pulmonary edema and CHF.   MACRO: none   Signed by: Rahul Staples 1/13/2024 3:02 PM Dictation workstation:   [id number]    CT chest wo IV contrast    Result Date: 1/13/2024  Interpreted By:  Michael Fuentes, STUDY: CT CHEST WO IV CONTRAST; ;  1/13/2024 9:09 am   INDICATION: Signs/Symptoms:worsening chest xray. Pneumonia   COMPARISON: 01/03/2024   ACCESSION NUMBER(S): [id number]   ORDERING CLINICIAN: LYNSEY HERNDON   TECHNIQUE: Serial axial unenhanced CT images obtained of the chest. Images reformatted in the coronal and sagittal projection   All CT examinations are performed with 1 or more of the following dose reduction techniques: Automated exposure control, adjustment of mA and/or kv according to patient's size, or use of iterative reconstruction techniques.   FINDINGS:  Mediastinum demonstrates lymphadenopathy with multiple enlarged paratracheal and pretracheal lymph nodes. There is a right paratracheal lymph node identified measuring 1.9 x 1.4 cm intervally increased in size from prior imaging where it measured 1.4 x 1.1 cm. Other lymph nodes have intervally increased in size. There is a precarinal lymph node identified measuring 12 mm in the short axis. Evaluation of the aidan limited without IV contrast. No significant hilar lymphadenopathy. Esophagus is gas-filled with component of refluxed suggested distally.   Heart and great vessels demonstrate ascending thoracic aorta to measure 3.2 cm. There is mild vascular calcification of the aortic arch. Diffuse qualitative coronary artery calcification is demonstrated. Cardiomegaly noted.   Large bilateral pleural effusions are demonstrated left-greater-than-right. Findings increased from prior imaging. There is left lower lobar collapse with no asymmetric density within the atelectatic lung. Also, there is compressive atelectasis within the left upper lobe with partial collapse within the lingula. The right lower lobe demonstrates partial lobar collapse in relation to compressive atelectasis. Lung parenchyma demonstrates septal thickening superimposed ground-glass airspace infiltrate more focal consolidation in the areas of septal thickening within bilateral lung fields which is progressed from prior imaging which may reflect alveolar component of pulmonary edema. However, the areas of consolidation may reflect superimposed component of infectious infiltrate.   Included portions visualized abdomen demonstrate mild ascites in particular perihepatic location. There is contrast demonstrated within the colon. A cyst is noted in the superior pole of the right kidney measuring 1.5 cm.   Visualized osseous structures demonstrate no compression deformity in the spine.             1. Interval increase in bilateral pleural effusions with large  bilateral effusions left-greater-than-right. There is resultant left lower lobar collapse with compressive atelectasis in particular in the left upper lobe as well as right lower lobe.   2. Patchy areas of airspace consolidation intervally developed from prior imaging with more focal septal thickening in the areas of consolidation may reflect alveolar component of pulmonary edema. However, superimposed infectious infiltrate is not excluded. No dense airspace consolidation     MACRO: None   Signed by: Michael Fuentes 1/13/2024 9:32 AM Dictation workstation:   [id number]    XR chest 1 view    Result Date: 1/13/2024  Interpreted By:  Michael Fuentes, STUDY: XR CHEST 1 VIEW 1/13/2024 8:31 am   INDICATION: Signs/Symptoms:respiratory distress   COMPARISON: 01/12/2024   ACCESSION NUMBER(S): [id number]   ORDERING CLINICIAN: CRISTAL MARCH   TECHNIQUE: Single AP view chest   FINDINGS: Cardiomediastinal silhouette is enlarged with mild cardiomegaly. Left-sided pacemaker with leads in the region of the right atrium, right ventricle, and coronary sinus. There is generalized increased interstitial prominence with cephalization as well as patchy alveolar airspace infiltrate in bilateral lung fields without significant change. There is improved aeration at the right lung base with component of layering basilar effusion improved. However, there is persistent left-sided pleural effusion with interval increased from prior imaging with component of compressive atelectasis.             1. Interval increase in left-sided pleural effusion with compressive atelectasis with moderate left-sided pleural effusion   2. Patchy alveolar airspace infiltrate in bilateral lung fields with alveolar component of pulmonary edema suggested.   Signed by: Michael Fuentes 1/13/2024 8:48 AM Dictation workstation:   [id number]    XR chest 1 view    Result Date: 1/12/2024  Interpreted By:  Sugey Tinoco, STUDY: XR CHEST 1 VIEW 1/12/2024 3:49 pm    INDICATION: Signs/Symptoms:hypoxia   COMPARISON: 01/03/2024   ACCESSION NUMBER(S): [id number]   ORDERING CLINICIAN: CRISTAL MARCH   TECHNIQUE: AP erect view of the chest   FINDINGS: The cardiac size is mildly enlarged with biventricular ICD leads observed in right atrium, right ventricle, and coronary sinus.   Pulmonary edema is present with worsening of bilateral pulmonary infiltrates and with bilateral pleural effusion seen. Left effusion appears larger than the right with left effusion increased in size.       Pulmonary edema is now seen with increased size of left pleural effusion which is now moderate in amount. Small right pleural effusion is also present.   Signed by: Sugey Tinoco 1/12/2024 4:27 PM Dictation workstation:   [id number]    ECG 12 Lead    Result Date: 1/10/2024  Wide QRS tachycardia with Premature supraventricular complexes and with occasional Premature ventricular complexes Nonspecific intraventricular block Cannot rule out Anteroseptal infarct , age undetermined T wave abnormality, consider inferolateral ischemia Abnormal ECG When compared with ECG of 14-DEC-2023 19:10, (unconfirmed) Wide QRS tachycardia has replaced Sinus rhythm Vent. rate has increased BY  57 BPM See ED provider note for full interpretation and clinical correlation Confirmed by Alyssa Brown ([id number]) on 1/10/2024 4:16:56 PM    FL modified barium swallow study    Result Date: 1/9/2024  Interpreted By:  Chuck Montoya and Janezic Kimberly STUDY: FL MODIFIED BARIUM SWALLOW STUDY;; 1/8/2024 3:34 pm   INDICATION: Signs/Symptoms:dysphagia.   COMPARISON: None.   ACCESSION NUMBER(S): [id number]   ORDERING CLINICIAN: CRISTAL MARCH   TECHNIQUE: MBSS completed. Informed verbal consent obtained prior to completion of exam. Trials of thin, nectar thick, honey thick, puree,  and regular solids given. Total fluoroscopy time:  2 minutes 52 seconds Radiation exposure (Reference Air Kerma):  34.37 mGy Images:  9 series      SLP: Emiliana Collins M.A. [facility]-SLP, BCS-S Phone/Pager: [phone] Option 2   SPEECH FINDINGS: Reason for referral: Aspiration due to comorbidities Patient hx: Per chart: This man who is debilitated from a stroke and a history of laryngeal cancer and repeat aspiration pneumonia has spent more time in the hospital than out of the hospital for the last 2 months.  He was most recently hospitalized here December 25 with COVID and aspiration pneumonia. Respiratory status: Nasal cannula 2L Previous diet: Regular/thin liquids   FINAL SPEECH RECOMMENDATIONS   Diet recommendations/feeding strategies: Puree/nectar thick liquids. Crush medication in puree or nectar thick liquids. Sit upright at 90 degrees for meals and medications.   Follow-up speech therapy recommended: Yes. Patient will benefit from continued dysphagia therapy for oropharyngeal exercises to allow for diet progression.   Short term goals: 1. Patient will tolerate recommended diet without pulmonary compromise 2. Patient will participate in oropharyngeal strengthening exercises. 3. SLP will assess for diet upgrade as appropriate. 4. Due to silent aspiration, patient will need a repeat MBS prior to liquid diet upgrade to thin.   Long term goals: Tolerated least restrictive diet without pulmonary compromise   Education provided: Reviewed results with nursing.   Treatment Provided today: No.     Repeat study/ dc plan: Repeat MBS prior to upgrade to thin liquids, due to silent aspriation with thin liquids.   Mechanics of the swallow summary:   Oral phase: 1. Patient is unable to masticate solids, thus manually removed. Attempt at mashing with tongue was not successful. 2. Repetitive tongue motion with puree. Oral holding with all liquids and puree prior to swallow onset. 3. Swallow initiation at the pyriforms with thin liquids. At the valleculae with nectar and honey thick, and puree. 4. Trace oral residue, clears with reswallow.   Pharyngeal phase: 1. Partial  superior laryngeal elevation. 2. Partial anterior hyoid excursion. 3. Partial epiglottic inversion. 4. Narrow column of contrast above vocal cords with  honey thick liquids and puree. Contrast enters airway above vocal cords (just underneath epiglottis) with no effort to eject. Narrow column of contrast enters airway below vocal cords with no effort to eject with straw. Contrast enters airway above vocal cords with teaspoon trial without effort to eject. 5. Collection of residue thoughout pharynx, mild and clears with spontaneous reswallows. 6. Narrow column of contrast between tongue base and pharyngeal wall.     SLP impressions with severity rating:   Silent aspiration with thin liquids. Laryngeal penetration noted with puree and honey thick liquids. Tolerated nectar thick liquids during testing constraints without difficulty. Unable to masticate solids. Unclear if this is due to edentulous status, or other comorbidities. Patient would benefit from continuation of dysphagia therapy to improve overall oral and pharyngeal strength, improve mastication and upgrade diet to least restrictive without pulmonary compromise.   Thin Liquids (MBSS) Rosenbek's Penetration Aspiration Scale, Thin Liquids (MBSS): 8. SILENT ASPIRATION - contrast passes glottis, visible residue, NO pt response   Response to Aspiration, Thin Liquid (MBSS): absent response, silent aspiration,   Response to Pharyngeal Residue, Thin Liquid (MBSS): spontaneous clearing,   Nectar Thick Liquids (MBSS) Rosenbek's Penetration Aspiration Scale, Nectar thick liquids (MBSS): 1. NO ASPIRATION & NO PENETRATION - no aspiration, contrast does not enter airway   Response to Pharyngeal Residue, Nectar thick liquids (MBSS): spontaneous clearing,   Honey Thick Liquids (MBSS) Rosenbek's Penetration Aspiration Scale, Honey thick liquids (MBSS): 3. PENETRATION with LOW ASPIRATION risk - contrast remains above vocal cords, visible residue   Response to Pharyngeal  Residue, Honey thick liquids (MBSS): spontaneous clearing,   Purees (MBSS) Rosenbek's Penetration Aspiration Scale, Purees (MBSS): 3. PENETRATION with LOW ASPIRATION risk - contrast remains above vocal cords, visible residue   Response to Pharyngeal Residue, Purees (MBSS): spontaneous clearing,     Speech Therapy section of this report signed by Emiliana Collins M.A. CCC-SLP, BCS-S on 1/8/2024 at 3:54 pm.   RADIOLOGY FINDINGS: There is laryngeal penetration and aspiration with thin liquid barium. There is laryngeal penetration without aspiration with nectar consistency, honey consistency, pudding consistency, and cookie consistency barium.   Radiology section of this report signed by Jan.       There is laryngeal penetration and aspiration with thin liquid barium. There is laryngeal penetration without aspiration with nectar consistency, honey consistency, pudding consistency, and cookie consistency barium.   MACRO: None   Signed by: Chuck Montoya 1/9/2024 5:02 PM Dictation workstation:   [id number]    ECG 12 lead    Result Date: 1/5/2024  Sinus bradycardia with frequent and consecutive Premature ventricular complexes and Fusion complexes Left axis deviation Nonspecific intraventricular block Inferior infarct , age undetermined Lateral injury pattern ** ** ACUTE MI / STEMI ** ** Abnormal ECG When compared with ECG of 27-DEC-2023 15:18, Significant changes have occurred See ED provider note for full interpretation and clinical correlation Confirmed by Yomi Lainez ([id number]) on 1/5/2024 9:13:12 PM    ECG 12 lead    Result Date: 1/5/2024  Undetermined rhythm Left bundle branch block Abnormal ECG When compared with ECG of 03-JAN-2024 11:50, (unconfirmed) Current undetermined rhythm precludes rhythm comparison, needs review Questionable change in QRS duration Criteria for Inferior infarct are no longer Present See ED provider note for full interpretation and clinical correlation Confirmed by Yomi Lainez  ([id number]) on 1/5/2024 9:12:07 PM    CT 3D reconstruction    Addendum Date: 1/5/2024    Interpreted By:  Chuck Argueta, ADDENDUM: Multiplanar 3D reformations are also generated and reviewed on independent workstation.   Signed by: Chuck Argueta 1/5/2024 2:19 PM   -------- ORIGINAL REPORT -------- Dictation workstation:   [id number]    Result Date: 1/5/2024  Interpreted By:  Chuck Argueta, STUDY: CT FACIAL BONES WO IV CONTRAST;  1/3/2024 1:14 pm   INDICATION: Signs/Symptoms:fall.   COMPARISON: None.   ACCESSION NUMBER(S): [id number]   ORDERING CLINICIAN: FIONA GOMEZ   TECHNIQUE: Contiguous axial CT sections were performed through the bones and orbits and supplemented with coronal and sagittal reformatted images. The study is limited by motion degradation.   FINDINGS: There is mild soft tissue swelling superior to the left orbit.   There is some deformity at the distal nasal bones greater on the right. This appearance is of indeterminate age and should be correlated to point tenderness. The remaining osseous structures demonstrate no sign of acute fracture allowing for motion degradation. There is no overt bone destruction or aggressive periosteal reaction. No lytic or blastic lesion is detected.   There are small fluid levels in both maxillary sinuses, greater on the left. There is a small fluid level in the right sphenoid compartment. There is mucoperiosteal thickening in scattered fluid within the ethmoid air cells, greater on the left posteriorly. The frontal sinuses and mastoid air cells are clear and symmetrically aerated. There may be mild mucoperiosteal thickening at the floor of the left maxillary sinus.   The nasal septum is at the midline. The ostiomeatal units are patent bilaterally.   The visualized orbital contents are unremarkable. There is no intraorbital air density or fluid collection. The medial and inferior orbital walls are intact.   There are least 2 small foci of air  density posterior to the right maxillary sinus though no apparent posterior wall fracture or defect. The significance of these findings is indeterminate.       Soft tissue swelling superior left orbit.   Mild deformity at the distal tip of the nasal bones greater on the right. This appearance is of uncertain age it should be correlated to point tenderness.   The remaining visualized osseous structures are intact.   Maxillary, ethmoid, and right sphenoid sinusitis with fluid levels.   Signed by: Chuck Argueta 1/5/2024 2:19 PM Dictation workstation:   [id number]    CT maxillofacial bones wo IV contrast    Addendum Date: 1/5/2024    Interpreted By:  Chuck Argueta, ADDENDUM: Multiplanar 3D reformations are also generated and reviewed on independent workstation.   Signed by: Chuck Argueta 1/5/2024 9:43 AM   -------- ORIGINAL REPORT -------- Dictation workstation:   [id number]    Result Date: 1/5/2024  Interpreted By:  Chuck Argueta, STUDY: CT FACIAL BONES WO IV CONTRAST;  1/3/2024 1:14 pm   INDICATION: Signs/Symptoms:fall.   COMPARISON: None.   ACCESSION NUMBER(S): [id number]   ORDERING CLINICIAN: FIONA GOMEZ   TECHNIQUE: Contiguous axial CT sections were performed through the bones and orbits and supplemented with coronal and sagittal reformatted images. The study is limited by motion degradation.   FINDINGS: There is mild soft tissue swelling superior to the left orbit.   There is some deformity at the distal nasal bones greater on the right. This appearance is of indeterminate age and should be correlated to point tenderness. The remaining osseous structures demonstrate no sign of acute fracture allowing for motion degradation. There is no overt bone destruction or aggressive periosteal reaction. No lytic or blastic lesion is detected.   There are small fluid levels in both maxillary sinuses, greater on the left. There is a small fluid level in the right sphenoid compartment. There is  mucoperiosteal thickening in scattered fluid within the ethmoid air cells, greater on the left posteriorly. The frontal sinuses and mastoid air cells are clear and symmetrically aerated. There may be mild mucoperiosteal thickening at the floor of the left maxillary sinus.   The nasal septum is at the midline. The ostiomeatal units are patent bilaterally.   The visualized orbital contents are unremarkable. There is no intraorbital air density or fluid collection. The medial and inferior orbital walls are intact.   There are least 2 small foci of air density posterior to the right maxillary sinus though no apparent posterior wall fracture or defect. The significance of these findings is indeterminate.       Soft tissue swelling superior left orbit.   Mild deformity at the distal tip of the nasal bones greater on the right. This appearance is of uncertain age it should be correlated to point tenderness.   The remaining visualized osseous structures are intact.   Maxillary, ethmoid, and right sphenoid sinusitis with fluid levels.   Signed by: Chuck Argueta 1/3/2024 1:56 PM Dictation workstation:   [id number]    ECG 12 lead    Result Date: 1/4/2024  Ventricular-paced rhythm with occasional and consecutive sinus complexes and with frequent and consecutive Premature ventricular complexes with junctional escape complexes Abnormal ECG When compared with ECG of 04-JAN-2024 04:21, (unconfirmed) Previous ECG has undetermined rhythm, needs review See ED provider note for full interpretation and clinical correlation Confirmed by Yomi Lainez ([id number]) on 1/4/2024 9:24:49 AM    CT head wo IV contrast    Addendum Date: 1/3/2024    Interpreted By:  Chuck Argueta, ADDENDUM: Subdural collection over the right convexity is new from 12/31/2022 though is of CSF attenuation. There is no evidence of dense acute hemorrhage at this site.   There is some asymmetry of the lateral ventricles with the left larger than the right.  This in part may be related to some residual mass effect from the right subdural collection though there is no significant midline shift or sulcal effacement.   Signed by: Chuck Argueta 1/3/2024 3:48 PM   -------- ORIGINAL REPORT -------- Dictation workstation:   [id number]    Result Date: 1/3/2024  Interpreted By:  Chuck Argueta, STUDY: CT HEAD WO IV CONTRAST;  1/3/2024 1:14 pm   INDICATION: Signs/Symptoms:fall, periorbital ecchymosis.   COMPARISON: 12/31/2022   ACCESSION NUMBER(S): [id number]   ORDERING CLINICIAN: FIONA GOMEZ   TECHNIQUE: Contiguous unenhanced axial CT sections are performed from the skull base to the vertex.   FINDINGS: The osseous structures are intact. There is small fluid levels in the maxillary sinuses, greater on the left as well as the right sphenoid compartment. There is partial opacification of the ethmoid air cells with mucoperiosteal thickening and fluid greater on the left. The visualized portions of the mastoid air cells are and frontal sinuses are clear.   There is severe generalized parenchymal volume loss with enlargement of the cortical sulci and CSF spaces. There is a CSF subdural collection overlying the right frontal convexity extending laterally over the temporoparietal convexity. This finding is new from the previous study and measures 8 mm in maximum thickness. There may be a smaller CF density extra-axial collection over the left frontal convexity anteriorly.   There is hypodensity in the deep cerebral white matter bilaterally compatible with small-vessel ischemia. There is no sign of parenchymal hematoma. There is no dense extra-axial fluid collection. There is no mass effect or midline shift. There is some asymmetry of the lateral ventricles with the left larger than the right.       Chronic appearing low density extra-axial fluid collections overlying the frontal convexities, greater on the right. These findings are new from 12/31/2022.   Generalized  parenchymal atrophy and small-vessel ischemic changes of the cerebral white matter.   Maxillary, ethmoid, and right sphenoid sinusitis with acute fluid levels.   No sign of acute intracranial hemorrhage or mass effect.   The visualized osseous structures are intact.   Signed by: Chuck Argueta 1/3/2024 1:42 PM Dictation workstation:   [id number]    CT chest abdomen pelvis wo IV contrast    Result Date: 1/3/2024  STUDY: CT Chest, Abdomen, and Pelvis without IV Contrast; 1/3/2024, 1:15PM. INDICATION: Shortness of breath and abdominal pain.  Leukocytosis. COMPARISON: CXR same day.  CT CAP 12/31/2022. ACCESSION NUMBER(S): [id number] ORDERING CLINICIAN: FIONA GOMEZ TECHNIQUE: CT of the chest, abdomen, and pelvis was performed.  Contiguous axial images were obtained at 3 mm slice thickness through the chest, abdomen, and pelvis.  Coronal and sagittal reconstructions at 3 mm slice thickness were performed.  No intravenous contrast was administered.  FINDINGS: Please note that the evaluation of vessels, lymph nodes and organs is limited without intravenous contrast. CHEST: MEDIASTINUM: The heart is enlarged in size without pericardial effusion. Biventricular AICD remains in place.  There is no thoracic aortic, aneurysm.  Scattered vascular calcifications are seen along the arterial tree. LUNGS/PLEURA: There are bilateral pleural effusions greater on the left than the right, these appear decreased when compared with the prior especially on the right.  Adjacent compressive and subsegmental atelectatic changes are noted as well as some areas of probable consolidation. There is a focus of patchy infiltrate in the right upper lobe anteriorly in proximity to the anterior aspect of the first rib.  This appears slightly more prominent.  Trachea and mainstem bronchi appear stable, borderline narrowed Groundglass infiltrates noted previously appear improved with mild residual. LYMPH NODES: Mediastinal lymph nodes are  mildly enlarged.  There is a pretracheal lymph node with the short axis diameter of 1.4 cm similar to the prior.  Detail is somewhat obscured, by artifact on the current study.  Patient's arms are included within the field-of-view. ABDOMEN:  LIVER: The liver is mildly enlarged, the right lobe measures 17 cm in CC dimension.  Assessment of parenchyma is limited by artifact.  Smooth surface contour.  Normal attenuation.  BILE DUCTS: No intrahepatic or extrahepatic biliary ductal dilatation.  GALLBLADDER: The gallbladder is surgically absent and this was also the case previously.  PANCREAS: No masses or ductal dilatation.  SPLEEN: The spleen is not clearly identified nor was it previously.  ADRENAL GLANDS: No thickening or nodules.  KIDNEYS AND URETERS: Kidneys are normal in size and location.  No renal or ureteral calculi.  There is a 2 cm cyst in the upper pole of the right kidney with small peripheral calcifications which are more prominent than on the prior.  No hydronephrosis of either kidney.  PELVIS:  BLADDER: There is increased bladder distention when compared with the prior. There is extrinsic pressure from the prostate and coarse prostate calcifications also present previously.  Increased attenuation in the dependent bladder appears to be likely artifactual.  REPRODUCTIVE ORGANS: Coarse prostate calcifications are noted also present previously.  BOWEL: There are multiple fluid and air-filled loops of small and large bowel as can be seen with adynamic ileus.  VESSELS: No acute abnormalities identified.  Abdominal aorta is normal in caliber.  There are vascular calcifications along the arterial tree.  PERITONEUM/RETROPERITONEUM/LYMPH NODES: There are mesenteric and pelvic reticulations.  There is a small amount of presacral and pelvic fluid.  No pneumoperitoneum. No lymphadenopathy.  ABDOMINAL WALL AND SOFT TISSUES: Abdominal wall edema is noted especially laterally more so on the right and posteriorly.  BONES:  No acute fracture or aggressive osseous lesion.  Lumbar dextroscoliosis is noted and there are degenerative changes of the spine and joints.    1.Bilateral pleural effusions greater on the left than the right, these appear decreased from the prior especially on the right. Adjacent compressive and subsegmental atelectatic changes are noted as well as some areas of consolidation. 2.Improving groundglass infiltrates in both lungs with mild residual. There is a small focus of infiltrate in the right upper lobe anteriorly which appears slightly more prominent.  Pulmonary findings suggest a combination of pneumonia and pulmonary vascular congestion. This could also be correlated clinically.  Underlying nodules could be obscured and follow-up is suggested. 3.Cardiomegaly with AICD in place also present previously. 4.Within the abdomen and pelvis there are multiple fluid and air-filled loops of small and large bowel as can be seen with adynamic ileus. There is a small amount of presacral and pelvic fluid. No pneumoperitoneum. 5.Abdominal wall edema. 6.  2 cm cyst in the upper pole of the right kidney with small peripheral calcifications which are more prominent than on the prior. Follow-up studies are suggested. Signed by Nancy Hagen DO    CT cervical spine wo IV contrast    Result Date: 1/3/2024  Interpreted By:  Chuck Argueta, STUDY: CT CERVICAL SPINE WO IV CONTRAST;  1/3/2024 1:14 pm   INDICATION: Signs/Symptoms:fall.   COMPARISON: None.   ACCESSION NUMBER(S): [id number]   ORDERING CLINICIAN: FIONA GOMEZ   TECHNIQUE: Contiguous axial CT sections are performed from the skullbase to the upper thoracic spine and supplemented with coronal and sagittal reformatted images. The study is limited by motion degradation.   FINDINGS: There is mild dextro convexity of the cervical spine and reversal of the cervical lordosis. There is anterior subluxation of C2 relative to C3 measuring 3 mm. The facet joints align  normally.   There are multilevel discogenic degenerative changes of the cervical spine with disc space narrowing from C3 through C6. There is degenerative endplate sclerosis and lucency is more pronounced at C5-6.   The cervical vertebral body heights are maintained. Allowing for motion degradation, there is no CT evidence of acute cervical spine fracture. There is no bone destruction or aggressive periosteal reaction. No lytic or blastic lesion is detected. The visualized surrounding osseous structures are intact.   The C1-2 relationship is within normal limits. There is C1-2 arthrosis anteriorly with joint space narrowing and marginal spurring.   The C2-3 disc space level demonstrates moderate facet arthrosis on the right and mild facet arthrosis on the left. There is bulging disc and uncovertebral arthrosis with mild narrowing of the left neural foramen and mild to moderate narrowing on the right. There is mild central canal narrowing with bulging disc and mass effect upon the ventral subarachnoid space.   The C3-4 disc space level demonstrates mild to moderate bilateral facet arthrosis. There is bulging disc and marginal osteophyte asymmetric to the right with severe uncovertebral arthrosis bilaterally. There is severe bilateral neural foraminal narrowing, greater on the right with moderate central canal narrowing.   The C4-5 disc space level demonstrates mild to moderate bilateral facet arthrosis, greater on the left. There is bulging disc and marginal osteophyte with severe bilateral uncovertebral arthrosis. There is severe narrowing of the right neural foramen and moderate to severe narrowing of the left neural foramen. There is superimposed extruded disc on the right with severe central canal narrowing and suspected mass effect upon the spinal cord. A similar finding was described on a MRI examination of 10/13/2022 though it is difficult to compare different modalities with respect to the size and intensity  of this finding.   The C5-6 disc space level demonstrates moderate bilateral facet arthrosis. There is bulging disc and marginal osteophyte with moderate central canal narrowing. There is severe bilateral neural foraminal narrowing.   The C6-7 disc space level demonstrates mild to moderate bilateral facet arthrosis. There is bulging disc and marginal osteophyte moderate narrowing left neural foramen and mild narrowing of the right neural foramen. There is mild to moderate central canal narrowing.   The C7-T1 disc space level demonstrates moderate bilateral facet arthrosis. There is bulging disc and marginal osteophyte with moderate bilateral neural foraminal narrowing. There is mild central canal narrowing.   There is no prevertebral soft tissue swelling or retropharyngeal air. Bilateral pleural effusions and dependent atelectasis is partially visualized, greater on the left. There are bilateral emphysematous changes with ground-glass density in both lung apices. There is superimposed focal opacity in the right apex anteriorly with a few peripheral air densities.       Severe multilevel cervical spondylosis with reversal of the cervical lordosis and mild dextro convexity of the cervical spine.   Multilevel bulging disc with facet and uncovertebral arthrosis. There is large superimposed extruded disc at C4-5 on the right. Severe central canal stenosis at this level asymmetric to the right. There is at least moderate central canal narrowing at C5-6 and C3-4. There is severe multilevel bilateral neural foraminal narrowing as detailed above.   Allowing for motion degradation, there is no CT evidence of acute fracture or traumatic subluxation.   Bilateral pleural effusions and dependent atelectasis are partially visualized, greater on the left. There are bilateral emphysematous changes with ground-glass density in the lung apices. Focal opacity with ill-defined margins is identified in the right apex. Further workup with  dedicated CT thorax is recommended.   Signed by: Chuck Argueta 1/3/2024 1:51 PM Dictation workstation:   [id number]    XR chest 1 view    Result Date: 1/3/2024  Interpreted By:  Anahi Fernando, STUDY: XR CHEST 1 VIEW;  1/3/2024 11:43 am   INDICATION: Signs/Symptoms:SOB.   COMPARISON: 12/27/2023   ACCESSION NUMBER(S): [id number]   ORDERING CLINICIAN: FIONA GOMEZ   FINDINGS: CARDIOMEDIASTINAL SILHOUETTE: The heart is enlarged. There is a left subclavian pacemaker-AICD with a single tip in the right atrium and two tips in the right ventricle.     LUNGS: There is improvement in bilateral perihilar alveolar opacities consistent with resolving pulmonary edema. There is underlying moderate vascular congestion, improved. There is no pleural fluid.   ABDOMEN: No remarkable upper abdominal findings.     BONES: No acute osseous changes.       1. Marked improvement bilateral patchy alveolar opacities consistent with resolving pneumonia. 2. Underlying moderate vascular congestion, improved.   MACRO: None   Signed by: Anahi Fernando 1/3/2024 12:00 PM Dictation workstation:   [id number]    ECG 12 lead    Result Date: 1/1/2024  Undetermined rhythm Left bundle branch block Abnormal ECG When compared with ECG of 27-DEC-2023 07:49, (unconfirmed) Current undetermined rhythm precludes rhythm comparison, needs review See ED provider note for full interpretation and clinical correlation Confirmed by Yomi Lainez ([id number]) on 1/1/2024 3:20:04 PM    XR chest 1 view    Result Date: 12/27/2023  Interpreted By:  Anahi Fernando, STUDY: XR CHEST 1 VIEW;  12/27/2023 8:43 am   INDICATION: Signs/Symptoms:Dypnea.   COMPARISON: 12/14/2023   ACCESSION NUMBER(S): [id number]   ORDERING CLINICIAN: SYLVIA MOORE   FINDINGS: CARDIOMEDIASTINAL SILHOUETTE: The heart is enlarged. There is a left subclavian pacemaker-AICD with a single tip in the right atrium and two tips in the right ventricle.   LUNGS: There are progressive perihilar  alveolar opacities, likely pulmonary edema but could also represent pneumonia. There is left basilar atelectasis and volume loss which has progressed. There is no pleural fluid.   ABDOMEN: No remarkable upper abdominal findings.     BONES: No acute osseous changes.       1. Progressive bilateral predominantly perihilar alveolar opacities consistent with pulmonary edema or pneumonia. 2. Progressive volume loss and atelectasis left lung base.   MACRO: None   Signed by: Anahi Fernando 12/27/2023 8:50 AM Dictation workstation:   [id number]    CT FACIAL BONE/TITO WO IVCON    Result Date: 12/22/2023  * * *Final Report* * * DATE OF EXAM: Dec 22 2023  5:57PM   [facility]   0507  -  CT FACIAL BONE/TITO WO IVCON  / ACCESSION #  [id number] PROCEDURE REASON: Maxillofacial pain      * * * * Physician Interpretation * * * *  EXAMINATION:  CT BRAIN WO IVCON, CT FACIAL BONE/TITO WO IVCON, CT CERVICAL SPINE WO IVCON CLINICAL HISTORY: Head trauma, moderate-severe (accession [id number]), Maxillofacial pain, Nasal fracture suspected (accession [id number]), Spine fracture, cervical, traumatic (accession [id number]) TECHNIQUE:  Serial axial images without IV contrast were obtained from the vertex to the foramen magnum.  CT of the cervical spine was also performed with axial sections from the skull base through the thoracic inlet. CT of the face was performed without IV contrast and multiplanar reconstructions were generated. MQ:  CTBWO_3 CT Dose-Length Product (DLP): 1565  mGy*cm CT Dose Reduction Employed: Iterative recon (accession [id number]), Automated exposure control(AEC) and iterative recon (accession [id number]) COMPARISON:  None. RESULT: CT HEAD: No acute intracranial hemorrhage or extra-axial fluid collection. No hydrocephalus, mass effect, or herniation. No acute ischemic infarct detected.  Mild background of white matter hypoattenuation consistent with chronic microvascular ischemic change. Unremarkable dural venous sinus attenuation.  No acute osseous abnormality. Clear visualized paranasal sinuses and tympanomastoid cavities. CT face: Soft Tissues:  Soft tissue swelling is present at the nose and along the right forehead/periorbital region. Facial bones:  Mildly comminuted bilateral nasal bone fractures are present. Orbits:  No evidence of an acute fracture.  The globes are intact.  The soft tissue planes of the orbits are maintained. Paranasal Sinuses:  The paranasal sinuses are clear. Foreign Bodies:  No evidence of radiopaque foreign bodies. Other:  No evidence of a remote fracture.  No lytic or blastic process seen in the facial bones. CT cervical spine: There is reversal of the normal cervical lordosis. There is no fracture or dislocation. Severe multilevel degenerative changes are present.  There is severe spinal canal stenosis at the C4-C5 level from a large posterior disc osteophyte complex.  Moderate spinal canal stenosis present at the C3-C4 level from a posterior disc osteophyte complex.  Severe bilateral neural foraminal narrowing is present at the C3-C4, C4-C5, and C5-C6 levels. Centrilobular emphysema.    IMPRESSION: 1.  No acute intracranial abnormality. 2.  No cervical spine fracture or traumatic subluxation.  Severe multilevel degenerative change. 3.  Nasal bone fractures with adjacent soft tissue swelling. : [facility]   Transcribe Date/Time: Dec 22 2023  6:05P Dictated by : MAIRA STEWART MD This examination was interpreted and the report reviewed and electronically signed by: MAIRA STEWART MD on Dec 22 2023  6:17PM  EST    CT CERVICAL SPINE WO IVCON    Result Date: 12/22/2023  * * *Final Report* * * DATE OF EXAM: Dec 22 2023  5:57PM   ASC   0505  -  CT CERVICAL SPINE WO IVCON  / ACCESSION #  [id number] PROCEDURE REASON: Spine fracture, cervical, traumatic      * * * * Physician Interpretation * * * *  EXAMINATION:  CT BRAIN WO IVCON, CT FACIAL BONE/TITO WO IVCON, CT CERVICAL SPINE WO IVCON CLINICAL HISTORY: Head  trauma, moderate-severe (accession [id number]), Maxillofacial pain, Nasal fracture suspected (accession [id number]), Spine fracture, cervical, traumatic (accession [id number]) TECHNIQUE:  Serial axial images without IV contrast were obtained from the vertex to the foramen magnum.  CT of the cervical spine was also performed with axial sections from the skull base through the thoracic inlet. CT of the face was performed without IV contrast and multiplanar reconstructions were generated. MQ:  CTBWO_3 CT Dose-Length Product (DLP): 1565  mGy*cm CT Dose Reduction Employed: Iterative recon (accession [id number]), Automated exposure control(AEC) and iterative recon (accession [id number]) COMPARISON:  None. RESULT: CT HEAD: No acute intracranial hemorrhage or extra-axial fluid collection. No hydrocephalus, mass effect, or herniation. No acute ischemic infarct detected.  Mild background of white matter hypoattenuation consistent with chronic microvascular ischemic change. Unremarkable dural venous sinus attenuation. No acute osseous abnormality. Clear visualized paranasal sinuses and tympanomastoid cavities. CT face: Soft Tissues:  Soft tissue swelling is present at the nose and along the right forehead/periorbital region. Facial bones:  Mildly comminuted bilateral nasal bone fractures are present. Orbits:  No evidence of an acute fracture.  The globes are intact.  The soft tissue planes of the orbits are maintained. Paranasal Sinuses:  The paranasal sinuses are clear. Foreign Bodies:  No evidence of radiopaque foreign bodies. Other:  No evidence of a remote fracture.  No lytic or blastic process seen in the facial bones. CT cervical spine: There is reversal of the normal cervical lordosis. There is no fracture or dislocation. Severe multilevel degenerative changes are present.  There is severe spinal canal stenosis at the C4-C5 level from a large posterior disc osteophyte complex.  Moderate spinal canal stenosis present at the  C3-C4 level from a posterior disc osteophyte complex.  Severe bilateral neural foraminal narrowing is present at the C3-C4, C4-C5, and C5-C6 levels. Centrilobular emphysema.    IMPRESSION: 1.  No acute intracranial abnormality. 2.  No cervical spine fracture or traumatic subluxation.  Severe multilevel degenerative change. 3.  Nasal bone fractures with adjacent soft tissue swelling. : [facility]   Transcribe Date/Time: Dec 22 2023  6:05P Dictated by : MAIRA STEWART MD This examination was interpreted and the report reviewed and electronically signed by: MAIRA STEWART MD on Dec 22 2023  6:17PM  EST    CT BRAIN WO IVCON    Result Date: 12/22/2023  * * *Final Report* * * DATE OF EXAM: Dec 22 2023  5:57PM   [facility]   0504  -  CT BRAIN WO IVCON   / ACCESSION #  [id number] PROCEDURE REASON: Head trauma, moderate-severe      * * * * Physician Interpretation * * * *  EXAMINATION:  CT BRAIN WO IVCON, CT FACIAL BONE/TITO WO IVCON, CT CERVICAL SPINE WO IVCON CLINICAL HISTORY: Head trauma, moderate-severe (accession [id number]), Maxillofacial pain, Nasal fracture suspected (accession [id number]), Spine fracture, cervical, traumatic (accession [id number]) TECHNIQUE:  Serial axial images without IV contrast were obtained from the vertex to the foramen magnum.  CT of the cervical spine was also performed with axial sections from the skull base through the thoracic inlet. CT of the face was performed without IV contrast and multiplanar reconstructions were generated. MQ:  CTBWO_3 CT Dose-Length Product (DLP): 1565  mGy*cm CT Dose Reduction Employed: Iterative recon (accession [id number]), Automated exposure control(AEC) and iterative recon (accession [id number]) COMPARISON:  None. RESULT: CT HEAD: No acute intracranial hemorrhage or extra-axial fluid collection. No hydrocephalus, mass effect, or herniation. No acute ischemic infarct detected.  Mild background of white matter hypoattenuation consistent with chronic microvascular  ischemic change. Unremarkable dural venous sinus attenuation. No acute osseous abnormality. Clear visualized paranasal sinuses and tympanomastoid cavities. CT face: Soft Tissues:  Soft tissue swelling is present at the nose and along the right forehead/periorbital region. Facial bones:  Mildly comminuted bilateral nasal bone fractures are present. Orbits:  No evidence of an acute fracture.  The globes are intact.  The soft tissue planes of the orbits are maintained. Paranasal Sinuses:  The paranasal sinuses are clear. Foreign Bodies:  No evidence of radiopaque foreign bodies. Other:  No evidence of a remote fracture.  No lytic or blastic process seen in the facial bones. CT cervical spine: There is reversal of the normal cervical lordosis. There is no fracture or dislocation. Severe multilevel degenerative changes are present.  There is severe spinal canal stenosis at the C4-C5 level from a large posterior disc osteophyte complex.  Moderate spinal canal stenosis present at the C3-C4 level from a posterior disc osteophyte complex.  Severe bilateral neural foraminal narrowing is present at the C3-C4, C4-C5, and C5-C6 levels. Centrilobular emphysema.    IMPRESSION: 1.  No acute intracranial abnormality. 2.  No cervical spine fracture or traumatic subluxation.  Severe multilevel degenerative change. 3.  Nasal bone fractures with adjacent soft tissue swelling. : [facility]   Transcribe Date/Time: Dec 22 2023  6:05P Dictated by : MAIRA STEWART MD This examination was interpreted and the report reviewed and electronically signed by: MAIRA STEWART MD on Dec 22 2023  6:17PM  EST    Transthoracic Echo (TTE) Complete    Result Date: 12/19/2023   [facility], [address] [id number]              Tel [phone] and Fax [phone] TRANSTHORACIC ECHOCARDIOGRAM REPORT  Patient Name:      IJEOMA CRUZ PERLA Delacruz Physician:    Shyanne Cody                                                               [place] Study Date:        12/18/2023          Ordering Provider:    [id number] GUNNAR HUGO MRN/PID:           [id number]            Fellow: Accession#:        [id number]        Nurse:                Mattie Sosa RN Date of Birth/Age: 1950 / 73 years Sonographer:          Cruz Wood RDCS Gender:            M                   Additional Staff: Height:            175.26 cm           Admit Date: Weight:            74.84 kg            Admission Status:     Inpatient - Routine BSA:               1.90 m2             Encounter#:           [id number]                                        Department Location:  [facility] Blood Pressure: 112 /63 mmHg Study Type:    TRANSTHORACIC ECHO (TTE) COMPLETE Diagnosis/ICD: Cardiomyopathy, unspecified-I42.9 Indication:    Cardiomyopathy CPT Code:      Echo Complete w Full Doppler-[id number] Patient History: Diabetes:          Yes Pertinent History: A-Fib, CAD, CVA, HTN, Cancer, COPD and Syncope. Ischemic CM,                    cardiac stent. Study Detail: The following Echo studies were performed: 2D, M-Mode, Doppler and               color flow. Technically challenging study due to body habitus.               Definity used as a contrast agent for endocardial border               definition. Total contrast used for this procedure was 1.5 mL via               IV push. The patient was awake.  PHYSICIAN INTERPRETATION: Left Ventricle: The left ventricular systolic function is moderately to severely decreased, with an estimated ejection fraction of 30-35%. Wall motion is abnormal. The left ventricular cavity size is mildly dilated. There is mild concentric left ventricular hypertrophy. Findings are consistent with ischemic cardiomyopathy. Spectral Doppler shows an impaired relaxation pattern of left ventricular diastolic filling. LV Wall  Scoring: The mid inferolateral segment is akinetic. The mid and apical anterior wall, basal and mid anterolateral wall, mid anteroseptal segment, basal inferolateral segment, apical lateral segment, basal inferior segment, and apex are hypokinetic. All remaining scored segments are normal. Left Atrium: The left atrium is mildly dilated. Right Ventricle: The right ventricle is mildly enlarged. There is mildly reduced right ventricular systolic function. A device is visualized in the right ventricle. Right Atrium: The right atrium is mildly dilated. Aortic Valve: The aortic valve is trileaflet. There is mild aortic valve cusp calcification. There is no evidence of aortic valve regurgitation. The peak instantaneous gradient of the aortic valve is 3.5 mmHg. Mitral Valve: The mitral valve is normal in structure. There is mild mitral valve regurgitation. Tricuspid Valve: The tricuspid valve is structurally normal. There is mild tricuspid regurgitation. Pulmonic Valve: The pulmonic valve is structurally normal. There is physiologic pulmonic valve regurgitation. Pericardium: There is a trivial to small pericardial effusion. Aorta: The aortic root is normal. Pulmonary Artery: The tricuspid regurgitant velocity is 3.17 m/s, and with an estimated right atrial pressure of 3 mmHg, the estimated pulmonary artery pressure is mildly elevated with the RVSP at 43.2 mmHg. Pulmonary Veins: There is blunted systolic flow in the pulmonary veins. Systemic Veins: The inferior vena cava appears mildly dilated.  CONCLUSIONS:  1. Left ventricular systolic function is moderately to severely decreased with a 30-35% estimated ejection fraction.  2. Multiple segmental abnormalities exist. See findings.  3. Spectral Doppler shows an impaired relaxation pattern of left ventricular diastolic filling.  4. There is ischemic cardiomyopathy.  5. There is mildly reduced right ventricular systolic function.  6. Mild pulmonary HTN. CVP is elevated.  QUANTITATIVE DATA SUMMARY: 2D MEASUREMENTS:                          Normal Ranges: Ao Root d:     2.60 cm   (2.0-3.7cm) LAs:           4.60 cm   (2.7-4.0cm) IVSd:          1.02 cm   (0.6-1.1cm) LVPWd:         0.96 cm   (0.6-1.1cm) LVIDd:         4.75 cm   (3.9-5.9cm) LVIDs:         4.22 cm LV Mass Index: 86.6 g/m2 LV % FS        11.2 % LA VOLUME:                               Normal Ranges: LA Vol A4C:        104.7 ml   (22+/-6mL/m2) LA Vol A2C:        69.5 ml LA Vol BP:         92.7 ml LA Vol Index A4C:  55.0ml/m2 LA Vol Index A2C:  36.5 ml/m2 LA Vol Index BP:   48.7 ml/m2 LA Area A4C:       28.3 cm2 LA Area A2C:       21.2 cm2 LA Major Axis A4C: 6.5 cm LA Major Axis A2C: 5.5 cm LA Volume Index:   47.0 ml/m2 RA VOLUME BY A/L METHOD:                               Normal Ranges: RA Vol A4C:        62.8 ml    (8.3-19.5ml) RA Vol Index A4C:  33.0 ml/m2 RA Area A4C:       20.7 cm2 RA Major Axis A4C: 5.8 cm M-MODE MEASUREMENTS:                  Normal Ranges: Ao Root: 2.60 cm (2.0-3.7cm) LAs:     4.50 cm (2.7-4.0cm) AORTA MEASUREMENTS:                    Normal Ranges: Asc Ao, d: 2.90 cm (2.1-3.4cm) LV SYSTOLIC FUNCTION BY 2D PLANIMETRY (MOD):                     Normal Ranges: EF-A4C View: 28.3 % (>=55%) EF-A2C View: 30.5 % EF-Biplane:  29.5 % LV DIASTOLIC FUNCTION:                            Normal Ranges: MV Peak E:      0.93 m/s   (0.7-1.2 m/s) MV e'           0.05 m/s   (>8.0) MV lateral e'   0.05 m/s MV medial e'    0.05 m/s E/e' Ratio:     18.56      (<8.0) PulmV Sys Chet:  24.60 cm/s PulmV English Chet: 88.80 cm/s PulmV S/D Chet:  0.30 MITRAL VALVE:                 Normal Ranges: MV DT: 201 msec (150-240msec) AORTIC VALVE:                         Normal Ranges: AoV Vmax:      0.93 m/s (<=1.7m/s) AoV Peak PG:   3.5 mmHg (<20mmHg) LVOT Max Chet:  0.79 m/s (<=1.1m/s) LVOT VTI:      16.30 cm LVOT Diameter: 1.90 cm  (1.8-2.4cm) AoV Area,Vmax: 2.41 cm2 (2.5-4.5cm2)  RIGHT VENTRICLE: RV Basal 3.70 cm RV Mid   2.90 cm RV Major  7.3 cm TAPSE:   13.7 mm RV s'    0.10 m/s TRICUSPID VALVE/RVSP:                             Normal Ranges: Peak TR Velocity: 3.17 m/s RV Syst Pressure: 43.2 mmHg (< 30mmHg) IVC Diam:         2.20 cm PULMONIC VALVE:                      Normal Ranges: PV Max Chet: 0.7 m/s  (0.6-0.9m/s) PV Max P.1 mmHg Pulmonary Veins: PulmV English Chet: 88.80 cm/s PulmV S/D Chet:  0.30 PulmV Sys Chet:  24.60 cm/s  [id number] Escobar Mendez MD Electronically signed on 2023 at 5:23:07 PM  Wall Scoring  ** Final **         Assessment/Plan   Coronavirus infection-treated with at least 10 days of dexamethasone, no further isolation precautions indicated  Possible pneumonia-treated with at least 7 days of antibiotics  Abnormal CT-pleural effusion-pulmonary on consult  C. difficile infection-positive PCR/negative EIA  Chronic respiratory failure-interval worsening, on BiPAP     Oral vancomycin for a total of 14 days-IV Flagyl inpatient is unable to take p.o.  Monitor stool  Continue contact plus precautions for c.diff  Supplemental oxygen as needed  Monitor temperature and WBC  Cardiology to advise with regards amiodarone dosing due to to potential interaction between Flagyl and amiodarone      Frank Escalante MD

## 2024-01-13 NOTE — CARE PLAN
Over the shift, the patient did not make progress toward the following goals:    Problem: Respiratory  Goal: Clear secretions with interventions this shift  Outcome: Not Progressing  Goal: No signs of respiratory distress (eg. Use of accessory muscles. Peds grunting)  Outcome: Not Progressing  Goal: Verbalize decreased shortness of breath this shift  Outcome: Not Progressing  Goal: Wean oxygen to maintain O2 saturation per order/standard this shift  Outcome: Not Progressing  The patient had a significant decline in his respiratory status this AM, Dr. Bailey evaluated the patient and at this time he will be transferred to a higher level of care.

## 2024-01-13 NOTE — CONSULTS
Inpatient consult to Pulmonology  Consult performed by: Barber Sprague MD  Consult ordered by: Seema Bailey DO        Critical Care Consult    Reason For Consult  Acute respiratory failure    History Of Present Illness  Hai Spaulding is a 73 y.o. male presenting with Acute respiratory failure (CMS/[facility]).  History of multiple comorbidities including ischemic cardiomyopathy EF of 30 to 35% severe coronary disease, history of COVID, Hodgkin's of Fallot, history of previous pneumonia.  Initially admitted January 5 diarrhea and C. difficile..  Recent history of positive for COVID-19.     Past Medical History  He has a past medical history of Acid reflux, Aspiration pneumonia (CMS/[facility]) (11/23/2021), Aspiration pneumonia resulting from a procedure (10/25/2021), Atrial fibrillation with RVR (CMS/[facility]) (10/2021), Congestive heart failure (CHF) (CMS/[facility]) (10/2021), COPD (chronic obstructive pulmonary disease) (CMS/[facility]), COVID, Diabetes (CMS/[facility]), Diabetic eye exam (CMS/[facility]) (05/08/2019), Hodgkin lymphoma (CMS/[facility]), HTN (hypertension), Hyperlipidemia, Larynx cancer (CMS/[facility]), Myocardial infarction (CMS/[facility]), Neuropathy, Pneumonia due to gram-negative bacteria (01/01/2023), Pneumonitis due to inhalation of other solids and liquids (CMS/[facility]) (11/25/2021), Urinary tract infection (01/04/2023), and Urinary tract infection, site not specified (12/18/2023).    Surgical History  He has a past surgical history that includes Other surgical history; Other surgical history; CT angio abdomen pelvis w and or wo IV IV contrast (01/22/2022); Other surgical history; Tonsillectomy; Other surgical history; Cholecystectomy; Other surgical history; Tumor excision; Cardiac defibrillator placement (05/2022); IR injection epidural steroid; and Tumor removal.     Social History  He reports that he has been smoking cigarettes. He has a 30.00 pack-year smoking history. He has been exposed to tobacco smoke. He has never used smokeless tobacco.  "He reports that he does not drink alcohol and does not use drugs.    Family History  Family History   Problem Relation Name Age of Onset    Diabetes Mother      Other (htn) Mother      Heart disease Mother      Diabetes Father      Heart disease Father      Other (htn) Father          Allergies  Patient has no known allergies.    Review of Systems   Reason unable to perform ROS: on continous bipap.       Last Recorded Vitals  Blood pressure 91/69, pulse 98, temperature 36 °C (96.8 °F), temperature source Temporal, resp. rate 24, height 1.753 m (5' 9.02\"), weight 77.1 kg (169 lb 15.6 oz), SpO2 97 %.    Intake/Output    Intake/Output Summary (Last 24 hours) at 1/13/2024 0821  Last data filed at 1/13/2024 0700  Gross per 24 hour   Intake 645.42 ml   Output 550 ml   Net 95.42 ml       Physical Exam  Vitals reviewed.   Constitutional:       Comments: On bipap   HENT:      Head: Normocephalic and atraumatic.   Eyes:      Extraocular Movements: Extraocular movements intact.      Pupils: Pupils are equal, round, and reactive to light.   Cardiovascular:      Rate and Rhythm: Normal rate. Rhythm irregular.      Pulses: Normal pulses.      Heart sounds: Normal heart sounds.   Pulmonary:      Effort: Pulmonary effort is normal.      Breath sounds: Normal breath sounds. Decreased air movement present.   Abdominal:      General: Bowel sounds are normal.      Palpations: Abdomen is soft.   Musculoskeletal:         General: Swelling present.      Cervical back: Normal range of motion.      Right lower leg: Edema present.      Left lower leg: Edema present.   Neurological:      General: No focal deficit present.      Mental Status: He is alert.       Oxygen Therapy  SpO2: 97 %  Medical Gas Therapy: Supplemental oxygen  O2 Delivery Method: Nasal cannula  FiO2 (%):  [32 %-60 %] 60 %    Lines and Tubes:  Peripheral IV 01/03/24 22 G Left;Upper Arm (Active)   Placement Date/Time: 01/03/24 1209   Size (Gauge): 22 G  Orientation: " Left;Upper  Location: Arm   Number of days: 9       Peripheral IV 01/03/24 22 G Right Antecubital (Active)   Placement Date/Time: 01/03/24 1145   Size (Gauge): 22 G  Orientation: Right  Location: Antecubital   Number of days: 9       Urethral Catheter 16 Fr. (Active)   Placement Date/Time: 01/03/24 1343   Placed by: Valery BELTRAN  Tube Size (Fr.): 16 Fr.  Catheter Balloon Size: 10 mL  Urine Returned: Yes   Number of days: 9         Scheduled medications  acetaminophen, 650 mg, oral, TID  amiodarone, 200 mg, oral, BID  apixaban, 5 mg, oral, BID  atorvastatin, 80 mg, oral, Nightly  budesonide, 0.5 mg, nebulization, BID  carbidopa-levodopa, 1 tablet, oral, TID  clopidogrel, 75 mg, oral, Daily  cyclobenzaprine, 10 mg, oral, Nightly  dapagliflozin propanediol, 10 mg, oral, Daily  gabapentin, 200 mg, oral, Nightly  insulin glargine, 10 Units, subcutaneous, Daily  insulin lispro, 0-10 Units, subcutaneous, TID with meals  ipratropium-albuteroL, 3 mL, nebulization, TID  lactobacillus acidophilus, 1 tablet, oral, BID  levothyroxine, 25 mcg, oral, Daily before breakfast  mirtazapine, 15 mg, oral, Nightly  nystatin, 1 Application, Topical, BID  pantoprazole, 40 mg, oral, Daily before breakfast  QUEtiapine, 25 mg, oral, Nightly  sertraline, 100 mg, oral, Daily  SITagliptin phosphate, 50 mg, oral, Daily  sodium chloride, 1 spray, Each Nostril, TID  vancomycin, 125 mg, oral, 4x daily      Continuous medications     PRN medications  PRN medications: albuterol, dextrose 10 % in water (D10W), dextrose, glucagon, melatonin, ondansetron, oxygen, oxygen    Relevant Results  Results from last 7 days   Lab Units 01/12/24  0545 01/11/24  0513 01/10/24  0526   WBC AUTO x10*3/uL 12.6* 12.1* 13.6*   HEMOGLOBIN g/dL 12.8* 12.8* 12.4*   HEMATOCRIT % 39.6* 40.2* 38.6*   PLATELETS AUTO x10*3/uL 252 249 279      Results from last 7 days   Lab Units 01/13/24  0500 01/12/24  0545 01/11/24  0513   SODIUM mmol/L 144 142 141   POTASSIUM mmol/L 4.0 4.6 4.0    CHLORIDE mmol/L 110* 108* 106   CO2 mmol/L 21* 22* 22*   BUN mg/dL 45* 43* 47*   CREATININE mg/dL 2.00* 2.10* 2.30*   GLUCOSE mg/dL 209* 138* 161*   CALCIUM mg/dL 7.7* 7.7* 7.7*      Results from last 7 days   Lab Units 01/12/24  1606   POCT PH, ARTERIAL pH 7.41   POCT PCO2, ARTERIAL mm Hg 36*   POCT PO2, ARTERIAL mm Hg 80*   POCT HCO3 CALCULATED, ARTERIAL mmol/L 22.8   POCT BASE EXCESS, ARTERIAL mmol/L -1.4     XR chest 1 view 01/12/2024    Narrative  Interpreted By:  Sugey Tinoco,  STUDY:  XR CHEST 1 VIEW 1/12/2024 3:49 pm    INDICATION:  Signs/Symptoms:hypoxia    COMPARISON:  01/03/2024    ACCESSION NUMBER(S):  [id number]    ORDERING CLINICIAN:  CRISTAL MARCH    TECHNIQUE:  AP erect view of the chest    FINDINGS:  The cardiac size is mildly enlarged with biventricular ICD leads  observed in right atrium, right ventricle, and coronary sinus.    Pulmonary edema is present with worsening of bilateral pulmonary  infiltrates and with bilateral pleural effusion seen. Left effusion  appears larger than the right with left effusion increased in size.    Impression  Pulmonary edema is now seen with increased size of left pleural  effusion which is now moderate in amount. Small right pleural  effusion is also present.    Signed by: Sugey Tinoco 1/12/2024 4:27 PM  Dictation workstation:   [id number]        Assessment/Plan   Principal Problem:    Acute respiratory failure (CMS/HCC)  Active Problems:    Atrial fibrillation (CMS/HCC)    CAD (coronary artery disease)    Parkinson's disease    Cerebral infarction, unspecified (CMS/HCC)    C. difficile diarrhea    Acute respiratory failure on BiPAP.  Chest x-ray shows worsening opacification of left lower lobe.  Blood pressure is also borderline this morning.  White Blood cell count elevated 12.6.  Question increasing effusion versus collapse of left lower lobe.  Feel patient benefit from CT of the chest    Initial COVID-19.  Status post 10 days of dexamethasone    C.  difficile infection.  ID following  Positive PCR but negative EIA    History of possible pneumonia.  Received Course of antibiotics.  White cell count stable at 12.  Worsening chest x-ray findings as above.  Await CT of the chest consider broadening antibiotics    Acute kidney injury  Creatinine is slightly improved today 2.0    Prophylaxis    Barber Sprague MD  [facility]    This critically ill patient continues to be at-risk for deterioration / failure due to the above mentioned dysfunctional unstable organ systems.   Critical care time is spent at bedside includes review of diagnostic tests, labs, and radiographs, serial assessments and management of hemodynamics, respiratory status, and coordination of care with different members of interdisciplinary team  Assessment, impression and plans are reflected in the note above as well as the orders.     Critical concerns addressed:     Time Spent in critical Care, exclusive of procedures : 35 mins.     Disclaimer: Parts of this chart were dictated with voice recognition software. Please excuse any errors of grammar, spelling, or transcription which are not corrected. Please contact me with any questions regarding documentation.

## 2024-01-13 NOTE — PROGRESS NOTES
"Nutrition Follow up Note    Nutrition Assessment      Spoke with RN. PO intake still poor d/t patient not liking texture diet. Patient NPO at this time. Transferred to ICU, on continuous BiPAP.    Nutrition History:  Food and Nutrient History: Per RN Pt doesn't like the pureed and thicken food here. Per RN Pt's wife given permission to bring smoothies in for Pt.  Energy Intake: Poor < 50 %  Food Allergies/Intolerances:  None  GI Symptoms: None  Oral Problems: Swallowing difficulty    Anthropometrics:  Ht: 175.3 cm (5' 9.02\"), Wt: 77.1 kg (169 lb 15.6 oz), BMI: 25.09  IBW/kg (Dietitian Calculated): 75.45 kg     Weight Change:  Weight History / % Weight Change: Unknown     Nutrition Focused Physical Exam Findings:   Subcutaneous Fat Loss  Orbital Fat Pads: Defer  Buccal Fat Pads: Defer  Triceps: Defer  Ribs: Defer    Muscle Wasting  Temporalis: Defer  Pectoralis (Clavicular Region): Defer  Deltoid/Trapezius: Defer  Interosseous: Defer  Trapezius/Infraspinatus/Supraspinatus (Scapular Region): Defer  Quadriceps: Defer  Gastrocnemius: Defer    Nutrition Significant Labs:  Lab Results   Component Value Date    WBC 12.6 (H) 01/12/2024    HGB 12.8 (L) 01/12/2024    HCT 39.6 (L) 01/12/2024     01/12/2024    CHOL 98 (L) 06/02/2023    TRIG 122 06/02/2023    HDL 35 (L) 06/02/2023    ALT <5 (L) 01/06/2024    AST 15 01/06/2024     01/13/2024    K 4.0 01/13/2024     (H) 01/13/2024    CREATININE 2.00 (H) 01/13/2024    BUN 45 (H) 01/13/2024    CO2 21 (L) 01/13/2024    TSH 7.54 (H) 10/11/2023    PSA 1.8 09/30/2020    INR 2.3 (H) 01/03/2024    HGBA1C 10.7 (H) 12/27/2023    ALBUR 12 03/11/2019       Current Facility-Administered Medications:     acetaminophen (Tylenol) tablet 650 mg, 650 mg, oral, TID, Seema Bailey DO, 650 mg at 01/12/24 2225    acetylcysteine (Mucomyst) 200 mg/mL (20 %) nebulizer solution 600 mg, 3 mL, nebulization, BID, Barber Sprague MD    albuterol 2.5 mg /3 mL (0.083 %) nebulizer " solution 2.5 mg, 2.5 mg, nebulization, q2h PRN, Seema Bailey DO    amiodarone (Pacerone) tablet 200 mg, 200 mg, oral, BID, Seema Bailey DO, 200 mg at 01/12/24 2225    apixaban (Eliquis) tablet 5 mg, 5 mg, oral, BID, Seema Bailey DO, 5 mg at 01/12/24 2225    atorvastatin (Lipitor) tablet 80 mg, 80 mg, oral, Nightly, Seema Bailey DO, 80 mg at 01/12/24 2225    budesonide (Pulmicort) 0.5 mg/2 mL nebulizer solution 0.5 mg, 0.5 mg, nebulization, BID, Seema Bailey DO, 0.5 mg at 01/13/24 0754    carbidopa-levodopa (Sinemet CR)  mg ER tablet 1 tablet, 1 tablet, oral, TID, Seema Bailey DO, 1 tablet at 01/12/24 2225    clopidogrel (Plavix) tablet 75 mg, 75 mg, oral, Daily, Seema Bailey DO, 75 mg at 01/12/24 0816    cyclobenzaprine (Flexeril) tablet 10 mg, 10 mg, oral, Nightly, Seema Bailey DO, 10 mg at 01/12/24 2225    dapagliflozin propanediol (Farxiga) tablet 10 mg, 10 mg, oral, Daily, eSema Bailey DO, 10 mg at 01/12/24 0816    dextrose 10 % in water (D10W) infusion, 0.3 g/kg/hr, intravenous, Once PRN, Seema Bailey DO    dextrose 50 % injection 25 g, 25 g, intravenous, q15 min PRN, Seema Bailey DO    gabapentin (Neurontin) capsule 200 mg, 200 mg, oral, Nightly, Seema Bailey DO, 200 mg at 01/12/24 2225    glucagon (Glucagen) injection 1 mg, 1 mg, intramuscular, q15 min PRN, Seema Bailey DO    insulin glargine (Lantus) injection 10 Units, 10 Units, subcutaneous, Daily, Seema Bailey DO, 10 Units at 01/12/24 0817    insulin lispro (HumaLOG) injection 0-10 Units, 0-10 Units, subcutaneous, TID with meals, Seema Bailey DO, 2 Units at 01/08/24 1744    ipratropium-albuteroL (Duo-Neb) 0.5-2.5 mg/3 mL nebulizer solution 3 mL, 3 mL, nebulization, TID, Seema Bailey DO, 3 mL at 01/13/24 0755    lactobacillus acidophilus tablet 1 tablet, 1 tablet, oral, BID, Seema Bailey DO, 1 tablet at 01/12/24 2227    levothyroxine  (Synthroid, Levoxyl) tablet 25 mcg, 25 mcg, oral, Daily before breakfast, Seema Bailey DO, 25 mcg at 01/12/24 0643    melatonin tablet 5 mg, 5 mg, oral, Nightly PRN, Seema Bailey DO, 5 mg at 01/09/24 2130    metroNIDAZOLE (Flagyl) 500 mg in NaCl (iso-os) 100 mL, 500 mg, intravenous, q8h, Seema Bailey DO    mirtazapine (Remeron) tablet 15 mg, 15 mg, oral, Nightly, Seema Bailey DO, 15 mg at 01/12/24 2225    nystatin (Mycostatin) 100,000 unit/gram powder 1 Application, 1 Application, Topical, BID, Seema Bailey DO, 1 Application at 01/12/24 2200    ondansetron (Zofran) injection 4 mg, 4 mg, intravenous, q4h PRN, Seema Bailey DO, 4 mg at 01/11/24 1243    oxygen (O2) therapy, , inhalation, Continuous PRN - O2/gases, Seema Bailey DO, Start at 01/13/24 0700    oxygen (O2) therapy, , inhalation, Continuous PRN - O2/gases, Seema Bailey DO    pantoprazole (ProtoNix) EC tablet 40 mg, 40 mg, oral, Daily before breakfast, Seema Bailey DO, 40 mg at 01/12/24 0643    QUEtiapine (SEROquel) tablet 25 mg, 25 mg, oral, Nightly, Seema Bailey DO, 25 mg at 01/12/24 2225    sertraline (Zoloft) tablet 100 mg, 100 mg, oral, Daily, Seema Bailey DO, 100 mg at 01/12/24 0816    SITagliptin phosphate (Januvia) tablet 50 mg, 50 mg, oral, Daily, Seema Bailey DO, 50 mg at 01/12/24 0816    sodium chloride (Ocean) 0.65 % nasal spray 1 spray, 1 spray, Each Nostril, TID, Seema Bailey DO, 1 spray at 01/12/24 2341    vancomycin (Vancocin) capsule 125 mg, 125 mg, oral, 4x daily, Seema Bailey, , 125 mg at 01/12/24 2225    Dietary Orders (From admission, onward)       Start     Ordered    01/12/24 1738  Oral nutritional supplements  Until discontinued        Question Answer Comment   Deliver with All meals    Select supplement: Ensure Clear        01/12/24 1738    01/10/24 1219  Oral nutritional supplements  Until discontinued        Comments: chocolate    Question Answer Comment   Deliver with All meals    Select supplement: Magic Cup        01/10/24 1218    01/09/24 1230  Adult diet Regular; Pureed 4; Mild thick 2  Diet effective now        Question Answer Comment   Diet type Regular    Texture Pureed 4    Fluid consistency Mild thick 2        01/09/24 1230                   Estimated Needs:   Estimated Energy Needs  Total Energy Estimated Needs (kCal): 1925 kCal  Total Estimated Energy Need per Day (kCal/kg): 25 kCal/kg  Method for Estimating Needs: Actual Wt    Estimated Protein Needs  Total Protein Estimated Needs (g): 77 g  Total Protein Estimated Needs (g/kg): 1 g/kg  Method for Estimating Needs: Actual Wt    Estimated Fluid Needs  Total Fluid Estimated Needs (mL): 1925 mL  Method for Estimating Needs: 1 mL/kcal      Nutrition Diagnosis   Nutrition Diagnosis:     Nutrition Diagnosis  Patient has Nutrition Diagnosis: Yes  Diagnosis Status (1): Resolved  Nutrition Diagnosis 1: Inadequate energy intake  Related to (1): Decreased ability to consume sufficient energy  As Evidenced by (1): Clear liquid diet  Additional Nutrition Diagnosis: Diagnosis 2  Diagnosis Status (2): Ongoing  Nutrition Diagnosis 2: Inadequate energy intake  Related to (2): Pt doesn't like the texture-modifid diet  As Evidenced by (2): poor PO intake     Nutrition Interventions/Recommendations   Nutrition Interventions and Recommendations:    Nutrition Prescription:  Individualized Nutrition Prescription Provided for : 1925 calories, 77 gm protein when diet advances    Nutrition Interventions:   Food and/or Nutrient Delivery Interventions  Interventions: Meals and snacks  Meals and Snacks: Texture-modified diet  Goal: Resume when able  Medical Food Supplement: Modified beverage  Goal: Magic Cup TID, provides 290 kcals and 9g Protein per serving    Education Documentation  No documentation found.         Nutrition Monitoring and Evaluation   Monitoring/Evaluation:   Food/Nutrient Related  History Monitoring  Monitoring and Evaluation Plan: Energy intake  Energy Intake: Estimated energy intake  Criteria: Monitoring for diet to resume       Time Spent/Follow-up:   Follow Up  Time Spent (min): 30 minutes  Last Date of Nutrition Visit: 01/13/24  Nutrition Follow-Up Needed?: 3-5 days  Follow up Comment: 1/16/24

## 2024-01-13 NOTE — CONSULTS
Inpatient consult to Cardiology  Consult performed by: Cinda Pedersen MD  Consult ordered by: Seema Bailey DO  Reason for consult: Atrial fibrillation, cardiogenic shock, artery disease, oral effusions        History Of Present Illness:    Hai Spaulding is a 73 y.o. male with multiple comorbidities including history of ischemic cardiomyopathy no option for revascularization status post ICD/CRT, ejection fraction of 30 to 35% in December 2023, severe coronary disease no options for bypass or stenting, chronic kidney disease, throat cancer and aspiration issues.  Patient follows with Dr. Dominguez.  Admitted to the hospital with respiratory failure.  PatienT has been in and out of the hospital for the last 2 months initially with respiratory failure aspiration subsequently had in December COVID-19 infection.  Patient had also pneumonia within the last 2 months.  Was at home without oxygen.  Readmitted for respiratory failure and shortness of breath.  Patient transferred to the intensive care unit.  I am involved in his care covering Dr. Dominguez.  Patient currently on pressors and BiPAP.  No respiratory failure.  Chest CT showed large bilateral pleural effusion.  Patient albumin is elevated and he is malnourished.  Unable to provide with any history.  History was obtained from his wife at the bedside as well as his daughter.  Patient currently in atrial fibrillation heart rate reasonably controlled with BiV pacing intermittently.    Review of systems.  Unable to obtain review of systems since patient is lethargic on BiPAP  Last Recorded Vitals:  Vitals:    01/13/24 1030 01/13/24 1100 01/13/24 1120 01/13/24 1212   BP: 95/70      BP Location:       Patient Position:       Pulse: 100      Resp: 18  17    Temp:  35.6 °C (96.1 °F)     TempSrc:  Temporal     SpO2:       Weight:    77.4 kg (170 lb 10.2 oz)   Height:           Last Labs:  CBC - 1/12/2024:  5:45 AM  12.6 12.8 252    39.6      CMP - 1/13/2024:   5:00 AM  7.7 5.4 15 --- 0.8   2.7 2.4 <5 281      PTT - 12/28/2023:  5:48 AM  2.3   26.1 107.8     Troponin I, High Sensitivity   Date/Time Value Ref Range Status   01/04/2024 08:16  (HH) 0 - 20 ng/L Final     Comment:     Previous result verified on 1/3/2024 1340 on specimen/case [id number] called with component TRPHS for procedure Troponin I, High Sensitivity, Initial with value 161 ng/L.   01/03/2024 05:37  (HH) 0 - 20 ng/L Final     Comment:     Previous result verified on 1/3/2024 1340 on specimen/case [id number] called with component TRPHS for procedure Troponin I, High Sensitivity, Initial with value 161 ng/L.   01/03/2024 02:27  (HH) 0 - 20 ng/L Final     Comment:     Previous result verified on 1/3/2024 1340 on specimen/case [id number] called with component TRPHS for procedure Troponin I, High Sensitivity, Initial with value 161 ng/L.     BNP   Date/Time Value Ref Range Status   01/03/2024 12:13  (H) 0 - 99 pg/mL Final   12/27/2023 10:04  (H) 0 - 99 pg/mL Final     Hemoglobin A1C   Date/Time Value Ref Range Status   12/27/2023 09:59 PM 10.7 (H) See below % Final   10/11/2023 01:15 PM 10.6 (H) See below % Final     LDL Calculated   Date/Time Value Ref Range Status   06/02/2023 02:40 PM 39 (L) 65 - 130 MG/DL Final   10/08/2019 12:15 PM 96 65 - 130 MG/DL Final     VLDL   Date/Time Value Ref Range Status   10/22/2021 08:07 PM 18 0 - 40 mg/dL Final   09/28/2020 04:12 PM 46 (H) 0 - 40 mg/dL Final   10/02/2018 02:40 PM 50 (H) 0 - 40 mg/dL Final      Last I/O:  I/O last 3 completed shifts:  In: 2941.7 (38.2 mL/kg) [I.V.:2941.7 (38.2 mL/kg)]  Out: 300 (3.9 mL/kg) [Urine:300 (0.1 mL/kg/hr)]  Weight: 77.1 kg     Past Cardiology Tests (Last 3 Years):  EKG:  ECG 12 lead 01/05/2024      ECG 12 lead 01/04/2024      ECG 12 lead 01/03/2024      ECG 12 lead 12/27/2023      ECG 12 Lead 12/27/2023      ECG 12 lead 12/14/2023    Echo:  Transthoracic Echo (TTE) Complete  12/18/2023    Ejection Fractions:  EF   Date/Time Value Ref Range Status   12/18/2023 12:10 PM 29       Cath:  No results found for this or any previous visit from the past 1095 days.    Stress Test:  No results found for this or any previous visit from the past 1095 days.    Cardiac Imaging:  XR CHEST ABDOMEN FOR OG NG PLACEMENT 11/26/2021      XR CHEST ABDOMEN FOR OG NG PLACEMENT 11/26/2021      Past Medical History:  He has a past medical history of Acid reflux, Aspiration pneumonia (CMS/Formerly [facility]) (11/23/2021), Aspiration pneumonia resulting from a procedure (10/25/2021), Atrial fibrillation with RVR (CMS/Formerly [facility]) (10/2021), Congestive heart failure (CHF) (CMS/HCC) (10/2021), COPD (chronic obstructive pulmonary disease) (CMS/HCC), COVID, Diabetes (CMS/HCC), Diabetic eye exam (CMS/HCC) (05/08/2019), Hodgkin lymphoma (CMS/HCC), HTN (hypertension), Hyperlipidemia, Larynx cancer (CMS/HCC), Myocardial infarction (CMS/HCC), Neuropathy, Pneumonia due to gram-negative bacteria (01/01/2023), Pneumonitis due to inhalation of other solids and liquids (CMS/[facility]ly [facility]) (11/25/2021), Urinary tract infection (01/04/2023), and Urinary tract infection, site not specified (12/18/2023).    Past Surgical History:  He has a past surgical history that includes Other surgical history; Other surgical history; CT angio abdomen pelvis w and or wo IV IV contrast (01/22/2022); Other surgical history; Tonsillectomy; Other surgical history; Cholecystectomy; Other surgical history; Tumor excision; Cardiac defibrillator placement (05/2022); IR injection epidural steroid; and Tumor removal.      Social History:  He reports that he has been smoking cigarettes. He has a 30.00 pack-year smoking history. He has been exposed to tobacco smoke. He has never used smokeless tobacco. He reports that he does not drink alcohol and does not use drugs.    Family History:  Family History   Problem Relation Name Age of Onset    Diabetes Mother      Other (htn) Mother       Heart disease Mother      Diabetes Father      Heart disease Father      Other (htn) Father          Allergies:  Patient has no known allergies.    Inpatient Medications:  Scheduled medications   Medication Dose Route Frequency    acetaminophen  1,000 mg intravenous q6h [id number]    acetaminophen  650 mg oral TID    acetylcysteine  3 mL nebulization BID    amiodarone  200 mg oral BID    atorvastatin  80 mg oral Nightly    budesonide  0.5 mg nebulization BID    carbidopa-levodopa  1 tablet oral TID    clopidogrel  75 mg oral Daily    cyclobenzaprine  10 mg oral Nightly    dapagliflozin propanediol  10 mg oral Daily    digoxin  500 mcg intravenous Once    gabapentin  200 mg oral Nightly    insulin glargine  10 Units subcutaneous Daily    insulin lispro  0-10 Units subcutaneous TID with meals    ipratropium-albuteroL  3 mL nebulization TID    lactobacillus acidophilus  1 tablet oral BID    levothyroxine  25 mcg oral Daily before breakfast    metroNIDAZOLE  500 mg intravenous q8h    mirtazapine  15 mg oral Nightly    nystatin  1 Application Topical BID    pantoprazole  40 mg oral Daily before breakfast    QUEtiapine  25 mg oral Nightly    sertraline  100 mg oral Daily    SITagliptin phosphate  50 mg oral Daily    sodium chloride  1 spray Each Nostril TID    vancomycin  125 mg oral 4x daily     PRN medications   Medication    albuterol    dextrose 10 % in water (D10W)    dextrose    glucagon    melatonin    ondansetron    oxygen    oxygen     Continuous Medications   Medication Dose Last Rate    amiodarone  1 mg/min      Followed by    amiodarone  0.5 mg/min      norepinephrine  0.01-3 mcg/kg/min 0.01 mcg/kg/min (01/13/24 1323)     Outpatient Medications:  Current Outpatient Medications   Medication Instructions    albuterol 2.5 mg, nebulization, Every 2 hour PRN    amiodarone (PACERONE) 200 mg, oral, 2 times daily    apixaban (ELIQUIS) 5 mg, oral, 2 times daily    atorvastatin (LIPITOR) 80 mg, oral, Nightly    Autolet lancing  "device 3 times daily    BD Cynthia 2nd Gen Pen Needle 32 gauge x 5/32\" needle 1 each, subcutaneous, 3 times daily    carbidopa-levodopa (Sinemet CR)  mg ER tablet 1 tablet, oral, 3 times daily    clopidogrel (PLAVIX) 75 mg, oral, Daily    cyclobenzaprine (Flexeril) 10 mg tablet 1 tablet, oral, Nightly    dapagliflozin propanediol (FARXIGA) 10 mg, oral, Daily    docusate sodium (COLACE) 100 mg, oral, 2 times daily PRN    fluticasone propion-salmeteroL (Advair Diskus) 250-50 mcg/dose diskus inhaler 1 puff, 2 times daily    FreeStyle Joan 2 [place] misc 1 each, subcutaneous, As needed    FreeStyle Joan 2 Sensor kit 1 each, subcutaneous, Every 14 days    gabapentin (Neurontin) 300 mg capsule 1 capsule, oral, Nightly    insulin glargine (LANTUS U-100 INSULIN) 30 Units, subcutaneous, Every evening    insulin lispro (HUMALOG) 0-15 Units, subcutaneous, 3 times daily with meals, Take as directed per insulin instructions.    insulin syr/ndl U100 half lexie (BD Veo Insulin Syr, half unit,) 0.3 mL 31 gauge x 15/64\" syringe 1 Needle, miscellaneous, Daily    insulin syringe-needle U-100 (BD Veo Insulin Syringe UF) 1 mL 31 gauge x 15/64\" syringe 3 times daily    insulin syringe-needle U-100 31G X 5/16\" 1 mL syringe 1 each, subcutaneous, Daily, As directed daily    ipratropium-albuteroL (Duo-Neb) 0.5-2.5 mg/3 mL nebulizer solution 3 mL, nebulization, Every 6 hours    Lactobacillus acidophilus (ACIDOPHILUS ORAL) As directed oral    levothyroxine (SYNTHROID, LEVOXYL) 25 mcg, oral, Daily (0630)    metoprolol succinate XL (TOPROL-XL) 25 mg, oral, 2 times daily, Do not crush or chew.    nicotine (Nicoderm CQ) 21 mg/24 hr patch 1 patch, transdermal, Every 24 hours    omeprazole (PRILOSEC) 40 mg, oral, Daily before breakfast    QUEtiapine (SEROQUEL) 25 mg, oral, Nightly    sertraline (Zoloft) 100 mg tablet 1 tablet, oral, Daily    SITagliptin phosphate (JANUVIA) 50 mg, oral, Daily       Physical Exam:  General: Patient is in moderate " respiratory distress on BiPAP.  HEENT: atraumatic normocephalic.  Neck: is supple jugular venous pressure within normal limits no thyromegaly.  Cardiovascular Iregular rate and rhythm normal heart sounds no murmurs rubs or gallops.  Lungs: Creased breathing sounds throughout  Abdomen: is soft nontender.  Extremities warm to touch no edema.  Neurologic examination: Patient is lethargic difficult to arouse.  Psychiatric examination.  Patient lethargic.  Pulses 2+ intact bilaterally     Assessment/Plan   #1 septic shock likely secondary to C. difficile colitis.  Low ejection fraction likely contributing to his hypotension.  Currently on Levophed which may control continue.  Management by primary team.    2.  Acute on chronic systolic heart failure ejection fraction 30 to 35% secondary to ischemic cardiomyopathy status post ICD/CRT.  Patient has known coronary artery disease not candidate for revascularization.  Recommend now to switch him from prasugrel to clopidogrel since he is also on oral anticoagulation for atrial fibrillation.  If patient CO2 remains elevated may need thoracentesis will hold Eliquis for now    3.  Atrial fibrillation.  Patient with history of paroxysmal atrial fibrillation on oral amiodarone status post ICD CRT and Eliquis.  For now we will hold Eliquis since patient may require thoracentesis by pulmonary.  Will start him on amiodarone drip since he cannot tolerate oral amiodarone we will give him digoxin 500 mg IV one-time.    4.  Hyperlipidemia continue statin since liver function test is stable.    5. acute on chronic renal failure.  Multifactorial.  Nephrology on board.  Peripheral IV 01/03/24 22 G Right Antecubital (Active)   Site Assessment Clean;Dry;Intact 01/13/24 0830   Dressing Type Transparent 01/13/24 0830   Line Status Blood return noted;Flushed;Infusing 01/13/24 0830   Dressing Status Clean;Dry;Occlusive 01/13/24 0830   Number of days: 10       Urethral Catheter 16 Fr. (Active)    Site Assessment Clean;Skin intact 01/13/24 1235   Collection Container Standard drainage bag 01/13/24 1235   Securement Method Securing device (Describe) 01/13/24 1235   Reason for Continuing Urinary Catheterization accurate hourly measurement of urine volume in a critically ill patient that cannot be assessed by other volumes and urine collection strategies 01/13/24 1235   Output (mL) 225 mL 01/13/24 1235   Number of days: 10       Code Status:  DNR and No Intubation    Cinda Pedersen MD

## 2024-01-13 NOTE — PROGRESS NOTES
Hai Spaulding is a 73 y.o. male on day 9 of admission presenting with Acute respiratory failure (CMS/HCC).      Subjective   Called by RN saying they were thinking of calling I team.   On my arrival,  patient with increased work of breathing. Pulsox not picking up, patient increased to 6L. SBP in the 90s. Patient awake/tracks with eyes, doesn't really answer questions, though nods 'yes' when asked if he's having a hard time breathing.    Overnight, less urine output.          Objective     Last Recorded Vitals  BP 91/69 (BP Location: Left arm, Patient Position: Lying)   Pulse 98   Temp 36 °C (96.8 °F) (Temporal)   Resp 24   Wt 77.1 kg (169 lb 15.6 oz)   SpO2 97%   Intake/Output last 3 Shifts:    Intake/Output Summary (Last 24 hours) at 1/13/2024 0824  Last data filed at 1/13/2024 0700  Gross per 24 hour   Intake 645.42 ml   Output 550 ml   Net 95.42 ml         Admission Weight  Weight: 79.4 kg (175 lb 1.6 oz) (01/04/24 2300)    Daily Weight  01/05/24 : 77.1 kg (169 lb 15.6 oz)    Image Results  XR chest 1 view  Narrative: Interpreted By:  Sugey Tinoco,   STUDY:  XR CHEST 1 VIEW 1/12/2024 3:49 pm      INDICATION:  Signs/Symptoms:hypoxia      COMPARISON:  01/03/2024      ACCESSION NUMBER(S):  [id number]      ORDERING CLINICIAN:  CRISTAL MARCH      TECHNIQUE:  AP erect view of the chest      FINDINGS:  The cardiac size is mildly enlarged with biventricular ICD leads  observed in right atrium, right ventricle, and coronary sinus.      Pulmonary edema is present with worsening of bilateral pulmonary  infiltrates and with bilateral pleural effusion seen. Left effusion  appears larger than the right with left effusion increased in size.      Impression: Pulmonary edema is now seen with increased size of left pleural  effusion which is now moderate in amount. Small right pleural  effusion is also present.      Signed by: Sugey Tinoco 1/12/2024 4:27 PM  Dictation workstation:   [id number]      Physical  Exam  General:  awake   Lungs: scattered rhonchi and rales; tachypneic   Heart: irregular, no LE edema BL   GI: abdomen soft, nontender, nondistended, BS present   MSK: no joint effusion or deformity   Skin: no rashes, erythema, or ecchymosis   Neuro: awake, answers yes/no questions      Relevant Results               Results for orders placed or performed during the hospital encounter of 01/04/24 (from the past 24 hour(s))   POCT GLUCOSE   Result Value Ref Range    POCT Glucose 114 (H) 74 - 99 mg/dL   POCT GLUCOSE   Result Value Ref Range    POCT Glucose 152 (H) 74 - 99 mg/dL   Blood Gas Arterial Full Panel   Result Value Ref Range    POCT pH, Arterial 7.41 7.38 - 7.42 pH    POCT pCO2, Arterial 36 (L) 38 - 42 mm Hg    POCT pO2, Arterial 80 (L) 85 - 95 mm Hg    POCT SO2, Arterial 98 94 - 100 %    POCT Oxy Hemoglobin, Arterial 95.8 94.0 - 98.0 %    POCT Hematocrit Calculated, Arterial 37.0 (L) 41.0 - 52.0 %    POCT Sodium, Arterial 139 136 - 145 mmol/L    POCT Potassium, Arterial 4.1 3.5 - 5.3 mmol/L    POCT Chloride, Arterial 108 (H) 98 - 107 mmol/L    POCT Ionized Calcium, Arterial 1.14 1.10 - 1.33 mmol/L    POCT Glucose, Arterial 218 (H) 74 - 99 mg/dL    POCT Lactate, Arterial 1.4 0.4 - 2.0 mmol/L    POCT Base Excess, Arterial -1.4 -2.0 - 3.0 mmol/L    POCT HCO3 Calculated, Arterial 22.8 22.0 - 26.0 mmol/L    POCT Hemoglobin, Arterial 12.3 (L) 13.5 - 17.5 g/dL    POCT Anion Gap, Arterial 12 10 - 25 mmo/L    Patient Temperature 37.0 degrees Celsius    FiO2 36 %    Apparatus CANNULA     Site of Arterial Puncture Radial Right     Byron's Test Positive    POCT GLUCOSE   Result Value Ref Range    POCT Glucose 217 (H) 74 - 99 mg/dL   Lavender Top   Result Value Ref Range    Extra Tube Hold for add-ons.    PST Top   Result Value Ref Range    Extra Tube Hold for add-ons.    Basic Metabolic Panel   Result Value Ref Range    Glucose 209 (H) 65 - 99 mg/dL    Sodium 144 133 - 145 mmol/L    Potassium 4.0 3.4 - 5.1 mmol/L     Chloride 110 (H) 97 - 107 mmol/L    Bicarbonate 21 (L) 24 - 31 mmol/L    Urea Nitrogen 45 (H) 8 - 25 mg/dL    Creatinine 2.00 (H) 0.40 - 1.60 mg/dL    eGFR 35 (L) >60 mL/min/1.73m*2    Calcium 7.7 (L) 8.5 - 10.4 mg/dL    Anion Gap 13 <=19 mmol/L     *Note: Due to a large number of results and/or encounters for the requested time period, some results have not been displayed. A complete set of results can be found in Results Review.         Scheduled medications  acetaminophen, 650 mg, oral, TID  amiodarone, 200 mg, oral, BID  apixaban, 5 mg, oral, BID  atorvastatin, 80 mg, oral, Nightly  budesonide, 0.5 mg, nebulization, BID  carbidopa-levodopa, 1 tablet, oral, TID  clopidogrel, 75 mg, oral, Daily  cyclobenzaprine, 10 mg, oral, Nightly  dapagliflozin propanediol, 10 mg, oral, Daily  gabapentin, 200 mg, oral, Nightly  insulin glargine, 10 Units, subcutaneous, Daily  insulin lispro, 0-10 Units, subcutaneous, TID with meals  ipratropium-albuteroL, 3 mL, nebulization, TID  lactobacillus acidophilus, 1 tablet, oral, BID  levothyroxine, 25 mcg, oral, Daily before breakfast  mirtazapine, 15 mg, oral, Nightly  nystatin, 1 Application, Topical, BID  pantoprazole, 40 mg, oral, Daily before breakfast  QUEtiapine, 25 mg, oral, Nightly  sertraline, 100 mg, oral, Daily  SITagliptin phosphate, 50 mg, oral, Daily  sodium chloride, 1 spray, Each Nostril, TID  vancomycin, 125 mg, oral, 4x daily      Continuous medications     PRN medications  PRN medications: albuterol, dextrose 10 % in water (D10W), dextrose, glucagon, melatonin, ondansetron, oxygen, oxygen    Assessment/Plan                  Principal Problem:    Acute respiratory failure (CMS/HCC)  Active Problems:    Atrial fibrillation (CMS/HCC)    CAD (coronary artery disease)    Parkinson's disease    Cerebral infarction, unspecified (CMS/HCC)    C. difficile diarrhea    Acute hypoxic respiratory failure  - worse again today. Placed on BIPAP for distress. Will get ABG  - stat  CXR done-- shows evolving consolidations from yesterday and possible pleural effusions  - Pulm consulted- discussed extensively with Dr. Sprague  - concern for underlying aspiration did okay with thickened liquids and pureed food on MBS-- this diet is ordered. Apparently with thin liquids found bedside yesterday    Dysphagia  - as above, seems to do okay with thickened liquids but doesn't really like his modified diet. Had a magic cup this morning and had some milkshake from his wife, though not very much  - discussed with speech therapy-- okay for smoothies and/or milkshakes with banana added-- he tolerated some smoothie last night but now refusing  - speech therapy following    Metabolic encephalopathy- improved though still present  - suspect delirium from hospital stays, infection, poor sleep/wake cycle    Abnormal rhythm  Afib  - underlying afib but seems to be flipping in and out of ventricular rhythm. Has ICD. On amio but currently npo  - will ask cardio to see    C diff colitis  - PO vancomycin for total of 14 days; IV flagyl while NPO on bipap    COVID 19  - completed course of decadron    History of stroke  - recent stroke at end of the year, aspirin, plavix, statin    Chronic systolic heart failure  - EF 30-35%; continue farxiga and metoprolol    Parkinsonism  - on levodopa carbidopa- very important patient takes this for his mental status. Will have to see if safe to give him this today    DM type 2  - on insulin    CKD 3 with MATEO- improving  - continue fluids. Needs hydration given poor PO intake    Pain  Sacral wound  - tylenol      Patient more ill appearing today  Transfer to ICU  Call and updated the wife  Discussed with Dr. Sprague  ABG and ct chest pending      CCT: 35 min                    Seema Bailey DO      Addendum:  S/p 1L thoracentesis by Dr. Sprague. Patient markedly hypotensive post-procedure and placed on levophed, now at 0.1 mcg. I spoke with wife, got consent, and placed central  line; see separate procedure note. Patient very somnolent, grimaces to painful stimuli only. Repeat ABG pending. Post procedure cxr shows no PTX. Remains on BIPAP.     About ten min after my procedure, RT found patient with blood soaking through gown on the left side. We rolled patient to see, pulsatile bright red blood coming from thoracentesis site. I personally held pressure until bleeding stopped, approximately 5 min. Pressure dressing to be applied. Dr. Sprague aware. H/h in 30 min. He was laying in significant amount of blood.      CCT: 75 min not including procedures

## 2024-01-14 NOTE — CARE PLAN
The patient's goals for the shift include  unable to state    The clinical goals for the shift include Patient will have decreased pressor and O2 requirements    Over the shift, the patient made some progress toward the following goals. Barriers to progression include poor po intake and dislike of nectar thick liquids. Recommendations to address these barriers include incentive spirometry, increasing activity and encouraging po intake.       Problem: Diabetes  Goal: Increase stability of blood glucose readings by end of shift  Outcome: Progressing  Flowsheets (Taken 1/14/2024 1647)  Increase stability of blood glucose readings by end of shift: Med administration/monitoring of effect  Goal: Maintain electrolyte levels within acceptable range throughout shift  Outcome: Progressing  Flowsheets (Taken 1/13/2024 1609)  Maintain electrolyte levels within acceptable range throughout shift:   Med administration/monitoring of effect   Monitor urine output  Goal: Maintain glucose levels >70mg/dl to <250mg/dl throughout shift  Outcome: Progressing  Flowsheets (Taken 1/13/2024 1609)  Maintain glucose levels >70mg/dl to <250mg/dl throughout shift: Med administration/monitoring of effect  Goal: No changes in neurological exam by end of shift  Outcome: Progressing  Flowsheets (Taken 1/13/2024 1609)  No changes in neurological exam by end of shift: Complete frequent neurological assessments  Goal: Learn about and adhere to nutrition recommendations by end of shift  Outcome: Progressing  Flowsheets (Taken 1/13/2024 1609)  Learn about and adhere to nutrition recommendations by end of shift: Ensure/encourage compliance with appropriate diet  Goal: Vital signs within normal range for age by end of shift  Outcome: Progressing  Flowsheets (Taken 1/13/2024 1603)  Vital signs within normal range for age by end of shift: Med administration/monitoring of effect  Goal: Increase self care and/or family involovement by end of shift  Outcome:  Progressing  Goal: Receive DSME education by end of shift  Outcome: Progressing  Flowsheets (Taken 1/13/2024 1609)  Receive DSME education by end of shift: Provide patient centered education on Diabetic Self Management Education     Problem: Skin  Goal: Decreased wound size/increased tissue granulation at next dressing change  Outcome: Progressing  Flowsheets (Taken 1/14/2024 1647)  Decreased wound size/increased tissue granulation at next dressing change:   Promote sleep for wound healing   Protective dressings over bony prominences   Utilize specialty bed per algorithm  Goal: Participates in plan/prevention/treatment measures  Outcome: Progressing  Flowsheets (Taken 1/14/2024 1647)  Participates in plan/prevention/treatment measures:   Elevate heels   Increase activity/out of bed for meals   Discuss with provider PT/OT consult  Goal: Prevent/manage excess moisture  Outcome: Progressing  Flowsheets (Taken 1/14/2024 1647)  Prevent/manage excess moisture:   Cleanse incontinence/protect with barrier cream   Moisturize dry skin   Follow provider orders for dressing changes   Monitor for/manage infection if present  Goal: Prevent/minimize sheer/friction injuries  Outcome: Progressing  Flowsheets (Taken 1/14/2024 1647)  Prevent/minimize sheer/friction injuries:   Complete micro-shifts as needed if patient unable. Adjust patient position to relieve pressure points, not a full turn   HOB 30 degrees or less   Increase activity/out of bed for meals   Turn/reposition every 2 hours/use positioning/transfer devices   Use pull sheet   Utilize specialty bed per algorithm  Goal: Promote/optimize nutrition  Outcome: Progressing  Flowsheets (Taken 1/14/2024 1647)  Promote/optimize nutrition:   Assist with feeding   Discuss with provider if NPO > 2 days   Offer water/supplements/favorite foods   Consume > 50% meals/supplements   Monitor/record intake including meals   Reassess MST if dietician not consulted  Goal: Promote skin  healing  Outcome: Progressing  Flowsheets (Taken 1/14/2024 1647)  Promote skin healing:   Assess skin/pad under line(s)/device(s)   Ensure correct size (line/device) and apply per  instructions   Rotate device position/do not position patient on device   Protective dressings over bony prominences   Turn/reposition every 2 hours/use positioning/transfer devices     Problem: Respiratory  Goal: Clear secretions with interventions this shift  Outcome: Progressing  Flowsheets (Taken 1/13/2024 1609)  Clear secretions with interventions this shift:   Encourage/provide pulmonary hygiene/secretion clearance   Med administration/monitoring of effect  Goal: Minimize anxiety/maximize coping throughout shift  Outcome: Progressing  Flowsheets (Taken 1/14/2024 1647)  Minimize anxiety/maximize coping throughout shift:   Monitor pain/anxiety level   Med administration/monitoring of effect  Goal: Minimal/no exertional discomfort or dyspnea this shift  Outcome: Progressing  Flowsheets (Taken 1/13/2024 1609)  Minimal/no exertional discomfort or dyspnea this shift: Positioning to promote ventilation/comfort  Goal: No signs of respiratory distress (eg. Use of accessory muscles. Peds grunting)  Outcome: Progressing  Goal: Patent airway maintained this shift  Outcome: Progressing  Goal: Tolerate pulmonary toileting this shift  Outcome: Progressing  Flowsheets (Taken 1/13/2024 1609)  Tolerate pulmonary toileting this shift: Positioning to promote ventilation/comfort  Goal: Verbalize decreased shortness of breath this shift  Outcome: Progressing  Flowsheets (Taken 1/13/2024 1609)  Verbalize decreased shortness of breath this shift: Encourage/provide pulmonary hygiene/secretion clearance  Goal: Wean oxygen to maintain O2 saturation per order/standard this shift  Outcome: Progressing  Flowsheets (Taken 1/13/2024 1609)  Wean oxygen to maintain O2 saturation per order/standard this shift: Encourage activity/mobility  Goal: Increase  self care and/or family involvement in next 24 hours  Outcome: Progressing  Flowsheets (Taken 1/13/2024 1609)  Increase self care and/or family involvement in next 24 hours: Encourage activity/mobility     Problem: Pain - Adult  Goal: Verbalizes/displays adequate comfort level or baseline comfort level  Outcome: Progressing  Flowsheets (Taken 1/13/2024 1609)  Verbalizes/displays adequate comfort level or baseline comfort level:   Encourage patient to monitor pain and request assistance   Assess pain using appropriate pain scale   Administer analgesics based on type and severity of pain and evaluate response   Implement non-pharmacological measures as appropriate and evaluate response   Notify Licensed Independent Practitioner if interventions unsuccessful or patient reports new pain     Problem: Safety - Adult  Goal: Free from fall injury  Outcome: Progressing  Flowsheets (Taken 1/13/2024 1609)  Free from fall injury:   Instruct family/caregiver on patient safety   Based on caregiver fall risk screen, instruct family/caregiver to ask for assistance with transferring infant if caregiver noted to have fall risk factors     Problem: Discharge Planning  Goal: Discharge to home or other facility with appropriate resources  Outcome: Progressing  Flowsheets (Taken 1/13/2024 1609)  Discharge to home or other facility with appropriate resources:   Identify barriers to discharge with patient and caregiver   Arrange for needed discharge resources and transportation as appropriate   Identify discharge learning needs (meds, wound care, etc)   Refer to discharge planning if patient needs post-hospital services based on physician order or complex needs related to functional status, cognitive ability or social support system     Problem: Chronic Conditions and Co-morbidities  Goal: Patient's chronic conditions and co-morbidity symptoms are monitored and maintained or improved  Outcome: Progressing  Flowsheets (Taken 1/13/2024  1603)  Care Plan - Patient's Chronic Conditions and Co-Morbidity Symptoms are Monitored and Maintained or Improved:   Monitor and assess patient's chronic conditions and comorbid symptoms for stability, deterioration, or improvement   Collaborate with multidisciplinary team to address chronic and comorbid conditions and prevent exacerbation or deterioration   Update acute care plan with appropriate goals if chronic or comorbid symptoms are exacerbated and prevent overall improvement and discharge     Problem: Nutrition  Goal: Less than 5 days NPO/clear liquids  Outcome: Progressing  Goal: Oral intake greater than 50%  Outcome: Progressing  Goal: Consume prescribed supplement  Outcome: Progressing  Goal: Adequate PO fluid intake  Outcome: Progressing  Goal: Nutrition support goals are met within 48 hrs  Outcome: Progressing  Goal: Nutrition support is meeting 75% of nutrient needs  Outcome: Progressing  Goal: BG  mg/dL  Outcome: Progressing  Goal: Lab values WNL  Outcome: Progressing  Goal: Electrolytes WNL  Outcome: Progressing  Goal: Promote healing  Outcome: Progressing  Goal: Maintain stable weight  Outcome: Progressing  Goal: Reduce weight from edema/fluid  Outcome: Progressing     Problem: General Stroke  Goal: Establish a mutual long term goal with patient by discharge  Outcome: Progressing  Flowsheets (Taken 1/13/2024 1608)  Establish a mutual long term goal with patient by discharge: Monitor blood pressure  Goal: Demonstrate improvement in neurological exam throughout the shift  Outcome: Progressing  Goal: Maintain BP within ordered limits throughout shift  Outcome: Progressing  Goal: Participate in treatment (ie., meds, therapy) throughout shift  Outcome: Progressing  Goal: No symptoms of aspiration throughout shift  Outcome: Progressing  Goal: Controlled blood glucose throughout shift  Outcome: Progressing     Problem: Fall/Injury  Goal: Not fall by end of shift  Outcome: Progressing  Goal: Be free  from injury by end of the shift  Outcome: Progressing  Goal: Verbalize understanding of personal risk factors for fall in the hospital  Outcome: Progressing  Goal: Verbalize understanding of risk factor reduction measures to prevent injury from fall in the home  Outcome: Progressing  Goal: Pace activities to prevent fatigue by end of the shift  Outcome: Progressing

## 2024-01-14 NOTE — PROGRESS NOTES
Critical Care Progress Note    Hai Spaulding is a 73 y.o. male on day 10 of admission presenting with Acute respiratory failure (CMS/HCC).    Subjective   Follow up resp failure  S/p thora yesterday for 1 liter    Objective   Vital Signs      1/14/2024     7:30 AM 1/14/2024     7:45 AM 1/14/2024     7:51 AM 1/14/2024     8:00 AM 1/14/2024     8:15 AM 1/14/2024     8:30 AM 1/14/2024     8:45 AM   Vitals   Systolic 110 119  118 107 112 117   Diastolic 73 78  82 75 69 75   Heart Rate 91 91  92 102 96 94   Temp   36.1 °C (97 °F)       Resp 17 18  19 20 19 20   Weight (lb)    171.96      BMI    25.38 kg/m2      BSA (m2)    1.95 m2          Oxygen Therapy  SpO2: 95 %  Medical Gas Therapy: Supplemental oxygen  O2 Delivery Method: CPAP/Bi-PAP mask    FiO2 (%):  [50 %] 50 %  S RR:  [12] 12    Intake/Output previous 24 hours:    Intake/Output Summary (Last 24 hours) at 1/14/2024 0942  Last data filed at 1/14/2024 0800  Gross per 24 hour   Intake 1015.08 ml   Output 1565 ml   Net -549.92 ml       Physical Exam  Vitals reviewed.   HENT:      Head: Normocephalic and atraumatic.   Eyes:      Extraocular Movements: Extraocular movements intact.      Pupils: Pupils are equal, round, and reactive to light.   Cardiovascular:      Rate and Rhythm: Normal rate and regular rhythm.      Pulses: Normal pulses.      Heart sounds: Normal heart sounds.   Pulmonary:      Effort: Pulmonary effort is normal.      Breath sounds: Normal breath sounds.   Abdominal:      General: Bowel sounds are normal.      Palpations: Abdomen is soft.   Musculoskeletal:      Cervical back: Normal range of motion.      Right lower leg: No edema.      Left lower leg: No edema.   Neurological:      Mental Status: He is alert.         Lines and Tubes:  CVC 01/13/24 Triple lumen Non-tunneled Right Internal jugular (Active)   Placement Date: 01/13/24   Lumen Type: Triple lumen  CVC Type: Non-tunneled  Orientation: Right  Location: Internal jugular  Insertion  attempts: 1  Securement Method: Sutured;Transparent dressing   Number of days: 1       Peripheral IV 01/03/24 22 G Right Antecubital (Active)   Placement Date/Time: 01/03/24 1145   Size (Gauge): 22 G  Orientation: Right  Location: Antecubital   Number of days: 10       Urethral Catheter 16 Fr. (Active)   Placement Date/Time: 01/03/24 1343   Placed by: Valery BELTRAN  Tube Size (Fr.): 16 Fr.  Catheter Balloon Size: 10 mL  Urine Returned: Yes   Number of days: 10         Scheduled medications  acetaminophen, 650 mg, oral, TID  acetylcysteine, 3 mL, nebulization, BID  atorvastatin, 80 mg, oral, Nightly  budesonide, 0.5 mg, nebulization, BID  carbidopa-levodopa, 1 tablet, oral, TID  clopidogrel, 75 mg, oral, Daily  cyclobenzaprine, 10 mg, oral, Nightly  dapagliflozin propanediol, 10 mg, oral, Daily  gabapentin, 200 mg, oral, Nightly  insulin glargine, 10 Units, subcutaneous, Daily  insulin lispro, 0-10 Units, subcutaneous, TID with meals  ipratropium-albuteroL, 3 mL, nebulization, TID  lactobacillus acidophilus, 1 tablet, oral, BID  levothyroxine, 25 mcg, oral, Daily before breakfast  metroNIDAZOLE, 500 mg, intravenous, q8h  mirtazapine, 15 mg, oral, Nightly  nystatin, 1 Application, Topical, BID  pantoprazole, 40 mg, oral, Daily before breakfast  QUEtiapine, 25 mg, oral, Nightly  sertraline, 100 mg, oral, Daily  SITagliptin phosphate, 50 mg, oral, Daily  sodium chloride, 1 spray, Each Nostril, TID  vancomycin, 125 mg, oral, 4x daily      Continuous medications  amiodarone, 0.5 mg/min, Last Rate: 0.5 mg/min (01/14/24 0800)  norepinephrine, 0.01-3 mcg/kg/min, Last Rate: 0.02 mcg/kg/min (01/14/24 0845)      PRN medications  PRN medications: albuterol, dextrose 10 % in water (D10W), dextrose, glucagon, melatonin, ondansetron, oxygen, oxygen    Relevant Results  Results from last 7 days   Lab Units 01/14/24  0347 01/13/24  1530 01/12/24  0545 01/11/24  0513   WBC AUTO x10*3/uL 15.2*  --  12.6* 12.1*   HEMOGLOBIN g/dL 12.0* 12.0*  12.8* 12.8*   HEMATOCRIT % 36.5* 37.1* 39.6* 40.2*   PLATELETS AUTO x10*3/uL 219  --  252 249      Results from last 7 days   Lab Units 01/14/24  0347 01/13/24  0500 01/12/24  0545   SODIUM mmol/L 142 144 142   POTASSIUM mmol/L 4.1 4.0 4.6   CHLORIDE mmol/L 109* 110* 108*   CO2 mmol/L 22* 21* 22*   BUN mg/dL 43* 45* 43*   CREATININE mg/dL 2.00* 2.00* 2.10*   GLUCOSE mg/dL 132* 209* 138*   CALCIUM mg/dL 7.5* 7.7* 7.7*      Results from last 7 days   Lab Units 01/13/24  1448   POCT PH, ARTERIAL pH 7.39   POCT PCO2, ARTERIAL mm Hg 42   POCT PO2, ARTERIAL mm Hg 181*   POCT HCO3 CALCULATED, ARTERIAL mmol/L 25.4   POCT BASE EXCESS, ARTERIAL mmol/L 0.3     XR chest 1 view 01/13/2024    Narrative  Interpreted By:  Rahul Staples,  STUDY:  XR CHEST 1 VIEW; 1/13/2024 2:51 pm    INDICATION:  CLINICAL INFORMATION: Signs/Symptoms:central line and thoracentesis.    COMPARISON:  01/13/2024 at 824 hours    ACCESSION NUMBER(S):  [id number]    ORDERING CLINICIAN:  CRISTAL MARCH    TECHNIQUE:  Portable chest one view.    FINDINGS:  The cardiac size is indeterminate in view of the AP projection.  A  cardiac pacemaker with biventricular as well as right atrial leads is  noted. There is a right internal jugular venous catheter now present,  new compared to the prior study with the tip somewhat obscured by the  cardiac pacemaker leads. The tip however appears to terminate above  the right atrium in the SVC distribution. There is no evidence for  pneumothorax.    Decrease in the pleural effusion on the left in this patient with  history of thoracentesis. There is no definite evidence for  pneumothorax within limits of this study.    There are bilateral infiltrates and effusions present, possibly  secondary to pulmonary edema and CHF.    Impression  1. Status post right-sided central line insertion without  pneumothorax as above.  2. Status post left thoracentesis without evidence for pneumothorax.  3. Persistent infiltrates and  effusions bilaterally suggesting  possibility of pulmonary edema and CHF.    MACRO:  none    Signed by: Rahul Staples 1/13/2024 3:02 PM  Dictation workstation:   [id number]      Patient Active Problem List   Diagnosis    Hypernatremia    Acute ischemic heart disease (CMS/HCC)    Acute on chronic respiratory failure with hypoxemia (CMS/HCC)    Anemia    Atelectasis    Atherosclerosis of both carotid arteries    Atrial fibrillation (CMS/HCC)    Biventricular automatic implantable cardioverter defibrillator in situ    Blood glucose abnormal    CAD (coronary artery disease)    Coronary arteriosclerosis    Cancer of larynx (CMS/HCC)    Cardiac LV ejection fraction <20%    Cardiomyopathy (CMS/HCC)    Cervical stenosis of spine    Spinal stenosis    Chronic systolic heart failure (CMS/HCC)    Congestive heart failure (CMS/HCC)    COPD (chronic obstructive pulmonary disease) (CMS/HCC)    CVA (cerebral vascular accident) (CMS/HCC)    CVD (cardiovascular disease)    Low blood pressure    Degenerative lumbar spinal stenosis    Depression    Diabetes mellitus (CMS/HCC)    Diabetic retinopathy (CMS/HCC)    Pneumonia due to COVID-19 virus    Disorder of intervertebral disc of lumbar spine    Dysphagia    Essential (primary) hypertension    Eating disorder    GERD (gastroesophageal reflux disease)    History of coronary artery stent placement    Hypoxia    Ischemic cardiomyopathy    Leukocytosis    Lumbar radiculopathy    Hyperlipidemia    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)    Chronic pain    Parkinson's disease    Protein-calorie malnutrition (CMS/HCC)    Psychotic disorder (CMS/HCC)    RLS (restless legs syndrome)    Sepsis (CMS/HCC)    Smoker    Syncope and collapse    Thrombocytosis    Type 2 diabetes mellitus without complication (CMS/HCC)    Ventricular tachycardia (CMS/HCC)    Vitamin D deficiency    Pulmonary function studies abnormal    Long term (current) use of insulin (CMS/HCC)    Cerebral infarction, unspecified  (CMS/[facility])    Pneumonia due to infectious organism, unspecified laterality, unspecified part of lung    Elevated brain natriuretic peptide (BNP) level    Shortness of breath    NSTEMI (non-ST elevated myocardial infarction) (CMS/[facility])    Acute respiratory failure (CMS/[facility])    C. difficile diarrhea     Assessment/Plan     Acute respiratory failure on BiPAP.  Chest CT findings:  Interval increase in bilateral pleural effusions with large bilateral effusions left-greater-than-right. There is resultant left lower lobar collapse with compressive atelectasis in particular in   the left upper lobe as well as right lower lobe.       Patchy areas of airspace consolidation intervally developed from prior imaging with more focal septal thickening in the areas of consolidation may reflect alveolar component of pulmonary edema.   However, superimposed infectious infiltrate is not excluded. No dense airspace consolidation     S/p left thora 1/13 for 1 liter transudative fluid  Chest xray improved on left  Right with persistent fluid  May need thora on right as well    COVID-19.  Status post 10 days of dexamethasone     C. difficile infection.  ID following  Positive PCR but negative EIA    sepsis   White Blood cell count elevated at 15.2 (increasing)  History of possible pneumonia.  Received Course of antibiotics.  White cell count increasing.  Meropenem restarted  Wean pressors     Acute kidney injury  Creatinine is slightly improved today 2.0     Prophylaxis    Barber Sprague MD  [facility]       This critically ill patient continues to be at-risk for deterioration / failure due to the above mentioned dysfunctional unstable organ systems.   Critical care time is spent at bedside includes review of diagnostic tests, labs, and radiographs, serial assessments and management of hemodynamics, respiratory status, and coordination of care with different members of interdisciplinary team  Assessment, impression and plans  are reflected in the note above as well as the orders.     Critical concerns addressed:     Time Spent in critical Care, exclusive of procedures : mins.     Disclaimer: Parts of this chart were dictated with voice recognition software. Please excuse any errors of grammar, spelling, or transcription which are not corrected. Please contact me with any questions regarding documentation.

## 2024-01-14 NOTE — PROGRESS NOTES
Subjective Data:  Patient is doing better.  More awake.  Remains on high flow oxygen.    Overnight Events:    Telemetry overnight reviewed no events     Objective Data:  Last Recorded Vitals:  Vitals:    01/14/24 1015 01/14/24 1030 01/14/24 1045 01/14/24 1100   BP: 93/59 100/63 93/59 95/56   BP Location:       Patient Position:       Pulse: 95 85 94 94   Resp: 16 19 20 20   Temp:       TempSrc:       SpO2: 93% 93% 93% 93%   Weight:       Height:           Last Labs:  CBC - 1/14/2024:  3:47 AM  15.2 12.0 219    36.5      CMP - 1/14/2024:  3:47 AM  7.5 5.4 19 --- 0.3   2.7 2.1 <5 398      PTT - 12/28/2023:  5:48 AM  2.3   26.1 107.8     TROPHS   Date/Time Value Ref Range Status   01/04/2024 08:16  0 - 20 ng/L Final     Comment:     Previous result verified on 1/3/2024 1340 on specimen/case [id number] called with component [facility] for procedure Troponin I, High Sensitivity, Initial with value 161 ng/L.   01/03/2024 05:37  0 - 20 ng/L Final     Comment:     Previous result verified on 1/3/2024 1340 on specimen/case [id number] called with component [facility]PHS for procedure Troponin I, High Sensitivity, Initial with value 161 ng/L.   01/03/2024 02:27  0 - 20 ng/L Final     Comment:     Previous result verified on 1/3/2024 1340 on specimen/case [id number] called with component TRPHS for procedure Troponin I, High Sensitivity, Initial with value 161 ng/L.     BNP   Date/Time Value Ref Range Status   01/03/2024 12:13  0 - 99 pg/mL Final   12/27/2023 10:04  0 - 99 pg/mL Final     HGBA1C   Date/Time Value Ref Range Status   12/27/2023 09:59 PM 10.7 See below % Final   10/11/2023 01:15 PM 10.6 See below % Final     LDLCALC   Date/Time Value Ref Range Status   06/02/2023 02:40 PM 39 65 - 130 MG/DL Final   10/08/2019 12:15 PM 96 65 - 130 MG/DL Final     VLDL   Date/Time Value Ref Range Status   10/22/2021 08:07 PM 18 0 - 40 mg/dL Final   09/28/2020 04:12 PM 46 0 - 40 mg/dL Final   10/02/2018  02:40 PM 50 0 - 40 mg/dL Final      Last I/O:  I/O last 3 completed shifts:  In: 767.8 (9.8 mL/kg) [P.O.:30; I.V.:337.8 (4.3 mL/kg); IV Piggyback:400]  Out: 1815 (23.2 mL/kg) [Urine:815 (0.3 mL/kg/hr); Drains:1000]  Weight: 78.2 kg     Past Cardiology Tests (Last 3 Years):  EKG:  ECG 12 lead 01/05/2024      ECG 12 lead 01/04/2024      ECG 12 lead 01/03/2024      ECG 12 lead 12/27/2023      ECG 12 Lead 12/27/2023      ECG 12 lead 12/14/2023    Echo:  Transthoracic Echo (TTE) Complete 12/18/2023    Ejection Fractions:  EF   Date/Time Value Ref Range Status   12/18/2023 12:10 PM 29       Cath:  No results found for this or any previous visit from the past 1095 days.    Stress Test:  No results found for this or any previous visit from the past 1095 days.    Cardiac Imaging:  XR CHEST ABDOMEN FOR OG NG PLACEMENT 11/26/2021      XR CHEST ABDOMEN FOR OG NG PLACEMENT 11/26/2021      Inpatient Medications:  Scheduled medications   Medication Dose Route Frequency    acetaminophen  650 mg oral TID    acetylcysteine  3 mL nebulization BID    atorvastatin  80 mg oral Nightly    budesonide  0.5 mg nebulization BID    carbidopa-levodopa  1 tablet oral TID    clopidogrel  75 mg oral Daily    cyclobenzaprine  10 mg oral Nightly    dapagliflozin propanediol  10 mg oral Daily    gabapentin  200 mg oral Nightly    insulin glargine  10 Units subcutaneous Daily    insulin lispro  0-10 Units subcutaneous TID with meals    ipratropium-albuteroL  3 mL nebulization TID    lactobacillus acidophilus  1 tablet oral BID    levothyroxine  25 mcg oral Daily before breakfast    meropenem  1 g intravenous q12h VAZQUEZ    metroNIDAZOLE  500 mg intravenous q8h    mirtazapine  15 mg oral Nightly    nystatin  1 Application Topical BID    pantoprazole  40 mg oral Daily before breakfast    QUEtiapine  25 mg oral Nightly    sertraline  100 mg oral Daily    SITagliptin phosphate  50 mg oral Daily    sodium chloride  1 spray Each Nostril TID    vancomycin  125  mg oral 4x daily     PRN medications   Medication    albuterol    dextrose 10 % in water (D10W)    dextrose    glucagon    melatonin    ondansetron    oxygen    oxygen    oxygen     Continuous Medications   Medication Dose Last Rate    amiodarone  0.5 mg/min 0.5 mg/min (01/14/24 1100)    norepinephrine  0.01-3 mcg/kg/min 0.02 mcg/kg/min (01/14/24 1100)       Physical Exam:  General: Patient is in no acute distress.  Mild respiratory distress  HEENT: atraumatic normocephalic.  Neck: is supple jugular venous pressure within normal limits no thyromegaly.  Cardiovascular irregular rate and rhythm normal heart sounds no murmurs rubs or gallops.  Lungs: deCreased breathing sounds at bases abdomen: is soft nontender.  Extremities warm to touch no edema.    Assessment/Plan   1 septic shock likely secondary to C. difficile colitis.  Low ejection fraction likely contributing to his hypotension.  Currently on Levophed which may control continue.  Management by primary team.     2.  Acute on chronic systolic heart failure ejection fraction 30 to 35% secondary to ischemic cardiomyopathy status post ICD/CRT.  Patient has known coronary artery disease not candidate for revascularization.  Recommend now to switch him from prasugrel to clopidogrel since he is also on oral anticoagulation for atrial fibrillation.  If patient CO2 remains elevated may need thoracentesis will hold Eliquis for now     3.  Atrial fibrillation.  Patient with history of paroxysmal atrial fibrillation on oral amiodarone status post ICD CRT and Eliquis.  For now we will hold Eliquis since patient may require thoracentesis by pulmonary.  Heart rate better controlled on amiodarone drip we will monitor.  Hold Eliquis until he gets thoracentesis will restart after.     4.  Hyperlipidemia continue statin since liver function test is stable.     5. acute on chronic renal failure.  Multifactorial.  Nephrology on board.  CVC 01/13/24 Triple lumen Non-tunneled Right  Internal jugular (Active)   Line Necessity Intravenous medication therapy 01/14/24 0800   Line Necessity Reviewed With Dr. Bailey 01/14/24 0800   Site Assessment Clean;Dry;Intact 01/14/24 0800   Proximal Lumen Status Blood return noted;Flushed;Infusing 01/14/24 0800   Medial 1 Lumen Status Blood return noted;Flushed;Infusing 01/14/24 0800   Distal Lumen Status Blood return noted;Flushed;Infusing 01/14/24 0800   Dressing Type Antimicrobial patch;Transparent 01/14/24 0800   Dressing Status Clean;Dry;Occlusive 01/14/24 0800   Number of days: 1       Peripheral IV 01/03/24 22 G Right Antecubital (Active)   Site Assessment Clean;Dry;Intact 01/14/24 0800   Dressing Type Transparent 01/14/24 0800   Line Status Blood return noted;Flushed;Saline locked;Capped 01/14/24 0800   Dressing Status Clean;Dry;Occlusive 01/14/24 0800   Number of days: 11       Urethral Catheter 16 Fr. (Active)   Site Assessment Clean;Skin intact 01/14/24 0800   Collection Container Standard drainage bag 01/14/24 0800   Securement Method Securing device (Describe) 01/14/24 0800   Reason for Continuing Urinary Catheterization accurate hourly measurement of urine volume in a critically ill patient that cannot be assessed by other volumes and urine collection strategies 01/14/24 0800   Number of days: 11       Code Status:  DNR and No Intubation      Cinda Pedersen MD

## 2024-01-14 NOTE — PROGRESS NOTES
Hai Spaulding is a 73 y.o. male on day 10 of admission presenting with Acute respiratory failure (CMS/HCC).      Subjective   Much more alert this am. He's on BIPAP. Denies pain. Indicates he wants to stay on BIPAP for right now.    Decreasing amounts of norepinephrine.         Objective     Last Recorded Vitals  /78   Pulse 91   Temp 36.1 °C (97 °F)   Resp 18   Wt 78.2 kg (172 lb 6.4 oz)   SpO2 95%   Intake/Output last 3 Shifts:    Intake/Output Summary (Last 24 hours) at 1/14/2024 0812  Last data filed at 1/14/2024 0611  Gross per 24 hour   Intake 767.82 ml   Output 1565 ml   Net -797.18 ml         Admission Weight  Weight: 79.4 kg (175 lb 1.6 oz) (01/04/24 2300)    Daily Weight  01/14/24 : 78.2 kg (172 lb 6.4 oz)    Image Results  XR chest 1 view  Narrative: Interpreted By:  Rahul Staples,   STUDY:  XR CHEST 1 VIEW; 1/13/2024 2:51 pm      INDICATION:  CLINICAL INFORMATION: Signs/Symptoms:central line and thoracentesis.      COMPARISON:  01/13/2024 at 824 hours      ACCESSION NUMBER(S):  [id number]      ORDERING CLINICIAN:  CRISTAL MARCH      TECHNIQUE:  Portable chest one view.      FINDINGS:  The cardiac size is indeterminate in view of the AP projection.  A  cardiac pacemaker with biventricular as well as right atrial leads is  noted. There is a right internal jugular venous catheter now present,  new compared to the prior study with the tip somewhat obscured by the  cardiac pacemaker leads. The tip however appears to terminate above  the right atrium in the SVC distribution. There is no evidence for  pneumothorax.      Decrease in the pleural effusion on the left in this patient with  history of thoracentesis. There is no definite evidence for  pneumothorax within limits of this study.      There are bilateral infiltrates and effusions present, possibly  secondary to pulmonary edema and CHF.      Impression: 1. Status post right-sided central line insertion without  pneumothorax as  above.  2. Status post left thoracentesis without evidence for pneumothorax.  3. Persistent infiltrates and effusions bilaterally suggesting  possibility of pulmonary edema and CHF.      MACRO:  none      Signed by: Rahul Staples 1/13/2024 3:02 PM  Dictation workstation:   [id number]  CT chest wo IV contrast  Narrative: Interpreted By:  Michael Fuentes,   STUDY:  CT CHEST WO IV CONTRAST; ;  1/13/2024 9:09 am      INDICATION:  Signs/Symptoms:worsening chest xray. Pneumonia      COMPARISON:  01/03/2024      ACCESSION NUMBER(S):  [id number]      ORDERING CLINICIAN:  LYNSEY HERNDON      TECHNIQUE:  Serial axial unenhanced CT images obtained of the chest. Images  reformatted in the coronal and sagittal projection      All CT examinations are performed with 1 or more of the following  dose reduction techniques: Automated exposure control, adjustment of  mA and/or kv according to patient's size, or use of iterative  reconstruction techniques.      FINDINGS:  Mediastinum demonstrates lymphadenopathy with multiple enlarged  paratracheal and pretracheal lymph nodes. There is a right  paratracheal lymph node identified measuring 1.9 x 1.4 cm intervally  increased in size from prior imaging where it measured 1.4 x 1.1 cm.  Other lymph nodes have intervally increased in size. There is a  precarinal lymph node identified measuring 12 mm in the short axis.  Evaluation of the aidan limited without IV contrast. No significant  hilar lymphadenopathy. Esophagus is gas-filled with component of  refluxed suggested distally.      Heart and great vessels demonstrate ascending thoracic aorta to  measure 3.2 cm. There is mild vascular calcification of the aortic  arch. Diffuse qualitative coronary artery calcification is  demonstrated. Cardiomegaly noted.      Large bilateral pleural effusions are demonstrated  left-greater-than-right. Findings increased from prior imaging. There  is left lower lobar collapse with no asymmetric density  within the  atelectatic lung. Also, there is compressive atelectasis within the  left upper lobe with partial collapse within the lingula. The right  lower lobe demonstrates partial lobar collapse in relation to  compressive atelectasis. Lung parenchyma demonstrates septal  thickening superimposed ground-glass airspace infiltrate more focal  consolidation in the areas of septal thickening within bilateral lung  fields which is progressed from prior imaging which may reflect  alveolar component of pulmonary edema. However, the areas of  consolidation may reflect superimposed component of infectious  infiltrate.      Included portions visualized abdomen demonstrate mild ascites in  particular perihepatic location. There is contrast demonstrated  within the colon. A cyst is noted in the superior pole of the right  kidney measuring 1.5 cm.      Visualized osseous structures demonstrate no compression deformity in  the spine.                  Impression: 1. Interval increase in bilateral pleural effusions with large  bilateral effusions left-greater-than-right. There is resultant left  lower lobar collapse with compressive atelectasis in particular in  the left upper lobe as well as right lower lobe.      2. Patchy areas of airspace consolidation intervally developed from  prior imaging with more focal septal thickening in the areas of  consolidation may reflect alveolar component of pulmonary edema.  However, superimposed infectious infiltrate is not excluded. No dense  airspace consolidation          MACRO:  None      Signed by: Michael Fuentes 1/13/2024 9:32 AM  Dictation workstation:   [id number]  XR chest 1 view  Narrative: Interpreted By:  Michael Fuentes,   STUDY:  XR CHEST 1 VIEW 1/13/2024 8:31 am      INDICATION:  Signs/Symptoms:respiratory distress      COMPARISON:  01/12/2024      ACCESSION NUMBER(S):  [id number]      ORDERING CLINICIAN:  CRISTAL MARCH      TECHNIQUE:  Single AP view chest       FINDINGS:  Cardiomediastinal silhouette is enlarged with mild cardiomegaly.  Left-sided pacemaker with leads in the region of the right atrium,  right ventricle, and coronary sinus. There is generalized increased  interstitial prominence with cephalization as well as patchy alveolar  airspace infiltrate in bilateral lung fields without significant  change. There is improved aeration at the right lung base with  component of layering basilar effusion improved. However, there is  persistent left-sided pleural effusion with interval increased from  prior imaging with component of compressive atelectasis.                  Impression: 1. Interval increase in left-sided pleural effusion with compressive  atelectasis with moderate left-sided pleural effusion      2. Patchy alveolar airspace infiltrate in bilateral lung fields with  alveolar component of pulmonary edema suggested.      Signed by: Michael Fuentes 1/13/2024 8:48 AM  Dictation workstation:   [id number]      Physical Exam  General:  awake   Lungs: diminished but fairly clear   Heart: irregular, no LE edema BL   GI: abdomen soft, nontender, nondistended, BS present   MSK: no joint effusion or deformity   Skin: no rashes, erythema, or ecchymosis   Neuro: awake, answers yes/no questions      Relevant Results               Results for orders placed or performed during the hospital encounter of 01/04/24 (from the past 24 hour(s))   POCT GLUCOSE   Result Value Ref Range    POCT Glucose 176 (H) 74 - 99 mg/dL   Blood Gas Arterial Full Panel   Result Value Ref Range    POCT pH, Arterial 7.36 (L) 7.38 - 7.42 pH    POCT pCO2, Arterial 42 38 - 42 mm Hg    POCT pO2, Arterial 137 (H) 85 - 95 mm Hg    POCT SO2, Arterial 99 94 - 100 %    POCT Oxy Hemoglobin, Arterial 96.6 94.0 - 98.0 %    POCT Hematocrit Calculated, Arterial 39.0 (L) 41.0 - 52.0 %    POCT Sodium, Arterial 139 136 - 145 mmol/L    POCT Potassium, Arterial 4.1 3.5 - 5.3 mmol/L    POCT Chloride, Arterial 110  (H) 98 - 107 mmol/L    POCT Ionized Calcium, Arterial 1.17 1.10 - 1.33 mmol/L    POCT Glucose, Arterial 205 (H) 74 - 99 mg/dL    POCT Lactate, Arterial 1.3 0.4 - 2.0 mmol/L    POCT Base Excess, Arterial -1.8 -2.0 - 3.0 mmol/L    POCT HCO3 Calculated, Arterial 23.7 22.0 - 26.0 mmol/L    POCT Hemoglobin, Arterial 12.9 (L) 13.5 - 17.5 g/dL    POCT Anion Gap, Arterial 9 (L) 10 - 25 mmo/L    Patient Temperature 37.0 degrees Celsius    FiO2 50 %    Ipap CMH2O 15.0 cm H2O    Epap CMH2O 5.0 cm H2O    Site of Arterial Puncture Radial Right     Byron's Test Positive    POCT GLUCOSE   Result Value Ref Range    POCT Glucose 195 (H) 74 - 99 mg/dL   pH, Body Fluid   Result Value Ref Range    pH, Fluid 7.20 See Below   Body Fluid Cell Count   Result Value Ref Range    Color, Fluid Straw Colorless, Straw, Yellow    Clarity, Fluid Clear Clear    WBC, Fluid 224 See Comment /uL    RBC, Fluid 2,000 0  /uL /uL   Lactate Dehydrogenase, Fluid   Result Value Ref Range    LD, Fluid 63 Not established. U/L   Glucose, Fluid   Result Value Ref Range    Glucose, Fluid 193 Not established mg/dL   Protein, Total Fluid   Result Value Ref Range    Protein, Total Fluid 1.3 Not established g/dL   Body Fluid Differential   Result Value Ref Range    Neutrophils %, Manual, Fluid 29 <25 % %    Lymphocytes %, Manual, Fluid 43 <75 % %    Mono/Macrophages %, Manual, Fluid 28 <70 % %    Total Cells Counted, Fluid 100    Sterile Fluid Culture/Smear    Specimen: Pleural; Fluid   Result Value Ref Range    Gram Stain No polymorphonuclear leukocytes seen     Gram Stain No organisms seen    Blood Gas Arterial Full Panel   Result Value Ref Range    POCT pH, Arterial 7.39 7.38 - 7.42 pH    POCT pCO2, Arterial 42 38 - 42 mm Hg    POCT pO2, Arterial 181 (H) 85 - 95 mm Hg    POCT SO2, Arterial 100 94 - 100 %    POCT Oxy Hemoglobin, Arterial 97.6 94.0 - 98.0 %    POCT Hematocrit Calculated, Arterial 38.0 (L) 41.0 - 52.0 %    POCT Sodium, Arterial 139 136 - 145 mmol/L     POCT Potassium, Arterial 3.9 3.5 - 5.3 mmol/L    POCT Chloride, Arterial 111 (H) 98 - 107 mmol/L    POCT Ionized Calcium, Arterial 1.18 1.10 - 1.33 mmol/L    POCT Glucose, Arterial 115 (H) 74 - 99 mg/dL    POCT Lactate, Arterial 2.0 0.4 - 2.0 mmol/L    POCT Base Excess, Arterial 0.3 -2.0 - 3.0 mmol/L    POCT HCO3 Calculated, Arterial 25.4 22.0 - 26.0 mmol/L    POCT Hemoglobin, Arterial 12.6 (L) 13.5 - 17.5 g/dL    POCT Anion Gap, Arterial 7 (L) 10 - 25 mmo/L    Patient Temperature 37.0 degrees Celsius    FiO2 50 %    Apparatus      Ventilator Rate 12 bpm    Ipap CMH2O 15.0 cm H2O    Epap CMH2O 5.0 cm H2O    Site of Arterial Puncture Radial Left     Byron's Test Positive    Hemoglobin and hematocrit, blood   Result Value Ref Range    Hemoglobin 12.0 (L) 13.5 - 17.5 g/dL    Hematocrit 37.1 (L) 41.0 - 52.0 %   POCT GLUCOSE   Result Value Ref Range    POCT Glucose 85 74 - 99 mg/dL   POCT GLUCOSE   Result Value Ref Range    POCT Glucose 65 (L) 74 - 99 mg/dL   POCT GLUCOSE   Result Value Ref Range    POCT Glucose 110 (H) 74 - 99 mg/dL   POCT GLUCOSE   Result Value Ref Range    POCT Glucose 112 (H) 74 - 99 mg/dL   Comprehensive Metabolic Panel   Result Value Ref Range    Glucose 132 (H) 65 - 99 mg/dL    Sodium 142 133 - 145 mmol/L    Potassium 4.1 3.4 - 5.1 mmol/L    Chloride 109 (H) 97 - 107 mmol/L    Bicarbonate 22 (L) 24 - 31 mmol/L    Urea Nitrogen 43 (H) 8 - 25 mg/dL    Creatinine 2.00 (H) 0.40 - 1.60 mg/dL    eGFR 35 (L) >60 mL/min/1.73m*2    Calcium 7.5 (L) 8.5 - 10.4 mg/dL    Albumin 2.1 (L) 3.5 - 5.0 g/dL    Alkaline Phosphatase 398 (H) 35 - 125 U/L    Total Protein 5.4 (L) 5.9 - 7.9 g/dL    AST 19 5 - 40 U/L    Bilirubin, Total 0.3 0.1 - 1.2 mg/dL    ALT <5 (L) 5 - 40 U/L    Anion Gap 11 <=19 mmol/L   CBC   Result Value Ref Range    WBC 15.2 (H) 4.4 - 11.3 x10*3/uL    nRBC 1.7 (H) 0.0 - 0.0 /100 WBCs    RBC 4.15 (L) 4.50 - 5.90 x10*6/uL    Hemoglobin 12.0 (L) 13.5 - 17.5 g/dL    Hematocrit 36.5 (L) 41.0 - 52.0 %     MCV 88 80 - 100 fL    MCH 28.9 26.0 - 34.0 pg    MCHC 32.9 32.0 - 36.0 g/dL    RDW 19.4 (H) 11.5 - 14.5 %    Platelets 219 150 - 450 x10*3/uL   POCT GLUCOSE   Result Value Ref Range    POCT Glucose 101 (H) 74 - 99 mg/dL   POCT GLUCOSE   Result Value Ref Range    POCT Glucose 108 (H) 74 - 99 mg/dL     *Note: Due to a large number of results and/or encounters for the requested time period, some results have not been displayed. A complete set of results can be found in Results Review.         Scheduled medications  acetaminophen, 650 mg, oral, TID  acetylcysteine, 3 mL, nebulization, BID  atorvastatin, 80 mg, oral, Nightly  budesonide, 0.5 mg, nebulization, BID  carbidopa-levodopa, 1 tablet, oral, TID  clopidogrel, 75 mg, oral, Daily  cyclobenzaprine, 10 mg, oral, Nightly  dapagliflozin propanediol, 10 mg, oral, Daily  gabapentin, 200 mg, oral, Nightly  insulin glargine, 10 Units, subcutaneous, Daily  insulin lispro, 0-10 Units, subcutaneous, TID with meals  ipratropium-albuteroL, 3 mL, nebulization, TID  lactobacillus acidophilus, 1 tablet, oral, BID  levothyroxine, 25 mcg, oral, Daily before breakfast  metroNIDAZOLE, 500 mg, intravenous, q8h  mirtazapine, 15 mg, oral, Nightly  nystatin, 1 Application, Topical, BID  pantoprazole, 40 mg, oral, Daily before breakfast  QUEtiapine, 25 mg, oral, Nightly  sertraline, 100 mg, oral, Daily  SITagliptin phosphate, 50 mg, oral, Daily  sodium chloride, 1 spray, Each Nostril, TID  vancomycin, 125 mg, oral, 4x daily      Continuous medications  amiodarone, 0.5 mg/min, Last Rate: 0.5 mg/min (01/14/24 0611)  norepinephrine, 0.01-3 mcg/kg/min, Last Rate: 0.05 mcg/kg/min (01/14/24 0745)    PRN medications  PRN medications: albuterol, dextrose 10 % in water (D10W), dextrose, glucagon, melatonin, ondansetron, oxygen, oxygen    Assessment/Plan                  Principal Problem:    Acute respiratory failure (CMS/HCC)  Active Problems:    Atrial fibrillation (CMS/[facility])    CAD (coronary  artery disease)    Parkinson's disease    Cerebral infarction, unspecified (CMS/HCC)    C. difficile diarrhea    Acute hypoxic respiratory failure  - s/p thoracentesis with 1L removal on 1/13 from left  - trial of NC yesterday for a few hours, but work of breathing increased and he was put on BIPAP and remained on overnight. Wants to remain on now, but I think we should trial off this morning. NC vs HFNC (perhaps will help WOB)-- will discuss with pulm    Dysphagia- hasn't had anything since coming to ICU besides PO meds  - as above, seems to do okay with thickened liquids but doesn't really like his modified diet. Had a magic cup this morning and had some milkshake from his wife, though not very much  - discussed with speech therapy-- okay for smoothies and/or milkshakes with banana added-- he tolerated some smoothie last night but now refusing  - speech therapy following    Metabolic encephalopathy- improved though still present  - suspect delirium from hospital stays, infection, poor sleep/wake cycle    Abnormal rhythm  Afib  - afib with intermittent fast rates-- flips in and out of pacing. Discussed with Dr. Pedersen. He started amiodarone drip  - on eliquis    C diff colitis  - PO vancomycin for total of 14 days; IV flagyl while NPO on bipap but ideally will switch back to PO vanco as flagyl can interact with amio    COVID 19  - completed course of decadron    History of stroke  - recent stroke at end of the year, aspirin, plavix, statin    Chronic systolic heart failure  - EF 30-35%; continue farxiga and metoprolol    Parkinsonism  - on levodopa carbidopa- very important patient takes this for his mental status. If it comes to the point that he cannot take it-- will opt to place NG. Wife is okay with PEG tube if absolutely necessary    DM type 2  - on insulin    CKD 3 with MATEO- improving  - not on fluids. Creatinine stable though not having significant urine output.    Pain  Sacral wound  - tylenol                   Seema Bailey, DO

## 2024-01-14 NOTE — PROGRESS NOTES
Hai Spaulding is a 73 y.o. male on day 10 of admission presenting with Acute respiratory failure (CMS/HCC).    Subjective   Interval History:  S/p left-sided thoracentesis yesterday  Patient on BiPAP per his request  Awake, responsive  No diarrhea reported  Review of Systems   All other systems reviewed and are negative.      Objective   Range of Vitals (last 24 hours)  Heart Rate:  []   Temp:  [35.6 °C (96.1 °F)-36.1 °C (97 °F)]   Resp:  [11-38]   BP: ()/()   Weight:  [77.4 kg (170 lb 10.2 oz)-78.2 kg (172 lb 6.4 oz)]   SpO2:  [85 %-100 %]   Daily Weight  01/14/24 : 78.2 kg (172 lb 6.4 oz)    Body mass index is 25.45 kg/m².    Physical Exam  Constitutional:       Appearance: Awake, responsive  HENT:      Head: Normocephalic.      Nose: Nose normal.      Mouth/Throat:      Mouth: Mucous membranes are moist.      Pharynx: Oropharynx is clear.   Eyes:      Extraocular Movements: Extraocular movements intact.      Conjunctiva/sclera: Conjunctivae normal.      Comments: Resolving right periorbital ecchymosis   Cardiovascular:      Rate and Rhythm: Normal rate and regular rhythm.   Pulmonary:      Effort: Pulmonary effort is normal.      Breath sounds: Normal breath sounds.   Abdominal:      General: Bowel sounds are normal.      Palpations: Abdomen is soft.      Tenderness: There is no abdominal tenderness.   Musculoskeletal:         General: Normal range of motion.      Cervical back: Normal range of motion and neck supple.   Skin:     General: Skin is warm and dry.   Neurological:      General: No focal deficit present.      Mental Status: He is alert and oriented to person, place, and time.   Psychiatric:         Mood and Affect: Not agitated        Behavior: Behavior not agitated    Antibiotics  amiodarone (Pacerone) tablet 200 mg  apixaban (Eliquis) tablet 5 mg  atorvastatin (Lipitor) tablet 80 mg  carbidopa-levodopa (Sinemet CR)  mg ER tablet 1 tablet  clopidogrel (Plavix) tablet 75  mg  dapagliflozin propanediol (Farxiga) tablet 10 mg  lactobacillus acidophilus capsule 1 capsule  levothyroxine (Synthroid, Levoxyl) tablet 25 mcg  metoprolol succinate XL (Toprol-XL) 24 hr tablet 25 mg  QUEtiapine (SEROquel) tablet 25 mg  sertraline (Zoloft) tablet 100 mg  SITagliptin phosphate (Januvia) tablet 50 mg  insulin glargine (Lantus) injection 10 Units  dextrose 50 % injection 25 g  glucagon (Glucagen) injection 1 mg  dextrose 10 % in water (D10W) infusion  insulin lispro (HumaLOG) injection 0-10 Units  vancomycin (Vancocin) capsule 125 mg  fluconazole in NaCl (iso-osm) (Diflucan) IVPB 200 mg  nystatin (Mycostatin) 100,000 unit/gram powder 1 Application  ipratropium-albuteroL (Duo-Neb) 0.5-2.5 mg/3 mL nebulizer solution 3 mL  budesonide (Pulmicort) 0.5 mg/2 mL nebulizer solution 0.5 mg  lactobacillus acidophilus tablet 1 tablet  vancomycin (Vancocin) capsule 125 mg  ipratropium-albuteroL (Duo-Neb) 0.5-2.5 mg/3 mL nebulizer solution 3 mL  acetaminophen (Tylenol) tablet 650 mg  ondansetron (Zofran) injection 4 mg  melatonin tablet 5 mg  cyclobenzaprine (Flexeril) tablet 10 mg  fluticasone furoate-vilanteroL (Breo Ellipta) 200-25 mcg/dose inhaler 1 puff  ipratropium-albuteroL (Duo-Neb) 0.5-2.5 mg/3 mL nebulizer solution 3 mL  pantoprazole (ProtoNix) EC tablet 40 mg  albuterol 2.5 mg /3 mL (0.083 %) nebulizer solution 2.5 mg  acetaminophen (Tylenol) tablet 650 mg  ondansetron (Zofran) injection 4 mg  melatonin tablet 5 mg  potassium chloride (Klor-Con) packet 40 mEq  potassium chloride 20 mEq in 100 mL IV premix  furosemide (Lasix) injection 40 mg  barium sulfate (Varibar Pudding) 40 % (w/v), 30% (w/w) oral paste 20 mL  barium sulfate (Varibar Nectar, Varibar Honey) 40 % (w/v) suspension 20 mL  barium sulfate (Varibar Honey) 40 % (w/v) 29% (w/w) suspension 20 mL  barium sulfate (Varibar THIN Liquid) 81 % (w/w) suspension 20 mL  sodium chloride 0.9% infusion  oxygen (O2) therapy  vancomycin (Firvanq) solution  125 mg  vancomycin (Vancocin) capsule 125 mg  ipratropium-albuteroL (Duo-Neb) 0.5-2.5 mg/3 mL nebulizer solution 3 mL  gabapentin (Neurontin) capsule 200 mg  sodium chloride (Ocean) 0.65 % nasal spray 1 spray  mirtazapine (Remeron) tablet 15 mg  sodium chloride (Ocean) 0.65 % nasal spray 1 spray  acetaminophen (Tylenol) tablet 650 mg  oxygen (O2) therapy  acetylcysteine (Mucomyst) 200 mg/mL (20 %) nebulizer solution 600 mg  metroNIDAZOLE (Flagyl) 500 mg in NaCl (iso-os) 100 mL  acetaminophen (Ofirmev) injection 1,000 mg  norepinephrine (Levophed) 8 mg in dextrose 5% 250 mL (0.032 mg/mL) infusion (premix)  norepinephrine in dextrose 5 % (Levophed) infusion  - Omnicell Override Pull  digoxin (Lanoxin) injection 500 mcg  amiodarone (Nexterone) 360 mg in dextrose,iso-osm 200 mL (1.8 mg/mL) infusion (premix)  amiodarone (Nexterone) 360 mg in dextrose,iso-osm 200 mL (1.8 mg/mL) infusion (premix)      Relevant Results  Labs  Results from last 72 hours   Lab Units 01/14/24  0347 01/13/24  1530 01/12/24  0545   WBC AUTO x10*3/uL 15.2*  --  12.6*   HEMOGLOBIN g/dL 12.0* 12.0* 12.8*   HEMATOCRIT % 36.5* 37.1* 39.6*   PLATELETS AUTO x10*3/uL 219  --  252   NEUTROS PCT AUTO %  --   --  86.4   LYMPHS PCT AUTO %  --   --  7.0   MONOS PCT AUTO %  --   --  5.6   EOS PCT AUTO %  --   --  0.0     Results from last 72 hours   Lab Units 01/14/24  0347 01/13/24  0500 01/12/24  0545   SODIUM mmol/L 142 144 142   POTASSIUM mmol/L 4.1 4.0 4.6   CHLORIDE mmol/L 109* 110* 108*   CO2 mmol/L 22* 21* 22*   BUN mg/dL 43* 45* 43*   CREATININE mg/dL 2.00* 2.00* 2.10*   GLUCOSE mg/dL 132* 209* 138*   CALCIUM mg/dL 7.5* 7.7* 7.7*   ANION GAP mmol/L 11 13 12   EGFR mL/min/1.73m*2 35* 35* 33*   PHOSPHORUS mg/dL  --   --  2.7     Results from last 72 hours   Lab Units 01/14/24  0347 01/12/24  0545   ALK PHOS U/L 398*  --    BILIRUBIN TOTAL mg/dL 0.3  --    PROTEIN TOTAL g/dL 5.4*  --    ALT U/L <5*  --    AST U/L 19  --    ALBUMIN g/dL 2.1* 2.4*      Estimated Creatinine Clearance: 33.4 mL/min (A) (by C-G formula based on SCr of 2 mg/dL (H)).  C-Reactive Protein   Date Value Ref Range Status   11/29/2023 2.53 (H) <1.00 mg/dL Final     CRP   Date Value Ref Range Status   12/31/2022 39.72 (A) mg/dL Final     Comment:     REF VALUE  < 1.00     12/14/2021 2.16 (A) mg/dL Final     Comment:     REF VALUE  < 1.00       Microbiology  Susceptibility data from last 14 days.  Collected Specimen Info Organism   01/03/24 Urine from Straight Catheter Candida glabrata   Reviewed  Imaging  XR chest 1 view    Result Date: 1/13/2024  Interpreted By:  Rahul Staples, STUDY: XR CHEST 1 VIEW; 1/13/2024 2:51 pm   INDICATION: CLINICAL INFORMATION: Signs/Symptoms:central line and thoracentesis.   COMPARISON: 01/13/2024 at 824 hours   ACCESSION NUMBER(S): [id number]   ORDERING CLINICIAN: CRISTAL MARCH   TECHNIQUE: Portable chest one view.   FINDINGS: The cardiac size is indeterminate in view of the AP projection.  A cardiac pacemaker with biventricular as well as right atrial leads is noted. There is a right internal jugular venous catheter now present, new compared to the prior study with the tip somewhat obscured by the cardiac pacemaker leads. The tip however appears to terminate above the right atrium in the SVC distribution. There is no evidence for pneumothorax.   Decrease in the pleural effusion on the left in this patient with history of thoracentesis. There is no definite evidence for pneumothorax within limits of this study.   There are bilateral infiltrates and effusions present, possibly secondary to pulmonary edema and CHF.       1. Status post right-sided central line insertion without pneumothorax as above. 2. Status post left thoracentesis without evidence for pneumothorax. 3. Persistent infiltrates and effusions bilaterally suggesting possibility of pulmonary edema and CHF.   MACRO: none   Signed by: Rahul Staples 1/13/2024 3:02 PM Dictation workstation:    [id number]    CT chest wo IV contrast    Result Date: 1/13/2024  Interpreted By:  Michael Fuentes, STUDY: CT CHEST WO IV CONTRAST; ;  1/13/2024 9:09 am   INDICATION: Signs/Symptoms:worsening chest xray. Pneumonia   COMPARISON: 01/03/2024   ACCESSION NUMBER(S): [id number]   ORDERING CLINICIAN: LYNSEY HERNDON   TECHNIQUE: Serial axial unenhanced CT images obtained of the chest. Images reformatted in the coronal and sagittal projection   All CT examinations are performed with 1 or more of the following dose reduction techniques: Automated exposure control, adjustment of mA and/or kv according to patient's size, or use of iterative reconstruction techniques.   FINDINGS: Mediastinum demonstrates lymphadenopathy with multiple enlarged paratracheal and pretracheal lymph nodes. There is a right paratracheal lymph node identified measuring 1.9 x 1.4 cm intervally increased in size from prior imaging where it measured 1.4 x 1.1 cm. Other lymph nodes have intervally increased in size. There is a precarinal lymph node identified measuring 12 mm in the short axis. Evaluation of the aidan limited without IV contrast. No significant hilar lymphadenopathy. Esophagus is gas-filled with component of refluxed suggested distally.   Heart and great vessels demonstrate ascending thoracic aorta to measure 3.2 cm. There is mild vascular calcification of the aortic arch. Diffuse qualitative coronary artery calcification is demonstrated. Cardiomegaly noted.   Large bilateral pleural effusions are demonstrated left-greater-than-right. Findings increased from prior imaging. There is left lower lobar collapse with no asymmetric density within the atelectatic lung. Also, there is compressive atelectasis within the left upper lobe with partial collapse within the lingula. The right lower lobe demonstrates partial lobar collapse in relation to compressive atelectasis. Lung parenchyma demonstrates septal thickening superimposed ground-glass  airspace infiltrate more focal consolidation in the areas of septal thickening within bilateral lung fields which is progressed from prior imaging which may reflect alveolar component of pulmonary edema. However, the areas of consolidation may reflect superimposed component of infectious infiltrate.   Included portions visualized abdomen demonstrate mild ascites in particular perihepatic location. There is contrast demonstrated within the colon. A cyst is noted in the superior pole of the right kidney measuring 1.5 cm.   Visualized osseous structures demonstrate no compression deformity in the spine.             1. Interval increase in bilateral pleural effusions with large bilateral effusions left-greater-than-right. There is resultant left lower lobar collapse with compressive atelectasis in particular in the left upper lobe as well as right lower lobe.   2. Patchy areas of airspace consolidation intervally developed from prior imaging with more focal septal thickening in the areas of consolidation may reflect alveolar component of pulmonary edema. However, superimposed infectious infiltrate is not excluded. No dense airspace consolidation     MACRO: None   Signed by: Michael Fuentes 1/13/2024 9:32 AM Dictation workstation:   [id number]    XR chest 1 view    Result Date: 1/13/2024  Interpreted By:  Michael Fuentes, STUDY: XR CHEST 1 VIEW 1/13/2024 8:31 am   INDICATION: Signs/Symptoms:respiratory distress   COMPARISON: 01/12/2024   ACCESSION NUMBER(S): [id number]   ORDERING CLINICIAN: CRISTAL MARCH   TECHNIQUE: Single AP view chest   FINDINGS: Cardiomediastinal silhouette is enlarged with mild cardiomegaly. Left-sided pacemaker with leads in the region of the right atrium, right ventricle, and coronary sinus. There is generalized increased interstitial prominence with cephalization as well as patchy alveolar airspace infiltrate in bilateral lung fields without significant change. There is improved aeration  at the right lung base with component of layering basilar effusion improved. However, there is persistent left-sided pleural effusion with interval increased from prior imaging with component of compressive atelectasis.             1. Interval increase in left-sided pleural effusion with compressive atelectasis with moderate left-sided pleural effusion   2. Patchy alveolar airspace infiltrate in bilateral lung fields with alveolar component of pulmonary edema suggested.   Signed by: Michael Fuentes 1/13/2024 8:48 AM Dictation workstation:   [id number]    XR chest 1 view    Result Date: 1/12/2024  Interpreted By:  Sugey Tinoco, STUDY: XR CHEST 1 VIEW 1/12/2024 3:49 pm   INDICATION: Signs/Symptoms:hypoxia   COMPARISON: 01/03/2024   ACCESSION NUMBER(S): [id number]   ORDERING CLINICIAN: CRISTAL MARCH   TECHNIQUE: AP erect view of the chest   FINDINGS: The cardiac size is mildly enlarged with biventricular ICD leads observed in right atrium, right ventricle, and coronary sinus.   Pulmonary edema is present with worsening of bilateral pulmonary infiltrates and with bilateral pleural effusion seen. Left effusion appears larger than the right with left effusion increased in size.       Pulmonary edema is now seen with increased size of left pleural effusion which is now moderate in amount. Small right pleural effusion is also present.   Signed by: Sugey Tinoco 1/12/2024 4:27 PM Dictation workstation:   [id number]    ECG 12 Lead    Result Date: 1/10/2024  Wide QRS tachycardia with Premature supraventricular complexes and with occasional Premature ventricular complexes Nonspecific intraventricular block Cannot rule out Anteroseptal infarct , age undetermined T wave abnormality, consider inferolateral ischemia Abnormal ECG When compared with ECG of 14-DEC-2023 19:10, (unconfirmed) Wide QRS tachycardia has replaced Sinus rhythm Vent. rate has increased BY  57 BPM See ED provider note for full interpretation and clinical  correlation Confirmed by Alyssa Brown ([id number]) on 1/10/2024 4:16:56 PM    FL modified barium swallow study    Result Date: 1/9/2024  Interpreted By:  Chuck Montoya and Janezic Kimberly STUDY: FL MODIFIED BARIUM SWALLOW STUDY;; 1/8/2024 3:34 pm   INDICATION: Signs/Symptoms:dysphagia.   COMPARISON: None.   ACCESSION NUMBER(S): [id number]   ORDERING CLINICIAN: CRISTAL MARCH   TECHNIQUE: MBSS completed. Informed verbal consent obtained prior to completion of exam. Trials of thin, nectar thick, honey thick, puree,  and regular solids given. Total fluoroscopy time:  2 minutes 52 seconds Radiation exposure (Reference Air Kerma):  34.37 mGy Images:  9 series     SLP: Emiliana Collins M.A. [facility]-SLP, [facility]-S Phone/Pager: [phone] Option 2   SPEECH FINDINGS: Reason for referral: Aspiration due to comorbidities Patient hx: Per chart: This man who is debilitated from a stroke and a history of laryngeal cancer and repeat aspiration pneumonia has spent more time in the hospital than out of the hospital for the last 2 months.  He was most recently hospitalized here December 25 with COVID and aspiration pneumonia. Respiratory status: Nasal cannula 2L Previous diet: Regular/thin liquids   FINAL SPEECH RECOMMENDATIONS   Diet recommendations/feeding strategies: Puree/nectar thick liquids. Crush medication in puree or nectar thick liquids. Sit upright at 90 degrees for meals and medications.   Follow-up speech therapy recommended: Yes. Patient will benefit from continued dysphagia therapy for oropharyngeal exercises to allow for diet progression.   Short term goals: 1. Patient will tolerate recommended diet without pulmonary compromise 2. Patient will participate in oropharyngeal strengthening exercises. 3. SLP will assess for diet upgrade as appropriate. 4. Due to silent aspiration, patient will need a repeat MBS prior to liquid diet upgrade to thin.   Long term goals: Tolerated least restrictive diet without pulmonary  compromise   Education provided: Reviewed results with nursing.   Treatment Provided today: No.     Repeat study/ dc plan: Repeat MBS prior to upgrade to thin liquids, due to silent aspriation with thin liquids.   Mechanics of the swallow summary:   Oral phase: 1. Patient is unable to masticate solids, thus manually removed. Attempt at mashing with tongue was not successful. 2. Repetitive tongue motion with puree. Oral holding with all liquids and puree prior to swallow onset. 3. Swallow initiation at the pyriforms with thin liquids. At the valleculae with nectar and honey thick, and puree. 4. Trace oral residue, clears with reswallow.   Pharyngeal phase: 1. Partial superior laryngeal elevation. 2. Partial anterior hyoid excursion. 3. Partial epiglottic inversion. 4. Narrow column of contrast above vocal cords with  honey thick liquids and puree. Contrast enters airway above vocal cords (just underneath epiglottis) with no effort to eject. Narrow column of contrast enters airway below vocal cords with no effort to eject with straw. Contrast enters airway above vocal cords with teaspoon trial without effort to eject. 5. Collection of residue thoughout pharynx, mild and clears with spontaneous reswallows. 6. Narrow column of contrast between tongue base and pharyngeal wall.     SLP impressions with severity rating:   Silent aspiration with thin liquids. Laryngeal penetration noted with puree and honey thick liquids. Tolerated nectar thick liquids during testing constraints without difficulty. Unable to masticate solids. Unclear if this is due to edentulous status, or other comorbidities. Patient would benefit from continuation of dysphagia therapy to improve overall oral and pharyngeal strength, improve mastication and upgrade diet to least restrictive without pulmonary compromise.   Thin Liquids (MBSS) Rosenbek's Penetration Aspiration Scale, Thin Liquids (MBSS): 8. SILENT ASPIRATION - contrast passes glottis,  visible residue, NO pt response   Response to Aspiration, Thin Liquid (MBSS): absent response, silent aspiration,   Response to Pharyngeal Residue, Thin Liquid (MBSS): spontaneous clearing,   Nectar Thick Liquids (MBSS) Rosenbek's Penetration Aspiration Scale, Nectar thick liquids (MBSS): 1. NO ASPIRATION & NO PENETRATION - no aspiration, contrast does not enter airway   Response to Pharyngeal Residue, Nectar thick liquids (MBSS): spontaneous clearing,   Honey Thick Liquids (MBSS) Rosenbek's Penetration Aspiration Scale, Honey thick liquids (MBSS): 3. PENETRATION with LOW ASPIRATION risk - contrast remains above vocal cords, visible residue   Response to Pharyngeal Residue, Honey thick liquids (MBSS): spontaneous clearing,   Purees (MBSS) Rosenbek's Penetration Aspiration Scale, Purees (MBSS): 3. PENETRATION with LOW ASPIRATION risk - contrast remains above vocal cords, visible residue   Response to Pharyngeal Residue, Purees (MBSS): spontaneous clearing,     Speech Therapy section of this report signed by Emiliana Collins M.A., CCC-SLP, BCS-S on 1/8/2024 at 3:54 pm.   RADIOLOGY FINDINGS: There is laryngeal penetration and aspiration with thin liquid barium. There is laryngeal penetration without aspiration with nectar consistency, honey consistency, pudding consistency, and cookie consistency barium.   Radiology section of this report signed by Jan.       There is laryngeal penetration and aspiration with thin liquid barium. There is laryngeal penetration without aspiration with nectar consistency, honey consistency, pudding consistency, and cookie consistency barium.   MACRO: None   Signed by: Chuck Montoya 1/9/2024 5:02 PM Dictation workstation:   [id number]    ECG 12 lead    Result Date: 1/5/2024  Sinus bradycardia with frequent and consecutive Premature ventricular complexes and Fusion complexes Left axis deviation Nonspecific intraventricular block Inferior infarct , age undetermined Lateral injury  pattern ** ** ACUTE MI / STEMI ** ** Abnormal ECG When compared with ECG of 27-DEC-2023 15:18, Significant changes have occurred See ED provider note for full interpretation and clinical correlation Confirmed by Yomi Lainez ([id number]) on 1/5/2024 9:13:12 PM    ECG 12 lead    Result Date: 1/5/2024  Undetermined rhythm Left bundle branch block Abnormal ECG When compared with ECG of 03-JAN-2024 11:50, (unconfirmed) Current undetermined rhythm precludes rhythm comparison, needs review Questionable change in QRS duration Criteria for Inferior infarct are no longer Present See ED provider note for full interpretation and clinical correlation Confirmed by Yomi Lainez ([id number]) on 1/5/2024 9:12:07 PM    CT 3D reconstruction    Addendum Date: 1/5/2024    Interpreted By:  Chuck Argueta, ADDENDUM: Multiplanar 3D reformations are also generated and reviewed on independent workstation.   Signed by: Chuck Argueta 1/5/2024 2:19 PM   -------- ORIGINAL REPORT -------- Dictation workstation:   [id number]    Result Date: 1/5/2024  Interpreted By:  Chuck Argueta, STUDY: CT FACIAL BONES WO IV CONTRAST;  1/3/2024 1:14 pm   INDICATION: Signs/Symptoms:fall.   COMPARISON: None.   ACCESSION NUMBER(S): [id number]   ORDERING CLINICIAN: FIONA GOMEZ   TECHNIQUE: Contiguous axial CT sections were performed through the bones and orbits and supplemented with coronal and sagittal reformatted images. The study is limited by motion degradation.   FINDINGS: There is mild soft tissue swelling superior to the left orbit.   There is some deformity at the distal nasal bones greater on the right. This appearance is of indeterminate age and should be correlated to point tenderness. The remaining osseous structures demonstrate no sign of acute fracture allowing for motion degradation. There is no overt bone destruction or aggressive periosteal reaction. No lytic or blastic lesion is detected.   There are small fluid levels in both  maxillary sinuses, greater on the left. There is a small fluid level in the right sphenoid compartment. There is mucoperiosteal thickening in scattered fluid within the ethmoid air cells, greater on the left posteriorly. The frontal sinuses and mastoid air cells are clear and symmetrically aerated. There may be mild mucoperiosteal thickening at the floor of the left maxillary sinus.   The nasal septum is at the midline. The ostiomeatal units are patent bilaterally.   The visualized orbital contents are unremarkable. There is no intraorbital air density or fluid collection. The medial and inferior orbital walls are intact.   There are least 2 small foci of air density posterior to the right maxillary sinus though no apparent posterior wall fracture or defect. The significance of these findings is indeterminate.       Soft tissue swelling superior left orbit.   Mild deformity at the distal tip of the nasal bones greater on the right. This appearance is of uncertain age it should be correlated to point tenderness.   The remaining visualized osseous structures are intact.   Maxillary, ethmoid, and right sphenoid sinusitis with fluid levels.   Signed by: Chuck Argueta 1/5/2024 2:19 PM Dictation workstation:   [id number]    CT maxillofacial bones wo IV contrast    Addendum Date: 1/5/2024    Interpreted By:  Chuck Argueta, ADDENDUM: Multiplanar 3D reformations are also generated and reviewed on independent workstation.   Signed by: Chuck Argueta 1/5/2024 9:43 AM   -------- ORIGINAL REPORT -------- Dictation workstation:   [id number]    Result Date: 1/5/2024  Interpreted By:  Chuck Argueta, STUDY: CT FACIAL BONES WO IV CONTRAST;  1/3/2024 1:14 pm   INDICATION: Signs/Symptoms:fall.   COMPARISON: None.   ACCESSION NUMBER(S): [id number]   ORDERING CLINICIAN: FIONA GOMEZ   TECHNIQUE: Contiguous axial CT sections were performed through the bones and orbits and supplemented with coronal and  sagittal reformatted images. The study is limited by motion degradation.   FINDINGS: There is mild soft tissue swelling superior to the left orbit.   There is some deformity at the distal nasal bones greater on the right. This appearance is of indeterminate age and should be correlated to point tenderness. The remaining osseous structures demonstrate no sign of acute fracture allowing for motion degradation. There is no overt bone destruction or aggressive periosteal reaction. No lytic or blastic lesion is detected.   There are small fluid levels in both maxillary sinuses, greater on the left. There is a small fluid level in the right sphenoid compartment. There is mucoperiosteal thickening in scattered fluid within the ethmoid air cells, greater on the left posteriorly. The frontal sinuses and mastoid air cells are clear and symmetrically aerated. There may be mild mucoperiosteal thickening at the floor of the left maxillary sinus.   The nasal septum is at the midline. The ostiomeatal units are patent bilaterally.   The visualized orbital contents are unremarkable. There is no intraorbital air density or fluid collection. The medial and inferior orbital walls are intact.   There are least 2 small foci of air density posterior to the right maxillary sinus though no apparent posterior wall fracture or defect. The significance of these findings is indeterminate.       Soft tissue swelling superior left orbit.   Mild deformity at the distal tip of the nasal bones greater on the right. This appearance is of uncertain age it should be correlated to point tenderness.   The remaining visualized osseous structures are intact.   Maxillary, ethmoid, and right sphenoid sinusitis with fluid levels.   Signed by: Chuck Argueta 1/3/2024 1:56 PM Dictation workstation:   [id number]    ECG 12 lead    Result Date: 1/4/2024  Ventricular-paced rhythm with occasional and consecutive sinus complexes and with frequent and  consecutive Premature ventricular complexes with junctional escape complexes Abnormal ECG When compared with ECG of 04-JAN-2024 04:21, (unconfirmed) Previous ECG has undetermined rhythm, needs review See ED provider note for full interpretation and clinical correlation Confirmed by Yomi Lainez ([id number]) on 1/4/2024 9:24:49 AM    CT head wo IV contrast    Addendum Date: 1/3/2024    Interpreted By:  Chuck Argueta, ADDENDUM: Subdural collection over the right convexity is new from 12/31/2022 though is of CSF attenuation. There is no evidence of dense acute hemorrhage at this site.   There is some asymmetry of the lateral ventricles with the left larger than the right. This in part may be related to some residual mass effect from the right subdural collection though there is no significant midline shift or sulcal effacement.   Signed by: Chuck Argueta 1/3/2024 3:48 PM   -------- ORIGINAL REPORT -------- Dictation workstation:   [id number]    Result Date: 1/3/2024  Interpreted By:  Chuck Argueta, STUDY: CT HEAD WO IV CONTRAST;  1/3/2024 1:14 pm   INDICATION: Signs/Symptoms:fall, periorbital ecchymosis.   COMPARISON: 12/31/2022   ACCESSION NUMBER(S): [id number]   ORDERING CLINICIAN: FIONA GOMEZ   TECHNIQUE: Contiguous unenhanced axial CT sections are performed from the skull base to the vertex.   FINDINGS: The osseous structures are intact. There is small fluid levels in the maxillary sinuses, greater on the left as well as the right sphenoid compartment. There is partial opacification of the ethmoid air cells with mucoperiosteal thickening and fluid greater on the left. The visualized portions of the mastoid air cells are and frontal sinuses are clear.   There is severe generalized parenchymal volume loss with enlargement of the cortical sulci and CSF spaces. There is a CSF subdural collection overlying the right frontal convexity extending laterally over the temporoparietal convexity. This  finding is new from the previous study and measures 8 mm in maximum thickness. There may be a smaller CF density extra-axial collection over the left frontal convexity anteriorly.   There is hypodensity in the deep cerebral white matter bilaterally compatible with small-vessel ischemia. There is no sign of parenchymal hematoma. There is no dense extra-axial fluid collection. There is no mass effect or midline shift. There is some asymmetry of the lateral ventricles with the left larger than the right.       Chronic appearing low density extra-axial fluid collections overlying the frontal convexities, greater on the right. These findings are new from 12/31/2022.   Generalized parenchymal atrophy and small-vessel ischemic changes of the cerebral white matter.   Maxillary, ethmoid, and right sphenoid sinusitis with acute fluid levels.   No sign of acute intracranial hemorrhage or mass effect.   The visualized osseous structures are intact.   Signed by: Chuck Argueta 1/3/2024 1:42 PM Dictation workstation:   [id number]    CT chest abdomen pelvis wo IV contrast    Result Date: 1/3/2024  STUDY: CT Chest, Abdomen, and Pelvis without IV Contrast; 1/3/2024, 1:15PM. INDICATION: Shortness of breath and abdominal pain.  Leukocytosis. COMPARISON: CXR same day.  CT CAP 12/31/2022. ACCESSION NUMBER(S): [id number] ORDERING CLINICIAN: FIONA GOMEZ TECHNIQUE: CT of the chest, abdomen, and pelvis was performed.  Contiguous axial images were obtained at 3 mm slice thickness through the chest, abdomen, and pelvis.  Coronal and sagittal reconstructions at 3 mm slice thickness were performed.  No intravenous contrast was administered.  FINDINGS: Please note that the evaluation of vessels, lymph nodes and organs is limited without intravenous contrast. CHEST: MEDIASTINUM: The heart is enlarged in size without pericardial effusion. Biventricular AICD remains in place.  There is no thoracic aortic, aneurysm.  Scattered vascular  calcifications are seen along the arterial tree. LUNGS/PLEURA: There are bilateral pleural effusions greater on the left than the right, these appear decreased when compared with the prior especially on the right.  Adjacent compressive and subsegmental atelectatic changes are noted as well as some areas of probable consolidation. There is a focus of patchy infiltrate in the right upper lobe anteriorly in proximity to the anterior aspect of the first rib.  This appears slightly more prominent.  Trachea and mainstem bronchi appear stable, borderline narrowed Groundglass infiltrates noted previously appear improved with mild residual. LYMPH NODES: Mediastinal lymph nodes are mildly enlarged.  There is a pretracheal lymph node with the short axis diameter of 1.4 cm similar to the prior.  Detail is somewhat obscured, by artifact on the current study.  Patient's arms are included within the field-of-view. ABDOMEN:  LIVER: The liver is mildly enlarged, the right lobe measures 17 cm in CC dimension.  Assessment of parenchyma is limited by artifact.  Smooth surface contour.  Normal attenuation.  BILE DUCTS: No intrahepatic or extrahepatic biliary ductal dilatation.  GALLBLADDER: The gallbladder is surgically absent and this was also the case previously.  PANCREAS: No masses or ductal dilatation.  SPLEEN: The spleen is not clearly identified nor was it previously.  ADRENAL GLANDS: No thickening or nodules.  KIDNEYS AND URETERS: Kidneys are normal in size and location.  No renal or ureteral calculi.  There is a 2 cm cyst in the upper pole of the right kidney with small peripheral calcifications which are more prominent than on the prior.  No hydronephrosis of either kidney.  PELVIS:  BLADDER: There is increased bladder distention when compared with the prior. There is extrinsic pressure from the prostate and coarse prostate calcifications also present previously.  Increased attenuation in the dependent bladder appears to be  likely artifactual.  REPRODUCTIVE ORGANS: Coarse prostate calcifications are noted also present previously.  BOWEL: There are multiple fluid and air-filled loops of small and large bowel as can be seen with adynamic ileus.  VESSELS: No acute abnormalities identified.  Abdominal aorta is normal in caliber.  There are vascular calcifications along the arterial tree.  PERITONEUM/RETROPERITONEUM/LYMPH NODES: There are mesenteric and pelvic reticulations.  There is a small amount of presacral and pelvic fluid.  No pneumoperitoneum. No lymphadenopathy.  ABDOMINAL WALL AND SOFT TISSUES: Abdominal wall edema is noted especially laterally more so on the right and posteriorly.  BONES: No acute fracture or aggressive osseous lesion.  Lumbar dextroscoliosis is noted and there are degenerative changes of the spine and joints.    1.Bilateral pleural effusions greater on the left than the right, these appear decreased from the prior especially on the right. Adjacent compressive and subsegmental atelectatic changes are noted as well as some areas of consolidation. 2.Improving groundglass infiltrates in both lungs with mild residual. There is a small focus of infiltrate in the right upper lobe anteriorly which appears slightly more prominent.  Pulmonary findings suggest a combination of pneumonia and pulmonary vascular congestion. This could also be correlated clinically.  Underlying nodules could be obscured and follow-up is suggested. 3.Cardiomegaly with AICD in place also present previously. 4.Within the abdomen and pelvis there are multiple fluid and air-filled loops of small and large bowel as can be seen with adynamic ileus. There is a small amount of presacral and pelvic fluid. No pneumoperitoneum. 5.Abdominal wall edema. 6.  2 cm cyst in the upper pole of the right kidney with small peripheral calcifications which are more prominent than on the prior. Follow-up studies are suggested. Signed by Nancy Hagen DO    CT cervical  spine wo IV contrast    Result Date: 1/3/2024  Interpreted By:  Chuck Argueta, STUDY: CT CERVICAL SPINE WO IV CONTRAST;  1/3/2024 1:14 pm   INDICATION: Signs/Symptoms:fall.   COMPARISON: None.   ACCESSION NUMBER(S): [id number]   ORDERING CLINICIAN: FIONA GOMEZ   TECHNIQUE: Contiguous axial CT sections are performed from the skullbase to the upper thoracic spine and supplemented with coronal and sagittal reformatted images. The study is limited by motion degradation.   FINDINGS: There is mild dextro convexity of the cervical spine and reversal of the cervical lordosis. There is anterior subluxation of C2 relative to C3 measuring 3 mm. The facet joints align normally.   There are multilevel discogenic degenerative changes of the cervical spine with disc space narrowing from C3 through C6. There is degenerative endplate sclerosis and lucency is more pronounced at C5-6.   The cervical vertebral body heights are maintained. Allowing for motion degradation, there is no CT evidence of acute cervical spine fracture. There is no bone destruction or aggressive periosteal reaction. No lytic or blastic lesion is detected. The visualized surrounding osseous structures are intact.   The C1-2 relationship is within normal limits. There is C1-2 arthrosis anteriorly with joint space narrowing and marginal spurring.   The C2-3 disc space level demonstrates moderate facet arthrosis on the right and mild facet arthrosis on the left. There is bulging disc and uncovertebral arthrosis with mild narrowing of the left neural foramen and mild to moderate narrowing on the right. There is mild central canal narrowing with bulging disc and mass effect upon the ventral subarachnoid space.   The C3-4 disc space level demonstrates mild to moderate bilateral facet arthrosis. There is bulging disc and marginal osteophyte asymmetric to the right with severe uncovertebral arthrosis bilaterally. There is severe bilateral neural foraminal  narrowing, greater on the right with moderate central canal narrowing.   The C4-5 disc space level demonstrates mild to moderate bilateral facet arthrosis, greater on the left. There is bulging disc and marginal osteophyte with severe bilateral uncovertebral arthrosis. There is severe narrowing of the right neural foramen and moderate to severe narrowing of the left neural foramen. There is superimposed extruded disc on the right with severe central canal narrowing and suspected mass effect upon the spinal cord. A similar finding was described on a MRI examination of 10/13/2022 though it is difficult to compare different modalities with respect to the size and intensity of this finding.   The C5-6 disc space level demonstrates moderate bilateral facet arthrosis. There is bulging disc and marginal osteophyte with moderate central canal narrowing. There is severe bilateral neural foraminal narrowing.   The C6-7 disc space level demonstrates mild to moderate bilateral facet arthrosis. There is bulging disc and marginal osteophyte moderate narrowing left neural foramen and mild narrowing of the right neural foramen. There is mild to moderate central canal narrowing.   The C7-T1 disc space level demonstrates moderate bilateral facet arthrosis. There is bulging disc and marginal osteophyte with moderate bilateral neural foraminal narrowing. There is mild central canal narrowing.   There is no prevertebral soft tissue swelling or retropharyngeal air. Bilateral pleural effusions and dependent atelectasis is partially visualized, greater on the left. There are bilateral emphysematous changes with ground-glass density in both lung apices. There is superimposed focal opacity in the right apex anteriorly with a few peripheral air densities.       Severe multilevel cervical spondylosis with reversal of the cervical lordosis and mild dextro convexity of the cervical spine.   Multilevel bulging disc with facet and uncovertebral  arthrosis. There is large superimposed extruded disc at C4-5 on the right. Severe central canal stenosis at this level asymmetric to the right. There is at least moderate central canal narrowing at C5-6 and C3-4. There is severe multilevel bilateral neural foraminal narrowing as detailed above.   Allowing for motion degradation, there is no CT evidence of acute fracture or traumatic subluxation.   Bilateral pleural effusions and dependent atelectasis are partially visualized, greater on the left. There are bilateral emphysematous changes with ground-glass density in the lung apices. Focal opacity with ill-defined margins is identified in the right apex. Further workup with dedicated CT thorax is recommended.   Signed by: Chuck Argueta 1/3/2024 1:51 PM Dictation workstation:   [id number]    XR chest 1 view    Result Date: 1/3/2024  Interpreted By:  Anahi Fernando, STUDY: XR CHEST 1 VIEW;  1/3/2024 11:43 am   INDICATION: Signs/Symptoms:SOB.   COMPARISON: 12/27/2023   ACCESSION NUMBER(S): [id number]   ORDERING CLINICIAN: FIONA GOMEZ   FINDINGS: CARDIOMEDIASTINAL SILHOUETTE: The heart is enlarged. There is a left subclavian pacemaker-AICD with a single tip in the right atrium and two tips in the right ventricle.     LUNGS: There is improvement in bilateral perihilar alveolar opacities consistent with resolving pulmonary edema. There is underlying moderate vascular congestion, improved. There is no pleural fluid.   ABDOMEN: No remarkable upper abdominal findings.     BONES: No acute osseous changes.       1. Marked improvement bilateral patchy alveolar opacities consistent with resolving pneumonia. 2. Underlying moderate vascular congestion, improved.   MACRO: None   Signed by: Anahi Fernando 1/3/2024 12:00 PM Dictation workstation:   [id number]    ECG 12 lead    Result Date: 1/1/2024  Undetermined rhythm Left bundle branch block Abnormal ECG When compared with ECG of 27-DEC-2023 07:49, (unconfirmed)  Current undetermined rhythm precludes rhythm comparison, needs review See ED provider note for full interpretation and clinical correlation Confirmed by Yomi Lainez ([id number]) on 1/1/2024 3:20:04 PM    XR chest 1 view    Result Date: 12/27/2023  Interpreted By:  Anahi Fernando, STUDY: XR CHEST 1 VIEW;  12/27/2023 8:43 am   INDICATION: Signs/Symptoms:Dypnea.   COMPARISON: 12/14/2023   ACCESSION NUMBER(S): [id number]   ORDERING CLINICIAN: SYLVIA MOORE   FINDINGS: CARDIOMEDIASTINAL SILHOUETTE: The heart is enlarged. There is a left subclavian pacemaker-AICD with a single tip in the right atrium and two tips in the right ventricle.   LUNGS: There are progressive perihilar alveolar opacities, likely pulmonary edema but could also represent pneumonia. There is left basilar atelectasis and volume loss which has progressed. There is no pleural fluid.   ABDOMEN: No remarkable upper abdominal findings.     BONES: No acute osseous changes.       1. Progressive bilateral predominantly perihilar alveolar opacities consistent with pulmonary edema or pneumonia. 2. Progressive volume loss and atelectasis left lung base.   MACRO: None   Signed by: Anahi Fernando 12/27/2023 8:50 AM Dictation workstation:   [id number]    CT FACIAL BONE/TITO WO IVCON    Result Date: 12/22/2023  * * *Final Report* * * DATE OF EXAM: Dec 22 2023  5:57PM   ASC   0507  -  CT FACIAL BONE/TITO WO IVCON  / ACCESSION #  [id number] PROCEDURE REASON: Maxillofacial pain      * * * * Physician Interpretation * * * *  EXAMINATION:  CT BRAIN WO IVCON, CT FACIAL BONE/TITO WO IVCON, CT CERVICAL SPINE WO IVCON CLINICAL HISTORY: Head trauma, moderate-severe (accession [id number]), Maxillofacial pain, Nasal fracture suspected (accession [id number]), Spine fracture, cervical, traumatic (accession [id number]) TECHNIQUE:  Serial axial images without IV contrast were obtained from the vertex to the foramen magnum.  CT of the cervical spine was also performed with axial  sections from the skull base through the thoracic inlet. CT of the face was performed without IV contrast and multiplanar reconstructions were generated. MQ:  CTBWO_3 CT Dose-Length Product (DLP): 1565  mGy*cm CT Dose Reduction Employed: Iterative recon (accession [id number]), Automated exposure control(AEC) and iterative recon (accession [id number]) COMPARISON:  None. RESULT: CT HEAD: No acute intracranial hemorrhage or extra-axial fluid collection. No hydrocephalus, mass effect, or herniation. No acute ischemic infarct detected.  Mild background of white matter hypoattenuation consistent with chronic microvascular ischemic change. Unremarkable dural venous sinus attenuation. No acute osseous abnormality. Clear visualized paranasal sinuses and tympanomastoid cavities. CT face: Soft Tissues:  Soft tissue swelling is present at the nose and along the right forehead/periorbital region. Facial bones:  Mildly comminuted bilateral nasal bone fractures are present. Orbits:  No evidence of an acute fracture.  The globes are intact.  The soft tissue planes of the orbits are maintained. Paranasal Sinuses:  The paranasal sinuses are clear. Foreign Bodies:  No evidence of radiopaque foreign bodies. Other:  No evidence of a remote fracture.  No lytic or blastic process seen in the facial bones. CT cervical spine: There is reversal of the normal cervical lordosis. There is no fracture or dislocation. Severe multilevel degenerative changes are present.  There is severe spinal canal stenosis at the C4-C5 level from a large posterior disc osteophyte complex.  Moderate spinal canal stenosis present at the C3-C4 level from a posterior disc osteophyte complex.  Severe bilateral neural foraminal narrowing is present at the C3-C4, C4-C5, and C5-C6 levels. Centrilobular emphysema.    IMPRESSION: 1.  No acute intracranial abnormality. 2.  No cervical spine fracture or traumatic subluxation.  Severe multilevel degenerative change. 3.  Nasal  bone fractures with adjacent soft tissue swelling. : PSCJANETH   Transcribe Date/Time: Dec 22 2023  6:05P Dictated by : MAIRA STEWART MD This examination was interpreted and the report reviewed and electronically signed by: MAIRA STEWART MD on Dec 22 2023  6:17PM  EST    CT CERVICAL SPINE WO IVCON    Result Date: 12/22/2023  * * *Final Report* * * DATE OF EXAM: Dec 22 2023  5:57PM   ASC   0505  -  CT CERVICAL SPINE WO IVCON  / ACCESSION #  [id number] PROCEDURE REASON: Spine fracture, cervical, traumatic      * * * * Physician Interpretation * * * *  EXAMINATION:  CT BRAIN WO IVCON, CT FACIAL BONE/TITO WO IVCON, CT CERVICAL SPINE WO IVCON CLINICAL HISTORY: Head trauma, moderate-severe (accession [id number]), Maxillofacial pain, Nasal fracture suspected (accession [id number]), Spine fracture, cervical, traumatic (accession [id number]) TECHNIQUE:  Serial axial images without IV contrast were obtained from the vertex to the foramen magnum.  CT of the cervical spine was also performed with axial sections from the skull base through the thoracic inlet. CT of the face was performed without IV contrast and multiplanar reconstructions were generated. MQ:  CTBWO_3 CT Dose-Length Product (DLP): 1565  mGy*cm CT Dose Reduction Employed: Iterative recon (accession [id number]), Automated exposure control(AEC) and iterative recon (accession [id number]) COMPARISON:  None. RESULT: CT HEAD: No acute intracranial hemorrhage or extra-axial fluid collection. No hydrocephalus, mass effect, or herniation. No acute ischemic infarct detected.  Mild background of white matter hypoattenuation consistent with chronic microvascular ischemic change. Unremarkable dural venous sinus attenuation. No acute osseous abnormality. Clear visualized paranasal sinuses and tympanomastoid cavities. CT face: Soft Tissues:  Soft tissue swelling is present at the nose and along the right forehead/periorbital region. Facial bones:  Mildly comminuted  bilateral nasal bone fractures are present. Orbits:  No evidence of an acute fracture.  The globes are intact.  The soft tissue planes of the orbits are maintained. Paranasal Sinuses:  The paranasal sinuses are clear. Foreign Bodies:  No evidence of radiopaque foreign bodies. Other:  No evidence of a remote fracture.  No lytic or blastic process seen in the facial bones. CT cervical spine: There is reversal of the normal cervical lordosis. There is no fracture or dislocation. Severe multilevel degenerative changes are present.  There is severe spinal canal stenosis at the C4-C5 level from a large posterior disc osteophyte complex.  Moderate spinal canal stenosis present at the C3-C4 level from a posterior disc osteophyte complex.  Severe bilateral neural foraminal narrowing is present at the C3-C4, C4-C5, and C5-C6 levels. Centrilobular emphysema.    IMPRESSION: 1.  No acute intracranial abnormality. 2.  No cervical spine fracture or traumatic subluxation.  Severe multilevel degenerative change. 3.  Nasal bone fractures with adjacent soft tissue swelling. : [facility]   Transcribe Date/Time: Dec 22 2023  6:05P Dictated by : MAIRA STEWART MD This examination was interpreted and the report reviewed and electronically signed by: MAIRA STEWART MD on Dec 22 2023  6:17PM  EST    CT BRAIN WO IVCON    Result Date: 12/22/2023  * * *Final Report* * * DATE OF EXAM: Dec 22 2023  5:57PM   ASC   0504  -  CT BRAIN WO IVCON   / ACCESSION #  [id number] PROCEDURE REASON: Head trauma, moderate-severe      * * * * Physician Interpretation * * * *  EXAMINATION:  CT BRAIN WO IVCON, CT FACIAL BONE/TITO WO IVCON, CT CERVICAL SPINE WO IVCON CLINICAL HISTORY: Head trauma, moderate-severe (accession [id number]), Maxillofacial pain, Nasal fracture suspected (accession [id number]), Spine fracture, cervical, traumatic (accession [id number]) TECHNIQUE:  Serial axial images without IV contrast were obtained from the vertex to the foramen  magnum.  CT of the cervical spine was also performed with axial sections from the skull base through the thoracic inlet. CT of the face was performed without IV contrast and multiplanar reconstructions were generated. MQ:  CTBWO_3 CT Dose-Length Product (DLP): 1565  mGy*cm CT Dose Reduction Employed: Iterative recon (accession [id number]), Automated exposure control(AEC) and iterative recon (accession [id number]) COMPARISON:  None. RESULT: CT HEAD: No acute intracranial hemorrhage or extra-axial fluid collection. No hydrocephalus, mass effect, or herniation. No acute ischemic infarct detected.  Mild background of white matter hypoattenuation consistent with chronic microvascular ischemic change. Unremarkable dural venous sinus attenuation. No acute osseous abnormality. Clear visualized paranasal sinuses and tympanomastoid cavities. CT face: Soft Tissues:  Soft tissue swelling is present at the nose and along the right forehead/periorbital region. Facial bones:  Mildly comminuted bilateral nasal bone fractures are present. Orbits:  No evidence of an acute fracture.  The globes are intact.  The soft tissue planes of the orbits are maintained. Paranasal Sinuses:  The paranasal sinuses are clear. Foreign Bodies:  No evidence of radiopaque foreign bodies. Other:  No evidence of a remote fracture.  No lytic or blastic process seen in the facial bones. CT cervical spine: There is reversal of the normal cervical lordosis. There is no fracture or dislocation. Severe multilevel degenerative changes are present.  There is severe spinal canal stenosis at the C4-C5 level from a large posterior disc osteophyte complex.  Moderate spinal canal stenosis present at the C3-C4 level from a posterior disc osteophyte complex.  Severe bilateral neural foraminal narrowing is present at the C3-C4, C4-C5, and C5-C6 levels. Centrilobular emphysema.    IMPRESSION: 1.  No acute intracranial abnormality. 2.  No cervical spine fracture or traumatic  subluxation.  Severe multilevel degenerative change. 3.  Nasal bone fractures with adjacent soft tissue swelling. : PSCB   Transcribe Date/Time: Dec 22 2023  6:05P Dictated by : MAIRA STEWART MD This examination was interpreted and the report reviewed and electronically signed by: MAIRA STEWART MD on Dec 22 2023  6:17PM  EST    Transthoracic Echo (TTE) Complete    Result Date: 12/19/2023   [facility], [address] [place] [address]              Tel [phone] and Fax [phone] TRANSTHORACIC ECHOCARDIOGRAM REPORT  Patient Name:      IJEOMA CRUZ PERLA     Reading Physician:    [id number] Escobar Mendez MD Study Date:        12/18/2023          Ordering Provider:    [id number] GUNNAR HUGO MRN/PID:           [id number]            Fellow: Accession#:        [id number]        Nurse:                Mattie Sosa RN Date of Birth/Age: 1950 / 73 years Sonographer:          Cruz Wood SAMSON Gender:            M                   Additional Staff: Height:            175.26 cm           Admit Date: Weight:            74.84 kg            Admission Status:     Inpatient - Routine BSA:               1.90 m2             Encounter#:           [id number]                                        Department Location:  [facility] Blood Pressure: 112 /63 mmHg Study Type:    TRANSTHORACIC ECHO (TTE) COMPLETE Diagnosis/ICD: Cardiomyopathy, unspecified-I42.9 Indication:    Cardiomyopathy CPT Code:      Echo Complete w Full [id number] Patient History: Diabetes:          Yes Pertinent History: A-Fib, CAD, CVA, HTN, Cancer, COPD and Syncope. Ischemic CM,                    cardiac stent. Study Detail: The following Echo studies were performed: 2D, M-Mode, Doppler and               color flow. Technically  challenging study due to body habitus.               Definity used as a contrast agent for endocardial border               definition. Total contrast used for this procedure was 1.5 mL via               IV push. The patient was awake.  PHYSICIAN INTERPRETATION: Left Ventricle: The left ventricular systolic function is moderately to severely decreased, with an estimated ejection fraction of 30-35%. Wall motion is abnormal. The left ventricular cavity size is mildly dilated. There is mild concentric left ventricular hypertrophy. Findings are consistent with ischemic cardiomyopathy. Spectral Doppler shows an impaired relaxation pattern of left ventricular diastolic filling. LV Wall Scoring: The mid inferolateral segment is akinetic. The mid and apical anterior wall, basal and mid anterolateral wall, mid anteroseptal segment, basal inferolateral segment, apical lateral segment, basal inferior segment, and apex are hypokinetic. All remaining scored segments are normal. Left Atrium: The left atrium is mildly dilated. Right Ventricle: The right ventricle is mildly enlarged. There is mildly reduced right ventricular systolic function. A device is visualized in the right ventricle. Right Atrium: The right atrium is mildly dilated. Aortic Valve: The aortic valve is trileaflet. There is mild aortic valve cusp calcification. There is no evidence of aortic valve regurgitation. The peak instantaneous gradient of the aortic valve is 3.5 mmHg. Mitral Valve: The mitral valve is normal in structure. There is mild mitral valve regurgitation. Tricuspid Valve: The tricuspid valve is structurally normal. There is mild tricuspid regurgitation. Pulmonic Valve: The pulmonic valve is structurally normal. There is physiologic pulmonic valve regurgitation. Pericardium: There is a trivial to small pericardial effusion. Aorta: The aortic root is normal. Pulmonary Artery: The tricuspid regurgitant velocity is 3.17 m/s, and with an estimated  right atrial pressure of 3 mmHg, the estimated pulmonary artery pressure is mildly elevated with the RVSP at 43.2 mmHg. Pulmonary Veins: There is blunted systolic flow in the pulmonary veins. Systemic Veins: The inferior vena cava appears mildly dilated.  CONCLUSIONS:  1. Left ventricular systolic function is moderately to severely decreased with a 30-35% estimated ejection fraction.  2. Multiple segmental abnormalities exist. See findings.  3. Spectral Doppler shows an impaired relaxation pattern of left ventricular diastolic filling.  4. There is ischemic cardiomyopathy.  5. There is mildly reduced right ventricular systolic function.  6. Mild pulmonary HTN. CVP is elevated. QUANTITATIVE DATA SUMMARY: 2D MEASUREMENTS:                          Normal Ranges: Ao Root d:     2.60 cm   (2.0-3.7cm) LAs:           4.60 cm   (2.7-4.0cm) IVSd:          1.02 cm   (0.6-1.1cm) LVPWd:         0.96 cm   (0.6-1.1cm) LVIDd:         4.75 cm   (3.9-5.9cm) LVIDs:         4.22 cm LV Mass Index: 86.6 g/m2 LV % FS        11.2 % LA VOLUME:                               Normal Ranges: LA Vol A4C:        104.7 ml   (22+/-6mL/m2) LA Vol A2C:        69.5 ml LA Vol BP:         92.7 ml LA Vol Index A4C:  55.0ml/m2 LA Vol Index A2C:  36.5 ml/m2 LA Vol Index BP:   48.7 ml/m2 LA Area A4C:       28.3 cm2 LA Area A2C:       21.2 cm2 LA Major Axis A4C: 6.5 cm LA Major Axis A2C: 5.5 cm LA Volume Index:   47.0 ml/m2 RA VOLUME BY A/L METHOD:                               Normal Ranges: RA Vol A4C:        62.8 ml    (8.3-19.5ml) RA Vol Index A4C:  33.0 ml/m2 RA Area A4C:       20.7 cm2 RA Major Axis A4C: 5.8 cm M-MODE MEASUREMENTS:                  Normal Ranges: Ao Root: 2.60 cm (2.0-3.7cm) LAs:     4.50 cm (2.7-4.0cm) AORTA MEASUREMENTS:                    Normal Ranges: Asc Ao, d: 2.90 cm (2.1-3.4cm) LV SYSTOLIC FUNCTION BY 2D PLANIMETRY (MOD):                     Normal Ranges: EF-A4C View: 28.3 % (>=55%) EF-A2C View: 30.5 % EF-Biplane:  29.5 % LV  DIASTOLIC FUNCTION:                            Normal Ranges: MV Peak E:      0.93 m/s   (0.7-1.2 m/s) MV e'           0.05 m/s   (>8.0) MV lateral e'   0.05 m/s MV medial e'    0.05 m/s E/e' Ratio:     18.56      (<8.0) PulmV Sys Chet:  24.60 cm/s PulmV English Chet: 88.80 cm/s PulmV S/D Chet:  0.30 MITRAL VALVE:                 Normal Ranges: MV DT: 201 msec (150-240msec) AORTIC VALVE:                         Normal Ranges: AoV Vmax:      0.93 m/s (<=1.7m/s) AoV Peak PG:   3.5 mmHg (<20mmHg) LVOT Max Chet:  0.79 m/s (<=1.1m/s) LVOT VTI:      16.30 cm LVOT Diameter: 1.90 cm  (1.8-2.4cm) AoV Area,Vmax: 2.41 cm2 (2.5-4.5cm2)  RIGHT VENTRICLE: RV Basal 3.70 cm RV Mid   2.90 cm RV Major 7.3 cm TAPSE:   13.7 mm RV s'    0.10 m/s TRICUSPID VALVE/RVSP:                             Normal Ranges: Peak TR Velocity: 3.17 m/s RV Syst Pressure: 43.2 mmHg (< 30mmHg) IVC Diam:         2.20 cm PULMONIC VALVE:                      Normal Ranges: PV Max Chet: 0.7 m/s  (0.6-0.9m/s) PV Max P.1 mmHg Pulmonary Veins: PulmV English Chet: 88.80 cm/s PulmV S/D Chet:  0.30 PulmV Sys Chet:  24.60 cm/s  [id number] Escobar Mendez MD Electronically signed on 2023 at 5:23:07 PM  Wall Scoring  ** Final **      Assessment/Plan   Coronavirus infection-treated with at least 10 days of dexamethasone, no further isolation precautions indicated  Possible pneumonia-treated with at least 7 days of antibiotics  Left-sided pleural effusion, s/p thoracentesis  Leukocytosis-most likely multifactorial  C. difficile infection-positive PCR/negative EIA-no diarrhea  Chronic respiratory failure-interval worsening, on BiPAP     Oral vancomycin for a total of 14 days-IV Flagyl inpatient is unable to take p.o.  Monitor stool  Continue contact plus precautions for c.diff  Supplemental oxygen as needed  Monitor temperature and WBC  Cardiology to advise with regards amiodarone dosing due to to potential interaction between Flagyl and amiodarone      Frank Escalante,  MD

## 2024-01-14 NOTE — CARE PLAN
Pt had an uneventful shift. Pt was mostly isolative to her room. During check-in Pt stated she feeling upset and anxious due to her impending ECT treatment. Writer offered reassurance and validated her concerns. Pt is concerned about potential negative side effects of ECT treatment, but is hopeful that the treatment will decrease depressive and OCD symptoms. Denies SI/SIB.     When this writer was conducting environmental checks, Pt came into the room and became very tearful, asking this writer not to touch her belongings due to her OCD. This writer stated that it is imperative that environmental checks be done thoroughly, including checking Pt belongings. Pt was accepting of this but was still visibly upset during environmental check.    The patient's goals for the shift include      The clinical goals for the shift include Patient will have decreasing O2 requirements      Problem: Diabetes  Goal: Increase stability of blood glucose readings by end of shift  Outcome: Progressing  Goal: Maintain electrolyte levels within acceptable range throughout shift  Outcome: Progressing  Goal: Maintain glucose levels >70mg/dl to <250mg/dl throughout shift  Outcome: Progressing  Goal: No changes in neurological exam by end of shift  Outcome: Progressing  Goal: Learn about and adhere to nutrition recommendations by end of shift  Outcome: Progressing  Goal: Vital signs within normal range for age by end of shift  Outcome: Progressing  Goal: Increase self care and/or family involovement by end of shift  Outcome: Progressing  Goal: Receive DSME education by end of shift  Outcome: Progressing     Problem: Skin  Goal: Decreased wound size/increased tissue granulation at next dressing change  Outcome: Progressing  Flowsheets (Taken 1/13/2024 1609 by Lyn Rojas, RN)  Decreased wound size/increased tissue granulation at next dressing change:   Protective dressings over bony prominences   Promote sleep for wound healing   Utilize specialty bed per algorithm  Goal: Participates in plan/prevention/treatment measures  Outcome: Progressing  Flowsheets (Taken 1/13/2024 1609 by Lyn Rojas, RN)  Participates in plan/prevention/treatment measures:   Discuss with provider PT/OT consult   Elevate heels  Goal: Prevent/manage excess moisture  Outcome: Progressing  Flowsheets (Taken 1/13/2024 1609 by Lyn Rojas, RN)  Prevent/manage excess moisture:   Cleanse incontinence/protect with barrier cream   Moisturize dry skin   Follow provider orders for dressing changes   Monitor for/manage infection if present  Goal: Prevent/minimize sheer/friction injuries  Outcome: Progressing  Flowsheets (Taken 1/13/2024 1609 by Lyn Rojas, RN)  Prevent/minimize sheer/friction  injuries:   Complete micro-shifts as needed if patient unable. Adjust patient position to relieve pressure points, not a full turn   HOB 30 degrees or less   Turn/reposition every 2 hours/use positioning/transfer devices   Use pull sheet  Goal: Promote/optimize nutrition  Outcome: Progressing  Flowsheets (Taken 1/13/2024 1609 by Lyn Rojas, RN)  Promote/optimize nutrition:   Discuss with provider if NPO > 2 days   Offer water/supplements/favorite foods   Consume > 50% meals/supplements   Reassess MST if dietician not consulted  Goal: Promote skin healing  Outcome: Progressing  Flowsheets (Taken 1/13/2024 1609 by Lyn Rojas, RN)  Promote skin healing:   Assess skin/pad under line(s)/device(s)   Ensure correct size (line/device) and apply per  instructions   Protective dressings over bony prominences   Rotate device position/do not position patient on device   Turn/reposition every 2 hours/use positioning/transfer devices     Problem: Respiratory  Goal: Clear secretions with interventions this shift  Outcome: Progressing  Goal: Minimize anxiety/maximize coping throughout shift  Outcome: Progressing  Goal: Minimal/no exertional discomfort or dyspnea this shift  Outcome: Progressing  Goal: No signs of respiratory distress (eg. Use of accessory muscles. Peds grunting)  Outcome: Progressing  Goal: Patent airway maintained this shift  Outcome: Progressing  Goal: Tolerate pulmonary toileting this shift  Outcome: Progressing  Goal: Verbalize decreased shortness of breath this shift  Outcome: Progressing  Goal: Wean oxygen to maintain O2 saturation per order/standard this shift  Outcome: Progressing  Goal: Increase self care and/or family involvement in next 24 hours  Outcome: Progressing     Problem: Pain - Adult  Goal: Verbalizes/displays adequate comfort level or baseline comfort level  Outcome: Progressing     Problem: Safety - Adult  Goal: Free from fall injury  Outcome: Progressing     Problem:  Discharge Planning  Goal: Discharge to home or other facility with appropriate resources  Outcome: Progressing     Problem: Chronic Conditions and Co-morbidities  Goal: Patient's chronic conditions and co-morbidity symptoms are monitored and maintained or improved  Outcome: Progressing     Problem: Nutrition  Goal: Less than 5 days NPO/clear liquids  Outcome: Progressing  Goal: Oral intake greater than 50%  Outcome: Progressing  Goal: Consume prescribed supplement  Outcome: Progressing  Goal: Adequate PO fluid intake  Outcome: Progressing  Goal: Nutrition support goals are met within 48 hrs  Outcome: Progressing  Goal: Nutrition support is meeting 75% of nutrient needs  Outcome: Progressing  Goal: BG  mg/dL  Outcome: Progressing  Goal: Lab values WNL  Outcome: Progressing  Goal: Electrolytes WNL  Outcome: Progressing  Goal: Promote healing  Outcome: Progressing  Goal: Maintain stable weight  Outcome: Progressing  Goal: Reduce weight from edema/fluid  Outcome: Progressing     Problem: General Stroke  Goal: Establish a mutual long term goal with patient by discharge  Outcome: Progressing  Goal: Demonstrate improvement in neurological exam throughout the shift  Outcome: Progressing  Goal: Maintain BP within ordered limits throughout shift  Outcome: Progressing  Goal: Participate in treatment (ie., meds, therapy) throughout shift  Outcome: Progressing  Goal: No symptoms of aspiration throughout shift  Outcome: Progressing  Goal: Controlled blood glucose throughout shift  Outcome: Progressing

## 2024-01-14 NOTE — CARE PLAN
Problem: Respiratory  Goal: Clear secretions with interventions this shift  1/14/2024 0518 by Bridgette Hawley, RRT  Outcome: Progressing  1/14/2024 0518 by Bridgette Hawley RRT  Outcome: Progressing  Goal: Minimize anxiety/maximize coping throughout shift  1/14/2024 0518 by Bridgette Hawley RRT  Outcome: Progressing  1/14/2024 0518 by Bridgette Hawley RRT  Outcome: Progressing  Goal: Minimal/no exertional discomfort or dyspnea this shift  1/14/2024 0518 by Bridgette Hawley RRT  Outcome: Progressing  1/14/2024 0518 by Bridgette Hawley RRT  Outcome: Progressing  Goal: No signs of respiratory distress (eg. Use of accessory muscles. Peds grunting)  1/14/2024 0518 by Bridgette Hawley RRT  Outcome: Progressing  1/14/2024 0518 by Bridgette Hawley RRT  Outcome: Progressing  Goal: Patent airway maintained this shift  Outcome: Progressing  Goal: Tolerate pulmonary toileting this shift  Outcome: Progressing  Goal: Verbalize decreased shortness of breath this shift  Outcome: Progressing  Goal: Wean oxygen to maintain O2 saturation per order/standard this shift  Outcome: Progressing  Goal: Increase self care and/or family involvement in next 24 hours  Outcome: Progressing

## 2024-01-14 NOTE — PROGRESS NOTES
"Hai Spaulding is a 73 y.o. male on day 10 of admission presenting with Acute respiratory failure (CMS/HCC).    Subjective   Patient more alert today. Trying to talk but whispering words almost. Tolerating HFNC today. On Levophed. Seen and examined. Daughter present at bedside.     Objective     Physical Exam  Vitals and nursing note reviewed.   Constitutional:       Appearance: He is ill-appearing.   HENT:      Head: Normocephalic and atraumatic.      Mouth/Throat:      Mouth: Mucous membranes are moist.   Eyes:      Pupils: Pupils are equal, round, and reactive to light.   Cardiovascular:      Rate and Rhythm: Tachycardia present. Rhythm irregular.      Heart sounds: No murmur heard.     No friction rub. No gallop.   Abdominal:      General: There is no distension.      Tenderness: There is no abdominal tenderness. There is no guarding or rebound.   Genitourinary:     Comments: [email or web address]  Musculoskeletal:      Cervical back: Neck supple.   Skin:     General: Skin is warm and dry.   Neurological:      Mental Status: He is disoriented.     Last Recorded Vitals  Blood pressure 93/64, pulse 97, temperature 36.2 °C (97.2 °F), resp. rate 23, height 1.753 m (5' 9.02\"), weight 78 kg (171 lb 15.3 oz), SpO2 94 %.  Intake/Output last 3 Shifts:  I/O last 3 completed shifts:  In: 767.8 (9.8 mL/kg) [P.O.:30; I.V.:337.8 (4.3 mL/kg); IV Piggyback:400]  Out: 1815 (23.2 mL/kg) [Urine:815 (0.3 mL/kg/hr); Drains:1000]  Weight: 78.2 kg     Relevant Results  Results for orders placed or performed during the hospital encounter of 01/04/24 (from the past 24 hour(s))   Hemoglobin and hematocrit, blood   Result Value Ref Range    Hemoglobin 12.0 (L) 13.5 - 17.5 g/dL    Hematocrit 37.1 (L) 41.0 - 52.0 %   POCT GLUCOSE   Result Value Ref Range    POCT Glucose 85 74 - 99 mg/dL   POCT GLUCOSE   Result Value Ref Range    POCT Glucose 65 (L) 74 - 99 mg/dL   POCT GLUCOSE   Result Value Ref Range    POCT Glucose 110 (H) 74 - 99 mg/dL   POCT GLUCOSE "   Result Value Ref Range    POCT Glucose 112 (H) 74 - 99 mg/dL   Comprehensive Metabolic Panel   Result Value Ref Range    Glucose 132 (H) 65 - 99 mg/dL    Sodium 142 133 - 145 mmol/L    Potassium 4.1 3.4 - 5.1 mmol/L    Chloride 109 (H) 97 - 107 mmol/L    Bicarbonate 22 (L) 24 - 31 mmol/L    Urea Nitrogen 43 (H) 8 - 25 mg/dL    Creatinine 2.00 (H) 0.40 - 1.60 mg/dL    eGFR 35 (L) >60 mL/min/1.73m*2    Calcium 7.5 (L) 8.5 - 10.4 mg/dL    Albumin 2.1 (L) 3.5 - 5.0 g/dL    Alkaline Phosphatase 398 (H) 35 - 125 U/L    Total Protein 5.4 (L) 5.9 - 7.9 g/dL    AST 19 5 - 40 U/L    Bilirubin, Total 0.3 0.1 - 1.2 mg/dL    ALT <5 (L) 5 - 40 U/L    Anion Gap 11 <=19 mmol/L   CBC   Result Value Ref Range    WBC 15.2 (H) 4.4 - 11.3 x10*3/uL    nRBC 1.7 (H) 0.0 - 0.0 /100 WBCs    RBC 4.15 (L) 4.50 - 5.90 x10*6/uL    Hemoglobin 12.0 (L) 13.5 - 17.5 g/dL    Hematocrit 36.5 (L) 41.0 - 52.0 %    MCV 88 80 - 100 fL    MCH 28.9 26.0 - 34.0 pg    MCHC 32.9 32.0 - 36.0 g/dL    RDW 19.4 (H) 11.5 - 14.5 %    Platelets 219 150 - 450 x10*3/uL   POCT GLUCOSE   Result Value Ref Range    POCT Glucose 101 (H) 74 - 99 mg/dL   POCT GLUCOSE   Result Value Ref Range    POCT Glucose 108 (H) 74 - 99 mg/dL   POCT GLUCOSE   Result Value Ref Range    POCT Glucose 113 (H) 74 - 99 mg/dL     *Note: Due to a large number of results and/or encounters for the requested time period, some results have not been displayed. A complete set of results can be found in Results Review.     XR chest 1 view  Result Date: 1/13/2024  1. Status post right-sided central line insertion without pneumothorax as above. 2. Status post left thoracentesis without evidence for pneumothorax. 3. Persistent infiltrates and effusions bilaterally suggesting possibility of pulmonary edema and CHF.       CT chest wo IV contrast  Result Date: 1/13/2024  1. Interval increase in bilateral pleural effusions with large bilateral effusions left-greater-than-right. There is resultant left lower  lobar collapse with compressive atelectasis in particular in the left upper lobe as well as right lower lobe.   2. Patchy areas of airspace consolidation intervally developed from prior imaging with more focal septal thickening in the areas of consolidation may reflect alveolar component of pulmonary edema. However, superimposed infectious infiltrate is not excluded. No dense airspace consolidation         XR chest 1 view  Result Date: 1/13/2024  1. Interval increase in left-sided pleural effusion with compressive atelectasis with moderate left-sided pleural effusion   2. Patchy alveolar airspace infiltrate in bilateral lung fields with alveolar component of pulmonary edema suggested.       Scheduled medications  acetaminophen, 650 mg, oral, TID  acetylcysteine, 3 mL, nebulization, BID  atorvastatin, 80 mg, oral, Nightly  budesonide, 0.5 mg, nebulization, BID  carbidopa-levodopa, 1 tablet, oral, TID  clopidogrel, 75 mg, oral, Daily  cyclobenzaprine, 10 mg, oral, Nightly  dapagliflozin propanediol, 10 mg, oral, Daily  gabapentin, 200 mg, oral, Nightly  insulin glargine, 10 Units, subcutaneous, Daily  insulin lispro, 0-10 Units, subcutaneous, TID with meals  ipratropium-albuteroL, 3 mL, nebulization, TID  lactobacillus acidophilus, 1 tablet, oral, BID  levothyroxine, 25 mcg, oral, Daily before breakfast  meropenem, 1 g, intravenous, q12h VAZQUEZ  metroNIDAZOLE, 500 mg, intravenous, q8h  mirtazapine, 15 mg, oral, Nightly  nystatin, 1 Application, Topical, BID  pantoprazole, 40 mg, oral, Daily before breakfast  QUEtiapine, 25 mg, oral, Nightly  sertraline, 100 mg, oral, Daily  SITagliptin phosphate, 50 mg, oral, Daily  sodium chloride, 1 spray, Each Nostril, TID  vancomycin, 125 mg, oral, 4x daily      Continuous medications  norepinephrine, 0.01-3 mcg/kg/min, Last Rate: 0.02 mcg/kg/min (01/14/24 1400)      PRN medications  PRN medications: albuterol, dextrose 10 % in water (D10W), dextrose, glucagon, melatonin, methyl  salicylate-menthol, ondansetron, oxygen, oxygen, oxygen     Assessment/Plan   Principal Problem:    Acute respiratory failure (CMS/HCC)  Active Problems:    Atrial fibrillation (CMS/HCC)    CAD (coronary artery disease)    Parkinson's disease    Cerebral infarction, unspecified (CMS/HCC)    C. difficile diarrhea    Acute hypoxic respiratory failure  - CT with increasing pleural effusions  - Suspect d/t aspiration   - On HFNC at present   - COVID 19 recently on 12/27/23 finished up 10 days of Decadron   - Pulmonology following  - On Levophed   - S/P L pleurocentesis 1/14/24 cultures pending  Leukocytosis   - Blood cultures negative from 1/03  - WBC 15.2  - On Merrem   MATEO on CKD  - Cr 2.0  Dysphagia   - MBS shows aspiration of thin liquids.   - Recommendations for puree/nectar thick - which is diet ordered.  Metabolic encephalopathy  - currently lethargic on bipap  - suspect multifactorial  CHF   - ECHO on 12/16/23 was 30-35%.  - Farxiga   C-diff   - On PO Vanco, IV Flagyl   Parkinsonism  A-fib   - ICD   - On Digoxin  COPD   Pall Care   The Patient is a DNR CCA DNI  He is currently not capable  His wife, Cata Spaulding, is his surrogate decision maker     1/14/2024  -The patient is more alert today albeit not capable from a decision making stand point.   - He is tolerating HFNC today.   - Aspercreme ordered for leg pain on top of routine Tylenol. Want to avoid anything centrally acting at this time.   -  He is taking in PO medications.   - Will monitor for effectiveness.   1/13/2024  -The patient is currently not a capable decision maker as he is lethargic and not fully arousable.   - The patient is also unable to swallow and needs PO meds such as Sinemet and PO Vanco. Discussion with wife who is agreeable to a short term NG tube.   - The wife also states that she would consider a PEG tube if the patient needed. She states that the patient recently stated that he did not want a PEG tube but was not sure if he was  fully aware of the decision he was making. States that he has had a PEG tube in the past temporarily due to cancer and he was determined to be able to swallow again.   - We did discuss how his progression of illness looks different now than in the past and this may not be congruent with his wishes.   - Discussion also had about the risks of a PEG tube in his currently state of illness and how these are considerations to take into account when making this decision.   - Tylenol IV ordered for 24hrs due to the patient being NPO. Will reassess for effectiveness tomorrow.       I spent 30 minutes in the professional and overall care of this patient.      Yuliya Hood, KIP-CNP

## 2024-01-15 NOTE — PROGRESS NOTES
Subjective Data:  Patient is doing good.  Breathing is more stable.  Blood pressure improved.  On less amount of Levophed.  Remains in atrial fibrillation with controlled rate.  Unable to tolerate well pills.    Overnight Events:    Telemetry overnight reviewed no events     Objective Data:  Last Recorded Vitals:  Vitals:    01/15/24 0718 01/15/24 0730 01/15/24 0745 01/15/24 0855   BP: 109/62 104/66 104/62    Pulse: 98 99 91 99   Resp: 15 17 17    Temp:   36.5 °C (97.7 °F)    TempSrc:   Temporal    SpO2: 94% 92% 94% 92%   Weight:       Height:           Last Labs:  CBC - 1/15/2024:  5:40 AM  12.5 11.2 213    33.6      CMP - 1/15/2024:  5:40 AM  7.6 5.2 19 --- 0.4   2.7 2.1 <5 506      PTT - 12/28/2023:  5:48 AM  2.3   26.1 107.8     TROPHS   Date/Time Value Ref Range Status   01/04/2024 08:16  0 - 20 ng/L Final     Comment:     Previous result verified on 1/3/2024 1340 on specimen/case [id number] called with component TRPHS for procedure Troponin I, High Sensitivity, Initial with value 161 ng/L.   01/03/2024 05:37  0 - 20 ng/L Final     Comment:     Previous result verified on 1/3/2024 1340 on specimen/case [id number] called with component TRPHS for procedure Troponin I, High Sensitivity, Initial with value 161 ng/L.   01/03/2024 02:27  0 - 20 ng/L Final     Comment:     Previous result verified on 1/3/2024 1340 on specimen/case [id number] called with component TRPHS for procedure Troponin I, High Sensitivity, Initial with value 161 ng/L.     BNP   Date/Time Value Ref Range Status   01/03/2024 12:13  0 - 99 pg/mL Final   12/27/2023 10:04  0 - 99 pg/mL Final     HGBA1C   Date/Time Value Ref Range Status   12/27/2023 09:59 PM 10.7 See below % Final   10/11/2023 01:15 PM 10.6 See below % Final     LDLCALC   Date/Time Value Ref Range Status   06/02/2023 02:40 PM 39 65 - 130 MG/DL Final   10/08/2019 12:15 PM 96 65 - 130 MG/DL Final     VLDL   Date/Time Value Ref Range Status    10/22/2021 08:07 PM 18 0 - 40 mg/dL Final   09/28/2020 04:12 PM 46 0 - 40 mg/dL Final   10/02/2018 02:40 PM 50 0 - 40 mg/dL Final      Last I/O:  I/O last 3 completed shifts:  In: 2032.3 (25.6 mL/kg) [P.O.:720; I.V.:712.3 (9 mL/kg); IV Piggyback:600]  Out: 1140 (14.3 mL/kg) [Urine:1140 (0.4 mL/kg/hr)]  Weight: 79.5 kg     Past Cardiology Tests (Last 3 Years):  EKG:  ECG 12 lead 01/05/2024      ECG 12 lead 01/04/2024      ECG 12 lead 01/03/2024      ECG 12 lead 12/27/2023      ECG 12 Lead 12/27/2023      ECG 12 lead 12/14/2023    Echo:  Transthoracic Echo (TTE) Complete 12/18/2023    Ejection Fractions:  EF   Date/Time Value Ref Range Status   12/18/2023 12:10 PM 29       Cath:  No results found for this or any previous visit from the past 1095 days.    Stress Test:  No results found for this or any previous visit from the past 1095 days.    Cardiac Imaging:  XR CHEST ABDOMEN FOR OG NG PLACEMENT 11/26/2021      XR CHEST ABDOMEN FOR OG NG PLACEMENT 11/26/2021      Inpatient Medications:  Scheduled medications   Medication Dose Route Frequency    acetaminophen  650 mg oral TID    acetylcysteine  3 mL nebulization BID    atorvastatin  80 mg oral Nightly    budesonide  0.5 mg nebulization BID    carbidopa-levodopa  1 tablet oral TID    clopidogrel  75 mg oral Daily    cyclobenzaprine  10 mg oral Nightly    dapagliflozin propanediol  10 mg oral Daily    gabapentin  200 mg oral Nightly    insulin glargine  10 Units subcutaneous Daily    insulin lispro  0-10 Units subcutaneous TID with meals    ipratropium-albuteroL  3 mL nebulization TID    lactobacillus acidophilus  1 tablet oral BID    levothyroxine  25 mcg oral Daily before breakfast    meropenem  1 g intravenous q12h VAZQUEZ    metroNIDAZOLE  500 mg intravenous q8h    mirtazapine  15 mg oral Nightly    nystatin  1 Application Topical BID    pantoprazole  40 mg oral Daily before breakfast    QUEtiapine  25 mg oral Nightly    sertraline  100 mg oral Daily    SITagliptin  phosphate  50 mg oral Daily    sodium chloride  1 spray Each Nostril TID    vancomycin  125 mg oral 4x daily     PRN medications   Medication    albuterol    dextrose 10 % in water (D10W)    dextrose    glucagon    melatonin    methyl salicylate-menthol    ondansetron    oxygen    oxygen    oxygen     Continuous Medications   Medication Dose Last Rate    amiodarone  0.5 mg/min 0.5 mg/min (01/15/24 1113)    norepinephrine  0.01-3 mcg/kg/min 0.03 mcg/kg/min (01/15/24 3805)       Physical Exam:  General: Patient is in Mild respiratory distress  HEENT: atraumatic normocephalic.  Neck: is supple jugular venous pressure within normal limits no thyromegaly.  Cardiovascular irregular rate and rhythm normal heart sounds no murmurs rubs or gallops.  Lungs: deCreased breathing sounds at bases abdomen: is soft nontender.  Extremities warm to touch no edema.        Assessment/Plan   1 septic shock likely secondary to C. difficile colitis.  Low ejection fraction likely contributing to his hypotension.  Currently on Levophed which may control continue.  Management by primary team.     2.  Acute on chronic systolic heart failure ejection fraction 30 to 35% secondary to ischemic cardiomyopathy status post ICD/CRT.  Patient has known coronary artery disease not candidate for revascularization.  Recommend now to switch him from prasugrel to clopidogrel since he is also on oral anticoagulation for atrial fibrillation.  If patient CO2 remains elevated may need thoracentesis will hold Eliquis for now     3.  Atrial fibrillation.  Patient with history of paroxysmal atrial fibrillation on oral amiodarone status post ICD CRT and Eliquis.  For now we will hold Eliquis since patient may require thoracentesis by pulmonary.  Heart rate better controlled on amiodarone drip we will monitor.  Hold Eliquis until he gets thoracentesis will restart after.     4.  Hyperlipidemia continue statin since liver function test is stable.     5. acute on  chronic renal failure.  Multifactorial.  Nephrology on board.  CVC 01/13/24 Triple lumen Non-tunneled Right Internal jugular (Active)   Site Assessment Clean;Dry;Intact 01/15/24 0400   Proximal Lumen Status Infusing 01/15/24 0400   Medial 1 Lumen Status Infusing 01/15/24 0400   Distal Lumen Status Infusing 01/15/24 0400   Dressing Type Antimicrobial patch 01/15/24 0400   Dressing Status Clean;Dry;Occlusive 01/15/24 0400   Number of days: 2       Peripheral IV 01/03/24 22 G Right Antecubital (Active)   Site Assessment Clean;Dry;Intact 01/15/24 0400   Dressing Type Transparent 01/15/24 0400   Line Status Blood return noted;Flushed;Saline locked 01/15/24 0400   Dressing Status Clean;Dry;Occlusive 01/15/24 0400   Number of days: 12       Urethral Catheter 16 Fr. (Active)   Output (mL) 600 mL 01/15/24 0600   Number of days: 12       Code Status:  DNR and No Intubation      Cinda Pedersen MD

## 2024-01-15 NOTE — PROGRESS NOTES
Critical Care Medicine Progress Note    Admitted on:     1/4/2024  Length of Stay: 11 day(s)     Interval History     74 yo M with PMH of CVA with right-sided paresis, HFrEF (30-35%), paroxysmal Afib, laryngeal CA with recurrent aspiration PNA, COPD, presented [facility] the ED from [facility] on 1/3 for hypoxemia.  Also with watery diarrhea.  C diff positive.  Of note, he had been admitted here in late December and treated for aspiration PNA as well as Covid.  Admitted again for the pneumonia but also for C diff colitis.  Treated accordingly with PO vanco.    Decompensated.  Required vasopressor support and continues BiPAP.  CT chest done 1/13 notable for large pleural effusions and patchy airspace consolidations bilaterally.  Left thoracentesis done on 1/13 with 1 L fluid drained.  Plavix resumed.    Objective   Objective     Vitals:    01/15/24 0300   Weight: 79.5 kg (175 lb 4.3 oz)   Body mass index is 25.87 kg/m².        1/15/2024     6:45 AM 1/15/2024     7:00 AM 1/15/2024     7:15 AM 1/15/2024     7:18 AM 1/15/2024     7:30 AM 1/15/2024     7:45 AM 1/15/2024     8:55 AM   Vitals   Systolic 103 104 109 109 104 104    Diastolic 63 60 62 62 66 62    Heart Rate 90 94 96 98 99 91 99   Temp      36.5 °C (97.7 °F)    Resp 18 17 15 15 17 17         Vent settings:  FiO2 (%):  [50 %] 50 %  S RR:  [12] 12    Intake/Output Summary (Last 24 hours) at 1/15/2024 1424  Last data filed at 1/15/2024 1211  Gross per 24 hour   Intake 960.99 ml   Output 600 ml   Net 360.99 ml       Physical Exam  Neuro: Moves all extremities, generalized weakness.  Eyes: PERRL, clear sclerae.  CV: Normal S1, S2.  RRR.  No m/r/g.  Resp: Diminished bilaterally R>L.  GI: +BS, abd soft, NT, ND.  Ext: No peripheral edema.  Skin: Warm and dry.  Psych: Flat affect.    Medications     Scheduled Medications:   acetaminophen, 650 mg, oral, TID  acetylcysteine, 3 mL, nebulization, BID  atorvastatin, 80 mg, oral, Nightly  budesonide, 0.5 mg, nebulization,  BID  carbidopa-levodopa, 1 tablet, oral, TID  clopidogrel, 75 mg, oral, Daily  cyclobenzaprine, 10 mg, oral, Nightly  dapagliflozin propanediol, 10 mg, oral, Daily  gabapentin, 200 mg, oral, Nightly  insulin glargine, 10 Units, subcutaneous, Daily  insulin lispro, 0-10 Units, subcutaneous, TID with meals  ipratropium-albuteroL, 3 mL, nebulization, TID  lactobacillus acidophilus, 1 tablet, oral, BID  levothyroxine, 25 mcg, oral, Daily before breakfast  meropenem, 1 g, intravenous, q12h VAZQUEZ  metroNIDAZOLE, 500 mg, intravenous, q8h  mirtazapine, 15 mg, oral, Nightly  nystatin, 1 Application, Topical, BID  pantoprazole, 40 mg, oral, Daily before breakfast  QUEtiapine, 25 mg, oral, Nightly  sertraline, 100 mg, oral, Daily  SITagliptin phosphate, 50 mg, oral, Daily  sodium chloride, 1 spray, Each Nostril, TID  vancomycin, 125 mg, oral, 4x daily       Continuous Medications:   amiodarone, 0.5 mg/min, Last Rate: 0.5 mg/min (01/15/24 1113)  norepinephrine, 0.01-3 mcg/kg/min, Last Rate: 0.03 mcg/kg/min (01/15/24 0759)       PRN Medications:     Labs     Results from last 72 hours   Lab Units 01/15/24  0540 01/14/24  0347 01/13/24  0500   GLUCOSE mg/dL 219* 132* 209*   SODIUM mmol/L 144 142 144   POTASSIUM mmol/L 3.9 4.1 4.0   CHLORIDE mmol/L 111* 109* 110*   CO2 mmol/L 24 22* 21*   BUN mg/dL 38* 43* 45*   CREATININE mg/dL 1.90* 2.00* 2.00*   EGFR mL/min/1.73m*2 37* 35* 35*   CALCIUM mg/dL 7.6* 7.5* 7.7*   ALBUMIN g/dL 2.1* 2.1*  --      Results from last 72 hours   Lab Units 01/15/24  0540 01/14/24  0347   ALK PHOS U/L 506* 398*   ALT U/L <5* <5*   AST U/L 19 19   BILIRUBIN TOTAL mg/dL 0.4 0.3   PROTEIN TOTAL g/dL 5.2* 5.4*             Results from last 72 hours   Lab Units 01/15/24  0540 01/14/24  0347 01/14/24  0347 01/13/24  1530   WBC AUTO x10*3/uL 12.5*  --  15.2*  --    NRBC AUTO /100 WBCs 2.0*  --  1.7*  --    RBC AUTO x10*6/uL 3.87*  --  4.15*  --    HEMOGLOBIN g/dL 11.2*  --  12.0* 12.0*   HEMATOCRIT % 33.6*  --   36.5* 37.1*   MCV fL 87  --  88  --    MCH pg 28.9  --  28.9  --    MCHC g/dL 33.3   < > 32.9  --    RDW % 19.5*  --  19.4*  --    PLATELETS AUTO x10*3/uL 213  --  219  --     < > = values in this interval not displayed.     Results from last 72 hours   Lab Units 01/15/24  0551 01/13/24  1448 01/13/24  0836 01/12/24  1606   POCT PH, ARTERIAL pH 7.39 7.39 7.36* 7.41   POCT PCO2, ARTERIAL mm Hg 40 42 42 36*   POCT PO2, ARTERIAL mm Hg 164* 181* 137* 80*   POCT HCO3 CALCULATED, ARTERIAL mmol/L 24.2 25.4 23.7 22.8   POCT LACTATE, ARTERIAL mmol/L  --  2.0 1.3 1.4   POCT BASE EXCESS, ARTERIAL mmol/L -0.7 0.3 -1.8 -1.4   FIO2 % 50 50 50 36     Lab Results   Component Value Date    BLOODCULT No growth at 4 days -  FINAL REPORT 01/03/2024    BLOODCULT No growth at 4 days -  FINAL REPORT 01/03/2024    GRAMSTAIN No polymorphonuclear leukocytes seen 01/13/2024    GRAMSTAIN No organisms seen 01/13/2024     Lab Results   Component Value Date    URINECULTURE 20,000 - 80,000 Candida glabrata (A) 01/03/2024       Imaging and Diagnostic Studies     Recent imaging and diagnostic studies reviewed.       Assessment / Plan       Acute hypoxemic respiratory failure  Acute on chronic systolic CHF  Bilateral pleural effusions s/p left thoracentesis (1/13)  Paroxysmal Afib  Pneumonia  MATEO  Dysphagia  C diff colitis      -Still with low-dose Levophed requirement.  -Cardiology following.  IV amiodarone.  Plavix resumed.  Eliquis still on hold.  -Maintaining adequate SpO2 on Airvo @ 40 L/min with FiO2 50%.  -Renal function improving.  Trial of diuresis.  -Seen by speech therapy.  Recommends nectar-thick liquids.  -ID following.  Meropenem, IV Flagyl, and PO vanco..    Spoke with patient's wife at bedside.  Provided updates, explained POC, answered questions.      Chuck Mann MD    This patient is critically ill/injured due to acute impairment in one or more vital organ systems, such that there is a probably of imminent or life-threatening  deterioration of the patient's condition.  I spent 38 minutes in the direct delivery of medical care that involves high complexity decision making to treat single or multiple vital organ system failure and/or prevent further life-threatening deterioration of the patient's condition.  This time does not include separately billable procedures.

## 2024-01-15 NOTE — PROGRESS NOTES
"Hai Spaulding is a 73 y.o. male on day 11 of admission presenting with Acute respiratory failure (CMS/HCC).    Subjective   Patient seen and examined. Alert and able to voice needs. Remains on Levo. On Amiodarone. Remains on HFNC at 50%. States Tylenol with Neurontin is helping leg pain.     Objective     Physical Exam  Vitals and nursing note reviewed.   Constitutional:       Appearance: He is ill-appearing.   HENT:      Head: Normocephalic and atraumatic.      Mouth/Throat:      Mouth: Mucous membranes are moist.   Eyes:      Pupils: Pupils are equal, round, and reactive to light.   Cardiovascular:      Rate and Rhythm: Tachycardia present. Rhythm irregular.      Heart sounds: No murmur heard.     No friction rub. No gallop.   Abdominal:      General: There is no distension.      Tenderness: There is no abdominal tenderness. There is no guarding or rebound.   Genitourinary:     Comments: mila  Musculoskeletal:      Cervical back: Neck supple.   Skin:     General: Skin is warm and dry.   Neurological:      Mental Status: He is awake and oriented.  Last Recorded Vitals  Blood pressure 104/62, pulse 99, temperature 36.5 °C (97.7 °F), temperature source Temporal, resp. rate 17, height 1.753 m (5' 9.02\"), weight 79.5 kg (175 lb 4.3 oz), SpO2 92 %.  Intake/Output last 3 Shifts:  I/O last 3 completed shifts:  In: 2032.3 (25.6 mL/kg) [P.O.:720; I.V.:712.3 (9 mL/kg); IV Piggyback:600]  Out: 1140 (14.3 mL/kg) [Urine:1140 (0.4 mL/kg/hr)]  Weight: 79.5 kg     Relevant Results  Results for orders placed or performed during the hospital encounter of 01/04/24 (from the past 24 hour(s))   POCT GLUCOSE   Result Value Ref Range    POCT Glucose 202 (H) 74 - 99 mg/dL   POCT GLUCOSE   Result Value Ref Range    POCT Glucose 233 (H) 74 - 99 mg/dL   Comprehensive Metabolic Panel   Result Value Ref Range    Glucose 219 (H) 65 - 99 mg/dL    Sodium 144 133 - 145 mmol/L    Potassium 3.9 3.4 - 5.1 mmol/L    Chloride 111 (H) 97 - 107 mmol/L "    Bicarbonate 24 24 - 31 mmol/L    Urea Nitrogen 38 (H) 8 - 25 mg/dL    Creatinine 1.90 (H) 0.40 - 1.60 mg/dL    eGFR 37 (L) >60 mL/min/1.73m*2    Calcium 7.6 (L) 8.5 - 10.4 mg/dL    Albumin 2.1 (L) 3.5 - 5.0 g/dL    Alkaline Phosphatase 506 (H) 35 - 125 U/L    Total Protein 5.2 (L) 5.9 - 7.9 g/dL    AST 19 5 - 40 U/L    Bilirubin, Total 0.4 0.1 - 1.2 mg/dL    ALT <5 (L) 5 - 40 U/L    Anion Gap 9 <=19 mmol/L   CBC   Result Value Ref Range    WBC 12.5 (H) 4.4 - 11.3 x10*3/uL    nRBC 2.0 (H) 0.0 - 0.0 /100 WBCs    RBC 3.87 (L) 4.50 - 5.90 x10*6/uL    Hemoglobin 11.2 (L) 13.5 - 17.5 g/dL    Hematocrit 33.6 (L) 41.0 - 52.0 %    MCV 87 80 - 100 fL    MCH 28.9 26.0 - 34.0 pg    MCHC 33.3 32.0 - 36.0 g/dL    RDW 19.5 (H) 11.5 - 14.5 %    Platelets 213 150 - 450 x10*3/uL   Blood Gas Arterial   Result Value Ref Range    POCT pH, Arterial 7.39 7.38 - 7.42 pH    POCT pCO2, Arterial 40 38 - 42 mm Hg    POCT pO2, Arterial 164 (H) 85 - 95 mm Hg    POCT SO2, Arterial 100 94 - 100 %    POCT Oxy Hemoglobin, Arterial 97.7 94.0 - 98.0 %    POCT Base Excess, Arterial -0.7 -2.0 - 3.0 mmol/L    POCT HCO3 Calculated, Arterial 24.2 22.0 - 26.0 mmol/L    Patient Temperature 37.0 degrees Celsius    FiO2 50 %    Apparatus FACE MASK     Ventilator Rate 12 bpm    Ipap CMH2O 15.0 cm H2O    Epap CMH2O 5.0 cm H2O    Site of Arterial Puncture Radial Left     Byron's Test Positive    POCT GLUCOSE   Result Value Ref Range    POCT Glucose 185 (H) 74 - 99 mg/dL   POCT GLUCOSE   Result Value Ref Range    POCT Glucose 215 (H) 74 - 99 mg/dL     *Note: Due to a large number of results and/or encounters for the requested time period, some results have not been displayed. A complete set of results can be found in Results Review.     XR chest 1 view  Result Date: 1/15/2024  Bilateral infiltrates and effusions are present in a pattern suggesting CHF. There is no interval change when compared to the previous examination.       Scheduled  medications  acetaminophen, 650 mg, oral, TID  acetylcysteine, 3 mL, nebulization, BID  atorvastatin, 80 mg, oral, Nightly  budesonide, 0.5 mg, nebulization, BID  carbidopa-levodopa, 1 tablet, oral, TID  clopidogrel, 75 mg, oral, Daily  cyclobenzaprine, 10 mg, oral, Nightly  dapagliflozin propanediol, 10 mg, oral, Daily  gabapentin, 200 mg, oral, Nightly  insulin glargine, 10 Units, subcutaneous, Daily  insulin lispro, 0-10 Units, subcutaneous, TID with meals  ipratropium-albuteroL, 3 mL, nebulization, TID  lactobacillus acidophilus, 1 tablet, oral, BID  levothyroxine, 25 mcg, oral, Daily before breakfast  meropenem, 1 g, intravenous, q12h VAZQUEZ  metroNIDAZOLE, 500 mg, intravenous, q8h  mirtazapine, 15 mg, oral, Nightly  nystatin, 1 Application, Topical, BID  pantoprazole, 40 mg, oral, Daily before breakfast  QUEtiapine, 25 mg, oral, Nightly  sertraline, 100 mg, oral, Daily  SITagliptin phosphate, 50 mg, oral, Daily  sodium chloride, 1 spray, Each Nostril, TID  vancomycin, 125 mg, oral, 4x daily      Continuous medications  amiodarone, 0.5 mg/min, Last Rate: 0.5 mg/min (01/15/24 1113)  norepinephrine, 0.01-3 mcg/kg/min, Last Rate: 0.03 mcg/kg/min (01/15/24 0759)      PRN medications  PRN medications: albuterol, dextrose 10 % in water (D10W), dextrose, glucagon, melatonin, methyl salicylate-menthol, ondansetron, oxygen, oxygen     Assessment/Plan   Principal Problem:    Acute respiratory failure (CMS/HCC)  Active Problems:    Atrial fibrillation (CMS/HCC)    CAD (coronary artery disease)    Parkinson's disease    Cerebral infarction, unspecified (CMS/HCC)    C. difficile diarrhea    Acute hypoxic respiratory failure  - CT with increasing pleural effusions  - Suspect d/t aspiration   - On HFNC at 50% FiO2  - COVID 19 recently on 12/27/23 finished up 10 days of Decadron   - Pulmonology following  - On Levophed   - S/P L pleurocentesis 1/14/24 cultures pending.  Leukocytosis   - Blood cultures negative from 1/03  - WBC  12.5  - On Merrem   MATEO on CKD  - Cr 1.9  Dysphagia   - MBS shows aspiration of thin liquids. - Recommendations for puree/nectar thick - which is diet ordered.  - Tolerating diet as ordered.  Metabolic encephalopathy  - currently lethargic on bipap  - suspect multifactorial  CHF   - ECHO on 12/16/23 was 30-35%.  - Farxiga   C-diff   - On PO Vanco, IV Flagyl   Parkinsonism  - On Sinemet  A-fib   - ICD   - On Digoxin  COPD   Pall Care   The Patient is a DNR CCA DNI  He is currently not capable  His wife, Cata Spaulding, is his surrogate decision maker     1/15/2024  -the patient is tolerating PO by means of his prescribed diet.   - He is taking Neurontin and Tylenol for his leg pain. May consider an increase in the next day or so.   1/14/2024  -The patient is more alert today albeit not capable from a decision making stand point.   - He is tolerating HFNC today.   - Aspercreme ordered for leg pain on top of routine Tylenol. Want to avoid anything centrally acting at this time.   -  He is taking in PO medications.   - Will monitor for effectiveness.   1/13/2024  -The patient is currently not a capable decision maker as he is lethargic and not fully arousable.   - The patient is also unable to swallow and needs PO meds such as Sinemet and PO Vanco. Discussion with wife who is agreeable to a short term NG tube.   - The wife also states that she would consider a PEG tube if the patient needed. She states that the patient recently stated that he did not want a PEG tube but was not sure if he was fully aware of the decision he was making. States that he has had a PEG tube in the past temporarily due to cancer and he was determined to be able to swallow again.   - We did discuss how his progression of illness looks different now than in the past and this may not be congruent with his wishes.   - Discussion also had about the risks of a PEG tube in his currently state of illness and how these are considerations to take into  account when making this decision.   - Tylenol IV ordered for 24hrs due to the patient being NPO. Will reassess for effectiveness tomorrow.     I spent 30 minutes in the professional and overall care of this patient.      KIP Leach-CNP

## 2024-01-15 NOTE — PROGRESS NOTES
Wound Care Progress Note     Visit Date: 1/15/2024      Patient Name: Hai Spaulding         MRN: [id number]                Reason for Visit: Followup       Wound History: Present on admission. Patient with Fungal rash to Bilateral buttock/Coccyx/Scrotum. Was recently discharged from hospital to Rehab on 1/1/24 for COVID on antibiotics. Patient began having diarrhea making redness and soreness worse to groin and buttock.  As of 1/12/24, Buttock/Coccyx/Scrotum has improved. Patient has new DTI to Nose from BiPAP on 1/15/24.      A 73 y.o. year old male admitted for Principal Problem:    Acute respiratory failure (CMS/[facility])  Active Problems:    Atrial fibrillation (CMS/[facility])    CAD (coronary artery disease)    Parkinson's disease    Cerebral infarction, unspecified (CMS/[facility])    C. difficile diarrhea      Past Medical History:   Diagnosis Date    Acid reflux     Aspiration pneumonia (CMS/[facility]) 11/23/2021    Aspiration pneumonia resulting from a procedure 10/25/2021    Atrial fibrillation with RVR (CMS/[facility]) 10/2021    Congestive heart failure (CHF) (CMS/HCC) 10/2021    COPD (chronic obstructive pulmonary disease) (CMS/[facility])     COVID     Diabetes (CMS/[facility])     Dr. Rebollar    Diabetic eye exam (CMS/HCC) 05/08/2019    worsening retinopathy    Hodgkin lymphoma (CMS/[facility])     HTN (hypertension)     Hyperlipidemia     Larynx cancer (CMS/[facility])     Myocardial infarction (CMS/[facility])     Neuropathy     hands & feet    Pneumonia due to gram-negative bacteria 01/01/2023    Pneumonitis due to inhalation of other solids and liquids (CMS/[facility]) 11/25/2021    Urinary tract infection 01/04/2023    Urinary tract infection, site not specified 12/18/2023    Comment on above: G93.40 ACUTE ENCEPHALOPATHY N39.0 UTI  G93.40 ACUTE ENCEPHALOPATHY N39.0 UTI.      Past Surgical History:   Procedure Laterality Date    CARDIAC DEFIBRILLATOR PLACEMENT  05/2022    CHOLECYSTECTOMY      CT ABDOMEN PELVIS ANGIOGRAM W AND/OR WO IV CONTRAST  01/22/2022    CT  ABDOMEN PELVIS ANGIOGRAM W AND/OR WO IV CONTRAST 1/22/2022 [facility]    IR INJECTION EPIDURAL STEROID      Dr. Torres    OTHER SURGICAL HISTORY      Splenectomy    OTHER SURGICAL HISTORY      Larynx surgery    OTHER SURGICAL HISTORY      chemotherapy    OTHER SURGICAL HISTORY      lymph node resection    OTHER SURGICAL HISTORY      heart stent x4    TONSILLECTOMY      TUMOR EXCISION      neck    TUMOR REMOVAL      throat       Scheduled medications  acetaminophen, 650 mg, oral, TID  acetylcysteine, 3 mL, nebulization, BID  atorvastatin, 80 mg, oral, Nightly  budesonide, 0.5 mg, nebulization, BID  carbidopa-levodopa, 1 tablet, oral, TID  clopidogrel, 75 mg, oral, Daily  cyclobenzaprine, 10 mg, oral, Nightly  dapagliflozin propanediol, 10 mg, oral, Daily  gabapentin, 200 mg, oral, Nightly  insulin glargine, 10 Units, subcutaneous, Daily  insulin lispro, 0-10 Units, subcutaneous, TID with meals  ipratropium-albuteroL, 3 mL, nebulization, TID  lactobacillus acidophilus, 1 tablet, oral, BID  levothyroxine, 25 mcg, oral, Daily before breakfast  meropenem, 1 g, intravenous, q12h VAZQUEZ  metroNIDAZOLE, 500 mg, intravenous, q8h  mirtazapine, 15 mg, oral, Nightly  nystatin, 1 Application, Topical, BID  pantoprazole, 40 mg, oral, Daily before breakfast  QUEtiapine, 25 mg, oral, Nightly  sertraline, 100 mg, oral, Daily  SITagliptin phosphate, 50 mg, oral, Daily  sodium chloride, 1 spray, Each Nostril, TID  vancomycin, 125 mg, oral, 4x daily      Continuous medications  amiodarone, 0.5 mg/min, Last Rate: 0.5 mg/min (01/15/24 1113)  norepinephrine, 0.01-3 mcg/kg/min, Last Rate: 0.03 mcg/kg/min (01/15/24 0759)      PRN medications  PRN medications: albuterol, dextrose 10 % in water (D10W), dextrose, glucagon, melatonin, methyl salicylate-menthol, ondansetron, oxygen, oxygen, oxygen    No Known Allergies    Pertinent Labs:   [place], Urine   Date Value Ref Range Status   03/11/2019 12 0 - 23 MG/L Final     Comment:      LESS THAN     Albumin   Date Value Ref Range Status   01/15/2024 2.1 (L) 3.5 - 5.0 g/dL Final     ALBUMIN (MG/L) IN URINE   Date Value Ref Range Status   09/29/2020 7.8 Not Established mg/L Final     Wound Assessment:  Wound 01/15/24 Pressure Injury Nose (Active)   Date First Assessed/Time First Assessed: 01/15/24 1225   Present on Original Admission: No  Primary Wound Type: Pressure Injury  Location: Nose      Assessments 1/15/2024 12:25 PM   Wound Image     Site Assessment Purple   Rosa-Wound Assessment Clean;Dry;Intact   Pressure Injury Stage Deep Tissue   Wound Length (cm) 1 cm   Wound Width (cm) 1.5 cm   Wound Surface Area (cm^2) 1.5 cm^2   Dressing Open to air       No associated orders.       Urethral Catheter 16 Fr. (Active)   Placement Date/Time: 01/03/24 1343   Placed by: Valery BELTRAN  Tube Size (Fr.): 16 Fr.  Catheter Balloon Size: 10 mL  Urine Returned: Yes   Number of days: 11     Urethral Catheter 16 Fr. (Active)   Site Assessment Clean;Skin intact 01/15/24 0400   Collection Container Standard drainage bag 01/15/24 0400   Securement Method Securing device (Describe) 01/15/24 0400   Reason for Continuing Urinary Catheterization accurate hourly measurement of urine volume in a critically ill patient that cannot be assessed by other volumes and urine collection strategies 01/14/24 2000   Output (mL) 600 mL 01/15/24 0600         Wound 01/01/24 Pressure Injury Buttocks Bilateral;Mid (Active)   Date First Assessed: 01/01/24   Present on Original Admission: Yes  Primary Wound Type: (c) Pressure Injury  Location: (c) Buttocks  Wound Location Orientation: Bilateral;Mid   Number of days: 14       Wound 01/04/24 Scrotum (Active)   Date First Assessed: 01/04/24   Present on Original Admission: Yes  Hand Hygiene Completed: Yes  Location: Scrotum   Number of days: 11     Wound 01/01/24 Pressure Injury Buttocks Bilateral;Mid (Active)   Wound Image   01/13/24 0811   Site Assessment Blanchable  erythema;Epithelialization;Maceration 01/15/24 0400   Rosa-Wound Assessment Clean;Dry;Fragile 01/15/24 0400   Pressure Injury Stage 1 01/12/24 0831   Drainage Description None 01/15/24 0400   Drainage Amount None 01/15/24 0400   Dressing Foam 01/15/24 0400   Dressing Changed Changed 01/15/24 0600   Dressing Status Clean;Dry 01/15/24 0600       Wound 01/04/24 Scrotum (Active)   Wound Image   01/13/24 0809   State of Healing Epithelialized 01/15/24 0400     Exam conducted on day 11 of stay with knowledge of Floor Nurse. Introductions made to patient and wife. On exam patient sitting fowlers in bed, noted edema in Bilateral upper extremities, zaragoza catheter in place. Bruising to Right occipital, new DTI to bridge of nose from patient's BiPAP. Buttock/Scrotum improved. Heel borders in place, peeled back, no issues. Progressa bed system with Waffle mattress overlay. Adilia Rojas RN updated, to continue pressure injury prevention interventions, and nursing to continue to follow providers orders. Reconsult Wound RN PRN. Harper SAMPSONN RN       Wound Team Plan: Continue to follow Provider orders. Bilateral Buttock/Coccyx/Scrotum- cleanse with soap and water, pat dry, apply mixture of Nystatin powder and Calazime cream, Cover with border dressing, daily and prn. Continue to offload.       Harper Cole RN  1/15/2024  1:35 PM

## 2024-01-15 NOTE — PROGRESS NOTES
Speech-Language Pathology    Inpatient  Speech-Language Pathology Treatment     Patient Name: Hai Spaulding  MRN: [id number]  Today's Date: 1/15/2024  Time Calculation  Start Time: 1127  Stop Time: 1140  Time Calculation (min): 13 min         Current Problem:   1. Acute respiratory failure (CMS/HCC)        2. Hypotension due to hypovolemia  Central Line    Central Line            SLP Assessment:  SLP TX Intervention Outcome: Making Progress Towards Goals  Prognosis: Fair  Treatment Provided: Yes   Treatment Tolerance: Patient limited by fatigue  Medical Staff Made Aware: Yes  Strengths: Family/Caregiver Suppport  Barriers: Cognition, Comorbidities  Education Provided: Yes     Plan:  Inpatient/Swing Bed or Outpatient: Inpatient  Treatment/Interventions:  (PO trials, pharyngeal ex)  SLP TX Plan: Continue Plan of Care  SLP Plan: Skilled SLP  SLP Frequency: 2x per week  Duration: Current admission  SLP Discharge Recommendations: Continue skilled speech therapy services post discharge  Next Treatment Priority: PO trials, diet larry, oropharyng ex  Discussed POC: Patient, Caregiver/family, Nursing  Discussed Risks/Benefits: Yes, Patient, Caregiver/Family, Nursing  Patient/Caregiver Agreeable: Yes    Subjective   Pt alert, oriented to self, seated upright in bed, soft voice, encouragement to participate, fed by SLP, edentulous, no dentures present. Per RN poor PO intake     General Visit Information:   Arrival: Family/caregiver present  Prior to Session Communication: Bedside nurse      Pain Assessment:   Pain Assessment: 0-10  Pain Score: 3  Pain Type: Chronic pain  Pain Location: Leg  Pain Orientation: Right      Objective   Therapeutic Swallow:  Therapeutic Swallow Intervention : PO Trials  Pharyngeal Strengthening Techniques: Effortful Swallow  Solid Diet Recommendations: Pureed/extremely thick  (IDDSI Level 4)  Liquid Diet Recommendations: Nectar thick/mildly thick (IDDS Level 2)  Swallow Comments: Pt accepted 3tsp  nectar thick  smoothie via tsp, refused  puree trials offered. Adequate oral manipulation, adequate oral clearance, laryngeal elevation present, No overt s/s aspiration. Pt completed effort swallow with each trial, given max cues,   GOALS:  1. Patient will tolerate recommended diet without pulmonary compromise  2. Patient will participate in oropharyngeal strengthening exercises.  3. SLP will assess for diet upgrade as appropriate.   4. Due to silent aspiration, patient will need a repeat MBS prior to liquid diet upgrade to thin.  Inpatient:  Education Documentation  Pt/family/nursing education provided re: regarding role of ST, purpose of treatment, clinical impressions, recommendations, POC,safe swallow strategies, Pt/caregiver/nurse verbalized understanding and agreement/pt unable to comprehend, pt requires reinforcement/assisstance

## 2024-01-15 NOTE — CARE PLAN
Problem: Respiratory  Goal: Clear secretions with interventions this shift  Outcome: Progressing  Goal: Minimize anxiety/maximize coping throughout shift  Outcome: Progressing  Goal: Minimal/no exertional discomfort or dyspnea this shift  Outcome: Progressing  Goal: No signs of respiratory distress (eg. Use of accessory muscles. Peds grunting)  Outcome: Progressing  Goal: Patent airway maintained this shift  Outcome: Progressing  Goal: Tolerate pulmonary toileting this shift  Outcome: Progressing  Goal: Verbalize decreased shortness of breath this shift  Outcome: Progressing  Goal: Wean oxygen to maintain O2 saturation per order/standard this shift  Outcome: Progressing  Goal: Increase self care and/or family involvement in next 24 hours  Outcome: Progressing

## 2024-01-15 NOTE — PROGRESS NOTES
PLACE OF SERVICE: [facility]     This is a subsequent visit.    Subjective   Patient ID: Hai Spaulding is a 73 y.o. male who presents for Follow-up.    Mr. Hai Spaulding is a 73-year-old male with history of prior stroke and history of laryngeal carcinoma.  He has had recent aspiration pneumonia with respiratory failure.  He is unable to care for himself and requires supportive care.    Review of Systems   Constitutional:  Negative for chills and fever.   Cardiovascular:  Negative for chest pain.   All other systems reviewed and are negative.    Objective   /82   Pulse 80   Temp 36.9 °C (98.4 °F)   Resp 18     Physical Exam  Vitals reviewed.   Constitutional:       General: He is not in acute distress.     Comments: This is a well-developed, well-nourished male, sitting in chair,   HENT:      Right Ear: Tympanic membrane, ear canal and external ear normal.      Left Ear: Tympanic membrane, ear canal and external ear normal.   Eyes:      General: No scleral icterus.     Pupils: Pupils are equal, round, and reactive to light.   Neck:      Vascular: No carotid bruit.   Cardiovascular:      Heart sounds: Normal heart sounds, S1 normal and S2 normal. No murmur heard.     No friction rub.   Pulmonary:      Effort: Pulmonary effort is normal.      Breath sounds: Decreased breath sounds (throughout) present.   Abdominal:      Palpations: There is no hepatomegaly, splenomegaly or mass.   Musculoskeletal:         General: No swelling or deformity. Normal range of motion.      Cervical back: Neck supple.      Right lower leg: Edema present.      Left lower leg: Edema present.   Lymphadenopathy:      Cervical: No cervical adenopathy.      Upper Body:      Right upper body: No axillary adenopathy.      Left upper body: No axillary adenopathy.      Lower Body: No right inguinal adenopathy. No left inguinal adenopathy.   Neurological:      Mental Status: He is oriented to person, place, and time.       Cranial Nerves: Cranial nerves 2-12 are intact. No cranial nerve deficit.      Sensory: No sensory deficit.      Motor: Motor function is intact. No weakness.      Gait: Gait is intact.      Deep Tendon Reflexes: Reflexes normal.   Psychiatric:         Mood and Affect: Mood normal. Mood is not anxious or depressed. Affect is not angry.         Behavior: Behavior is not agitated.         Thought Content: Thought content normal.         Judgment: Judgment normal.     LAB WORK: Laboratory studies reviewed.    Assessment/Plan   Problem List Items Addressed This Visit             ICD-10-CM       Cardiac and Vasculature    Atrial fibrillation (CMS/HCC) I48.91    Hyperlipidemia E78.5       Endocrine/Metabolic    Diabetes mellitus (CMS/HCC) E11.9       Pulmonary and Pneumonias    Pneumonia due to infectious organism, unspecified laterality, unspecified part of lung - Primary J18.9     Other Visit Diagnoses         Codes    Respiratory failure, unspecified chronicity, unspecified whether with hypoxia or hypercapnia (CMS/HCC)     J96.90    COPD on long-term inhaled steroid therapy (CMS/HCC)     J44.9, Z79.51    Heart failure, unspecified HF chronicity, unspecified heart failure type (CMS/HCC)     I50.9    Hypertension, unspecified type     I10    Neuropathy     G62.9    Weakness     R53.1    At risk for falling     Z91.81    History of acute myocardial infarction     I25.2        1. Pneumonia, on antibiotic.  2. Respiratory failure, on oxygen as needed.  3. COPD, on bronchodilator therapy.  4. Heart failure, on diuretic.  5. Atrial fibrillation, on rate control.  6. Hyperlipidemia, on statin.  7. Hypertension, medically controlled.  8. History of AMI, on aspirin.  9. Neuropathy, on gabapentin.  10. Weakness, on PT/OT.  11. Fall risk, fall precautions.  12. Diabetes, on insulin.    Scribe Attestation  By signing my name below, Tova CORRALES, [place] attest that this documentation has been prepared under the direction and in the  presence of Coby Hamm MD.   All medical record entries made by the scribe were personally dictated by me I have reviewed the chart and agree the record accurately reflects my personal performance of his history physical examination and management

## 2024-01-15 NOTE — CARE PLAN
Problem: PT Problem  Goal: The patient will demonstrate an overall strength of >3+/5 in BLE to assist with completion of functional mobility.    Outcome: Progressing  Goal: The patient will be able to complete bed mobility at a [place] level with use of bed rails.    Outcome: Progressing

## 2024-01-15 NOTE — PROGRESS NOTES
Hai Spaulding is a 73 y.o. male on day 11 of admission presenting with Acute respiratory failure (CMS/HCC).    Subjective   Interval History:   Awake, alert  On high flow oxygen  No significant cough    No diarrhea    Review of Systems   All other systems reviewed and are negative.      Objective   Range of Vitals (last 24 hours)  Heart Rate:  []   Temp:  [36.1 °C (97 °F)-36.7 °C (98.1 °F)]   Resp:  [13-26]   BP: ()/()   Weight:  [79.5 kg (175 lb 4.3 oz)]   SpO2:  [84 %-97 %]   Daily Weight  01/15/24 : 79.5 kg (175 lb 4.3 oz)    Body mass index is 25.87 kg/m².    Physical Exam  Constitutional:       Appearance: Awake, responsive  HENT:      Head: Normocephalic.      Nose: Nose normal.      Mouth/Throat:      Mouth: Mucous membranes are moist.      Pharynx: Oropharynx is clear.   Eyes:      Extraocular Movements: Extraocular movements intact.      Conjunctiva/sclera: Conjunctivae normal.      Comments: Resolving right periorbital ecchymosis   Cardiovascular:      Rate and Rhythm: Normal rate and regular rhythm.   Pulmonary:      Effort: Pulmonary effort is normal.      Breath sounds: Normal breath sounds.   Abdominal:      General: Bowel sounds are normal.      Palpations: Abdomen is soft.      Tenderness: There is no abdominal tenderness.   Musculoskeletal:         General: Normal range of motion.      Cervical back: Normal range of motion and neck supple.   Skin:     General: Skin is warm and dry.   Neurological:      General: No focal deficit present.      Mental Status: He is alert and oriented to person, place, and time.   Psychiatric:         Mood and Affect: Not agitated        Behavior: Behavior not agitated       Antibiotics  amiodarone (Pacerone) tablet 200 mg  apixaban (Eliquis) tablet 5 mg  atorvastatin (Lipitor) tablet 80 mg  carbidopa-levodopa (Sinemet CR)  mg ER tablet 1 tablet  clopidogrel (Plavix) tablet 75 mg  dapagliflozin propanediol (Farxiga) tablet 10 mg  lactobacillus  acidophilus capsule 1 capsule  levothyroxine (Synthroid, Levoxyl) tablet 25 mcg  metoprolol succinate XL (Toprol-XL) 24 hr tablet 25 mg  QUEtiapine (SEROquel) tablet 25 mg  sertraline (Zoloft) tablet 100 mg  SITagliptin phosphate (Januvia) tablet 50 mg  insulin glargine (Lantus) injection 10 Units  dextrose 50 % injection 25 g  glucagon (Glucagen) injection 1 mg  dextrose 10 % in water (D10W) infusion  insulin lispro (HumaLOG) injection 0-10 Units  vancomycin (Vancocin) capsule 125 mg  fluconazole in NaCl (iso-osm) (Diflucan) IVPB 200 mg  nystatin (Mycostatin) 100,000 unit/gram powder 1 Application  ipratropium-albuteroL (Duo-Neb) 0.5-2.5 mg/3 mL nebulizer solution 3 mL  budesonide (Pulmicort) 0.5 mg/2 mL nebulizer solution 0.5 mg  lactobacillus acidophilus tablet 1 tablet  vancomycin (Vancocin) capsule 125 mg  ipratropium-albuteroL (Duo-Neb) 0.5-2.5 mg/3 mL nebulizer solution 3 mL  acetaminophen (Tylenol) tablet 650 mg  ondansetron (Zofran) injection 4 mg  melatonin tablet 5 mg  cyclobenzaprine (Flexeril) tablet 10 mg  fluticasone furoate-vilanteroL (Breo Ellipta) 200-25 mcg/dose inhaler 1 puff  ipratropium-albuteroL (Duo-Neb) 0.5-2.5 mg/3 mL nebulizer solution 3 mL  pantoprazole (ProtoNix) EC tablet 40 mg  albuterol 2.5 mg /3 mL (0.083 %) nebulizer solution 2.5 mg  acetaminophen (Tylenol) tablet 650 mg  ondansetron (Zofran) injection 4 mg  melatonin tablet 5 mg  potassium chloride (Klor-Con) packet 40 mEq  potassium chloride 20 mEq in 100 mL IV premix  furosemide (Lasix) injection 40 mg  barium sulfate (Varibar Pudding) 40 % (w/v), 30% (w/w) oral paste 20 mL  barium sulfate (Varibar Nectar, Varibar Honey) 40 % (w/v) suspension 20 mL  barium sulfate (Varibar Honey) 40 % (w/v) 29% (w/w) suspension 20 mL  barium sulfate (Varibar THIN Liquid) 81 % (w/w) suspension 20 mL  sodium chloride 0.9% infusion  oxygen (O2) therapy  vancomycin (Firvanq) solution 125 mg  vancomycin (Vancocin) capsule 125 mg  ipratropium-albuteroL  (Duo-Neb) 0.5-2.5 mg/3 mL nebulizer solution 3 mL  gabapentin (Neurontin) capsule 200 mg  sodium chloride (Ocean) 0.65 % nasal spray 1 spray  mirtazapine (Remeron) tablet 15 mg  sodium chloride (Ocean) 0.65 % nasal spray 1 spray  acetaminophen (Tylenol) tablet 650 mg  oxygen (O2) therapy  acetylcysteine (Mucomyst) 200 mg/mL (20 %) nebulizer solution 600 mg  metroNIDAZOLE (Flagyl) 500 mg in NaCl (iso-os) 100 mL  acetaminophen (Ofirmev) injection 1,000 mg  norepinephrine (Levophed) 8 mg in dextrose 5% 250 mL (0.032 mg/mL) infusion (premix)  norepinephrine in dextrose 5 % (Levophed) infusion  - Omnicell Override Pull  digoxin (Lanoxin) injection 500 mcg  amiodarone (Nexterone) 360 mg in dextrose,iso-osm 200 mL (1.8 mg/mL) infusion (premix)  amiodarone (Nexterone) 360 mg in dextrose,iso-osm 200 mL (1.8 mg/mL) infusion (premix)  meropenem (Merrem) in sodium chloride 0.9 % 100 mL IV 1 g  oxygen (O2) therapy  methyl salicylate-menthol (Bengay) 15-10 % greaseless cream  amiodarone (Nexterone) 360 mg in dextrose,iso-osm 200 mL (1.8 mg/mL) infusion (premix)      Relevant Results  Labs  Results from last 72 hours   Lab Units 01/15/24  0540 01/14/24  0347 01/13/24  1530   WBC AUTO x10*3/uL 12.5* 15.2*  --    HEMOGLOBIN g/dL 11.2* 12.0* 12.0*   HEMATOCRIT % 33.6* 36.5* 37.1*   PLATELETS AUTO x10*3/uL 213 219  --      Results from last 72 hours   Lab Units 01/15/24  0540 01/14/24  0347 01/13/24  0500   SODIUM mmol/L 144 142 144   POTASSIUM mmol/L 3.9 4.1 4.0   CHLORIDE mmol/L 111* 109* 110*   CO2 mmol/L 24 22* 21*   BUN mg/dL 38* 43* 45*   CREATININE mg/dL 1.90* 2.00* 2.00*   GLUCOSE mg/dL 219* 132* 209*   CALCIUM mg/dL 7.6* 7.5* 7.7*   ANION GAP mmol/L 9 11 13   EGFR mL/min/1.73m*2 37* 35* 35*     Results from last 72 hours   Lab Units 01/15/24  0540 01/14/24  0347   ALK PHOS U/L 506* 398*   BILIRUBIN TOTAL mg/dL 0.4 0.3   PROTEIN TOTAL g/dL 5.2* 5.4*   ALT U/L <5* <5*   AST U/L 19 19   ALBUMIN g/dL 2.1* 2.1*     Estimated  Creatinine Clearance: 35.1 mL/min (A) (by C-G formula based on SCr of 1.9 mg/dL (H)).  C-Reactive Protein   Date Value Ref Range Status   11/29/2023 2.53 (H) <1.00 mg/dL Final     CRP   Date Value Ref Range Status   12/31/2022 39.72 (A) mg/dL Final     Comment:     REF VALUE  < 1.00     12/14/2021 2.16 (A) mg/dL Final     Comment:     REF VALUE  < 1.00       Microbiology  Susceptibility data from last 14 days.  Collected Specimen Info Organism   01/03/24 Urine from Straight Catheter Candida glabrata     Reviewed  Imaging  XR chest 1 view    Result Date: 1/15/2024  Interpreted By:  Rahul Staples, STUDY: XR CHEST 1 VIEW; 1/15/2024 6:59 am   INDICATION: CLINICAL INFORMATION: Signs/Symptoms:hypoxia.   COMPARISON: 01/13/2024   ACCESSION NUMBER(S): [id number]   ORDERING CLINICIAN: LYNSEY HERNDON   TECHNIQUE: Portable chest one view.   FINDINGS: The cardiac size is indeterminate in view of the AP projection.  A cardiac pacemaker with biventricular as well as right atrial leads is noted. Right internal jugular venous catheter is unchanged. Diffuse hazy bilateral alveolar infiltrates are present along with moderate-sized bilateral effusions.       Bilateral infiltrates and effusions are present in a pattern suggesting CHF. There is no interval change when compared to the previous examination.   MACRO: none   Signed by: Rahul Staples 1/15/2024 7:51 AM Dictation workstation:   [id number]    XR chest 1 view    Result Date: 1/13/2024  Interpreted By:  Rahul Staples, STUDY: XR CHEST 1 VIEW; 1/13/2024 2:51 pm   INDICATION: CLINICAL INFORMATION: Signs/Symptoms:central line and thoracentesis.   COMPARISON: 01/13/2024 at 824 hours   ACCESSION NUMBER(S): [id number]   ORDERING CLINICIAN: CRISTAL MARCH   TECHNIQUE: Portable chest one view.   FINDINGS: The cardiac size is indeterminate in view of the AP projection.  A cardiac pacemaker with biventricular as well as right atrial leads is noted. There is a right internal jugular  venous catheter now present, new compared to the prior study with the tip somewhat obscured by the cardiac pacemaker leads. The tip however appears to terminate above the right atrium in the SVC distribution. There is no evidence for pneumothorax.   Decrease in the pleural effusion on the left in this patient with history of thoracentesis. There is no definite evidence for pneumothorax within limits of this study.   There are bilateral infiltrates and effusions present, possibly secondary to pulmonary edema and CHF.       1. Status post right-sided central line insertion without pneumothorax as above. 2. Status post left thoracentesis without evidence for pneumothorax. 3. Persistent infiltrates and effusions bilaterally suggesting possibility of pulmonary edema and CHF.   MACRO: none   Signed by: Rahul Staples 1/13/2024 3:02 PM Dictation workstation:   [id number]    CT chest wo IV contrast    Result Date: 1/13/2024  Interpreted By:  Michael Fuentes, STUDY: CT CHEST WO IV CONTRAST; ;  1/13/2024 9:09 am   INDICATION: Signs/Symptoms:worsening chest xray. Pneumonia   COMPARISON: 01/03/2024   ACCESSION NUMBER(S): [id number]   ORDERING CLINICIAN: LYNSEY HERNDON   TECHNIQUE: Serial axial unenhanced CT images obtained of the chest. Images reformatted in the coronal and sagittal projection   All CT examinations are performed with 1 or more of the following dose reduction techniques: Automated exposure control, adjustment of mA and/or kv according to patient's size, or use of iterative reconstruction techniques.   FINDINGS: Mediastinum demonstrates lymphadenopathy with multiple enlarged paratracheal and pretracheal lymph nodes. There is a right paratracheal lymph node identified measuring 1.9 x 1.4 cm intervally increased in size from prior imaging where it measured 1.4 x 1.1 cm. Other lymph nodes have intervally increased in size. There is a precarinal lymph node identified measuring 12 mm in the short axis. Evaluation  of the aidan limited without IV contrast. No significant hilar lymphadenopathy. Esophagus is gas-filled with component of refluxed suggested distally.   Heart and great vessels demonstrate ascending thoracic aorta to measure 3.2 cm. There is mild vascular calcification of the aortic arch. Diffuse qualitative coronary artery calcification is demonstrated. Cardiomegaly noted.   Large bilateral pleural effusions are demonstrated left-greater-than-right. Findings increased from prior imaging. There is left lower lobar collapse with no asymmetric density within the atelectatic lung. Also, there is compressive atelectasis within the left upper lobe with partial collapse within the lingula. The right lower lobe demonstrates partial lobar collapse in relation to compressive atelectasis. Lung parenchyma demonstrates septal thickening superimposed ground-glass airspace infiltrate more focal consolidation in the areas of septal thickening within bilateral lung fields which is progressed from prior imaging which may reflect alveolar component of pulmonary edema. However, the areas of consolidation may reflect superimposed component of infectious infiltrate.   Included portions visualized abdomen demonstrate mild ascites in particular perihepatic location. There is contrast demonstrated within the colon. A cyst is noted in the superior pole of the right kidney measuring 1.5 cm.   Visualized osseous structures demonstrate no compression deformity in the spine.             1. Interval increase in bilateral pleural effusions with large bilateral effusions left-greater-than-right. There is resultant left lower lobar collapse with compressive atelectasis in particular in the left upper lobe as well as right lower lobe.   2. Patchy areas of airspace consolidation intervally developed from prior imaging with more focal septal thickening in the areas of consolidation may reflect alveolar component of pulmonary edema. However,  superimposed infectious infiltrate is not excluded. No dense airspace consolidation     MACRO: None   Signed by: Michael Fuentes 1/13/2024 9:32 AM Dictation workstation:   [id number]    XR chest 1 view    Result Date: 1/13/2024  Interpreted By:  Michael Fuentes, STUDY: XR CHEST 1 VIEW 1/13/2024 8:31 am   INDICATION: Signs/Symptoms:respiratory distress   COMPARISON: 01/12/2024   ACCESSION NUMBER(S): [id number]   ORDERING CLINICIAN: CRISTAL MARCH   TECHNIQUE: Single AP view chest   FINDINGS: Cardiomediastinal silhouette is enlarged with mild cardiomegaly. Left-sided pacemaker with leads in the region of the right atrium, right ventricle, and coronary sinus. There is generalized increased interstitial prominence with cephalization as well as patchy alveolar airspace infiltrate in bilateral lung fields without significant change. There is improved aeration at the right lung base with component of layering basilar effusion improved. However, there is persistent left-sided pleural effusion with interval increased from prior imaging with component of compressive atelectasis.             1. Interval increase in left-sided pleural effusion with compressive atelectasis with moderate left-sided pleural effusion   2. Patchy alveolar airspace infiltrate in bilateral lung fields with alveolar component of pulmonary edema suggested.   Signed by: Michael Fuentes 1/13/2024 8:48 AM Dictation workstation:   [id number]    XR chest 1 view    Result Date: 1/12/2024  Interpreted By:  Sugey Tinoco, STUDY: XR CHEST 1 VIEW 1/12/2024 3:49 pm   INDICATION: Signs/Symptoms:hypoxia   COMPARISON: 01/03/2024   ACCESSION NUMBER(S): [id number]   ORDERING CLINICIAN: CRISTAL MARCH   TECHNIQUE: AP erect view of the chest   FINDINGS: The cardiac size is mildly enlarged with biventricular ICD leads observed in right atrium, right ventricle, and coronary sinus.   Pulmonary edema is present with worsening of bilateral pulmonary infiltrates and  with bilateral pleural effusion seen. Left effusion appears larger than the right with left effusion increased in size.       Pulmonary edema is now seen with increased size of left pleural effusion which is now moderate in amount. Small right pleural effusion is also present.   Signed by: Sugey Tinoco 1/12/2024 4:27 PM Dictation workstation:   [id number]    ECG 12 Lead    Result Date: 1/10/2024  Wide QRS tachycardia with Premature supraventricular complexes and with occasional Premature ventricular complexes Nonspecific intraventricular block Cannot rule out Anteroseptal infarct , age undetermined T wave abnormality, consider inferolateral ischemia Abnormal ECG When compared with ECG of 14-DEC-2023 19:10, (unconfirmed) Wide QRS tachycardia has replaced Sinus rhythm Vent. rate has increased BY  57 BPM See ED provider note for full interpretation and clinical correlation Confirmed by Alyssa Brown ([id number]) on 1/10/2024 4:16:56 PM    FL modified barium swallow study    Result Date: 1/9/2024  Interpreted By:  Chuck Montoya and Janezic Kimberly STUDY: FL MODIFIED BARIUM SWALLOW STUDY;; 1/8/2024 3:34 pm   INDICATION: Signs/Symptoms:dysphagia.   COMPARISON: None.   ACCESSION NUMBER(S): [id number]   ORDERING CLINICIAN: CRISTAL MARCH   TECHNIQUE: MBSS completed. Informed verbal consent obtained prior to completion of exam. Trials of thin, nectar thick, honey thick, puree,  and regular solids given. Total fluoroscopy time:  2 minutes 52 seconds Radiation exposure (Reference Air Kerma):  34.37 mGy Images:  9 series     SLP: Emiliana Collins M.A., CCC-HARSHAL, [facility] Phone/Pager: [phone] Option 2   SPEECH FINDINGS: Reason for referral: Aspiration due to comorbidities Patient hx: Per chart: This man who is debilitated from a stroke and a history of laryngeal cancer and repeat aspiration pneumonia has spent more time in the hospital than out of the hospital for the last 2 months.  He was most recently hospitalized  here December 25 with COVID and aspiration pneumonia. Respiratory status: Nasal cannula 2L Previous diet: Regular/thin liquids   FINAL SPEECH RECOMMENDATIONS   Diet recommendations/feeding strategies: Puree/nectar thick liquids. Crush medication in puree or nectar thick liquids. Sit upright at 90 degrees for meals and medications.   Follow-up speech therapy recommended: Yes. Patient will benefit from continued dysphagia therapy for oropharyngeal exercises to allow for diet progression.   Short term goals: 1. Patient will tolerate recommended diet without pulmonary compromise 2. Patient will participate in oropharyngeal strengthening exercises. 3. SLP will assess for diet upgrade as appropriate. 4. Due to silent aspiration, patient will need a repeat MBS prior to liquid diet upgrade to thin.   Long term goals: Tolerated least restrictive diet without pulmonary compromise   Education provided: Reviewed results with nursing.   Treatment Provided today: No.     Repeat study/ dc plan: Repeat MBS prior to upgrade to thin liquids, due to silent aspriation with thin liquids.   Mechanics of the swallow summary:   Oral phase: 1. Patient is unable to masticate solids, thus manually removed. Attempt at mashing with tongue was not successful. 2. Repetitive tongue motion with puree. Oral holding with all liquids and puree prior to swallow onset. 3. Swallow initiation at the pyriforms with thin liquids. At the valleculae with nectar and honey thick, and puree. 4. Trace oral residue, clears with reswallow.   Pharyngeal phase: 1. Partial superior laryngeal elevation. 2. Partial anterior hyoid excursion. 3. Partial epiglottic inversion. 4. Narrow column of contrast above vocal cords with  honey thick liquids and puree. Contrast enters airway above vocal cords (just underneath epiglottis) with no effort to eject. Narrow column of contrast enters airway below vocal cords with no effort to eject with straw. Contrast enters airway above  vocal cords with teaspoon trial without effort to eject. 5. Collection of residue thoughout pharynx, mild and clears with spontaneous reswallows. 6. Narrow column of contrast between tongue base and pharyngeal wall.     SLP impressions with severity rating:   Silent aspiration with thin liquids. Laryngeal penetration noted with puree and honey thick liquids. Tolerated nectar thick liquids during testing constraints without difficulty. Unable to masticate solids. Unclear if this is due to edentulous status, or other comorbidities. Patient would benefit from continuation of dysphagia therapy to improve overall oral and pharyngeal strength, improve mastication and upgrade diet to least restrictive without pulmonary compromise.   Thin Liquids (MBSS) Rosenbek's Penetration Aspiration Scale, Thin Liquids (MBSS): 8. SILENT ASPIRATION - contrast passes glottis, visible residue, NO pt response   Response to Aspiration, Thin Liquid (MBSS): absent response, silent aspiration,   Response to Pharyngeal Residue, Thin Liquid (MBSS): spontaneous clearing,   Nectar Thick Liquids (MBSS) Rosenbek's Penetration Aspiration Scale, Nectar thick liquids (MBSS): 1. NO ASPIRATION & NO PENETRATION - no aspiration, contrast does not enter airway   Response to Pharyngeal Residue, Nectar thick liquids (MBSS): spontaneous clearing,   Honey Thick Liquids (MBSS) Rosenbek's Penetration Aspiration Scale, Honey thick liquids (MBSS): 3. PENETRATION with LOW ASPIRATION risk - contrast remains above vocal cords, visible residue   Response to Pharyngeal Residue, Honey thick liquids (MBSS): spontaneous clearing,   Purees (MBSS) Rosenbek's Penetration Aspiration Scale, Purees (MBSS): 3. PENETRATION with LOW ASPIRATION risk - contrast remains above vocal cords, visible residue   Response to Pharyngeal Residue, Purees (MBSS): spontaneous clearing,     Speech Therapy section of this report signed by Emiliana Collins M.A., CCC-SLP, [employer]-S on 1/8/2024 at 3:54 pm.    RADIOLOGY FINDINGS: There is laryngeal penetration and aspiration with thin liquid barium. There is laryngeal penetration without aspiration with nectar consistency, honey consistency, pudding consistency, and cookie consistency barium.   Radiology section of this report signed by Jan.       There is laryngeal penetration and aspiration with thin liquid barium. There is laryngeal penetration without aspiration with nectar consistency, honey consistency, pudding consistency, and cookie consistency barium.   MACRO: None   Signed by: Chuck Montoya 1/9/2024 5:02 PM Dictation workstation:   [id number]    ECG 12 lead    Result Date: 1/5/2024  Sinus bradycardia with frequent and consecutive Premature ventricular complexes and Fusion complexes Left axis deviation Nonspecific intraventricular block Inferior infarct , age undetermined Lateral injury pattern ** ** ACUTE MI / STEMI ** ** Abnormal ECG When compared with ECG of 27-DEC-2023 15:18, Significant changes have occurred See ED provider note for full interpretation and clinical correlation Confirmed by Yomi Lainez ([id number]) on 1/5/2024 9:13:12 PM    ECG 12 lead    Result Date: 1/5/2024  Undetermined rhythm Left bundle branch block Abnormal ECG When compared with ECG of 03-JAN-2024 11:50, (unconfirmed) Current undetermined rhythm precludes rhythm comparison, needs review Questionable change in QRS duration Criteria for Inferior infarct are no longer Present See ED provider note for full interpretation and clinical correlation Confirmed by Yomi Lainez ([id number]) on 1/5/2024 9:12:07 PM    CT 3D reconstruction    Addendum Date: 1/5/2024    Interpreted By:  Chuck Argueta, ADDENDUM: Multiplanar 3D reformations are also generated and reviewed on independent workstation.   Signed by: Chuck Argueta 1/5/2024 2:19 PM   -------- ORIGINAL REPORT -------- Dictation workstation:   [id number]    Result Date: 1/5/2024  Interpreted By:  Chuck Argueta,  STUDY: CT FACIAL BONES WO IV CONTRAST;  1/3/2024 1:14 pm   INDICATION: Signs/Symptoms:fall.   COMPARISON: None.   ACCESSION NUMBER(S): [id number]   ORDERING CLINICIAN: FIONA GOMEZ   TECHNIQUE: Contiguous axial CT sections were performed through the bones and orbits and supplemented with coronal and sagittal reformatted images. The study is limited by motion degradation.   FINDINGS: There is mild soft tissue swelling superior to the left orbit.   There is some deformity at the distal nasal bones greater on the right. This appearance is of indeterminate age and should be correlated to point tenderness. The remaining osseous structures demonstrate no sign of acute fracture allowing for motion degradation. There is no overt bone destruction or aggressive periosteal reaction. No lytic or blastic lesion is detected.   There are small fluid levels in both maxillary sinuses, greater on the left. There is a small fluid level in the right sphenoid compartment. There is mucoperiosteal thickening in scattered fluid within the ethmoid air cells, greater on the left posteriorly. The frontal sinuses and mastoid air cells are clear and symmetrically aerated. There may be mild mucoperiosteal thickening at the floor of the left maxillary sinus.   The nasal septum is at the midline. The ostiomeatal units are patent bilaterally.   The visualized orbital contents are unremarkable. There is no intraorbital air density or fluid collection. The medial and inferior orbital walls are intact.   There are least 2 small foci of air density posterior to the right maxillary sinus though no apparent posterior wall fracture or defect. The significance of these findings is indeterminate.       Soft tissue swelling superior left orbit.   Mild deformity at the distal tip of the nasal bones greater on the right. This appearance is of uncertain age it should be correlated to point tenderness.   The remaining visualized osseous structures are intact.    Maxillary, ethmoid, and right sphenoid sinusitis with fluid levels.   Signed by: Chuck Argueta 1/5/2024 2:19 PM Dictation workstation:   [id number]    CT maxillofacial bones wo IV contrast    Addendum Date: 1/5/2024    Interpreted By:  Chuck Argueta, ADDENDUM: Multiplanar 3D reformations are also generated and reviewed on independent workstation.   Signed by: Chuck Argueta 1/5/2024 9:43 AM   -------- ORIGINAL REPORT -------- Dictation workstation:   [id number]    Result Date: 1/5/2024  Interpreted By:  Chuck Argueta, STUDY: CT FACIAL BONES WO IV CONTRAST;  1/3/2024 1:14 pm   INDICATION: Signs/Symptoms:fall.   COMPARISON: None.   ACCESSION NUMBER(S): [id number]   ORDERING CLINICIAN: FIONA GOMEZ   TECHNIQUE: Contiguous axial CT sections were performed through the bones and orbits and supplemented with coronal and sagittal reformatted images. The study is limited by motion degradation.   FINDINGS: There is mild soft tissue swelling superior to the left orbit.   There is some deformity at the distal nasal bones greater on the right. This appearance is of indeterminate age and should be correlated to point tenderness. The remaining osseous structures demonstrate no sign of acute fracture allowing for motion degradation. There is no overt bone destruction or aggressive periosteal reaction. No lytic or blastic lesion is detected.   There are small fluid levels in both maxillary sinuses, greater on the left. There is a small fluid level in the right sphenoid compartment. There is mucoperiosteal thickening in scattered fluid within the ethmoid air cells, greater on the left posteriorly. The frontal sinuses and mastoid air cells are clear and symmetrically aerated. There may be mild mucoperiosteal thickening at the floor of the left maxillary sinus.   The nasal septum is at the midline. The ostiomeatal units are patent bilaterally.   The visualized orbital contents are unremarkable. There  is no intraorbital air density or fluid collection. The medial and inferior orbital walls are intact.   There are least 2 small foci of air density posterior to the right maxillary sinus though no apparent posterior wall fracture or defect. The significance of these findings is indeterminate.       Soft tissue swelling superior left orbit.   Mild deformity at the distal tip of the nasal bones greater on the right. This appearance is of uncertain age it should be correlated to point tenderness.   The remaining visualized osseous structures are intact.   Maxillary, ethmoid, and right sphenoid sinusitis with fluid levels.   Signed by: Chuck Argueta 1/3/2024 1:56 PM Dictation workstation:   [id number]    ECG 12 lead    Result Date: 1/4/2024  Ventricular-paced rhythm with occasional and consecutive sinus complexes and with frequent and consecutive Premature ventricular complexes with junctional escape complexes Abnormal ECG When compared with ECG of 04-JAN-2024 04:21, (unconfirmed) Previous ECG has undetermined rhythm, needs review See ED provider note for full interpretation and clinical correlation Confirmed by Yomi Lainez ([id number]) on 1/4/2024 9:24:49 AM    CT head wo IV contrast    Addendum Date: 1/3/2024    Interpreted By:  Chuck Argueta, ADDENDUM: Subdural collection over the right convexity is new from 12/31/2022 though is of CSF attenuation. There is no evidence of dense acute hemorrhage at this site.   There is some asymmetry of the lateral ventricles with the left larger than the right. This in part may be related to some residual mass effect from the right subdural collection though there is no significant midline shift or sulcal effacement.   Signed by: Chuck Argueta 1/3/2024 3:48 PM   -------- ORIGINAL REPORT -------- Dictation workstation:   [id number]    Result Date: 1/3/2024  Interpreted By:  Chuck Argueta, STUDY: CT HEAD WO IV CONTRAST;  1/3/2024 1:14 pm   INDICATION:  Signs/Symptoms:fall, periorbital ecchymosis.   COMPARISON: 12/31/2022   ACCESSION NUMBER(S): [id number]   ORDERING CLINICIAN: FIONA GOMEZ   TECHNIQUE: Contiguous unenhanced axial CT sections are performed from the skull base to the vertex.   FINDINGS: The osseous structures are intact. There is small fluid levels in the maxillary sinuses, greater on the left as well as the right sphenoid compartment. There is partial opacification of the ethmoid air cells with mucoperiosteal thickening and fluid greater on the left. The visualized portions of the mastoid air cells are and frontal sinuses are clear.   There is severe generalized parenchymal volume loss with enlargement of the cortical sulci and CSF spaces. There is a CSF subdural collection overlying the right frontal convexity extending laterally over the temporoparietal convexity. This finding is new from the previous study and measures 8 mm in maximum thickness. There may be a smaller CF density extra-axial collection over the left frontal convexity anteriorly.   There is hypodensity in the deep cerebral white matter bilaterally compatible with small-vessel ischemia. There is no sign of parenchymal hematoma. There is no dense extra-axial fluid collection. There is no mass effect or midline shift. There is some asymmetry of the lateral ventricles with the left larger than the right.       Chronic appearing low density extra-axial fluid collections overlying the frontal convexities, greater on the right. These findings are new from 12/31/2022.   Generalized parenchymal atrophy and small-vessel ischemic changes of the cerebral white matter.   Maxillary, ethmoid, and right sphenoid sinusitis with acute fluid levels.   No sign of acute intracranial hemorrhage or mass effect.   The visualized osseous structures are intact.   Signed by: Chuck Argueta 1/3/2024 1:42 PM Dictation workstation:   [id number]    CT chest abdomen pelvis wo IV contrast    Result Date:  1/3/2024  STUDY: CT Chest, Abdomen, and Pelvis without IV Contrast; 1/3/2024, 1:15PM. INDICATION: Shortness of breath and abdominal pain.  Leukocytosis. COMPARISON: CXR same day.  CT CAP 12/31/2022. ACCESSION NUMBER(S): [id number] ORDERING CLINICIAN: FIONA GOMEZ TECHNIQUE: CT of the chest, abdomen, and pelvis was performed.  Contiguous axial images were obtained at 3 mm slice thickness through the chest, abdomen, and pelvis.  Coronal and sagittal reconstructions at 3 mm slice thickness were performed.  No intravenous contrast was administered.  FINDINGS: Please note that the evaluation of vessels, lymph nodes and organs is limited without intravenous contrast. CHEST: MEDIASTINUM: The heart is enlarged in size without pericardial effusion. Biventricular AICD remains in place.  There is no thoracic aortic, aneurysm.  Scattered vascular calcifications are seen along the arterial tree. LUNGS/PLEURA: There are bilateral pleural effusions greater on the left than the right, these appear decreased when compared with the prior especially on the right.  Adjacent compressive and subsegmental atelectatic changes are noted as well as some areas of probable consolidation. There is a focus of patchy infiltrate in the right upper lobe anteriorly in proximity to the anterior aspect of the first rib.  This appears slightly more prominent.  Trachea and mainstem bronchi appear stable, borderline narrowed Groundglass infiltrates noted previously appear improved with mild residual. LYMPH NODES: Mediastinal lymph nodes are mildly enlarged.  There is a pretracheal lymph node with the short axis diameter of 1.4 cm similar to the prior.  Detail is somewhat obscured, by artifact on the current study.  Patient's arms are included within the field-of-view. ABDOMEN:  LIVER: The liver is mildly enlarged, the right lobe measures 17 cm in CC dimension.  Assessment of parenchyma is limited by artifact.  Smooth surface contour.  Normal  attenuation.  BILE DUCTS: No intrahepatic or extrahepatic biliary ductal dilatation.  GALLBLADDER: The gallbladder is surgically absent and this was also the case previously.  PANCREAS: No masses or ductal dilatation.  SPLEEN: The spleen is not clearly identified nor was it previously.  ADRENAL GLANDS: No thickening or nodules.  KIDNEYS AND URETERS: Kidneys are normal in size and location.  No renal or ureteral calculi.  There is a 2 cm cyst in the upper pole of the right kidney with small peripheral calcifications which are more prominent than on the prior.  No hydronephrosis of either kidney.  PELVIS:  BLADDER: There is increased bladder distention when compared with the prior. There is extrinsic pressure from the prostate and coarse prostate calcifications also present previously.  Increased attenuation in the dependent bladder appears to be likely artifactual.  REPRODUCTIVE ORGANS: Coarse prostate calcifications are noted also present previously.  BOWEL: There are multiple fluid and air-filled loops of small and large bowel as can be seen with adynamic ileus.  VESSELS: No acute abnormalities identified.  Abdominal aorta is normal in caliber.  There are vascular calcifications along the arterial tree.  PERITONEUM/RETROPERITONEUM/LYMPH NODES: There are mesenteric and pelvic reticulations.  There is a small amount of presacral and pelvic fluid.  No pneumoperitoneum. No lymphadenopathy.  ABDOMINAL WALL AND SOFT TISSUES: Abdominal wall edema is noted especially laterally more so on the right and posteriorly.  BONES: No acute fracture or aggressive osseous lesion.  Lumbar dextroscoliosis is noted and there are degenerative changes of the spine and joints.    1.Bilateral pleural effusions greater on the left than the right, these appear decreased from the prior especially on the right. Adjacent compressive and subsegmental atelectatic changes are noted as well as some areas of consolidation. 2.Improving groundglass  infiltrates in both lungs with mild residual. There is a small focus of infiltrate in the right upper lobe anteriorly which appears slightly more prominent.  Pulmonary findings suggest a combination of pneumonia and pulmonary vascular congestion. This could also be correlated clinically.  Underlying nodules could be obscured and follow-up is suggested. 3.Cardiomegaly with AICD in place also present previously. 4.Within the abdomen and pelvis there are multiple fluid and air-filled loops of small and large bowel as can be seen with adynamic ileus. There is a small amount of presacral and pelvic fluid. No pneumoperitoneum. 5.Abdominal wall edema. 6.  2 cm cyst in the upper pole of the right kidney with small peripheral calcifications which are more prominent than on the prior. Follow-up studies are suggested. Signed by Nancy Hagen DO    CT cervical spine wo IV contrast    Result Date: 1/3/2024  Interpreted By:  Chuck Argueta, STUDY: CT CERVICAL SPINE WO IV CONTRAST;  1/3/2024 1:14 pm   INDICATION: Signs/Symptoms:fall.   COMPARISON: None.   ACCESSION NUMBER(S): [id number]   ORDERING CLINICIAN: FIONA GOMEZ   TECHNIQUE: Contiguous axial CT sections are performed from the skullbase to the upper thoracic spine and supplemented with coronal and sagittal reformatted images. The study is limited by motion degradation.   FINDINGS: There is mild dextro convexity of the cervical spine and reversal of the cervical lordosis. There is anterior subluxation of C2 relative to C3 measuring 3 mm. The facet joints align normally.   There are multilevel discogenic degenerative changes of the cervical spine with disc space narrowing from C3 through C6. There is degenerative endplate sclerosis and lucency is more pronounced at C5-6.   The cervical vertebral body heights are maintained. Allowing for motion degradation, there is no CT evidence of acute cervical spine fracture. There is no bone destruction or aggressive periosteal  reaction. No lytic or blastic lesion is detected. The visualized surrounding osseous structures are intact.   The C1-2 relationship is within normal limits. There is C1-2 arthrosis anteriorly with joint space narrowing and marginal spurring.   The C2-3 disc space level demonstrates moderate facet arthrosis on the right and mild facet arthrosis on the left. There is bulging disc and uncovertebral arthrosis with mild narrowing of the left neural foramen and mild to moderate narrowing on the right. There is mild central canal narrowing with bulging disc and mass effect upon the ventral subarachnoid space.   The C3-4 disc space level demonstrates mild to moderate bilateral facet arthrosis. There is bulging disc and marginal osteophyte asymmetric to the right with severe uncovertebral arthrosis bilaterally. There is severe bilateral neural foraminal narrowing, greater on the right with moderate central canal narrowing.   The C4-5 disc space level demonstrates mild to moderate bilateral facet arthrosis, greater on the left. There is bulging disc and marginal osteophyte with severe bilateral uncovertebral arthrosis. There is severe narrowing of the right neural foramen and moderate to severe narrowing of the left neural foramen. There is superimposed extruded disc on the right with severe central canal narrowing and suspected mass effect upon the spinal cord. A similar finding was described on a MRI examination of 10/13/2022 though it is difficult to compare different modalities with respect to the size and intensity of this finding.   The C5-6 disc space level demonstrates moderate bilateral facet arthrosis. There is bulging disc and marginal osteophyte with moderate central canal narrowing. There is severe bilateral neural foraminal narrowing.   The C6-7 disc space level demonstrates mild to moderate bilateral facet arthrosis. There is bulging disc and marginal osteophyte moderate narrowing left neural foramen and mild  narrowing of the right neural foramen. There is mild to moderate central canal narrowing.   The C7-T1 disc space level demonstrates moderate bilateral facet arthrosis. There is bulging disc and marginal osteophyte with moderate bilateral neural foraminal narrowing. There is mild central canal narrowing.   There is no prevertebral soft tissue swelling or retropharyngeal air. Bilateral pleural effusions and dependent atelectasis is partially visualized, greater on the left. There are bilateral emphysematous changes with ground-glass density in both lung apices. There is superimposed focal opacity in the right apex anteriorly with a few peripheral air densities.       Severe multilevel cervical spondylosis with reversal of the cervical lordosis and mild dextro convexity of the cervical spine.   Multilevel bulging disc with facet and uncovertebral arthrosis. There is large superimposed extruded disc at C4-5 on the right. Severe central canal stenosis at this level asymmetric to the right. There is at least moderate central canal narrowing at C5-6 and C3-4. There is severe multilevel bilateral neural foraminal narrowing as detailed above.   Allowing for motion degradation, there is no CT evidence of acute fracture or traumatic subluxation.   Bilateral pleural effusions and dependent atelectasis are partially visualized, greater on the left. There are bilateral emphysematous changes with ground-glass density in the lung apices. Focal opacity with ill-defined margins is identified in the right apex. Further workup with dedicated CT thorax is recommended.   Signed by: Chuck Argueta 1/3/2024 1:51 PM Dictation workstation:   [id number]    XR chest 1 view    Result Date: 1/3/2024  Interpreted By:  Anahi Fernando, STUDY: XR CHEST 1 VIEW;  1/3/2024 11:43 am   INDICATION: Signs/Symptoms:SOB.   COMPARISON: 12/27/2023   ACCESSION NUMBER(S): [id number]   ORDERING CLINICIAN: FIONA GOMEZ   FINDINGS: CARDIOMEDIASTINAL  SILHOUETTE: The heart is enlarged. There is a left subclavian pacemaker-AICD with a single tip in the right atrium and two tips in the right ventricle.     LUNGS: There is improvement in bilateral perihilar alveolar opacities consistent with resolving pulmonary edema. There is underlying moderate vascular congestion, improved. There is no pleural fluid.   ABDOMEN: No remarkable upper abdominal findings.     BONES: No acute osseous changes.       1. Marked improvement bilateral patchy alveolar opacities consistent with resolving pneumonia. 2. Underlying moderate vascular congestion, improved.   MACRO: None   Signed by: Anahi Fernando 1/3/2024 12:00 PM Dictation workstation:   [id number]    ECG 12 lead    Result Date: 1/1/2024  Undetermined rhythm Left bundle branch block Abnormal ECG When compared with ECG of 27-DEC-2023 07:49, (unconfirmed) Current undetermined rhythm precludes rhythm comparison, needs review See ED provider note for full interpretation and clinical correlation Confirmed by Yomi Lainez ([id number]) on 1/1/2024 3:20:04 PM    XR chest 1 view    Result Date: 12/27/2023  Interpreted By:  Anahi Fernando, STUDY: XR CHEST 1 VIEW;  12/27/2023 8:43 am   INDICATION: Signs/Symptoms:Dypnea.   COMPARISON: 12/14/2023   ACCESSION NUMBER(S): [id number]   ORDERING CLINICIAN: SYLVIA MOORE   FINDINGS: CARDIOMEDIASTINAL SILHOUETTE: The heart is enlarged. There is a left subclavian pacemaker-AICD with a single tip in the right atrium and two tips in the right ventricle.   LUNGS: There are progressive perihilar alveolar opacities, likely pulmonary edema but could also represent pneumonia. There is left basilar atelectasis and volume loss which has progressed. There is no pleural fluid.   ABDOMEN: No remarkable upper abdominal findings.     BONES: No acute osseous changes.       1. Progressive bilateral predominantly perihilar alveolar opacities consistent with pulmonary edema or pneumonia. 2. Progressive volume  loss and atelectasis left lung base.   MACRO: None   Signed by: Anahi Fernando 12/27/2023 8:50 AM Dictation workstation:   [id number]    CT FACIAL BONE/TITO WO IVCON    Result Date: 12/22/2023  * * *Final Report* * * DATE OF EXAM: Dec 22 2023  5:57PM   ASC   0507  -  CT FACIAL BONE/TITO WO IVCON  / ACCESSION #  [id number] PROCEDURE REASON: Maxillofacial pain      * * * * Physician Interpretation * * * *  EXAMINATION:  CT BRAIN WO IVCON, CT FACIAL BONE/TITO WO IVCON, CT CERVICAL SPINE WO IVCON CLINICAL HISTORY: Head trauma, moderate-severe (accession [id number]), Maxillofacial pain, Nasal fracture suspected (accession [id number]), Spine fracture, cervical, traumatic (accession [id number]) TECHNIQUE:  Serial axial images without IV contrast were obtained from the vertex to the foramen magnum.  CT of the cervical spine was also performed with axial sections from the skull base through the thoracic inlet. CT of the face was performed without IV contrast and multiplanar reconstructions were generated. MQ:  CTBWO_3 CT Dose-Length Product (DLP): 1565  mGy*cm CT Dose Reduction Employed: Iterative recon (accession [id number]), Automated exposure control(AEC) and iterative recon (accession [id number]) COMPARISON:  None. RESULT: CT HEAD: No acute intracranial hemorrhage or extra-axial fluid collection. No hydrocephalus, mass effect, or herniation. No acute ischemic infarct detected.  Mild background of white matter hypoattenuation consistent with chronic microvascular ischemic change. Unremarkable dural venous sinus attenuation. No acute osseous abnormality. Clear visualized paranasal sinuses and tympanomastoid cavities. CT face: Soft Tissues:  Soft tissue swelling is present at the nose and along the right forehead/periorbital region. Facial bones:  Mildly comminuted bilateral nasal bone fractures are present. Orbits:  No evidence of an acute fracture.  The globes are intact.  The soft tissue planes of the orbits are maintained.  Paranasal Sinuses:  The paranasal sinuses are clear. Foreign Bodies:  No evidence of radiopaque foreign bodies. Other:  No evidence of a remote fracture.  No lytic or blastic process seen in the facial bones. CT cervical spine: There is reversal of the normal cervical lordosis. There is no fracture or dislocation. Severe multilevel degenerative changes are present.  There is severe spinal canal stenosis at the C4-C5 level from a large posterior disc osteophyte complex.  Moderate spinal canal stenosis present at the C3-C4 level from a posterior disc osteophyte complex.  Severe bilateral neural foraminal narrowing is present at the C3-C4, C4-C5, and C5-C6 levels. Centrilobular emphysema.    IMPRESSION: 1.  No acute intracranial abnormality. 2.  No cervical spine fracture or traumatic subluxation.  Severe multilevel degenerative change. 3.  Nasal bone fractures with adjacent soft tissue swelling. : ALISSA   Transcribe Date/Time: Dec 22 2023  6:05P Dictated by : MAIRA STEWART MD This examination was interpreted and the report reviewed and electronically signed by: MAIRA STEWART MD on Dec 22 2023  6:17PM  EST    CT CERVICAL SPINE WO IVCON    Result Date: 12/22/2023  * * *Final Report* * * DATE OF EXAM: Dec 22 2023  5:57PM   [facility]   0505  -  CT CERVICAL SPINE WO IVCON  / ACCESSION #  [id number] PROCEDURE REASON: Spine fracture, cervical, traumatic      * * * * Physician Interpretation * * * *  EXAMINATION:  CT BRAIN WO IVCON, CT FACIAL BONE/TITO WO IVCON, CT CERVICAL SPINE WO IVCON CLINICAL HISTORY: Head trauma, moderate-severe (accession [id number]), Maxillofacial pain, Nasal fracture suspected (accession [id number]), Spine fracture, cervical, traumatic (accession [id number]) TECHNIQUE:  Serial axial images without IV contrast were obtained from the vertex to the foramen magnum.  CT of the cervical spine was also performed with axial sections from the skull base through the thoracic inlet. CT of the face was  performed without IV contrast and multiplanar reconstructions were generated. MQ:  CTBWO_3 CT Dose-Length Product (DLP): 1565  mGy*cm CT Dose Reduction Employed: Iterative recon (accession [id number]), Automated exposure control(AEC) and iterative recon (accession [id number]) COMPARISON:  None. RESULT: CT HEAD: No acute intracranial hemorrhage or extra-axial fluid collection. No hydrocephalus, mass effect, or herniation. No acute ischemic infarct detected.  Mild background of white matter hypoattenuation consistent with chronic microvascular ischemic change. Unremarkable dural venous sinus attenuation. No acute osseous abnormality. Clear visualized paranasal sinuses and tympanomastoid cavities. CT face: Soft Tissues:  Soft tissue swelling is present at the nose and along the right forehead/periorbital region. Facial bones:  Mildly comminuted bilateral nasal bone fractures are present. Orbits:  No evidence of an acute fracture.  The globes are intact.  The soft tissue planes of the orbits are maintained. Paranasal Sinuses:  The paranasal sinuses are clear. Foreign Bodies:  No evidence of radiopaque foreign bodies. Other:  No evidence of a remote fracture.  No lytic or blastic process seen in the facial bones. CT cervical spine: There is reversal of the normal cervical lordosis. There is no fracture or dislocation. Severe multilevel degenerative changes are present.  There is severe spinal canal stenosis at the C4-C5 level from a large posterior disc osteophyte complex.  Moderate spinal canal stenosis present at the C3-C4 level from a posterior disc osteophyte complex.  Severe bilateral neural foraminal narrowing is present at the C3-C4, C4-C5, and C5-C6 levels. Centrilobular emphysema.    IMPRESSION: 1.  No acute intracranial abnormality. 2.  No cervical spine fracture or traumatic subluxation.  Severe multilevel degenerative change. 3.  Nasal bone fractures with adjacent soft tissue swelling. : ALISSA    Transcribe Date/Time: Dec 22 2023  6:05P Dictated by : MAIRA STEWART MD This examination was interpreted and the report reviewed and electronically signed by: MAIRA STEWART MD on Dec 22 2023  6:17PM  EST    CT BRAIN WO IVCON    Result Date: 12/22/2023  * * *Final Report* * * DATE OF EXAM: Dec 22 2023  5:57PM   [facility]   0504  -  CT BRAIN WO IVCON   / ACCESSION #  [id number] PROCEDURE REASON: Head trauma, moderate-severe      * * * * Physician Interpretation * * * *  EXAMINATION:  CT BRAIN WO IVCON, CT FACIAL BONE/TITO WO IVCON, CT CERVICAL SPINE WO IVCON CLINICAL HISTORY: Head trauma, moderate-severe (accession [id number]), Maxillofacial pain, Nasal fracture suspected (accession [id number]), Spine fracture, cervical, traumatic (accession [id number]) TECHNIQUE:  Serial axial images without IV contrast were obtained from the vertex to the foramen magnum.  CT of the cervical spine was also performed with axial sections from the skull base through the thoracic inlet. CT of the face was performed without IV contrast and multiplanar reconstructions were generated. MQ:  CTBWO_3 CT Dose-Length Product (DLP): 1565  mGy*cm CT Dose Reduction Employed: Iterative recon (accession [id number]), Automated exposure control(AEC) and iterative recon (accession [id number]) COMPARISON:  None. RESULT: CT HEAD: No acute intracranial hemorrhage or extra-axial fluid collection. No hydrocephalus, mass effect, or herniation. No acute ischemic infarct detected.  Mild background of white matter hypoattenuation consistent with chronic microvascular ischemic change. Unremarkable dural venous sinus attenuation. No acute osseous abnormality. Clear visualized paranasal sinuses and tympanomastoid cavities. CT face: Soft Tissues:  Soft tissue swelling is present at the nose and along the right forehead/periorbital region. Facial bones:  Mildly comminuted bilateral nasal bone fractures are present. Orbits:  No evidence of an acute fracture.  The globes are  intact.  The soft tissue planes of the orbits are maintained. Paranasal Sinuses:  The paranasal sinuses are clear. Foreign Bodies:  No evidence of radiopaque foreign bodies. Other:  No evidence of a remote fracture.  No lytic or blastic process seen in the facial bones. CT cervical spine: There is reversal of the normal cervical lordosis. There is no fracture or dislocation. Severe multilevel degenerative changes are present.  There is severe spinal canal stenosis at the C4-C5 level from a large posterior disc osteophyte complex.  Moderate spinal canal stenosis present at the C3-C4 level from a posterior disc osteophyte complex.  Severe bilateral neural foraminal narrowing is present at the C3-C4, C4-C5, and C5-C6 levels. Centrilobular emphysema.    IMPRESSION: 1.  No acute intracranial abnormality. 2.  No cervical spine fracture or traumatic subluxation.  Severe multilevel degenerative change. 3.  Nasal bone fractures with adjacent soft tissue swelling. : ALISSA   Transcribe Date/Time: Dec 22 2023  6:05P Dictated by : MAIRA STEWART MD This examination was interpreted and the report reviewed and electronically signed by: MAIRA STEWART MD on Dec 22 2023  6:17PM  EST    Transthoracic Echo (TTE) Complete    Result Date: 12/19/2023   [facility], [address] [place] [address]              Tel [phone] and Fax [phone] TRANSTHORACIC ECHOCARDIOGRAM REPORT  Patient [facility]:      [facility] Phys[facility]    [id number] Escobar Mendez MD Study Date:        12/18/2023          Ordering Provider:    [id number] GUNNAR HUGO MRN/PID:           [id number]            Fellow: Accession#:        [id number]        Nurse:                Mattie Sosa RN Date of Birth/Age: 1950 / 73 years Sonographer:          Cruz Wood RDCS Gender:            M                    Additional Staff: Height:            175.26 cm           Admit Date: Weight:            74.84 kg            Admission Status:     Inpatient - Routine BSA:               1.90 m2             Encounter#:           [id number]                                        Department Location:  [facility] Blood Pressure: 112 /63 mmHg Study Type:    TRANSTHORACIC ECHO (TTE) COMPLETE Diagnosis/ICD: Cardiomyopathy, unspecified-I42.9 Indication:    Cardiomyopathy CPT Code:      Echo Complete w Full [id number] Patient History: Diabetes:          Yes Pertinent History: A-Fib, CAD, CVA, HTN, Cancer, COPD and Syncope. Ischemic CM,                    cardiac stent. Study Detail: The following Echo studies were performed: 2D, M-Mode, Doppler and               color flow. Technically challenging study due to body habitus.               Definity used as a contrast agent for endocardial border               definition. Total contrast used for this procedure was 1.5 mL via               IV push. The patient was awake.  PHYSICIAN INTERPRETATION: Left Ventricle: The left ventricular systolic function is moderately to severely decreased, with an estimated ejection fraction of 30-35%. Wall motion is abnormal. The left ventricular cavity size is mildly dilated. There is mild concentric left ventricular hypertrophy. Findings are consistent with ischemic cardiomyopathy. Spectral Doppler shows an impaired relaxation pattern of left ventricular diastolic filling. LV Wall Scoring: The mid inferolateral segment is akinetic. The mid and apical anterior wall, basal and mid anterolateral wall, mid anteroseptal segment, basal inferolateral segment, apical lateral segment, basal inferior segment, and apex are hypokinetic. All remaining scored segments are normal. Left Atrium: The left atrium is mildly dilated. Right Ventricle: The right ventricle is mildly enlarged. There is mildly  reduced right ventricular systolic function. A device is visualized in the right ventricle. Right Atrium: The right atrium is mildly dilated. Aortic Valve: The aortic valve is trileaflet. There is mild aortic valve cusp calcification. There is no evidence of aortic valve regurgitation. The peak instantaneous gradient of the aortic valve is 3.5 mmHg. Mitral Valve: The mitral valve is normal in structure. There is mild mitral valve regurgitation. Tricuspid Valve: The tricuspid valve is structurally normal. There is mild tricuspid regurgitation. Pulmonic Valve: The pulmonic valve is structurally normal. There is physiologic pulmonic valve regurgitation. Pericardium: There is a trivial to small pericardial effusion. Aorta: The aortic root is normal. Pulmonary Artery: The tricuspid regurgitant velocity is 3.17 m/s, and with an estimated right atrial pressure of 3 mmHg, the estimated pulmonary artery pressure is mildly elevated with the RVSP at 43.2 mmHg. Pulmonary Veins: There is blunted systolic flow in the pulmonary veins. Systemic Veins: The inferior vena cava appears mildly dilated.  CONCLUSIONS:  1. Left ventricular systolic function is moderately to severely decreased with a 30-35% estimated ejection fraction.  2. Multiple segmental abnormalities exist. See findings.  3. Spectral Doppler shows an impaired relaxation pattern of left ventricular diastolic filling.  4. There is ischemic cardiomyopathy.  5. There is mildly reduced right ventricular systolic function.  6. Mild pulmonary HTN. CVP is elevated. QUANTITATIVE DATA SUMMARY: 2D MEASUREMENTS:                          Normal Ranges: Ao Root d:     2.60 cm   (2.0-3.7cm) LAs:           4.60 cm   (2.7-4.0cm) IVSd:          1.02 cm   (0.6-1.1cm) LVPWd:         0.96 cm   (0.6-1.1cm) LVIDd:         4.75 cm   (3.9-5.9cm) LVIDs:         4.22 cm LV Mass Index: 86.6 g/m2 LV % FS        11.2 % LA VOLUME:                               Normal Ranges: LA Vol A4C:        104.7  ml   (22+/-6mL/m2) LA Vol A2C:        69.5 ml LA Vol BP:         92.7 ml LA Vol Index A4C:  55.0ml/m2 LA Vol Index A2C:  36.5 ml/m2 LA Vol Index BP:   48.7 ml/m2 LA Area A4C:       28.3 cm2 LA Area A2C:       21.2 cm2 LA Major Axis A4C: 6.5 cm LA Major Axis A2C: 5.5 cm LA Volume Index:   47.0 ml/m2 RA VOLUME BY A/L METHOD:                               Normal Ranges: RA Vol A4C:        62.8 ml    (8.3-19.5ml) RA Vol Index A4C:  33.0 ml/m2 RA Area A4C:       20.7 cm2 RA Major Axis A4C: 5.8 cm M-MODE MEASUREMENTS:                  Normal Ranges: Ao Root: 2.60 cm (2.0-3.7cm) LAs:     4.50 cm (2.7-4.0cm) AORTA MEASUREMENTS:                    Normal Ranges: Asc Ao, d: 2.90 cm (2.1-3.4cm) LV SYSTOLIC FUNCTION BY 2D PLANIMETRY (MOD):                     Normal Ranges: EF-A4C View: 28.3 % (>=55%) EF-A2C View: 30.5 % EF-Biplane:  29.5 % LV DIASTOLIC FUNCTION:                            Normal Ranges: MV Peak E:      0.93 m/s   (0.7-1.2 m/s) MV e'           0.05 m/s   (>8.0) MV lateral e'   0.05 m/s MV medial e'    0.05 m/s E/e' Ratio:     18.56      (<8.0) PulmV Sys Chet:  24.60 cm/s PulmV English Chet: 88.80 cm/s PulmV S/D Chet:  0.30 MITRAL VALVE:                 Normal Ranges: MV DT: 201 msec (150-240msec) AORTIC VALVE:                         Normal Ranges: AoV Vmax:      0.93 m/s (<=1.7m/s) AoV Peak PG:   3.5 mmHg (<20mmHg) LVOT Max Chet:  0.79 m/s (<=1.1m/s) LVOT VTI:      16.30 cm LVOT Diameter: 1.90 cm  (1.8-2.4cm) AoV Area,Vmax: 2.41 cm2 (2.5-4.5cm2)  RIGHT VENTRICLE: RV Basal 3.70 cm RV Mid   2.90 cm RV Major 7.3 cm TAPSE:   13.7 mm RV s'    0.10 m/s TRICUSPID VALVE/RVSP:                             Normal Ranges: Peak TR Velocity: 3.17 m/s RV Syst Pressure: 43.2 mmHg (< 30mmHg) IVC Diam:         2.20 cm PULMONIC VALVE:                      Normal Ranges: PV Max Chet: 0.7 m/s  (0.6-0.9m/s) PV Max P.1 mmHg Pulmonary Veins: PulmV English Chet: 88.80 cm/s PulmV S/D Chet:  0.30 PulmV Sys Chet:  24.60 cm/s  [id number] Escobar  [place] Electronically signed on 12/19/2023 at 5:23:07 PM  Wall Scoring  ** Final **      Assessment/Plan   Coronavirus infection-treated with at least 10 days of dexamethasone, no further isolation precautions indicated  Possible pneumonia-treated with at least 7 days of antibiotics  Left-sided pleural effusion, s/p thoracentesis  Leukocytosis-most likely multifactorial  C. difficile infection-positive PCR/negative EIA-no diarrhea  Chronic respiratory failure-interval worsening, on BiPAP     Oral vancomycin for a total of 14 days-IV Flagyl if patient is unable to take p.o-tentative stop date 1/19/2024  Monitor stool  Continue contact plus precautions for c.diff  Supplemental oxygen as needed  Monitor temperature and WBC  Cardiology to advise with regards amiodarone dosing due to to potential interaction between Flagyl and amiodarone      Frank Escalante MD

## 2024-01-15 NOTE — CARE PLAN
Problem: Nutrition  Goal: Oral intake greater than 50%  1/14/2024 2355 by Ella John, RN  Outcome: Not Progressing  1/14/2024 2351 by Ella John, RN  Outcome: Progressing  Goal: Consume prescribed supplement  Outcome: Not Progressing     Problem: Diabetes  Goal: Increase stability of blood glucose readings by end of shift  Outcome: Progressing  Flowsheets (Taken 1/14/2024 1647 by Lyn Rojas, RN)  Increase stability of blood glucose readings by end of shift: Med administration/monitoring of effect  Goal: No changes in neurological exam by end of shift  Outcome: Progressing  Flowsheets (Taken 1/13/2024 1609 by Lyn Rojas, RN)  No changes in neurological exam by end of shift: Complete frequent neurological assessments     Problem: Skin  Goal: Decreased wound size/increased tissue granulation at next dressing change  Outcome: Progressing  Flowsheets (Taken 1/14/2024 1647 by Lyn Rojas, RN)  Decreased wound size/increased tissue granulation at next dressing change:   Promote sleep for wound healing   Protective dressings over bony prominences   Utilize specialty bed per algorithm  Goal: Prevent/manage excess moisture  Outcome: Progressing  Flowsheets (Taken 1/14/2024 1647 by Lyn Rojas, RN)  Prevent/manage excess moisture:   Cleanse incontinence/protect with barrier cream   Moisturize dry skin   Follow provider orders for dressing changes   Monitor for/manage infection if present  Goal: Promote/optimize nutrition  Outcome: Progressing  Flowsheets (Taken 1/14/2024 1647 by Lyn Rojas, RN)  Promote/optimize nutrition:   Assist with feeding   Discuss with provider if NPO > 2 days   Offer water/supplements/favorite foods   Consume > 50% meals/supplements   Monitor/record intake including meals   Reassess MST if dietician not consulted     Problem: Respiratory  Goal: Clear secretions with interventions this shift  Outcome: Progressing  Flowsheets (Taken 1/13/2024 1609 by Lyn Rojas, RN)  Clear  secretions with interventions this shift:   Encourage/provide pulmonary hygiene/secretion clearance   Med administration/monitoring of effect  Goal: Minimal/no exertional discomfort or dyspnea this shift  Outcome: Progressing  Flowsheets (Taken 1/13/2024 1609 by Lyn Rojas RN)  Minimal/no exertional discomfort or dyspnea this shift: Positioning to promote ventilation/comfort  Goal: Verbalize decreased shortness of breath this shift  Outcome: Progressing  Flowsheets (Taken 1/13/2024 1609 by Lyn Rojas RN)  Verbalize decreased shortness of breath this shift: Encourage/provide pulmonary hygiene/secretion clearance     Problem: Pain - Adult  Goal: Verbalizes/displays adequate comfort level or baseline comfort level  Outcome: Progressing  Flowsheets (Taken 1/13/2024 1609 by Lyn Rojas RN)  Verbalizes/displays adequate comfort level or baseline comfort level:   Encourage patient to monitor pain and request assistance   Assess pain using appropriate pain scale   Administer analgesics based on type and severity of pain and evaluate response   Implement non-pharmacological measures as appropriate and evaluate response   Notify Licensed Independent Practitioner if interventions unsuccessful or patient reports new pain     Problem: Chronic Conditions and Co-morbidities  Goal: Patient's chronic conditions and co-morbidity symptoms are monitored and maintained or improved  Outcome: Progressing  Flowsheets (Taken 1/13/2024 1609 by Lyn Rojas RN)  Care Plan - Patient's Chronic Conditions and Co-Morbidity Symptoms are Monitored and Maintained or Improved:   Monitor and assess patient's chronic conditions and comorbid symptoms for stability, deterioration, or improvement   Collaborate with multidisciplinary team to address chronic and comorbid conditions and prevent exacerbation or deterioration   Update acute care plan with appropriate goals if chronic or comorbid symptoms are exacerbated and prevent overall  improvement and discharge     Problem: Nutrition  Goal: Nutrition support is meeting 75% of nutrient needs  Outcome: Progressing  Goal: BG  mg/dL  Outcome: Progressing  Goal: Promote healing  Outcome: Progressing     Problem: Fall/Injury  Goal: Not fall by end of shift  Outcome: Progressing   The patient's goals for the shift include      The clinical goals for the shift include pt will tolerate PO medications with modified diet throughout shift    Over the shift, the patient did not make progress toward the following goals. Barriers to progression include pt psychological status & depression. Recommendations to address these barriers include family involvement, including foods pt enjoys, education on nutrition.    Problem: Nutrition  Goal: Oral intake greater than 50%  1/14/2024 2355 by Ella John, RN  Outcome: Not Progressing  1/14/2024 2351 by Ella John RN  Outcome: Progressing  Goal: Consume prescribed supplement  Outcome: Not Progressing

## 2024-01-15 NOTE — PROGRESS NOTES
Patient not medically clear. Patient in the ICU. Patient on pressors and Amiodarone. At the time of discharge patient will return to [facility]. No precert needed. Will follow.     **PATIENT WITH A SAFE DISCHARGE PLAN      Andree Torres RN

## 2024-01-16 NOTE — CARE PLAN
The clinical goals for the shift include pt will tolerate PO medications with modified diet throughout shift      Problem: Diabetes  Goal: Learn about and adhere to nutrition recommendations by end of shift  Outcome: Not Progressing  Goal: Increase self care and/or family involovement by end of shift  Outcome: Not Progressing  Goal: Receive DSME education by end of shift  Outcome: Not Progressing     Problem: Skin  Goal: Promote/optimize nutrition  Outcome: Not Progressing  Promote/optimize nutrition:   Assist with feeding   Discuss with provider if NPO > 2 days   Offer water/supplements/favorite foods   Consume > 50% meals/supplements   Monitor/record intake including meals   Reassess MST if dietician not consulted     Problem: Respiratory  Goal: Increase self care and/or family involvement in next 24 hours  Outcome: Not Progressing     Problem: Nutrition  Goal: Oral intake greater than 50%  Outcome: Not Progressing  Goal: Consume prescribed supplement  Outcome: Not Progressing  Goal: Adequate PO fluid intake  Outcome: Not Progressing  Goal: Nutrition support is meeting 75% of nutrient needs  Outcome: Not Progressing  Goal: BG  mg/dL  Outcome: Not Progressing  Goal: Promote healing  Outcome: Not Progressing     Problem: Diabetes  Goal: Increase stability of blood glucose readings by end of shift  Outcome: Progressing  Goal: Maintain electrolyte levels within acceptable range throughout shift  Outcome: Progressing  Goal: Maintain glucose levels >70mg/dl to <250mg/dl throughout shift  Outcome: Progressing  Goal: No changes in neurological exam by end of shift  Outcome: Progressing  Goal: Vital signs within normal range for age by end of shift  Outcome: Progressing     Problem: Skin  Goal: Decreased wound size/increased tissue granulation at next dressing change  Outcome: Progressing  Decreased wound size/increased tissue granulation at next dressing change:   Promote sleep for wound healing   Protective  dressings over bony prominences   Utilize specialty bed per algorithm  Goal: Participates in plan/prevention/treatment measures  Outcome: Progressing  Participates in plan/prevention/treatment measures:   Elevate heels   Increase activity/out of bed for meals   Discuss with provider PT/OT consult  Goal: Prevent/manage excess moisture  Outcome: Progressing  Prevent/manage excess moisture:   Cleanse incontinence/protect with barrier cream   Moisturize dry skin   Follow provider orders for dressing changes   Monitor for/manage infection if present  Goal: Prevent/minimize sheer/friction injuries  Outcome: Progressing  Prevent/minimize sheer/friction injuries:   Complete micro-shifts as needed if patient unable. Adjust patient position to relieve pressure points, not a full turn   HOB 30 degrees or less   Increase activity/out of bed for meals   Turn/reposition every 2 hours/use positioning/transfer devices   Use pull sheet   Utilize specialty bed per algorithm     Problem: Respiratory  Goal: Clear secretions with interventions this shift  Outcome: Progressing  Goal: Minimize anxiety/maximize coping throughout shift  Outcome: Progressing  Goal: Minimal/no exertional discomfort or dyspnea this shift  Outcome: Progressing  Goal: No signs of respiratory distress (eg. Use of accessory muscles. Peds grunting)  Outcome: Progressing  Goal: Patent airway maintained this shift  Outcome: Progressing  Goal: Verbalize decreased shortness of breath this shift  Outcome: Progressing  Goal: Wean oxygen to maintain O2 saturation per order/standard this shift  Outcome: Progressing     Problem: Pain - Adult  Goal: Verbalizes/displays adequate comfort level or baseline comfort level  Outcome: Progressing     Problem: Safety - Adult  Goal: Free from fall injury  Outcome: Progressing     Problem: Discharge Planning  Goal: Discharge to home or other facility with appropriate resources  Outcome: Progressing     Problem: Chronic Conditions and  Co-morbidities  Goal: Patient's chronic conditions and co-morbidity symptoms are monitored and maintained or improved  Outcome: Progressing     Problem: Nutrition  Goal: Less than 5 days NPO/clear liquids  Outcome: Progressing  Goal: Nutrition support goals are met within 48 hrs  Outcome: Progressing  Goal: Lab values WNL  Outcome: Progressing  Goal: Electrolytes WNL  Outcome: Progressing  Goal: Maintain stable weight  Outcome: Progressing  Goal: Reduce weight from edema/fluid  Outcome: Progressing     Problem: Fall/Injury  Goal: Not fall by end of shift  Outcome: Progressing  Goal: Be free from injury by end of the shift  Outcome: Progressing  Goal: Verbalize understanding of personal risk factors for fall in the hospital  Outcome: Progressing  Goal: Verbalize understanding of risk factor reduction measures to prevent injury from fall in the home  Outcome: Progressing  Goal: Pace activities to prevent fatigue by end of the shift  Outcome: Progressing     Problem: Skin  Goal: Promote skin healing  Promote skin healing:   Assess skin/pad under line(s)/device(s)   Ensure correct size (line/device) and apply per  instructions   Rotate device position/do not position patient on device   Protective dressings over bony prominences   Turn/reposition every 2 hours/use positioning/transfer devices

## 2024-01-16 NOTE — PROGRESS NOTES
Hai Spaulding is a 73 y.o. male on day 12 of admission presenting with Acute respiratory failure (CMS/HCC).    Subjective   Awake alert, voice remains soft/hoarse.  On nasal cannula 5 to 6 L, accessory muscle use noted with attempts at conversation or repositioning.  No nausea.  Very poor p.o. intake, denies epigastric pain or discomfort /mouth pain or discomfort, has poor taste, known dysphagia on thickened liquid diet.  Blood pressure stable on Levophed, started diuretics yesterday.    Objective     Physical Exam  Vitals and nursing note reviewed.   Constitutional:       Appearance: He is chronically ill-appearing.  Sitting up in bed, appears much older than stated age  HENT:      Head: Normocephalic and atraumatic.  Temporal wasting noted, bruising over right orbital socket and bridge of nose with small healed scab.  Slight left-sided septal deviation     Mouth/Throat:      Mouth: Mucous membranes are moist.  Brown exudate noted in the posterior pharynx, no mucosal lesions or thrush noted  Eyes:      Pupils: Pupils are equal, round, and reactive to light.   Cardiovascular:      Rate and Rhythm: Tachycardia present. Rhythm irregular.      Heart sounds: No murmur heard.     No friction rub. No gallop.   Abdominal:      General: There is no distension.      Tenderness: There is no abdominal tenderness. There is no guarding or rebound.   Genitourinary:     Comments: [email or web address]  Musculoskeletal:   General: No obvious joint deformities, no tenderness noted with palpation of the cervical spine, generalized muscle loss noted to the bilateral lower extremities     Cervical back: Neck supple.   Skin:     General: Skin is warm and dry.  Back and buttocks were not assessed.  Edema noted to both feet and bilateral upper extremities, nailbeds are somewhat dusky, cooler to touch than upper arms.  Bruising noted to bilateral forearms.  Neurological:      Mental Status: He is awake and alert, follows commands, responses to questions  "are slow, but answering questions appropriately,   Last Recorded Vitals  Blood pressure 101/73, pulse (!) 112, temperature 36.3 °C (97.3 °F), temperature source Axillary, resp. rate 21, height 1.753 m (5' 9.02\"), weight 76.6 kg (168 lb 14 oz), SpO2 95 %.  Intake/Output last 3 Shifts:  I/O last 3 completed shifts:  In: 1386.4 (18.1 mL/kg) [I.V.:686.4 (9 mL/kg); IV Piggyback:700]  Out: 1750 (22.8 mL/kg) [Urine:1750 (0.6 mL/kg/hr)]  Weight: 76.6 kg     Relevant Results  Results for orders placed or performed during the hospital encounter of 01/04/24 (from the past 24 hour(s))   POCT GLUCOSE   Result Value Ref Range    POCT Glucose 200 (H) 74 - 99 mg/dL   Comprehensive Metabolic Panel   Result Value Ref Range    Glucose 200 (H) 65 - 99 mg/dL    Sodium 143 133 - 145 mmol/L    Potassium 3.6 3.4 - 5.1 mmol/L    Chloride 108 (H) 97 - 107 mmol/L    Bicarbonate 24 24 - 31 mmol/L    Urea Nitrogen 35 (H) 8 - 25 mg/dL    Creatinine 1.80 (H) 0.40 - 1.60 mg/dL    eGFR 39 (L) >60 mL/min/1.73m*2    Calcium 7.4 (L) 8.5 - 10.4 mg/dL    Albumin 2.1 (L) 3.5 - 5.0 g/dL    Alkaline Phosphatase 497 (H) 35 - 125 U/L    Total Protein 5.4 (L) 5.9 - 7.9 g/dL    AST 23 5 - 40 U/L    Bilirubin, Total 0.3 0.1 - 1.2 mg/dL    ALT <5 (L) 5 - 40 U/L    Anion Gap 11 <=19 mmol/L   CBC   Result Value Ref Range    WBC 14.0 (H) 4.4 - 11.3 x10*3/uL    nRBC 1.4 (H) 0.0 - 0.0 /100 WBCs    RBC 3.91 (L) 4.50 - 5.90 x10*6/uL    Hemoglobin 11.2 (L) 13.5 - 17.5 g/dL    Hematocrit 34.0 (L) 41.0 - 52.0 %    MCV 87 80 - 100 fL    MCH 28.6 26.0 - 34.0 pg    MCHC 32.9 32.0 - 36.0 g/dL    RDW 20.2 (H) 11.5 - 14.5 %    Platelets 244 150 - 450 x10*3/uL   POCT GLUCOSE   Result Value Ref Range    POCT Glucose 172 (H) 74 - 99 mg/dL   POCT GLUCOSE   Result Value Ref Range    POCT Glucose 158 (H) 74 - 99 mg/dL     *Note: Due to a large number of results and/or encounters for the requested time period, some results have not been displayed. A complete set of results can be " found in Results Review.     XR chest 1 view    Result Date: 1/15/2024  Interpreted By:  Rahul Staples, STUDY: XR CHEST 1 VIEW; 1/15/2024 6:59 am   INDICATION: CLINICAL INFORMATION: Signs/Symptoms:hypoxia.   COMPARISON: 01/13/2024   ACCESSION NUMBER(S): [id number]   ORDERING CLINICIAN: LYNSEY HERNDON   TECHNIQUE: Portable chest one view.   FINDINGS: The cardiac size is indeterminate in view of the AP projection.  A cardiac pacemaker with biventricular as well as right atrial leads is noted. Right internal jugular venous catheter is unchanged. Diffuse hazy bilateral alveolar infiltrates are present along with moderate-sized bilateral effusions.       Bilateral infiltrates and effusions are present in a pattern suggesting CHF. There is no interval change when compared to the previous examination.   MACRO: none   Signed by: Rahul Staples 1/15/2024 7:51 AM Dictation workstation:   [id number]      Scheduled medications  acetaminophen, 650 mg, oral, TID  acetylcysteine, 3 mL, nebulization, BID  atorvastatin, 80 mg, oral, Nightly  budesonide, 0.5 mg, nebulization, BID  carbidopa-levodopa, 1 tablet, oral, TID  clopidogrel, 75 mg, oral, Daily  cyclobenzaprine, 10 mg, oral, Nightly  dapagliflozin propanediol, 10 mg, oral, Daily  furosemide, 40 mg, intravenous, q12h  gabapentin, 200 mg, oral, Nightly  heparin (porcine), 5,000 Units, subcutaneous, q8h VAZQUEZ  insulin glargine, 10 Units, subcutaneous, Daily  insulin lispro, 0-10 Units, subcutaneous, Before meals & nightly  ipratropium-albuteroL, 3 mL, nebulization, TID  lactobacillus acidophilus, 1 tablet, oral, BID  levothyroxine, 25 mcg, oral, Daily before breakfast  meropenem, 1 g, intravenous, q12h VAZQUEZ  metroNIDAZOLE, 500 mg, intravenous, q8h  mirtazapine, 15 mg, oral, Nightly  nystatin, 1 Application, Topical, BID  pantoprazole, 40 mg, oral, Daily before breakfast  QUEtiapine, 25 mg, oral, Nightly  sertraline, 100 mg, oral, Daily  SITagliptin phosphate, 50 mg, oral,  Daily  sodium chloride, 1 spray, Each Nostril, TID  vancomycin, 125 mg, oral, 4x daily      Continuous medications  amiodarone, 0.5 mg/min, Last Rate: 0.5 mg/min (01/16/24 1105)  norepinephrine, 0.01-3 mcg/kg/min, Last Rate: 0.03 mcg/kg/min (01/16/24 3472)      PRN medications  PRN medications: albuterol, dextrose 10 % in water (D10W), dextrose, glucagon, melatonin, methyl salicylate-menthol, ondansetron, oxygen, oxygen     Assessment/Plan   Principal Problem:    Acute respiratory failure (CMS/HCC)  Active Problems:    Atrial fibrillation (CMS/HCC)    CAD (coronary artery disease)    Parkinson's disease    Cerebral infarction, unspecified (CMS/HCC)    C. difficile diarrhea    Acute hypoxic respiratory failure  -Weaned down to nasal cannula today, was on high flow oxygen.  Still utilizing NIV at at bedtime and as needed.  - COVID 19 recently on 12/27/23 finished up 10 days of Decadron   - Pulmonology following  - S/P left-sided thoracentesis with 1 L removed on 1/13  Leukocytosis   - Blood cultures negative from 1/03  - WBC down to 14 today  - On Merrem for suspected aspiration pneumonia, remains on p.o. vancomycin and IV Flagyl for underlying C. Difficile  -Infectious disease managing  MATEO on CKD  - Cr improved, 1.8 today, on diuretics per nephrology  Dysphagia/malnutrition  - MBS shows aspiration of thin liquids.   - Recommendations for puree/nectar thick  -Prealbumin done 1/13 is 7.6, albumin today 2.1.  -Dietary supplements ordered.  Metabolic encephalopathy  -Improved  CHF   - ECHO on 12/16/23 was 30-35%.  -Started on diuretics yesterday, nephrology managing  C-diff   - On PO Vanco, IV Flagyl per infectious disease  Parkinsonism  A-fib   - ICD   - On Digoxin  -Amiodarone drip discontinued by cardiology today, switch to oral.  COPD   Pall Care   The Patient is a DNR CCA DNI  He is currently not capable  His wife, Cata Spaulding, is his surrogate decision maker    1/16/2024   Overall appears mildly improved  today, improved mental alertness, creatinine slowly improving, weaned off of high flow to nasal cannula.  Patient remains on Levophed for blood pressure support, remains in A-fib, but rate is controlled.  Patient continues to have very poor intake, which is concerning given his low albumin and prealbumin.  Continue to encourage oral intake, supplements ordered.  No complaints today of pain, no adjustments needed to pain regimen.  -Goals previously established, continue treatment model of care at this time.  Plan for skilled rehab when medically stable.    1/14/2024  -The patient is more alert today albeit not capable from a decision making stand point.   - He is tolerating HFNC today.   - Aspercreme ordered for leg pain on top of routine Tylenol. Want to avoid anything centrally acting at this time.   -  He is taking in PO medications.   - Will monitor for effectiveness.   1/13/2024  -The patient is currently not a capable decision maker as he is lethargic and not fully arousable.   - The patient is also unable to swallow and needs PO meds such as Sinemet and PO Vanco. Discussion with wife who is agreeable to a short term NG tube.   - The wife also states that she would consider a PEG tube if the patient needed. She states that the patient recently stated that he did not want a PEG tube but was not sure if he was fully aware of the decision he was making. States that he has had a PEG tube in the past temporarily due to cancer and he was determined to be able to swallow again.   - We did discuss how his progression of illness looks different now than in the past and this may not be congruent with his wishes.   - Discussion also had about the risks of a PEG tube in his currently state of illness and how these are considerations to take into account when making this decision.   - Tylenol IV ordered for 24hrs due to the patient being NPO. Will reassess for effectiveness tomorrow.       I spent 30 minutes in the  professional and overall care of this patient.      Elizabeth Pearce, APRN-CNP

## 2024-01-16 NOTE — PROGRESS NOTES
Occupational Therapy    OT Treatment    Patient Name: Hai Spaulding  MRN: [id number]  Today's Date: 1/16/2024  Time Calculation  Start Time: 1338  Stop Time: 1416  Time Calculation (min): 38 min         Assessment:  OT Assessment: pt tolerated session fairly with increased fatigue noted. Pt would benefit from continued skilled OT services to improve strength, balance, and functional toleance to increase independence with ADL tasks  End of Session Communication: Bedside nurse  End of Session Patient Position: Bed, 4 rail up, Alarm on  OT Assessment Results: Decreased ADL status, Decreased upper extremity range of motion, Decreased upper extremity strength, Decreased cognition, Decreased endurance, Decreased functional mobility, Decreased fine motor control, Decreased gross motor control, Decreased IADLs, Non-functional right upper extremity  Plan:  Treatment Interventions: ADL retraining, Functional transfer training, UE strengthening/ROM, Endurance training, Cognitive reorientation, Patient/family training, Equipment evaluation/education  OT Frequency: 3 times per week  OT Discharge Recommendations: Moderate intensity level of continued care  OT Recommended Transfer Status: Maximum assist, Assist of 2  OT - OK to Discharge: Yes  Treatment Interventions: ADL retraining, Functional transfer training, UE strengthening/ROM, Endurance training, Cognitive reorientation, Patient/family training, Equipment evaluation/education    Subjective   Previous Visit Info:  OT Last Visit  OT Received On: 01/16/24  General:  General  Co-Treatment: PT  Co-Treatment Reason: d/t pt heavy assist level and decreased functional tolerance. Co-tx provided this date to maximize safety and functional outcomes.  Prior to Session Communication: Bedside nurse  Patient Position Received: Bed, 4 rail up, Alarm on  General Comment: cleared for therapy per RN. Pt supine in bed upon arrival and agreeable to tx session  Precautions:  Medical  Precautions: Fall precautions, Oxygen therapy device and L/min (5L O2 nc)  Vital Signs:  Vital Signs  BP: 82/61 (EOB)  MAP (mmHg): 69  Pain:  Pain Assessment  Pain Assessment: 0-10  Pain Score: 0 - No pain    Objective    Cognition:  Cognition  Overall Cognitive Status: Impaired (cooperative and following simple commands with increased time. verbalizes short responses when asked)  Following Commands: Follows one step commands with increased time  Task Initiation: Delayed initiation (effort present, requiring assist for follow through)  Processing Speed: Delayed    Activities of Daily Living: Grooming  Grooming Level of Assistance: Dependent  Grooming Comments: dependent for oral hygiene and face washing required d/t left over food stuck on face/beard    Bed Mobility/Transfers: Bed Mobility  Bed Mobility: Yes  Bed Mobility 1  Bed Mobility 1: Supine to sitting  Level of Assistance 1: Maximum assistance (x2)  Bed Mobility Comments 1: max A x2 with BLE advancement, trunk elevation, and to scoot hips to EOB. Demonstrating slight retropulsion seated requiring mod A for trunk support, progressing to min A  Bed Mobility 2  Bed Mobility  2: Sitting to supine  Level of Assistance 2: Maximum assistance (x2)  Bed Mobility Comments 2: max A x2 sit>supine with trunk and BLE  Bed Mobility 3  Bed Mobility 3: Rolling right, Rolling left  Level of Assistance 3: Maximum assistance  Bed Mobility Comments 3: max A to roll L/R to adjust linen. Dependent to boost to HOB with use of drawsheet    Sitting Balance:  Static Sitting Balance  Static Sitting-Level of Assistance: Moderate assistance, Minimum assistance  Static Sitting-Comment/Number of Minutes: poor sitting balance with slight retropulsion noted, mod A for trunk control, progressing to min with VC to forward weightshift    Therapy/Activity: Therapeutic Exercise  Therapeutic Exercise Activity 1: participated in AAROM BUE exercises in bicep curls and shoulder flexion to increase ROM  to improve strength to increase independence with transfers    Therapeutic Activity  Therapeutic Activity 1: pt tolerated sitting EOB ~1 min with mod A with pt expressing dizziness, returning to supine      Outcome Measures:[facility] Daily Activity  Putting on and taking off regular lower body clothing: Total  Bathing (including washing, rinsing, drying): Total  Putting on and taking off regular upper body clothing: A lot  Toileting, which includes using toilet, bedpan or urinal: Total  Taking care of personal grooming such as brushing teeth: Total  Eating Meals: A lot  Daily Activity - Total Score: 8    Education Documentation  Handouts, taught by SOLIS Newell at 1/16/2024  2:39 PM.  Learner: Patient  Readiness: Acceptance  Method: Demonstration, Explanation  Response: Verbalizes Understanding, Needs Reinforcement    Body Mechanics, taught by SOLIS Newell at 1/16/2024  2:39 PM.  Learner: Patient  Readiness: Acceptance  Method: Demonstration, Explanation  Response: Verbalizes Understanding, Needs Reinforcement    Precautions, taught by SOLIS Newell at 1/16/2024  2:39 PM.  Learner: Patient  Readiness: Acceptance  Method: Demonstration, Explanation  Response: Verbalizes Understanding, Needs Reinforcement    Home Exercise Program, taught by SOLIS Newell at 1/16/2024  2:39 PM.  Learner: Patient  Readiness: Acceptance  Method: Demonstration, Explanation  Response: Verbalizes Understanding, Needs Reinforcement    ADL Training, taught by SOLIS Newell at 1/16/2024  2:39 PM.  Learner: Patient  Readiness: Acceptance  Method: Demonstration, Explanation  Response: Verbalizes Understanding, Needs Reinforcement    Education Comments  No comments found.      Problem: OT Goals  Goal: Pt will complete upper body ADL tasks with min A using AE as needed, in order to complete self-care tasks.   Outcome: Progressing  Goal: Pt will perform functional transfers at max  A x1 level   Outcome: Progressing  Goal: Pt will perform upper body therapeutic exercises all joints/planes of motion with mod A for R UE, close S for L UE  Outcome: Progressing

## 2024-01-16 NOTE — PROGRESS NOTES
Patient not medically clear. Patient remains in the ICU on pressors and Amiodarone. Patient on 6 liters of oxygen. At the time of discharge patient will return to [facility]. No precert needed. Will follow.      **PATIENT WITH A SAFE DISCHARGE PLAN      Andree Torres RN

## 2024-01-16 NOTE — CARE PLAN
Problem: Diabetes  Goal: Increase stability of blood glucose readings by end of shift  Outcome: Progressing  Goal: Maintain electrolyte levels within acceptable range throughout shift  Outcome: Progressing  Goal: Maintain glucose levels >70mg/dl to <250mg/dl throughout shift  Outcome: Progressing  Goal: No changes in neurological exam by end of shift  Outcome: Progressing  Goal: Learn about and adhere to nutrition recommendations by end of shift  Outcome: Not Progressing  Goal: Vital signs within normal range for age by end of shift  Outcome: Progressing  Goal: Increase self care and/or family involovement by end of shift  Outcome: Not Progressing  Goal: Receive DSME education by end of shift  Outcome: Not Progressing         The patient's goals for the shift include      The clinical goals for the shift include wean off pressors as tolerated         Problem: Skin  Goal: Promote/optimize nutrition  1/16/2024 1307 by Erika Garcia RN  Flowsheets (Taken 1/14/2024 1647 by Lyn Rojas, RN)  Promote/optimize nutrition:   Assist with feeding   Discuss with provider if NPO > 2 days   Offer water/supplements/favorite foods   Consume > 50% meals/supplements   Monitor/record intake including meals   Reassess MST if dietician not consulted  1/16/2024 1307 by Erika Garcia RN  Flowsheets (Taken 1/14/2024 1647 by Lyn Rojas, RN)  Promote/optimize nutrition:   Assist with feeding   Discuss with provider if NPO > 2 days   Offer water/supplements/favorite foods   Consume > 50% meals/supplements   Monitor/record intake including meals   Reassess MST if dietician not consulted  1/16/2024 1155 by Erika Garcia RN  Outcome: Not Progressing     Problem: Nutrition  Goal: Oral intake greater than 50%  Outcome: Not Progressing  Goal: Consume prescribed supplement  Outcome: Not Progressing  Goal: Adequate PO fluid intake  Outcome: Not Progressing  Goal: Nutrition support is meeting 75% of nutrient needs  Outcome: Not  Progressing  Goal: BG  mg/dL  Outcome: Not Progressing  Goal: Promote healing  Outcome: Not Progressing

## 2024-01-16 NOTE — PROGRESS NOTES
Subjective Data:  Patient breathing slightly better.  Blood pressure better still on small dose Levophed.  Status post arterial line placement.    Overnight Events:    Telemetry overnight reviewed no events     Objective Data:  Last Recorded Vitals:  Vitals:    01/16/24 1335 01/16/24 1345 01/16/24 1400 01/16/24 1415   BP: 82/61 78/61 89/62 83/58   BP Location:       Patient Position: Lying      Pulse: 108 108 106 109   Resp:  17 21 21   Temp:       TempSrc:       SpO2:    98%   Weight:       Height:           Last Labs:  CBC - 1/16/2024:  4:54 AM  14.0 11.2 244    34.0      CMP - 1/16/2024:  4:54 AM  7.4 5.4 23 --- 0.3   2.7 2.1 <5 497      PTT - 12/28/2023:  5:48 AM  2.3   26.1 107.8     TROPHS   Date/Time Value Ref Range Status   01/04/2024 08:16  0 - 20 ng/L Final     Comment:     Previous result verified on 1/3/2024 1340 on specimen/case [id number] called with component TRPHS for procedure Troponin I, High Sensitivity, Initial with value 161 ng/L.   01/03/2024 05:37  0 - 20 ng/L Final     Comment:     Previous result verified on 1/3/2024 1340 on specimen/case [id number] called with component TRPHS for procedure Troponin I, High Sensitivity, Initial with value 161 ng/L.   01/03/2024 02:27  0 - 20 ng/L Final     Comment:     Previous result verified on 1/3/2024 1340 on specimen/case [id number] called with component TRPHS for procedure Troponin I, High Sensitivity, Initial with value 161 ng/L.     BNP   Date/Time Value Ref Range Status   01/03/2024 12:13  0 - 99 pg/mL Final   12/27/2023 10:04  0 - 99 pg/mL Final     HGBA1C   Date/Time Value Ref Range Status   12/27/2023 09:59 PM 10.7 See below % Final   10/11/2023 01:15 PM 10.6 See below % Final     LDLCALC   Date/Time Value Ref Range Status   06/02/2023 02:40 PM 39 65 - 130 MG/DL Final   10/08/2019 12:15 PM 96 65 - 130 MG/DL Final     VLDL   Date/Time Value Ref Range Status   10/22/2021 08:07 PM 18 0 - 40 mg/dL Final    09/28/2020 04:12 PM 46 0 - 40 mg/dL Final   10/02/2018 02:40 PM 50 0 - 40 mg/dL Final      Last I/O:  I/O last 3 completed shifts:  In: 1386.4 (18.1 mL/kg) [I.V.:686.4 (9 mL/kg); IV Piggyback:700]  Out: 1750 (22.8 mL/kg) [Urine:1750 (0.6 mL/kg/hr)]  Weight: 76.6 kg     Past Cardiology Tests (Last 3 Years):  EKG:  ECG 12 lead 01/05/2024      ECG 12 lead 01/04/2024      ECG 12 lead 01/03/2024      ECG 12 lead 12/27/2023      ECG 12 Lead 12/27/2023      ECG 12 lead 12/14/2023    Echo:  Transthoracic Echo (TTE) Complete 12/18/2023    Ejection Fractions:  EF   Date/Time Value Ref Range Status   12/18/2023 12:10 PM 29       Cath:  No results found for this or any previous visit from the past 1095 days.    Stress Test:  No results found for this or any previous visit from the past 1095 days.    Cardiac Imaging:  XR CHEST ABDOMEN FOR OG NG PLACEMENT 11/26/2021      XR CHEST ABDOMEN FOR OG NG PLACEMENT 11/26/2021      Inpatient Medications:  Scheduled medications   Medication Dose Route Frequency    acetaminophen  650 mg oral TID    acetylcysteine  3 mL nebulization BID    amiodarone  400 mg oral BID    apixaban  5 mg oral q12h    atorvastatin  80 mg oral Nightly    budesonide  0.5 mg nebulization BID    carbidopa-levodopa  1 tablet oral TID    clopidogrel  75 mg oral Daily    cyclobenzaprine  10 mg oral Nightly    dapagliflozin propanediol  10 mg oral Daily    digoxin  500 mcg intravenous Once    furosemide  40 mg intravenous q12h    gabapentin  200 mg oral Nightly    heparin (porcine)  5,000 Units subcutaneous q8h VAZQUEZ    insulin glargine  10 Units subcutaneous Daily    insulin lispro  0-10 Units subcutaneous Before meals & nightly    ipratropium-albuteroL  3 mL nebulization TID    lactobacillus acidophilus  1 tablet oral BID    levothyroxine  25 mcg oral Daily before breakfast    meropenem  1 g intravenous q12h VAZQUEZ    metroNIDAZOLE  500 mg intravenous q8h    mirtazapine  15 mg oral Nightly    nystatin  1 Application  Topical BID    pantoprazole  40 mg oral Daily before breakfast    QUEtiapine  25 mg oral Nightly    sertraline  100 mg oral Daily    SITagliptin phosphate  50 mg oral Daily    sodium chloride  1 spray Each Nostril TID    vancomycin  125 mg oral 4x daily     PRN medications   Medication    albuterol    dextrose 10 % in water (D10W)    dextrose    glucagon    melatonin    methyl salicylate-menthol    ondansetron    oxygen    oxygen     Continuous Medications   Medication Dose Last Rate    norepinephrine  0.01-3 mcg/kg/min 0.02 mcg/kg/min (01/16/24 1200)       Physical Exam:  General: Patient is in Mild respiratory distress  HEENT: atraumatic normocephalic.  Neck: is supple jugular venous pressure within normal limits no thyromegaly.  Cardiovascular irregular rate and rhythm normal heart sounds no murmurs rubs or gallops.  Lungs: deCreased breathing sounds at bases abdomen: is soft nontender.  Extremities warm to touch no edema.        Assessment/Plan   1 septic shock likely secondary to C. difficile colitis.  Low ejection fraction likely contributing to his hypotension.  Currently on Levophed which may control continue.  Management by primary team.     2.  Acute on chronic systolic heart failure ejection fraction 30 to 35% secondary to ischemic cardiomyopathy status post ICD/CRT.  Patient has known coronary artery disease not candidate for revascularization.  Recommend now to switch him from prasugrel to clopidogrel since he is also on oral anticoagulation for atrial fibrillation.  If patient CO2 remains elevated may need thoracentesis will hold Eliquis for now     3.  Atrial fibrillation.  Patient with history of paroxysmal atrial fibrillation on oral amiodarone status post ICD CRT and Eliquis.  For now we will hold Eliquis since patient may require thoracentesis by pulmonary.  I will switch to oral amiodarone 400 mg twice daily. will give one-time digoxin 500 mcg IV.4.  Will restart Eliquis 5 mg twice daily.    5.  acute on chronic renal failure.  Multifactorial.  Nephrology on board.  CVC 01/13/24 Triple lumen Non-tunneled Right Internal jugular (Active)   Site Assessment Clean;Dry;Intact 01/16/24 1200   Proximal Lumen Status Flushed;Infusing 01/16/24 1200   Medial 1 Lumen Status Flushed;Infusing 01/16/24 1200   Distal Lumen Status Flushed;Infusing 01/16/24 1200   Dressing Type Antimicrobial patch;Transparent 01/16/24 1200   Dressing Status Clean;Dry;Occlusive 01/16/24 1200   Number of days: 3       Urethral Catheter 16 Fr. (Active)   Output (mL) 425 mL 01/16/24 1227   Number of days: 13       Code Status:  DNR and No Intubation      Cinda Pedersen MD

## 2024-01-16 NOTE — PROGRESS NOTES
"Nutrition Follow up Note    Nutrition Assessment      Patient off BiPap, sleeping soundly at time of visit. RN states po intake continues to be low, not drinking much of nutritional supplements. Awaiting speech eval to try and liberalize modified diet.    Nutrition History:     Energy Intake: Poor < 50 %  Food Allergies/Intolerances:  None  GI Symptoms: None  Oral Problems: None    Anthropometrics:  Ht: 175.3 cm (5' 9.02\"), Wt: 76.6 kg (168 lb 14 oz), BMI: 24.93  IBW/kg (Dietitian Calculated): 75.45 kg     Weight Change:  Weight History / % Weight Change: Unknown     Daily Weight  01/16/24 : 76.6 kg (168 lb 14 oz)  01/03/24 : 75.9 kg (167 lb 5.3 oz)  12/31/23 : 71.4 kg (157 lb 4.8 oz)  12/27/23 : 74.8 kg (165 lb)  12/19/23 : 75.5 kg (166 lb 7.2 oz)  11/29/23 : 74.2 kg (163 lb 8.1 oz)  11/20/23 : 70.3 kg (155 lb)  10/11/23 : 68.9 kg (152 lb)  09/01/23 : 70.3 kg (155 lb)  09/01/23 : 70.3 kg (155 lb)     Nutrition Focused Physical Exam Findings:   Subcutaneous Fat Loss  Orbital Fat Pads: Defer  Buccal Fat Pads: Defer  Triceps: Defer  Ribs: Defer    Muscle Wasting  Temporalis: Defer  Pectoralis (Clavicular Region): Defer  Deltoid/Trapezius: Defer  Interosseous: Defer  Trapezius/Infraspinatus/Supraspinatus (Scapular Region): Defer  Quadriceps: Defer  Gastrocnemius: Defer    Nutrition Significant Labs:  Lab Results   Component Value Date    WBC 14.0 (H) 01/16/2024    HGB 11.2 (L) 01/16/2024    HCT 34.0 (L) 01/16/2024     01/16/2024    CHOL 98 (L) 06/02/2023    TRIG 122 06/02/2023    HDL 35 (L) 06/02/2023    ALT <5 (L) 01/16/2024    AST 23 01/16/2024     01/16/2024    K 3.6 01/16/2024     (H) 01/16/2024    CREATININE 1.80 (H) 01/16/2024    BUN 35 (H) 01/16/2024    CO2 24 01/16/2024    TSH 7.54 (H) 10/11/2023    PSA 1.8 09/30/2020    INR 2.3 (H) 01/03/2024    HGBA1C 10.7 (H) 12/27/2023    ALBUR 12 03/11/2019       Current Facility-Administered Medications:     acetaminophen (Tylenol) tablet 650 mg, 650 " mg, oral, TID, Seema Bailey DO, 650 mg at 01/16/24 1010    acetylcysteine (Mucomyst) 200 mg/mL (20 %) nebulizer solution 600 mg, 3 mL, nebulization, BID, Barber Sprague MD, 600 mg at 01/16/24 0710    albuterol 2.5 mg /3 mL (0.083 %) nebulizer solution 2.5 mg, 2.5 mg, nebulization, q2h PRN, Seema Bailey DO    amiodarone (Nexterone) 360 mg in dextrose,iso-osm 200 mL (1.8 mg/mL) infusion (premix), 0.5 mg/min, intravenous, Continuous, Chuck Mann MD, Last Rate: 16.67 mL/hr at 01/16/24 1105, 0.5 mg/min at 01/16/24 1105    atorvastatin (Lipitor) tablet 80 mg, 80 mg, oral, Nightly, Seema Bailey DO, 80 mg at 01/15/24 2112    budesonide (Pulmicort) 0.5 mg/2 mL nebulizer solution 0.5 mg, 0.5 mg, nebulization, BID, Seema Bailey DO, 0.5 mg at 01/16/24 0710    carbidopa-levodopa (Sinemet CR)  mg ER tablet 1 tablet, 1 tablet, oral, TID, Seema Bailey DO, 1 tablet at 01/16/24 1010    clopidogrel (Plavix) tablet 75 mg, 75 mg, oral, Daily, Seema Bailey DO, 75 mg at 01/16/24 1011    cyclobenzaprine (Flexeril) tablet 10 mg, 10 mg, oral, Nightly, Seema Bailey DO, 10 mg at 01/15/24 2112    dapagliflozin propanediol (Farxiga) tablet 10 mg, 10 mg, oral, Daily, Seema Bailey DO, 10 mg at 01/16/24 1010    dextrose 10 % in water (D10W) infusion, 0.3 g/kg/hr, intravenous, Once PRN, Seema Bailey DO    dextrose 50 % injection 25 g, 25 g, intravenous, q15 min PRN, Seema Bailey DO, 25 g at 01/13/24 2047    furosemide (Lasix) injection 40 mg, 40 mg, intravenous, q12h, Chuck Mann MD, 40 mg at 01/16/24 0900    gabapentin (Neurontin) capsule 200 mg, 200 mg, oral, Nightly, Seema Bailey DO, 200 mg at 01/15/24 2113    glucagon (Glucagen) injection 1 mg, 1 mg, intramuscular, q15 min PRN, Seema Bailey DO    heparin (porcine) injection 5,000 Units, 5,000 Units, subcutaneous, q8h [facility], Chuck Mann MD    insulin glargine (Lantus) injection 10 Units, 10  Units, subcutaneous, Daily, Seema Bailey DO, 10 Units at 01/16/24 1011    insulin lispro (HumaLOG) injection 0-10 Units, 0-10 Units, subcutaneous, Before meals & nightly, Chuck Mann MD    ipratropium-albuteroL (Duo-Neb) 0.5-2.5 mg/3 mL nebulizer solution 3 mL, 3 mL, nebulization, TID, Seema Bailey DO, 3 mL at 01/16/24 1110    lactobacillus acidophilus tablet 1 tablet, 1 tablet, oral, BID, Seema Bailey DO, 1 tablet at 01/16/24 1010    levothyroxine (Synthroid, Levoxyl) tablet 25 mcg, 25 mcg, oral, Daily before breakfast, Seema Bailey DO, 25 mcg at 01/16/24 0817    melatonin tablet 5 mg, 5 mg, oral, Nightly PRN, Seema Bailey DO, 5 mg at 01/09/24 2130    meropenem (Merrem) in sodium chloride 0.9 % 100 mL IV 1 g, 1 g, intravenous, q12h VAZQUEZ, Barber Sprague MD, Stopped at 01/16/24 1041    methyl salicylate-menthol (Bengay) 15-10 % greaseless cream, , Topical, TID PRN, Yuliya Hood, APRN-CNP, Given at 01/14/24 2030    metroNIDAZOLE (Flagyl) 500 mg in NaCl (iso-os) 100 mL, 500 mg, intravenous, q8h, Seema Bailey DO, Stopped at 01/16/24 1206    mirtazapine (Remeron) tablet 15 mg, 15 mg, oral, Nightly, Seema Bailey DO, 15 mg at 01/15/24 2112    norepinephrine (Levophed) 8 mg in dextrose 5% 250 mL (0.032 mg/mL) infusion (premix), 0.01-3 mcg/kg/min, intravenous, Continuous, Seema Bailey DO, Last Rate: 2.9 mL/hr at 01/16/24 1200, 0.02 mcg/kg/min at 01/16/24 1200    nystatin (Mycostatin) 100,000 unit/gram powder 1 Application, 1 Application, Topical, BID, Seema Bailey DO, 1 Application at 01/16/24 1015    ondansetron (Zofran) injection 4 mg, 4 mg, intravenous, q4h PRN, Seema Bailey DO, 4 mg at 01/11/24 1243    oxygen (O2) therapy, , inhalation, Continuous PRN - O2/gases, Seema Bailey DO, Start at 01/14/24 0414    oxygen (O2) therapy, , inhalation, Continuous PRN - O2/gases, Barber Sprague MD, 40 L/min at 01/15/24 0718    pantoprazole  (ProtoNix) EC tablet 40 mg, 40 mg, oral, Daily before breakfast, Seema Bailey DO, 40 mg at 01/16/24 0817    QUEtiapine (SEROquel) tablet 25 mg, 25 mg, oral, Nightly, Seema Bailey DO, 25 mg at 01/15/24 2112    sertraline (Zoloft) tablet 100 mg, 100 mg, oral, Daily, Seema Bailey DO, 100 mg at 01/16/24 1011    SITagliptin phosphate (Januvia) tablet 50 mg, 50 mg, oral, Daily, Seema Bailey DO, 50 mg at 01/16/24 1010    sodium chloride (Ocean) 0.65 % nasal spray 1 spray, 1 spray, Each Nostril, TID, Seema Bailey DO, 1 spray at 01/16/24 1015    vancomycin (Vancocin) capsule 125 mg, 125 mg, oral, 4x daily, Seema Bailey DO, 125 mg at 01/16/24 1211    Dietary Orders (From admission, onward)       Start     Ordered    01/14/24 1224  Oral nutritional supplements  Until discontinued        Comments: chocolate   Question Answer Comment   Deliver with Lunch    Select supplement: Sugar Free Mighty Shake        01/14/24 1223    01/12/24 1738  Oral nutritional supplements  Until discontinued        Question Answer Comment   Deliver with All meals    Select supplement: Ensure Clear        01/12/24 1738    01/10/24 1219  Oral nutritional supplements  Until discontinued        Comments: chocolate   Question Answer Comment   Deliver with All meals    Select supplement: Magic Cup        01/10/24 1218    01/09/24 1230  Adult diet Regular; Pureed 4; Mild thick 2  Diet effective now        Question Answer Comment   Diet type Regular    Texture Pureed 4    Fluid consistency Mild thick 2        01/09/24 1230                   Estimated Needs:   Estimated Energy Needs  Total Energy Estimated Needs (kCal): 1925 kCal  Total Estimated Energy Need per Day (kCal/kg): 25 kCal/kg  Method for Estimating Needs: Actual Wt    Estimated Protein Needs  Total Protein Estimated Needs (g): 77 g  Total Protein Estimated Needs (g/kg): 1 g/kg  Method for Estimating Needs: Actual Wt    Estimated Fluid Needs  Total Fluid  Estimated Needs (mL): 1925 mL  Method for Estimating Needs: 1 mL/kcal      Nutrition Diagnosis   Nutrition Diagnosis:     Nutrition Diagnosis  Patient has Nutrition Diagnosis: Yes  Diagnosis Status (1): Resolved  Nutrition Diagnosis 1: Inadequate energy intake  Related to (1): Decreased ability to consume sufficient energy  As Evidenced by (1): Clear liquid diet  Additional Nutrition Diagnosis: Diagnosis 2  Diagnosis Status (2): Ongoing  Nutrition Diagnosis 2: Inadequate energy intake  Related to (2): Pt doesn't like the texture-modifid diet  As Evidenced by (2): poor PO intake     Nutrition Interventions/Recommendations   Nutrition Interventions and Recommendations:    Nutrition Prescription:  Individualized Nutrition Prescription Provided for : 1925 calories, 77 gm protein when diet advances    Nutrition Interventions:   Food and/or Nutrient Delivery Interventions  Interventions: Meals and snacks, Medical food supplement  Meals and Snacks: Texture-modified diet  Goal: Provide as tolerated  Medical Food Supplement: Commercial beverage, Modified beverage  Goal: Magic Cup TID, provides 290 kcals and 9g Protein per serving, Ensure Clear TID to provide 240 calories, 8 gm protein each    Education Documentation  No documentation found.         Nutrition Monitoring and Evaluation   Monitoring/Evaluation:   Food/Nutrient Related History Monitoring  Monitoring and Evaluation Plan: Energy intake  Energy Intake: Estimated energy intake  Criteria: Pateint will consume =/> 75% of estimated needs       Time Spent/Follow-up:   Follow Up  Time Spent (min): 25 minutes  Last Date of Nutrition Visit: 01/16/24  Nutrition Follow-Up Needed?: 5-7 days  Follow up Comment: 1/22/24

## 2024-01-16 NOTE — PROGRESS NOTES
Physical Therapy    Physical Therapy Treatment    Patient Name: Hai Spaulding  MRN: [id number]  Today's Date: 1/16/2024  Time Calculation  Start Time: 0855  Stop Time: 0930  Time Calculation (min): 35 min       Assessment/Plan   PT Assessment  Rehab Prognosis: Good  Evaluation/Treatment Tolerance: Patient limited by fatigue  End of Session Communication: Bedside nurse  Assessment Comment: Slow progress regarding functional mobility;  tolerance to activity improving slowly;  fall risk.  End of Session Patient Position: Bed, 4 rail up, Alarm on  PT Plan  Inpatient/Swing Bed or Outpatient: Inpatient  PT Plan  Treatment/Interventions: Bed mobility, Transfer training, Gait training, Balance training, Neuromuscular re-education, Strengthening, Endurance training, Range of motion, Therapeutic exercise, Therapeutic activity, Home exercise program  PT Plan: Skilled PT  PT Frequency: 3 times per week  PT Discharge Recommendations: Moderate intensity level of continued care  Equipment Recommended upon Discharge: Wheeled walker, Wheelchair  PT Recommended Transfer Status: Assist x2  PT - OK to Discharge: Yes (with skilled physical therapy services at next level of care.)      General Visit Information:   PT  Visit  PT Received On: 01/15/24  General  Prior to Session Communication: Bedside nurse  Patient Position Received: Bed, 4 rail up, Alarm on  General Comment: RN cleared patient for treatment.  Patient reports agreeable to treatment.    Subjective   Precautions:  Precautions  Medical Precautions: Fall precautions  Vital Signs:  Vital Signs  Heart Rate: 99  SpO2: 92 % (with O2 via high flow nasal cannula 40 lpm at 50% FiO2.)    Objective   Pain:  Pain Assessment  Pain Assessment: FLACC (Face, Legs, Activity, Cry, Consolability)  Pain Score: 3  Pain Type: Chronic pain  Pain Location: Leg  Pain Orientation: Left, Right  Cognition:  Cognition  Overall Cognitive Status: Impaired  Postural Control:  Static Sitting Balance  Static  Sitting-Balance Support: Bilateral upper extremity supported  Static Sitting-Level of Assistance: Maximum assistance (x 2)  Static Sitting-Comment/Number of Minutes: Assist required to maintain midline while sitting on side of bed due to decreased balance all planes.  Extremity/Trunk Assessments:        Activity Tolerance:  Activity Tolerance  Endurance: Decreased tolerance for upright activites  Activity Tolerance Comments: Fatigue  Treatments:  Therapeutic Exercise  Therapeutic Exercise Activity 1: ankle pumps, heel slides, hip abduction, SLR 10 reps x 1 set kat LE with assist (right greater than left)    Therapeutic Activity  Therapeutic Activity 1: Sitting balance activities while sitting on side of bed;  assist with maintaining midline.         Bed Mobility  Bed Mobility: Yes  Bed Mobility 1  Bed Mobility 1: Supine to sitting  Level of Assistance 1: Maximum assistance (x 2)  Bed Mobility Comments 1: Assist with trunk-up and kat LE during supine to sit;  max assist with scooting hips to edge of bed during supine to sit.  Bed Mobility 2  Bed Mobility  2: Sitting to supine  Level of Assistance 2: Maximum assistance (x 2)  Bed Mobility Comments 2: Assist with trunk and kat LE during sit to supine.    Ambulation/Gait Training  Ambulation/Gait Training Performed: No  Transfers  Transfer: No    Stairs  Stairs: No         Outcome Measures:  [facility] Basic Mobility  Turning from your back to your side while in a flat bed without using bedrails: A lot  Moving from lying on your back to sitting on the side of a flat bed without using bedrails: A lot  Moving to and from bed to chair (including a wheelchair): Total  Standing up from a chair using your arms (e.g. wheelchair or bedside chair): Total  To walk in hospital room: Total  Climbing 3-5 steps with railing: Total  Basic Mobility - Total Score: 8    Education Documentation  No documentation found.  Education Comments  No comments found.        OP EDUCATION:        Encounter Problems       Encounter Problems (Active)       PT Problem       The patient will demonstrate an overall strength of >3+/5 in BLE to assist with completion of functional mobility.   (Progressing)       Start:  01/05/24    Expected End:  01/18/24            The patient will be able to complete bed mobility at a [place] level with use of bed rails.   (Progressing)       Start:  01/05/24    Expected End:  01/18/24            The patient will be able to complete safe transfer of sit <> stand with minimal VC at a [place] level.   (Progressing)       Start:  01/05/24    Expected End:  01/18/24            The patient will be able to ambulate at a [place] level for >15ftx1 with RW.  (Progressing)       Start:  01/05/24    Expected End:  01/18/24               Pain - Adult

## 2024-01-16 NOTE — PROGRESS NOTES
Hai Spaulding is a 73 y.o. male on day 12 of admission presenting with Acute respiratory failure (CMS/HCC).    Subjective   Interval History:   Afebrile, no chills  Awake, responsive  No diarrhea  Less pressor requirement  On 5 L of oxygen    Review of Systems    Objective   Range of Vitals (last 24 hours)  Heart Rate:  []   Temp:  [36.3 °C (97.3 °F)-36.8 °C (98.2 °F)]   Resp:  [16-28]   BP: ()/(49-81)   Weight:  [76.6 kg (168 lb 14 oz)]   SpO2:  [90 %-100 %]   Daily Weight  01/16/24 : 76.6 kg (168 lb 14 oz)    Body mass index is 24.93 kg/m².    Physical Exam  Constitutional:       Appearance: Awake, responsive  HENT:      Head: Normocephalic.      Nose: Nose normal.      Mouth/Throat:      Mouth: Mucous membranes are moist.      Pharynx: Oropharynx is clear.   Eyes:      Extraocular Movements: Extraocular movements intact.      Conjunctiva/sclera: Conjunctivae normal.      Comments: Resolving right periorbital ecchymosis   Cardiovascular:      Rate and Rhythm: Normal rate and regular rhythm.   Pulmonary:      Effort: Pulmonary effort is normal.      Breath sounds: Normal breath sounds.   Abdominal:      General: Bowel sounds are normal.      Palpations: Abdomen is soft.      Tenderness: There is no abdominal tenderness.   Musculoskeletal:         General: Normal range of motion.      Cervical back: Normal range of motion and neck supple.   Skin:     General: Skin is warm and dry.   Neurological:      General: No focal deficit present.      Mental Status: He is alert and oriented to person, place, and time.   Psychiatric:         Mood and Affect: Not agitated        Behavior: Behavior not agitated       Antibiotics  amiodarone (Pacerone) tablet 200 mg  apixaban (Eliquis) tablet 5 mg  atorvastatin (Lipitor) tablet 80 mg  carbidopa-levodopa (Sinemet CR)  mg ER tablet 1 tablet  clopidogrel (Plavix) tablet 75 mg  dapagliflozin propanediol (Farxiga) tablet 10 mg  lactobacillus acidophilus capsule 1  capsule  levothyroxine (Synthroid, Levoxyl) tablet 25 mcg  metoprolol succinate XL (Toprol-XL) 24 hr tablet 25 mg  QUEtiapine (SEROquel) tablet 25 mg  sertraline (Zoloft) tablet 100 mg  SITagliptin phosphate (Januvia) tablet 50 mg  insulin glargine (Lantus) injection 10 Units  dextrose 50 % injection 25 g  glucagon (Glucagen) injection 1 mg  dextrose 10 % in water (D10W) infusion  insulin lispro (HumaLOG) injection 0-10 Units  vancomycin (Vancocin) capsule 125 mg  fluconazole in NaCl (iso-osm) (Diflucan) IVPB 200 mg  nystatin (Mycostatin) 100,000 unit/gram powder 1 Application  ipratropium-albuteroL (Duo-Neb) 0.5-2.5 mg/3 mL nebulizer solution 3 mL  budesonide (Pulmicort) 0.5 mg/2 mL nebulizer solution 0.5 mg  lactobacillus acidophilus tablet 1 tablet  vancomycin (Vancocin) capsule 125 mg  ipratropium-albuteroL (Duo-Neb) 0.5-2.5 mg/3 mL nebulizer solution 3 mL  acetaminophen (Tylenol) tablet 650 mg  ondansetron (Zofran) injection 4 mg  melatonin tablet 5 mg  cyclobenzaprine (Flexeril) tablet 10 mg  fluticasone furoate-vilanteroL (Breo Ellipta) 200-25 mcg/dose inhaler 1 puff  ipratropium-albuteroL (Duo-Neb) 0.5-2.5 mg/3 mL nebulizer solution 3 mL  pantoprazole (ProtoNix) EC tablet 40 mg  albuterol 2.5 mg /3 mL (0.083 %) nebulizer solution 2.5 mg  acetaminophen (Tylenol) tablet 650 mg  ondansetron (Zofran) injection 4 mg  melatonin tablet 5 mg  potassium chloride (Klor-Con) packet 40 mEq  potassium chloride 20 mEq in 100 mL IV premix  furosemide (Lasix) injection 40 mg  barium sulfate (Varibar Pudding) 40 % (w/v), 30% (w/w) oral paste 20 mL  barium sulfate (Varibar Nectar, Varibar Honey) 40 % (w/v) suspension 20 mL  barium sulfate (Varibar Honey) 40 % (w/v) 29% (w/w) suspension 20 mL  barium sulfate (Varibar THIN Liquid) 81 % (w/w) suspension 20 mL  sodium chloride 0.9% infusion  oxygen (O2) therapy  vancomycin (Firvanq) solution 125 mg  vancomycin (Vancocin) capsule 125 mg  ipratropium-albuteroL (Duo-Neb) 0.5-2.5  mg/3 mL nebulizer solution 3 mL  gabapentin (Neurontin) capsule 200 mg  sodium chloride (Ocean) 0.65 % nasal spray 1 spray  mirtazapine (Remeron) tablet 15 mg  sodium chloride (Ocean) 0.65 % nasal spray 1 spray  acetaminophen (Tylenol) tablet 650 mg  oxygen (O2) therapy  acetylcysteine (Mucomyst) 200 mg/mL (20 %) nebulizer solution 600 mg  metroNIDAZOLE (Flagyl) 500 mg in NaCl (iso-os) 100 mL  acetaminophen (Ofirmev) injection 1,000 mg  norepinephrine (Levophed) 8 mg in dextrose 5% 250 mL (0.032 mg/mL) infusion (premix)  norepinephrine in dextrose 5 % (Levophed) infusion  - Omnicell Override Pull  digoxin (Lanoxin) injection 500 mcg  amiodarone (Nexterone) 360 mg in dextrose,iso-osm 200 mL (1.8 mg/mL) infusion (premix)  amiodarone (Nexterone) 360 mg in dextrose,iso-osm 200 mL (1.8 mg/mL) infusion (premix)  meropenem (Merrem) in sodium chloride 0.9 % 100 mL IV 1 g  oxygen (O2) therapy  methyl salicylate-menthol (Bengay) 15-10 % greaseless cream  amiodarone (Nexterone) 360 mg in dextrose,iso-osm 200 mL (1.8 mg/mL) infusion (premix)  furosemide (Lasix) injection 40 mg  amiodarone (Nexterone) 360 mg in dextrose,iso-osm 200 mL (1.8 mg/mL) infusion (premix)  levothyroxine (Synthroid, Levoxyl) tablet 25 mcg  furosemide (Lasix) injection 40 mg  insulin lispro (HumaLOG) injection 0-10 Units  heparin (porcine) injection 5,000 Units      Relevant Results  Labs  Results from last 72 hours   Lab Units 01/16/24  0454 01/15/24  0540 01/14/24  0347   WBC AUTO x10*3/uL 14.0* 12.5* 15.2*   HEMOGLOBIN g/dL 11.2* 11.2* 12.0*   HEMATOCRIT % 34.0* 33.6* 36.5*   PLATELETS AUTO x10*3/uL 244 213 219     Results from last 72 hours   Lab Units 01/16/24  0454 01/15/24  0540 01/14/24  0347   SODIUM mmol/L 143 144 142   POTASSIUM mmol/L 3.6 3.9 4.1   CHLORIDE mmol/L 108* 111* 109*   CO2 mmol/L 24 24 22*   BUN mg/dL 35* 38* 43*   CREATININE mg/dL 1.80* 1.90* 2.00*   GLUCOSE mg/dL 200* 219* 132*   CALCIUM mg/dL 7.4* 7.6* 7.5*   ANION GAP mmol/L 11  9 11   EGFR mL/min/1.73m*2 39* 37* 35*     Results from last 72 hours   Lab Units 01/16/24  0454 01/15/24  0540 01/14/24  0347   ALK PHOS U/L 497* 506* 398*   BILIRUBIN TOTAL mg/dL 0.3 0.4 0.3   PROTEIN TOTAL g/dL 5.4* 5.2* 5.4*   ALT U/L <5* <5* <5*   AST U/L 23 19 19   ALBUMIN g/dL 2.1* 2.1* 2.1*     Estimated Creatinine Clearance: 37.1 mL/min (A) (by C-G formula based on SCr of 1.8 mg/dL (H)).  C-Reactive Protein   Date Value Ref Range Status   11/29/2023 2.53 (H) <1.00 mg/dL Final     CRP   Date Value Ref Range Status   12/31/2022 39.72 (A) mg/dL Final     Comment:     REF VALUE  < 1.00     12/14/2021 2.16 (A) mg/dL Final     Comment:     REF VALUE  < 1.00       Microbiology  Susceptibility data from last 14 days.  Collected Specimen Info Organism   01/03/24 Urine from Straight Catheter Candida glabrata     Imaging  XR chest 1 view    Result Date: 1/15/2024  Interpreted By:  Rahul Staples, STUDY: XR CHEST 1 VIEW; 1/15/2024 6:59 am   INDICATION: CLINICAL INFORMATION: Signs/Symptoms:hypoxia.   COMPARISON: 01/13/2024   ACCESSION NUMBER(S): [id number]   ORDERING CLINICIAN: LYNSEY HERNDON   TECHNIQUE: Portable chest one view.   FINDINGS: The cardiac size is indeterminate in view of the AP projection.  A cardiac pacemaker with biventricular as well as right atrial leads is noted. Right internal jugular venous catheter is unchanged. Diffuse hazy bilateral alveolar infiltrates are present along with moderate-sized bilateral effusions.       Bilateral infiltrates and effusions are present in a pattern suggesting CHF. There is no interval change when compared to the previous examination.   MACRO: none   Signed by: Rahul Staples 1/15/2024 7:51 AM Dictation workstation:   [id number]    XR chest 1 view    Result Date: 1/13/2024  Interpreted By:  Rahul Staples, STUDY: XR CHEST 1 VIEW; 1/13/2024 2:51 pm   INDICATION: CLINICAL INFORMATION: Signs/Symptoms:central line and thoracentesis.   COMPARISON: 01/13/2024 at 824 hours    ACCESSION NUMBER(S): [id number]   ORDERING CLINICIAN: CRISTAL MARCH   TECHNIQUE: Portable chest one view.   FINDINGS: The cardiac size is indeterminate in view of the AP projection.  A cardiac pacemaker with biventricular as well as right atrial leads is noted. There is a right internal jugular venous catheter now present, new compared to the prior study with the tip somewhat obscured by the cardiac pacemaker leads. The tip however appears to terminate above the right atrium in the SVC distribution. There is no evidence for pneumothorax.   Decrease in the pleural effusion on the left in this patient with history of thoracentesis. There is no definite evidence for pneumothorax within limits of this study.   There are bilateral infiltrates and effusions present, possibly secondary to pulmonary edema and CHF.       1. Status post right-sided central line insertion without pneumothorax as above. 2. Status post left thoracentesis without evidence for pneumothorax. 3. Persistent infiltrates and effusions bilaterally suggesting possibility of pulmonary edema and CHF.   MACRO: none   Signed by: Rahul Staples 1/13/2024 3:02 PM Dictation workstation:   [id number]    CT chest wo IV contrast    Result Date: 1/13/2024  Interpreted By:  Michael Fuentes, STUDY: CT CHEST WO IV CONTRAST; ;  1/13/2024 9:09 am   INDICATION: Signs/Symptoms:worsening chest xray. Pneumonia   COMPARISON: 01/03/2024   ACCESSION NUMBER(S): [id number]   ORDERING CLINICIAN: LYNSEY HERNDON   TECHNIQUE: Serial axial unenhanced CT images obtained of the chest. Images reformatted in the coronal and sagittal projection   All CT examinations are performed with 1 or more of the following dose reduction techniques: Automated exposure control, adjustment of mA and/or kv according to patient's size, or use of iterative reconstruction techniques.   FINDINGS: Mediastinum demonstrates lymphadenopathy with multiple enlarged paratracheal and pretracheal lymph  nodes. There is a right paratracheal lymph node identified measuring 1.9 x 1.4 cm intervally increased in size from prior imaging where it measured 1.4 x 1.1 cm. Other lymph nodes have intervally increased in size. There is a precarinal lymph node identified measuring 12 mm in the short axis. Evaluation of the aidan limited without IV contrast. No significant hilar lymphadenopathy. Esophagus is gas-filled with component of refluxed suggested distally.   Heart and great vessels demonstrate ascending thoracic aorta to measure 3.2 cm. There is mild vascular calcification of the aortic arch. Diffuse qualitative coronary artery calcification is demonstrated. Cardiomegaly noted.   Large bilateral pleural effusions are demonstrated left-greater-than-right. Findings increased from prior imaging. There is left lower lobar collapse with no asymmetric density within the atelectatic lung. Also, there is compressive atelectasis within the left upper lobe with partial collapse within the lingula. The right lower lobe demonstrates partial lobar collapse in relation to compressive atelectasis. Lung parenchyma demonstrates septal thickening superimposed ground-glass airspace infiltrate more focal consolidation in the areas of septal thickening within bilateral lung fields which is progressed from prior imaging which may reflect alveolar component of pulmonary edema. However, the areas of consolidation may reflect superimposed component of infectious infiltrate.   Included portions visualized abdomen demonstrate mild ascites in particular perihepatic location. There is contrast demonstrated within the colon. A cyst is noted in the superior pole of the right kidney measuring 1.5 cm.   Visualized osseous structures demonstrate no compression deformity in the spine.             1. Interval increase in bilateral pleural effusions with large bilateral effusions left-greater-than-right. There is resultant left lower lobar collapse with  compressive atelectasis in particular in the left upper lobe as well as right lower lobe.   2. Patchy areas of airspace consolidation intervally developed from prior imaging with more focal septal thickening in the areas of consolidation may reflect alveolar component of pulmonary edema. However, superimposed infectious infiltrate is not excluded. No dense airspace consolidation     MACRO: None   Signed by: Michael Fuentes 1/13/2024 9:32 AM Dictation workstation:   [id number]    XR chest 1 view    Result Date: 1/13/2024  Interpreted By:  Michael Fuentes, STUDY: XR CHEST 1 VIEW 1/13/2024 8:31 am   INDICATION: Signs/Symptoms:respiratory distress   COMPARISON: 01/12/2024   ACCESSION NUMBER(S): [id number]   ORDERING CLINICIAN: CRISTAL MARCH   TECHNIQUE: Single AP view chest   FINDINGS: Cardiomediastinal silhouette is enlarged with mild cardiomegaly. Left-sided pacemaker with leads in the region of the right atrium, right ventricle, and coronary sinus. There is generalized increased interstitial prominence with cephalization as well as patchy alveolar airspace infiltrate in bilateral lung fields without significant change. There is improved aeration at the right lung base with component of layering basilar effusion improved. However, there is persistent left-sided pleural effusion with interval increased from prior imaging with component of compressive atelectasis.             1. Interval increase in left-sided pleural effusion with compressive atelectasis with moderate left-sided pleural effusion   2. Patchy alveolar airspace infiltrate in bilateral lung fields with alveolar component of pulmonary edema suggested.   Signed by: Michael Fuentes 1/13/2024 8:48 AM Dictation workstation:   [id number]    XR chest 1 view    Result Date: 1/12/2024  Interpreted By:  Sugey Tinoco, STUDY: XR CHEST 1 VIEW 1/12/2024 3:49 pm   INDICATION: Signs/Symptoms:hypoxia   COMPARISON: 01/03/2024   ACCESSION NUMBER(S): [id number]    ORDERING CLINICIAN: CRISTAL MARCH   TECHNIQUE: AP erect view of the chest   FINDINGS: The cardiac size is mildly enlarged with biventricular ICD leads observed in right atrium, right ventricle, and coronary sinus.   Pulmonary edema is present with worsening of bilateral pulmonary infiltrates and with bilateral pleural effusion seen. Left effusion appears larger than the right with left effusion increased in size.       Pulmonary edema is now seen with increased size of left pleural effusion which is now moderate in amount. Small right pleural effusion is also present.   Signed by: Sugey Tinoco 1/12/2024 4:27 PM Dictation workstation:   [id number]    ECG 12 Lead    Result Date: 1/10/2024  Wide QRS tachycardia with Premature supraventricular complexes and with occasional Premature ventricular complexes Nonspecific intraventricular block Cannot rule out Anteroseptal infarct , age undetermined T wave abnormality, consider inferolateral ischemia Abnormal ECG When compared with ECG of 14-DEC-2023 19:10, (unconfirmed) Wide QRS tachycardia has replaced Sinus rhythm Vent. rate has increased BY  57 BPM See ED provider note for full interpretation and clinical correlation Confirmed by Alyssa Brown ([id number]) on 1/10/2024 4:16:56 PM    FL modified barium swallow study    Result Date: 1/9/2024  Interpreted By:  Chuck Montoya and Janezic Kimberly STUDY: FL MODIFIED BARIUM SWALLOW STUDY;; 1/8/2024 3:34 pm   INDICATION: Signs/Symptoms:dysphagia.   COMPARISON: None.   ACCESSION NUMBER(S): [id number]   ORDERING CLINICIAN: CRISTAL MARCH   TECHNIQUE: MBSS completed. Informed verbal consent obtained prior to completion of exam. Trials of thin, nectar thick, honey thick, puree,  and regular solids given. Total fluoroscopy time:  2 minutes 52 seconds Radiation exposure (Reference Air Kerma):  34.37 mGy Images:  9 series     SLP: Emiliana Collins M.A. [facility]-SLP, [facility] Phone/Pager: [phone] Option 2   SPEECH FINDINGS:  Reason for referral: Aspiration due to comorbidities Patient hx: Per chart: This man who is debilitated from a stroke and a history of laryngeal cancer and repeat aspiration pneumonia has spent more time in the hospital than out of the hospital for the last 2 months.  He was most recently hospitalized here December 25 with COVID and aspiration pneumonia. Respiratory status: Nasal cannula 2L Previous diet: Regular/thin liquids   FINAL SPEECH RECOMMENDATIONS   Diet recommendations/feeding strategies: Puree/nectar thick liquids. Crush medication in puree or nectar thick liquids. Sit upright at 90 degrees for meals and medications.   Follow-up speech therapy recommended: Yes. Patient will benefit from continued dysphagia therapy for oropharyngeal exercises to allow for diet progression.   Short term goals: 1. Patient will tolerate recommended diet without pulmonary compromise 2. Patient will participate in oropharyngeal strengthening exercises. 3. SLP will assess for diet upgrade as appropriate. 4. Due to silent aspiration, patient will need a repeat MBS prior to liquid diet upgrade to thin.   Long term goals: Tolerated least restrictive diet without pulmonary compromise   Education provided: Reviewed results with nursing.   Treatment Provided today: No.     Repeat study/ dc plan: Repeat MBS prior to upgrade to thin liquids, due to silent aspriation with thin liquids.   Mechanics of the swallow summary:   Oral phase: 1. Patient is unable to masticate solids, thus manually removed. Attempt at mashing with tongue was not successful. 2. Repetitive tongue motion with puree. Oral holding with all liquids and puree prior to swallow onset. 3. Swallow initiation at the pyriforms with thin liquids. At the valleculae with nectar and honey thick, and puree. 4. Trace oral residue, clears with reswallow.   Pharyngeal phase: 1. Partial superior laryngeal elevation. 2. Partial anterior hyoid excursion. 3. Partial epiglottic inversion.  4. Narrow column of contrast above vocal cords with  honey thick liquids and puree. Contrast enters airway above vocal cords (just underneath epiglottis) with no effort to eject. Narrow column of contrast enters airway below vocal cords with no effort to eject with straw. Contrast enters airway above vocal cords with teaspoon trial without effort to eject. 5. Collection of residue thoughout pharynx, mild and clears with spontaneous reswallows. 6. Narrow column of contrast between tongue base and pharyngeal wall.     SLP impressions with severity rating:   Silent aspiration with thin liquids. Laryngeal penetration noted with puree and honey thick liquids. Tolerated nectar thick liquids during testing constraints without difficulty. Unable to masticate solids. Unclear if this is due to edentulous status, or other comorbidities. Patient would benefit from continuation of dysphagia therapy to improve overall oral and pharyngeal strength, improve mastication and upgrade diet to least restrictive without pulmonary compromise.   Thin Liquids (MBSS) Rosenbek's Penetration Aspiration Scale, Thin Liquids (MBSS): 8. SILENT ASPIRATION - contrast passes glottis, visible residue, NO pt response   Response to Aspiration, Thin Liquid (MBSS): absent response, silent aspiration,   Response to Pharyngeal Residue, Thin Liquid (MBSS): spontaneous clearing,   Nectar Thick Liquids (MBSS) Rosenbek's Penetration Aspiration Scale, Nectar thick liquids (MBSS): 1. NO ASPIRATION & NO PENETRATION - no aspiration, contrast does not enter airway   Response to Pharyngeal Residue, Nectar thick liquids (MBSS): spontaneous clearing,   Honey Thick Liquids (MBSS) Rosenbek's Penetration Aspiration Scale, Honey thick liquids (MBSS): 3. PENETRATION with LOW ASPIRATION risk - contrast remains above vocal cords, visible residue   Response to Pharyngeal Residue, Honey thick liquids (MBSS): spontaneous clearing,   Purees (MBSS) Rosenbek's Penetration  Aspiration Scale, Purees (MBSS): 3. PENETRATION with LOW ASPIRATION risk - contrast remains above vocal cords, visible residue   Response to Pharyngeal Residue, Purees (MBSS): spontaneous clearing,     Speech Therapy section of this report signed by Emiliana Collins M.A. CCC-SLP, BCS-S on 1/8/2024 at 3:54 pm.   RADIOLOGY FINDINGS: There is laryngeal penetration and aspiration with thin liquid barium. There is laryngeal penetration without aspiration with nectar consistency, honey consistency, pudding consistency, and cookie consistency barium.   Radiology section of this report signed by Jan.       There is laryngeal penetration and aspiration with thin liquid barium. There is laryngeal penetration without aspiration with nectar consistency, honey consistency, pudding consistency, and cookie consistency barium.   MACRO: None   Signed by: Chuck Montoya 1/9/2024 5:02 PM Dictation workstation:   [id number]    ECG 12 lead    Result Date: 1/5/2024  Sinus bradycardia with frequent and consecutive Premature ventricular complexes and Fusion complexes Left axis deviation Nonspecific intraventricular block Inferior infarct , age undetermined Lateral injury pattern ** ** ACUTE MI / STEMI ** ** Abnormal ECG When compared with ECG of 27-DEC-2023 15:18, Significant changes have occurred See ED provider note for full interpretation and clinical correlation Confirmed by Yomi Lainez ([id number]) on 1/5/2024 9:13:12 PM    ECG 12 lead    Result Date: 1/5/2024  Undetermined rhythm Left bundle branch block Abnormal ECG When compared with ECG of 03-JAN-2024 11:50, (unconfirmed) Current undetermined rhythm precludes rhythm comparison, needs review Questionable change in QRS duration Criteria for Inferior infarct are no longer Present See ED provider note for full interpretation and clinical correlation Confirmed by Yomi Lainez ([id number]) on 1/5/2024 9:12:07 PM    CT 3D reconstruction    Addendum Date: 1/5/2024    Interpreted By:   Chuck Argueta, ADDENDUM: Multiplanar 3D reformations are also generated and reviewed on independent workstation.   Signed by: Chuck Argueta 1/5/2024 2:19 PM   -------- ORIGINAL REPORT -------- Dictation workstation:   [id number]    Result Date: 1/5/2024  Interpreted By:  Chuck Argueta, STUDY: CT FACIAL BONES WO IV CONTRAST;  1/3/2024 1:14 pm   INDICATION: Signs/Symptoms:fall.   COMPARISON: None.   ACCESSION NUMBER(S): [id number]   ORDERING CLINICIAN: FIONA GOMEZ   TECHNIQUE: Contiguous axial CT sections were performed through the bones and orbits and supplemented with coronal and sagittal reformatted images. The study is limited by motion degradation.   FINDINGS: There is mild soft tissue swelling superior to the left orbit.   There is some deformity at the distal nasal bones greater on the right. This appearance is of indeterminate age and should be correlated to point tenderness. The remaining osseous structures demonstrate no sign of acute fracture allowing for motion degradation. There is no overt bone destruction or aggressive periosteal reaction. No lytic or blastic lesion is detected.   There are small fluid levels in both maxillary sinuses, greater on the left. There is a small fluid level in the right sphenoid compartment. There is mucoperiosteal thickening in scattered fluid within the ethmoid air cells, greater on the left posteriorly. The frontal sinuses and mastoid air cells are clear and symmetrically aerated. There may be mild mucoperiosteal thickening at the floor of the left maxillary sinus.   The nasal septum is at the midline. The ostiomeatal units are patent bilaterally.   The visualized orbital contents are unremarkable. There is no intraorbital air density or fluid collection. The medial and inferior orbital walls are intact.   There are least 2 small foci of air density posterior to the right maxillary sinus though no apparent posterior wall fracture or defect. The  significance of these findings is indeterminate.       Soft tissue swelling superior left orbit.   Mild deformity at the distal tip of the nasal bones greater on the right. This appearance is of uncertain age it should be correlated to point tenderness.   The remaining visualized osseous structures are intact.   Maxillary, ethmoid, and right sphenoid sinusitis with fluid levels.   Signed by: Chuck Argueta 1/5/2024 2:19 PM Dictation workstation:   [id number]    CT maxillofacial bones wo IV contrast    Addendum Date: 1/5/2024    Interpreted By:  Chuck Argueta, ADDENDUM: Multiplanar 3D reformations are also generated and reviewed on independent workstation.   Signed by: Chuck Argueta 1/5/2024 9:43 AM   -------- ORIGINAL REPORT -------- Dictation workstation:   [id number]    Result Date: 1/5/2024  Interpreted By:  Chuck Argueta, STUDY: CT FACIAL BONES WO IV CONTRAST;  1/3/2024 1:14 pm   INDICATION: Signs/Symptoms:fall.   COMPARISON: None.   ACCESSION NUMBER(S): [id number]   ORDERING CLINICIAN: FIONA GOMEZ   TECHNIQUE: Contiguous axial CT sections were performed through the bones and orbits and supplemented with coronal and sagittal reformatted images. The study is limited by motion degradation.   FINDINGS: There is mild soft tissue swelling superior to the left orbit.   There is some deformity at the distal nasal bones greater on the right. This appearance is of indeterminate age and should be correlated to point tenderness. The remaining osseous structures demonstrate no sign of acute fracture allowing for motion degradation. There is no overt bone destruction or aggressive periosteal reaction. No lytic or blastic lesion is detected.   There are small fluid levels in both maxillary sinuses, greater on the left. There is a small fluid level in the right sphenoid compartment. There is mucoperiosteal thickening in scattered fluid within the ethmoid air cells, greater on the left  posteriorly. The frontal sinuses and mastoid air cells are clear and symmetrically aerated. There may be mild mucoperiosteal thickening at the floor of the left maxillary sinus.   The nasal septum is at the midline. The ostiomeatal units are patent bilaterally.   The visualized orbital contents are unremarkable. There is no intraorbital air density or fluid collection. The medial and inferior orbital walls are intact.   There are least 2 small foci of air density posterior to the right maxillary sinus though no apparent posterior wall fracture or defect. The significance of these findings is indeterminate.       Soft tissue swelling superior left orbit.   Mild deformity at the distal tip of the nasal bones greater on the right. This appearance is of uncertain age it should be correlated to point tenderness.   The remaining visualized osseous structures are intact.   Maxillary, ethmoid, and right sphenoid sinusitis with fluid levels.   Signed by: Chuck Argueta 1/3/2024 1:56 PM Dictation workstation:   [id number]    ECG 12 lead    Result Date: 1/4/2024  Ventricular-paced rhythm with occasional and consecutive sinus complexes and with frequent and consecutive Premature ventricular complexes with junctional escape complexes Abnormal ECG When compared with ECG of 04-JAN-2024 04:21, (unconfirmed) Previous ECG has undetermined rhythm, needs review See ED provider note for full interpretation and clinical correlation Confirmed by Yomi Lainez ([id number]) on 1/4/2024 9:24:49 AM    CT head wo IV contrast    Addendum Date: 1/3/2024    Interpreted By:  Chuck Argueta, ADDENDUM: Subdural collection over the right convexity is new from 12/31/2022 though is of CSF attenuation. There is no evidence of dense acute hemorrhage at this site.   There is some asymmetry of the lateral ventricles with the left larger than the right. This in part may be related to some residual mass effect from the right subdural collection  though there is no significant midline shift or sulcal effacement.   Signed by: Chuck Argueta 1/3/2024 3:48 PM   -------- ORIGINAL REPORT -------- Dictation workstation:   [id number]    Result Date: 1/3/2024  Interpreted By:  Chuck Argueta, STUDY: CT HEAD WO IV CONTRAST;  1/3/2024 1:14 pm   INDICATION: Signs/Symptoms:fall, periorbital ecchymosis.   COMPARISON: 12/31/2022   ACCESSION NUMBER(S): [id number]   ORDERING CLINICIAN: FIONA GOMEZ   TECHNIQUE: Contiguous unenhanced axial CT sections are performed from the skull base to the vertex.   FINDINGS: The osseous structures are intact. There is small fluid levels in the maxillary sinuses, greater on the left as well as the right sphenoid compartment. There is partial opacification of the ethmoid air cells with mucoperiosteal thickening and fluid greater on the left. The visualized portions of the mastoid air cells are and frontal sinuses are clear.   There is severe generalized parenchymal volume loss with enlargement of the cortical sulci and CSF spaces. There is a CSF subdural collection overlying the right frontal convexity extending laterally over the temporoparietal convexity. This finding is new from the previous study and measures 8 mm in maximum thickness. There may be a smaller CF density extra-axial collection over the left frontal convexity anteriorly.   There is hypodensity in the deep cerebral white matter bilaterally compatible with small-vessel ischemia. There is no sign of parenchymal hematoma. There is no dense extra-axial fluid collection. There is no mass effect or midline shift. There is some asymmetry of the lateral ventricles with the left larger than the right.       Chronic appearing low density extra-axial fluid collections overlying the frontal convexities, greater on the right. These findings are new from 12/31/2022.   Generalized parenchymal atrophy and small-vessel ischemic changes of the cerebral white matter.    Maxillary, ethmoid, and right sphenoid sinusitis with acute fluid levels.   No sign of acute intracranial hemorrhage or mass effect.   The visualized osseous structures are intact.   Signed by: Chuck Argueta 1/3/2024 1:42 PM Dictation workstation:   [id number]    CT chest abdomen pelvis wo IV contrast    Result Date: 1/3/2024  STUDY: CT Chest, Abdomen, and Pelvis without IV Contrast; 1/3/2024, 1:15PM. INDICATION: Shortness of breath and abdominal pain.  Leukocytosis. COMPARISON: CXR same day.  CT CAP 12/31/2022. ACCESSION NUMBER(S): [id number] ORDERING CLINICIAN: FIONA GOMEZ TECHNIQUE: CT of the chest, abdomen, and pelvis was performed.  Contiguous axial images were obtained at 3 mm slice thickness through the chest, abdomen, and pelvis.  Coronal and sagittal reconstructions at 3 mm slice thickness were performed.  No intravenous contrast was administered.  FINDINGS: Please note that the evaluation of vessels, lymph nodes and organs is limited without intravenous contrast. CHEST: MEDIASTINUM: The heart is enlarged in size without pericardial effusion. Biventricular AICD remains in place.  There is no thoracic aortic, aneurysm.  Scattered vascular calcifications are seen along the arterial tree. LUNGS/PLEURA: There are bilateral pleural effusions greater on the left than the right, these appear decreased when compared with the prior especially on the right.  Adjacent compressive and subsegmental atelectatic changes are noted as well as some areas of probable consolidation. There is a focus of patchy infiltrate in the right upper lobe anteriorly in proximity to the anterior aspect of the first rib.  This appears slightly more prominent.  Trachea and mainstem bronchi appear stable, borderline narrowed Groundglass infiltrates noted previously appear improved with mild residual. LYMPH NODES: Mediastinal lymph nodes are mildly enlarged.  There is a pretracheal lymph node with the short axis diameter of 1.4  cm similar to the prior.  Detail is somewhat obscured, by artifact on the current study.  Patient's arms are included within the field-of-view. ABDOMEN:  LIVER: The liver is mildly enlarged, the right lobe measures 17 cm in CC dimension.  Assessment of parenchyma is limited by artifact.  Smooth surface contour.  Normal attenuation.  BILE DUCTS: No intrahepatic or extrahepatic biliary ductal dilatation.  GALLBLADDER: The gallbladder is surgically absent and this was also the case previously.  PANCREAS: No masses or ductal dilatation.  SPLEEN: The spleen is not clearly identified nor was it previously.  ADRENAL GLANDS: No thickening or nodules.  KIDNEYS AND URETERS: Kidneys are normal in size and location.  No renal or ureteral calculi.  There is a 2 cm cyst in the upper pole of the right kidney with small peripheral calcifications which are more prominent than on the prior.  No hydronephrosis of either kidney.  PELVIS:  BLADDER: There is increased bladder distention when compared with the prior. There is extrinsic pressure from the prostate and coarse prostate calcifications also present previously.  Increased attenuation in the dependent bladder appears to be likely artifactual.  REPRODUCTIVE ORGANS: Coarse prostate calcifications are noted also present previously.  BOWEL: There are multiple fluid and air-filled loops of small and large bowel as can be seen with adynamic ileus.  VESSELS: No acute abnormalities identified.  Abdominal aorta is normal in caliber.  There are vascular calcifications along the arterial tree.  PERITONEUM/RETROPERITONEUM/LYMPH NODES: There are mesenteric and pelvic reticulations.  There is a small amount of presacral and pelvic fluid.  No pneumoperitoneum. No lymphadenopathy.  ABDOMINAL WALL AND SOFT TISSUES: Abdominal wall edema is noted especially laterally more so on the right and posteriorly.  BONES: No acute fracture or aggressive osseous lesion.  Lumbar dextroscoliosis is noted and  there are degenerative changes of the spine and joints.    1.Bilateral pleural effusions greater on the left than the right, these appear decreased from the prior especially on the right. Adjacent compressive and subsegmental atelectatic changes are noted as well as some areas of consolidation. 2.Improving groundglass infiltrates in both lungs with mild residual. There is a small focus of infiltrate in the right upper lobe anteriorly which appears slightly more prominent.  Pulmonary findings suggest a combination of pneumonia and pulmonary vascular congestion. This could also be correlated clinically.  Underlying nodules could be obscured and follow-up is suggested. 3.Cardiomegaly with AICD in place also present previously. 4.Within the abdomen and pelvis there are multiple fluid and air-filled loops of small and large bowel as can be seen with adynamic ileus. There is a small amount of presacral and pelvic fluid. No pneumoperitoneum. 5.Abdominal wall edema. 6.  2 cm cyst in the upper pole of the right kidney with small peripheral calcifications which are more prominent than on the prior. Follow-up studies are suggested. Signed by Nancy Hagen DO    CT cervical spine wo IV contrast    Result Date: 1/3/2024  Interpreted By:  Chuck Argueta, STUDY: CT CERVICAL SPINE WO IV CONTRAST;  1/3/2024 1:14 pm   INDICATION: Signs/Symptoms:fall.   COMPARISON: None.   ACCESSION NUMBER(S): [id number]   ORDERING CLINICIAN: FIONA GOMEZ   TECHNIQUE: Contiguous axial CT sections are performed from the skullbase to the upper thoracic spine and supplemented with coronal and sagittal reformatted images. The study is limited by motion degradation.   FINDINGS: There is mild dextro convexity of the cervical spine and reversal of the cervical lordosis. There is anterior subluxation of C2 relative to C3 measuring 3 mm. The facet joints align normally.   There are multilevel discogenic degenerative changes of the cervical spine with  disc space narrowing from C3 through C6. There is degenerative endplate sclerosis and lucency is more pronounced at C5-6.   The cervical vertebral body heights are maintained. Allowing for motion degradation, there is no CT evidence of acute cervical spine fracture. There is no bone destruction or aggressive periosteal reaction. No lytic or blastic lesion is detected. The visualized surrounding osseous structures are intact.   The C1-2 relationship is within normal limits. There is C1-2 arthrosis anteriorly with joint space narrowing and marginal spurring.   The C2-3 disc space level demonstrates moderate facet arthrosis on the right and mild facet arthrosis on the left. There is bulging disc and uncovertebral arthrosis with mild narrowing of the left neural foramen and mild to moderate narrowing on the right. There is mild central canal narrowing with bulging disc and mass effect upon the ventral subarachnoid space.   The C3-4 disc space level demonstrates mild to moderate bilateral facet arthrosis. There is bulging disc and marginal osteophyte asymmetric to the right with severe uncovertebral arthrosis bilaterally. There is severe bilateral neural foraminal narrowing, greater on the right with moderate central canal narrowing.   The C4-5 disc space level demonstrates mild to moderate bilateral facet arthrosis, greater on the left. There is bulging disc and marginal osteophyte with severe bilateral uncovertebral arthrosis. There is severe narrowing of the right neural foramen and moderate to severe narrowing of the left neural foramen. There is superimposed extruded disc on the right with severe central canal narrowing and suspected mass effect upon the spinal cord. A similar finding was described on a MRI examination of 10/13/2022 though it is difficult to compare different modalities with respect to the size and intensity of this finding.   The C5-6 disc space level demonstrates moderate bilateral facet  arthrosis. There is bulging disc and marginal osteophyte with moderate central canal narrowing. There is severe bilateral neural foraminal narrowing.   The C6-7 disc space level demonstrates mild to moderate bilateral facet arthrosis. There is bulging disc and marginal osteophyte moderate narrowing left neural foramen and mild narrowing of the right neural foramen. There is mild to moderate central canal narrowing.   The C7-T1 disc space level demonstrates moderate bilateral facet arthrosis. There is bulging disc and marginal osteophyte with moderate bilateral neural foraminal narrowing. There is mild central canal narrowing.   There is no prevertebral soft tissue swelling or retropharyngeal air. Bilateral pleural effusions and dependent atelectasis is partially visualized, greater on the left. There are bilateral emphysematous changes with ground-glass density in both lung apices. There is superimposed focal opacity in the right apex anteriorly with a few peripheral air densities.       Severe multilevel cervical spondylosis with reversal of the cervical lordosis and mild dextro convexity of the cervical spine.   Multilevel bulging disc with facet and uncovertebral arthrosis. There is large superimposed extruded disc at C4-5 on the right. Severe central canal stenosis at this level asymmetric to the right. There is at least moderate central canal narrowing at C5-6 and C3-4. There is severe multilevel bilateral neural foraminal narrowing as detailed above.   Allowing for motion degradation, there is no CT evidence of acute fracture or traumatic subluxation.   Bilateral pleural effusions and dependent atelectasis are partially visualized, greater on the left. There are bilateral emphysematous changes with ground-glass density in the lung apices. Focal opacity with ill-defined margins is identified in the right apex. Further workup with dedicated CT thorax is recommended.   Signed by: Chuck Argueta 1/3/2024 1:51  PM Dictation workstation:   [id number]    XR chest 1 view    Result Date: 1/3/2024  Interpreted By:  Anahi Fernando, STUDY: XR CHEST 1 VIEW;  1/3/2024 11:43 am   INDICATION: Signs/Symptoms:SOB.   COMPARISON: 12/27/2023   ACCESSION NUMBER(S): [id number]   ORDERING CLINICIAN: FIONA GOMEZ   FINDINGS: CARDIOMEDIASTINAL SILHOUETTE: The heart is enlarged. There is a left subclavian pacemaker-AICD with a single tip in the right atrium and two tips in the right ventricle.     LUNGS: There is improvement in bilateral perihilar alveolar opacities consistent with resolving pulmonary edema. There is underlying moderate vascular congestion, improved. There is no pleural fluid.   ABDOMEN: No remarkable upper abdominal findings.     BONES: No acute osseous changes.       1. Marked improvement bilateral patchy alveolar opacities consistent with resolving pneumonia. 2. Underlying moderate vascular congestion, improved.   MACRO: None   Signed by: Anahi Fernando 1/3/2024 12:00 PM Dictation workstation:   [id number]    ECG 12 lead    Result Date: 1/1/2024  Undetermined rhythm Left bundle branch block Abnormal ECG When compared with ECG of 27-DEC-2023 07:49, (unconfirmed) Current undetermined rhythm precludes rhythm comparison, needs review See ED provider note for full interpretation and clinical correlation Confirmed by Yomi Lainez ([id number]) on 1/1/2024 3:20:04 PM    XR chest 1 view    Result Date: 12/27/2023  Interpreted By:  Anahi Fernando, STUDY: XR CHEST 1 VIEW;  12/27/2023 8:43 am   INDICATION: Signs/Symptoms:Dypnea.   COMPARISON: 12/14/2023   ACCESSION NUMBER(S): [id number]   ORDERING CLINICIAN: SYLVIA MOORE   FINDINGS: CARDIOMEDIASTINAL SILHOUETTE: The heart is enlarged. There is a left subclavian pacemaker-AICD with a single tip in the right atrium and two tips in the right ventricle.   LUNGS: There are progressive perihilar alveolar opacities, likely pulmonary edema but could also represent pneumonia. There  is left basilar atelectasis and volume loss which has progressed. There is no pleural fluid.   ABDOMEN: No remarkable upper abdominal findings.     BONES: No acute osseous changes.       1. Progressive bilateral predominantly perihilar alveolar opacities consistent with pulmonary edema or pneumonia. 2. Progressive volume loss and atelectasis left lung base.   MACRO: None   Signed by: Anahi Fernando 12/27/2023 8:50 AM Dictation workstation:   [id number]    CT FACIAL BONE/TITO WO IVCON    Result Date: 12/22/2023  * * *Final Report* * * DATE OF EXAM: Dec 22 2023  5:57PM   ASC   0507  -  CT FACIAL BONE/TITO WO IVCON  / ACCESSION #  [id number] PROCEDURE REASON: Maxillofacial pain      * * * * Physician Interpretation * * * *  EXAMINATION:  CT BRAIN WO IVCON, CT FACIAL BONE/TITO WO IVCON, CT CERVICAL SPINE WO IVCON CLINICAL HISTORY: Head trauma, moderate-severe (accession [id number]), Maxillofacial pain, Nasal fracture suspected (accession [id number]), Spine fracture, cervical, traumatic (accession [id number]) TECHNIQUE:  Serial axial images without IV contrast were obtained from the vertex to the foramen magnum.  CT of the cervical spine was also performed with axial sections from the skull base through the thoracic inlet. CT of the face was performed without IV contrast and multiplanar reconstructions were generated. MQ:  CTBWO_3 CT Dose-Length Product (DLP): 1565  mGy*cm CT Dose Reduction Employed: Iterative recon (accession [id number]), Automated exposure control(AEC) and iterative recon (accession [id number]) COMPARISON:  None. RESULT: CT HEAD: No acute intracranial hemorrhage or extra-axial fluid collection. No hydrocephalus, mass effect, or herniation. No acute ischemic infarct detected.  Mild background of white matter hypoattenuation consistent with chronic microvascular ischemic change. Unremarkable dural venous sinus attenuation. No acute osseous abnormality. Clear visualized paranasal sinuses and tympanomastoid  cavities. CT face: Soft Tissues:  Soft tissue swelling is present at the nose and along the right forehead/periorbital region. Facial bones:  Mildly comminuted bilateral nasal bone fractures are present. Orbits:  No evidence of an acute fracture.  The globes are intact.  The soft tissue planes of the orbits are maintained. Paranasal Sinuses:  The paranasal sinuses are clear. Foreign Bodies:  No evidence of radiopaque foreign bodies. Other:  No evidence of a remote fracture.  No lytic or blastic process seen in the facial bones. CT cervical spine: There is reversal of the normal cervical lordosis. There is no fracture or dislocation. Severe multilevel degenerative changes are present.  There is severe spinal canal stenosis at the C4-C5 level from a large posterior disc osteophyte complex.  Moderate spinal canal stenosis present at the C3-C4 level from a posterior disc osteophyte complex.  Severe bilateral neural foraminal narrowing is present at the C3-C4, C4-C5, and C5-C6 levels. Centrilobular emphysema.    IMPRESSION: 1.  No acute intracranial abnormality. 2.  No cervical spine fracture or traumatic subluxation.  Severe multilevel degenerative change. 3.  Nasal bone fractures with adjacent soft tissue swelling. : [facility]   Transcribe Date/Time: Dec 22 2023  6:05P Dictated by : MAIRA STEWART MD This examination was interpreted and the report reviewed and electronically signed by: MAIRA STEWART MD on Dec 22 2023  6:17PM  EST    CT CERVICAL SPINE WO IVCON    Result Date: 12/22/2023  * * *Final Report* * * DATE OF EXAM: Dec 22 2023  5:57PM   ASC   0505  -  CT CERVICAL SPINE WO IVCON  / ACCESSION #  [id number] PROCEDURE REASON: Spine fracture, cervical, traumatic      * * * * Physician Interpretation * * * *  EXAMINATION:  CT BRAIN WO IVCON, CT FACIAL BONE/TITO WO IVCON, CT CERVICAL SPINE WO IVCON CLINICAL HISTORY: Head trauma, moderate-severe (accession [id number]), Maxillofacial pain, Nasal fracture  suspected (accession [id number]), Spine fracture, cervical, traumatic (accession [id number]) TECHNIQUE:  Serial axial images without IV contrast were obtained from the vertex to the foramen magnum.  CT of the cervical spine was also performed with axial sections from the skull base through the thoracic inlet. CT of the face was performed without IV contrast and multiplanar reconstructions were generated. MQ:  CTBWO_3 CT Dose-Length Product (DLP): 1565  mGy*cm CT Dose Reduction Employed: Iterative recon (accession [id number]), Automated exposure control(AEC) and iterative recon (accession [id number]) COMPARISON:  None. RESULT: CT HEAD: No acute intracranial hemorrhage or extra-axial fluid collection. No hydrocephalus, mass effect, or herniation. No acute ischemic infarct detected.  Mild background of white matter hypoattenuation consistent with chronic microvascular ischemic change. Unremarkable dural venous sinus attenuation. No acute osseous abnormality. Clear visualized paranasal sinuses and tympanomastoid cavities. CT face: Soft Tissues:  Soft tissue swelling is present at the nose and along the right forehead/periorbital region. Facial bones:  Mildly comminuted bilateral nasal bone fractures are present. Orbits:  No evidence of an acute fracture.  The globes are intact.  The soft tissue planes of the orbits are maintained. Paranasal Sinuses:  The paranasal sinuses are clear. Foreign Bodies:  No evidence of radiopaque foreign bodies. Other:  No evidence of a remote fracture.  No lytic or blastic process seen in the facial bones. CT cervical spine: There is reversal of the normal cervical lordosis. There is no fracture or dislocation. Severe multilevel degenerative changes are present.  There is severe spinal canal stenosis at the C4-C5 level from a large posterior disc osteophyte complex.  Moderate spinal canal stenosis present at the C3-C4 level from a posterior disc osteophyte complex.  Severe bilateral neural  foraminal narrowing is present at the C3-C4, C4-C5, and C5-C6 levels. Centrilobular emphysema.    IMPRESSION: 1.  No acute intracranial abnormality. 2.  No cervical spine fracture or traumatic subluxation.  Severe multilevel degenerative change. 3.  Nasal bone fractures with adjacent soft tissue swelling. : [facility]   Transcribe Date/Time: Dec 22 2023  6:05P Dictated by : MAIRA STEWART MD This examination was interpreted and the report reviewed and electronically signed by: MAIRA STEWART MD on Dec 22 2023  6:17PM  EST    CT BRAIN WO IVCON    Result Date: 12/22/2023  * * *Final Report* * * DATE OF EXAM: Dec 22 2023  5:57PM   ASC   0504  -  CT BRAIN WO IVCON   / ACCESSION #  [id number] PROCEDURE REASON: Head trauma, moderate-severe      * * * * Physician Interpretation * * * *  EXAMINATION:  CT BRAIN WO IVCON, CT FACIAL BONE/TITO WO IVCON, CT CERVICAL SPINE WO IVCON CLINICAL HISTORY: Head trauma, moderate-severe (accession [id number]), Maxillofacial pain, Nasal fracture suspected (accession [id number]), Spine fracture, cervical, traumatic (accession [id number]) TECHNIQUE:  Serial axial images without IV contrast were obtained from the vertex to the foramen magnum.  CT of the cervical spine was also performed with axial sections from the skull base through the thoracic inlet. CT of the face was performed without IV contrast and multiplanar reconstructions were generated. MQ:  [id number]_3 CT Dose-Length Product (DLP): 1565  mGy*cm CT Dose Reduction Employed: Iterative recon (accession [id number]), Automated exposure control(AEC) and iterative recon (accession [id number]) COMPARISON:  None. RESULT: CT HEAD: No acute intracranial hemorrhage or extra-axial fluid collection. No hydrocephalus, mass effect, or herniation. No acute ischemic infarct detected.  Mild background of white matter hypoattenuation consistent with chronic microvascular ischemic change. Unremarkable dural venous sinus attenuation. No acute osseous  abnormality. Clear visualized paranasal sinuses and tympanomastoid cavities. CT face: Soft Tissues:  Soft tissue swelling is present at the nose and along the right forehead/periorbital region. Facial bones:  Mildly comminuted bilateral nasal bone fractures are present. Orbits:  No evidence of an acute fracture.  The globes are intact.  The soft tissue planes of the orbits are maintained. Paranasal Sinuses:  The paranasal sinuses are clear. Foreign Bodies:  No evidence of radiopaque foreign bodies. Other:  No evidence of a remote fracture.  No lytic or blastic process seen in the facial bones. CT cervical spine: There is reversal of the normal cervical lordosis. There is no fracture or dislocation. Severe multilevel degenerative changes are present.  There is severe spinal canal stenosis at the C4-C5 level from a large posterior disc osteophyte complex.  Moderate spinal canal stenosis present at the C3-C4 level from a posterior disc osteophyte complex.  Severe bilateral neural foraminal narrowing is present at the C3-C4, C4-C5, and C5-C6 levels. Centrilobular emphysema.    IMPRESSION: 1.  No acute intracranial abnormality. 2.  No cervical spine fracture or traumatic subluxation.  Severe multilevel degenerative change. 3.  Nasal bone fractures with adjacent soft tissue swelling. : PSCB   Transcribe Date/Time: Dec 22 2023  6:05P Dictated by : MAIRA STEWART MD This examination was interpreted and the report reviewed and electronically signed by: MAIRA STEWART MD on Dec 22 2023  6:17PM  EST    Transthoracic Echo (TTE) Complete    Result Date: 12/19/2023   [facility], [address] [address]              Tel [phone] and Fax [phone] TRANSTHORACIC ECHOCARDIOGRAM REPORT  Patient Name:      IJEOMA CRUZ [facility] Physician:    [id number] Escobar Mendez MD Study Date:        12/18/2023          Ordering  Provider:    [id number] GUNNAR HUGO MRN/PID:           [id number]            Fellow: Accession#:        [id number]        Nurse:                Mattie Sosa RN Date of Birth/Age: 1950 / 73 years Sonographer:          Cruz Wood RDCS Gender:            M                   Additional Staff: Height:            175.26 cm           Admit Date: Weight:            74.84 kg            Admission Status:     Inpatient - Routine BSA:               1.90 m2             Encounter#:           [id number]                                        Department Location:  [facility] Blood Pressure: 112 /63 mmHg Study Type:    TRANSTHORACIC ECHO (TTE) COMPLETE Diagnosis/ICD: Cardiomyopathy, unspecified-I42.9 Indication:    Cardiomyopathy CPT Code:      Echo Complete w Full [id number] Patient History: Diabetes:          Yes Pertinent History: A-Fib, CAD, CVA, HTN, Cancer, COPD and Syncope. Ischemic CM,                    cardiac stent. Study Detail: The following Echo studies were performed: 2D, M-Mode, Doppler and               color flow. Technically challenging study due to body habitus.               Definity used as a contrast agent for endocardial border               definition. Total contrast used for this procedure was 1.5 mL via               IV push. The patient was awake.  PHYSICIAN INTERPRETATION: Left Ventricle: The left ventricular systolic function is moderately to severely decreased, with an estimated ejection fraction of 30-35%. Wall motion is abnormal. The left ventricular cavity size is mildly dilated. There is mild concentric left ventricular hypertrophy. Findings are consistent with ischemic cardiomyopathy. Spectral Doppler shows an impaired relaxation pattern of left ventricular diastolic filling. LV Wall Scoring: The mid inferolateral segment is akinetic. The mid and apical anterior  wall, basal and mid anterolateral wall, mid anteroseptal segment, basal inferolateral segment, apical lateral segment, basal inferior segment, and apex are hypokinetic. All remaining scored segments are normal. Left Atrium: The left atrium is mildly dilated. Right Ventricle: The right ventricle is mildly enlarged. There is mildly reduced right ventricular systolic function. A device is visualized in the right ventricle. Right Atrium: The right atrium is mildly dilated. Aortic Valve: The aortic valve is trileaflet. There is mild aortic valve cusp calcification. There is no evidence of aortic valve regurgitation. The peak instantaneous gradient of the aortic valve is 3.5 mmHg. Mitral Valve: The mitral valve is normal in structure. There is mild mitral valve regurgitation. Tricuspid Valve: The tricuspid valve is structurally normal. There is mild tricuspid regurgitation. Pulmonic Valve: The pulmonic valve is structurally normal. There is physiologic pulmonic valve regurgitation. Pericardium: There is a trivial to small pericardial effusion. Aorta: The aortic root is normal. Pulmonary Artery: The tricuspid regurgitant velocity is 3.17 m/s, and with an estimated right atrial pressure of 3 mmHg, the estimated pulmonary artery pressure is mildly elevated with the RVSP at 43.2 mmHg. Pulmonary Veins: There is blunted systolic flow in the pulmonary veins. Systemic Veins: The inferior vena cava appears mildly dilated.  CONCLUSIONS:  1. Left ventricular systolic function is moderately to severely decreased with a 30-35% estimated ejection fraction.  2. Multiple segmental abnormalities exist. See findings.  3. Spectral Doppler shows an impaired relaxation pattern of left ventricular diastolic filling.  4. There is ischemic cardiomyopathy.  5. There is mildly reduced right ventricular systolic function.  6. Mild pulmonary HTN. CVP is elevated. QUANTITATIVE DATA SUMMARY: 2D MEASUREMENTS:                          Normal Ranges: Ao  Root d:     2.60 cm   (2.0-3.7cm) LAs:           4.60 cm   (2.7-4.0cm) IVSd:          1.02 cm   (0.6-1.1cm) LVPWd:         0.96 cm   (0.6-1.1cm) LVIDd:         4.75 cm   (3.9-5.9cm) LVIDs:         4.22 cm LV Mass Index: 86.6 g/m2 LV % FS        11.2 % LA VOLUME:                               Normal Ranges: LA Vol A4C:        104.7 ml   (22+/-6mL/m2) LA Vol A2C:        69.5 ml LA Vol BP:         92.7 ml LA Vol Index A4C:  55.0ml/m2 LA Vol Index A2C:  36.5 ml/m2 LA Vol Index BP:   48.7 ml/m2 LA Area A4C:       28.3 cm2 LA Area A2C:       21.2 cm2 LA Major Axis A4C: 6.5 cm LA Major Axis A2C: 5.5 cm LA Volume Index:   47.0 ml/m2 RA VOLUME BY A/L METHOD:                               Normal Ranges: RA Vol A4C:        62.8 ml    (8.3-19.5ml) RA Vol Index A4C:  33.0 ml/m2 RA Area A4C:       20.7 cm2 RA Major Axis A4C: 5.8 cm M-MODE MEASUREMENTS:                  Normal Ranges: Ao Root: 2.60 cm (2.0-3.7cm) LAs:     4.50 cm (2.7-4.0cm) AORTA MEASUREMENTS:                    Normal Ranges: Asc Ao, d: 2.90 cm (2.1-3.4cm) LV SYSTOLIC FUNCTION BY 2D PLANIMETRY (MOD):                     Normal Ranges: EF-A4C View: 28.3 % (>=55%) EF-A2C View: 30.5 % EF-Biplane:  29.5 % LV DIASTOLIC FUNCTION:                            Normal Ranges: MV Peak E:      0.93 m/s   (0.7-1.2 m/s) MV e'           0.05 m/s   (>8.0) MV lateral e'   0.05 m/s MV medial e'    0.05 m/s E/e' Ratio:     18.56      (<8.0) PulmV Sys Chet:  24.60 cm/s PulmV English Chet: 88.80 cm/s PulmV S/D Chet:  0.30 MITRAL VALVE:                 Normal Ranges: MV DT: 201 msec (150-240msec) AORTIC VALVE:                         Normal Ranges: AoV Vmax:      0.93 m/s (<=1.7m/s) AoV Peak PG:   3.5 mmHg (<20mmHg) LVOT Max Chet:  0.79 m/s (<=1.1m/s) LVOT VTI:      16.30 cm LVOT Diameter: 1.90 cm  (1.8-2.4cm) AoV Area,Vmax: 2.41 cm2 (2.5-4.5cm2)  RIGHT VENTRICLE: RV Basal 3.70 cm RV Mid   2.90 cm RV Major 7.3 cm TAPSE:   13.7 mm RV s'    0.10 m/s TRICUSPID VALVE/RVSP:                              Normal Ranges: Peak TR Velocity: 3.17 m/s RV Syst Pressure: 43.2 mmHg (< 30mmHg) IVC Diam:         2.20 cm PULMONIC VALVE:                      Normal Ranges: PV Max Chet: 0.7 m/s  (0.6-0.9m/s) PV Max P.1 mmHg Pulmonary Veins: PulmV English Chet: 88.80 cm/s PulmV S/D Chet:  0.30 PulmV Sys Chet:  24.60 cm/s  [id number] Escobar Mendez MD Electronically signed on 2023 at 5:23:07 PM  Wall Scoring  ** Final **      Assessment/Plan   Coronavirus infection-treated with at least 10 days of dexamethasone, no further isolation precautions indicated  Suspected aspiration pneumonia  Left-sided pleural effusion, s/p thoracentesis  Leukocytosis-most likely multifactorial  C. difficile infection-positive PCR/negative EIA-no diarrhea  Chronic respiratory failure-interval worsening, on BiPAP     Oral vancomycin for a total of 14 days-IV Flagyl if patient is unable to take p.o-tentative stop date 2024  IV meropenem per pulmonary-evaluate for stopping on 2024  Continue contact plus precautions for c.diff  Supplemental oxygen as needed  Monitor temperature and WBC  Cardiology to advise with regards amiodarone dosing due to to potential interaction between Flagyl and amiodarone    Frank Escalante MD

## 2024-01-16 NOTE — PROGRESS NOTES
Critical Care Medicine Progress Note    Admitted on:     1/4/2024  Length of Stay: 12 day(s)     Interval History     Awake.  Generalized weakness.  Voice barely audible.  Answers questions appropriately.    Remains on low-dose Levophed (0.03 mcg/kg/min at time of AM rounds).  Maintaining adequate SpO2 on 6 L NC.    24 hr UOP ~1.2 L after Lasix 40 mg yesterday.  24 hr fluid balance about even.  Serum Cr 1.90 -> 1.80    Blood glucose in the range of 180 to 230 mg/dL.    Objective   Objective     Vitals:    01/16/24 0041   Weight: 76.6 kg (168 lb 14 oz)   Body mass index is 24.93 kg/m².        1/16/2024     6:00 AM 1/16/2024     7:00 AM 1/16/2024     8:00 AM 1/16/2024     8:33 AM 1/16/2024     9:00 AM 1/16/2024    10:00 AM 1/16/2024    11:00 AM   Vitals   Systolic 99 95 103 105 100 95 101   Diastolic 66 65 67 64 63 74 73   Heart Rate 95 101 106 101 110 114 112   Temp   36.4 °C (97.5 °F)       Resp 16 16 21 16 21 20 21        Vent settings:  FiO2 (%):  [50 %] 50 %  S RR:  [12] 12    Intake/Output Summary (Last 24 hours) at 1/16/2024 1142  Last data filed at 1/16/2024 0700  Gross per 24 hour   Intake 791.16 ml   Output 1050 ml   Net -258.84 ml       Physical Exam  Neuro: Moves all extremities.  Eyes: PERRL, clear sclerae.  CV: Normal S1, S2.  RRR.  No m/r/g.  Resp: Diminished R>L.  GI: +BS, abd soft, NT, ND.  Ext: 2+ pitting MONSERRAT edema.  Skin: Warm and dry.  Psych: Appropriate mood and affect.     Medications     Scheduled Medications:   acetaminophen, 650 mg, oral, TID  acetylcysteine, 3 mL, nebulization, BID  atorvastatin, 80 mg, oral, Nightly  budesonide, 0.5 mg, nebulization, BID  carbidopa-levodopa, 1 tablet, oral, TID  clopidogrel, 75 mg, oral, Daily  cyclobenzaprine, 10 mg, oral, Nightly  dapagliflozin propanediol, 10 mg, oral, Daily  furosemide, 40 mg, intravenous, q12h  gabapentin, 200 mg, oral, Nightly  insulin glargine, 10 Units, subcutaneous, Daily  insulin lispro, 0-10 Units, subcutaneous, TID with  meals  ipratropium-albuteroL, 3 mL, nebulization, TID  lactobacillus acidophilus, 1 tablet, oral, BID  levothyroxine, 25 mcg, oral, Daily before breakfast  meropenem, 1 g, intravenous, q12h VAZQUEZ  metroNIDAZOLE, 500 mg, intravenous, q8h  mirtazapine, 15 mg, oral, Nightly  nystatin, 1 Application, Topical, BID  pantoprazole, 40 mg, oral, Daily before breakfast  QUEtiapine, 25 mg, oral, Nightly  sertraline, 100 mg, oral, Daily  SITagliptin phosphate, 50 mg, oral, Daily  sodium chloride, 1 spray, Each Nostril, TID  vancomycin, 125 mg, oral, 4x daily       Continuous Medications:   amiodarone, 0.5 mg/min, Last Rate: 0.5 mg/min (01/16/24 1105)  norepinephrine, 0.01-3 mcg/kg/min, Last Rate: 0.03 mcg/kg/min (01/16/24 0833)       PRN Medications:     Labs     Results from last 72 hours   Lab Units 01/16/24  0454 01/15/24  0540 01/14/24  0347   GLUCOSE mg/dL 200* 219* 132*   SODIUM mmol/L 143 144 142   POTASSIUM mmol/L 3.6 3.9 4.1   CHLORIDE mmol/L 108* 111* 109*   CO2 mmol/L 24 24 22*   BUN mg/dL 35* 38* 43*   CREATININE mg/dL 1.80* 1.90* 2.00*   EGFR mL/min/1.73m*2 39* 37* 35*   CALCIUM mg/dL 7.4* 7.6* 7.5*   ALBUMIN g/dL 2.1* 2.1* 2.1*     Results from last 72 hours   Lab Units 01/16/24  0454 01/15/24  0540 01/14/24  0347   ALK PHOS U/L 497* 506* 398*   ALT U/L <5* <5* <5*   AST U/L 23 19 19   BILIRUBIN TOTAL mg/dL 0.3 0.4 0.3   PROTEIN TOTAL g/dL 5.4* 5.2* 5.4*             Results from last 72 hours   Lab Units 01/16/24  0454 01/15/24  0540 01/14/24  0347   WBC AUTO x10*3/uL 14.0* 12.5* 15.2*   NRBC AUTO /100 WBCs 1.4* 2.0* 1.7*   RBC AUTO x10*6/uL 3.91* 3.87* 4.15*   HEMOGLOBIN g/dL 11.2* 11.2* 12.0*   HEMATOCRIT % 34.0* 33.6* 36.5*   MCV fL 87 87 88   MCH pg 28.6 28.9 28.9   MCHC g/dL 32.9 33.3 32.9   RDW % 20.2* 19.5* 19.4*   PLATELETS AUTO x10*3/uL 244 213 219     Results from last 72 hours   Lab Units 01/15/24  0551 01/13/24  1448   POCT PH, ARTERIAL pH 7.39 7.39   POCT PCO2, ARTERIAL mm Hg 40 42   POCT PO2, ARTERIAL  mm Hg 164* 181*   POCT HCO3 CALCULATED, ARTERIAL mmol/L 24.2 25.4   POCT LACTATE, ARTERIAL mmol/L  --  2.0   POCT BASE EXCESS, ARTERIAL mmol/L -0.7 0.3   FIO2 % 50 50     Lab Results   Component Value Date    BLOODCULT No growth at 4 days -  FINAL REPORT 01/03/2024    BLOODCULT No growth at 4 days -  FINAL REPORT 01/03/2024    GRAMSTAIN No polymorphonuclear leukocytes seen 01/13/2024    GRAMSTAIN No organisms seen 01/13/2024     Lab Results   Component Value Date    URINECULTURE 20,000 - 80,000 Candida glabrata (A) 01/03/2024       Imaging and Diagnostic Studies     Recent imaging and diagnostic studies reviewed.       Assessment / Plan     Septic shock  Acute hypoxemic respiratory failure  Acute on chronic systolic CHF  Bilateral pleural effusions s/p left thoracentesis (1/13)  Paroxysmal Afib  HAP  C diff colitis    -Continue gentle Lasix diuresis and wean O2 accordingly.  -Tolerating PO meds.  Transition to PO amiodarone per cards.  -Adjust sliding scale insulin.  -Ongoing course of antibiotics.      Chuck Mann MD    This patient is critically ill/injured due to acute impairment in one or more vital organ systems, such that there is a probably of imminent or life-threatening deterioration of the patient's condition.  I spent 32 minutes in the direct delivery of medical care that involves high complexity decision making to treat single or multiple vital organ system failure and/or prevent further life-threatening deterioration of the patient's condition.  This time does not include separately billable procedures.

## 2024-01-17 NOTE — PROGRESS NOTES
Hai Spaulding is a 73 y.o. male on day 13 of admission presenting with Acute respiratory failure (CMS/HCC).    Subjective   Awake alert, voice remains soft/hoarse.  On nasal cannula 5 to 6 L, decreased work of breathing noted today.  Patient denies any pain or discomfort currently.  Suspected aspiration despite modified diet.  NG tube placed, awaiting KUB for confirmation of placement to start nutritional support as well as have oral access for medications.  Remains on Levophed drip for blood pressure support, on IV diuretics with decreased peripheral edema and adequate urine output noted.    Objective     Physical Exam  Vitals and nursing note reviewed.   Constitutional:       Appearance: He is chronically ill-appearing.  Lying in bed, appears older than stated age  HENT:      Head: Normocephalic and atraumatic.  Temporal wasting noted, bruising over right orbital socket and bridge of nose with small healed scab.  Slight left-sided septal deviation, NG tube noted to the right nares.     Mouth/Throat:      Mouth: Mucous membranes are dry  Eyes:      Pupils: Pupils are equal, round, and reactive to light.   Cardiovascular:      Rate and Rhythm: Heart rate 104, rhythm irregular.      Heart sounds: No murmur heard.     No friction rub. No gallop.   Abdominal:      General: There is no distension.      Tenderness: There is no abdominal tenderness. There is no guarding or rebound.   Genitourinary:     Comments: zaragoza urine clear yellow  Musculoskeletal:   General: No obvious joint deformities, no tenderness noted with palpation of the cervical spine, generalized muscle loss noted to the bilateral lower extremities     Cervical back: Neck supple.   Skin:     General: Skin is warm and dry.  Back and buttocks were not assessed.  Edema noted to both feet and bilateral upper extremities, somewhat improved today upper extremity edema use greater than lower extremity edema, nailbeds are pink, cap refill less than 3 seconds.   "Bruising noted to bilateral forearms.  Neurological:      Mental Status: He is awake and alert, follows commands, responses to questions are slow, but answering questions appropriately,   Last Recorded Vitals  Blood pressure 96/73, pulse (!) 111, temperature 36.2 °C (97.2 °F), temperature source Temporal, resp. rate 25, height 1.753 m (5' 9.02\"), weight 79.9 kg (176 lb 2.4 oz), SpO2 94 %.  Intake/Output last 3 Shifts:  I/O last 3 completed shifts:  In: 931.2 (11.7 mL/kg) [P.O.:240; I.V.:491.2 (6.1 mL/kg); IV Piggyback:200]  Out: 1825 (22.8 mL/kg) [Urine:1825 (0.6 mL/kg/hr)]  Weight: 79.9 kg     Relevant Results  Results for orders placed or performed during the hospital encounter of 01/04/24 (from the past 24 hour(s))   POCT GLUCOSE   Result Value Ref Range    POCT Glucose 158 (H) 74 - 99 mg/dL   POCT GLUCOSE   Result Value Ref Range    POCT Glucose 127 (H) 74 - 99 mg/dL   POCT GLUCOSE   Result Value Ref Range    POCT Glucose 117 (H) 74 - 99 mg/dL   Comprehensive Metabolic Panel   Result Value Ref Range    Glucose 81 65 - 99 mg/dL    Sodium 149 (H) 133 - 145 mmol/L    Potassium 3.7 3.4 - 5.1 mmol/L    Chloride 113 (H) 97 - 107 mmol/L    Bicarbonate 26 24 - 31 mmol/L    Urea Nitrogen 34 (H) 8 - 25 mg/dL    Creatinine 1.90 (H) 0.40 - 1.60 mg/dL    eGFR 37 (L) >60 mL/min/1.73m*2    Calcium 7.5 (L) 8.5 - 10.4 mg/dL    Albumin 2.1 (L) 3.5 - 5.0 g/dL    Alkaline Phosphatase 464 (H) 35 - 125 U/L    Total Protein 5.4 (L) 5.9 - 7.9 g/dL    AST 25 5 - 40 U/L    Bilirubin, Total 0.4 0.1 - 1.2 mg/dL    ALT <5 (L) 5 - 40 U/L    Anion Gap 10 <=19 mmol/L   CBC   Result Value Ref Range    WBC 13.2 (H) 4.4 - 11.3 x10*3/uL    nRBC 2.1 (H) 0.0 - 0.0 /100 WBCs    RBC 3.94 (L) 4.50 - 5.90 x10*6/uL    Hemoglobin 11.2 (L) 13.5 - 17.5 g/dL    Hematocrit 34.7 (L) 41.0 - 52.0 %    MCV 88 80 - 100 fL    MCH 28.4 26.0 - 34.0 pg    MCHC 32.3 32.0 - 36.0 g/dL    RDW 20.6 (H) 11.5 - 14.5 %    Platelets 247 150 - 450 x10*3/uL   Magnesium   Result " Value Ref Range    Magnesium 1.90 1.60 - 3.10 mg/dL   Phosphorus   Result Value Ref Range    Phosphorus 2.8 2.5 - 4.5 mg/dL   POCT GLUCOSE   Result Value Ref Range    POCT Glucose 53 (L) 74 - 99 mg/dL     *Note: Due to a large number of results and/or encounters for the requested time period, some results have not been displayed. A complete set of results can be found in Results Review.    No results found.     Scheduled medications:  acetaminophen, 650 mg, oral, TID  acetylcysteine, 3 mL, nebulization, BID  amiodarone, 400 mg, oral, BID  apixaban, 5 mg, oral, BID  atorvastatin, 80 mg, oral, Nightly  budesonide, 0.5 mg, nebulization, BID  carbidopa-levodopa, 1 tablet, oral, TID  clopidogrel, 75 mg, oral, Daily  cyclobenzaprine, 10 mg, oral, Nightly  dapagliflozin propanediol, 10 mg, oral, Daily  furosemide, 40 mg, intravenous, q8h  gabapentin, 200 mg, oral, Nightly  insulin glargine, 10 Units, subcutaneous, Daily  insulin lispro, 0-10 Units, subcutaneous, q6h  ipratropium-albuteroL, 3 mL, nebulization, TID  lactobacillus acidophilus, 1 tablet, oral, BID  levothyroxine, 25 mcg, oral, Daily before breakfast  magnesium sulfate, 2 g, intravenous, Once  meropenem, 1 g, intravenous, q12h VZAQUEZ  metroNIDAZOLE, 500 mg, intravenous, q8h  mirtazapine, 15 mg, oral, Nightly  nystatin, 1 Application, Topical, BID  pantoprazole, 40 mg, oral, Daily before breakfast  QUEtiapine, 25 mg, oral, Nightly  sertraline, 100 mg, oral, Daily  SITagliptin phosphate, 50 mg, oral, Daily  sodium chloride, 1 spray, Each Nostril, TID  vancomycin, 125 mg, oral, 4x daily       Assessment/Plan   Principal Problem:    Acute respiratory failure (CMS/HCC)  Active Problems:    Atrial fibrillation (CMS/HCC)    CAD (coronary artery disease)    Parkinson's disease    Cerebral infarction, unspecified (CMS/HCC)    C. difficile diarrhea    Acute hypoxic respiratory failure  -Weaned down to nasal cannula, was on high flow oxygen.  Still utilizing NIV at at  bedtime and as needed.  - COVID 19 recently on 12/27/23 finished up 10 days of Decadron   - Pulmonology following  - S/P left-sided thoracentesis with 1 L removed on 1/13  -Currently on diuretics, nebulizers, meropenem  Leukocytosis   - Blood cultures negative from 1/03  -Urine culture with 20-80,000 Candida glabrata  -Pleural fluid culture negative to date  - WBC 13.2  - On Merrem for suspected aspiration pneumonia, remains on p.o. vancomycin and IV Flagyl for C. Difficile  -Infectious disease managing  MATEO on CKD  -Creatinine 1.9, elevated sodium 149 today suspect related to diuretics  -Urine output 750 mL in a 24-hour period with IV Lasix  -Remains on Levophed  Dysphagia/malnutrition  - MBS shows aspiration of thin liquids.   - Recommendations for puree/nectar thick  -Prealbumin done 1/13 is 7.6, albumin today 2.1.  -Dietary supplements ordered.  Metabolic encephalopathy  -Improved  CHF   - ECHO on 12/16/23 was 30-35%.  -Currently on IV diuretics  -Cardiology following  C-diff   - On PO Vanco, IV Flagyl per infectious disease  -No bowel movement noted in past 24 hours  Parkinsonism  A-fib   - ICD   - On Digoxin  -Amiodarone drip discontinued by cardiology, now on oral amiodarone  -Eliquis resumed  COPD   Pall Care   The Patient is a DNR CCA DNI  He is currently not capable  His wife, Cata Spaulding, is his surrogate decision maker    1/17/2024  Rising sodium level in the setting of IV diuresis, with mild bump in his creatinine, respiratory status improved however, NG tube placed this morning to administer oral medications with consideration of starting free water flushes and nutritional support.  No diarrhea in past 24 hours on IV Flagyl and oral vancomycin.  Remains on IV meropenem for aspiration pneumonia, oral anticoagulation resumed for patient's A-fib, remains on oral amiodarone for rate control.  Pain appears controlled on current regimen.  Continue treatment model of care.    1/16/2024   Overall appears  mildly improved today, improved mental alertness, creatinine slowly improving, weaned off of high flow to nasal cannula.  Patient remains on Levophed for blood pressure support, remains in A-fib, but rate is controlled.  Patient continues to have very poor intake, which is concerning given his low albumin and prealbumin.  Continue to encourage oral intake, supplements ordered.  No complaints today of pain, no adjustments needed to pain regimen.  -Goals previously established, continue treatment model of care at this time.  Plan for skilled rehab when medically stable.    1/14/2024  -The patient is more alert today albeit not capable from a decision making stand point.   - He is tolerating HFNC today.   - Aspercreme ordered for leg pain on top of routine Tylenol. Want to avoid anything centrally acting at this time.   -  He is taking in PO medications.   - Will monitor for effectiveness.   1/13/2024  -The patient is currently not a capable decision maker as he is lethargic and not fully arousable.   - The patient is also unable to swallow and needs PO meds such as Sinemet and PO Vanco. Discussion with wife who is agreeable to a short term NG tube.   - The wife also states that she would consider a PEG tube if the patient needed. She states that the patient recently stated that he did not want a PEG tube but was not sure if he was fully aware of the decision he was making. States that he has had a PEG tube in the past temporarily due to cancer and he was determined to be able to swallow again.   - We did discuss how his progression of illness looks different now than in the past and this may not be congruent with his wishes.   - Discussion also had about the risks of a PEG tube in his currently state of illness and how these are considerations to take into account when making this decision.   - Tylenol IV ordered for 24hrs due to the patient being NPO. Will reassess for effectiveness tomorrow.       I spent 30 minutes  in the professional and overall care of this patient.      Elizabeth Pearce, APRN-CNP

## 2024-01-17 NOTE — CARE PLAN
Problem: Respiratory  Goal: Clear secretions with interventions this shift  Outcome: Progressing  Goal: Minimize anxiety/maximize coping throughout shift  Outcome: Progressing  Goal: Minimal/no exertional discomfort or dyspnea this shift  Outcome: Progressing  Goal: No signs of respiratory distress (eg. Use of accessory muscles. Peds grunting)  Outcome: Progressing  Goal: Patent airway maintained this shift  Outcome: Progressing  Goal: Tolerate pulmonary toileting this shift  Outcome: Progressing  Goal: Wean oxygen to maintain O2 saturation per order/standard this shift  Outcome: Progressing

## 2024-01-17 NOTE — PROGRESS NOTES
Subjective Data:  Patient lethargic.  Tired.  Short of breath.  Denies having chest pain.    Overnight Events:    Telemetry reviewed remains in atrial fibrillation with rapid ventricular rate.     Objective Data:  Last Recorded Vitals:  Vitals:    01/17/24 0730 01/17/24 0745 01/17/24 0800 01/17/24 0815   BP: 89/53 93/61 96/55 96/73   Pulse: 101 (!) 112 (!) 121 (!) 111   Resp: 22 22 23 25   Temp:       TempSrc:       SpO2: 95% 95% 91% 94%   Weight:       Height:           Last Labs:  CBC - 1/17/2024:  5:08 AM  13.2 11.2 247    34.7      CMP - 1/17/2024:  5:08 AM  7.5 5.4 25 --- 0.4   2.8 2.1 <5 464      PTT - 12/28/2023:  5:48 AM  2.3   26.1 107.8     TROPHS   Date/Time Value Ref Range Status   01/04/2024 08:16  0 - 20 ng/L Final     Comment:     Previous result verified on 1/3/2024 1340 on specimen/case [id number] called with component TRPHS for procedure Troponin I, High Sensitivity, Initial with value 161 ng/L.   01/03/2024 05:37  0 - 20 ng/L Final     Comment:     Previous result verified on 1/3/2024 1340 on specimen/case [id number] called with component TRPHS for procedure Troponin I, High Sensitivity, Initial with value 161 ng/L.   01/03/2024 02:27  0 - 20 ng/L Final     Comment:     Previous result verified on 1/3/2024 1340 on specimen/case [id number] called with component TRPHS for procedure Troponin I, High Sensitivity, Initial with value 161 ng/L.     BNP   Date/Time Value Ref Range Status   01/03/2024 12:13  0 - 99 pg/mL Final   12/27/2023 10:04  0 - 99 pg/mL Final     HGBA1C   Date/Time Value Ref Range Status   12/27/2023 09:59 PM 10.7 See below % Final   10/11/2023 01:15 PM 10.6 See below % Final     LDLCALC   Date/Time Value Ref Range Status   06/02/2023 02:40 PM 39 65 - 130 MG/DL Final   10/08/2019 12:15 PM 96 65 - 130 MG/DL Final     VLDL   Date/Time Value Ref Range Status   10/22/2021 08:07 PM 18 0 - 40 mg/dL Final   09/28/2020 04:12 PM 46 0 - 40 mg/dL  Final   10/02/2018 02:40 PM 50 0 - 40 mg/dL Final      Last I/O:  I/O last 3 completed shifts:  In: 931.2 (11.7 mL/kg) [P.O.:240; I.V.:491.2 (6.1 mL/kg); IV Piggyback:200]  Out: 1825 (22.8 mL/kg) [Urine:1825 (0.6 mL/kg/hr)]  Weight: 79.9 kg     Past Cardiology Tests (Last 3 Years):  EKG:  ECG 12 lead 01/05/2024      ECG 12 lead 01/04/2024      ECG 12 lead 01/03/2024      ECG 12 lead 12/27/2023      ECG 12 Lead 12/27/2023      ECG 12 lead 12/14/2023    Echo:  Transthoracic Echo (TTE) Complete 12/18/2023    Ejection Fractions:  EF   Date/Time Value Ref Range Status   12/18/2023 12:10 PM 29       Cath:  No results found for this or any previous visit from the past 1095 days.    Stress Test:  No results found for this or any previous visit from the past 1095 days.    Cardiac Imaging:  XR CHEST ABDOMEN FOR OG NG PLACEMENT 11/26/2021      XR CHEST ABDOMEN FOR OG NG PLACEMENT 11/26/2021      Inpatient Medications:  Scheduled medications   Medication Dose Route Frequency    acetaminophen  650 mg oral TID    acetylcysteine  3 mL nebulization BID    amiodarone  400 mg oral BID    apixaban  5 mg oral BID    atorvastatin  80 mg oral Nightly    budesonide  0.5 mg nebulization BID    carbidopa-levodopa  1 tablet oral TID    clopidogrel  75 mg oral Daily    cyclobenzaprine  10 mg oral Nightly    dapagliflozin propanediol  10 mg oral Daily    furosemide  40 mg intravenous q8h    gabapentin  200 mg oral Nightly    insulin glargine  10 Units subcutaneous Daily    insulin lispro  0-10 Units subcutaneous q6h    ipratropium-albuteroL  3 mL nebulization TID    lactobacillus acidophilus  1 tablet oral BID    levothyroxine  25 mcg oral Daily before breakfast    magnesium sulfate  2 g intravenous Once    meropenem  1 g intravenous q12h VAZQUEZ    metroNIDAZOLE  500 mg intravenous q8h    mirtazapine  15 mg oral Nightly    nystatin  1 Application Topical BID    pantoprazole  40 mg oral Daily before breakfast    QUEtiapine  25 mg oral Nightly     sertraline  100 mg oral Daily    SITagliptin phosphate  50 mg oral Daily    sodium chloride  1 spray Each Nostril TID    vancomycin  125 mg oral 4x daily     PRN medications   Medication    albuterol    dextrose 10 % in water (D10W)    dextrose    glucagon    melatonin    methyl salicylate-menthol    ondansetron    oxygen     Continuous Medications   Medication Dose Last Rate    norepinephrine  0.01-3 mcg/kg/min         Physical Exam:  General: Patient is in Mild respiratory distress  HEENT: atraumatic normocephalic.  Neck: is supple jugular venous pressure within normal limits no thyromegaly.  Cardiovascular irregular rate and rhythm normal heart sounds no murmurs rubs or gallops.  Lungs: deCreased breathing sounds at bases abdomen: is soft nontender.  Extremities warm to touch no edema.        Assessment/Plan   1 septic shock likely secondary to C. difficile colitis.  Low ejection fraction likely contributing to his hypotension.  Currently on Levophed which may control continue.  Management by primary team.     2.  Acute on chronic systolic heart failure ejection fraction 30 to 35% secondary to ischemic cardiomyopathy status post ICD/CRT.  Patient has known coronary artery disease not candidate for revascularization.  Recommend now to switch him from prasugrel to clopidogrel since he is also on oral anticoagulation for atrial fibrillation.  If patient CO2 remains elevated may need thoracentesis will hold Eliquis for now     3.  Atrial fibrillation.  Patient with history of paroxysmal atrial fibrillation on oral amiodarone status post ICD CRT and Eliquis.  Restart Eliquis.  Hemoglobin stable.  Will monitor.  Not receiving amiodarone oral because of swallowing issues now he has NG tube will reassess heart rate after restarting amiodarone 400 mg oral twice daily.    5. acute on chronic renal failure.  Multifactorial.  Nephrology on board.  CVC 01/13/24 Triple lumen Non-tunneled Right Internal jugular (Active)   Site  Assessment Clean;Intact;Dry 01/17/24 0400   Dressing Status Clean;Dry 01/17/24 0400   Number of days: 4       Urethral Catheter 16 Fr. (Active)   Site Assessment Clean;Skin intact 01/17/24 0821   Collection Container Standard drainage bag 01/17/24 0821   Securement Method Securing device (Describe) 01/17/24 0821   Reason for Continuing Urinary Catheterization accurate hourly measurement of urine volume in a critically ill patient that cannot be assessed by other volumes and urine collection strategies 01/17/24 0821   Output (mL) 750 mL 01/17/24 0821   Number of days: 14       Code Status:  DNR and No Intubation    Cinda Pedersen MD

## 2024-01-17 NOTE — PROGRESS NOTES
Critical Care Medicine Progress Note    Admitted on:     1/4/2024  Length of Stay: 13 day(s)     Interval History     Given a dose of digoxin yesterday afternoon per cards.  Also, Eliquis was resumed.    Slightly increased vasopressor requirement overnight.  Currently on Levophed 0.06 mcg/kg/min.  Still maintaining adequate SpO2 on 6 L NC.    More lethargic, weaker today.  Follows commands.  He managed to cough with a lot of coaxing.  Weak cough, ronchorous.    24 hr fluid balance ~800 mL negative on Lasix 40 mg BID.  Serum Cr 1.80 -> 1.90    Has not been eating much.  Blood glucose 53 this AM.  Treated accordingly.    I inserted 12 Fr NG tube into R nare.  Patient tolerated well.  No complications.    Has right IJ CVC.  Inserted 1/13 (day #5).  Site c/d/i.    Objective   Objective     Vitals:    01/17/24 0500   Weight: 79.9 kg (176 lb 2.4 oz)   Body mass index is 26 kg/m².        1/17/2024     4:00 AM 1/17/2024     5:00 AM 1/17/2024     7:08 AM 1/17/2024     7:30 AM 1/17/2024     7:45 AM 1/17/2024     8:00 AM 1/17/2024     8:15 AM   Vitals   Systolic 90 90  89 93 96 96   Diastolic 53 62  53 61 55 73   Heart Rate 113 103  101 112 121 111   Temp 36.5 °C (97.7 °F)  36.2 °C (97.2 °F)       Resp 19 22  22 22 23 25   Weight (lb)  176.15        BMI  26 kg/m2        BSA (m2)  1.97 m2             Vent settings:       Intake/Output Summary (Last 24 hours) at 1/17/2024 0824  Last data filed at 1/17/2024 0821  Gross per 24 hour   Intake 240 ml   Output 1875 ml   Net -1635 ml       Physical Exam  CV: Normal S1, S2.  RRR.  No m/r/g.  Resp: Bilateral ronchi, bibasilar crackles.  GI: +BS, abd soft, NT, ND.  Ext: 2+ to 3+ pitting BUE edema.    Medications     Scheduled Medications:   acetaminophen, 650 mg, oral, TID  acetylcysteine, 3 mL, nebulization, BID  amiodarone, 400 mg, oral, BID  apixaban, 5 mg, oral, BID  atorvastatin, 80 mg, oral, Nightly  budesonide, 0.5 mg, nebulization, BID  carbidopa-levodopa, 1 tablet, oral,  TID  clopidogrel, 75 mg, oral, Daily  cyclobenzaprine, 10 mg, oral, Nightly  dapagliflozin propanediol, 10 mg, oral, Daily  furosemide, 40 mg, intravenous, q8h  gabapentin, 200 mg, oral, Nightly  insulin glargine, 10 Units, subcutaneous, Daily  insulin lispro, 0-10 Units, subcutaneous, q6h  ipratropium-albuteroL, 3 mL, nebulization, TID  lactobacillus acidophilus, 1 tablet, oral, BID  levothyroxine, 25 mcg, oral, Daily before breakfast  meropenem, 1 g, intravenous, q12h VAZQUEZ  metroNIDAZOLE, 500 mg, intravenous, q8h  mirtazapine, 15 mg, oral, Nightly  nystatin, 1 Application, Topical, BID  pantoprazole, 40 mg, oral, Daily before breakfast  QUEtiapine, 25 mg, oral, Nightly  sertraline, 100 mg, oral, Daily  SITagliptin phosphate, 50 mg, oral, Daily  sodium chloride, 1 spray, Each Nostril, TID  vancomycin, 125 mg, oral, 4x daily       Continuous Medications:   norepinephrine, 0.01-3 mcg/kg/min       PRN Medications:     Labs     Results from last 72 hours   Lab Units 01/17/24  0508 01/16/24  0454 01/15/24  0540   GLUCOSE mg/dL 81 200* 219*   SODIUM mmol/L 149* 143 144   POTASSIUM mmol/L 3.7 3.6 3.9   CHLORIDE mmol/L 113* 108* 111*   CO2 mmol/L 26 24 24   BUN mg/dL 34* 35* 38*   CREATININE mg/dL 1.90* 1.80* 1.90*   EGFR mL/min/1.73m*2 37* 39* 37*   CALCIUM mg/dL 7.5* 7.4* 7.6*   ALBUMIN g/dL 2.1* 2.1* 2.1*   MAGNESIUM mg/dL 1.90  --   --    PHOSPHORUS mg/dL 2.8  --   --      Results from last 72 hours   Lab Units 01/17/24  0508 01/16/24  0454 01/15/24  0540   ALK PHOS U/L 464* 497* 506*   ALT U/L <5* <5* <5*   AST U/L 25 23 19   BILIRUBIN TOTAL mg/dL 0.4 0.3 0.4   PROTEIN TOTAL g/dL 5.4* 5.4* 5.2*             Results from last 72 hours   Lab Units 01/17/24  0508 01/16/24  0454 01/15/24  0540   WBC AUTO x10*3/uL 13.2* 14.0* 12.5*   NRBC AUTO /100 WBCs 2.1* 1.4* 2.0*   RBC AUTO x10*6/uL 3.94* 3.91* 3.87*   HEMOGLOBIN g/dL 11.2* 11.2* 11.2*   HEMATOCRIT % 34.7* 34.0* 33.6*   MCV fL 88 87 87   MCH pg 28.4 28.6 28.9   MCHC  g/dL 32.3 32.9 33.3   RDW % 20.6* 20.2* 19.5*   PLATELETS AUTO x10*3/uL 247 244 213     Results from last 72 hours   Lab Units 01/15/24  0551   POCT PH, ARTERIAL pH 7.39   POCT PCO2, ARTERIAL mm Hg 40   POCT PO2, ARTERIAL mm Hg 164*   POCT HCO3 CALCULATED, ARTERIAL mmol/L 24.2   POCT BASE EXCESS, ARTERIAL mmol/L -0.7   FIO2 % 50     Lab Results   Component Value Date    BLOODCULT No growth at 4 days -  FINAL REPORT 01/03/2024    BLOODCULT No growth at 4 days -  FINAL REPORT 01/03/2024    GRAMSTAIN No polymorphonuclear leukocytes seen 01/13/2024    GRAMSTAIN No organisms seen 01/13/2024     Lab Results   Component Value Date    URINECULTURE 20,000 - 80,000 Candida glabrata (A) 01/03/2024       Imaging and Diagnostic Studies     Recent imaging and diagnostic studies reviewed.       Assessment / Plan     Septic shock  Acute hypoxemic respiratory failure  Acute on chronic systolic CHF  Bilateral pleural effusions s/p left thoracentesis (1/13)  Paroxysmal Afib  HAP  C diff colitis    -Weaker today.  Suspect he's aspirating.  Inserted NG tube for enteral meds and nutrition.  -Chest PT.  -Increase Lasix to 40 mg TID.  -Ongoing vasopressor support, antibiotics.      Chuck Mann MD    This patient is critically ill/injured due to acute impairment in one or more vital organ systems, such that there is a probably of imminent or life-threatening deterioration of the patient's condition.  I spent 33 minutes in the direct delivery of medical care that involves high complexity decision making to treat single or multiple vital organ system failure and/or prevent further life-threatening deterioration of the patient's condition.  This time does not include separately billable procedures.

## 2024-01-17 NOTE — CARE PLAN
The patient's goals for the shift include      The clinical goals for the shift include sammi off pressors      Problem: Diabetes  Goal: Increase stability of blood glucose readings by end of shift  Outcome: Progressing  Goal: Maintain electrolyte levels within acceptable range throughout shift  Outcome: Not Progressing  Goal: Maintain glucose levels >70mg/dl to <250mg/dl throughout shift  Outcome: Not Progressing  Goal: No changes in neurological exam by end of shift  Outcome: Progressing  Goal: Learn about and adhere to nutrition recommendations by end of shift  Outcome: Not Progressing  Goal: Vital signs within normal range for age by end of shift  Outcome: Progressing  Goal: Increase self care and/or family involovement by end of shift  Outcome: Not Progressing  Goal: Receive DSME education by end of shift  Outcome: Progressing     Problem: Diabetes  Goal: Maintain electrolyte levels within acceptable range throughout shift  Outcome: Not Progressing  Goal: Maintain glucose levels >70mg/dl to <250mg/dl throughout shift  Outcome: Not Progressing  Goal: Learn about and adhere to nutrition recommendations by end of shift  Outcome: Not Progressing  Goal: Increase self care and/or family involovement by end of shift  Outcome: Not Progressing     Problem: Nutrition  Goal: Oral intake greater than 50%  Outcome: Not Progressing  Goal: Consume prescribed supplement  Outcome: Not Progressing  Goal: Adequate PO fluid intake  Outcome: Not Progressing  Goal: Nutrition support is meeting 75% of nutrient needs  Outcome: Not Progressing  Goal: BG  mg/dL  Outcome: Not Progressing  Goal: Promote healing  Outcome: Not Progressing

## 2024-01-17 NOTE — NURSING NOTE
Pt still has a blanchable wound on coccyx going down to the scrotum, scrotum is swollen, pt has a bruise on nose with a healing scab, and bruising on his right eye. Pt has bruising on his Right lower abdomen. Bruising on Right shin and bruising on Left armpit.  Pt has waffle mattress, wedge and wound Wednesday pictures done.

## 2024-01-17 NOTE — PROGRESS NOTES
Speech-Language Pathology                 Therapy Communication Note    Patient Name: Hai Spaulding  MRN: [id number]  Today's Date: 1/17/2024     Discipline: Speech Language Pathology    Missed Visit Reason:  per RN pt lethargic, is NPO and has NGT, suspect pt is aspirating, ST will cont to follow and reassess as appropriate, discussed with Erika BELTRAN    Missed Time: Cancel    Comment:

## 2024-01-17 NOTE — NURSING NOTE
Upon rounding right neck TLC with loose dressing. Routine dressing change done per protocol using sterile technique. Site without redness, edema or bleeding noted, All ports in use at this time.

## 2024-01-17 NOTE — PROGRESS NOTES
Hai Spaulding is a 73 y.o. male on day 13 of admission presenting with Acute respiratory failure (CMS/HCC).    Subjective   Interval History:   Afebrile, no chills  No diarrhea   Wife present   On low flow oxygen     Review of Systems   All other systems reviewed and are negative.      Objective   Range of Vitals (last 24 hours)  Heart Rate:  []   Temp:  [36.2 °C (97.2 °F)-36.8 °C (98.2 °F)]   Resp:  [16-28]   BP: ()/(51-81)   Weight:  [79.9 kg (176 lb 2.4 oz)]   SpO2:  [78 %-99 %]   Daily Weight  01/17/24 : 79.9 kg (176 lb 2.4 oz)    Body mass index is 26 kg/m².    Physical Exam  Constitutional:       Appearance: Awake, responsive  HENT:      Head: Normocephalic.      Nose: Nose normal.      Mouth/Throat:      Mouth: Mucous membranes are moist.      Pharynx: Oropharynx is clear.   Eyes:      Extraocular Movements: Extraocular movements intact.      Conjunctiva/sclera: Conjunctivae normal.      Comments: Resolving right periorbital ecchymosis   Cardiovascular:      Rate and Rhythm: Normal rate and regular rhythm.   Pulmonary:      Effort: Pulmonary effort is normal.      Breath sounds: Normal breath sounds.   Abdominal:      General: Bowel sounds are normal.      Palpations: Abdomen is soft.      Tenderness: There is no abdominal tenderness.   Musculoskeletal:         General: Normal range of motion.      Cervical back: Normal range of motion and neck supple.   Skin:     General: Skin is warm and dry.   Neurological:      General: No focal deficit present.      Mental Status: He is alert and oriented to person, place, and time.   Psychiatric:         Mood and Affect: Not agitated        Behavior: Behavior not agitated    Antibiotics  amiodarone (Pacerone) tablet 200 mg  apixaban (Eliquis) tablet 5 mg  atorvastatin (Lipitor) tablet 80 mg  carbidopa-levodopa (Sinemet CR)  mg ER tablet 1 tablet  clopidogrel (Plavix) tablet 75 mg  dapagliflozin propanediol (Farxiga) tablet 10 mg  lactobacillus  acidophilus capsule 1 capsule  levothyroxine (Synthroid, Levoxyl) tablet 25 mcg  metoprolol succinate XL (Toprol-XL) 24 hr tablet 25 mg  QUEtiapine (SEROquel) tablet 25 mg  sertraline (Zoloft) tablet 100 mg  SITagliptin phosphate (Januvia) tablet 50 mg  insulin glargine (Lantus) injection 10 Units  dextrose 50 % injection 25 g  glucagon (Glucagen) injection 1 mg  dextrose 10 % in water (D10W) infusion  insulin lispro (HumaLOG) injection 0-10 Units  vancomycin (Vancocin) capsule 125 mg  fluconazole in NaCl (iso-osm) (Diflucan) IVPB 200 mg  nystatin (Mycostatin) 100,000 unit/gram powder 1 Application  ipratropium-albuteroL (Duo-Neb) 0.5-2.5 mg/3 mL nebulizer solution 3 mL  budesonide (Pulmicort) 0.5 mg/2 mL nebulizer solution 0.5 mg  lactobacillus acidophilus tablet 1 tablet  vancomycin (Vancocin) capsule 125 mg  ipratropium-albuteroL (Duo-Neb) 0.5-2.5 mg/3 mL nebulizer solution 3 mL  acetaminophen (Tylenol) tablet 650 mg  ondansetron (Zofran) injection 4 mg  melatonin tablet 5 mg  cyclobenzaprine (Flexeril) tablet 10 mg  fluticasone furoate-vilanteroL (Breo Ellipta) 200-25 mcg/dose inhaler 1 puff  ipratropium-albuteroL (Duo-Neb) 0.5-2.5 mg/3 mL nebulizer solution 3 mL  pantoprazole (ProtoNix) EC tablet 40 mg  albuterol 2.5 mg /3 mL (0.083 %) nebulizer solution 2.5 mg  acetaminophen (Tylenol) tablet 650 mg  ondansetron (Zofran) injection 4 mg  melatonin tablet 5 mg  potassium chloride (Klor-Con) packet 40 mEq  potassium chloride 20 mEq in 100 mL IV premix  furosemide (Lasix) injection 40 mg  barium sulfate (Varibar Pudding) 40 % (w/v), 30% (w/w) oral paste 20 mL  barium sulfate (Varibar Nectar, Varibar Honey) 40 % (w/v) suspension 20 mL  barium sulfate (Varibar Honey) 40 % (w/v) 29% (w/w) suspension 20 mL  barium sulfate (Varibar THIN Liquid) 81 % (w/w) suspension 20 mL  sodium chloride 0.9% infusion  oxygen (O2) therapy  vancomycin (Firvanq) solution 125 mg  vancomycin (Vancocin) capsule 125 mg  ipratropium-albuteroL  (Duo-Neb) 0.5-2.5 mg/3 mL nebulizer solution 3 mL  gabapentin (Neurontin) capsule 200 mg  sodium chloride (Ocean) 0.65 % nasal spray 1 spray  mirtazapine (Remeron) tablet 15 mg  sodium chloride (Ocean) 0.65 % nasal spray 1 spray  acetaminophen (Tylenol) tablet 650 mg  oxygen (O2) therapy  acetylcysteine (Mucomyst) 200 mg/mL (20 %) nebulizer solution 600 mg  metroNIDAZOLE (Flagyl) 500 mg in NaCl (iso-os) 100 mL  acetaminophen (Ofirmev) injection 1,000 mg  norepinephrine (Levophed) 8 mg in dextrose 5% 250 mL (0.032 mg/mL) infusion (premix)  norepinephrine in dextrose 5 % (Levophed) infusion  - Omnicell Override Pull  digoxin (Lanoxin) injection 500 mcg  amiodarone (Nexterone) 360 mg in dextrose,iso-osm 200 mL (1.8 mg/mL) infusion (premix)  amiodarone (Nexterone) 360 mg in dextrose,iso-osm 200 mL (1.8 mg/mL) infusion (premix)  meropenem (Merrem) in sodium chloride 0.9 % 100 mL IV 1 g  oxygen (O2) therapy  methyl salicylate-menthol (Bengay) 15-10 % greaseless cream  amiodarone (Nexterone) 360 mg in dextrose,iso-osm 200 mL (1.8 mg/mL) infusion (premix)  furosemide (Lasix) injection 40 mg  amiodarone (Nexterone) 360 mg in dextrose,iso-osm 200 mL (1.8 mg/mL) infusion (premix)  levothyroxine (Synthroid, Levoxyl) tablet 25 mcg  furosemide (Lasix) injection 40 mg  insulin lispro (HumaLOG) injection 0-10 Units  heparin (porcine) injection 5,000 Units  amiodarone (Pacerone) tablet 400 mg  digoxin (Lanoxin) injection 500 mcg  apixaban (Eliquis) tablet 5 mg  norepinephrine (Levophed) 8 mg in dextrose 5% 250 mL (0.032 mg/mL) infusion (premix)  oxygen (O2) therapy  insulin lispro (HumaLOG) injection 0-10 Units  furosemide (Lasix) injection 40 mg  magnesium sulfate IV 2 g      Relevant Results  Labs  Results from last 72 hours   Lab Units 01/17/24  0508 01/16/24  0454 01/15/24  0540   WBC AUTO x10*3/uL 13.2* 14.0* 12.5*   HEMOGLOBIN g/dL 11.2* 11.2* 11.2*   HEMATOCRIT % 34.7* 34.0* 33.6*   PLATELETS AUTO x10*3/uL 247 244 213      Results from last 72 hours   Lab Units 01/17/24  0508 01/16/24  0454 01/15/24  0540   SODIUM mmol/L 149* 143 144   POTASSIUM mmol/L 3.7 3.6 3.9   CHLORIDE mmol/L 113* 108* 111*   CO2 mmol/L 26 24 24   BUN mg/dL 34* 35* 38*   CREATININE mg/dL 1.90* 1.80* 1.90*   GLUCOSE mg/dL 81 200* 219*   CALCIUM mg/dL 7.5* 7.4* 7.6*   ANION GAP mmol/L 10 11 9   EGFR mL/min/1.73m*2 37* 39* 37*   PHOSPHORUS mg/dL 2.8  --   --      Results from last 72 hours   Lab Units 01/17/24  0508 01/16/24  0454 01/15/24  0540   ALK PHOS U/L 464* 497* 506*   BILIRUBIN TOTAL mg/dL 0.4 0.3 0.4   PROTEIN TOTAL g/dL 5.4* 5.4* 5.2*   ALT U/L <5* <5* <5*   AST U/L 25 23 19   ALBUMIN g/dL 2.1* 2.1* 2.1*     Estimated Creatinine Clearance: 35.1 mL/min (A) (by C-G formula based on SCr of 1.9 mg/dL (H)).  C-Reactive Protein   Date Value Ref Range Status   11/29/2023 2.53 (H) <1.00 mg/dL Final     CRP   Date Value Ref Range Status   12/31/2022 39.72 (A) mg/dL Final     Comment:     REF VALUE  < 1.00     12/14/2021 2.16 (A) mg/dL Final     Comment:     REF VALUE  < 1.00       Microbiology  Susceptibility data from last 14 days.  Collected Specimen Info Organism   01/03/24 Urine from Straight Catheter Candida glabrata     Imaging  XR abdomen 1 view    Result Date: 1/17/2024  Interpreted By:  Jarvis Harper, STUDY: XR ABDOMEN 1 VIEW;  1/17/2024 9:02 am   INDICATION: Signs/Symptoms:Inserted NG tube.   COMPARISON: None.   ACCESSION NUMBER(S): [id number]   ORDERING CLINICIAN: SHELLEY BALDERAS   FINDINGS: NG tube is not visualized, clinical correlation is recommended. Nonobstructive bowel gas pattern. Limited evaluation of pneumoperitoneum on supine imaging, however no gross evidence of free air is noted.   Lung bases show mild bibasilar airspace opacities right-greater-than-left.   Osseous structures demonstrate no acute bony changes. Severe degenerative change at the lumbar spine.       NG tube is not visualized, clinical correlation is recommended.    Nonspecific bowel-gas pattern.   MACRO: None   Signed by: Jarvis Harper 1/17/2024 10:02 AM Dictation workstation:   [id number]    XR chest 1 view    Result Date: 1/15/2024  Interpreted By:  Rahul Staples, STUDY: XR CHEST 1 VIEW; 1/15/2024 6:59 am   INDICATION: CLINICAL INFORMATION: Signs/Symptoms:hypoxia.   COMPARISON: 01/13/2024   ACCESSION NUMBER(S): [id number]   ORDERING CLINICIAN: LYNSEY HERNDON   TECHNIQUE: Portable chest one view.   FINDINGS: The cardiac size is indeterminate in view of the AP projection.  A cardiac pacemaker with biventricular as well as right atrial leads is noted. Right internal jugular venous catheter is unchanged. Diffuse hazy bilateral alveolar infiltrates are present along with moderate-sized bilateral effusions.       Bilateral infiltrates and effusions are present in a pattern suggesting CHF. There is no interval change when compared to the previous examination.   MACRO: none   Signed by: Rahul Staples 1/15/2024 7:51 AM Dictation workstation:   [id number]    XR chest 1 view    Result Date: 1/13/2024  Interpreted By:  Rahul Staples, STUDY: XR CHEST 1 VIEW; 1/13/2024 2:51 pm   INDICATION: CLINICAL INFORMATION: Signs/Symptoms:central line and thoracentesis.   COMPARISON: 01/13/2024 at 824 hours   ACCESSION NUMBER(S): [id number]   ORDERING CLINICIAN: CRISTAL MARCH   TECHNIQUE: Portable chest one view.   FINDINGS: The cardiac size is indeterminate in view of the AP projection.  A cardiac pacemaker with biventricular as well as right atrial leads is noted. There is a right internal jugular venous catheter now present, new compared to the prior study with the tip somewhat obscured by the cardiac pacemaker leads. The tip however appears to terminate above the right atrium in the SVC distribution. There is no evidence for pneumothorax.   Decrease in the pleural effusion on the left in this patient with history of thoracentesis. There is no definite evidence for pneumothorax  within limits of this study.   There are bilateral infiltrates and effusions present, possibly secondary to pulmonary edema and CHF.       1. Status post right-sided central line insertion without pneumothorax as above. 2. Status post left thoracentesis without evidence for pneumothorax. 3. Persistent infiltrates and effusions bilaterally suggesting possibility of pulmonary edema and CHF.   MACRO: none   Signed by: Rahul Staples 1/13/2024 3:02 PM Dictation workstation:   [id number]    CT chest wo IV contrast    Result Date: 1/13/2024  Interpreted By:  Michael Fuentes, STUDY: CT CHEST WO IV CONTRAST; ;  1/13/2024 9:09 am   INDICATION: Signs/Symptoms:worsening chest xray. Pneumonia   COMPARISON: 01/03/2024   ACCESSION NUMBER(S): [id number]   ORDERING CLINICIAN: LYNSEY HERNDON   TECHNIQUE: Serial axial unenhanced CT images obtained of the chest. Images reformatted in the coronal and sagittal projection   All CT examinations are performed with 1 or more of the following dose reduction techniques: Automated exposure control, adjustment of mA and/or kv according to patient's size, or use of iterative reconstruction techniques.   FINDINGS: Mediastinum demonstrates lymphadenopathy with multiple enlarged paratracheal and pretracheal lymph nodes. There is a right paratracheal lymph node identified measuring 1.9 x 1.4 cm intervally increased in size from prior imaging where it measured 1.4 x 1.1 cm. Other lymph nodes have intervally increased in size. There is a precarinal lymph node identified measuring 12 mm in the short axis. Evaluation of the aidan limited without IV contrast. No significant hilar lymphadenopathy. Esophagus is gas-filled with component of refluxed suggested distally.   Heart and great vessels demonstrate ascending thoracic aorta to measure 3.2 cm. There is mild vascular calcification of the aortic arch. Diffuse qualitative coronary artery calcification is demonstrated. Cardiomegaly noted.   Large  bilateral pleural effusions are demonstrated left-greater-than-right. Findings increased from prior imaging. There is left lower lobar collapse with no asymmetric density within the atelectatic lung. Also, there is compressive atelectasis within the left upper lobe with partial collapse within the lingula. The right lower lobe demonstrates partial lobar collapse in relation to compressive atelectasis. Lung parenchyma demonstrates septal thickening superimposed ground-glass airspace infiltrate more focal consolidation in the areas of septal thickening within bilateral lung fields which is progressed from prior imaging which may reflect alveolar component of pulmonary edema. However, the areas of consolidation may reflect superimposed component of infectious infiltrate.   Included portions visualized abdomen demonstrate mild ascites in particular perihepatic location. There is contrast demonstrated within the colon. A cyst is noted in the superior pole of the right kidney measuring 1.5 cm.   Visualized osseous structures demonstrate no compression deformity in the spine.             1. Interval increase in bilateral pleural effusions with large bilateral effusions left-greater-than-right. There is resultant left lower lobar collapse with compressive atelectasis in particular in the left upper lobe as well as right lower lobe.   2. Patchy areas of airspace consolidation intervally developed from prior imaging with more focal septal thickening in the areas of consolidation may reflect alveolar component of pulmonary edema. However, superimposed infectious infiltrate is not excluded. No dense airspace consolidation     MACRO: None   Signed by: Michael Fuentes 1/13/2024 9:32 AM Dictation workstation:   [id number]    XR chest 1 view    Result Date: 1/13/2024  Interpreted By:  Michael Fuentes, STUDY: XR CHEST 1 VIEW 1/13/2024 8:31 am   INDICATION: Signs/Symptoms:respiratory distress   COMPARISON: 01/12/2024   ACCESSION  NUMBER(S): [id number]   ORDERING CLINICIAN: CRISTAL MARCH   TECHNIQUE: Single AP view chest   FINDINGS: Cardiomediastinal silhouette is enlarged with mild cardiomegaly. Left-sided pacemaker with leads in the region of the right atrium, right ventricle, and coronary sinus. There is generalized increased interstitial prominence with cephalization as well as patchy alveolar airspace infiltrate in bilateral lung fields without significant change. There is improved aeration at the right lung base with component of layering basilar effusion improved. However, there is persistent left-sided pleural effusion with interval increased from prior imaging with component of compressive atelectasis.             1. Interval increase in left-sided pleural effusion with compressive atelectasis with moderate left-sided pleural effusion   2. Patchy alveolar airspace infiltrate in bilateral lung fields with alveolar component of pulmonary edema suggested.   Signed by: Michael Fuentes 1/13/2024 8:48 AM Dictation workstation:   [id number]    XR chest 1 view    Result Date: 1/12/2024  Interpreted By:  Sugey Tinoco, STUDY: XR CHEST 1 VIEW 1/12/2024 3:49 pm   INDICATION: Signs/Symptoms:hypoxia   COMPARISON: 01/03/2024   ACCESSION NUMBER(S): [id number]   ORDERING CLINICIAN: CRISTAL MARCH   TECHNIQUE: AP erect view of the chest   FINDINGS: The cardiac size is mildly enlarged with biventricular ICD leads observed in right atrium, right ventricle, and coronary sinus.   Pulmonary edema is present with worsening of bilateral pulmonary infiltrates and with bilateral pleural effusion seen. Left effusion appears larger than the right with left effusion increased in size.       Pulmonary edema is now seen with increased size of left pleural effusion which is now moderate in amount. Small right pleural effusion is also present.   Signed by: Sugey Tinoco 1/12/2024 4:27 PM Dictation workstation:   [id number]    ECG 12 Lead    Result Date:  1/10/2024  Wide QRS tachycardia with Premature supraventricular complexes and with occasional Premature ventricular complexes Nonspecific intraventricular block Cannot rule out Anteroseptal infarct , age undetermined T wave abnormality, consider inferolateral ischemia Abnormal ECG When compared with ECG of 14-DEC-2023 19:10, (unconfirmed) Wide QRS tachycardia has replaced Sinus rhythm Vent. rate has increased BY  57 BPM See ED provider note for full interpretation and clinical correlation Confirmed by Alyssa Brown ([id number]) on 1/10/2024 4:16:56 PM    FL modified barium swallow study    Result Date: 1/9/2024  Interpreted By:  Chuck Montoya and Janezic Kimberly STUDY: FL MODIFIED BARIUM SWALLOW STUDY;; 1/8/2024 3:34 pm   INDICATION: Signs/Symptoms:dysphagia.   COMPARISON: None.   ACCESSION NUMBER(S): [id number]   ORDERING CLINICIAN: CRISTAL MARCH   TECHNIQUE: MBSS completed. Informed verbal consent obtained prior to completion of exam. Trials of thin, nectar thick, honey thick, puree,  and regular solids given. Total fluoroscopy time:  2 minutes 52 seconds Radiation exposure (Reference Air Kerma):  34.37 mGy Images:  9 series     SLP: Emiliana Collins M.A. [facility]-SLP, [facility] Phone/Pager: [phone] Option 2   SPEECH FINDINGS: Reason for referral: Aspiration due to comorbidities Patient hx: Per chart: This man who is debilitated from a stroke and a history of laryngeal cancer and repeat aspiration pneumonia has spent more time in the hospital than out of the hospital for the last 2 months.  He was most recently hospitalized here December 25 with COVID and aspiration pneumonia. Respiratory status: Nasal cannula 2L Previous diet: Regular/thin liquids   FINAL SPEECH RECOMMENDATIONS   Diet recommendations/feeding strategies: Puree/nectar thick liquids. Crush medication in puree or nectar thick liquids. Sit upright at 90 degrees for meals and medications.   Follow-up speech therapy recommended: Yes. Patient will  benefit from continued dysphagia therapy for oropharyngeal exercises to allow for diet progression.   Short term goals: 1. Patient will tolerate recommended diet without pulmonary compromise 2. Patient will participate in oropharyngeal strengthening exercises. 3. SLP will assess for diet upgrade as appropriate. 4. Due to silent aspiration, patient will need a repeat MBS prior to liquid diet upgrade to thin.   Long term goals: Tolerated least restrictive diet without pulmonary compromise   Education provided: Reviewed results with nursing.   Treatment Provided today: No.     Repeat study/ dc plan: Repeat MBS prior to upgrade to thin liquids, due to silent aspriation with thin liquids.   Mechanics of the swallow summary:   Oral phase: 1. Patient is unable to masticate solids, thus manually removed. Attempt at mashing with tongue was not successful. 2. Repetitive tongue motion with puree. Oral holding with all liquids and puree prior to swallow onset. 3. Swallow initiation at the pyriforms with thin liquids. At the valleculae with nectar and honey thick, and puree. 4. Trace oral residue, clears with reswallow.   Pharyngeal phase: 1. Partial superior laryngeal elevation. 2. Partial anterior hyoid excursion. 3. Partial epiglottic inversion. 4. Narrow column of contrast above vocal cords with  honey thick liquids and puree. Contrast enters airway above vocal cords (just underneath epiglottis) with no effort to eject. Narrow column of contrast enters airway below vocal cords with no effort to eject with straw. Contrast enters airway above vocal cords with teaspoon trial without effort to eject. 5. Collection of residue thoughout pharynx, mild and clears with spontaneous reswallows. 6. Narrow column of contrast between tongue base and pharyngeal wall.     SLP impressions with severity rating:   Silent aspiration with thin liquids. Laryngeal penetration noted with puree and honey thick liquids. Tolerated nectar thick liquids  during testing constraints without difficulty. Unable to masticate solids. Unclear if this is due to edentulous status, or other comorbidities. Patient would benefit from continuation of dysphagia therapy to improve overall oral and pharyngeal strength, improve mastication and upgrade diet to least restrictive without pulmonary compromise.   Thin Liquids (MBSS) Rosenbek's Penetration Aspiration Scale, Thin Liquids (MBSS): 8. SILENT ASPIRATION - contrast passes glottis, visible residue, NO pt response   Response to Aspiration, Thin Liquid (MBSS): absent response, silent aspiration,   Response to Pharyngeal Residue, Thin Liquid (MBSS): spontaneous clearing,   Nectar Thick Liquids (MBSS) Rosenbek's Penetration Aspiration Scale, Nectar thick liquids (MBSS): 1. NO ASPIRATION & NO PENETRATION - no aspiration, contrast does not enter airway   Response to Pharyngeal Residue, Nectar thick liquids (MBSS): spontaneous clearing,   Honey Thick Liquids (MBSS) Rosenbek's Penetration Aspiration Scale, Honey thick liquids (MBSS): 3. PENETRATION with LOW ASPIRATION risk - contrast remains above vocal cords, visible residue   Response to Pharyngeal Residue, Honey thick liquids (MBSS): spontaneous clearing,   Purees (MBSS) Rosenbek's Penetration Aspiration Scale, Purees (MBSS): 3. PENETRATION with LOW ASPIRATION risk - contrast remains above vocal cords, visible residue   Response to Pharyngeal Residue, Purees (MBSS): spontaneous clearing,     Speech Therapy section of this report signed by Emiliana Collins M.A., CCC-SLP, BCS-S on 1/8/2024 at 3:54 pm.   RADIOLOGY FINDINGS: There is laryngeal penetration and aspiration with thin liquid barium. There is laryngeal penetration without aspiration with nectar consistency, honey consistency, pudding consistency, and cookie consistency barium.   Radiology section of this report signed by Jan.       There is laryngeal penetration and aspiration with thin liquid barium. There is laryngeal  penetration without aspiration with nectar consistency, honey consistency, pudding consistency, and cookie consistency barium.   MACRO: None   Signed by: Chuck Montoya 1/9/2024 5:02 PM Dictation workstation:   [id number]    ECG 12 lead    Result Date: 1/5/2024  Sinus bradycardia with frequent and consecutive Premature ventricular complexes and Fusion complexes Left axis deviation Nonspecific intraventricular block Inferior infarct , age undetermined Lateral injury pattern ** ** ACUTE MI / STEMI ** ** Abnormal ECG When compared with ECG of 27-DEC-2023 15:18, Significant changes have occurred See ED provider note for full interpretation and clinical correlation Confirmed by Yomi Lainez ([id number]) on 1/5/2024 9:13:12 PM    ECG 12 lead    Result Date: 1/5/2024  Undetermined rhythm Left bundle branch block Abnormal ECG When compared with ECG of 03-JAN-2024 11:50, (unconfirmed) Current undetermined rhythm precludes rhythm comparison, needs review Questionable change in QRS duration Criteria for Inferior infarct are no longer Present See ED provider note for full interpretation and clinical correlation Confirmed by Yomi Lainez ([id number]) on 1/5/2024 9:12:07 PM    CT 3D reconstruction    Addendum Date: 1/5/2024    Interpreted By:  Chuck Argueta, ADDENDUM: Multiplanar 3D reformations are also generated and reviewed on independent workstation.   Signed by: Chuck Argueta 1/5/2024 2:19 PM   -------- ORIGINAL REPORT -------- Dictation workstation:   [id number]    Result Date: 1/5/2024  Interpreted By:  Chuck Argueta, STUDY: CT FACIAL BONES WO IV CONTRAST;  1/3/2024 1:14 pm   INDICATION: Signs/Symptoms:fall.   COMPARISON: None.   ACCESSION NUMBER(S): [id number]   ORDERING CLINICIAN: FIONA GOMEZ   TECHNIQUE: Contiguous axial CT sections were performed through the bones and orbits and supplemented with coronal and sagittal reformatted images. The study is limited by motion degradation.   FINDINGS:  There is mild soft tissue swelling superior to the left orbit.   There is some deformity at the distal nasal bones greater on the right. This appearance is of indeterminate age and should be correlated to point tenderness. The remaining osseous structures demonstrate no sign of acute fracture allowing for motion degradation. There is no overt bone destruction or aggressive periosteal reaction. No lytic or blastic lesion is detected.   There are small fluid levels in both maxillary sinuses, greater on the left. There is a small fluid level in the right sphenoid compartment. There is mucoperiosteal thickening in scattered fluid within the ethmoid air cells, greater on the left posteriorly. The frontal sinuses and mastoid air cells are clear and symmetrically aerated. There may be mild mucoperiosteal thickening at the floor of the left maxillary sinus.   The nasal septum is at the midline. The ostiomeatal units are patent bilaterally.   The visualized orbital contents are unremarkable. There is no intraorbital air density or fluid collection. The medial and inferior orbital walls are intact.   There are least 2 small foci of air density posterior to the right maxillary sinus though no apparent posterior wall fracture or defect. The significance of these findings is indeterminate.       Soft tissue swelling superior left orbit.   Mild deformity at the distal tip of the nasal bones greater on the right. This appearance is of uncertain age it should be correlated to point tenderness.   The remaining visualized osseous structures are intact.   Maxillary, ethmoid, and right sphenoid sinusitis with fluid levels.   Signed by: Chuck Argueta 1/5/2024 2:19 PM Dictation workstation:   [id number]    CT maxillofacial bones wo IV contrast    Addendum Date: 1/5/2024    Interpreted By:  Chuck Argueta, ADDENDUM: Multiplanar 3D reformations are also generated and reviewed on independent workstation.   Signed by: Chuck  Ellie 1/5/2024 9:43 AM   -------- ORIGINAL REPORT -------- Dictation workstation:   [id number]    Result Date: 1/5/2024  Interpreted By:  Chuck Argueta, STUDY: CT FACIAL BONES WO IV CONTRAST;  1/3/2024 1:14 pm   INDICATION: Signs/Symptoms:fall.   COMPARISON: None.   ACCESSION NUMBER(S): [id number]   ORDERING CLINICIAN: FIONA GOMEZ   TECHNIQUE: Contiguous axial CT sections were performed through the bones and orbits and supplemented with coronal and sagittal reformatted images. The study is limited by motion degradation.   FINDINGS: There is mild soft tissue swelling superior to the left orbit.   There is some deformity at the distal nasal bones greater on the right. This appearance is of indeterminate age and should be correlated to point tenderness. The remaining osseous structures demonstrate no sign of acute fracture allowing for motion degradation. There is no overt bone destruction or aggressive periosteal reaction. No lytic or blastic lesion is detected.   There are small fluid levels in both maxillary sinuses, greater on the left. There is a small fluid level in the right sphenoid compartment. There is mucoperiosteal thickening in scattered fluid within the ethmoid air cells, greater on the left posteriorly. The frontal sinuses and mastoid air cells are clear and symmetrically aerated. There may be mild mucoperiosteal thickening at the floor of the left maxillary sinus.   The nasal septum is at the midline. The ostiomeatal units are patent bilaterally.   The visualized orbital contents are unremarkable. There is no intraorbital air density or fluid collection. The medial and inferior orbital walls are intact.   There are least 2 small foci of air density posterior to the right maxillary sinus though no apparent posterior wall fracture or defect. The significance of these findings is indeterminate.       Soft tissue swelling superior left orbit.   Mild deformity at the distal tip of the  nasal bones greater on the right. This appearance is of uncertain age it should be correlated to point tenderness.   The remaining visualized osseous structures are intact.   Maxillary, ethmoid, and right sphenoid sinusitis with fluid levels.   Signed by: Chuck Argueta 1/3/2024 1:56 PM Dictation workstation:   [id number]    ECG 12 lead    Result Date: 1/4/2024  Ventricular-paced rhythm with occasional and consecutive sinus complexes and with frequent and consecutive Premature ventricular complexes with junctional escape complexes Abnormal ECG When compared with ECG of 04-JAN-2024 04:21, (unconfirmed) Previous ECG has undetermined rhythm, needs review See ED provider note for full interpretation and clinical correlation Confirmed by Yomi Lainez ([id number]) on 1/4/2024 9:24:49 AM    CT head wo IV contrast    Addendum Date: 1/3/2024    Interpreted By:  Chuck Argueta, ADDENDUM: Subdural collection over the right convexity is new from 12/31/2022 though is of CSF attenuation. There is no evidence of dense acute hemorrhage at this site.   There is some asymmetry of the lateral ventricles with the left larger than the right. This in part may be related to some residual mass effect from the right subdural collection though there is no significant midline shift or sulcal effacement.   Signed by: Chuck Argueta 1/3/2024 3:48 PM   -------- ORIGINAL REPORT -------- Dictation workstation:   [id number]    Result Date: 1/3/2024  Interpreted By:  Chuck Argueta, STUDY: CT HEAD WO IV CONTRAST;  1/3/2024 1:14 pm   INDICATION: Signs/Symptoms:fall, periorbital ecchymosis.   COMPARISON: 12/31/2022   ACCESSION NUMBER(S): [id number]   ORDERING CLINICIAN: FIONA GOMEZ   TECHNIQUE: Contiguous unenhanced axial CT sections are performed from the skull base to the vertex.   FINDINGS: The osseous structures are intact. There is small fluid levels in the maxillary sinuses, greater on the left as well as the right  sphenoid compartment. There is partial opacification of the ethmoid air cells with mucoperiosteal thickening and fluid greater on the left. The visualized portions of the mastoid air cells are and frontal sinuses are clear.   There is severe generalized parenchymal volume loss with enlargement of the cortical sulci and CSF spaces. There is a CSF subdural collection overlying the right frontal convexity extending laterally over the temporoparietal convexity. This finding is new from the previous study and measures 8 mm in maximum thickness. There may be a smaller CF density extra-axial collection over the left frontal convexity anteriorly.   There is hypodensity in the deep cerebral white matter bilaterally compatible with small-vessel ischemia. There is no sign of parenchymal hematoma. There is no dense extra-axial fluid collection. There is no mass effect or midline shift. There is some asymmetry of the lateral ventricles with the left larger than the right.       Chronic appearing low density extra-axial fluid collections overlying the frontal convexities, greater on the right. These findings are new from 12/31/2022.   Generalized parenchymal atrophy and small-vessel ischemic changes of the cerebral white matter.   Maxillary, ethmoid, and right sphenoid sinusitis with acute fluid levels.   No sign of acute intracranial hemorrhage or mass effect.   The visualized osseous structures are intact.   Signed by: Chuck Argueta 1/3/2024 1:42 PM Dictation workstation:   [id number]    CT chest abdomen pelvis wo IV contrast    Result Date: 1/3/2024  STUDY: CT Chest, Abdomen, and Pelvis without IV Contrast; 1/3/2024, 1:15PM. INDICATION: Shortness of breath and abdominal pain.  Leukocytosis. COMPARISON: CXR same day.  CT CAP 12/31/2022. ACCESSION NUMBER(S): [id number] ORDERING CLINICIAN: FIONA GOMEZ TECHNIQUE: CT of the chest, abdomen, and pelvis was performed.  Contiguous axial images were obtained at 3 mm slice  thickness through the chest, abdomen, and pelvis.  Coronal and sagittal reconstructions at 3 mm slice thickness were performed.  No intravenous contrast was administered.  FINDINGS: Please note that the evaluation of vessels, lymph nodes and organs is limited without intravenous contrast. CHEST: MEDIASTINUM: The heart is enlarged in size without pericardial effusion. Biventricular AICD remains in place.  There is no thoracic aortic, aneurysm.  Scattered vascular calcifications are seen along the arterial tree. LUNGS/PLEURA: There are bilateral pleural effusions greater on the left than the right, these appear decreased when compared with the prior especially on the right.  Adjacent compressive and subsegmental atelectatic changes are noted as well as some areas of probable consolidation. There is a focus of patchy infiltrate in the right upper lobe anteriorly in proximity to the anterior aspect of the first rib.  This appears slightly more prominent.  Trachea and mainstem bronchi appear stable, borderline narrowed Groundglass infiltrates noted previously appear improved with mild residual. LYMPH NODES: Mediastinal lymph nodes are mildly enlarged.  There is a pretracheal lymph node with the short axis diameter of 1.4 cm similar to the prior.  Detail is somewhat obscured, by artifact on the current study.  Patient's arms are included within the field-of-view. ABDOMEN:  LIVER: The liver is mildly enlarged, the right lobe measures 17 cm in CC dimension.  Assessment of parenchyma is limited by artifact.  Smooth surface contour.  Normal attenuation.  BILE DUCTS: No intrahepatic or extrahepatic biliary ductal dilatation.  GALLBLADDER: The gallbladder is surgically absent and this was also the case previously.  PANCREAS: No masses or ductal dilatation.  SPLEEN: The spleen is not clearly identified nor was it previously.  ADRENAL GLANDS: No thickening or nodules.  KIDNEYS AND URETERS: Kidneys are normal in size and location.   No renal or ureteral calculi.  There is a 2 cm cyst in the upper pole of the right kidney with small peripheral calcifications which are more prominent than on the prior.  No hydronephrosis of either kidney.  PELVIS:  BLADDER: There is increased bladder distention when compared with the prior. There is extrinsic pressure from the prostate and coarse prostate calcifications also present previously.  Increased attenuation in the dependent bladder appears to be likely artifactual.  REPRODUCTIVE ORGANS: Coarse prostate calcifications are noted also present previously.  BOWEL: There are multiple fluid and air-filled loops of small and large bowel as can be seen with adynamic ileus.  VESSELS: No acute abnormalities identified.  Abdominal aorta is normal in caliber.  There are vascular calcifications along the arterial tree.  PERITONEUM/RETROPERITONEUM/LYMPH NODES: There are mesenteric and pelvic reticulations.  There is a small amount of presacral and pelvic fluid.  No pneumoperitoneum. No lymphadenopathy.  ABDOMINAL WALL AND SOFT TISSUES: Abdominal wall edema is noted especially laterally more so on the right and posteriorly.  BONES: No acute fracture or aggressive osseous lesion.  Lumbar dextroscoliosis is noted and there are degenerative changes of the spine and joints.    1.Bilateral pleural effusions greater on the left than the right, these appear decreased from the prior especially on the right. Adjacent compressive and subsegmental atelectatic changes are noted as well as some areas of consolidation. 2.Improving groundglass infiltrates in both lungs with mild residual. There is a small focus of infiltrate in the right upper lobe anteriorly which appears slightly more prominent.  Pulmonary findings suggest a combination of pneumonia and pulmonary vascular congestion. This could also be correlated clinically.  Underlying nodules could be obscured and follow-up is suggested. 3.Cardiomegaly with AICD in place also  present previously. 4.Within the abdomen and pelvis there are multiple fluid and air-filled loops of small and large bowel as can be seen with adynamic ileus. There is a small amount of presacral and pelvic fluid. No pneumoperitoneum. 5.Abdominal wall edema. 6.  2 cm cyst in the upper pole of the right kidney with small peripheral calcifications which are more prominent than on the prior. Follow-up studies are suggested. Signed by Nancy Hagen DO    CT cervical spine wo IV contrast    Result Date: 1/3/2024  Interpreted By:  Chuck Argueta, STUDY: CT CERVICAL SPINE WO IV CONTRAST;  1/3/2024 1:14 pm   INDICATION: Signs/Symptoms:fall.   COMPARISON: None.   ACCESSION NUMBER(S): [id number]   ORDERING CLINICIAN: FIONA GOMEZ   TECHNIQUE: Contiguous axial CT sections are performed from the skullbase to the upper thoracic spine and supplemented with coronal and sagittal reformatted images. The study is limited by motion degradation.   FINDINGS: There is mild dextro convexity of the cervical spine and reversal of the cervical lordosis. There is anterior subluxation of C2 relative to C3 measuring 3 mm. The facet joints align normally.   There are multilevel discogenic degenerative changes of the cervical spine with disc space narrowing from C3 through C6. There is degenerative endplate sclerosis and lucency is more pronounced at C5-6.   The cervical vertebral body heights are maintained. Allowing for motion degradation, there is no CT evidence of acute cervical spine fracture. There is no bone destruction or aggressive periosteal reaction. No lytic or blastic lesion is detected. The visualized surrounding osseous structures are intact.   The C1-2 relationship is within normal limits. There is C1-2 arthrosis anteriorly with joint space narrowing and marginal spurring.   The C2-3 disc space level demonstrates moderate facet arthrosis on the right and mild facet arthrosis on the left. There is bulging disc and  uncovertebral arthrosis with mild narrowing of the left neural foramen and mild to moderate narrowing on the right. There is mild central canal narrowing with bulging disc and mass effect upon the ventral subarachnoid space.   The C3-4 disc space level demonstrates mild to moderate bilateral facet arthrosis. There is bulging disc and marginal osteophyte asymmetric to the right with severe uncovertebral arthrosis bilaterally. There is severe bilateral neural foraminal narrowing, greater on the right with moderate central canal narrowing.   The C4-5 disc space level demonstrates mild to moderate bilateral facet arthrosis, greater on the left. There is bulging disc and marginal osteophyte with severe bilateral uncovertebral arthrosis. There is severe narrowing of the right neural foramen and moderate to severe narrowing of the left neural foramen. There is superimposed extruded disc on the right with severe central canal narrowing and suspected mass effect upon the spinal cord. A similar finding was described on a MRI examination of 10/13/2022 though it is difficult to compare different modalities with respect to the size and intensity of this finding.   The C5-6 disc space level demonstrates moderate bilateral facet arthrosis. There is bulging disc and marginal osteophyte with moderate central canal narrowing. There is severe bilateral neural foraminal narrowing.   The C6-7 disc space level demonstrates mild to moderate bilateral facet arthrosis. There is bulging disc and marginal osteophyte moderate narrowing left neural foramen and mild narrowing of the right neural foramen. There is mild to moderate central canal narrowing.   The C7-T1 disc space level demonstrates moderate bilateral facet arthrosis. There is bulging disc and marginal osteophyte with moderate bilateral neural foraminal narrowing. There is mild central canal narrowing.   There is no prevertebral soft tissue swelling or retropharyngeal air. Bilateral  pleural effusions and dependent atelectasis is partially visualized, greater on the left. There are bilateral emphysematous changes with ground-glass density in both lung apices. There is superimposed focal opacity in the right apex anteriorly with a few peripheral air densities.       Severe multilevel cervical spondylosis with reversal of the cervical lordosis and mild dextro convexity of the cervical spine.   Multilevel bulging disc with facet and uncovertebral arthrosis. There is large superimposed extruded disc at C4-5 on the right. Severe central canal stenosis at this level asymmetric to the right. There is at least moderate central canal narrowing at C5-6 and C3-4. There is severe multilevel bilateral neural foraminal narrowing as detailed above.   Allowing for motion degradation, there is no CT evidence of acute fracture or traumatic subluxation.   Bilateral pleural effusions and dependent atelectasis are partially visualized, greater on the left. There are bilateral emphysematous changes with ground-glass density in the lung apices. Focal opacity with ill-defined margins is identified in the right apex. Further workup with dedicated CT thorax is recommended.   Signed by: Chuck Argueta 1/3/2024 1:51 PM Dictation workstation:   [id number]    XR chest 1 view    Result Date: 1/3/2024  Interpreted By:  Anahi Fernando, STUDY: XR CHEST 1 VIEW;  1/3/2024 11:43 am   INDICATION: Signs/Symptoms:SOB.   COMPARISON: 12/27/2023   ACCESSION NUMBER(S): [id number]   ORDERING CLINICIAN: FIONA GOMEZ   FINDINGS: CARDIOMEDIASTINAL SILHOUETTE: The heart is enlarged. There is a left subclavian pacemaker-AICD with a single tip in the right atrium and two tips in the right ventricle.     LUNGS: There is improvement in bilateral perihilar alveolar opacities consistent with resolving pulmonary edema. There is underlying moderate vascular congestion, improved. There is no pleural fluid.   ABDOMEN: No remarkable upper  abdominal findings.     BONES: No acute osseous changes.       1. Marked improvement bilateral patchy alveolar opacities consistent with resolving pneumonia. 2. Underlying moderate vascular congestion, improved.   MACRO: None   Signed by: Anahi Fernando 1/3/2024 12:00 PM Dictation workstation:   [id number]    ECG 12 lead    Result Date: 1/1/2024  Undetermined rhythm Left bundle branch block Abnormal ECG When compared with ECG of 27-DEC-2023 07:49, (unconfirmed) Current undetermined rhythm precludes rhythm comparison, needs review See ED provider note for full interpretation and clinical correlation Confirmed by Yomi Lainez ([id number]) on 1/1/2024 3:20:04 PM    XR chest 1 view    Result Date: 12/27/2023  Interpreted By:  Anahi Fernando, STUDY: XR CHEST 1 VIEW;  12/27/2023 8:43 am   INDICATION: Signs/Symptoms:Dypnea.   COMPARISON: 12/14/2023   ACCESSION NUMBER(S): [id number]   ORDERING CLINICIAN: SYLVIA MOORE   FINDINGS: CARDIOMEDIASTINAL SILHOUETTE: The heart is enlarged. There is a left subclavian pacemaker-AICD with a single tip in the right atrium and two tips in the right ventricle.   LUNGS: There are progressive perihilar alveolar opacities, likely pulmonary edema but could also represent pneumonia. There is left basilar atelectasis and volume loss which has progressed. There is no pleural fluid.   ABDOMEN: No remarkable upper abdominal findings.     BONES: No acute osseous changes.       1. Progressive bilateral predominantly perihilar alveolar opacities consistent with pulmonary edema or pneumonia. 2. Progressive volume loss and atelectasis left lung base.   MACRO: None   Signed by: Anahi Fernando 12/27/2023 8:50 AM Dictation workstation:   [id number]    CT FACIAL BONE/TITO WO IVCON    Result Date: 12/22/2023  * * *Final Report* * * DATE OF EXAM: Dec 22 2023  5:57PM   [facility]   0507  -  CT FACIAL BONE/TITO WO IVCON  / ACCESSION #  [id number] PROCEDURE REASON: Maxillofacial pain      * * * * Physician  Interpretation * * * *  EXAMINATION:  CT BRAIN WO IVCON, CT FACIAL BONE/TITO WO IVCON, CT CERVICAL SPINE WO IVCON CLINICAL HISTORY: Head trauma, moderate-severe (accession [id number]), Maxillofacial pain, Nasal fracture suspected (accession [id number]), Spine fracture, cervical, traumatic (accession [id number]) TECHNIQUE:  Serial axial images without IV contrast were obtained from the vertex to the foramen magnum.  CT of the cervical spine was also performed with axial sections from the skull base through the thoracic inlet. CT of the face was performed without IV contrast and multiplanar reconstructions were generated. MQ:  CTBWO_3 CT Dose-Length Product (DLP): 1565  mGy*cm CT Dose Reduction Employed: Iterative recon (accession [id number]), Automated exposure control(AEC) and iterative recon (accession [id number]) COMPARISON:  None. RESULT: CT HEAD: No acute intracranial hemorrhage or extra-axial fluid collection. No hydrocephalus, mass effect, or herniation. No acute ischemic infarct detected.  Mild background of white matter hypoattenuation consistent with chronic microvascular ischemic change. Unremarkable dural venous sinus attenuation. No acute osseous abnormality. Clear visualized paranasal sinuses and tympanomastoid cavities. CT face: Soft Tissues:  Soft tissue swelling is present at the nose and along the right forehead/periorbital region. Facial bones:  Mildly comminuted bilateral nasal bone fractures are present. Orbits:  No evidence of an acute fracture.  The globes are intact.  The soft tissue planes of the orbits are maintained. Paranasal Sinuses:  The paranasal sinuses are clear. Foreign Bodies:  No evidence of radiopaque foreign bodies. Other:  No evidence of a remote fracture.  No lytic or blastic process seen in the facial bones. CT cervical spine: There is reversal of the normal cervical lordosis. There is no fracture or dislocation. Severe multilevel degenerative changes are present.  There is severe  spinal canal stenosis at the C4-C5 level from a large posterior disc osteophyte complex.  Moderate spinal canal stenosis present at the C3-C4 level from a posterior disc osteophyte complex.  Severe bilateral neural foraminal narrowing is present at the C3-C4, C4-C5, and C5-C6 levels. Centrilobular emphysema.    IMPRESSION: 1.  No acute intracranial abnormality. 2.  No cervical spine fracture or traumatic subluxation.  Severe multilevel degenerative change. 3.  Nasal bone fractures with adjacent soft tissue swelling. : PSCB   Transcribe Date/Time: Dec 22 2023  6:05P Dictated by : MAIRA STEWART MD This examination was interpreted and the report reviewed and electronically signed by: MAIRA STEWART MD on Dec 22 2023  6:17PM  EST    CT CERVICAL SPINE WO IVCON    Result Date: 12/22/2023  * * *Final Report* * * DATE OF EXAM: Dec 22 2023  5:57PM   [facility]   0505  -  CT CERVICAL SPINE WO IVCON  / ACCESSION #  [id number] PROCEDURE REASON: Spine fracture, cervical, traumatic      * * * * Physician Interpretation * * * *  EXAMINATION:  CT BRAIN WO IVCON, CT FACIAL BONE/TITO WO IVCON, CT CERVICAL SPINE WO IVCON CLINICAL HISTORY: Head trauma, moderate-severe (accession [id number]), Maxillofacial pain, Nasal fracture suspected (accession [id number]), Spine fracture, cervical, traumatic (accession [id number]) TECHNIQUE:  Serial axial images without IV contrast were obtained from the vertex to the foramen magnum.  CT of the cervical spine was also performed with axial sections from the skull base through the thoracic inlet. CT of the face was performed without IV contrast and multiplanar reconstructions were generated. MQ:  CTBWO_3 CT Dose-Length Product (DLP): 1565  mGy*cm CT Dose Reduction Employed: Iterative recon (accession [id number]), Automated exposure control(AEC) and iterative recon (accession [id number]) COMPARISON:  None. RESULT: CT HEAD: No acute intracranial hemorrhage or extra-axial fluid collection. No  hydrocephalus, mass effect, or herniation. No acute ischemic infarct detected.  Mild background of white matter hypoattenuation consistent with chronic microvascular ischemic change. Unremarkable dural venous sinus attenuation. No acute osseous abnormality. Clear visualized paranasal sinuses and tympanomastoid cavities. CT face: Soft Tissues:  Soft tissue swelling is present at the nose and along the right forehead/periorbital region. Facial bones:  Mildly comminuted bilateral nasal bone fractures are present. Orbits:  No evidence of an acute fracture.  The globes are intact.  The soft tissue planes of the orbits are maintained. Paranasal Sinuses:  The paranasal sinuses are clear. Foreign Bodies:  No evidence of radiopaque foreign bodies. Other:  No evidence of a remote fracture.  No lytic or blastic process seen in the facial bones. CT cervical spine: There is reversal of the normal cervical lordosis. There is no fracture or dislocation. Severe multilevel degenerative changes are present.  There is severe spinal canal stenosis at the C4-C5 level from a large posterior disc osteophyte complex.  Moderate spinal canal stenosis present at the C3-C4 level from a posterior disc osteophyte complex.  Severe bilateral neural foraminal narrowing is present at the C3-C4, C4-C5, and C5-C6 levels. Centrilobular emphysema.    IMPRESSION: 1.  No acute intracranial abnormality. 2.  No cervical spine fracture or traumatic subluxation.  Severe multilevel degenerative change. 3.  Nasal bone fractures with adjacent soft tissue swelling. : ALISSA   Transcribe Date/Time: Dec 22 2023  6:05P Dictated by : MAIRA STEWART MD This examination was interpreted and the report reviewed and electronically signed by: MAIRA STEWART MD on Dec 22 2023  6:17PM  EST    CT BRAIN WO IVCON    Result Date: 12/22/2023  * * *Final Report* * * DATE OF EXAM: Dec 22 2023  5:57PM   ASC   0504  -  CT BRAIN WO IVCON   / ACCESSION #  [id number]  PROCEDURE REASON: Head trauma, moderate-severe      * * * * Physician Interpretation * * * *  EXAMINATION:  CT BRAIN WO IVCON, CT FACIAL BONE/TITO WO IVCON, CT CERVICAL SPINE WO IVCON CLINICAL HISTORY: Head trauma, moderate-severe (accession [id number]), Maxillofacial pain, Nasal fracture suspected (accession [id number]), Spine fracture, cervical, traumatic (accession [id number]) TECHNIQUE:  Serial axial images without IV contrast were obtained from the vertex to the foramen magnum.  CT of the cervical spine was also performed with axial sections from the skull base through the thoracic inlet. CT of the face was performed without IV contrast and multiplanar reconstructions were generated. MQ:  CTBWO_3 CT Dose-Length Product (DLP): 1565  mGy*cm CT Dose Reduction Employed: Iterative recon (accession [id number]), Automated exposure control(AEC) and iterative recon (accession [id number]) COMPARISON:  None. RESULT: CT HEAD: No acute intracranial hemorrhage or extra-axial fluid collection. No hydrocephalus, mass effect, or herniation. No acute ischemic infarct detected.  Mild background of white matter hypoattenuation consistent with chronic microvascular ischemic change. Unremarkable dural venous sinus attenuation. No acute osseous abnormality. Clear visualized paranasal sinuses and tympanomastoid cavities. CT face: Soft Tissues:  Soft tissue swelling is present at the nose and along the right forehead/periorbital region. Facial bones:  Mildly comminuted bilateral nasal bone fractures are present. Orbits:  No evidence of an acute fracture.  The globes are intact.  The soft tissue planes of the orbits are maintained. Paranasal Sinuses:  The paranasal sinuses are clear. Foreign Bodies:  No evidence of radiopaque foreign bodies. Other:  No evidence of a remote fracture.  No lytic or blastic process seen in the facial bones. CT cervical spine: There is reversal of the normal cervical lordosis. There is no fracture or dislocation.  Severe multilevel degenerative changes are present.  There is severe spinal canal stenosis at the C4-C5 level from a large posterior disc osteophyte complex.  Moderate spinal canal stenosis present at the C3-C4 level from a posterior disc osteophyte complex.  Severe bilateral neural foraminal narrowing is present at the C3-C4, C4-C5, and C5-C6 levels. Centrilobular emphysema.    IMPRESSION: 1.  No acute intracranial abnormality. 2.  No cervical spine fracture or traumatic subluxation.  Severe multilevel degenerative change. 3.  Nasal bone fractures with adjacent soft tissue swelling. : PSCB   Transcribe Date/Time: Dec 22 2023  6:05P Dictated by : MAIRA STEWART MD This examination was interpreted and the report reviewed and electronically signed by: MAIRA STEWART MD on Dec 22 2023  6:17PM  EST    Transthoracic Echo (TTE) Complete    Result Date: 12/19/2023   [facility], [address] [place] [address]              Tel [phone] and Fax [phone] TRANSTHORACIC ECHOCARDIOGRAM REPORT  Patient Name:      IJEOMALUTHER GERMANUND     Reading Physician:    [id number] Escobar Mendez MD Study Date:        12/18/2023          Ordering Provider:    [id number] GUNNAR HUGO MRN/PID:           [id number]            Fellow: Accession#:        [id number]        Nurse:                Mattie Sosa RN Date of Birth/Age: 1950 / 73 years Sonographer:          Cruz Wood RDCS Gender:            M                   Additional Staff: Height:            175.26 cm           Admit Date: Weight:            74.84 kg            Admission Status:     Inpatient - Routine BSA:               1.90 m2             Encounter#:           [id number]                                        Department Location:  [facility] Blood  Pressure: 112 /63 mmHg Study Type:    TRANSTHORACIC ECHO (TTE) COMPLETE Diagnosis/ICD: Cardiomyopathy, unspecified-I42.9 Indication:    Cardiomyopathy CPT Code:      Echo Complete w Full Doppler-[id number] Patient History: Diabetes:          Yes Pertinent History: A-Fib, CAD, CVA, HTN, Cancer, COPD and Syncope. Ischemic CM,                    cardiac stent. Study Detail: The following Echo studies were performed: 2D, M-Mode, Doppler and               color flow. Technically challenging study due to body habitus.               Definity used as a contrast agent for endocardial border               definition. Total contrast used for this procedure was 1.5 mL via               IV push. The patient was awake.  PHYSICIAN INTERPRETATION: Left Ventricle: The left ventricular systolic function is moderately to severely decreased, with an estimated ejection fraction of 30-35%. Wall motion is abnormal. The left ventricular cavity size is mildly dilated. There is mild concentric left ventricular hypertrophy. Findings are consistent with ischemic cardiomyopathy. Spectral Doppler shows an impaired relaxation pattern of left ventricular diastolic filling. LV Wall Scoring: The mid inferolateral segment is akinetic. The mid and apical anterior wall, basal and mid anterolateral wall, mid anteroseptal segment, basal inferolateral segment, apical lateral segment, basal inferior segment, and apex are hypokinetic. All remaining scored segments are normal. Left Atrium: The left atrium is mildly dilated. Right Ventricle: The right ventricle is mildly enlarged. There is mildly reduced right ventricular systolic function. A device is visualized in the right ventricle. Right Atrium: The right atrium is mildly dilated. Aortic Valve: The aortic valve is trileaflet. There is mild aortic valve cusp calcification. There is no evidence of aortic valve regurgitation. The peak instantaneous gradient of the aortic valve is 3.5 mmHg. Mitral Valve: The  mitral valve is normal in structure. There is mild mitral valve regurgitation. Tricuspid Valve: The tricuspid valve is structurally normal. There is mild tricuspid regurgitation. Pulmonic Valve: The pulmonic valve is structurally normal. There is physiologic pulmonic valve regurgitation. Pericardium: There is a trivial to small pericardial effusion. Aorta: The aortic root is normal. Pulmonary Artery: The tricuspid regurgitant velocity is 3.17 m/s, and with an estimated right atrial pressure of 3 mmHg, the estimated pulmonary artery pressure is mildly elevated with the RVSP at 43.2 mmHg. Pulmonary Veins: There is blunted systolic flow in the pulmonary veins. Systemic Veins: The inferior vena cava appears mildly dilated.  CONCLUSIONS:  1. Left ventricular systolic function is moderately to severely decreased with a 30-35% estimated ejection fraction.  2. Multiple segmental abnormalities exist. See findings.  3. Spectral Doppler shows an impaired relaxation pattern of left ventricular diastolic filling.  4. There is ischemic cardiomyopathy.  5. There is mildly reduced right ventricular systolic function.  6. Mild pulmonary HTN. CVP is elevated. QUANTITATIVE DATA SUMMARY: 2D MEASUREMENTS:                          Normal Ranges: Ao Root d:     2.60 cm   (2.0-3.7cm) LAs:           4.60 cm   (2.7-4.0cm) IVSd:          1.02 cm   (0.6-1.1cm) LVPWd:         0.96 cm   (0.6-1.1cm) LVIDd:         4.75 cm   (3.9-5.9cm) LVIDs:         4.22 cm LV Mass Index: 86.6 g/m2 LV % FS        11.2 % LA VOLUME:                               Normal Ranges: LA Vol A4C:        104.7 ml   (22+/-6mL/m2) LA Vol A2C:        69.5 ml LA Vol BP:         92.7 ml LA Vol Index A4C:  55.0ml/m2 LA Vol Index A2C:  36.5 ml/m2 LA Vol Index BP:   48.7 ml/m2 LA Area A4C:       28.3 cm2 LA Area A2C:       21.2 cm2 LA Major Axis A4C: 6.5 cm LA Major Axis A2C: 5.5 cm LA Volume Index:   47.0 ml/m2 RA VOLUME BY A/L METHOD:                               Normal Ranges:  RA Vol A4C:        62.8 ml    (8.3-19.5ml) RA Vol Index A4C:  33.0 ml/m2 RA Area A4C:       20.7 cm2 RA Major Axis A4C: 5.8 cm M-MODE MEASUREMENTS:                  Normal Ranges: Ao Root: 2.60 cm (2.0-3.7cm) LAs:     4.50 cm (2.7-4.0cm) AORTA MEASUREMENTS:                    Normal Ranges: Asc Ao, d: 2.90 cm (2.1-3.4cm) LV SYSTOLIC FUNCTION BY 2D PLANIMETRY (MOD):                     Normal Ranges: EF-A4C View: 28.3 % (>=55%) EF-A2C View: 30.5 % EF-Biplane:  29.5 % LV DIASTOLIC FUNCTION:                            Normal Ranges: MV Peak E:      0.93 m/s   (0.7-1.2 m/s) MV e'           0.05 m/s   (>8.0) MV lateral e'   0.05 m/s MV medial e'    0.05 m/s E/e' Ratio:     18.56      (<8.0) PulmV Sys Chet:  24.60 cm/s PulmV English Chet: 88.80 cm/s PulmV S/D Chet:  0.30 MITRAL VALVE:                 Normal Ranges: MV DT: 201 msec (150-240msec) AORTIC VALVE:                         Normal Ranges: AoV Vmax:      0.93 m/s (<=1.7m/s) AoV Peak PG:   3.5 mmHg (<20mmHg) LVOT Max Chet:  0.79 m/s (<=1.1m/s) LVOT VTI:      16.30 cm LVOT Diameter: 1.90 cm  (1.8-2.4cm) AoV Area,Vmax: 2.41 cm2 (2.5-4.5cm2)  RIGHT VENTRICLE: RV Basal 3.70 cm RV Mid   2.90 cm RV Major 7.3 cm TAPSE:   13.7 mm RV s'    0.10 m/s TRICUSPID VALVE/RVSP:                             Normal Ranges: Peak TR Velocity: 3.17 m/s RV Syst Pressure: 43.2 mmHg (< 30mmHg) IVC Diam:         2.20 cm PULMONIC VALVE:                      Normal Ranges: PV Max Chet: 0.7 m/s  (0.6-0.9m/s) PV Max P.1 mmHg Pulmonary Veins: PulmV English Chet: 88.80 cm/s PulmV S/D Chet:  0.30 PulmV Sys Chet:  24.60 cm/s  [id number] Escobar Mendez MD Electronically signed on 2023 at 5:23:07 PM  Wall Scoring  ** Final **      Assessment/Plan   Coronavirus infection-treated with at least 10 days of dexamethasone, no further isolation precautions indicated  Suspected aspiration pneumonia  Left-sided pleural effusion, s/p thoracentesis  Leukocytosis-most likely multifactorial  C. difficile  infection-positive PCR/negative EIA-no diarrhea  Chronic respiratory failure-interval worsening, on BiPAP     Oral vancomycin for a total of 14 days-IV Flagyl if patient is unable to take p.o-tentative stop date 1/19/2024-will extend based on stop date of Merrem  IV meropenem per pulmonary-evaluate for stopping on 1/18/2024  Continue contact plus precautions for c.diff  Supplemental oxygen as needed  Monitor temperature and WBC  Cardiology to advise with regards amiodarone dosing due to to potential interaction between Flagyl and amiodarone         Frank Escalante MD

## 2024-01-17 NOTE — PROGRESS NOTES
"Nutrition Follow up Note    Nutrition Assessment      Received consult for new NG tube and to initiate/ manage tube feed. RN stated that pt is not having diarrhea. Recommend Glucerna because of elevated blood glucose.     Nutrition History:     Energy Intake: Poor < 50 %  Food Allergies/Intolerances:  None  GI Symptoms: None  Oral Problems: Swallowing difficulty    Anthropometrics:  Ht: 175.3 cm (5' 9.02\"), Wt: 79.9 kg (176 lb 2.4 oz), BMI: 26  IBW/kg (Dietitian Calculated): 75.45 kg          Weight Change:  Weight History / % Weight Change: Per chart, pt has experienced a 9# (5.4%) weight gain in 2 weeks.           Daily Weight  01/17/24 : 79.9 kg (176 lb 2.4 oz)  01/03/24 : 75.9 kg (167 lb 5.3 oz)  12/31/23 : 71.4 kg (157 lb 4.8 oz)  12/27/23 : 74.8 kg (165 lb)  12/19/23 : 75.5 kg (166 lb 7.2 oz)  11/29/23 : 74.2 kg (163 lb 8.1 oz)  11/20/23 : 70.3 kg (155 lb)  10/11/23 : 68.9 kg (152 lb)  09/01/23 : 70.3 kg (155 lb)  09/01/23 : 70.3 kg (155 lb)      Nutrition Focused Physical Exam Findings: defer:    Subcutaneous Fat Loss  Orbital Fat Pads: Defer  Buccal Fat Pads: Defer  Triceps: Defer  Ribs: Defer    Muscle Wasting  Temporalis: Defer  Pectoralis (Clavicular Region): Defer  Deltoid/Trapezius: Defer  Interosseous: Defer  Trapezius/Infraspinatus/Supraspinatus (Scapular Region): Defer  Quadriceps: Defer  Gastrocnemius: Defer              Nutrition Significant Labs:  Lab Results   Component Value Date    WBC 13.2 (H) 01/17/2024    HGB 11.2 (L) 01/17/2024    HCT 34.7 (L) 01/17/2024     01/17/2024    CHOL 98 (L) 06/02/2023    TRIG 122 06/02/2023    HDL 35 (L) 06/02/2023    ALT <5 (L) 01/17/2024    AST 25 01/17/2024     (H) 01/17/2024    K 3.7 01/17/2024     (H) 01/17/2024    CREATININE 1.90 (H) 01/17/2024    BUN 34 (H) 01/17/2024    CO2 26 01/17/2024    TSH 7.54 (H) 10/11/2023    PSA 1.8 09/30/2020    INR 2.3 (H) 01/03/2024    HGBA1C 10.7 (H) 12/27/2023    ALBUR 12 03/11/2019       Current " Facility-Administered Medications:     acetaminophen (Tylenol) tablet 650 mg, 650 mg, oral, TID, Seema Bailey DO, 650 mg at 01/16/24 2015    acetylcysteine (Mucomyst) 200 mg/mL (20 %) nebulizer solution 600 mg, 3 mL, nebulization, BID, Barber Sprague MD, 600 mg at 01/17/24 0711    albuterol 2.5 mg /3 mL (0.083 %) nebulizer solution 2.5 mg, 2.5 mg, nebulization, q2h PRN, Seema Bailey DO    amiodarone (Pacerone) tablet 400 mg, 400 mg, oral, BID, Cinda Pedersen MD, 400 mg at 01/16/24 2015    apixaban (Eliquis) tablet 5 mg, 5 mg, oral, BID, Cinda Pedersen MD, 5 mg at 01/16/24 2015    artificial saliva (yerbas-lyt) aerosol,spray 1 mL, 1 mL, mucous membrane, TID, Elizabeth Pearce, APRN-CNP, 1 mL at 01/17/24 1136    atorvastatin (Lipitor) tablet 80 mg, 80 mg, oral, Nightly, eSema Bailey DO, 80 mg at 01/16/24 2015    budesonide (Pulmicort) 0.5 mg/2 mL nebulizer solution 0.5 mg, 0.5 mg, nebulization, BID, Seema Bailey DO, 0.5 mg at 01/17/24 0708    carbidopa-levodopa (Sinemet)  mg per tablet 1 tablet, 1 tablet, nasogastric tube, TID, Silviano Lakhani PA-C    clopidogrel (Plavix) tablet 75 mg, 75 mg, oral, Daily, Seema Bailey DO, 75 mg at 01/16/24 1011    cyclobenzaprine (Flexeril) tablet 10 mg, 10 mg, oral, Nightly, Seema Bailey DO, 10 mg at 01/16/24 2016    dapagliflozin propanediol (Farxiga) tablet 10 mg, 10 mg, oral, Daily, Seema Bailey DO, 10 mg at 01/16/24 1010    dextrose 10 % in water (D10W) infusion, 0.3 g/kg/hr, intravenous, Once PRN, Seema Bailey DO    dextrose 50 % injection 25 g, 25 g, intravenous, q15 min PRN, Seema Bailey DO, 25 g at 01/17/24 0751    furosemide (Lasix) injection 40 mg, 40 mg, intravenous, q8h, Chuck Mann MD, 40 mg at 01/17/24 0939    gabapentin (Neurontin) capsule 200 mg, 200 mg, oral, Nightly, Seema Bailey DO, 200 mg at 01/16/24 2015    glucagon (Glucagen) injection 1 mg, 1 mg, intramuscular, q15 min PRN, Seema ALVAREZ  DO Leo    insulin glargine (Lantus) injection 10 Units, 10 Units, subcutaneous, Daily, Seema Bailey DO, 10 Units at 01/16/24 1011    insulin lispro (HumaLOG) injection 0-10 Units, 0-10 Units, subcutaneous, q6h, Chuck Mann MD    ipratropium-albuteroL (Duo-Neb) 0.5-2.5 mg/3 mL nebulizer solution 3 mL, 3 mL, nebulization, TID, Seema Bailey DO, 3 mL at 01/17/24 1150    lactobacillus acidophilus tablet 1 tablet, 1 tablet, oral, BID, Seema Bailey DO, 1 tablet at 01/16/24 2015    levothyroxine (Synthroid, Levoxyl) tablet 25 mcg, 25 mcg, oral, Daily before breakfast, Seema Bailey DO, 25 mcg at 01/16/24 0817    melatonin tablet 5 mg, 5 mg, oral, Nightly PRN, Seema Bailey DO, 5 mg at 01/09/24 2130    meropenem (Merrem) in sodium chloride 0.9 % 100 mL IV 1 g, 1 g, intravenous, q12h VAZQUEZ, Barber Sprague MD, Stopped at 01/17/24 1013    methyl salicylate-menthol (Bengay) 15-10 % greaseless cream, , Topical, TID PRN, Yuliya Hood, APRN-CNP, Given at 01/14/24 2030    metroNIDAZOLE (Flagyl) 500 mg in NaCl (iso-os) 100 mL, 500 mg, intravenous, q8h, Seema Bailey DO, Stopped at 01/17/24 1153    mirtazapine (Remeron) tablet 15 mg, 15 mg, oral, Nightly, Seema Bailey DO, 15 mg at 01/16/24 2016    norepinephrine (Levophed) 8 mg in dextrose 5% 250 mL (0.032 mg/mL) infusion (premix), 0.01-3 mcg/kg/min, intravenous, Continuous, Chuck Mann MD, Last Rate: 8.71 mL/hr at 01/17/24 0830, 0.06 mcg/kg/min at 01/17/24 0830    nystatin (Mycostatin) 100,000 unit/gram powder 1 Application, 1 Application, Topical, BID, Seema Bailey DO, 1 Application at 01/16/24 2100    ondansetron (Zofran) injection 4 mg, 4 mg, intravenous, q4h PRN, Seema Bailey DO, 4 mg at 01/11/24 1243    oxygen (O2) therapy, , inhalation, Continuous PRN - O2/gases, Chuck Mann MD    pantoprazole (ProtoNix) EC tablet 40 mg, 40 mg, oral, Daily before breakfast, Seema Bailey DO, 40 mg at  01/16/24 0817    phenoL (Chloraseptic) 1.4 % mouth/throat spray 1 spray, 1 spray, Mouth/Throat, q2h PRN, Elizabeth Pearce, APRN-CNP    QUEtiapine (SEROquel) tablet 25 mg, 25 mg, oral, Nightly, Seema ANTONIO Scruggsreich, DO, 25 mg at 01/16/24 2016    sertraline (Zoloft) tablet 100 mg, 100 mg, oral, Daily, Seema J Brynidenreich, DO, 100 mg at 01/16/24 1011    SITagliptin phosphate (Januvia) tablet 50 mg, 50 mg, oral, Daily, Seema J Heidenreich, DO, 50 mg at 01/16/24 1010    sodium chloride (Ocean) 0.65 % nasal spray 1 spray, 1 spray, Each Nostril, TID, Seema Bailey, DO, 1 spray at 01/16/24 2100    vancomycin (Firvanq) solution 125 mg, 125 mg, oral, 4x daily, Silviano Lakhani PA-C    Dietary Orders (From admission, onward)       Start     Ordered    01/17/24 1340  Enteral feeding with NPO NG (nasogastric tube); 55 (Start at 20 mL/hr increase by 10 mL/hr Q4H as tolerated); 150; Every 4 hours  Diet effective now        Question Answer Comment   Tube feeding formula: Glucerna 1.5    Feeding route: NG (nasogastric tube)    Tube feeding continuous rate (mL/hr): 55 Start at 20 mL/hr increase by 10 mL/hr Q4H as tolerated   Tube feeding flush (mL): 150    Flush frequency: Every 4 hours        01/17/24 1347    01/14/24 1224  Oral nutritional supplements  Until discontinued        Comments: chocolate   Question Answer Comment   Deliver with Lunch    Select supplement: Sugar Free Mighty Shake        01/14/24 1223    01/12/24 1738  Oral nutritional supplements  Until discontinued        Question Answer Comment   Deliver with All meals    Select supplement: Ensure Clear        01/12/24 1738    01/10/24 1219  Oral nutritional supplements  Until discontinued        Comments: chocolate   Question Answer Comment   Deliver with All meals    Select supplement: Magic Cup        01/10/24 1218                  Estimated Needs:   Estimated Energy Needs  Total Energy Estimated Needs (kCal):  (6191-7504)  Total Estimated Energy Need per Day (kCal/kg):   (25-30)  Method for Estimating Needs: Actual Wt    Estimated Protein Needs  Total Protein Estimated Needs (g):  ()  Total Protein Estimated Needs (g/kg):  (1.1-1.4)  Method for Estimating Needs: Actual Wt    Estimated Fluid Needs  Total Fluid Estimated Needs (mL):  (1998-2397)  Method for Estimating Needs: 1 mL/kcal        Nutrition Diagnosis   Nutrition Diagnosis:       Nutrition Diagnosis  Patient has Nutrition Diagnosis: Yes  Diagnosis Status (1): Resolved  Nutrition Diagnosis 1: Inadequate energy intake  Related to (1): Decreased ability to consume sufficient energy  As Evidenced by (1): Clear liquid diet  Additional Nutrition Diagnosis: Diagnosis 2  Diagnosis Status (2): Ongoing  Nutrition Diagnosis 2: Inadequate energy intake  Related to (2): Pt doesn't like the texture-modifid diet  As Evidenced by (2): poor PO intake       Nutrition Interventions/Recommendations   Nutrition Interventions and Recommendations:    Nutrition Prescription:  Individualized Nutrition Prescription Provided for : 1998-2397 kcals,  gm protein via Enteral nutrition    Nutrition Interventions:   Food and/or Nutrient Delivery Interventions  Interventions: Enteral intake  Enteral Intake: Modify composition of enteral nutrition  Goal: If tube feed initiated, recommend Glucerna goal rate @ 55 mL/hr to provide 1980 kcals, 109 gm protein, 176 gm CHO, 1002 mL free water. Pt will require additional 150 mL water flushes Q4H to meet hydration needs. Recommend starting tube feed @ 20 mL/hr and increasing by 10 mL Q4H as tolerated.    Education Documentation  No documentation found.           Nutrition Monitoring and Evaluation   Monitoring/Evaluation:   Food/Nutrient Related History Monitoring  Monitoring and Evaluation Plan: Enteral and parenteral nutrition intake  Enteral and Parenteral Nutrition Intake: Enteral nutrition formula/solution  Criteria: Tube feed to provide >/= 75% of estimated needs, pt to tolerate @ goal  rate    Biochemical Data, Medical Tests and Procedures  Monitoring and Evaluation Plan: Glucose/endocrine profile  Glucose/Endocrine Profile: Glucose, casual, Glucose, fasting, Hemoglobin A1c (HgbA1c)  Criteria: Labs to trend toward desired ranges            Time Spent/Follow-up:   Follow Up  Time Spent (min): 30 minutes  Last Date of Nutrition Visit: 01/17/24  Nutrition Follow-Up Needed?: 3-5 days  Follow up Comment: 1/19/24

## 2024-01-17 NOTE — PROGRESS NOTES
Physical Therapy    Physical Therapy Treatment    Patient Name: Hai Spaulding  MRN: [id number]  Today's Date: 1/17/2024  Time Calculation  Start Time: 1059  Stop Time: 1139  Time Calculation (min): 40 min       Assessment/Plan   PT Assessment  Rehab Prognosis: Good  Evaluation/Treatment Tolerance: Patient limited by fatigue  End of Session Communication: Bedside nurse  Assessment Comment: Progress is slow regarding functional mobility;  tolerance to activity improving slowly;  fall risk.  End of Session Patient Position: Bed, 4 rail up  PT Plan  Inpatient/Swing Bed or Outpatient: Inpatient  PT Plan  Treatment/Interventions: Bed mobility, Transfer training, Gait training, Balance training, Neuromuscular re-education, Strengthening, Endurance training, Range of motion, Therapeutic exercise, Therapeutic activity, Home exercise program  PT Plan: Skilled PT  PT Frequency: 3 times per week  PT Discharge Recommendations: Moderate intensity level of continued care  Equipment Recommended upon Discharge: Wheeled walker, Wheelchair  PT Recommended Transfer Status: Total assist  PT - OK to Discharge: Yes (with skilled physical therapy services at next level of care.)      General Visit Information:   PT  Visit  PT Received On: 01/17/24  General  Family/Caregiver Present: Yes  Co-Treatment: OT  Co-Treatment Reason: ICU status;  need for 2nd skilled person for therapeutic patient handling;  limited tolerance to activity  Prior to Session Communication: Bedside nurse  Patient Position Received: Bed, 4 rail up  General Comment: RN cleared patient for treatment.  Patient reports agreeable to treatment.    Subjective   Precautions:  Precautions  Medical Precautions: Fall precautions  Vital Signs:  Vital Signs  Heart Rate: (!) 118  SpO2: 91 % (O2 at 5 liters via nasal cannula.)  BP: 99/65  MAP (mmHg): 62 (RN aware)  BP Method: Automatic  Patient Position: Lying    Objective   Pain:  Pain Assessment  Pain Assessment: 0-10  Pain Score:  "0 - No pain  Cognition:  Cognition  Overall Cognitive Status: Impaired (alert; attempting to follow commands;  \"mouthing\" words to communicate)  Postural Control:  Static Sitting Balance  Static Sitting-Balance Support: Bilateral upper extremity supported  Static Sitting-Level of Assistance: Maximum assistance  Static Sitting-Comment/Number of Minutes: Assist required to maintain midline while sitting on side of bed due to decreased balance all planes.          Activity Tolerance:  Activity Tolerance  Endurance: Decreased tolerance for upright activites  Activity Tolerance Comments: Fatigue  Treatments:  Therapeutic Exercise  Therapeutic Exercise Performed: Yes  Therapeutic Exercise Activity 1: ankle pumps, heel slides, hip abduction, TKE with assist kat LE 10 reps x 1 set    Therapeutic Activity  Therapeutic Activity Performed: Yes  Therapeutic Activity 1: Sitting balance activities while sitting on side of bed including midline orientation x ~3 minutes.         Bed Mobility  Bed Mobility: Yes  Bed Mobility 1  Bed Mobility 1: Supine to sitting  Level of Assistance 1: Maximum assistance (x 2)  Bed Mobility Comments 1: Assist with trunk-up and kat LE during supine to sit;  max assist to scoot hips to edge of bed during supine to sit.  Bed Mobility 2  Bed Mobility  2: Sitting to supine  Level of Assistance 2: Maximum assistance (x 2)  Bed Mobility Comments 2: Assist with trunk and kat LE during sit to supine.  Bed Mobility 3  Bed Mobility 3: Rolling right, Rolling left  Level of Assistance 3: Maximum assistance  Bed Mobility Comments 3: Assist at shoulders and hips while rolling side to side.    Ambulation/Gait Training  Ambulation/Gait Training Performed: No  Transfers  Transfer: No    Stairs  Stairs: No         Outcome Measures:  [facility] Basic Mobility  Turning from your back to your side while in a flat bed without using bedrails: A lot  Moving from lying on your back to sitting on the side of a flat bed without " using bedrails: A lot  Moving to and from bed to chair (including a wheelchair): Total  Standing up from a chair using your arms (e.g. wheelchair or bedside chair): Total  To walk in hospital room: Total  Climbing 3-5 steps with railing: Total  Basic Mobility - Total Score: 8    Education Documentation  No documentation found.  Education Comments  No comments found.        OP EDUCATION:       Encounter Problems       Encounter Problems (Active)       PT Problem       The patient will demonstrate an overall strength of >3+/5 in BLE to assist with completion of functional mobility.   (Progressing)       Start:  01/05/24    Expected End:  01/18/24            The patient will be able to complete bed mobility at a [place] level with use of bed rails.   (Progressing)       Start:  01/05/24    Expected End:  01/18/24            The patient will be able to complete safe transfer of sit <> stand with minimal VC at a [place] level.   (Progressing)       Start:  01/05/24    Expected End:  01/18/24            The patient will be able to ambulate at a [place] level for >15ftx1 with RW.  (Progressing)       Start:  01/05/24    Expected End:  01/18/24               Pain - Adult

## 2024-01-17 NOTE — PROGRESS NOTES
Patient not medically clear. Patient on 5 liters of oxygen. NG tube placed on this day. Patient on pressors and Amiodarone. At the time of discharge patient will return to [facility]. No precert needed. Will follow.      **PATIENT WITH A SAFE DISCHARGE PLAN      Andree Torres RN

## 2024-01-18 NOTE — PROGRESS NOTES
Subjective Data:  Patient appears worse today.  He is more lethargic difficulty breathing.    Overnight Events:    Telemetry reviewed remains in atrial fibrillation fast heart rate.  Objective Data:  Last Recorded Vitals:  Vitals:    01/18/24 0800 01/18/24 1040 01/18/24 1100 01/18/24 1151   BP:       Pulse:       Resp:  (!) 32  (!) 28   Temp:   37.3 °C (99.1 °F)    TempSrc:   Temporal    SpO2:       Weight: 76.9 kg (169 lb 8.5 oz)      Height:           Last Labs:  CBC - 1/18/2024:  5:58 AM  12.2 10.5 281    31.5      CMP - 1/18/2024:  5:58 AM  7.8 5.3 36 --- 0.7   2.8 2.2 <5 560      PTT - 12/28/2023:  5:48 AM  2.3   26.1 107.8     TROPHS   Date/Time Value Ref Range Status   01/04/2024 08:16  0 - 20 ng/L Final     Comment:     Previous result verified on 1/3/2024 1340 on specimen/case [id number] called with component TRPHS for procedure Troponin I, High Sensitivity, Initial with value 161 ng/L.   01/03/2024 05:37  0 - 20 ng/L Final     Comment:     Previous result verified on 1/3/2024 1340 on specimen/case [id number] called with component TRPHS for procedure Troponin I, High Sensitivity, Initial with value 161 ng/L.   01/03/2024 02:27  0 - 20 ng/L Final     Comment:     Previous result verified on 1/3/2024 1340 on specimen/case [id number] called with component TRPHS for procedure Troponin I, High Sensitivity, Initial with value 161 ng/L.     BNP   Date/Time Value Ref Range Status   01/03/2024 12:13  0 - 99 pg/mL Final   12/27/2023 10:04  0 - 99 pg/mL Final     HGBA1C   Date/Time Value Ref Range Status   12/27/2023 09:59 PM 10.7 See below % Final   10/11/2023 01:15 PM 10.6 See below % Final     LDLCALC   Date/Time Value Ref Range Status   06/02/2023 02:40 PM 39 65 - 130 MG/DL Final   10/08/2019 12:15 PM 96 65 - 130 MG/DL Final     VLDL   Date/Time Value Ref Range Status   10/22/2021 08:07 PM 18 0 - 40 mg/dL Final   09/28/2020 04:12 PM 46 0 - 40 mg/dL Final   10/02/2018  02:40 PM 50 0 - 40 mg/dL Final      Last I/O:  I/O last 3 completed shifts:  In: 540 (6.8 mL/kg) [Blood:50; NG/GT:190; IV Piggyback:300]  Out: 2375 (29.8 mL/kg) [Urine:2375 (0.8 mL/kg/hr)]  Weight: 79.6 kg     Past Cardiology Tests (Last 3 Years):  EKG:  ECG 12 lead 01/05/2024      ECG 12 lead 01/04/2024      ECG 12 lead 01/03/2024      ECG 12 lead 12/27/2023      ECG 12 Lead 12/27/2023      ECG 12 lead 12/14/2023    Echo:  Transthoracic Echo (TTE) Complete 12/18/2023    Ejection Fractions:  EF   Date/Time Value Ref Range Status   12/18/2023 12:10 PM 29       Cath:  No results found for this or any previous visit from the past 1095 days.    Stress Test:  No results found for this or any previous visit from the past 1095 days.    Cardiac Imaging:  XR CHEST ABDOMEN FOR OG NG PLACEMENT 11/26/2021      XR CHEST ABDOMEN FOR OG NG PLACEMENT 11/26/2021      Inpatient Medications:  Scheduled medications   Medication Dose Route Frequency    acetaminophen  650 mg oral TID    acetylcysteine  3 mL nebulization BID    amiodarone  400 mg oral BID    apixaban  5 mg oral BID    artificial saliva (yerbas-lyt)  1 mL mucous membrane TID    atorvastatin  80 mg oral Nightly    budesonide  0.5 mg nebulization BID    carbidopa-levodopa  1 tablet nasogastric tube TID    clopidogrel  75 mg oral Daily    cyclobenzaprine  10 mg oral Nightly    dapagliflozin propanediol  10 mg oral Daily    furosemide  40 mg intravenous q8h    gabapentin  200 mg oral Nightly    insulin glargine  10 Units subcutaneous Daily    insulin lispro  0-10 Units subcutaneous q6h    ipratropium-albuteroL  3 mL nebulization TID    lactobacillus acidophilus  1 tablet oral BID    levothyroxine  25 mcg oral Daily before breakfast    meropenem  1 g intravenous q12h VAZQUEZ    metroNIDAZOLE  500 mg intravenous q8h    mirtazapine  15 mg oral Nightly    nystatin  1 Application Topical BID    pantoprazole  40 mg oral Daily before breakfast    QUEtiapine  25 mg oral Nightly     sertraline  100 mg oral Daily    SITagliptin phosphate  50 mg oral Daily    sodium chloride  1 spray Each Nostril TID    vancomycin  125 mg oral 4x daily     PRN medications   Medication    albuterol    dextrose 10 % in water (D10W)    dextrose    glucagon    melatonin    methyl salicylate-menthol    ondansetron    oxygen    oxygen    phenoL     Continuous Medications   Medication Dose Last Rate    DOBUTamine  5 mcg/kg/min 5 mcg/kg/min (01/18/24 1329)    norepinephrine  0.01-3 mcg/kg/min 0.12 mcg/kg/min (01/18/24 1329)       Physical Exam:  General: Patient is in Mild respiratory distress  HEENT: atraumatic normocephalic.  Neck: is supple jugular venous pressure within normal limits no thyromegaly.  Cardiovascular irregular rate and rhythm normal heart sounds no murmurs rubs or gallops.  Lungs: deCreased breathing sounds at bases abdomen: is soft nontender.  Extremities warm to touch no edema.        Assessment/Plan   1 septic shock likely secondary to C. difficile colitis.  Low ejection fraction likely contributing to his hypotension.  Currently on Levophed which may control continue.  Management by primary team.     2.  Acute on chronic systolic heart failure ejection fraction 30 to 35% secondary to ischemic cardiomyopathy status post ICD/CRT.  Patient has known coronary artery disease not candidate for revascularization.  Recommend now to switch him from prasugrel to clopidogrel since he is also on oral anticoagulation for atrial fibrillation.  Continue oral anticoagulation for now.    3.  Atrial fibrillation.  Patient with history of paroxysmal atrial fibrillation on oral amiodarone status post ICD CRT and Eliquis.  Restart Eliquis.  Hemoglobin stable.  Will monitor.  Not receiving amiodarone oral because of swallowing issues now he has NG tube will reassess heart rate after restarting amiodarone 400 mg oral twice daily.     5. acute on chronic renal failure.  Multifactorial.  Nephrology on board.  CVC 01/13/24  Triple lumen Non-tunneled Right Internal jugular (Active)   Line Necessity Intravenous medication therapy 01/18/24 0800   Line Necessity Reviewed With Dr. Mann 01/18/24 0800   Site Assessment Clean;Dry;Intact 01/18/24 0800   Proximal Lumen Status Infusing 01/18/24 0800   Medial 1 Lumen Status Infusing 01/18/24 0800   Distal Lumen Status Flushed 01/18/24 0800   Dressing Type Antimicrobial patch 01/18/24 0800   Dressing Status Clean;Dry 01/18/24 0800   Number of days: 5       NG/OG/Feeding Tube 14 Fr Right nostril (Active)   Intake (mL) 20 mL 01/18/24 1200   Number of days: 1       Urethral Catheter 16 Fr. (Active)   Reason for Continuing Urinary Catheterization accurate hourly measurement of urine volume in a critically ill patient that cannot be assessed by other volumes and urine collection strategies 01/18/24 0800   Number of days: 15       Code Status:  DNR and No Intubation    Cinda Pedersen MD

## 2024-01-18 NOTE — PROGRESS NOTES
Hai Spaulding is a 73 y.o. male on day 14 of admission presenting with Acute respiratory failure (CMS/HCC).    Subjective   Interval History:   Interval increase in pressor support overnight  On Bipap  Wife at bedsdie  Afebrile, no chills  No diarrhea       Objective   Range of Vitals (last 24 hours)  Heart Rate:  [101-121]   Temp:  [36 °C (96.8 °F)-37.3 °C (99.1 °F)]   Resp:  [17-32]   BP: ()/(49-72)   Weight:  [79.6 kg (175 lb 7.8 oz)]   SpO2:  [85 %-97 %]   Daily Weight  01/18/24 : 79.6 kg (175 lb 7.8 oz)    Body mass index is 25.9 kg/m².    Physical Exam  Constitutional:       Appearance: He is ill-appearing.      Comments: awake   HENT:      Head: Normocephalic.      Comments: Resolving right periorbital ecchymosis      Nose: Nose normal.      Mouth/Throat:      Mouth: Mucous membranes are moist.      Pharynx: Oropharynx is clear.      Comments: On bipap  Eyes:      Conjunctiva/sclera: Conjunctivae normal.   Cardiovascular:      Rate and Rhythm: Normal rate and regular rhythm.   Pulmonary:      Effort: Pulmonary effort is normal.      Breath sounds: Normal breath sounds.   Abdominal:      General: Bowel sounds are normal.      Palpations: Abdomen is soft.   Genitourinary:     Comments: Thomas catheter  Musculoskeletal:         General: Normal range of motion.      Cervical back: Normal range of motion and neck supple.   Skin:     General: Skin is warm and dry.   Neurological:      Comments: Unable to assess   Psychiatric:      Comments: Unable to assess         Antibiotics  amiodarone (Pacerone) tablet 200 mg  apixaban (Eliquis) tablet 5 mg  atorvastatin (Lipitor) tablet 80 mg  carbidopa-levodopa (Sinemet CR)  mg ER tablet 1 tablet  clopidogrel (Plavix) tablet 75 mg  dapagliflozin propanediol (Farxiga) tablet 10 mg  lactobacillus acidophilus capsule 1 capsule  levothyroxine (Synthroid, Levoxyl) tablet 25 mcg  metoprolol succinate XL (Toprol-XL) 24 hr tablet 25 mg  QUEtiapine (SEROquel) tablet 25  mg  sertraline (Zoloft) tablet 100 mg  SITagliptin phosphate (Januvia) tablet 50 mg  insulin glargine (Lantus) injection 10 Units  dextrose 50 % injection 25 g  glucagon (Glucagen) injection 1 mg  dextrose 10 % in water (D10W) infusion  insulin lispro (HumaLOG) injection 0-10 Units  vancomycin (Vancocin) capsule 125 mg  fluconazole in NaCl (iso-osm) (Diflucan) IVPB 200 mg  nystatin (Mycostatin) 100,000 unit/gram powder 1 Application  ipratropium-albuteroL (Duo-Neb) 0.5-2.5 mg/3 mL nebulizer solution 3 mL  budesonide (Pulmicort) 0.5 mg/2 mL nebulizer solution 0.5 mg  lactobacillus acidophilus tablet 1 tablet  vancomycin (Vancocin) capsule 125 mg  ipratropium-albuteroL (Duo-Neb) 0.5-2.5 mg/3 mL nebulizer solution 3 mL  acetaminophen (Tylenol) tablet 650 mg  ondansetron (Zofran) injection 4 mg  melatonin tablet 5 mg  cyclobenzaprine (Flexeril) tablet 10 mg  fluticasone furoate-vilanteroL (Breo Ellipta) 200-25 mcg/dose inhaler 1 puff  ipratropium-albuteroL (Duo-Neb) 0.5-2.5 mg/3 mL nebulizer solution 3 mL  pantoprazole (ProtoNix) EC tablet 40 mg  albuterol 2.5 mg /3 mL (0.083 %) nebulizer solution 2.5 mg  acetaminophen (Tylenol) tablet 650 mg  ondansetron (Zofran) injection 4 mg  melatonin tablet 5 mg  potassium chloride (Klor-Con) packet 40 mEq  potassium chloride 20 mEq in 100 mL IV premix  furosemide (Lasix) injection 40 mg  barium sulfate (Varibar Pudding) 40 % (w/v), 30% (w/w) oral paste 20 mL  barium sulfate (Varibar Nectar, Varibar Honey) 40 % (w/v) suspension 20 mL  barium sulfate (Varibar Honey) 40 % (w/v) 29% (w/w) suspension 20 mL  barium sulfate (Varibar THIN Liquid) 81 % (w/w) suspension 20 mL  sodium chloride 0.9% infusion  oxygen (O2) therapy  vancomycin (Firvanq) solution 125 mg  vancomycin (Vancocin) capsule 125 mg  ipratropium-albuteroL (Duo-Neb) 0.5-2.5 mg/3 mL nebulizer solution 3 mL  gabapentin (Neurontin) capsule 200 mg  sodium chloride (Ocean) 0.65 % nasal spray 1 spray  mirtazapine (Remeron)  tablet 15 mg  sodium chloride (Ocean) 0.65 % nasal spray 1 spray  acetaminophen (Tylenol) tablet 650 mg  oxygen (O2) therapy  acetylcysteine (Mucomyst) 200 mg/mL (20 %) nebulizer solution 600 mg  metroNIDAZOLE (Flagyl) 500 mg in NaCl (iso-os) 100 mL  acetaminophen (Ofirmev) injection 1,000 mg  norepinephrine (Levophed) 8 mg in dextrose 5% 250 mL (0.032 mg/mL) infusion (premix)  norepinephrine in dextrose 5 % (Levophed) infusion  - Omnicell Override Pull  digoxin (Lanoxin) injection 500 mcg  amiodarone (Nexterone) 360 mg in dextrose,iso-osm 200 mL (1.8 mg/mL) infusion (premix)  amiodarone (Nexterone) 360 mg in dextrose,iso-osm 200 mL (1.8 mg/mL) infusion (premix)  meropenem (Merrem) in sodium chloride 0.9 % 100 mL IV 1 g  oxygen (O2) therapy  methyl salicylate-menthol (Bengay) 15-10 % greaseless cream  amiodarone (Nexterone) 360 mg in dextrose,iso-osm 200 mL (1.8 mg/mL) infusion (premix)  furosemide (Lasix) injection 40 mg  amiodarone (Nexterone) 360 mg in dextrose,iso-osm 200 mL (1.8 mg/mL) infusion (premix)  levothyroxine (Synthroid, Levoxyl) tablet 25 mcg  furosemide (Lasix) injection 40 mg  insulin lispro (HumaLOG) injection 0-10 Units  heparin (porcine) injection 5,000 Units  amiodarone (Pacerone) tablet 400 mg  digoxin (Lanoxin) injection 500 mcg  apixaban (Eliquis) tablet 5 mg  norepinephrine (Levophed) 8 mg in dextrose 5% 250 mL (0.032 mg/mL) infusion (premix)  oxygen (O2) therapy  insulin lispro (HumaLOG) injection 0-10 Units  furosemide (Lasix) injection 40 mg  magnesium sulfate IV 2 g      Relevant Results  Labs  Results from last 72 hours   Lab Units 01/18/24  0558 01/17/24  0508 01/16/24  0454   WBC AUTO x10*3/uL 12.2* 13.2* 14.0*   HEMOGLOBIN g/dL 10.5* 11.2* 11.2*   HEMATOCRIT % 31.5* 34.7* 34.0*   PLATELETS AUTO x10*3/uL 281 247 244       Results from last 72 hours   Lab Units 01/18/24  0558 01/17/24  0508 01/16/24  0454   SODIUM mmol/L 147* 149* 143   POTASSIUM mmol/L 3.4 3.7 3.6   CHLORIDE mmol/L  110* 113* 108*   CO2 mmol/L 25 26 24   BUN mg/dL 40* 34* 35*   CREATININE mg/dL 2.10* 1.90* 1.80*   GLUCOSE mg/dL 171* 81 200*   CALCIUM mg/dL 7.8* 7.5* 7.4*   ANION GAP mmol/L 12 10 11   EGFR mL/min/1.73m*2 33* 37* 39*   PHOSPHORUS mg/dL  --  2.8  --        Results from last 72 hours   Lab Units 01/18/24  0558 01/17/24  0508 01/16/24  0454   ALK PHOS U/L 560* 464* 497*   BILIRUBIN TOTAL mg/dL 0.7 0.4 0.3   PROTEIN TOTAL g/dL 5.3* 5.4* 5.4*   ALT U/L <5* <5* <5*   AST U/L 36 25 23   ALBUMIN g/dL 2.2* 2.1* 2.1*       Estimated Creatinine Clearance: 31.8 mL/min (A) (by C-G formula based on SCr of 2.1 mg/dL (H)).  C-Reactive Protein   Date Value Ref Range Status   11/29/2023 2.53 (H) <1.00 mg/dL Final     CRP   Date Value Ref Range Status   12/31/2022 39.72 (A) mg/dL Final     Comment:     REF VALUE  < 1.00     12/14/2021 2.16 (A) mg/dL Final     Comment:     REF VALUE  < 1.00       Microbiology  No results found for the last 14 days.    Imaging  XR abdomen 1 view    Result Date: 1/17/2024  Interpreted By:  Jarvis Harper, STUDY: XR ABDOMEN 1 VIEW;  1/17/2024 9:02 am   INDICATION: Signs/Symptoms:Inserted NG tube.   COMPARISON: None.   ACCESSION NUMBER(S): [id number]   ORDERING CLINICIAN: SHELLEY BALDERAS   FINDINGS: NG tube is not visualized, clinical correlation is recommended. Nonobstructive bowel gas pattern. Limited evaluation of pneumoperitoneum on supine imaging, however no gross evidence of free air is noted.   Lung bases show mild bibasilar airspace opacities right-greater-than-left.   Osseous structures demonstrate no acute bony changes. Severe degenerative change at the lumbar spine.       NG tube is not visualized, clinical correlation is recommended.   Nonspecific bowel-gas pattern.   MACRO: None   Signed by: Jarvis Harper 1/17/2024 10:02 AM Dictation workstation:   [id number]    XR chest 1 view    Result Date: 1/15/2024  Interpreted By:  Rahul Staples, STUDY: XR CHEST 1 VIEW; 1/15/2024 6:59 am    INDICATION: CLINICAL INFORMATION: Signs/Symptoms:hypoxia.   COMPARISON: 01/13/2024   ACCESSION NUMBER(S): [id number]   ORDERING CLINICIAN: LYNSEY HERNDON   TECHNIQUE: Portable chest one view.   FINDINGS: The cardiac size is indeterminate in view of the AP projection.  A cardiac pacemaker with biventricular as well as right atrial leads is noted. Right internal jugular venous catheter is unchanged. Diffuse hazy bilateral alveolar infiltrates are present along with moderate-sized bilateral effusions.       Bilateral infiltrates and effusions are present in a pattern suggesting CHF. There is no interval change when compared to the previous examination.   MACRO: none   Signed by: Rahul Staples 1/15/2024 7:51 AM Dictation workstation:   [id number]    XR chest 1 view    Result Date: 1/13/2024  Interpreted By:  Rahul Staples, STUDY: XR CHEST 1 VIEW; 1/13/2024 2:51 pm   INDICATION: CLINICAL INFORMATION: Signs/Symptoms:central line and thoracentesis.   COMPARISON: 01/13/2024 at 824 hours   ACCESSION NUMBER(S): [id number]   ORDERING CLINICIAN: CRISTAL MARCH   TECHNIQUE: Portable chest one view.   FINDINGS: The cardiac size is indeterminate in view of the AP projection.  A cardiac pacemaker with biventricular as well as right atrial leads is noted. There is a right internal jugular venous catheter now present, new compared to the prior study with the tip somewhat obscured by the cardiac pacemaker leads. The tip however appears to terminate above the right atrium in the SVC distribution. There is no evidence for pneumothorax.   Decrease in the pleural effusion on the left in this patient with history of thoracentesis. There is no definite evidence for pneumothorax within limits of this study.   There are bilateral infiltrates and effusions present, possibly secondary to pulmonary edema and CHF.       1. Status post right-sided central line insertion without pneumothorax as above. 2. Status post left thoracentesis  without evidence for pneumothorax. 3. Persistent infiltrates and effusions bilaterally suggesting possibility of pulmonary edema and CHF.   MACRO: none   Signed by: Rahul Staples 1/13/2024 3:02 PM Dictation workstation:   [id number]    CT chest wo IV contrast    Result Date: 1/13/2024  Interpreted By:  Michael Fuentes, STUDY: CT CHEST WO IV CONTRAST; ;  1/13/2024 9:09 am   INDICATION: Signs/Symptoms:worsening chest xray. Pneumonia   COMPARISON: 01/03/2024   ACCESSION NUMBER(S): [id number]   ORDERING CLINICIAN: LYNSEY HERNDON   TECHNIQUE: Serial axial unenhanced CT images obtained of the chest. Images reformatted in the coronal and sagittal projection   All CT examinations are performed with 1 or more of the following dose reduction techniques: Automated exposure control, adjustment of mA and/or kv according to patient's size, or use of iterative reconstruction techniques.   FINDINGS: Mediastinum demonstrates lymphadenopathy with multiple enlarged paratracheal and pretracheal lymph nodes. There is a right paratracheal lymph node identified measuring 1.9 x 1.4 cm intervally increased in size from prior imaging where it measured 1.4 x 1.1 cm. Other lymph nodes have intervally increased in size. There is a precarinal lymph node identified measuring 12 mm in the short axis. Evaluation of the aidan limited without IV contrast. No significant hilar lymphadenopathy. Esophagus is gas-filled with component of refluxed suggested distally.   Heart and great vessels demonstrate ascending thoracic aorta to measure 3.2 cm. There is mild vascular calcification of the aortic arch. Diffuse qualitative coronary artery calcification is demonstrated. Cardiomegaly noted.   Large bilateral pleural effusions are demonstrated left-greater-than-right. Findings increased from prior imaging. There is left lower lobar collapse with no asymmetric density within the atelectatic lung. Also, there is compressive atelectasis within the left upper  lobe with partial collapse within the lingula. The right lower lobe demonstrates partial lobar collapse in relation to compressive atelectasis. Lung parenchyma demonstrates septal thickening superimposed ground-glass airspace infiltrate more focal consolidation in the areas of septal thickening within bilateral lung fields which is progressed from prior imaging which may reflect alveolar component of pulmonary edema. However, the areas of consolidation may reflect superimposed component of infectious infiltrate.   Included portions visualized abdomen demonstrate mild ascites in particular perihepatic location. There is contrast demonstrated within the colon. A cyst is noted in the superior pole of the right kidney measuring 1.5 cm.   Visualized osseous structures demonstrate no compression deformity in the spine.             1. Interval increase in bilateral pleural effusions with large bilateral effusions left-greater-than-right. There is resultant left lower lobar collapse with compressive atelectasis in particular in the left upper lobe as well as right lower lobe.   2. Patchy areas of airspace consolidation intervally developed from prior imaging with more focal septal thickening in the areas of consolidation may reflect alveolar component of pulmonary edema. However, superimposed infectious infiltrate is not excluded. No dense airspace consolidation     MACRO: None   Signed by: Michael Fuentes 1/13/2024 9:32 AM Dictation workstation:   [id number]    XR chest 1 view    Result Date: 1/13/2024  Interpreted By:  Michael Fuentes, STUDY: XR CHEST 1 VIEW 1/13/2024 8:31 am   INDICATION: Signs/Symptoms:respiratory distress   COMPARISON: 01/12/2024   ACCESSION NUMBER(S): [id number]   ORDERING CLINICIAN: CRISTAL MARCH   TECHNIQUE: Single AP view chest   FINDINGS: Cardiomediastinal silhouette is enlarged with mild cardiomegaly. Left-sided pacemaker with leads in the region of the right atrium, right ventricle, and  coronary sinus. There is generalized increased interstitial prominence with cephalization as well as patchy alveolar airspace infiltrate in bilateral lung fields without significant change. There is improved aeration at the right lung base with component of layering basilar effusion improved. However, there is persistent left-sided pleural effusion with interval increased from prior imaging with component of compressive atelectasis.             1. Interval increase in left-sided pleural effusion with compressive atelectasis with moderate left-sided pleural effusion   2. Patchy alveolar airspace infiltrate in bilateral lung fields with alveolar component of pulmonary edema suggested.   Signed by: Michael Fuentes 1/13/2024 8:48 AM Dictation workstation:   [id number]    XR chest 1 view    Result Date: 1/12/2024  Interpreted By:  Sugey Tinoco, STUDY: XR CHEST 1 VIEW 1/12/2024 3:49 pm   INDICATION: Signs/Symptoms:hypoxia   COMPARISON: 01/03/2024   ACCESSION NUMBER(S): [id number]   ORDERING CLINICIAN: CRISTAL MARCH   TECHNIQUE: AP erect view of the chest   FINDINGS: The cardiac size is mildly enlarged with biventricular ICD leads observed in right atrium, right ventricle, and coronary sinus.   Pulmonary edema is present with worsening of bilateral pulmonary infiltrates and with bilateral pleural effusion seen. Left effusion appears larger than the right with left effusion increased in size.       Pulmonary edema is now seen with increased size of left pleural effusion which is now moderate in amount. Small right pleural effusion is also present.   Signed by: Sugey Tinoco 1/12/2024 4:27 PM Dictation workstation:   [id number]    ECG 12 Lead    Result Date: 1/10/2024  Wide QRS tachycardia with Premature supraventricular complexes and with occasional Premature ventricular complexes Nonspecific intraventricular block Cannot rule out Anteroseptal infarct , age undetermined T wave abnormality, consider inferolateral  ischemia Abnormal ECG When compared with ECG of 14-DEC-2023 19:10, (unconfirmed) Wide QRS tachycardia has replaced Sinus rhythm Vent. rate has increased BY  57 BPM See ED provider note for full interpretation and clinical correlation Confirmed by Alyssa Brown ([id number]) on 1/10/2024 4:16:56 PM    FL modified barium swallow study    Result Date: 1/9/2024  Interpreted By:  Chuck Montoya and Janezic Kimberly STUDY: FL MODIFIED BARIUM SWALLOW STUDY;; 1/8/2024 3:34 pm   INDICATION: Signs/Symptoms:dysphagia.   COMPARISON: None.   ACCESSION NUMBER(S): [id number]   ORDERING CLINICIAN: CRISTAL MARCH   TECHNIQUE: MBSS completed. Informed verbal consent obtained prior to completion of exam. Trials of thin, nectar thick, honey thick, puree,  and regular solids given. Total fluoroscopy time:  2 minutes 52 seconds Radiation exposure (Reference Air Kerma):  34.37 mGy Images:  9 series     SLP: Emiliana Collins M.A. [facility]-SLP, [facility] Phone/Pager: [phone] Option 2   SPEECH FINDINGS: Reason for referral: Aspiration due to comorbidities Patient hx: Per chart: This man who is debilitated from a stroke and a history of laryngeal cancer and repeat aspiration pneumonia has spent more time in the hospital than out of the hospital for the last 2 months.  He was most recently hospitalized here December 25 with COVID and aspiration pneumonia. Respiratory status: Nasal cannula 2L Previous diet: Regular/thin liquids   FINAL SPEECH RECOMMENDATIONS   Diet recommendations/feeding strategies: Puree/nectar thick liquids. Crush medication in puree or nectar thick liquids. Sit upright at 90 degrees for meals and medications.   Follow-up speech therapy recommended: Yes. Patient will benefit from continued dysphagia therapy for oropharyngeal exercises to allow for diet progression.   Short term goals: 1. Patient will tolerate recommended diet without pulmonary compromise 2. Patient will participate in oropharyngeal strengthening exercises.  3. SLP will assess for diet upgrade as appropriate. 4. Due to silent aspiration, patient will need a repeat MBS prior to liquid diet upgrade to thin.   Long term goals: Tolerated least restrictive diet without pulmonary compromise   Education provided: Reviewed results with nursing.   Treatment Provided today: No.     Repeat study/ dc plan: Repeat MBS prior to upgrade to thin liquids, due to silent aspriation with thin liquids.   Mechanics of the swallow summary:   Oral phase: 1. Patient is unable to masticate solids, thus manually removed. Attempt at mashing with tongue was not successful. 2. Repetitive tongue motion with puree. Oral holding with all liquids and puree prior to swallow onset. 3. Swallow initiation at the pyriforms with thin liquids. At the valleculae with nectar and honey thick, and puree. 4. Trace oral residue, clears with reswallow.   Pharyngeal phase: 1. Partial superior laryngeal elevation. 2. Partial anterior hyoid excursion. 3. Partial epiglottic inversion. 4. Narrow column of contrast above vocal cords with  honey thick liquids and puree. Contrast enters airway above vocal cords (just underneath epiglottis) with no effort to eject. Narrow column of contrast enters airway below vocal cords with no effort to eject with straw. Contrast enters airway above vocal cords with teaspoon trial without effort to eject. 5. Collection of residue thoughout pharynx, mild and clears with spontaneous reswallows. 6. Narrow column of contrast between tongue base and pharyngeal wall.     SLP impressions with severity rating:   Silent aspiration with thin liquids. Laryngeal penetration noted with puree and honey thick liquids. Tolerated nectar thick liquids during testing constraints without difficulty. Unable to masticate solids. Unclear if this is due to edentulous status, or other comorbidities. Patient would benefit from continuation of dysphagia therapy to improve overall oral and pharyngeal strength,  improve mastication and upgrade diet to least restrictive without pulmonary compromise.   Thin Liquids (MBSS) Rosenbek's Penetration Aspiration Scale, Thin Liquids (MBSS): 8. SILENT ASPIRATION - contrast passes glottis, visible residue, NO pt response   Response to Aspiration, Thin Liquid (MBSS): absent response, silent aspiration,   Response to Pharyngeal Residue, Thin Liquid (MBSS): spontaneous clearing,   Nectar Thick Liquids (MBSS) Rosenbek's Penetration Aspiration Scale, Nectar thick liquids (MBSS): 1. NO ASPIRATION & NO PENETRATION - no aspiration, contrast does not enter airway   Response to Pharyngeal Residue, Nectar thick liquids (MBSS): spontaneous clearing,   Honey Thick Liquids (MBSS) Rosenbek's Penetration Aspiration Scale, Honey thick liquids (MBSS): 3. PENETRATION with LOW ASPIRATION risk - contrast remains above vocal cords, visible residue   Response to Pharyngeal Residue, Honey thick liquids (MBSS): spontaneous clearing,   Purees (MBSS) Rosenbek's Penetration Aspiration Scale, Purees (MBSS): 3. PENETRATION with LOW ASPIRATION risk - contrast remains above vocal cords, visible residue   Response to Pharyngeal Residue, Purees (MBSS): spontaneous clearing,     Speech Therapy section of this report signed by Emiliana Collins M.A., CCC-SLP, BCS-S on 1/8/2024 at 3:54 pm.   RADIOLOGY FINDINGS: There is laryngeal penetration and aspiration with thin liquid barium. There is laryngeal penetration without aspiration with nectar consistency, honey consistency, pudding consistency, and cookie consistency barium.   Radiology section of this report signed by Jan.       There is laryngeal penetration and aspiration with thin liquid barium. There is laryngeal penetration without aspiration with nectar consistency, honey consistency, pudding consistency, and cookie consistency barium.   MACRO: None   Signed by: Chuck Montoya 1/9/2024 5:02 PM Dictation workstation:   [id number]    ECG 12 lead    Result Date:  1/5/2024  Sinus bradycardia with frequent and consecutive Premature ventricular complexes and Fusion complexes Left axis deviation Nonspecific intraventricular block Inferior infarct , age undetermined Lateral injury pattern ** ** ACUTE MI / STEMI ** ** Abnormal ECG When compared with ECG of 27-DEC-2023 15:18, Significant changes have occurred See ED provider note for full interpretation and clinical correlation Confirmed by Yomi Lainez ([id number]) on 1/5/2024 9:13:12 PM    ECG 12 lead    Result Date: 1/5/2024  Undetermined rhythm Left bundle branch block Abnormal ECG When compared with ECG of 03-JAN-2024 11:50, (unconfirmed) Current undetermined rhythm precludes rhythm comparison, needs review Questionable change in QRS duration Criteria for Inferior infarct are no longer Present See ED provider note for full interpretation and clinical correlation Confirmed by Yomi Lainez ([id number]) on 1/5/2024 9:12:07 PM    CT 3D reconstruction    Addendum Date: 1/5/2024    Interpreted By:  Chuck Argueta, ADDENDUM: Multiplanar 3D reformations are also generated and reviewed on independent workstation.   Signed by: Chuck Argueta 1/5/2024 2:19 PM   -------- ORIGINAL REPORT -------- Dictation workstation:   [id number]    Result Date: 1/5/2024  Interpreted By:  Chuck Argueta, STUDY: CT FACIAL BONES WO IV CONTRAST;  1/3/2024 1:14 pm   INDICATION: Signs/Symptoms:fall.   COMPARISON: None.   ACCESSION NUMBER(S): [id number]   ORDERING CLINICIAN: FIONA GOMEZ   TECHNIQUE: Contiguous axial CT sections were performed through the bones and orbits and supplemented with coronal and sagittal reformatted images. The study is limited by motion degradation.   FINDINGS: There is mild soft tissue swelling superior to the left orbit.   There is some deformity at the distal nasal bones greater on the right. This appearance is of indeterminate age and should be correlated to point tenderness. The remaining osseous structures  demonstrate no sign of acute fracture allowing for motion degradation. There is no overt bone destruction or aggressive periosteal reaction. No lytic or blastic lesion is detected.   There are small fluid levels in both maxillary sinuses, greater on the left. There is a small fluid level in the right sphenoid compartment. There is mucoperiosteal thickening in scattered fluid within the ethmoid air cells, greater on the left posteriorly. The frontal sinuses and mastoid air cells are clear and symmetrically aerated. There may be mild mucoperiosteal thickening at the floor of the left maxillary sinus.   The nasal septum is at the midline. The ostiomeatal units are patent bilaterally.   The visualized orbital contents are unremarkable. There is no intraorbital air density or fluid collection. The medial and inferior orbital walls are intact.   There are least 2 small foci of air density posterior to the right maxillary sinus though no apparent posterior wall fracture or defect. The significance of these findings is indeterminate.       Soft tissue swelling superior left orbit.   Mild deformity at the distal tip of the nasal bones greater on the right. This appearance is of uncertain age it should be correlated to point tenderness.   The remaining visualized osseous structures are intact.   Maxillary, ethmoid, and right sphenoid sinusitis with fluid levels.   Signed by: Chuck Argueta 1/5/2024 2:19 PM Dictation workstation:   [id number]    CT maxillofacial bones wo IV contrast    Addendum Date: 1/5/2024    Interpreted By:  Chuck Argueta, ADDENDUM: Multiplanar 3D reformations are also generated and reviewed on independent workstation.   Signed by: Chuck Argueta 1/5/2024 9:43 AM   -------- ORIGINAL REPORT -------- Dictation workstation:   [id number]    Result Date: 1/5/2024  Interpreted By:  Chuck Argueta, STUDY: CT FACIAL BONES WO IV CONTRAST;  1/3/2024 1:14 pm   INDICATION:  Signs/Symptoms:fall.   COMPARISON: None.   ACCESSION NUMBER(S): [id number]   ORDERING CLINICIAN: FIONA GOMEZ   TECHNIQUE: Contiguous axial CT sections were performed through the bones and orbits and supplemented with coronal and sagittal reformatted images. The study is limited by motion degradation.   FINDINGS: There is mild soft tissue swelling superior to the left orbit.   There is some deformity at the distal nasal bones greater on the right. This appearance is of indeterminate age and should be correlated to point tenderness. The remaining osseous structures demonstrate no sign of acute fracture allowing for motion degradation. There is no overt bone destruction or aggressive periosteal reaction. No lytic or blastic lesion is detected.   There are small fluid levels in both maxillary sinuses, greater on the left. There is a small fluid level in the right sphenoid compartment. There is mucoperiosteal thickening in scattered fluid within the ethmoid air cells, greater on the left posteriorly. The frontal sinuses and mastoid air cells are clear and symmetrically aerated. There may be mild mucoperiosteal thickening at the floor of the left maxillary sinus.   The nasal septum is at the midline. The ostiomeatal units are patent bilaterally.   The visualized orbital contents are unremarkable. There is no intraorbital air density or fluid collection. The medial and inferior orbital walls are intact.   There are least 2 small foci of air density posterior to the right maxillary sinus though no apparent posterior wall fracture or defect. The significance of these findings is indeterminate.       Soft tissue swelling superior left orbit.   Mild deformity at the distal tip of the nasal bones greater on the right. This appearance is of uncertain age it should be correlated to point tenderness.   The remaining visualized osseous structures are intact.   Maxillary, ethmoid, and right sphenoid sinusitis with fluid levels.    Signed by: Chuck Argueta 1/3/2024 1:56 PM Dictation workstation:   [id number]    ECG 12 lead    Result Date: 1/4/2024  Ventricular-paced rhythm with occasional and consecutive sinus complexes and with frequent and consecutive Premature ventricular complexes with junctional escape complexes Abnormal ECG When compared with ECG of 04-JAN-2024 04:21, (unconfirmed) Previous ECG has undetermined rhythm, needs review See ED provider note for full interpretation and clinical correlation Confirmed by Yomi Lainez ([id number]) on 1/4/2024 9:24:49 AM    CT head wo IV contrast    Addendum Date: 1/3/2024    Interpreted By:  Chuck Argueta, ADDENDUM: Subdural collection over the right convexity is new from 12/31/2022 though is of CSF attenuation. There is no evidence of dense acute hemorrhage at this site.   There is some asymmetry of the lateral ventricles with the left larger than the right. This in part may be related to some residual mass effect from the right subdural collection though there is no significant midline shift or sulcal effacement.   Signed by: Chuck Argueta 1/3/2024 3:48 PM   -------- ORIGINAL REPORT -------- Dictation workstation:   [id number]    Result Date: 1/3/2024  Interpreted By:  Chuck Argueta, STUDY: CT HEAD WO IV CONTRAST;  1/3/2024 1:14 pm   INDICATION: Signs/Symptoms:fall, periorbital ecchymosis.   COMPARISON: 12/31/2022   ACCESSION NUMBER(S): [id number]   ORDERING CLINICIAN: FIONA GOMEZ   TECHNIQUE: Contiguous unenhanced axial CT sections are performed from the skull base to the vertex.   FINDINGS: The osseous structures are intact. There is small fluid levels in the maxillary sinuses, greater on the left as well as the right sphenoid compartment. There is partial opacification of the ethmoid air cells with mucoperiosteal thickening and fluid greater on the left. The visualized portions of the mastoid air cells are and frontal sinuses are clear.   There is severe  generalized parenchymal volume loss with enlargement of the cortical sulci and CSF spaces. There is a CSF subdural collection overlying the right frontal convexity extending laterally over the temporoparietal convexity. This finding is new from the previous study and measures 8 mm in maximum thickness. There may be a smaller CF density extra-axial collection over the left frontal convexity anteriorly.   There is hypodensity in the deep cerebral white matter bilaterally compatible with small-vessel ischemia. There is no sign of parenchymal hematoma. There is no dense extra-axial fluid collection. There is no mass effect or midline shift. There is some asymmetry of the lateral ventricles with the left larger than the right.       Chronic appearing low density extra-axial fluid collections overlying the frontal convexities, greater on the right. These findings are new from 12/31/2022.   Generalized parenchymal atrophy and small-vessel ischemic changes of the cerebral white matter.   Maxillary, ethmoid, and right sphenoid sinusitis with acute fluid levels.   No sign of acute intracranial hemorrhage or mass effect.   The visualized osseous structures are intact.   Signed by: Chuck Argueta 1/3/2024 1:42 PM Dictation workstation:   [id number]    CT chest abdomen pelvis wo IV contrast    Result Date: 1/3/2024  STUDY: CT Chest, Abdomen, and Pelvis without IV Contrast; 1/3/2024, 1:15PM. INDICATION: Shortness of breath and abdominal pain.  Leukocytosis. COMPARISON: CXR same day.  CT CAP 12/31/2022. ACCESSION NUMBER(S): [id number] ORDERING CLINICIAN: FIONA GOMEZ TECHNIQUE: CT of the chest, abdomen, and pelvis was performed.  Contiguous axial images were obtained at 3 mm slice thickness through the chest, abdomen, and pelvis.  Coronal and sagittal reconstructions at 3 mm slice thickness were performed.  No intravenous contrast was administered.  FINDINGS: Please note that the evaluation of vessels, lymph nodes and  organs is limited without intravenous contrast. CHEST: MEDIASTINUM: The heart is enlarged in size without pericardial effusion. Biventricular AICD remains in place.  There is no thoracic aortic, aneurysm.  Scattered vascular calcifications are seen along the arterial tree. LUNGS/PLEURA: There are bilateral pleural effusions greater on the left than the right, these appear decreased when compared with the prior especially on the right.  Adjacent compressive and subsegmental atelectatic changes are noted as well as some areas of probable consolidation. There is a focus of patchy infiltrate in the right upper lobe anteriorly in proximity to the anterior aspect of the first rib.  This appears slightly more prominent.  Trachea and mainstem bronchi appear stable, borderline narrowed Groundglass infiltrates noted previously appear improved with mild residual. LYMPH NODES: Mediastinal lymph nodes are mildly enlarged.  There is a pretracheal lymph node with the short axis diameter of 1.4 cm similar to the prior.  Detail is somewhat obscured, by artifact on the current study.  Patient's arms are included within the field-of-view. ABDOMEN:  LIVER: The liver is mildly enlarged, the right lobe measures 17 cm in CC dimension.  Assessment of parenchyma is limited by artifact.  Smooth surface contour.  Normal attenuation.  BILE DUCTS: No intrahepatic or extrahepatic biliary ductal dilatation.  GALLBLADDER: The gallbladder is surgically absent and this was also the case previously.  PANCREAS: No masses or ductal dilatation.  SPLEEN: The spleen is not clearly identified nor was it previously.  ADRENAL GLANDS: No thickening or nodules.  KIDNEYS AND URETERS: Kidneys are normal in size and location.  No renal or ureteral calculi.  There is a 2 cm cyst in the upper pole of the right kidney with small peripheral calcifications which are more prominent than on the prior.  No hydronephrosis of either kidney.  PELVIS:  BLADDER: There is  increased bladder distention when compared with the prior. There is extrinsic pressure from the prostate and coarse prostate calcifications also present previously.  Increased attenuation in the dependent bladder appears to be likely artifactual.  REPRODUCTIVE ORGANS: Coarse prostate calcifications are noted also present previously.  BOWEL: There are multiple fluid and air-filled loops of small and large bowel as can be seen with adynamic ileus.  VESSELS: No acute abnormalities identified.  Abdominal aorta is normal in caliber.  There are vascular calcifications along the arterial tree.  PERITONEUM/RETROPERITONEUM/LYMPH NODES: There are mesenteric and pelvic reticulations.  There is a small amount of presacral and pelvic fluid.  No pneumoperitoneum. No lymphadenopathy.  ABDOMINAL WALL AND SOFT TISSUES: Abdominal wall edema is noted especially laterally more so on the right and posteriorly.  BONES: No acute fracture or aggressive osseous lesion.  Lumbar dextroscoliosis is noted and there are degenerative changes of the spine and joints.    1.Bilateral pleural effusions greater on the left than the right, these appear decreased from the prior especially on the right. Adjacent compressive and subsegmental atelectatic changes are noted as well as some areas of consolidation. 2.Improving groundglass infiltrates in both lungs with mild residual. There is a small focus of infiltrate in the right upper lobe anteriorly which appears slightly more prominent.  Pulmonary findings suggest a combination of pneumonia and pulmonary vascular congestion. This could also be correlated clinically.  Underlying nodules could be obscured and follow-up is suggested. 3.Cardiomegaly with AICD in place also present previously. 4.Within the abdomen and pelvis there are multiple fluid and air-filled loops of small and large bowel as can be seen with adynamic ileus. There is a small amount of presacral and pelvic fluid. No pneumoperitoneum.  5.Abdominal wall edema. 6.  2 cm cyst in the upper pole of the right kidney with small peripheral calcifications which are more prominent than on the prior. Follow-up studies are suggested. Signed by Nancy Hagen DO    CT cervical spine wo IV contrast    Result Date: 1/3/2024  Interpreted By:  Chuck Argueta, STUDY: CT CERVICAL SPINE WO IV CONTRAST;  1/3/2024 1:14 pm   INDICATION: Signs/Symptoms:fall.   COMPARISON: None.   ACCESSION NUMBER(S): [id number]   ORDERING CLINICIAN: FIONA GOMEZ   TECHNIQUE: Contiguous axial CT sections are performed from the skullbase to the upper thoracic spine and supplemented with coronal and sagittal reformatted images. The study is limited by motion degradation.   FINDINGS: There is mild dextro convexity of the cervical spine and reversal of the cervical lordosis. There is anterior subluxation of C2 relative to C3 measuring 3 mm. The facet joints align normally.   There are multilevel discogenic degenerative changes of the cervical spine with disc space narrowing from C3 through C6. There is degenerative endplate sclerosis and lucency is more pronounced at C5-6.   The cervical vertebral body heights are maintained. Allowing for motion degradation, there is no CT evidence of acute cervical spine fracture. There is no bone destruction or aggressive periosteal reaction. No lytic or blastic lesion is detected. The visualized surrounding osseous structures are intact.   The C1-2 relationship is within normal limits. There is C1-2 arthrosis anteriorly with joint space narrowing and marginal spurring.   The C2-3 disc space level demonstrates moderate facet arthrosis on the right and mild facet arthrosis on the left. There is bulging disc and uncovertebral arthrosis with mild narrowing of the left neural foramen and mild to moderate narrowing on the right. There is mild central canal narrowing with bulging disc and mass effect upon the ventral subarachnoid space.   The C3-4 disc  space level demonstrates mild to moderate bilateral facet arthrosis. There is bulging disc and marginal osteophyte asymmetric to the right with severe uncovertebral arthrosis bilaterally. There is severe bilateral neural foraminal narrowing, greater on the right with moderate central canal narrowing.   The C4-5 disc space level demonstrates mild to moderate bilateral facet arthrosis, greater on the left. There is bulging disc and marginal osteophyte with severe bilateral uncovertebral arthrosis. There is severe narrowing of the right neural foramen and moderate to severe narrowing of the left neural foramen. There is superimposed extruded disc on the right with severe central canal narrowing and suspected mass effect upon the spinal cord. A similar finding was described on a MRI examination of 10/13/2022 though it is difficult to compare different modalities with respect to the size and intensity of this finding.   The C5-6 disc space level demonstrates moderate bilateral facet arthrosis. There is bulging disc and marginal osteophyte with moderate central canal narrowing. There is severe bilateral neural foraminal narrowing.   The C6-7 disc space level demonstrates mild to moderate bilateral facet arthrosis. There is bulging disc and marginal osteophyte moderate narrowing left neural foramen and mild narrowing of the right neural foramen. There is mild to moderate central canal narrowing.   The C7-T1 disc space level demonstrates moderate bilateral facet arthrosis. There is bulging disc and marginal osteophyte with moderate bilateral neural foraminal narrowing. There is mild central canal narrowing.   There is no prevertebral soft tissue swelling or retropharyngeal air. Bilateral pleural effusions and dependent atelectasis is partially visualized, greater on the left. There are bilateral emphysematous changes with ground-glass density in both lung apices. There is superimposed focal opacity in the right apex  anteriorly with a few peripheral air densities.       Severe multilevel cervical spondylosis with reversal of the cervical lordosis and mild dextro convexity of the cervical spine.   Multilevel bulging disc with facet and uncovertebral arthrosis. There is large superimposed extruded disc at C4-5 on the right. Severe central canal stenosis at this level asymmetric to the right. There is at least moderate central canal narrowing at C5-6 and C3-4. There is severe multilevel bilateral neural foraminal narrowing as detailed above.   Allowing for motion degradation, there is no CT evidence of acute fracture or traumatic subluxation.   Bilateral pleural effusions and dependent atelectasis are partially visualized, greater on the left. There are bilateral emphysematous changes with ground-glass density in the lung apices. Focal opacity with ill-defined margins is identified in the right apex. Further workup with dedicated CT thorax is recommended.   Signed by: Chuck Argueta 1/3/2024 1:51 PM Dictation workstation:   [id number]    XR chest 1 view    Result Date: 1/3/2024  Interpreted By:  Anahi Fernando, STUDY: XR CHEST 1 VIEW;  1/3/2024 11:43 am   INDICATION: Signs/Symptoms:SOB.   COMPARISON: 12/27/2023   ACCESSION NUMBER(S): [id number]   ORDERING CLINICIAN: FIONA GOMEZ   FINDINGS: CARDIOMEDIASTINAL SILHOUETTE: The heart is enlarged. There is a left subclavian pacemaker-AICD with a single tip in the right atrium and two tips in the right ventricle.     LUNGS: There is improvement in bilateral perihilar alveolar opacities consistent with resolving pulmonary edema. There is underlying moderate vascular congestion, improved. There is no pleural fluid.   ABDOMEN: No remarkable upper abdominal findings.     BONES: No acute osseous changes.       1. Marked improvement bilateral patchy alveolar opacities consistent with resolving pneumonia. 2. Underlying moderate vascular congestion, improved.   MACRO: None   Signed  by: Anahi Fernando 1/3/2024 12:00 PM Dictation workstation:   [id number]    ECG 12 lead    Result Date: 1/1/2024  Undetermined rhythm Left bundle branch block Abnormal ECG When compared with ECG of 27-DEC-2023 07:49, (unconfirmed) Current undetermined rhythm precludes rhythm comparison, needs review See ED provider note for full interpretation and clinical correlation Confirmed by Yomi Lainez ([id number]) on 1/1/2024 3:20:04 PM    XR chest 1 view    Result Date: 12/27/2023  Interpreted By:  Anahi Fernando, STUDY: XR CHEST 1 VIEW;  12/27/2023 8:43 am   INDICATION: Signs/Symptoms:Dypnea.   COMPARISON: 12/14/2023   ACCESSION NUMBER(S): [id number]   ORDERING CLINICIAN: SYLVIA MOORE   FINDINGS: CARDIOMEDIASTINAL SILHOUETTE: The heart is enlarged. There is a left subclavian pacemaker-AICD with a single tip in the right atrium and two tips in the right ventricle.   LUNGS: There are progressive perihilar alveolar opacities, likely pulmonary edema but could also represent pneumonia. There is left basilar atelectasis and volume loss which has progressed. There is no pleural fluid.   ABDOMEN: No remarkable upper abdominal findings.     BONES: No acute osseous changes.       1. Progressive bilateral predominantly perihilar alveolar opacities consistent with pulmonary edema or pneumonia. 2. Progressive volume loss and atelectasis left lung base.   MACRO: None   Signed by: Anahi Fernando 12/27/2023 8:50 AM Dictation workstation:   [id number]    CT FACIAL BONE/TITO WO IVCON    Result Date: 12/22/2023  * * *Final Report* * * DATE OF EXAM: Dec 22 2023  5:57PM   [facility]   0507  -  CT FACIAL BONE/TITO WO IVCON  / ACCESSION #  [id number] PROCEDURE REASON: Maxillofacial pain      * * * * Physician Interpretation * * * *  EXAMINATION:  CT BRAIN WO IVCON, CT FACIAL BONE/TITO WO IVCON, CT CERVICAL SPINE WO IVCON CLINICAL HISTORY: Head trauma, moderate-severe (accession [id number]), Maxillofacial pain, Nasal fracture suspected  (accession [id number]), Spine fracture, cervical, traumatic (accession [id number]) TECHNIQUE:  Serial axial images without IV contrast were obtained from the vertex to the foramen magnum.  CT of the cervical spine was also performed with axial sections from the skull base through the thoracic inlet. CT of the face was performed without IV contrast and multiplanar reconstructions were generated. MQ:  CTBWO_3 CT Dose-Length Product (DLP): 1565  mGy*cm CT Dose Reduction Employed: Iterative recon (accession [id number]), Automated exposure control(AEC) and iterative recon (accession [id number]) COMPARISON:  None. RESULT: CT HEAD: No acute intracranial hemorrhage or extra-axial fluid collection. No hydrocephalus, mass effect, or herniation. No acute ischemic infarct detected.  Mild background of white matter hypoattenuation consistent with chronic microvascular ischemic change. Unremarkable dural venous sinus attenuation. No acute osseous abnormality. Clear visualized paranasal sinuses and tympanomastoid cavities. CT face: Soft Tissues:  Soft tissue swelling is present at the nose and along the right forehead/periorbital region. Facial bones:  Mildly comminuted bilateral nasal bone fractures are present. Orbits:  No evidence of an acute fracture.  The globes are intact.  The soft tissue planes of the orbits are maintained. Paranasal Sinuses:  The paranasal sinuses are clear. Foreign Bodies:  No evidence of radiopaque foreign bodies. Other:  No evidence of a remote fracture.  No lytic or blastic process seen in the facial bones. CT cervical spine: There is reversal of the normal cervical lordosis. There is no fracture or dislocation. Severe multilevel degenerative changes are present.  There is severe spinal canal stenosis at the C4-C5 level from a large posterior disc osteophyte complex.  Moderate spinal canal stenosis present at the C3-C4 level from a posterior disc osteophyte complex.  Severe bilateral neural foraminal  narrowing is present at the C3-C4, C4-C5, and C5-C6 levels. Centrilobular emphysema.    IMPRESSION: 1.  No acute intracranial abnormality. 2.  No cervical spine fracture or traumatic subluxation.  Severe multilevel degenerative change. 3.  Nasal bone fractures with adjacent soft tissue swelling. : ALISSA   Transcribe Date/Time: Dec 22 2023  6:05P Dictated by : MAIRA STEWART MD This examination was interpreted and the report reviewed and electronically signed by: MAIRA STEWART MD on Dec 22 2023  6:17PM  EST    CT CERVICAL SPINE WO IVCON    Result Date: 12/22/2023  * * *Final Report* * * DATE OF EXAM: Dec 22 2023  5:57PM   [facility]   0505  -  CT CERVICAL SPINE WO IVCON  / ACCESSION #  [id number] PROCEDURE REASON: Spine fracture, cervical, traumatic      * * * * Physician Interpretation * * * *  EXAMINATION:  CT BRAIN WO IVCON, CT FACIAL BONE/TITO WO IVCON, CT CERVICAL SPINE WO IVCON CLINICAL HISTORY: Head trauma, moderate-severe (accession [id number]), Maxillofacial pain, Nasal fracture suspected (accession [id number]), Spine fracture, cervical, traumatic (accession [id number]) TECHNIQUE:  Serial axial images without IV contrast were obtained from the vertex to the foramen magnum.  CT of the cervical spine was also performed with axial sections from the skull base through the thoracic inlet. CT of the face was performed without IV contrast and multiplanar reconstructions were generated. MQ:  CTBWO_3 CT Dose-Length Product (DLP): 1565  mGy*cm CT Dose Reduction Employed: Iterative recon (accession [id number]), Automated exposure control(AEC) and iterative recon (accession [id number]) COMPARISON:  None. RESULT: CT HEAD: No acute intracranial hemorrhage or extra-axial fluid collection. No hydrocephalus, mass effect, or herniation. No acute ischemic infarct detected.  Mild background of white matter hypoattenuation consistent with chronic microvascular ischemic change. Unremarkable dural venous sinus attenuation. No  acute osseous abnormality. Clear visualized paranasal sinuses and tympanomastoid cavities. CT face: Soft Tissues:  Soft tissue swelling is present at the nose and along the right forehead/periorbital region. Facial bones:  Mildly comminuted bilateral nasal bone fractures are present. Orbits:  No evidence of an acute fracture.  The globes are intact.  The soft tissue planes of the orbits are maintained. Paranasal Sinuses:  The paranasal sinuses are clear. Foreign Bodies:  No evidence of radiopaque foreign bodies. Other:  No evidence of a remote fracture.  No lytic or blastic process seen in the facial bones. CT cervical spine: There is reversal of the normal cervical lordosis. There is no fracture or dislocation. Severe multilevel degenerative changes are present.  There is severe spinal canal stenosis at the C4-C5 level from a large posterior disc osteophyte complex.  Moderate spinal canal stenosis present at the C3-C4 level from a posterior disc osteophyte complex.  Severe bilateral neural foraminal narrowing is present at the C3-C4, C4-C5, and C5-C6 levels. Centrilobular emphysema.    IMPRESSION: 1.  No acute intracranial abnormality. 2.  No cervical spine fracture or traumatic subluxation.  Severe multilevel degenerative change. 3.  Nasal bone fractures with adjacent soft tissue swelling. : PSCB   Transcribe Date/Time: Dec 22 2023  6:05P Dictated by : MAIRA STEWART MD This examination was interpreted and the report reviewed and electronically signed by: MAIRA STEWART MD on Dec 22 2023  6:17PM  EST    CT BRAIN WO IVCON    Result Date: 12/22/2023  * * *Final Report* * * DATE OF EXAM: Dec 22 2023  5:57PM   ASC   0504  -  CT BRAIN WO IVCON   / ACCESSION #  [id number] PROCEDURE REASON: Head trauma, moderate-severe      * * * * Physician Interpretation * * * *  EXAMINATION:  CT BRAIN WO IVCON, CT FACIAL BONE/TITO WO IVCON, CT CERVICAL SPINE WO IVCON CLINICAL HISTORY: Head trauma, moderate-severe  (accession [id number]), Maxillofacial pain, Nasal fracture suspected (accession [id number]), Spine fracture, cervical, traumatic (accession [id number]) TECHNIQUE:  Serial axial images without IV contrast were obtained from the vertex to the foramen magnum.  CT of the cervical spine was also performed with axial sections from the skull base through the thoracic inlet. CT of the face was performed without IV contrast and multiplanar reconstructions were generated. MQ:  CTBWO_3 CT Dose-Length Product (DLP): 1565  mGy*cm CT Dose Reduction Employed: Iterative recon (accession [id number]), Automated exposure control(AEC) and iterative recon (accession [id number]) COMPARISON:  None. RESULT: CT HEAD: No acute intracranial hemorrhage or extra-axial fluid collection. No hydrocephalus, mass effect, or herniation. No acute ischemic infarct detected.  Mild background of white matter hypoattenuation consistent with chronic microvascular ischemic change. Unremarkable dural venous sinus attenuation. No acute osseous abnormality. Clear visualized paranasal sinuses and tympanomastoid cavities. CT face: Soft Tissues:  Soft tissue swelling is present at the nose and along the right forehead/periorbital region. Facial bones:  Mildly comminuted bilateral nasal bone fractures are present. Orbits:  No evidence of an acute fracture.  The globes are intact.  The soft tissue planes of the orbits are maintained. Paranasal Sinuses:  The paranasal sinuses are clear. Foreign Bodies:  No evidence of radiopaque foreign bodies. Other:  No evidence of a remote fracture.  No lytic or blastic process seen in the facial bones. CT cervical spine: There is reversal of the normal cervical lordosis. There is no fracture or dislocation. Severe multilevel degenerative changes are present.  There is severe spinal canal stenosis at the C4-C5 level from a large posterior disc osteophyte complex.  Moderate spinal canal stenosis present at the C3-C4 level from a  posterior disc osteophyte complex.  Severe bilateral neural foraminal narrowing is present at the C3-C4, C4-C5, and C5-C6 levels. Centrilobular emphysema.    IMPRESSION: 1.  No acute intracranial abnormality. 2.  No cervical spine fracture or traumatic subluxation.  Severe multilevel degenerative change. 3.  Nasal bone fractures with adjacent soft tissue swelling. : ALISSA   Transcribe Date/Time: Dec 22 2023  6:05P Dictated by : MAIRA STEWART MD This examination was interpreted and the report reviewed and electronically signed by: MAIRA STEWART MD on Dec 22 2023  6:17PM  EST    Transthoracic Echo (TTE) Complete    Result Date: 12/19/2023   [facility], [address] [place] [address]              Tel [phone] and Fax [phone] TRANSTHORACIC ECHOCARDIOGRAM REPORT  Patient Name:      IJEOMA SHELLEYKLUND     Reading Physician:    [id number] Escobar Mendez MD Study Date:        12/18/2023          Ordering Provider:    [id number] GUNNAR HUGO MRN/PID:           [id number]            Fellow: Accession#:        [id number]        Nurse:                Mattie Sosa RN Date of Birth/Age: 1950 / 73 years Sonographer:          Cruz Wood SAMSON Gender:            M                   Additional Staff: Height:            175.26 cm           Admit Date: Weight:            74.84 kg            Admission Status:     Inpatient - Routine BSA:               1.90 m2             Encounter#:           [id number]                                        Department Location:  [facility] Blood Pressure: 112 /63 mmHg Study Type:    TRANSTHORACIC ECHO (TTE) COMPLETE Diagnosis/ICD: Cardiomyopathy, unspecified-I42.9 Indication:    Cardiomyopathy CPT Code:      Echo Complete w Full Doppler-[id number] Patient History: Diabetes:           Yes Pertinent History: A-Fib, CAD, CVA, HTN, Cancer, COPD and Syncope. Ischemic CM,                    cardiac stent. Study Detail: The following Echo studies were performed: 2D, M-Mode, Doppler and               color flow. Technically challenging study due to body habitus.               Definity used as a contrast agent for endocardial border               definition. Total contrast used for this procedure was 1.5 mL via               IV push. The patient was awake.  PHYSICIAN INTERPRETATION: Left Ventricle: The left ventricular systolic function is moderately to severely decreased, with an estimated ejection fraction of 30-35%. Wall motion is abnormal. The left ventricular cavity size is mildly dilated. There is mild concentric left ventricular hypertrophy. Findings are consistent with ischemic cardiomyopathy. Spectral Doppler shows an impaired relaxation pattern of left ventricular diastolic filling. LV Wall Scoring: The mid inferolateral segment is akinetic. The mid and apical anterior wall, basal and mid anterolateral wall, mid anteroseptal segment, basal inferolateral segment, apical lateral segment, basal inferior segment, and apex are hypokinetic. All remaining scored segments are normal. Left Atrium: The left atrium is mildly dilated. Right Ventricle: The right ventricle is mildly enlarged. There is mildly reduced right ventricular systolic function. A device is visualized in the right ventricle. Right Atrium: The right atrium is mildly dilated. Aortic Valve: The aortic valve is trileaflet. There is mild aortic valve cusp calcification. There is no evidence of aortic valve regurgitation. The peak instantaneous gradient of the aortic valve is 3.5 mmHg. Mitral Valve: The mitral valve is normal in structure. There is mild mitral valve regurgitation. Tricuspid Valve: The tricuspid valve is structurally normal. There is mild tricuspid regurgitation. Pulmonic Valve: The pulmonic valve is structurally  normal. There is physiologic pulmonic valve regurgitation. Pericardium: There is a trivial to small pericardial effusion. Aorta: The aortic root is normal. Pulmonary Artery: The tricuspid regurgitant velocity is 3.17 m/s, and with an estimated right atrial pressure of 3 mmHg, the estimated pulmonary artery pressure is mildly elevated with the RVSP at 43.2 mmHg. Pulmonary Veins: There is blunted systolic flow in the pulmonary veins. Systemic Veins: The inferior vena cava appears mildly dilated.  CONCLUSIONS:  1. Left ventricular systolic function is moderately to severely decreased with a 30-35% estimated ejection fraction.  2. Multiple segmental abnormalities exist. See findings.  3. Spectral Doppler shows an impaired relaxation pattern of left ventricular diastolic filling.  4. There is ischemic cardiomyopathy.  5. There is mildly reduced right ventricular systolic function.  6. Mild pulmonary HTN. CVP is elevated. QUANTITATIVE DATA SUMMARY: 2D MEASUREMENTS:                          Normal Ranges: Ao Root d:     2.60 cm   (2.0-3.7cm) LAs:           4.60 cm   (2.7-4.0cm) IVSd:          1.02 cm   (0.6-1.1cm) LVPWd:         0.96 cm   (0.6-1.1cm) LVIDd:         4.75 cm   (3.9-5.9cm) LVIDs:         4.22 cm LV Mass Index: 86.6 g/m2 LV % FS        11.2 % LA VOLUME:                               Normal Ranges: LA Vol A4C:        104.7 ml   (22+/-6mL/m2) LA Vol A2C:        69.5 ml LA Vol BP:         92.7 ml LA Vol Index A4C:  55.0ml/m2 LA Vol Index A2C:  36.5 ml/m2 LA Vol Index BP:   48.7 ml/m2 LA Area A4C:       28.3 cm2 LA Area A2C:       21.2 cm2 LA Major Axis A4C: 6.5 cm LA Major Axis A2C: 5.5 cm LA Volume Index:   47.0 ml/m2 RA VOLUME BY A/L METHOD:                               Normal Ranges: RA Vol A4C:        62.8 ml    (8.3-19.5ml) RA Vol Index A4C:  33.0 ml/m2 RA Area A4C:       20.7 cm2 RA Major Axis A4C: 5.8 cm M-MODE MEASUREMENTS:                  Normal Ranges: Ao Root: 2.60 cm (2.0-3.7cm) LAs:     4.50 cm  (2.7-4.0cm) AORTA MEASUREMENTS:                    Normal Ranges: Asc Ao, d: 2.90 cm (2.1-3.4cm) LV SYSTOLIC FUNCTION BY 2D PLANIMETRY (MOD):                     Normal Ranges: EF-A4C View: 28.3 % (>=55%) EF-A2C View: 30.5 % EF-Biplane:  29.5 % LV DIASTOLIC FUNCTION:                            Normal Ranges: MV Peak E:      0.93 m/s   (0.7-1.2 m/s) MV e'           0.05 m/s   (>8.0) MV lateral e'   0.05 m/s MV medial e'    0.05 m/s E/e' Ratio:     18.56      (<8.0) PulmV Sys Chet:  24.60 cm/s PulmV English Chet: 88.80 cm/s PulmV S/D Chet:  0.30 MITRAL VALVE:                 Normal Ranges: MV DT: 201 msec (150-240msec) AORTIC VALVE:                         Normal Ranges: AoV Vmax:      0.93 m/s (<=1.7m/s) AoV Peak PG:   3.5 mmHg (<20mmHg) LVOT Max Chet:  0.79 m/s (<=1.1m/s) LVOT VTI:      16.30 cm LVOT Diameter: 1.90 cm  (1.8-2.4cm) AoV Area,Vmax: 2.41 cm2 (2.5-4.5cm2)  RIGHT VENTRICLE: RV Basal 3.70 cm RV Mid   2.90 cm RV Major 7.3 cm TAPSE:   13.7 mm RV s'    0.10 m/s TRICUSPID VALVE/RVSP:                             Normal Ranges: Peak TR Velocity: 3.17 m/s RV Syst Pressure: 43.2 mmHg (< 30mmHg) IVC Diam:         2.20 cm PULMONIC VALVE:                      Normal Ranges: PV Max Chet: 0.7 m/s  (0.6-0.9m/s) PV Max P.1 mmHg Pulmonary Veins: PulmV English Chet: 88.80 cm/s PulmV S/D Chet:  0.30 PulmV Sys Chet:  24.60 cm/s  [id number] Escobar Mendez MD Electronically signed on 2023 at 5:23:07 PM  Wall Scoring  ** Final **      Assessment/Plan   Septic shock, on pressor support  Coronavirus infection-treated with at least 10 days of dexamethasone, no further isolation precautions indicated  Suspected aspiration pneumonia  Left-sided pleural effusion, s/p thoracentesis  Leukocytosis-most likely multifactorial  C. difficile infection-positive PCR/negative EIA-no diarrhea  Chronic respiratory failure-interval worsening, on BiPAP     Oral vancomycin for a total of 14 days-IV Flagyl if patient is unable to take p.o-tentative  stop date 1/19/2024-will extend based on stop date of Merrem  IV meropenem per pulmonary-evaluate for stopping on 1/18/2024  Continue contact plus precautions for c.diff  Supplemental oxygen as needed  Monitor temperature and WBC  Cardiology to advise with regards amiodarone dosing due to to potential interaction between Flagyl and amiodarone         Malia Flood, APRN-CNP

## 2024-01-18 NOTE — NURSING NOTE
Assumed care of patient.  He is resting comfortably on BIPAP.  His BP is low and levophed was increased.

## 2024-01-18 NOTE — PROGRESS NOTES
Patient not medically clear. Patient decompensated overnight, requiring bipap and increased pressor support. At the time of discharge patient will return to [facility]. No precert needed. Will follow.      **PATIENT WITH A SAFE DISCHARGE PLAN      Andree Torres RN

## 2024-01-18 NOTE — PROGRESS NOTES
"Hai Spaulding is a 73 y.o. male on day 14 of admission presenting with Acute respiratory failure (CMS/HCC).    Subjective   Decompensated overnight, now on dobutamine with increased levophed requirements, now on continuous bipap. Wife at bedside.     Objective     Physical Exam  Vitals and nursing note reviewed.   Constitutional:       Appearance: He is acute on chronically ill-appearing. On continuous bipap, was belly breathing on nasal cannula.  Lying in bed, appears older than stated age  HENT:      Head: Normocephalic and atraumatic.  Temporal wasting noted, bruising over right orbital socket and bridge of nose with small healed scab.  Slight left-sided septal deviation, NG tube noted to the right nares.     Mouth/Throat:      Mouth: Mucous membranes are unable to be visualized d/t bipap  Eyes:      Pupils: Pupils are equal, round, and reactive to light.   Cardiovascular:      Rate and Rhythm: Heart rate 117-130, wide complex afib with intermittent pacing spikes     Heart sounds: No murmur heard.     No friction rub. No gallop.   Abdominal:      General: There is no distension.      Tenderness: There is no abdominal tenderness. There is no guarding or rebound.   Genitourinary:     Comments: zaragoza urine clear yellow, scant output  Musculoskeletal:   General: No obvious joint deformities, no tenderness noted with palpation of the cervical spine, generalized muscle loss noted to the bilateral lower extremities     Cervical back: Neck supple.   Skin:     General: Skin is warm and dry.  Back and buttocks were not assessed.  Edema noted to both feet and bilateral upper extremities, nailbeds are dusky, cap refill less than 3 seconds.  Bruising noted to bilateral forearms.  Neurological:      Mental Status: He is somnolent, nodding yes/no to questions.   Last Recorded Vitals  Blood pressure 91/55, pulse 108, temperature 36.8 °C (98.2 °F), temperature source Temporal, resp. rate (!) 32, height 1.753 m (5' 9.02\"), " weight 79.6 kg (175 lb 7.8 oz), SpO2 96 %.  Intake/Output last 3 Shifts:  I/O last 3 completed shifts:  In: 540 (6.8 mL/kg) [Blood:50; NG/GT:190; IV Piggyback:300]  Out: 2375 (29.8 mL/kg) [Urine:2375 (0.8 mL/kg/hr)]  Weight: 79.6 kg     Relevant Results  Results for orders placed or performed during the hospital encounter of 01/04/24 (from the past 24 hour(s))   POCT GLUCOSE   Result Value Ref Range    POCT Glucose 98 74 - 99 mg/dL   POCT GLUCOSE   Result Value Ref Range    POCT Glucose 106 (H) 74 - 99 mg/dL   POCT GLUCOSE   Result Value Ref Range    POCT Glucose 113 (H) 74 - 99 mg/dL   POCT GLUCOSE   Result Value Ref Range    POCT Glucose 136 (H) 74 - 99 mg/dL   Comprehensive Metabolic Panel   Result Value Ref Range    Glucose 171 (H) 65 - 99 mg/dL    Sodium 147 (H) 133 - 145 mmol/L    Potassium 3.4 3.4 - 5.1 mmol/L    Chloride 110 (H) 97 - 107 mmol/L    Bicarbonate 25 24 - 31 mmol/L    Urea Nitrogen 40 (H) 8 - 25 mg/dL    Creatinine 2.10 (H) 0.40 - 1.60 mg/dL    eGFR 33 (L) >60 mL/min/1.73m*2    Calcium 7.8 (L) 8.5 - 10.4 mg/dL    Albumin 2.2 (L) 3.5 - 5.0 g/dL    Alkaline Phosphatase 560 (H) 35 - 125 U/L    Total Protein 5.3 (L) 5.9 - 7.9 g/dL    AST 36 5 - 40 U/L    Bilirubin, Total 0.7 0.1 - 1.2 mg/dL    ALT <5 (L) 5 - 40 U/L    Anion Gap 12 <=19 mmol/L   CBC   Result Value Ref Range    WBC 12.2 (H) 4.4 - 11.3 x10*3/uL    nRBC 4.0 (H) 0.0 - 0.0 /100 WBCs    RBC 3.71 (L) 4.50 - 5.90 x10*6/uL    Hemoglobin 10.5 (L) 13.5 - 17.5 g/dL    Hematocrit 31.5 (L) 41.0 - 52.0 %    MCV 85 80 - 100 fL    MCH 28.3 26.0 - 34.0 pg    MCHC 33.3 32.0 - 36.0 g/dL    RDW 19.2 (H) 11.5 - 14.5 %    Platelets 281 150 - 450 x10*3/uL   POCT GLUCOSE   Result Value Ref Range    POCT Glucose 131 (H) 74 - 99 mg/dL     *Note: Due to a large number of results and/or encounters for the requested time period, some results have not been displayed. A complete set of results can be found in Results Review.    No results found.     Scheduled  medications:  acetaminophen, 650 mg, oral, TID  acetylcysteine, 3 mL, nebulization, BID  amiodarone, 400 mg, oral, BID  apixaban, 5 mg, oral, BID  artificial saliva (yerbas-lyt), 1 mL, mucous membrane, TID  atorvastatin, 80 mg, oral, Nightly  budesonide, 0.5 mg, nebulization, BID  carbidopa-levodopa, 1 tablet, nasogastric tube, TID  clopidogrel, 75 mg, oral, Daily  cyclobenzaprine, 10 mg, oral, Nightly  dapagliflozin propanediol, 10 mg, oral, Daily  furosemide, 40 mg, intravenous, q8h  gabapentin, 200 mg, oral, Nightly  insulin glargine, 10 Units, subcutaneous, Daily  insulin lispro, 0-10 Units, subcutaneous, q6h  ipratropium-albuteroL, 3 mL, nebulization, TID  lactobacillus acidophilus, 1 tablet, oral, BID  levothyroxine, 25 mcg, oral, Daily before breakfast  meropenem, 1 g, intravenous, q12h VAZQUEZ  metroNIDAZOLE, 500 mg, intravenous, q8h  mirtazapine, 15 mg, oral, Nightly  nystatin, 1 Application, Topical, BID  pantoprazole, 40 mg, oral, Daily before breakfast  QUEtiapine, 25 mg, oral, Nightly  sertraline, 100 mg, oral, Daily  SITagliptin phosphate, 50 mg, oral, Daily  sodium chloride, 1 spray, Each Nostril, TID  vancomycin, 125 mg, oral, 4x daily       Assessment/Plan   Principal Problem:    Acute respiratory failure (CMS/HCC)  Active Problems:    Atrial fibrillation (CMS/HCC)    CAD (coronary artery disease)    Parkinson's disease    Cerebral infarction, unspecified (CMS/HCC)    C. difficile diarrhea    Acute hypoxic respiratory failure  -Weaned down to nasal cannula, was on high flow oxygen.  Still utilizing NIV at at bedtime and as needed.  - COVID 19 recently on 12/27/23 finished up 10 days of Decadron   - Pulmonology following  - S/P left-sided thoracentesis with 1 L removed on 1/13  -Currently on nebulizers, meropenem, diuretics held d/t renal dysfunction  Leukocytosis   - Blood cultures negative from 1/03  -Urine culture with 20-80,000 Candida glabrata  -Pleural fluid culture negative to date  - WBC 12.2  -  On Merrem for suspected aspiration pneumonia, remains on p.o. vancomycin and IV Flagyl for C. Difficile  -Infectious disease managing  MATEO on CKD  -Creatinine 2.1, elevated sodium 147 today suspect related to diuretics  -Urine output 1675 mL in a 24-hour period with IV Lasix, diuretics now on hold  -Remains on Levophed, with increased rate, dobutamine added this am d/t poor CO  Dysphagia/malnutrition  - MBS shows aspiration of thin liquids.   - Recommendations for puree/nectar thick, initially, now NPO, Had NG placed and started Tube feeds/water flushes 1/17, TF now on hold d/t continuous bipap.   -Prealbumin done 1/13 is 7.6, albumin today 2.2  -IV albumin given 1/17  Metabolic encephalopathy  -Improved  CHF   - ECHO on 12/16/23 was 30-35%.  -dobutamine added this am, increased levophed requirements  -diuretics on hold d/t worsened renal function  -Cardiology following.   C-diff   - On PO Vanco, IV Flagyl per infectious disease  -No bowel movement noted in past 24 hours  Parkinsonism  A-fib   - ICD   - On Digoxin  -Amiodarone drip discontinued by cardiology, now on oral amiodarone  -Eliquis resumed  COPD   Pall Care   The Patient is a DNR CCA DNI  He is currently not capable  His wife, Cata Spaulding, is his surrogate decision maker    1/18  Patient decompensated overnight, BP dropped, worsened renal function, worsened respiratory status, tube feeds held, started on dobutamine, increased levophed requirements, now on continuous bipap. Spoke to wife at bedside, prognosis guarded/poor. Wife reconfirmed code status, no cpr, no intubation, continue in treatment model of care and see how patient progresses over next 4hrs, if continued decompensation, not opposed to readdressing goals and treatment plan.   Addendum: Met with wife and children at bedside. Family very aware of poor prognosis. Goal is no suffering, family not yet ready to transition to comfort model of care at this time, however if patient continues to  decline and/or starts to show signs of discomfort, family agreeable to transition to comfort care at that time.   Family reconfirmed that they do not want cpr or intubation. They do NOT want dialysis.   I updated staff RN and attending service.   1/17/2024  Rising sodium level in the setting of IV diuresis, with mild bump in his creatinine, respiratory status improved however, NG tube placed this morning to administer oral medications with consideration of starting free water flushes and nutritional support.  No diarrhea in past 24 hours on IV Flagyl and oral vancomycin.  Remains on IV meropenem for aspiration pneumonia, oral anticoagulation resumed for patient's A-fib, remains on oral amiodarone for rate control.  Pain appears controlled on current regimen.  Continue treatment model of care.    1/16/2024   Overall appears mildly improved today, improved mental alertness, creatinine slowly improving, weaned off of high flow to nasal cannula.  Patient remains on Levophed for blood pressure support, remains in A-fib, but rate is controlled.  Patient continues to have very poor intake, which is concerning given his low albumin and prealbumin.  Continue to encourage oral intake, supplements ordered.  No complaints today of pain, no adjustments needed to pain regimen.  -Goals previously established, continue treatment model of care at this time.  Plan for skilled rehab when medically stable.    1/14/2024  -The patient is more alert today albeit not capable from a decision making stand point.   - He is tolerating HFNC today.   - Aspercreme ordered for leg pain on top of routine Tylenol. Want to avoid anything centrally acting at this time.   -  He is taking in PO medications.   - Will monitor for effectiveness.   1/13/2024  -The patient is currently not a capable decision maker as he is lethargic and not fully arousable.   - The patient is also unable to swallow and needs PO meds such as Sinemet and PO Vanco. Discussion  with wife who is agreeable to a short term NG tube.   - The wife also states that she would consider a PEG tube if the patient needed. She states that the patient recently stated that he did not want a PEG tube but was not sure if he was fully aware of the decision he was making. States that he has had a PEG tube in the past temporarily due to cancer and he was determined to be able to swallow again.   - We did discuss how his progression of illness looks different now than in the past and this may not be congruent with his wishes.   - Discussion also had about the risks of a PEG tube in his currently state of illness and how these are considerations to take into account when making this decision.   - Tylenol IV ordered for 24hrs due to the patient being NPO. Will reassess for effectiveness tomorrow.       I spent 70 minutes in the professional and overall care of this patient.      KIP Ricci-CNP

## 2024-01-18 NOTE — PROGRESS NOTES
Critical Care Medicine Progress Note    Admitted on:     1/4/2024  Length of Stay: 14 day(s)     Interval History     Increasing vasopressor requirement overnight.  Up to Levophed 0.11 mcg/kg/min.    Put on BiPAP yesterday afternoon and was on it overnight.  Crackles on exam today but no ronchi.    24 hr fluid balance ~1.1 L negative with Lasix 40 mg TID.  Serum Cr 1.90 -> 2.10    Glucose controlled on current insulin regimen.    WBC 13.2 -> 12.2  No fevers  Meropenem was started on the morning of 1/14.  Also on course of PO vancomycin (started 1/12) and Flagyl (started 1/13).    Objective   Objective     Vitals:    01/18/24 0500   Weight: 79.6 kg (175 lb 7.8 oz)   Body mass index is 25.9 kg/m².        1/18/2024     2:00 AM 1/18/2024     3:00 AM 1/18/2024     3:53 AM 1/18/2024     4:00 AM 1/18/2024     5:00 AM 1/18/2024     6:00 AM 1/18/2024     7:00 AM   Vitals   Systolic 95 88  97  88 91   Diastolic 54 59  55  56 55   Heart Rate 106 115  118 115 114 108   Temp    36.6 °C (97.9 °F)   36.8 °C (98.2 °F)   Resp 26 25 28 27 21 21 26   Weight (lb)     175.49     BMI     25.9 kg/m2     BSA (m2)     1.97 m2          Vent settings:  FiO2 (%):  [40 %] 40 %  S RR:  [16] 16    Intake/Output Summary (Last 24 hours) at 1/18/2024 0847  Last data filed at 1/18/2024 0400  Gross per 24 hour   Intake 540 ml   Output 925 ml   Net -385 ml       Physical Exam  CV:        Normal S1, S2.  RRR.  No m/r/g.  Resp:     Bibasilar crackles.  No wheezes, rales or ronchi.  GI:         +BS, abd soft, NT, ND.  Ext:        2+ to 3+ pitting BUE edema.    Medications     Scheduled Medications:   acetaminophen, 650 mg, oral, TID  acetylcysteine, 3 mL, nebulization, BID  amiodarone, 400 mg, oral, BID  apixaban, 5 mg, oral, BID  artificial saliva (yerbas-lyt), 1 mL, mucous membrane, TID  atorvastatin, 80 mg, oral, Nightly  budesonide, 0.5 mg, nebulization, BID  carbidopa-levodopa, 1 tablet, nasogastric tube, TID  clopidogrel, 75 mg, oral,  Daily  cyclobenzaprine, 10 mg, oral, Nightly  dapagliflozin propanediol, 10 mg, oral, Daily  furosemide, 40 mg, intravenous, q8h  gabapentin, 200 mg, oral, Nightly  insulin glargine, 10 Units, subcutaneous, Daily  insulin lispro, 0-10 Units, subcutaneous, q6h  ipratropium-albuteroL, 3 mL, nebulization, TID  lactobacillus acidophilus, 1 tablet, oral, BID  levothyroxine, 25 mcg, oral, Daily before breakfast  meropenem, 1 g, intravenous, q12h VAZQUEZ  metroNIDAZOLE, 500 mg, intravenous, q8h  mirtazapine, 15 mg, oral, Nightly  nystatin, 1 Application, Topical, BID  pantoprazole, 40 mg, oral, Daily before breakfast  QUEtiapine, 25 mg, oral, Nightly  sertraline, 100 mg, oral, Daily  SITagliptin phosphate, 50 mg, oral, Daily  sodium chloride, 1 spray, Each Nostril, TID  vancomycin, 125 mg, oral, 4x daily       Continuous Medications:   DOBUTamine, 5 mcg/kg/min  norepinephrine, 0.01-3 mcg/kg/min, Last Rate: 0.1 mcg/kg/min (01/17/24 3527)       PRN Medications:     Labs     Results from last 72 hours   Lab Units 01/18/24  0558 01/17/24  0508 01/16/24  0454   GLUCOSE mg/dL 171* 81 200*   SODIUM mmol/L 147* 149* 143   POTASSIUM mmol/L 3.4 3.7 3.6   CHLORIDE mmol/L 110* 113* 108*   CO2 mmol/L 25 26 24   BUN mg/dL 40* 34* 35*   CREATININE mg/dL 2.10* 1.90* 1.80*   EGFR mL/min/1.73m*2 33* 37* 39*   CALCIUM mg/dL 7.8* 7.5* 7.4*   ALBUMIN g/dL 2.2* 2.1* 2.1*   MAGNESIUM mg/dL  --  1.90  --    PHOSPHORUS mg/dL  --  2.8  --      Results from last 72 hours   Lab Units 01/18/24  0558 01/17/24  0508 01/16/24  0454   ALK PHOS U/L 560* 464* 497*   ALT U/L <5* <5* <5*   AST U/L 36 25 23   BILIRUBIN TOTAL mg/dL 0.7 0.4 0.3   PROTEIN TOTAL g/dL 5.3* 5.4* 5.4*             Results from last 72 hours   Lab Units 01/18/24  0558 01/17/24  0508 01/16/24  0454   WBC AUTO x10*3/uL 12.2* 13.2* 14.0*   NRBC AUTO /100 WBCs 4.0* 2.1* 1.4*   RBC AUTO x10*6/uL 3.71* 3.94* 3.91*   HEMOGLOBIN g/dL 10.5* 11.2* 11.2*   HEMATOCRIT % 31.5* 34.7* 34.0*   MCV fL 85  88 87   MCH pg 28.3 28.4 28.6   MCHC g/dL 33.3 32.3 32.9   RDW % 19.2* 20.6* 20.2*   PLATELETS AUTO x10*3/uL 281 247 244         Lab Results   Component Value Date    BLOODCULT No growth at 4 days -  FINAL REPORT 01/03/2024    BLOODCULT No growth at 4 days -  FINAL REPORT 01/03/2024    GRAMSTAIN No polymorphonuclear leukocytes seen 01/13/2024    GRAMSTAIN No organisms seen 01/13/2024     Lab Results   Component Value Date    URINECULTURE 20,000 - 80,000 Candida glabrata (A) 01/03/2024       Imaging and Diagnostic Studies     Recent imaging and diagnostic studies reviewed.       Assessment / Plan     Septic shock  Acute on chronic systolic & diastolic CHF  Aspiration pneumonia  MATEO  Fluid overload  Dysphagia    -Underwhelming response to Lasix.  Suspect inadequate renal perfusion in setting of known cardiac dysfunction.  Start dobutamine gtt.  Continue diuresing.  -Tube feeds started per dietician recs.  -DC meropenem after today to complete a 5 day course.  14 day course of PO vancomycin and IV Flagyl per ID.  D/w Dr. Escalante yesterday.      Chuck Mann MD    This patient is critically ill/injured due to acute impairment in one or more vital organ systems, such that there is a probably of imminent or life-threatening deterioration of the patient's condition.  I spent 40 minutes in the direct delivery of medical care that involves high complexity decision making to treat single or multiple vital organ system failure and/or prevent further life-threatening deterioration of the patient's condition.  This time does not include separately billable procedures.

## 2024-01-18 NOTE — CARE PLAN
Problem: Diabetes  Goal: Increase stability of blood glucose readings by end of shift  Outcome: Progressing  Goal: Maintain electrolyte levels within acceptable range throughout shift  Outcome: Progressing  Goal: Maintain glucose levels >70mg/dl to <250mg/dl throughout shift  Outcome: Progressing  Goal: No changes in neurological exam by end of shift  Outcome: Progressing  Goal: Learn about and adhere to nutrition recommendations by end of shift  Outcome: Progressing  Goal: Vital signs within normal range for age by end of shift  Outcome: Progressing  Goal: Increase self care and/or family involovement by end of shift  Outcome: Progressing  Goal: Receive DSME education by end of shift  Outcome: Progressing     Problem: Skin  Goal: Decreased wound size/increased tissue granulation at next dressing change  Outcome: Progressing  Flowsheets (Taken 1/18/2024 0157)  Decreased wound size/increased tissue granulation at next dressing change:   Promote sleep for wound healing   Protective dressings over bony prominences  Goal: Participates in plan/prevention/treatment measures  Outcome: Progressing  Flowsheets (Taken 1/17/2024 0523 by Aylin Costello RN)  Participates in plan/prevention/treatment measures:   Elevate heels   Discuss with provider PT/OT consult  Goal: Prevent/manage excess moisture  Outcome: Progressing  Flowsheets (Taken 1/18/2024 0157)  Prevent/manage excess moisture:   Moisturize dry skin   Cleanse incontinence/protect with barrier cream   Monitor for/manage infection if present  Goal: Prevent/minimize sheer/friction injuries  Outcome: Progressing  Flowsheets (Taken 1/18/2024 0157)  Prevent/minimize sheer/friction injuries:   HOB 30 degrees or less   Turn/reposition every 2 hours/use positioning/transfer devices   Use pull sheet   Complete micro-shifts as needed if patient unable. Adjust patient position to relieve pressure points, not a full turn  Goal: Promote/optimize nutrition  Outcome:  Progressing  Flowsheets (Taken 1/18/2024 0157)  Promote/optimize nutrition: Monitor/record intake including meals  Goal: Promote skin healing  Outcome: Progressing  Flowsheets (Taken 1/18/2024 0157)  Promote skin healing:   Turn/reposition every 2 hours/use positioning/transfer devices   Rotate device position/do not position patient on device   Assess skin/pad under line(s)/device(s)     Problem: Respiratory  Goal: Clear secretions with interventions this shift  Outcome: Progressing  Goal: Minimize anxiety/maximize coping throughout shift  Outcome: Progressing  Goal: Minimal/no exertional discomfort or dyspnea this shift  Outcome: Progressing  Goal: No signs of respiratory distress (eg. Use of accessory muscles. Peds grunting)  Outcome: Progressing  Goal: Patent airway maintained this shift  Outcome: Progressing  Goal: Tolerate pulmonary toileting this shift  Outcome: Progressing  Goal: Verbalize decreased shortness of breath this shift  Outcome: Progressing  Goal: Wean oxygen to maintain O2 saturation per order/standard this shift  Outcome: Progressing  Goal: Increase self care and/or family involvement in next 24 hours  Outcome: Progressing     Problem: Pain - Adult  Goal: Verbalizes/displays adequate comfort level or baseline comfort level  Outcome: Progressing     Problem: Safety - Adult  Goal: Free from fall injury  Outcome: Progressing     Problem: Discharge Planning  Goal: Discharge to home or other facility with appropriate resources  Outcome: Progressing     Problem: Chronic Conditions and Co-morbidities  Goal: Patient's chronic conditions and co-morbidity symptoms are monitored and maintained or improved  Outcome: Progressing     Problem: Nutrition  Goal: Less than 5 days NPO/clear liquids  Outcome: Progressing  Goal: Oral intake greater than 50%  Outcome: Progressing  Goal: Consume prescribed supplement  Outcome: Progressing  Goal: Adequate PO fluid intake  Outcome: Progressing  Goal: Nutrition support  goals are met within 48 hrs  Outcome: Progressing  Goal: Nutrition support is meeting 75% of nutrient needs  Outcome: Progressing  Goal: BG  mg/dL  Outcome: Progressing  Goal: Lab values WNL  Outcome: Progressing  Goal: Electrolytes WNL  Outcome: Progressing  Goal: Promote healing  Outcome: Progressing  Goal: Maintain stable weight  Outcome: Progressing  Goal: Reduce weight from edema/fluid  Outcome: Progressing     Problem: General Stroke  Goal: Establish a mutual long term goal with patient by discharge  Outcome: Progressing  Goal: Demonstrate improvement in neurological exam throughout the shift  Outcome: Progressing  Goal: Maintain BP within ordered limits throughout shift  Outcome: Progressing  Goal: Participate in treatment (ie., meds, therapy) throughout shift  Outcome: Progressing  Goal: No symptoms of aspiration throughout shift  Outcome: Progressing  Goal: Controlled blood glucose throughout shift  Outcome: Progressing     Problem: Fall/Injury  Goal: Not fall by end of shift  Outcome: Progressing  Goal: Be free from injury by end of the shift  Outcome: Progressing  Goal: Verbalize understanding of personal risk factors for fall in the hospital  Outcome: Progressing  Goal: Verbalize understanding of risk factor reduction measures to prevent injury from fall in the home  Outcome: Progressing  Goal: Pace activities to prevent fatigue by end of the shift  Outcome: Progressing   The patient's goals for the shift include  patient non-verbal at this time    The clinical goals for the shift include Maintain hemodynamic stability    Over the shift, the patient did not make progress toward the following goals. Barriers to progression include . Recommendations to address these barriers include .

## 2024-01-18 NOTE — PROGRESS NOTES
Occupational Therapy                 Therapy Communication Note    Patient Name: Hia Spaulding  MRN: [id number]  Today's Date: 1/18/2024     Discipline: Occupational Therapy    Missed Visit Reason: Missed Visit Reason: Patient placed on medical hold (Spoke with bedside RN. Pt with inc'd levo requirements, on dobutamine, elevated HR (120s resting), and low pressures. Will hold this date and f/u as medically appropriate.)    Missed Time: Cancel    Comment:

## 2024-01-18 NOTE — CARE PLAN
The patient's goals for the shift include      The clinical goals for the shift include Maintain hemodynamic stability    Over the shift, the patient did not make progress toward the following goals. Barriers to progression include He is still on BIPAP.  BP is dropping and HR is climbing.   Problem: Diabetes  Goal: Increase stability of blood glucose readings by end of shift  Outcome: Progressing  Goal: Maintain electrolyte levels within acceptable range throughout shift  Outcome: Progressing  Goal: Maintain glucose levels >70mg/dl to <250mg/dl throughout shift  Outcome: Progressing  Goal: No changes in neurological exam by end of shift  Outcome: Progressing  Goal: Learn about and adhere to nutrition recommendations by end of shift  Outcome: Progressing  Goal: Vital signs within normal range for age by end of shift  Outcome: Progressing  Goal: Increase self care and/or family involovement by end of shift  Outcome: Progressing  Goal: Receive DSME education by end of shift  Outcome: Progressing     Problem: Skin  Goal: Decreased wound size/increased tissue granulation at next dressing change  Outcome: Progressing  Flowsheets (Taken 1/18/2024 1135)  Decreased wound size/increased tissue granulation at next dressing change:   Promote sleep for wound healing   Protective dressings over bony prominences  Goal: Participates in plan/prevention/treatment measures  Outcome: Progressing  Flowsheets (Taken 1/18/2024 1135)  Participates in plan/prevention/treatment measures:   Discuss with provider PT/OT consult   Elevate heels  Goal: Prevent/manage excess moisture  Outcome: Progressing  Flowsheets (Taken 1/18/2024 1135)  Prevent/manage excess moisture:   Cleanse incontinence/protect with barrier cream   Monitor for/manage infection if present  Goal: Prevent/minimize sheer/friction injuries  Outcome: Progressing  Flowsheets (Taken 1/18/2024 1135)  Prevent/minimize sheer/friction injuries:   HOB 30 degrees or less    Turn/reposition every 2 hours/use positioning/transfer devices  Goal: Promote/optimize nutrition  Outcome: Progressing  Flowsheets (Taken 1/18/2024 1135)  Promote/optimize nutrition: Consume > 50% meals/supplements  Goal: Promote skin healing  Outcome: Progressing  Flowsheets (Taken 1/18/2024 1135)  Promote skin healing:   Turn/reposition every 2 hours/use positioning/transfer devices   Protective dressings over bony prominences     Problem: Respiratory  Goal: Clear secretions with interventions this shift  Outcome: Progressing  Goal: Minimize anxiety/maximize coping throughout shift  Outcome: Progressing  Goal: Minimal/no exertional discomfort or dyspnea this shift  Outcome: Progressing  Goal: No signs of respiratory distress (eg. Use of accessory muscles. Peds grunting)  Outcome: Progressing  Goal: Patent airway maintained this shift  Outcome: Progressing  Goal: Tolerate pulmonary toileting this shift  Outcome: Progressing  Goal: Verbalize decreased shortness of breath this shift  Outcome: Progressing  Goal: Wean oxygen to maintain O2 saturation per order/standard this shift  Outcome: Progressing  Goal: Increase self care and/or family involvement in next 24 hours  Outcome: Progressing     Problem: Pain - Adult  Goal: Verbalizes/displays adequate comfort level or baseline comfort level  Outcome: Progressing     Problem: Safety - Adult  Goal: Free from fall injury  Outcome: Progressing     Problem: Discharge Planning  Goal: Discharge to home or other facility with appropriate resources  Outcome: Progressing     Problem: Chronic Conditions and Co-morbidities  Goal: Patient's chronic conditions and co-morbidity symptoms are monitored and maintained or improved  Outcome: Progressing     Problem: Nutrition  Goal: Less than 5 days NPO/clear liquids  Outcome: Progressing  Goal: Oral intake greater than 50%  Outcome: Progressing  Goal: Consume prescribed supplement  Outcome: Progressing  Goal: Adequate PO fluid  intake  Outcome: Progressing  Goal: Nutrition support goals are met within 48 hrs  Outcome: Progressing  Goal: Nutrition support is meeting 75% of nutrient needs  Outcome: Progressing  Goal: BG  mg/dL  Outcome: Progressing  Goal: Lab values WNL  Outcome: Progressing  Goal: Electrolytes WNL  Outcome: Progressing  Goal: Promote healing  Outcome: Progressing  Goal: Maintain stable weight  Outcome: Progressing  Goal: Reduce weight from edema/fluid  Outcome: Progressing     Problem: General Stroke  Goal: Establish a mutual long term goal with patient by discharge  Outcome: Progressing  Goal: Demonstrate improvement in neurological exam throughout the shift  Outcome: Progressing  Goal: Maintain BP within ordered limits throughout shift  Outcome: Progressing  Goal: Participate in treatment (ie., meds, therapy) throughout shift  Outcome: Progressing  Goal: No symptoms of aspiration throughout shift  Outcome: Progressing  Goal: Controlled blood glucose throughout shift  Outcome: Progressing     Problem: Fall/Injury  Goal: Not fall by end of shift  Outcome: Progressing  Goal: Be free from injury by end of the shift  Outcome: Progressing  Goal: Verbalize understanding of personal risk factors for fall in the hospital  Outcome: Progressing  Goal: Verbalize understanding of risk factor reduction measures to prevent injury from fall in the home  Outcome: Progressing  Goal: Pace activities to prevent fatigue by end of the shift  Outcome: Progressing

## 2024-01-19 NOTE — PROGRESS NOTES
"Hai Spaulding is a 73 y.o. male on day 15 of admission presenting with Acute respiratory failure (CMS/HCC).    Subjective   Patient awake, but not speaking.  Follows some simple commands.  Family, (wife, son dtr) at bedside.      Objective   Last Recorded Vitals  Blood pressure 88/55, pulse 109, temperature 36.4 °C (97.5 °F), resp. rate (!) 31, height 1.753 m (5' 9.02\"), weight 68.7 kg (151 lb 7.3 oz), SpO2 95 %.  Intake/Output last 3 Shifts:  I/O last 3 completed shifts:  In: 2629 (38.3 mL/kg) [I.V.:779 (11.3 mL/kg); Blood:50; NG/GT:1300; IV Piggyback:500]  Out: 770 (11.2 mL/kg) [Urine:770 (0.3 mL/kg/hr)]  Weight: 68.7 kg     Physical Exam:  General:   frail-appearing appearing male, lying in bed,  no acute distress, appears older than stated age.  HEENT:  NCAT, perrl, sclerae anicteric, oral mm dry, patient endentulous, mouth breathing, no lesions noted, NG in place with tf infusing.   Neck:    Neck is supple, no palpable adenopathy appreciated.  No JVD or carotid bruits noted.   Pulmonary:    Breath sounds with diminished bilaterally.  Respirations even and unlabored at present.   Cardiovascular: irregular present, unable to appreciate murmurs or rub at present. 2+  bilateral upper extremity edema,  1+ lower extremity edema.  Right IJ cvc intact, site dressed.   Abdomen:   Abdomen soft, some grimace with palpation of abdomen. Bowel sounds are present.  :  Thomas to continuous drain with urine in collection bag.  Musculoskeletal:  Hands and feet warm to touch.  No clubbing or cyanosis noted.  Attempts to move left hand on command, able to move left leg/foot on command. no movement of right arm on command (baseline).  Wearing scds to bilateral LW.   Neurologic:  becomes awake, recognizes family at bedside,  not speaking or answering questions.  Attempts to follow simple commands.    .  Skin:  skin is thin and frail,   areas of bruising  and ecchymosis on extremities.    Psychiatric:  calm at present    Relevant " Results  Results for orders placed or performed during the hospital encounter of 01/04/24 (from the past 24 hour(s))   POCT GLUCOSE   Result Value Ref Range    POCT Glucose 158 (H) 74 - 99 mg/dL   POCT GLUCOSE   Result Value Ref Range    POCT Glucose 191 (H) 74 - 99 mg/dL   POCT GLUCOSE   Result Value Ref Range    POCT Glucose 209 (H) 74 - 99 mg/dL   POCT GLUCOSE   Result Value Ref Range    POCT Glucose 250 (H) 74 - 99 mg/dL   Basic metabolic panel   Result Value Ref Range    Glucose 287 (H) 65 - 99 mg/dL    Sodium 146 (H) 133 - 145 mmol/L    Potassium 3.7 3.4 - 5.1 mmol/L    Chloride 110 (H) 97 - 107 mmol/L    Bicarbonate 25 24 - 31 mmol/L    Urea Nitrogen 52 (H) 8 - 25 mg/dL    Creatinine 2.50 (H) 0.40 - 1.60 mg/dL    eGFR 27 (L) >60 mL/min/1.73m*2    Calcium 7.4 (L) 8.5 - 10.4 mg/dL    Anion Gap 11 <=19 mmol/L   Magnesium   Result Value Ref Range    Magnesium 2.30 1.60 - 3.10 mg/dL   Phosphorus   Result Value Ref Range    Phosphorus 3.5 2.5 - 4.5 mg/dL   CBC and Auto Differential   Result Value Ref Range    WBC 12.2 (H) 4.4 - 11.3 x10*3/uL    nRBC 5.9 (H) 0.0 - 0.0 /100 WBCs    RBC 3.66 (L) 4.50 - 5.90 x10*6/uL    Hemoglobin 10.4 (L) 13.5 - 17.5 g/dL    Hematocrit 30.9 (L) 41.0 - 52.0 %    MCV 84 80 - 100 fL    MCH 28.4 26.0 - 34.0 pg    MCHC 33.7 32.0 - 36.0 g/dL    RDW 20.0 (H) 11.5 - 14.5 %    Platelets 324 150 - 450 x10*3/uL    Neutrophils % 89.3 40.0 - 80.0 %    Immature Granulocytes %, Automated 1.1 (H) 0.0 - 0.9 %    Lymphocytes % 5.8 13.0 - 44.0 %    Monocytes % 3.6 2.0 - 10.0 %    Eosinophils % 0.0 0.0 - 6.0 %    Basophils % 0.2 0.0 - 2.0 %    Neutrophils Absolute 10.92 (H) 1.60 - 5.50 x10*3/uL    Immature Granulocytes Absolute, Automated 0.13 0.00 - 0.50 x10*3/uL    Lymphocytes Absolute 0.71 (L) 0.80 - 3.00 x10*3/uL    Monocytes Absolute 0.44 0.05 - 0.80 x10*3/uL    Eosinophils Absolute 0.00 0.00 - 0.40 x10*3/uL    Basophils Absolute 0.02 0.00 - 0.10 x10*3/uL   POCT GLUCOSE   Result Value Ref Range     POCT Glucose 276 (H) 74 - 99 mg/dL     *Note: Due to a large number of results and/or encounters for the requested time period, some results have not been displayed. A complete set of results can be found in Results Review.     XR chest 1 view  Result Date: 1/17/2024  NG tube tip within the proximal aspect of the stomach, consider slight advancement.   No significant interval change in bilateral airspace opacities, small effusions, and presumed interstitial edema       Scheduled medications  acetaminophen, 650 mg, oral, TID  acetylcysteine, 3 mL, nebulization, BID  amiodarone, 400 mg, oral, BID  apixaban, 5 mg, oral, BID  artificial saliva (yerbas-lyt), 1 mL, mucous membrane, TID  atorvastatin, 80 mg, oral, Nightly  budesonide, 0.5 mg, nebulization, BID  carbidopa-levodopa, 1 tablet, nasogastric tube, TID  clopidogrel, 75 mg, oral, Daily  cyclobenzaprine, 10 mg, oral, Nightly  dapagliflozin propanediol, 10 mg, oral, Daily  furosemide, 40 mg, intravenous, q8h  gabapentin, 200 mg, oral, Nightly  insulin glargine, 10 Units, subcutaneous, Daily  insulin lispro, 0-10 Units, subcutaneous, q6h  ipratropium-albuteroL, 3 mL, nebulization, TID  lactobacillus acidophilus, 1 tablet, oral, BID  levothyroxine, 25 mcg, oral, Daily before breakfast  metroNIDAZOLE, 500 mg, intravenous, q8h  mirtazapine, 15 mg, oral, Nightly  nystatin, 1 Application, Topical, BID  pantoprazole, 40 mg, oral, Daily before breakfast  QUEtiapine, 25 mg, oral, Nightly  sertraline, 100 mg, oral, Daily  SITagliptin phosphate, 50 mg, oral, Daily  sodium chloride, 1 spray, Each Nostril, TID  vancomycin, 125 mg, oral, 4x daily      Continuous medications  norepinephrine, 0.01-3 mcg/kg/min, Last Rate: 0.19 mcg/kg/min (01/19/24 0300)      PRN medications  PRN medications: albuterol, dextrose 10 % in water (D10W), dextrose, glucagon, melatonin, methyl salicylate-menthol, ondansetron, oxygen, oxygen, phenoL      Assessment/Plan   Acute on chronic respiratory  failure  -Weaned down to nasal cannula, was on high flow oxygen.  Still utilizing NIV at at bedtime and as needed.  - COVID 19 recently on 12/27/23 finished up 10 days of Decadron   - Pulmonology following  - S/P left-sided thoracentesis with 1 L removed on 1/13  -Currently on nebulizers, meropenem  -h/o COPD    Leukocytosis   - Blood cultures negative from 1/03  -Urine culture with 20-80,000 Candida glabrata  -Pleural fluid culture negative to date  - WBC 12.2  - On Merrem for suspected aspiration pneumonia, remains on p.o. vancomycin and IV Flagyl for C. Difficile  -Infectious disease managing    MATEO on CKD  -Creatinine 2.5, elevated sodium 146 today  -Urine output only about 300mL in a 24-hour period   -Remains on Levophed, dobutamine a at time of my exam.    Dysphagia/malnutrition  - MBS showed aspiration of thin liquids.   - Recommendations for puree/nectar thick, initially, now NPO, Had NG placed and started Tube feeds/water flushes 1/17,   -Prealbumin 1/13 was 7.6,     Metabolic encephalopathy  -likely multifactorial, Improved somewhat    CHF   - ECHO on 12/16/23 was 30-35%.  -dobutamine added   -mgmt per cardiology    C-diff   - On PO Vanco, IV Flagyl per infectious disease    A-fib   - ICD   - On Digoxin  -Amiodarone drip discontinued by cardiology, now on oral amiodarone  -Eliquis resumed    Palliative Care   Code status:  DNR CCA DNI, no dialysis  The patient is not a capable decision maker at this time.   His wife, Cata Spaulding, is his surrogate decision maker     Previous goals of care discussions conducted with the patient's wife.  As of yesterday, she wished to stay in a treatment model of care.  She stated she talked with primary team today who informed her that the patient would not ikley return to his previous pre-admit level of function and would like be relegated to living in a long term care facility for the rest of his life.  Goals of care re-addressed with family today.  Reviewed patient's  preior functional state.  Family indicated he would not want to live in a facility for the rest of his life.  They do not want him to suffer in any way.  We did discuss the option of transitioning to comfort only model of care where focus would be on quality vs. Quantity of life.  Offered informational hospice meeting for consideration.  After my discussion, his wife decided she would like referral to hospice.    Patient currently in treatment model of are.  Wife interested in hospice informational meeting and probable transition to comfort model of care.  Given patient's instability and symptoms without non-invasive ventilation, would likely need symptoms controlled and initiated in GIP setting.    Discussion conducted with primary team, [place]  Referral placed to [facility] who will contact the patient's wife to arrange and informational meeting.  Will update the chart if I receive any more information regarding this.    Will follow with you for now.     I spent 70 minutes in the professional and overall care of this patient.3  20 minutes spent in ACP      Leticia Cunningham, APRN-CNP

## 2024-01-19 NOTE — NURSING NOTE
Assumed care of patient.  He is resting comfortably on the BIPAP.  His BP is low and they increased his levophed last pm.

## 2024-01-19 NOTE — CARE PLAN
The patient's goals for the shift include      The clinical goals for the shift include Maintain hemodynamic stability  Problem: Diabetes  Goal: Increase stability of blood glucose readings by end of shift  Outcome: Progressing  Goal: Maintain electrolyte levels within acceptable range throughout shift  Outcome: Progressing  Goal: Maintain glucose levels >70mg/dl to <250mg/dl throughout shift  Outcome: Progressing  Goal: No changes in neurological exam by end of shift  Outcome: Progressing  Goal: Learn about and adhere to nutrition recommendations by end of shift  Outcome: Progressing  Goal: Vital signs within normal range for age by end of shift  Outcome: Progressing  Goal: Increase self care and/or family involovement by end of shift  Outcome: Progressing  Goal: Receive DSME education by end of shift  Outcome: Progressing     Problem: Skin  Goal: Decreased wound size/increased tissue granulation at next dressing change  Outcome: Progressing  Flowsheets (Taken 1/19/2024 1224)  Decreased wound size/increased tissue granulation at next dressing change: Promote sleep for wound healing  Goal: Participates in plan/prevention/treatment measures  Outcome: Progressing  Flowsheets (Taken 1/19/2024 1224)  Participates in plan/prevention/treatment measures: Discuss with provider PT/OT consult  Goal: Prevent/manage excess moisture  Outcome: Progressing  Flowsheets (Taken 1/19/2024 1224)  Prevent/manage excess moisture:   Cleanse incontinence/protect with barrier cream   Use wicking fabric (obtain order)   Monitor for/manage infection if present  Goal: Prevent/minimize sheer/friction injuries  Outcome: Progressing  Flowsheets (Taken 1/19/2024 1224)  Prevent/minimize sheer/friction injuries:   HOB 30 degrees or less   Turn/reposition every 2 hours/use positioning/transfer devices  Goal: Promote/optimize nutrition  Outcome: Progressing  Flowsheets (Taken 1/19/2024 1224)  Promote/optimize nutrition:   Consume > 50%  meals/supplements   Monitor/record intake including meals  Goal: Promote skin healing  Outcome: Progressing  Flowsheets (Taken 1/19/2024 1224)  Promote skin healing:   Rotate device position/do not position patient on device   Turn/reposition every 2 hours/use positioning/transfer devices     Problem: Skin  Goal: Decreased wound size/increased tissue granulation at next dressing change  Outcome: Progressing  Flowsheets (Taken 1/19/2024 1224)  Decreased wound size/increased tissue granulation at next dressing change: Promote sleep for wound healing  Goal: Participates in plan/prevention/treatment measures  Outcome: Progressing  Flowsheets (Taken 1/19/2024 1224)  Participates in plan/prevention/treatment measures: Discuss with provider PT/OT consult  Goal: Prevent/manage excess moisture  Outcome: Progressing  Flowsheets (Taken 1/19/2024 1224)  Prevent/manage excess moisture:   Cleanse incontinence/protect with barrier cream   Use wicking fabric (obtain order)   Monitor for/manage infection if present  Goal: Prevent/minimize sheer/friction injuries  Outcome: Progressing  Flowsheets (Taken 1/19/2024 1224)  Prevent/minimize sheer/friction injuries:   HOB 30 degrees or less   Turn/reposition every 2 hours/use positioning/transfer devices  Goal: Promote/optimize nutrition  Outcome: Progressing  Flowsheets (Taken 1/19/2024 1224)  Promote/optimize nutrition:   Consume > 50% meals/supplements   Monitor/record intake including meals  Goal: Promote skin healing  Outcome: Progressing  Flowsheets (Taken 1/19/2024 1224)  Promote skin healing:   Rotate device position/do not position patient on device   Turn/reposition every 2 hours/use positioning/transfer devices     Problem: Respiratory  Goal: Clear secretions with interventions this shift  Outcome: Progressing  Goal: Minimize anxiety/maximize coping throughout shift  Outcome: Progressing  Goal: Minimal/no exertional discomfort or dyspnea this shift  Outcome: Progressing  Goal:  No signs of respiratory distress (eg. Use of accessory muscles. Peds grunting)  Outcome: Progressing  Goal: Patent airway maintained this shift  Outcome: Progressing  Goal: Tolerate pulmonary toileting this shift  Outcome: Progressing  Goal: Verbalize decreased shortness of breath this shift  Outcome: Progressing  Goal: Wean oxygen to maintain O2 saturation per order/standard this shift  Outcome: Progressing  Goal: Increase self care and/or family involvement in next 24 hours  Outcome: Progressing     Problem: Pain - Adult  Goal: Verbalizes/displays adequate comfort level or baseline comfort level  Outcome: Progressing     Problem: Safety - Adult  Goal: Free from fall injury  Outcome: Progressing     Problem: Discharge Planning  Goal: Discharge to home or other facility with appropriate resources  Outcome: Progressing     Problem: Chronic Conditions and Co-morbidities  Goal: Patient's chronic conditions and co-morbidity symptoms are monitored and maintained or improved  Outcome: Progressing     Problem: Nutrition  Goal: Less than 5 days NPO/clear liquids  Outcome: Progressing  Goal: Oral intake greater than 50%  Outcome: Progressing  Goal: Consume prescribed supplement  Outcome: Progressing  Goal: Adequate PO fluid intake  Outcome: Progressing  Goal: Nutrition support goals are met within 48 hrs  Outcome: Progressing  Goal: Nutrition support is meeting 75% of nutrient needs  Outcome: Progressing  Goal: BG  mg/dL  Outcome: Progressing  Goal: Lab values WNL  Outcome: Progressing  Goal: Electrolytes WNL  Outcome: Progressing  Goal: Promote healing  Outcome: Progressing  Goal: Maintain stable weight  Outcome: Progressing  Goal: Reduce weight from edema/fluid  Outcome: Progressing     Problem: Fall/Injury  Goal: Not fall by end of shift  Outcome: Progressing  Goal: Be free from injury by end of the shift  Outcome: Progressing  Goal: Verbalize understanding of personal risk factors for fall in the hospital  Outcome:  Progressing  Goal: Verbalize understanding of risk factor reduction measures to prevent injury from fall in the home  Outcome: Progressing  Goal: Pace activities to prevent fatigue by end of the shift  Outcome: Progressing

## 2024-01-19 NOTE — CARE PLAN
Problem: Diabetes  Goal: Increase stability of blood glucose readings by end of shift  Outcome: Progressing  Goal: Maintain electrolyte levels within acceptable range throughout shift  Outcome: Progressing  Goal: Maintain glucose levels >70mg/dl to <250mg/dl throughout shift  Outcome: Progressing  Goal: No changes in neurological exam by end of shift  Outcome: Progressing  Goal: Learn about and adhere to nutrition recommendations by end of shift  Outcome: Progressing  Goal: Vital signs within normal range for age by end of shift  Outcome: Progressing  Goal: Increase self care and/or family involovement by end of shift  Outcome: Progressing  Goal: Receive DSME education by end of shift  Outcome: Progressing     Problem: Skin  Goal: Decreased wound size/increased tissue granulation at next dressing change  Outcome: Progressing  Flowsheets (Taken 1/18/2024 1135 by Niki Edwards, RN)  Decreased wound size/increased tissue granulation at next dressing change:   Promote sleep for wound healing   Protective dressings over bony prominences  Goal: Participates in plan/prevention/treatment measures  Outcome: Progressing  Flowsheets (Taken 1/18/2024 1135 by Niki Edwards, RN)  Participates in plan/prevention/treatment measures:   Discuss with provider PT/OT consult   Elevate heels  Goal: Prevent/manage excess moisture  Outcome: Progressing  Flowsheets (Taken 1/18/2024 1135 by Niki Edwards, RN)  Prevent/manage excess moisture:   Cleanse incontinence/protect with barrier cream   Monitor for/manage infection if present  Goal: Prevent/minimize sheer/friction injuries  Outcome: Progressing  Flowsheets (Taken 1/18/2024 1135 by Niki Edwards, RN)  Prevent/minimize sheer/friction injuries:   HOB 30 degrees or less   Turn/reposition every 2 hours/use positioning/transfer devices  Goal: Promote/optimize nutrition  Outcome: Progressing  Flowsheets (Taken 1/18/2024 1135 by Niki Edwards, RN)  Promote/optimize nutrition: Consume >  50% meals/supplements  Goal: Promote skin healing  Outcome: Progressing  Flowsheets (Taken 1/18/2024 1135 by Niki Edwards RN)  Promote skin healing:   Turn/reposition every 2 hours/use positioning/transfer devices   Protective dressings over bony prominences     Problem: Respiratory  Goal: Clear secretions with interventions this shift  Outcome: Progressing  Goal: Minimize anxiety/maximize coping throughout shift  Outcome: Progressing  Goal: Minimal/no exertional discomfort or dyspnea this shift  Outcome: Progressing  Goal: No signs of respiratory distress (eg. Use of accessory muscles. Peds grunting)  Outcome: Progressing  Goal: Patent airway maintained this shift  Outcome: Progressing  Goal: Tolerate pulmonary toileting this shift  Outcome: Progressing  Goal: Verbalize decreased shortness of breath this shift  Outcome: Progressing  Goal: Wean oxygen to maintain O2 saturation per order/standard this shift  Outcome: Progressing  Goal: Increase self care and/or family involvement in next 24 hours  Outcome: Progressing     Problem: Pain - Adult  Goal: Verbalizes/displays adequate comfort level or baseline comfort level  Outcome: Progressing     Problem: Safety - Adult  Goal: Free from fall injury  Outcome: Progressing     Problem: Discharge Planning  Goal: Discharge to home or other facility with appropriate resources  Outcome: Progressing     Problem: Chronic Conditions and Co-morbidities  Goal: Patient's chronic conditions and co-morbidity symptoms are monitored and maintained or improved  Outcome: Progressing     Problem: Nutrition  Goal: Less than 5 days NPO/clear liquids  Outcome: Progressing  Goal: Oral intake greater than 50%  Outcome: Progressing  Goal: Consume prescribed supplement  Outcome: Progressing  Goal: Adequate PO fluid intake  Outcome: Progressing  Goal: Nutrition support goals are met within 48 hrs  Outcome: Progressing  Goal: Nutrition support is meeting 75% of nutrient needs  Outcome:  Progressing  Goal: BG  mg/dL  Outcome: Progressing  Goal: Lab values WNL  Outcome: Progressing  Goal: Electrolytes WNL  Outcome: Progressing  Goal: Promote healing  Outcome: Progressing  Goal: Maintain stable weight  Outcome: Progressing  Goal: Reduce weight from edema/fluid  Outcome: Progressing     Problem: General Stroke  Goal: Establish a mutual long term goal with patient by discharge  Outcome: Progressing  Goal: Demonstrate improvement in neurological exam throughout the shift  Outcome: Progressing  Goal: Maintain BP within ordered limits throughout shift  Outcome: Progressing  Goal: Participate in treatment (ie., meds, therapy) throughout shift  Outcome: Progressing  Goal: No symptoms of aspiration throughout shift  Outcome: Progressing  Goal: Controlled blood glucose throughout shift  Outcome: Progressing     Problem: Fall/Injury  Goal: Not fall by end of shift  Outcome: Progressing  Goal: Be free from injury by end of the shift  Outcome: Progressing  Goal: Verbalize understanding of personal risk factors for fall in the hospital  Outcome: Progressing  Goal: Verbalize understanding of risk factor reduction measures to prevent injury from fall in the home  Outcome: Progressing  Goal: Pace activities to prevent fatigue by end of the shift  Outcome: Progressing   The patient's goals for the shift include      The clinical goals for the shift include Maintain hemodynamic stability    Over the shift, the patient did not make progress toward the following goals. Barriers to progression include . Recommendations to address these barriers include .

## 2024-01-19 NOTE — NURSING NOTE
"Inpatient diabetes Education Chart review.      Patient is a 72 y/o male came to ED on 1/4/24 at 22:34 with c/o \"weakness & diarrhea\"  Found to have pneumonia & COVID.  H/o laryngeal ca, CVA with residual, aspiration pneumonia, GERD, Afib, CHF, COPD, T2DM, hodgkin's lymphoma, HTN, HPL, larynx ca, MI neuropathy    BS today 11/19 at 11:30 was 250 mg/dl.  Getting Humalog 0-10 units tid and Lantus 10 units at bedtime..    Possible hospice referral.   Will continue to monitor POCT  "

## 2024-01-19 NOTE — PROGRESS NOTES
Patient not medically clear. Patient remains in the ICU. Palliative following, [facility] consulted. No meeting set at this time. The original discharge plan was to return to [facility]. Will wait for hospice meeting.     **PATIENT DOES NOT HAVE A SAFE DISCHARGE PLAN      Andree Torres RN

## 2024-01-19 NOTE — PROGRESS NOTES
Occupational Therapy                 Therapy Communication Note    Patient Name: Hai Spaulding  MRN: [id number]  Today's Date: 1/19/2024     Discipline: Occupational Therapy    Missed Visit Reason: Missed Visit Reason: Other (Comment) (Pt family requesting hospice meeting, will hold OT interventions at this time pending decision.)    Missed Time: Cancel    Comment: Attempt 1222

## 2024-01-19 NOTE — PROGRESS NOTES
Georgina Spaulding is a 73 y.o. male on day 15 of admission presenting with Acute respiratory failure (CMS/HCC).    Subjective   Interval History:   Afebrile, no chills  Awake, responsive  No diarrhea reported  On pressors when seen    Review of Systems   All other systems reviewed and are negative.      Objective   Range of Vitals (last 24 hours)  Heart Rate:  [104-124]   Temp:  [36.3 °C (97.3 °F)-37.3 °C (99.1 °F)]   Resp:  [22-39]   BP: (79-89)/(49-58)   Weight:  [68.7 kg (151 lb 7.3 oz)]   SpO2:  [93 %-95 %]   Daily Weight  01/19/24 : 68.7 kg (151 lb 7.3 oz)    Body mass index is 22.36 kg/m².    Physical Exam  Constitutional:       Appearance: He is responsive     Comments: awake   HENT:      Head: Normocephalic.      Comments: Resolving right periorbital ecchymosis      Nose: Nose normal.    Comments:   Eyes:      Conjunctiva/sclera: Conjunctivae normal.   Cardiovascular:      Rate and Rhythm: Normal rate and regular rhythm.   Pulmonary:      Effort: Pulmonary effort is normal.      Breath sounds: Normal breath sounds.   Abdominal:      General: Bowel sounds are normal.      Palpations: Abdomen is soft.   Genitourinary:     Comments: Thomas catheter  Musculoskeletal:         General: Normal range of motion.      Cervical back: Normal range of motion and neck supple.   Skin:     General: Skin is warm and dry.   Neurological:      Comments: Awake, responsive  Psychiatric:      Comments: Awake, responsive       Antibiotics  amiodarone (Pacerone) tablet 200 mg  apixaban (Eliquis) tablet 5 mg  atorvastatin (Lipitor) tablet 80 mg  carbidopa-levodopa (Sinemet CR)  mg ER tablet 1 tablet  clopidogrel (Plavix) tablet 75 mg  dapagliflozin propanediol (Farxiga) tablet 10 mg  lactobacillus acidophilus capsule 1 capsule  levothyroxine (Synthroid, Levoxyl) tablet 25 mcg  metoprolol succinate XL (Toprol-XL) 24 hr tablet 25 mg  QUEtiapine (SEROquel) tablet 25 mg  sertraline (Zoloft) tablet 100 mg  SITagliptin phosphate  (Januvia) tablet 50 mg  insulin glargine (Lantus) injection 10 Units  dextrose 50 % injection 25 g  glucagon (Glucagen) injection 1 mg  dextrose 10 % in water (D10W) infusion  insulin lispro (HumaLOG) injection 0-10 Units  vancomycin (Vancocin) capsule 125 mg  fluconazole in NaCl (iso-osm) (Diflucan) IVPB 200 mg  nystatin (Mycostatin) 100,000 unit/gram powder 1 Application  ipratropium-albuteroL (Duo-Neb) 0.5-2.5 mg/3 mL nebulizer solution 3 mL  budesonide (Pulmicort) 0.5 mg/2 mL nebulizer solution 0.5 mg  lactobacillus acidophilus tablet 1 tablet  vancomycin (Vancocin) capsule 125 mg  ipratropium-albuteroL (Duo-Neb) 0.5-2.5 mg/3 mL nebulizer solution 3 mL  acetaminophen (Tylenol) tablet 650 mg  ondansetron (Zofran) injection 4 mg  melatonin tablet 5 mg  cyclobenzaprine (Flexeril) tablet 10 mg  fluticasone furoate-vilanteroL (Breo Ellipta) 200-25 mcg/dose inhaler 1 puff  ipratropium-albuteroL (Duo-Neb) 0.5-2.5 mg/3 mL nebulizer solution 3 mL  pantoprazole (ProtoNix) EC tablet 40 mg  albuterol 2.5 mg /3 mL (0.083 %) nebulizer solution 2.5 mg  acetaminophen (Tylenol) tablet 650 mg  ondansetron (Zofran) injection 4 mg  melatonin tablet 5 mg  potassium chloride (Klor-Con) packet 40 mEq  potassium chloride 20 mEq in 100 mL IV premix  furosemide (Lasix) injection 40 mg  barium sulfate (Varibar Pudding) 40 % (w/v), 30% (w/w) oral paste 20 mL  barium sulfate (Varibar Nectar, Varibar Honey) 40 % (w/v) suspension 20 mL  barium sulfate (Varibar Honey) 40 % (w/v) 29% (w/w) suspension 20 mL  barium sulfate (Varibar THIN Liquid) 81 % (w/w) suspension 20 mL  sodium chloride 0.9% infusion  oxygen (O2) therapy  vancomycin (Firvanq) solution 125 mg  vancomycin (Vancocin) capsule 125 mg  ipratropium-albuteroL (Duo-Neb) 0.5-2.5 mg/3 mL nebulizer solution 3 mL  gabapentin (Neurontin) capsule 200 mg  sodium chloride (Ocean) 0.65 % nasal spray 1 spray  mirtazapine (Remeron) tablet 15 mg  sodium chloride (Ocean) 0.65 % nasal spray 1  spray  acetaminophen (Tylenol) tablet 650 mg  oxygen (O2) therapy  acetylcysteine (Mucomyst) 200 mg/mL (20 %) nebulizer solution 600 mg  metroNIDAZOLE (Flagyl) 500 mg in NaCl (iso-os) 100 mL  acetaminophen (Ofirmev) injection 1,000 mg  norepinephrine (Levophed) 8 mg in dextrose 5% 250 mL (0.032 mg/mL) infusion (premix)  norepinephrine in dextrose 5 % (Levophed) infusion  - Omnicell Override Pull  digoxin (Lanoxin) injection 500 mcg  amiodarone (Nexterone) 360 mg in dextrose,iso-osm 200 mL (1.8 mg/mL) infusion (premix)  amiodarone (Nexterone) 360 mg in dextrose,iso-osm 200 mL (1.8 mg/mL) infusion (premix)  meropenem (Merrem) in sodium chloride 0.9 % 100 mL IV 1 g  oxygen (O2) therapy  methyl salicylate-menthol (Bengay) 15-10 % greaseless cream  amiodarone (Nexterone) 360 mg in dextrose,iso-osm 200 mL (1.8 mg/mL) infusion (premix)  furosemide (Lasix) injection 40 mg  amiodarone (Nexterone) 360 mg in dextrose,iso-osm 200 mL (1.8 mg/mL) infusion (premix)  levothyroxine (Synthroid, Levoxyl) tablet 25 mcg  furosemide (Lasix) injection 40 mg  insulin lispro (HumaLOG) injection 0-10 Units  heparin (porcine) injection 5,000 Units  amiodarone (Pacerone) tablet 400 mg  digoxin (Lanoxin) injection 500 mcg  apixaban (Eliquis) tablet 5 mg  norepinephrine (Levophed) 8 mg in dextrose 5% 250 mL (0.032 mg/mL) infusion (premix)  oxygen (O2) therapy  insulin lispro (HumaLOG) injection 0-10 Units  furosemide (Lasix) injection 40 mg  magnesium sulfate IV 2 g  artificial saliva (yerbas-lyt) aerosol,spray 1 mL  phenoL (Chloraseptic) 1.4 % mouth/throat spray 1 spray  carbidopa-levodopa (Sinemet)  mg per tablet 1 tablet  vancomycin (Firvanq) solution 125 mg  oxygen (O2) therapy  albumin human 25 % solution 12.5 g  DOBUTamine (Dobutrex) 1,000 mg in dextrose 5% 250 mL (4 mg/mL) infusion (premix)      Relevant Results  Labs  Results from last 72 hours   Lab Units 01/19/24  0504 01/18/24  0558 01/17/24  0508   WBC AUTO x10*3/uL 12.2* 12.2*  13.2*   HEMOGLOBIN g/dL 10.4* 10.5* 11.2*   HEMATOCRIT % 30.9* 31.5* 34.7*   PLATELETS AUTO x10*3/uL 324 281 247   NEUTROS PCT AUTO % 89.3  --   --    LYMPHS PCT AUTO % 5.8  --   --    MONOS PCT AUTO % 3.6  --   --    EOS PCT AUTO % 0.0  --   --      Results from last 72 hours   Lab Units 01/19/24  0503 01/18/24  0558 01/17/24  0508   SODIUM mmol/L 146* 147* 149*   POTASSIUM mmol/L 3.7 3.4 3.7   CHLORIDE mmol/L 110* 110* 113*   CO2 mmol/L 25 25 26   BUN mg/dL 52* 40* 34*   CREATININE mg/dL 2.50* 2.10* 1.90*   GLUCOSE mg/dL 287* 171* 81   CALCIUM mg/dL 7.4* 7.8* 7.5*   ANION GAP mmol/L 11 12 10   EGFR mL/min/1.73m*2 27* 33* 37*   PHOSPHORUS mg/dL 3.5  --  2.8     Results from last 72 hours   Lab Units 01/18/24  0558 01/17/24  0508   ALK PHOS U/L 560* 464*   BILIRUBIN TOTAL mg/dL 0.7 0.4   PROTEIN TOTAL g/dL 5.3* 5.4*   ALT U/L <5* <5*   AST U/L 36 25   ALBUMIN g/dL 2.2* 2.1*     Estimated Creatinine Clearance: 26 mL/min (A) (by C-G formula based on SCr of 2.5 mg/dL (H)).  C-Reactive Protein   Date Value Ref Range Status   11/29/2023 2.53 (H) <1.00 mg/dL Final     CRP   Date Value Ref Range Status   12/31/2022 39.72 (A) mg/dL Final     Comment:     REF VALUE  < 1.00     12/14/2021 2.16 (A) mg/dL Final     Comment:     REF VALUE  < 1.00       Microbiology  Reviewed  Imaging  XR chest 1 view    Result Date: 1/17/2024  Interpreted By:  Jarvis Harper, STUDY: XR CHEST 1 VIEW; 1/17/2024 12:13 pm   INDICATION: Signs/Symptoms:NG tube.   COMPARISON: 01/15/2024   ACCESSION NUMBER(S): [id number]   ORDERING CLINICIAN: SHELLEY BALDERAS   TECHNIQUE: 1 view of the chest was performed.   FINDINGS: Right IJ central line tip at the distal SVC. Left chest wall pacemaker and leads appear in good position. NG tube tip within the proximal aspect of the stomach, consider slight advancement. No significant interval change in patchy bilateral airspace opacities and prominence of the interstitium. Probable small pleural effusions. The  cardiomediastinal silhouette is stable.       NG tube tip within the proximal aspect of the stomach, consider slight advancement.   No significant interval change in bilateral airspace opacities, small effusions, and presumed interstitial edema   Signed by: Jarvis Harper 1/17/2024 2:02 PM Dictation workstation:   [id number]    XR abdomen 1 view    Result Date: 1/17/2024  Interpreted By:  Jarvis Harper, STUDY: XR ABDOMEN 1 VIEW;  1/17/2024 9:02 am   INDICATION: Signs/Symptoms:Inserted NG tube.   COMPARISON: None.   ACCESSION NUMBER(S): [id number]   ORDERING CLINICIAN: SHELLEY BALDERAS   FINDINGS: NG tube is not visualized, clinical correlation is recommended. Nonobstructive bowel gas pattern. Limited evaluation of pneumoperitoneum on supine imaging, however no gross evidence of free air is noted.   Lung bases show mild bibasilar airspace opacities right-greater-than-left.   Osseous structures demonstrate no acute bony changes. Severe degenerative change at the lumbar spine.       NG tube is not visualized, clinical correlation is recommended.   Nonspecific bowel-gas pattern.   MACRO: None   Signed by: Jarvis Harper 1/17/2024 10:02 AM Dictation workstation:   [id number]    XR chest 1 view    Result Date: 1/15/2024  Interpreted By:  Rahul Staples, STUDY: XR CHEST 1 VIEW; 1/15/2024 6:59 am   INDICATION: CLINICAL INFORMATION: Signs/Symptoms:hypoxia.   COMPARISON: 01/13/2024   ACCESSION NUMBER(S): [id number]   ORDERING CLINICIAN: LYNSEY HERNDON   TECHNIQUE: Portable chest one view.   FINDINGS: The cardiac size is indeterminate in view of the AP projection.  A cardiac pacemaker with biventricular as well as right atrial leads is noted. Right internal jugular venous catheter is unchanged. Diffuse hazy bilateral alveolar infiltrates are present along with moderate-sized bilateral effusions.       Bilateral infiltrates and effusions are present in a pattern suggesting CHF. There is no interval change when  compared to the previous examination.   MACRO: none   Signed by: Rahul Staples 1/15/2024 7:51 AM Dictation workstation:   [id number]    XR chest 1 view    Result Date: 1/13/2024  Interpreted By:  Rahul Staples, STUDY: XR CHEST 1 VIEW; 1/13/2024 2:51 pm   INDICATION: CLINICAL INFORMATION: Signs/Symptoms:central line and thoracentesis.   COMPARISON: 01/13/2024 at 824 hours   ACCESSION NUMBER(S): [id number]   ORDERING CLINICIAN: CRISTAL MARCH   TECHNIQUE: Portable chest one view.   FINDINGS: The cardiac size is indeterminate in view of the AP projection.  A cardiac pacemaker with biventricular as well as right atrial leads is noted. There is a right internal jugular venous catheter now present, new compared to the prior study with the tip somewhat obscured by the cardiac pacemaker leads. The tip however appears to terminate above the right atrium in the SVC distribution. There is no evidence for pneumothorax.   Decrease in the pleural effusion on the left in this patient with history of thoracentesis. There is no definite evidence for pneumothorax within limits of this study.   There are bilateral infiltrates and effusions present, possibly secondary to pulmonary edema and CHF.       1. Status post right-sided central line insertion without pneumothorax as above. 2. Status post left thoracentesis without evidence for pneumothorax. 3. Persistent infiltrates and effusions bilaterally suggesting possibility of pulmonary edema and CHF.   MACRO: none   Signed by: Rahul Staples 1/13/2024 3:02 PM Dictation workstation:   [id number]    CT chest wo IV contrast    Result Date: 1/13/2024  Interpreted By:  Michael Fuentes, STUDY: CT CHEST WO IV CONTRAST; ;  1/13/2024 9:09 am   INDICATION: Signs/Symptoms:worsening chest xray. Pneumonia   COMPARISON: 01/03/2024   ACCESSION NUMBER(S): [id number]   ORDERING CLINICIAN: LYNSEY HERNDON   TECHNIQUE: Serial axial unenhanced CT images obtained of the chest. Images reformatted in  the coronal and sagittal projection   All CT examinations are performed with 1 or more of the following dose reduction techniques: Automated exposure control, adjustment of mA and/or kv according to patient's size, or use of iterative reconstruction techniques.   FINDINGS: Mediastinum demonstrates lymphadenopathy with multiple enlarged paratracheal and pretracheal lymph nodes. There is a right paratracheal lymph node identified measuring 1.9 x 1.4 cm intervally increased in size from prior imaging where it measured 1.4 x 1.1 cm. Other lymph nodes have intervally increased in size. There is a precarinal lymph node identified measuring 12 mm in the short axis. Evaluation of the aidan limited without IV contrast. No significant hilar lymphadenopathy. Esophagus is gas-filled with component of refluxed suggested distally.   Heart and great vessels demonstrate ascending thoracic aorta to measure 3.2 cm. There is mild vascular calcification of the aortic arch. Diffuse qualitative coronary artery calcification is demonstrated. Cardiomegaly noted.   Large bilateral pleural effusions are demonstrated left-greater-than-right. Findings increased from prior imaging. There is left lower lobar collapse with no asymmetric density within the atelectatic lung. Also, there is compressive atelectasis within the left upper lobe with partial collapse within the lingula. The right lower lobe demonstrates partial lobar collapse in relation to compressive atelectasis. Lung parenchyma demonstrates septal thickening superimposed ground-glass airspace infiltrate more focal consolidation in the areas of septal thickening within bilateral lung fields which is progressed from prior imaging which may reflect alveolar component of pulmonary edema. However, the areas of consolidation may reflect superimposed component of infectious infiltrate.   Included portions visualized abdomen demonstrate mild ascites in particular perihepatic location. There is  contrast demonstrated within the colon. A cyst is noted in the superior pole of the right kidney measuring 1.5 cm.   Visualized osseous structures demonstrate no compression deformity in the spine.             1. Interval increase in bilateral pleural effusions with large bilateral effusions left-greater-than-right. There is resultant left lower lobar collapse with compressive atelectasis in particular in the left upper lobe as well as right lower lobe.   2. Patchy areas of airspace consolidation intervally developed from prior imaging with more focal septal thickening in the areas of consolidation may reflect alveolar component of pulmonary edema. However, superimposed infectious infiltrate is not excluded. No dense airspace consolidation     MACRO: None   Signed by: Michael Fuentes 1/13/2024 9:32 AM Dictation workstation:   [id number]    XR chest 1 view    Result Date: 1/13/2024  Interpreted By:  Michael Fuentes, STUDY: XR CHEST 1 VIEW 1/13/2024 8:31 am   INDICATION: Signs/Symptoms:respiratory distress   COMPARISON: 01/12/2024   ACCESSION NUMBER(S): [id number]   ORDERING CLINICIAN: CRISTAL MARCH   TECHNIQUE: Single AP view chest   FINDINGS: Cardiomediastinal silhouette is enlarged with mild cardiomegaly. Left-sided pacemaker with leads in the region of the right atrium, right ventricle, and coronary sinus. There is generalized increased interstitial prominence with cephalization as well as patchy alveolar airspace infiltrate in bilateral lung fields without significant change. There is improved aeration at the right lung base with component of layering basilar effusion improved. However, there is persistent left-sided pleural effusion with interval increased from prior imaging with component of compressive atelectasis.             1. Interval increase in left-sided pleural effusion with compressive atelectasis with moderate left-sided pleural effusion   2. Patchy alveolar airspace infiltrate in bilateral  lung fields with alveolar component of pulmonary edema suggested.   Signed by: Michael Fuentes 1/13/2024 8:48 AM Dictation workstation:   [id number]    XR chest 1 view    Result Date: 1/12/2024  Interpreted By:  Sugey Tinoco, STUDY: XR CHEST 1 VIEW 1/12/2024 3:49 pm   INDICATION: Signs/Symptoms:hypoxia   COMPARISON: 01/03/2024   ACCESSION NUMBER(S): [id number]   ORDERING CLINICIAN: CRISTAL MARCH   TECHNIQUE: AP erect view of the chest   FINDINGS: The cardiac size is mildly enlarged with biventricular ICD leads observed in right atrium, right ventricle, and coronary sinus.   Pulmonary edema is present with worsening of bilateral pulmonary infiltrates and with bilateral pleural effusion seen. Left effusion appears larger than the right with left effusion increased in size.       Pulmonary edema is now seen with increased size of left pleural effusion which is now moderate in amount. Small right pleural effusion is also present.   Signed by: Sugey Tinoco 1/12/2024 4:27 PM Dictation workstation:   [id number]    ECG 12 Lead    Result Date: 1/10/2024  Wide QRS tachycardia with Premature supraventricular complexes and with occasional Premature ventricular complexes Nonspecific intraventricular block Cannot rule out Anteroseptal infarct , age undetermined T wave abnormality, consider inferolateral ischemia Abnormal ECG When compared with ECG of 14-DEC-2023 19:10, (unconfirmed) Wide QRS tachycardia has replaced Sinus rhythm Vent. rate has increased BY  57 BPM See ED provider note for full interpretation and clinical correlation Confirmed by Alyssa Brown ([id number]) on 1/10/2024 4:16:56 PM    FL modified barium swallow study    Result Date: 1/9/2024  Interpreted By:  Chuck Montoya and Janezic Kimberly STUDY: FL MODIFIED BARIUM SWALLOW STUDY;; 1/8/2024 3:34 pm   INDICATION: Signs/Symptoms:dysphagia.   COMPARISON: None.   ACCESSION NUMBER(S): [id number]   ORDERING CLINICIAN: CRISTAL MARCH   TECHNIQUE: [facility]  completed. Informed verbal consent obtained prior to completion of exam. Trials of thin, nectar thick, honey thick, puree,  and regular solids given. Total fluoroscopy time:  2 minutes 52 seconds Radiation exposure (Reference Air Kerma):  34.37 mGy Images:  9 series     SLP: Emiliana Collins M.A. [facility]SLP, [facility] Phone/Pager: [phone] Option 2   SPEECH FINDINGS: Reason for referral: Aspiration due to comorbidities Patient hx: Per chart: This man who is debilitated from a stroke and a history of laryngeal cancer and repeat aspiration pneumonia has spent more time in the hospital than out of the hospital for the last 2 months.  He was most recently hospitalized here December 25 with COVID and aspiration pneumonia. Respiratory status: Nasal cannula 2L Previous diet: Regular/thin liquids   FINAL SPEECH RECOMMENDATIONS   Diet recommendations/feeding strategies: Puree/nectar thick liquids. Crush medication in puree or nectar thick liquids. Sit upright at 90 degrees for meals and medications.   Follow-up speech therapy recommended: Yes. Patient will benefit from continued dysphagia therapy for oropharyngeal exercises to allow for diet progression.   Short term goals: 1. Patient will tolerate recommended diet without pulmonary compromise 2. Patient will participate in oropharyngeal strengthening exercises. 3. SLP will assess for diet upgrade as appropriate. 4. Due to silent aspiration, patient will need a repeat MBS prior to liquid diet upgrade to thin.   Long term goals: Tolerated least restrictive diet without pulmonary compromise   Education provided: Reviewed results with nursing.   Treatment Provided today: No.     Repeat study/ dc plan: Repeat MBS prior to upgrade to thin liquids, due to silent aspriation with thin liquids.   Mechanics of the swallow summary:   Oral phase: 1. Patient is unable to masticate solids, thus manually removed. Attempt at mashing with tongue was not successful. 2. Repetitive tongue motion  with puree. Oral holding with all liquids and puree prior to swallow onset. 3. Swallow initiation at the pyriforms with thin liquids. At the valleculae with nectar and honey thick, and puree. 4. Trace oral residue, clears with reswallow.   Pharyngeal phase: 1. Partial superior laryngeal elevation. 2. Partial anterior hyoid excursion. 3. Partial epiglottic inversion. 4. Narrow column of contrast above vocal cords with  honey thick liquids and puree. Contrast enters airway above vocal cords (just underneath epiglottis) with no effort to eject. Narrow column of contrast enters airway below vocal cords with no effort to eject with straw. Contrast enters airway above vocal cords with teaspoon trial without effort to eject. 5. Collection of residue thoughout pharynx, mild and clears with spontaneous reswallows. 6. Narrow column of contrast between tongue base and pharyngeal wall.     SLP impressions with severity rating:   Silent aspiration with thin liquids. Laryngeal penetration noted with puree and honey thick liquids. Tolerated nectar thick liquids during testing constraints without difficulty. Unable to masticate solids. Unclear if this is due to edentulous status, or other comorbidities. Patient would benefit from continuation of dysphagia therapy to improve overall oral and pharyngeal strength, improve mastication and upgrade diet to least restrictive without pulmonary compromise.   Thin Liquids (MBSS) Rosenbek's Penetration Aspiration Scale, Thin Liquids (MBSS): 8. SILENT ASPIRATION - contrast passes glottis, visible residue, NO pt response   Response to Aspiration, Thin Liquid (MBSS): absent response, silent aspiration,   Response to Pharyngeal Residue, Thin Liquid (MBSS): spontaneous clearing,   Nectar Thick Liquids (MBSS) Rosenbek's Penetration Aspiration Scale, Nectar thick liquids (MBSS): 1. NO ASPIRATION & NO PENETRATION - no aspiration, contrast does not enter airway   Response to Pharyngeal Residue, Nectar  thick liquids (MBSS): spontaneous clearing,   Honey Thick Liquids (MBSS) Rosenbek's Penetration Aspiration Scale, Honey thick liquids (MBSS): 3. PENETRATION with LOW ASPIRATION risk - contrast remains above vocal cords, visible residue   Response to Pharyngeal Residue, Honey thick liquids (MBSS): spontaneous clearing,   Purees (MBSS) Rosenbek's Penetration Aspiration Scale, Purees (MBSS): 3. PENETRATION with LOW ASPIRATION risk - contrast remains above vocal cords, visible residue   Response to Pharyngeal Residue, Purees (MBSS): spontaneous clearing,     Speech Therapy section of this report signed by Emiliana Collins M.A. CCC-SLP, BCS-S on 1/8/2024 at 3:54 pm.   RADIOLOGY FINDINGS: There is laryngeal penetration and aspiration with thin liquid barium. There is laryngeal penetration without aspiration with nectar consistency, honey consistency, pudding consistency, and cookie consistency barium.   Radiology section of this report signed by Jan.       There is laryngeal penetration and aspiration with thin liquid barium. There is laryngeal penetration without aspiration with nectar consistency, honey consistency, pudding consistency, and cookie consistency barium.   MACRO: None   Signed by: Chuck Montoya 1/9/2024 5:02 PM Dictation workstation:   [id number]    ECG 12 lead    Result Date: 1/5/2024  Sinus bradycardia with frequent and consecutive Premature ventricular complexes and Fusion complexes Left axis deviation Nonspecific intraventricular block Inferior infarct , age undetermined Lateral injury pattern ** ** ACUTE MI / STEMI ** ** Abnormal ECG When compared with ECG of 27-DEC-2023 15:18, Significant changes have occurred See ED provider note for full interpretation and clinical correlation Confirmed by Yomi Lainez ([id number]) on 1/5/2024 9:13:12 PM    ECG 12 lead    Result Date: 1/5/2024  Undetermined rhythm Left bundle branch block Abnormal ECG When compared with ECG of 03-JAN-2024 11:50,  (unconfirmed) Current undetermined rhythm precludes rhythm comparison, needs review Questionable change in QRS duration Criteria for Inferior infarct are no longer Present See ED provider note for full interpretation and clinical correlation Confirmed by Yomi Lainez ([id number]) on 1/5/2024 9:12:07 PM    CT 3D reconstruction    Addendum Date: 1/5/2024    Interpreted By:  Chuck Argueta, ADDENDUM: Multiplanar 3D reformations are also generated and reviewed on independent workstation.   Signed by: Chuck Argueta 1/5/2024 2:19 PM   -------- ORIGINAL REPORT -------- Dictation workstation:   [id number]    Result Date: 1/5/2024  Interpreted By:  Chuck Argueta, STUDY: CT FACIAL BONES WO IV CONTRAST;  1/3/2024 1:14 pm   INDICATION: Signs/Symptoms:fall.   COMPARISON: None.   ACCESSION NUMBER(S): [id number]   ORDERING CLINICIAN: FIONA GOMEZ   TECHNIQUE: Contiguous axial CT sections were performed through the bones and orbits and supplemented with coronal and sagittal reformatted images. The study is limited by motion degradation.   FINDINGS: There is mild soft tissue swelling superior to the left orbit.   There is some deformity at the distal nasal bones greater on the right. This appearance is of indeterminate age and should be correlated to point tenderness. The remaining osseous structures demonstrate no sign of acute fracture allowing for motion degradation. There is no overt bone destruction or aggressive periosteal reaction. No lytic or blastic lesion is detected.   There are small fluid levels in both maxillary sinuses, greater on the left. There is a small fluid level in the right sphenoid compartment. There is mucoperiosteal thickening in scattered fluid within the ethmoid air cells, greater on the left posteriorly. The frontal sinuses and mastoid air cells are clear and symmetrically aerated. There may be mild mucoperiosteal thickening at the floor of the left maxillary sinus.   The nasal  septum is at the midline. The ostiomeatal units are patent bilaterally.   The visualized orbital contents are unremarkable. There is no intraorbital air density or fluid collection. The medial and inferior orbital walls are intact.   There are least 2 small foci of air density posterior to the right maxillary sinus though no apparent posterior wall fracture or defect. The significance of these findings is indeterminate.       Soft tissue swelling superior left orbit.   Mild deformity at the distal tip of the nasal bones greater on the right. This appearance is of uncertain age it should be correlated to point tenderness.   The remaining visualized osseous structures are intact.   Maxillary, ethmoid, and right sphenoid sinusitis with fluid levels.   Signed by: Chuck Argueta 1/5/2024 2:19 PM Dictation workstation:   [id number]    CT maxillofacial bones wo IV contrast    Addendum Date: 1/5/2024    Interpreted By:  Chuck Argueta, ADDENDUM: Multiplanar 3D reformations are also generated and reviewed on independent workstation.   Signed by: Chuck Argueta 1/5/2024 9:43 AM   -------- ORIGINAL REPORT -------- Dictation workstation:   [id number]    Result Date: 1/5/2024  Interpreted By:  Chuck Argueta, STUDY: CT FACIAL BONES WO IV CONTRAST;  1/3/2024 1:14 pm   INDICATION: Signs/Symptoms:fall.   COMPARISON: None.   ACCESSION NUMBER(S): [id number]   ORDERING CLINICIAN: FIONA GOMEZ   TECHNIQUE: Contiguous axial CT sections were performed through the bones and orbits and supplemented with coronal and sagittal reformatted images. The study is limited by motion degradation.   FINDINGS: There is mild soft tissue swelling superior to the left orbit.   There is some deformity at the distal nasal bones greater on the right. This appearance is of indeterminate age and should be correlated to point tenderness. The remaining osseous structures demonstrate no sign of acute fracture allowing for motion  degradation. There is no overt bone destruction or aggressive periosteal reaction. No lytic or blastic lesion is detected.   There are small fluid levels in both maxillary sinuses, greater on the left. There is a small fluid level in the right sphenoid compartment. There is mucoperiosteal thickening in scattered fluid within the ethmoid air cells, greater on the left posteriorly. The frontal sinuses and mastoid air cells are clear and symmetrically aerated. There may be mild mucoperiosteal thickening at the floor of the left maxillary sinus.   The nasal septum is at the midline. The ostiomeatal units are patent bilaterally.   The visualized orbital contents are unremarkable. There is no intraorbital air density or fluid collection. The medial and inferior orbital walls are intact.   There are least 2 small foci of air density posterior to the right maxillary sinus though no apparent posterior wall fracture or defect. The significance of these findings is indeterminate.       Soft tissue swelling superior left orbit.   Mild deformity at the distal tip of the nasal bones greater on the right. This appearance is of uncertain age it should be correlated to point tenderness.   The remaining visualized osseous structures are intact.   Maxillary, ethmoid, and right sphenoid sinusitis with fluid levels.   Signed by: Chuck Argueta 1/3/2024 1:56 PM Dictation workstation:   [id number]    ECG 12 lead    Result Date: 1/4/2024  Ventricular-paced rhythm with occasional and consecutive sinus complexes and with frequent and consecutive Premature ventricular complexes with junctional escape complexes Abnormal ECG When compared with ECG of 04-JAN-2024 04:21, (unconfirmed) Previous ECG has undetermined rhythm, needs review See ED provider note for full interpretation and clinical correlation Confirmed by Yomi Lainez ([id number]) on 1/4/2024 9:24:49 AM    CT head wo IV contrast    Addendum Date: 1/3/2024    Interpreted By:   Chuck Argueta, ADDENDUM: Subdural collection over the right convexity is new from 12/31/2022 though is of CSF attenuation. There is no evidence of dense acute hemorrhage at this site.   There is some asymmetry of the lateral ventricles with the left larger than the right. This in part may be related to some residual mass effect from the right subdural collection though there is no significant midline shift or sulcal effacement.   Signed by: Chuck Argueta 1/3/2024 3:48 PM   -------- ORIGINAL REPORT -------- Dictation workstation:   [id number]    Result Date: 1/3/2024  Interpreted By:  Chuck Argueta, STUDY: CT HEAD WO IV CONTRAST;  1/3/2024 1:14 pm   INDICATION: Signs/Symptoms:fall, periorbital ecchymosis.   COMPARISON: 12/31/2022   ACCESSION NUMBER(S): [id number]   ORDERING CLINICIAN: FIONA GOMEZ   TECHNIQUE: Contiguous unenhanced axial CT sections are performed from the skull base to the vertex.   FINDINGS: The osseous structures are intact. There is small fluid levels in the maxillary sinuses, greater on the left as well as the right sphenoid compartment. There is partial opacification of the ethmoid air cells with mucoperiosteal thickening and fluid greater on the left. The visualized portions of the mastoid air cells are and frontal sinuses are clear.   There is severe generalized parenchymal volume loss with enlargement of the cortical sulci and CSF spaces. There is a CSF subdural collection overlying the right frontal convexity extending laterally over the temporoparietal convexity. This finding is new from the previous study and measures 8 mm in maximum thickness. There may be a smaller CF density extra-axial collection over the left frontal convexity anteriorly.   There is hypodensity in the deep cerebral white matter bilaterally compatible with small-vessel ischemia. There is no sign of parenchymal hematoma. There is no dense extra-axial fluid collection. There is no mass effect or  midline shift. There is some asymmetry of the lateral ventricles with the left larger than the right.       Chronic appearing low density extra-axial fluid collections overlying the frontal convexities, greater on the right. These findings are new from 12/31/2022.   Generalized parenchymal atrophy and small-vessel ischemic changes of the cerebral white matter.   Maxillary, ethmoid, and right sphenoid sinusitis with acute fluid levels.   No sign of acute intracranial hemorrhage or mass effect.   The visualized osseous structures are intact.   Signed by: Chuck Argueta 1/3/2024 1:42 PM Dictation workstation:   [id number]    CT chest abdomen pelvis wo IV contrast    Result Date: 1/3/2024  STUDY: CT Chest, Abdomen, and Pelvis without IV Contrast; 1/3/2024, 1:15PM. INDICATION: Shortness of breath and abdominal pain.  Leukocytosis. COMPARISON: CXR same day.  CT CAP 12/31/2022. ACCESSION NUMBER(S): [id number] ORDERING CLINICIAN: FIONA GOMEZ TECHNIQUE: CT of the chest, abdomen, and pelvis was performed.  Contiguous axial images were obtained at 3 mm slice thickness through the chest, abdomen, and pelvis.  Coronal and sagittal reconstructions at 3 mm slice thickness were performed.  No intravenous contrast was administered.  FINDINGS: Please note that the evaluation of vessels, lymph nodes and organs is limited without intravenous contrast. CHEST: MEDIASTINUM: The heart is enlarged in size without pericardial effusion. Biventricular AICD remains in place.  There is no thoracic aortic, aneurysm.  Scattered vascular calcifications are seen along the arterial tree. LUNGS/PLEURA: There are bilateral pleural effusions greater on the left than the right, these appear decreased when compared with the prior especially on the right.  Adjacent compressive and subsegmental atelectatic changes are noted as well as some areas of probable consolidation. There is a focus of patchy infiltrate in the right upper lobe anteriorly  in proximity to the anterior aspect of the first rib.  This appears slightly more prominent.  Trachea and mainstem bronchi appear stable, borderline narrowed Groundglass infiltrates noted previously appear improved with mild residual. LYMPH NODES: Mediastinal lymph nodes are mildly enlarged.  There is a pretracheal lymph node with the short axis diameter of 1.4 cm similar to the prior.  Detail is somewhat obscured, by artifact on the current study.  Patient's arms are included within the field-of-view. ABDOMEN:  LIVER: The liver is mildly enlarged, the right lobe measures 17 cm in CC dimension.  Assessment of parenchyma is limited by artifact.  Smooth surface contour.  Normal attenuation.  BILE DUCTS: No intrahepatic or extrahepatic biliary ductal dilatation.  GALLBLADDER: The gallbladder is surgically absent and this was also the case previously.  PANCREAS: No masses or ductal dilatation.  SPLEEN: The spleen is not clearly identified nor was it previously.  ADRENAL GLANDS: No thickening or nodules.  KIDNEYS AND URETERS: Kidneys are normal in size and location.  No renal or ureteral calculi.  There is a 2 cm cyst in the upper pole of the right kidney with small peripheral calcifications which are more prominent than on the prior.  No hydronephrosis of either kidney.  PELVIS:  BLADDER: There is increased bladder distention when compared with the prior. There is extrinsic pressure from the prostate and coarse prostate calcifications also present previously.  Increased attenuation in the dependent bladder appears to be likely artifactual.  REPRODUCTIVE ORGANS: Coarse prostate calcifications are noted also present previously.  BOWEL: There are multiple fluid and air-filled loops of small and large bowel as can be seen with adynamic ileus.  VESSELS: No acute abnormalities identified.  Abdominal aorta is normal in caliber.  There are vascular calcifications along the arterial tree.  PERITONEUM/RETROPERITONEUM/LYMPH NODES:  There are mesenteric and pelvic reticulations.  There is a small amount of presacral and pelvic fluid.  No pneumoperitoneum. No lymphadenopathy.  ABDOMINAL WALL AND SOFT TISSUES: Abdominal wall edema is noted especially laterally more so on the right and posteriorly.  BONES: No acute fracture or aggressive osseous lesion.  Lumbar dextroscoliosis is noted and there are degenerative changes of the spine and joints.    1.Bilateral pleural effusions greater on the left than the right, these appear decreased from the prior especially on the right. Adjacent compressive and subsegmental atelectatic changes are noted as well as some areas of consolidation. 2.Improving groundglass infiltrates in both lungs with mild residual. There is a small focus of infiltrate in the right upper lobe anteriorly which appears slightly more prominent.  Pulmonary findings suggest a combination of pneumonia and pulmonary vascular congestion. This could also be correlated clinically.  Underlying nodules could be obscured and follow-up is suggested. 3.Cardiomegaly with AICD in place also present previously. 4.Within the abdomen and pelvis there are multiple fluid and air-filled loops of small and large bowel as can be seen with adynamic ileus. There is a small amount of presacral and pelvic fluid. No pneumoperitoneum. 5.Abdominal wall edema. 6.  2 cm cyst in the upper pole of the right kidney with small peripheral calcifications which are more prominent than on the prior. Follow-up studies are suggested. Signed by Nancy Hagen DO    CT cervical spine wo IV contrast    Result Date: 1/3/2024  Interpreted By:  Chuck Argueta, STUDY: CT CERVICAL SPINE WO IV CONTRAST;  1/3/2024 1:14 pm   INDICATION: Signs/Symptoms:fall.   COMPARISON: None.   ACCESSION NUMBER(S): [id number]   ORDERING CLINICIAN: FOINA GOMEZ   TECHNIQUE: Contiguous axial CT sections are performed from the skullbase to the upper thoracic spine and supplemented with coronal  and sagittal reformatted images. The study is limited by motion degradation.   FINDINGS: There is mild dextro convexity of the cervical spine and reversal of the cervical lordosis. There is anterior subluxation of C2 relative to C3 measuring 3 mm. The facet joints align normally.   There are multilevel discogenic degenerative changes of the cervical spine with disc space narrowing from C3 through C6. There is degenerative endplate sclerosis and lucency is more pronounced at C5-6.   The cervical vertebral body heights are maintained. Allowing for motion degradation, there is no CT evidence of acute cervical spine fracture. There is no bone destruction or aggressive periosteal reaction. No lytic or blastic lesion is detected. The visualized surrounding osseous structures are intact.   The C1-2 relationship is within normal limits. There is C1-2 arthrosis anteriorly with joint space narrowing and marginal spurring.   The C2-3 disc space level demonstrates moderate facet arthrosis on the right and mild facet arthrosis on the left. There is bulging disc and uncovertebral arthrosis with mild narrowing of the left neural foramen and mild to moderate narrowing on the right. There is mild central canal narrowing with bulging disc and mass effect upon the ventral subarachnoid space.   The C3-4 disc space level demonstrates mild to moderate bilateral facet arthrosis. There is bulging disc and marginal osteophyte asymmetric to the right with severe uncovertebral arthrosis bilaterally. There is severe bilateral neural foraminal narrowing, greater on the right with moderate central canal narrowing.   The C4-5 disc space level demonstrates mild to moderate bilateral facet arthrosis, greater on the left. There is bulging disc and marginal osteophyte with severe bilateral uncovertebral arthrosis. There is severe narrowing of the right neural foramen and moderate to severe narrowing of the left neural foramen. There is superimposed  extruded disc on the right with severe central canal narrowing and suspected mass effect upon the spinal cord. A similar finding was described on a MRI examination of 10/13/2022 though it is difficult to compare different modalities with respect to the size and intensity of this finding.   The C5-6 disc space level demonstrates moderate bilateral facet arthrosis. There is bulging disc and marginal osteophyte with moderate central canal narrowing. There is severe bilateral neural foraminal narrowing.   The C6-7 disc space level demonstrates mild to moderate bilateral facet arthrosis. There is bulging disc and marginal osteophyte moderate narrowing left neural foramen and mild narrowing of the right neural foramen. There is mild to moderate central canal narrowing.   The C7-T1 disc space level demonstrates moderate bilateral facet arthrosis. There is bulging disc and marginal osteophyte with moderate bilateral neural foraminal narrowing. There is mild central canal narrowing.   There is no prevertebral soft tissue swelling or retropharyngeal air. Bilateral pleural effusions and dependent atelectasis is partially visualized, greater on the left. There are bilateral emphysematous changes with ground-glass density in both lung apices. There is superimposed focal opacity in the right apex anteriorly with a few peripheral air densities.       Severe multilevel cervical spondylosis with reversal of the cervical lordosis and mild dextro convexity of the cervical spine.   Multilevel bulging disc with facet and uncovertebral arthrosis. There is large superimposed extruded disc at C4-5 on the right. Severe central canal stenosis at this level asymmetric to the right. There is at least moderate central canal narrowing at C5-6 and C3-4. There is severe multilevel bilateral neural foraminal narrowing as detailed above.   Allowing for motion degradation, there is no CT evidence of acute fracture or traumatic subluxation.    Bilateral pleural effusions and dependent atelectasis are partially visualized, greater on the left. There are bilateral emphysematous changes with ground-glass density in the lung apices. Focal opacity with ill-defined margins is identified in the right apex. Further workup with dedicated CT thorax is recommended.   Signed by: Chuck Argueta 1/3/2024 1:51 PM Dictation workstation:   [id number]    XR chest 1 view    Result Date: 1/3/2024  Interpreted By:  Anahi Fernando, STUDY: XR CHEST 1 VIEW;  1/3/2024 11:43 am   INDICATION: Signs/Symptoms:SOB.   COMPARISON: 12/27/2023   ACCESSION NUMBER(S): [id number]   ORDERING CLINICIAN: FIONA GOMEZ   FINDINGS: CARDIOMEDIASTINAL SILHOUETTE: The heart is enlarged. There is a left subclavian pacemaker-AICD with a single tip in the right atrium and two tips in the right ventricle.     LUNGS: There is improvement in bilateral perihilar alveolar opacities consistent with resolving pulmonary edema. There is underlying moderate vascular congestion, improved. There is no pleural fluid.   ABDOMEN: No remarkable upper abdominal findings.     BONES: No acute osseous changes.       1. Marked improvement bilateral patchy alveolar opacities consistent with resolving pneumonia. 2. Underlying moderate vascular congestion, improved.   MACRO: None   Signed by: Anahi Fernando 1/3/2024 12:00 PM Dictation workstation:   [id number]    ECG 12 lead    Result Date: 1/1/2024  Undetermined rhythm Left bundle branch block Abnormal ECG When compared with ECG of 27-DEC-2023 07:49, (unconfirmed) Current undetermined rhythm precludes rhythm comparison, needs review See ED provider note for full interpretation and clinical correlation Confirmed by Yomi Lainez ([id number]) on 1/1/2024 3:20:04 PM    XR chest 1 view    Result Date: 12/27/2023  Interpreted By:  Anahi Fernando, STUDY: XR CHEST 1 VIEW;  12/27/2023 8:43 am   INDICATION: Signs/Symptoms:Dypnea.   COMPARISON: 12/14/2023   ACCESSION  NUMBER(S): [id number]   ORDERING CLINICIAN: SYLVIA MOORE   FINDINGS: CARDIOMEDIASTINAL SILHOUETTE: The heart is enlarged. There is a left subclavian pacemaker-AICD with a single tip in the right atrium and two tips in the right ventricle.   LUNGS: There are progressive perihilar alveolar opacities, likely pulmonary edema but could also represent pneumonia. There is left basilar atelectasis and volume loss which has progressed. There is no pleural fluid.   ABDOMEN: No remarkable upper abdominal findings.     BONES: No acute osseous changes.       1. Progressive bilateral predominantly perihilar alveolar opacities consistent with pulmonary edema or pneumonia. 2. Progressive volume loss and atelectasis left lung base.   MACRO: None   Signed by: Anahi Fernando 12/27/2023 8:50 AM Dictation workstation:   [id number]    CT FACIAL BONE/TITO WO IVCON    Result Date: 12/22/2023  * * *Final Report* * * DATE OF EXAM: Dec 22 2023  5:57PM   [facility]   0507  -  CT FACIAL BONE/TITO WO IVCON  / ACCESSION #  [id number] PROCEDURE REASON: Maxillofacial pain      * * * * Physician Interpretation * * * *  EXAMINATION:  CT BRAIN WO IVCON, CT FACIAL BONE/TITO WO IVCON, CT CERVICAL SPINE WO IVCON CLINICAL HISTORY: Head trauma, moderate-severe (accession [id number]), Maxillofacial pain, Nasal fracture suspected (accession [id number]), Spine fracture, cervical, traumatic (accession [id number]) TECHNIQUE:  Serial axial images without IV contrast were obtained from the vertex to the foramen magnum.  CT of the cervical spine was also performed with axial sections from the skull base through the thoracic inlet. CT of the face was performed without IV contrast and multiplanar reconstructions were generated. MQ:  CTBWO_3 CT Dose-Length Product (DLP): 1565  mGy*cm CT Dose Reduction Employed: Iterative recon (accession [id number]), Automated exposure control(AEC) and iterative recon (accession [id number]) COMPARISON:  None. RESULT: CT HEAD: No acute  intracranial hemorrhage or extra-axial fluid collection. No hydrocephalus, mass effect, or herniation. No acute ischemic infarct detected.  Mild background of white matter hypoattenuation consistent with chronic microvascular ischemic change. Unremarkable dural venous sinus attenuation. No acute osseous abnormality. Clear visualized paranasal sinuses and tympanomastoid cavities. CT face: Soft Tissues:  Soft tissue swelling is present at the nose and along the right forehead/periorbital region. Facial bones:  Mildly comminuted bilateral nasal bone fractures are present. Orbits:  No evidence of an acute fracture.  The globes are intact.  The soft tissue planes of the orbits are maintained. Paranasal Sinuses:  The paranasal sinuses are clear. Foreign Bodies:  No evidence of radiopaque foreign bodies. Other:  No evidence of a remote fracture.  No lytic or blastic process seen in the facial bones. CT cervical spine: There is reversal of the normal cervical lordosis. There is no fracture or dislocation. Severe multilevel degenerative changes are present.  There is severe spinal canal stenosis at the C4-C5 level from a large posterior disc osteophyte complex.  Moderate spinal canal stenosis present at the C3-C4 level from a posterior disc osteophyte complex.  Severe bilateral neural foraminal narrowing is present at the C3-C4, C4-C5, and C5-C6 levels. Centrilobular emphysema.    IMPRESSION: 1.  No acute intracranial abnormality. 2.  No cervical spine fracture or traumatic subluxation.  Severe multilevel degenerative change. 3.  Nasal bone fractures with adjacent soft tissue swelling. : ALISSA   Transcribe Date/Time: Dec 22 2023  6:05P Dictated by : MAIRA STEWART MD This examination was interpreted and the report reviewed and electronically signed by: MAIRA STEWART MD on Dec 22 2023  6:17PM  EST    CT CERVICAL SPINE WO IVCON    Result Date: 12/22/2023  * * *Final Report* * * DATE OF EXAM: Dec 22 2023  5:57PM    [facility]   [id number]  -  CT CERVICAL SPINE WO IVCON  / ACCESSION #  [id number] PROCEDURE REASON: Spine fracture, cervical, traumatic      * * * * Physician Interpretation * * * *  EXAMINATION:  CT BRAIN WO IVCON, CT FACIAL BONE/TITO WO IVCON, CT CERVICAL SPINE WO IVCON CLINICAL HISTORY: Head trauma, moderate-severe (accession [id number]), Maxillofacial pain, Nasal fracture suspected (accession [id number]), Spine fracture, cervical, traumatic (accession [id number]) TECHNIQUE:  Serial axial images without IV contrast were obtained from the vertex to the foramen magnum.  CT of the cervical spine was also performed with axial sections from the skull base through the thoracic inlet. CT of the face was performed without IV contrast and multiplanar reconstructions were generated. MQ:  CTBWO_3 CT Dose-Length Product (DLP): 1565  mGy*cm CT Dose Reduction Employed: Iterative recon (accession [id number]), Automated exposure control(AEC) and iterative recon (accession [id number]) COMPARISON:  None. RESULT: CT HEAD: No acute intracranial hemorrhage or extra-axial fluid collection. No hydrocephalus, mass effect, or herniation. No acute ischemic infarct detected.  Mild background of white matter hypoattenuation consistent with chronic microvascular ischemic change. Unremarkable dural venous sinus attenuation. No acute osseous abnormality. Clear visualized paranasal sinuses and tympanomastoid cavities. CT face: Soft Tissues:  Soft tissue swelling is present at the nose and along the right forehead/periorbital region. Facial bones:  Mildly comminuted bilateral nasal bone fractures are present. Orbits:  No evidence of an acute fracture.  The globes are intact.  The soft tissue planes of the orbits are maintained. Paranasal Sinuses:  The paranasal sinuses are clear. Foreign Bodies:  No evidence of radiopaque foreign bodies. Other:  No evidence of a remote fracture.  No lytic or blastic process seen in the facial bones. CT cervical spine: There is  reversal of the normal cervical lordosis. There is no fracture or dislocation. Severe multilevel degenerative changes are present.  There is severe spinal canal stenosis at the C4-C5 level from a large posterior disc osteophyte complex.  Moderate spinal canal stenosis present at the C3-C4 level from a posterior disc osteophyte complex.  Severe bilateral neural foraminal narrowing is present at the C3-C4, C4-C5, and C5-C6 levels. Centrilobular emphysema.    IMPRESSION: 1.  No acute intracranial abnormality. 2.  No cervical spine fracture or traumatic subluxation.  Severe multilevel degenerative change. 3.  Nasal bone fractures with adjacent soft tissue swelling. : [employer]   Transcribe Date/Time: Dec 22 2023  6:05P Dictated by : MAIRA STEWART MD This examination was interpreted and the report reviewed and electronically signed by: MAIRA STEWART MD on Dec 22 2023  6:17PM  EST    CT BRAIN WO IVCON    Result Date: 12/22/2023  * * *Final Report* * * DATE OF EXAM: Dec 22 2023  5:57PM   ASC   0504  -  CT BRAIN WO IVCON   / ACCESSION #  [id number] PROCEDURE REASON: Head trauma, moderate-severe      * * * * Physician Interpretation * * * *  EXAMINATION:  CT BRAIN WO IVCON, CT FACIAL BONE/TITO WO IVCON, CT CERVICAL SPINE WO IVCON CLINICAL HISTORY: Head trauma, moderate-severe (accession [id number]), Maxillofacial pain, Nasal fracture suspected (accession [id number]), Spine fracture, cervical, traumatic (accession [id number]) TECHNIQUE:  Serial axial images without IV contrast were obtained from the vertex to the foramen magnum.  CT of the cervical spine was also performed with axial sections from the skull base through the thoracic inlet. CT of the face was performed without IV contrast and multiplanar reconstructions were generated. MQ:  CTBWO_3 CT Dose-Length Product (DLP): 1565  mGy*cm CT Dose Reduction Employed: Iterative recon (accession [id number]), Automated exposure control(AEC) and iterative recon (accession  [id number]) COMPARISON:  None. RESULT: CT HEAD: No acute intracranial hemorrhage or extra-axial fluid collection. No hydrocephalus, mass effect, or herniation. No acute ischemic infarct detected.  Mild background of white matter hypoattenuation consistent with chronic microvascular ischemic change. Unremarkable dural venous sinus attenuation. No acute osseous abnormality. Clear visualized paranasal sinuses and tympanomastoid cavities. CT face: Soft Tissues:  Soft tissue swelling is present at the nose and along the right forehead/periorbital region. Facial bones:  Mildly comminuted bilateral nasal bone fractures are present. Orbits:  No evidence of an acute fracture.  The globes are intact.  The soft tissue planes of the orbits are maintained. Paranasal Sinuses:  The paranasal sinuses are clear. Foreign Bodies:  No evidence of radiopaque foreign bodies. Other:  No evidence of a remote fracture.  No lytic or blastic process seen in the facial bones. CT cervical spine: There is reversal of the normal cervical lordosis. There is no fracture or dislocation. Severe multilevel degenerative changes are present.  There is severe spinal canal stenosis at the C4-C5 level from a large posterior disc osteophyte complex.  Moderate spinal canal stenosis present at the C3-C4 level from a posterior disc osteophyte complex.  Severe bilateral neural foraminal narrowing is present at the C3-C4, C4-C5, and C5-C6 levels. Centrilobular emphysema.    IMPRESSION: 1.  No acute intracranial abnormality. 2.  No cervical spine fracture or traumatic subluxation.  Severe multilevel degenerative change. 3.  Nasal bone fractures with adjacent soft tissue swelling. : [facility]   Transcribe Date/Time: Dec 22 2023  6:05P Dictated by : MAIRA STEWART MD This examination was interpreted and the report reviewed and electronically signed by: MAIRA STEWART MD on Dec 22 2023  6:17PM  EST     Assessment/Plan   Septic shock, on pressor  support  Coronavirus infection-treated with at least 10 days of dexamethasone, no further isolation precautions indicated  Suspected aspiration pneumonia  Left-sided pleural effusion, s/p thoracentesis  Leukocytosis-most likely multifactorial-marginal decrease  C. difficile infection-positive PCR/negative EIA-no diarrhea  Acute on chronic respiratory failure-resolving, on 6 L     Oral vancomycin for a total of 14 days-IV Flagyl if patient is unable to take p.o-tentative stop date 1/19/2024-will extend based on stop date of Merrem  Meropenem-completed  Continue contact plus precautions for c.diff  Supplemental oxygen as needed  Monitor temperature and WBC  Cardiology to advise with regards amiodarone dosing due to to potential interaction between Flagyl and amiodarone         Frank Escalante MD

## 2024-01-19 NOTE — NURSING NOTE
On rounding, R internal jugular triple lumen catheter dressing is current, dry and intact. All three lumens are in use, infusing without difficulty.

## 2024-01-19 NOTE — PROGRESS NOTES
Physical Therapy                 Therapy Communication Note    Patient Name: Hai Spaulding  MRN: [id number]  Today's Date: 1/19/2024     Discipline: Physical Therapy    Missed Visit Reason: Missed Visit Reason:  (pending hospice meeting)    Missed Time: Cancel

## 2024-01-19 NOTE — PROGRESS NOTES
Critical Care Medicine Progress Note    Admitted on:     1/4/2024  Length of Stay: 15 day(s)     Interval History     24 hr UOP only 320 mL with the dobutamine (started empirically) and Lasix.  Serum Cr 1.90 -> 2.10 -> 2.50  Increasing vasopressor requirement.  On BiPAP most of yesterday and overnight.    Still very lethargic.  Will intermittently nod to questions and weakly follows commands.    Spoke with multiple family members at bedside.  First, patient's son and then patient's wife.  Then, patient's daughter came in as I was speaking to wife.  Explained that patient's prognosis is dismal unless we pursue more aggressive interventions such as dialysis and intubation / mechanical ventilation.  However, even with these interventions prognosis is guarded, and his prognosis as it pertains to quality of life is poor.  Assuming he survives, he can be expected to require SNF or LTAC on discharge given his chronic comorbidities and limited function prior to hospital admission, and not return to his prior level of function.    About an hour after this conversation, I was contacted by Octavio JEFFERSON with the palliative care service.  Patient's family has opted for hospice.      Objective   Objective     Vitals:    01/19/24 0315   Weight: 68.7 kg (151 lb 7.3 oz)   Body mass index is 22.36 kg/m².        1/19/2024    11:26 AM 1/19/2024    11:27 AM 1/19/2024    11:28 AM 1/19/2024    11:29 AM 1/19/2024    11:30 AM 1/19/2024    11:31 AM 1/19/2024    11:46 AM   Vitals   Systolic     57     Diastolic     46     Heart Rate 94 100 99 95 95 101    Temp       36.1 °C (97 °F)   Resp 25 29 27 28 25 21         Vent settings:  FiO2 (%):  [40 %] 40 %  S RR:  [16] 16    Intake/Output Summary (Last 24 hours) at 1/19/2024 1319  Last data filed at 1/19/2024 1110  Gross per 24 hour   Intake 1597.91 ml   Output 320 ml   Net 1277.91 ml       Physical Exam  Neuro: AAOx3.  Moves all extremities.  Eyes: PERRL, scleral edema.  CV:        Normal S1, S2.   Irregular rhythm.  Resp:     Bibasilar crackles.  No wheezes, rales or ronchi.  GI:         +NG tube in R nare, +BS, abd soft, NT, ND.  Ext:        3+ pitting BUE edema.    Medications     Scheduled Medications:   acetaminophen, 650 mg, oral, TID  acetylcysteine, 3 mL, nebulization, BID  amiodarone, 400 mg, oral, BID  apixaban, 5 mg, oral, BID  artificial saliva (yerbas-lyt), 1 mL, mucous membrane, TID  atorvastatin, 80 mg, oral, Nightly  budesonide, 0.5 mg, nebulization, BID  carbidopa-levodopa, 1 tablet, nasogastric tube, TID  clopidogrel, 75 mg, oral, Daily  cyclobenzaprine, 10 mg, oral, Nightly  dapagliflozin propanediol, 10 mg, oral, Daily  furosemide, 40 mg, intravenous, q8h  gabapentin, 200 mg, oral, Nightly  insulin glargine, 10 Units, subcutaneous, Daily  insulin lispro, 0-10 Units, subcutaneous, q6h  ipratropium-albuteroL, 3 mL, nebulization, TID  lactobacillus acidophilus, 1 tablet, oral, BID  levothyroxine, 25 mcg, oral, Daily before breakfast  metroNIDAZOLE, 500 mg, intravenous, q8h  mirtazapine, 15 mg, oral, Nightly  nystatin, 1 Application, Topical, BID  pantoprazole, 40 mg, oral, Daily before breakfast  QUEtiapine, 25 mg, oral, Nightly  sertraline, 100 mg, oral, Daily  SITagliptin phosphate, 50 mg, oral, Daily  sodium chloride, 1 spray, Each Nostril, TID  vancomycin, 125 mg, oral, 4x daily       Continuous Medications:   norepinephrine, 0.01-3 mcg/kg/min, Last Rate: 0.19 mcg/kg/min (01/19/24 1000)       PRN Medications:     Labs     Results from last 72 hours   Lab Units 01/19/24  0503 01/18/24  0558 01/17/24  0508   GLUCOSE mg/dL 287* 171* 81   SODIUM mmol/L 146* 147* 149*   POTASSIUM mmol/L 3.7 3.4 3.7   CHLORIDE mmol/L 110* 110* 113*   CO2 mmol/L 25 25 26   BUN mg/dL 52* 40* 34*   CREATININE mg/dL 2.50* 2.10* 1.90*   EGFR mL/min/1.73m*2 27* 33* 37*   CALCIUM mg/dL 7.4* 7.8* 7.5*   ALBUMIN g/dL  --  2.2* 2.1*   MAGNESIUM mg/dL 2.30  --  1.90   PHOSPHORUS mg/dL 3.5  --  2.8     Results from last 72  hours   Lab Units 01/18/24  0558 01/17/24  0508   ALK PHOS U/L 560* 464*   ALT U/L <5* <5*   AST U/L 36 25   BILIRUBIN TOTAL mg/dL 0.7 0.4   PROTEIN TOTAL g/dL 5.3* 5.4*             Results from last 72 hours   Lab Units 01/19/24  0504 01/18/24  0558 01/17/24  0508   WBC AUTO x10*3/uL 12.2* 12.2* 13.2*   NRBC AUTO /100 WBCs 5.9* 4.0* 2.1*   RBC AUTO x10*6/uL 3.66* 3.71* 3.94*   HEMOGLOBIN g/dL 10.4* 10.5* 11.2*   HEMATOCRIT % 30.9* 31.5* 34.7*   MCV fL 84 85 88   MCH pg 28.4 28.3 28.4   MCHC g/dL 33.7 33.3 32.3   RDW % 20.0* 19.2* 20.6*   PLATELETS AUTO x10*3/uL 324 281 247         Lab Results   Component Value Date    BLOODCULT No growth at 4 days -  FINAL REPORT 01/03/2024    BLOODCULT No growth at 4 days -  FINAL REPORT 01/03/2024    GRAMSTAIN No polymorphonuclear leukocytes seen 01/13/2024    GRAMSTAIN No organisms seen 01/13/2024     Lab Results   Component Value Date    URINECULTURE 20,000 - 80,000 Candida glabrata (A) 01/03/2024       Imaging and Diagnostic Studies     Recent imaging and diagnostic studies reviewed.       Assessment / Plan     Septic shock  Aspiration pneumonia  MATEO  Fluid overload    -GOC discussions as documented above.  Family has opted for hospice and hospice service consulted accordingly.  -DC dobutamine.  Otherwise continue current management for now.      Chuck Mann MD    This patient is critically ill/injured due to acute impairment in one or more vital organ systems, such that there is a probably of imminent or life-threatening deterioration of the patient's condition.  I spent 46 minutes in the direct delivery of medical care that involves high complexity decision making to treat single or multiple vital organ system failure and/or prevent further life-threatening deterioration of the patient's condition.  This time does not include separately billable procedures.

## 2024-01-19 NOTE — PROGRESS NOTES
Speech-Language Pathology                 Therapy Communication Note    Patient Name: Hai Spaulding  MRN: [id number]  Today's Date: 1/19/2024     Discipline: Speech Language Pathology    Missed Visit Reason:  Pt NPO has NGT on cont BIPAP per RN not appropriate for therapy    Missed Time: Cancel    Comment:

## 2024-01-19 NOTE — PROGRESS NOTES
Subjective Data:  Patient remains respiratory failure.    Overnight Events:    Telemetry overnight reviewed no events     Objective Data:  Last Recorded Vitals:  Vitals:    01/19/24 0700 01/19/24 0732 01/19/24 0742 01/19/24 0753   BP: 88/55      Pulse: 109      Resp: 22  (!) 31    Temp:    36.4 °C (97.5 °F)   TempSrc:       SpO2: 95% 95%     Weight:       Height:           Last Labs:  CBC - 1/19/2024:  5:04 AM  12.2 10.4 324    30.9      CMP - 1/19/2024:  5:03 AM  7.4 5.3 36 --- 0.7   3.5 2.2 <5 560      PTT - 12/28/2023:  5:48 AM  2.3   26.1 107.8     TROPHS   Date/Time Value Ref Range Status   01/04/2024 08:16  0 - 20 ng/L Final     Comment:     Previous result verified on 1/3/2024 1340 on specimen/case [id number] called with component [facility] for procedure Troponin I, High Sensitivity, Initial with value 161 ng/L.   01/03/2024 05:37  0 - 20 ng/L Final     Comment:     Previous result verified on 1/3/2024 1340 on specimen/case [id number] called with component [facility] for procedure Troponin I, High Sensitivity, Initial with value 161 ng/L.   01/03/2024 02:27  0 - 20 ng/L Final     Comment:     Previous result verified on 1/3/2024 1340 on specimen/case [id number] called with component [facility] for procedure Troponin I, High Sensitivity, Initial with value 161 ng/L.     BNP   Date/Time Value Ref Range Status   01/03/2024 12:13  0 - 99 pg/mL Final   12/27/2023 10:04  0 - 99 pg/mL Final     HGBA1C   Date/Time Value Ref Range Status   12/27/2023 09:59 PM 10.7 See below % Final   10/11/2023 01:15 PM 10.6 See below % Final     LDLCALC   Date/Time Value Ref Range Status   06/02/2023 02:40 PM 39 65 - 130 MG/DL Final   10/08/2019 12:15 PM 96 65 - 130 MG/DL Final     VLDL   Date/Time Value Ref Range Status   10/22/2021 08:07 PM 18 0 - 40 mg/dL Final   09/28/2020 04:12 PM 46 0 - 40 mg/dL Final   10/02/2018 02:40 PM 50 0 - 40 mg/dL Final      Last I/O:  I/O last 3 completed shifts:  In:  2629 (38.3 mL/kg) [I.V.:779 (11.3 mL/kg); Blood:50; NG/GT:1300; IV Piggyback:500]  Out: 770 (11.2 mL/kg) [Urine:770 (0.3 mL/kg/hr)]  Weight: 68.7 kg     Past Cardiology Tests (Last 3 Years):  EKG:  ECG 12 lead 01/05/2024      ECG 12 lead 01/04/2024      ECG 12 lead 01/03/2024      ECG 12 lead 12/27/2023      ECG 12 Lead 12/27/2023      ECG 12 lead 12/14/2023    Echo:  Transthoracic Echo (TTE) Complete 12/18/2023    Ejection Fractions:  EF   Date/Time Value Ref Range Status   12/18/2023 12:10 PM 29       Cath:  No results found for this or any previous visit from the past 1095 days.    Stress Test:  No results found for this or any previous visit from the past 1095 days.    Cardiac Imaging:  XR CHEST ABDOMEN FOR OG NG PLACEMENT 11/26/2021      XR CHEST ABDOMEN FOR OG NG PLACEMENT 11/26/2021      Inpatient Medications:  Scheduled medications   Medication Dose Route Frequency    acetaminophen  650 mg oral TID    acetylcysteine  3 mL nebulization BID    amiodarone  400 mg oral BID    apixaban  5 mg oral BID    artificial saliva (yerbas-lyt)  1 mL mucous membrane TID    atorvastatin  80 mg oral Nightly    budesonide  0.5 mg nebulization BID    carbidopa-levodopa  1 tablet nasogastric tube TID    clopidogrel  75 mg oral Daily    cyclobenzaprine  10 mg oral Nightly    dapagliflozin propanediol  10 mg oral Daily    furosemide  40 mg intravenous q8h    gabapentin  200 mg oral Nightly    insulin glargine  10 Units subcutaneous Daily    insulin lispro  0-10 Units subcutaneous q6h    ipratropium-albuteroL  3 mL nebulization TID    lactobacillus acidophilus  1 tablet oral BID    levothyroxine  25 mcg oral Daily before breakfast    metroNIDAZOLE  500 mg intravenous q8h    mirtazapine  15 mg oral Nightly    nystatin  1 Application Topical BID    pantoprazole  40 mg oral Daily before breakfast    QUEtiapine  25 mg oral Nightly    sertraline  100 mg oral Daily    SITagliptin phosphate  50 mg oral Daily    sodium chloride  1 spray  Each Nostril TID    vancomycin  125 mg oral 4x daily     PRN medications   Medication    albuterol    dextrose 10 % in water (D10W)    dextrose    glucagon    melatonin    methyl salicylate-menthol    ondansetron    oxygen    oxygen    phenoL     Continuous Medications   Medication Dose Last Rate    norepinephrine  0.01-3 mcg/kg/min 0.19 mcg/kg/min (01/19/24 0300)       Physical Exam:  General: Patient is in Mild respiratory distress  HEENT: atraumatic normocephalic.  Neck: is supple jugular venous pressure within normal limits no thyromegaly.  Cardiovascular irregular rate and rhythm normal heart sounds no murmurs rubs or gallops.  Lungs: deCreased breathing sounds at bases abdomen: is soft nontender.  Extremities warm to touch no edema.        Assessment/Plan   1 septic shock likely secondary to C. difficile colitis.  Low ejection fraction likely contributing to his hypotension.  Currently on Levophed which may control continue.  Management by primary team.     2.  Acute on chronic systolic heart failure ejection fraction 30 to 35% secondary to ischemic cardiomyopathy status post ICD/CRT.  Patient has known coronary artery disease not candidate for revascularization.  Continue aspirin.     3.  Atrial fibrillation.  Patient with history of paroxysmal atrial fibrillation on oral amiodarone status post ICD CRT and Eliquis.  Restart Eliquis.  Hemoglobin stable.  Will monitor.  Not receiving amiodarone oral because of swallowing issues now he has NG tube will reassess heart rate after restarting amiodarone 400 mg oral twice daily.     5. acute on chronic renal failure.  Multifactorial.  Nephrology on board.  CVC 01/13/24 Triple lumen Non-tunneled Right Internal jugular (Active)   Site Assessment Clean;Dry;Intact 01/19/24 0800   Dressing Status Clean;Dry 01/19/24 0800   Number of days: 6       NG/OG/Feeding Tube 14 Fr Right nostril (Active)   Site Assessment Clean;Dry;Intact 01/19/24 0804   Number of days: 2        Urethral Catheter 16 Fr. (Active)   Site Assessment Clean;Skin intact 01/19/24 0745   Number of days: 16       Code Status:  DNR and No Intubation    Cinda Pedersen MD

## 2024-01-20 NOTE — PROGRESS NOTES
Critical Care Medicine Progress Note    Admitted on:     1/20/2024  Length of Stay: 0 day(s)     Interval History     Escalating vasopressor requirement overnight.  Put on BiPAP early this AM for tachypnea and work of breathing.  Further decline in mentation.    Spoke with patient's wife at bedside.  Provided updates.  Reaffirmed that she wished to proceed with hospice.      Objective   Objective   There were no vitals filed for this visit.There is no height or weight on file to calculate BMI.        1/20/2024    10:10 AM 1/20/2024    10:20 AM 1/20/2024    10:30 AM 1/20/2024    10:40 AM 1/20/2024    10:45 AM 1/20/2024    10:50 AM 1/20/2024    11:00 AM   Vitals   Systolic 81 81 78 87  81 83   Diastolic 58 43 56 60  42 55   Heart Rate 109 113 117 107  116 117   Resp 39 34 35 35 34 35 39        Vent settings:  FiO2 (%):  [40 %] 40 %  S RR:  [16] 16  No intake or output data in the 24 hours ending 01/20/24 1200    Physical Exam  CV: Normal S1, S2.  RRR.  No m/r/g.  Resp: Rales and ronchi bilaterally.  GI: +BS, abd soft, NT, ND.  Ext: 2+ pitting BLE edema    Medications     Scheduled Medications:      Continuous Medications:      PRN Medications:     Labs     Results from last 72 hours   Lab Units 01/19/24  0503 01/18/24  0558   GLUCOSE mg/dL 287* 171*   SODIUM mmol/L 146* 147*   POTASSIUM mmol/L 3.7 3.4   CHLORIDE mmol/L 110* 110*   CO2 mmol/L 25 25   BUN mg/dL 52* 40*   CREATININE mg/dL 2.50* 2.10*   EGFR mL/min/1.73m*2 27* 33*   CALCIUM mg/dL 7.4* 7.8*   ALBUMIN g/dL  --  2.2*   MAGNESIUM mg/dL 2.30  --    PHOSPHORUS mg/dL 3.5  --      Results from last 72 hours   Lab Units 01/18/24  0558   ALK PHOS U/L 560*   ALT U/L <5*   AST U/L 36   BILIRUBIN TOTAL mg/dL 0.7   PROTEIN TOTAL g/dL 5.3*             Results from last 72 hours   Lab Units 01/19/24  0504 01/18/24  0558   WBC AUTO x10*3/uL 12.2* 12.2*   NRBC AUTO /100 WBCs 5.9* 4.0*   RBC AUTO x10*6/uL 3.66* 3.71*   HEMOGLOBIN g/dL 10.4* 10.5*   HEMATOCRIT % 30.9* 31.5*    MCV fL 84 85   MCH pg 28.4 28.3   MCHC g/dL 33.7 33.3   RDW % 20.0* 19.2*   PLATELETS AUTO x10*3/uL 324 281         Lab Results   Component Value Date    BLOODCULT No growth at 4 days -  FINAL REPORT 01/03/2024    BLOODCULT No growth at 4 days -  FINAL REPORT 01/03/2024    GRAMSTAIN No polymorphonuclear leukocytes seen 01/13/2024    GRAMSTAIN No organisms seen 01/13/2024     Lab Results   Component Value Date    URINECULTURE 20,000 - 80,000 Candida glabrata (A) 01/03/2024       Imaging and Diagnostic Studies     Recent imaging and diagnostic studies reviewed.       Assessment / Plan     Septic shock  Aspiration pneumonia  MATEO  Fluid overload    -Continue supportive care while awaiting hospice.      Chuck Mann MD    This patient is critically ill/injured due to acute impairment in one or more vital organ systems, such that there is a probably of imminent or life-threatening deterioration of the patient's condition.  I spent 31 minutes in the direct delivery of medical care that involves high complexity decision making to treat single or multiple vital organ system failure and/or prevent further life-threatening deterioration of the patient's condition.  This time does not include separately billable procedures.

## 2024-01-20 NOTE — PROGRESS NOTES
Patient with active discharge orders.    Planned to transition to [facility] for hospice services.

## 2024-01-20 NOTE — PROGRESS NOTES
"Hai Spaulding is a 73 y.o. male on day 16 of admission presenting with Acute respiratory failure (CMS/HCC).    Subjective   Further decompensation overnight. Now on continuous bipap, increased pressor requirements. Staff RN noting mottling. Minimally responsive.      Objective   Last Recorded Vitals  Blood pressure 99/62, pulse (!) 116, temperature 37.7 °C (99.9 °F), temperature source Temporal, resp. rate (!) 40, height 1.753 m (5' 9.02\"), weight 73.6 kg (162 lb 4.1 oz), SpO2 96 %.  Intake/Output last 3 Shifts:  I/O last 3 completed shifts:  In: 3895.9 (52.9 mL/kg) [I.V.:990.9 (13.5 mL/kg); NG/GT:2305; IV Piggyback:600]  Out: 300 (4.1 mL/kg) [Urine:300 (0.1 mL/kg/hr)]  Weight: 73.6 kg     Physical Exam:  General:   frail-appearing appearing male, lying in bed,  no acute distress, appears older than stated age.  HEENT:  NCAT, perrl, sclerae anicteric, oral mm dry, patient endentulous, mouth breathing, no lesions noted, NG in place with tf infusing.   Neck:    Neck is supple, no palpable adenopathy appreciated.  No JVD or carotid bruits noted.   Pulmonary:    Breath sounds with diminished bilaterally.  Respirations even and unlabored at present.   Cardiovascular: irregular present, unable to appreciate murmurs or rub at present. 2+  bilateral upper extremity edema,  1+ lower extremity edema.  Right IJ cvc intact, site dressed.   Abdomen:   Abdomen soft, some grimace with palpation of abdomen. Bowel sounds are present.  :  Thomas to continuous drain with urine in collection bag.  Musculoskeletal:  Hands and feet cool to touch.  No clubbing or cyanosis noted.  Attempts to move left hand on command, able to move left leg/foot on command. no movement of right arm on command (baseline).  Wearing scds to bilateral LW.   Neurologic:  minimally responsive to painful stimuli.   Skin:  skin is thin and frail,   areas of bruising  and ecchymosis on extremities.  Mottling noted.   Psychiatric:  minimally responsive. "     Relevant Results  Results for orders placed or performed during the hospital encounter of 01/04/24 (from the past 24 hour(s))   POCT GLUCOSE   Result Value Ref Range    POCT Glucose 250 (H) 74 - 99 mg/dL   POCT GLUCOSE   Result Value Ref Range    POCT Glucose 241 (H) 74 - 99 mg/dL   POCT GLUCOSE   Result Value Ref Range    POCT Glucose 296 (H) 74 - 99 mg/dL   POCT GLUCOSE   Result Value Ref Range    POCT Glucose 281 (H) 74 - 99 mg/dL     *Note: Due to a large number of results and/or encounters for the requested time period, some results have not been displayed. A complete set of results can be found in Results Review.     XR chest 1 view  Result Date: 1/17/2024  NG tube tip within the proximal aspect of the stomach, consider slight advancement.   No significant interval change in bilateral airspace opacities, small effusions, and presumed interstitial edema       Scheduled medications  acetaminophen, 650 mg, oral, TID  acetylcysteine, 3 mL, nebulization, BID  amiodarone, 400 mg, oral, BID  apixaban, 5 mg, oral, BID  artificial saliva (yerbas-lyt), 1 mL, mucous membrane, TID  atorvastatin, 80 mg, oral, Nightly  budesonide, 0.5 mg, nebulization, BID  carbidopa-levodopa, 1 tablet, nasogastric tube, TID  clopidogrel, 75 mg, oral, Daily  cyclobenzaprine, 10 mg, oral, Nightly  dapagliflozin propanediol, 10 mg, oral, Daily  gabapentin, 200 mg, oral, Nightly  insulin glargine, 10 Units, subcutaneous, Daily  insulin lispro, 0-10 Units, subcutaneous, q6h  ipratropium-albuteroL, 3 mL, nebulization, TID  lactobacillus acidophilus, 1 tablet, oral, BID  levothyroxine, 25 mcg, oral, Daily before breakfast  metroNIDAZOLE, 500 mg, intravenous, q8h  mirtazapine, 15 mg, oral, Nightly  nystatin, 1 Application, Topical, BID  pantoprazole, 40 mg, intravenous, Daily  QUEtiapine, 25 mg, oral, Nightly  sertraline, 100 mg, oral, Daily  SITagliptin phosphate, 50 mg, oral, Daily  sodium chloride, 1 spray, Each Nostril, TID  vancomycin,  125 mg, oral, 4x daily      Continuous medications  norepinephrine, 0.01-3 mcg/kg/min, Last Rate: 0.41 mcg/kg/min (01/20/24 0792)      PRN medications  PRN medications: albuterol, dextrose 10 % in water (D10W), dextrose, glucagon, melatonin, methyl salicylate-menthol, ondansetron, oxygen, oxygen, phenoL      Assessment/Plan   Acute on chronic respiratory failure  -Back on continuous bipap, unresponsive, tachypneic.   - COVID 19 recently on 12/27/23 finished up 10 days of Decadron   - Pulmonology following  - S/P left-sided thoracentesis with 1 L removed on 1/13, imaging with interstitial edema  -Currently on nebulizers, meropenem  -h/o COPD    Leukocytosis   - Blood cultures negative from 1/03  -Urine culture with 20-80,000 Candida glabrata  -Pleural fluid culture negative to date  - On Merrem for suspected aspiration pneumonia, remains on p.o. vancomycin and IV Flagyl for C. Difficile  -Infectious disease managing    MATEO on CKD  -Creatinine 2.5, elevated sodium 146 today  -Urine output only about 100mL in a 24-hour period   -Remains on Levophed, dobutamine a at time of my exam. Increased levophed requirements.     Dysphagia/malnutrition  - MBS showed aspiration of thin liquids.   - Recommendations for puree/nectar thick, initially, now NPO, Had NG placed and started Tube feeds/water flushes 1/17,   -Prealbumin 1/13 was 7.6,     Metabolic encephalopathy  -likely multifactorial, Improved somewhat    CHF   - ECHO on 12/16/23 was 30-35%.  -dobutamine added   -mgmt per cardiology    C-diff   - On PO Vanco, IV Flagyl per infectious disease    A-fib   - ICD medtronic  - On Digoxin  -Amiodarone drip discontinued by cardiology, now on oral amiodarone  -Eliquis resumed    Palliative Care   Code status:  DNR CCA DNI, no dialysis  The patient is not a capable decision maker at this time.   His wife, Cata Spaulding, is his surrogate decision maker     Previous goals of care discussions conducted with the patient's wife.  As  of yesterday, she wished to stay in a treatment model of care.  She stated she talked with primary team today who informed her that the patient would not ikley return to his previous pre-admit level of function and would like be relegated to living in a long term care facility for the rest of his life.  Goals of care re-addressed with family today.  Reviewed patient's preior functional state.  Family indicated he would not want to live in a facility for the rest of his life.  They do not want him to suffer in any way.  We did discuss the option of transitioning to comfort only model of care where focus would be on quality vs. Quantity of life.  Offered informational hospice meeting for consideration.  After my discussion, his wife decided she would like referral to hospice.    Patient currently in treatment model of are.  Wife interested in hospice informational meeting and probable transition to comfort model of care.  Given patient's instability and symptoms without non-invasive ventilation, would likely need symptoms controlled and initiated in GIP setting.    Discussion conducted with primary team, [place]  Referral placed to [facility] who will contact the patient's wife to arrange and informational meeting.  Will update the chart if I receive any more information regarding this.    1/20/2024  Family meeting with [facility] this am to discuss hospice, given current condition, will require inpatient hospice. Patient with worsened mentation, respiratory status, renal function and BP, now with mottling. Overall prognosis grim.   Await results of hospice meeting.     Addendum: Family met with JAYLA Underwood. Consents signed for inpatient hospice. I personally met with family, answered all questions. Orders placed to switch patient to inpatient hospice. Discussed with attending service.     I spent 50 minutes in the professional and overall care of this patient.      Elizabeth PABON  KIP Pearce-CNP

## 2024-01-20 NOTE — PROGRESS NOTES
Hai Spaulding is a 73 y.o. male on day 16 of admission presenting with Acute respiratory failure (CMS/HCC).    Subjective   Interval History:   Patient seen and examined  On BiPAP  Lethargic    Review of Systems   Unable to perform ROS: Patient nonverbal       Objective   Range of Vitals (last 24 hours)  Heart Rate:  []   Temp:  [36.1 °C (97 °F)-37.8 °C (100 °F)]   Resp:  [21-40]   BP: ()/(28-65)   Weight:  [73.6 kg (162 lb 4.1 oz)]   SpO2:  [89 %-98 %]   Daily Weight  01/20/24 : 73.6 kg (162 lb 4.1 oz)    Body mass index is 23.95 kg/m².    Physical Exam    Constitutional:       Appearance: Lethargic     Comments: Lethargic  HENT:      Head: Normocephalic.      Comments: Resolving right periorbital ecchymosis      Nose: Nose normal.    Comments:   Eyes:      Conjunctiva/sclera: Conjunctivae normal.   Cardiovascular:      Rate and Rhythm: Normal rate and regular rhythm.   Pulmonary:      Effort: Pulmonary effort is normal.      Breath sounds: Normal breath sounds.   Abdominal:      General: Bowel sounds are normal.      Palpations: Abdomen is soft.   Genitourinary:     Comments: Thomas catheter  Musculoskeletal:         General: No joint swelling  Skin:     General: Skin is warm and dry.   Neurological:      Comments: Unable to assess  Psychiatric:      Comments: Unable to assess  Antibiotics  amiodarone (Pacerone) tablet 200 mg  apixaban (Eliquis) tablet 5 mg  atorvastatin (Lipitor) tablet 80 mg  carbidopa-levodopa (Sinemet CR)  mg ER tablet 1 tablet  clopidogrel (Plavix) tablet 75 mg  dapagliflozin propanediol (Farxiga) tablet 10 mg  lactobacillus acidophilus capsule 1 capsule  levothyroxine (Synthroid, Levoxyl) tablet 25 mcg  metoprolol succinate XL (Toprol-XL) 24 hr tablet 25 mg  QUEtiapine (SEROquel) tablet 25 mg  sertraline (Zoloft) tablet 100 mg  SITagliptin phosphate (Januvia) tablet 50 mg  insulin glargine (Lantus) injection 10 Units  dextrose 50 % injection 25 g  glucagon (Glucagen)  injection 1 mg  dextrose 10 % in water (D10W) infusion  insulin lispro (HumaLOG) injection 0-10 Units  vancomycin (Vancocin) capsule 125 mg  fluconazole in NaCl (iso-osm) (Diflucan) IVPB 200 mg  nystatin (Mycostatin) 100,000 unit/gram powder 1 Application  ipratropium-albuteroL (Duo-Neb) 0.5-2.5 mg/3 mL nebulizer solution 3 mL  budesonide (Pulmicort) 0.5 mg/2 mL nebulizer solution 0.5 mg  lactobacillus acidophilus tablet 1 tablet  vancomycin (Vancocin) capsule 125 mg  ipratropium-albuteroL (Duo-Neb) 0.5-2.5 mg/3 mL nebulizer solution 3 mL  acetaminophen (Tylenol) tablet 650 mg  ondansetron (Zofran) injection 4 mg  melatonin tablet 5 mg  cyclobenzaprine (Flexeril) tablet 10 mg  fluticasone furoate-vilanteroL (Breo Ellipta) 200-25 mcg/dose inhaler 1 puff  ipratropium-albuteroL (Duo-Neb) 0.5-2.5 mg/3 mL nebulizer solution 3 mL  pantoprazole (ProtoNix) EC tablet 40 mg  albuterol 2.5 mg /3 mL (0.083 %) nebulizer solution 2.5 mg  acetaminophen (Tylenol) tablet 650 mg  ondansetron (Zofran) injection 4 mg  melatonin tablet 5 mg  potassium chloride (Klor-Con) packet 40 mEq  potassium chloride 20 mEq in 100 mL IV premix  furosemide (Lasix) injection 40 mg  barium sulfate (Varibar Pudding) 40 % (w/v), 30% (w/w) oral paste 20 mL  barium sulfate (Varibar Nectar, Varibar Honey) 40 % (w/v) suspension 20 mL  barium sulfate (Varibar Honey) 40 % (w/v) 29% (w/w) suspension 20 mL  barium sulfate (Varibar THIN Liquid) 81 % (w/w) suspension 20 mL  sodium chloride 0.9% infusion  oxygen (O2) therapy  vancomycin (Firvanq) solution 125 mg  vancomycin (Vancocin) capsule 125 mg  ipratropium-albuteroL (Duo-Neb) 0.5-2.5 mg/3 mL nebulizer solution 3 mL  gabapentin (Neurontin) capsule 200 mg  sodium chloride (Ocean) 0.65 % nasal spray 1 spray  mirtazapine (Remeron) tablet 15 mg  sodium chloride (Ocean) 0.65 % nasal spray 1 spray  acetaminophen (Tylenol) tablet 650 mg  oxygen (O2) therapy  acetylcysteine (Mucomyst) 200 mg/mL (20 %) nebulizer  solution 600 mg  metroNIDAZOLE (Flagyl) 500 mg in NaCl (iso-os) 100 mL  acetaminophen (Ofirmev) injection 1,000 mg  norepinephrine (Levophed) 8 mg in dextrose 5% 250 mL (0.032 mg/mL) infusion (premix)  norepinephrine in dextrose 5 % (Levophed) infusion  - Omnicell Override Pull  digoxin (Lanoxin) injection 500 mcg  amiodarone (Nexterone) 360 mg in dextrose,iso-osm 200 mL (1.8 mg/mL) infusion (premix)  amiodarone (Nexterone) 360 mg in dextrose,iso-osm 200 mL (1.8 mg/mL) infusion (premix)  meropenem (Merrem) in sodium chloride 0.9 % 100 mL IV 1 g  oxygen (O2) therapy  methyl salicylate-menthol (Bengay) 15-10 % greaseless cream  amiodarone (Nexterone) 360 mg in dextrose,iso-osm 200 mL (1.8 mg/mL) infusion (premix)  furosemide (Lasix) injection 40 mg  amiodarone (Nexterone) 360 mg in dextrose,iso-osm 200 mL (1.8 mg/mL) infusion (premix)  levothyroxine (Synthroid, Levoxyl) tablet 25 mcg  furosemide (Lasix) injection 40 mg  insulin lispro (HumaLOG) injection 0-10 Units  heparin (porcine) injection 5,000 Units  amiodarone (Pacerone) tablet 400 mg  digoxin (Lanoxin) injection 500 mcg  apixaban (Eliquis) tablet 5 mg  norepinephrine (Levophed) 8 mg in dextrose 5% 250 mL (0.032 mg/mL) infusion (premix)  oxygen (O2) therapy  insulin lispro (HumaLOG) injection 0-10 Units  furosemide (Lasix) injection 40 mg  magnesium sulfate IV 2 g  artificial saliva (yerbas-lyt) aerosol,spray 1 mL  phenoL (Chloraseptic) 1.4 % mouth/throat spray 1 spray  carbidopa-levodopa (Sinemet)  mg per tablet 1 tablet  vancomycin (Firvanq) solution 125 mg  oxygen (O2) therapy  albumin human 25 % solution 12.5 g  DOBUTamine (Dobutrex) 1,000 mg in dextrose 5% 250 mL (4 mg/mL) infusion (premix)  pantoprazole (ProtoNix) injection 40 mg      Relevant Results  Labs  Results from last 72 hours   Lab Units 01/19/24  0504 01/18/24  0558   WBC AUTO x10*3/uL 12.2* 12.2*   HEMOGLOBIN g/dL 10.4* 10.5*   HEMATOCRIT % 30.9* 31.5*   PLATELETS AUTO x10*3/uL 324 281    NEUTROS PCT AUTO % 89.3  --    LYMPHS PCT AUTO % 5.8  --    MONOS PCT AUTO % 3.6  --    EOS PCT AUTO % 0.0  --      Results from last 72 hours   Lab Units 01/19/24  0503 01/18/24  0558   SODIUM mmol/L 146* 147*   POTASSIUM mmol/L 3.7 3.4   CHLORIDE mmol/L 110* 110*   CO2 mmol/L 25 25   BUN mg/dL 52* 40*   CREATININE mg/dL 2.50* 2.10*   GLUCOSE mg/dL 287* 171*   CALCIUM mg/dL 7.4* 7.8*   ANION GAP mmol/L 11 12   EGFR mL/min/1.73m*2 27* 33*   PHOSPHORUS mg/dL 3.5  --      Results from last 72 hours   Lab Units 01/18/24  0558   ALK PHOS U/L 560*   BILIRUBIN TOTAL mg/dL 0.7   PROTEIN TOTAL g/dL 5.3*   ALT U/L <5*   AST U/L 36   ALBUMIN g/dL 2.2*     Estimated Creatinine Clearance: 26.7 mL/min (A) (by C-G formula based on SCr of 2.5 mg/dL (H)).  C-Reactive Protein   Date Value Ref Range Status   11/29/2023 2.53 (H) <1.00 mg/dL Final     CRP   Date Value Ref Range Status   12/31/2022 39.72 (A) mg/dL Final     Comment:     REF VALUE  < 1.00     12/14/2021 2.16 (A) mg/dL Final     Comment:     REF VALUE  < 1.00       Microbiology  Reviewed  Imaging  XR chest 1 view    Result Date: 1/17/2024  Interpreted By:  Jarvis Harper, STUDY: XR CHEST 1 VIEW; 1/17/2024 12:13 pm   INDICATION: Signs/Symptoms:NG tube.   COMPARISON: 01/15/2024   ACCESSION NUMBER(S): [id number]   ORDERING CLINICIAN: SHELLEY BALDERAS   TECHNIQUE: 1 view of the chest was performed.   FINDINGS: Right IJ central line tip at the distal SVC. Left chest wall pacemaker and leads appear in good position. NG tube tip within the proximal aspect of the stomach, consider slight advancement. No significant interval change in patchy bilateral airspace opacities and prominence of the interstitium. Probable small pleural effusions. The cardiomediastinal silhouette is stable.       NG tube tip within the proximal aspect of the stomach, consider slight advancement.   No significant interval change in bilateral airspace opacities, small effusions, and presumed interstitial  edema   Signed by: Jarvis Harper 1/17/2024 2:02 PM Dictation workstation:   [id number]    XR abdomen 1 view    Result Date: 1/17/2024  Interpreted By:  Jarvis Harper, STUDY: XR ABDOMEN 1 VIEW;  1/17/2024 9:02 am   INDICATION: Signs/Symptoms:Inserted NG tube.   COMPARISON: None.   ACCESSION NUMBER(S): [id number]   ORDERING CLINICIAN: SHELLEY BALDERAS   FINDINGS: NG tube is not visualized, clinical correlation is recommended. Nonobstructive bowel gas pattern. Limited evaluation of pneumoperitoneum on supine imaging, however no gross evidence of free air is noted.   Lung bases show mild bibasilar airspace opacities right-greater-than-left.   Osseous structures demonstrate no acute bony changes. Severe degenerative change at the lumbar spine.       NG tube is not visualized, clinical correlation is recommended.   Nonspecific bowel-gas pattern.   MACRO: None   Signed by: Jarvis Harper 1/17/2024 10:02 AM Dictation workstation:   [id number]    XR chest 1 view    Result Date: 1/15/2024  Interpreted By:  Rahul Staples, STUDY: XR CHEST 1 VIEW; 1/15/2024 6:59 am   INDICATION: CLINICAL INFORMATION: Signs/Symptoms:hypoxia.   COMPARISON: 01/13/2024   ACCESSION NUMBER(S): [id number]   ORDERING CLINICIAN: LYNSEY HERNDON   TECHNIQUE: Portable chest one view.   FINDINGS: The cardiac size is indeterminate in view of the AP projection.  A cardiac pacemaker with biventricular as well as right atrial leads is noted. Right internal jugular venous catheter is unchanged. Diffuse hazy bilateral alveolar infiltrates are present along with moderate-sized bilateral effusions.       Bilateral infiltrates and effusions are present in a pattern suggesting CHF. There is no interval change when compared to the previous examination.   MACRO: none   Signed by: Rahul Staples 1/15/2024 7:51 AM Dictation workstation:   [id number]    XR chest 1 view    Result Date: 1/13/2024  Interpreted By:  Rahul Staples, STUDY: XR CHEST 1 VIEW;  1/13/2024 2:51 pm   INDICATION: CLINICAL INFORMATION: Signs/Symptoms:central line and thoracentesis.   COMPARISON: 01/13/2024 at 824 hours   ACCESSION NUMBER(S): [id number]   ORDERING CLINICIAN: CRISTAL MARCH   TECHNIQUE: Portable chest one view.   FINDINGS: The cardiac size is indeterminate in view of the AP projection.  A cardiac pacemaker with biventricular as well as right atrial leads is noted. There is a right internal jugular venous catheter now present, new compared to the prior study with the tip somewhat obscured by the cardiac pacemaker leads. The tip however appears to terminate above the right atrium in the SVC distribution. There is no evidence for pneumothorax.   Decrease in the pleural effusion on the left in this patient with history of thoracentesis. There is no definite evidence for pneumothorax within limits of this study.   There are bilateral infiltrates and effusions present, possibly secondary to pulmonary edema and CHF.       1. Status post right-sided central line insertion without pneumothorax as above. 2. Status post left thoracentesis without evidence for pneumothorax. 3. Persistent infiltrates and effusions bilaterally suggesting possibility of pulmonary edema and CHF.   MACRO: none   Signed by: Rahul Staples 1/13/2024 3:02 PM Dictation workstation:   [id number]    CT chest wo IV contrast    Result Date: 1/13/2024  Interpreted By:  Michael Fuentes, STUDY: CT CHEST WO IV CONTRAST; ;  1/13/2024 9:09 am   INDICATION: Signs/Symptoms:worsening chest xray. Pneumonia   COMPARISON: 01/03/2024   ACCESSION NUMBER(S): [id number]   ORDERING CLINICIAN: LYNSEY HERNDON   TECHNIQUE: Serial axial unenhanced CT images obtained of the chest. Images reformatted in the coronal and sagittal projection   All CT examinations are performed with 1 or more of the following dose reduction techniques: Automated exposure control, adjustment of mA and/or kv according to patient's size, or use of iterative  reconstruction techniques.   FINDINGS: Mediastinum demonstrates lymphadenopathy with multiple enlarged paratracheal and pretracheal lymph nodes. There is a right paratracheal lymph node identified measuring 1.9 x 1.4 cm intervally increased in size from prior imaging where it measured 1.4 x 1.1 cm. Other lymph nodes have intervally increased in size. There is a precarinal lymph node identified measuring 12 mm in the short axis. Evaluation of the aidan limited without IV contrast. No significant hilar lymphadenopathy. Esophagus is gas-filled with component of refluxed suggested distally.   Heart and great vessels demonstrate ascending thoracic aorta to measure 3.2 cm. There is mild vascular calcification of the aortic arch. Diffuse qualitative coronary artery calcification is demonstrated. Cardiomegaly noted.   Large bilateral pleural effusions are demonstrated left-greater-than-right. Findings increased from prior imaging. There is left lower lobar collapse with no asymmetric density within the atelectatic lung. Also, there is compressive atelectasis within the left upper lobe with partial collapse within the lingula. The right lower lobe demonstrates partial lobar collapse in relation to compressive atelectasis. Lung parenchyma demonstrates septal thickening superimposed ground-glass airspace infiltrate more focal consolidation in the areas of septal thickening within bilateral lung fields which is progressed from prior imaging which may reflect alveolar component of pulmonary edema. However, the areas of consolidation may reflect superimposed component of infectious infiltrate.   Included portions visualized abdomen demonstrate mild ascites in particular perihepatic location. There is contrast demonstrated within the colon. A cyst is noted in the superior pole of the right kidney measuring 1.5 cm.   Visualized osseous structures demonstrate no compression deformity in the spine.             1. Interval increase in  bilateral pleural effusions with large bilateral effusions left-greater-than-right. There is resultant left lower lobar collapse with compressive atelectasis in particular in the left upper lobe as well as right lower lobe.   2. Patchy areas of airspace consolidation intervally developed from prior imaging with more focal septal thickening in the areas of consolidation may reflect alveolar component of pulmonary edema. However, superimposed infectious infiltrate is not excluded. No dense airspace consolidation     MACRO: None   Signed by: Michael Fuentes 1/13/2024 9:32 AM Dictation workstation:   [id number]    XR chest 1 view    Result Date: 1/13/2024  Interpreted By:  Michael Fuentes, STUDY: XR CHEST 1 VIEW 1/13/2024 8:31 am   INDICATION: Signs/Symptoms:respiratory distress   COMPARISON: 01/12/2024   ACCESSION NUMBER(S): [id number]   ORDERING CLINICIAN: CRISTAL MARCH   TECHNIQUE: Single AP view chest   FINDINGS: Cardiomediastinal silhouette is enlarged with mild cardiomegaly. Left-sided pacemaker with leads in the region of the right atrium, right ventricle, and coronary sinus. There is generalized increased interstitial prominence with cephalization as well as patchy alveolar airspace infiltrate in bilateral lung fields without significant change. There is improved aeration at the right lung base with component of layering basilar effusion improved. However, there is persistent left-sided pleural effusion with interval increased from prior imaging with component of compressive atelectasis.             1. Interval increase in left-sided pleural effusion with compressive atelectasis with moderate left-sided pleural effusion   2. Patchy alveolar airspace infiltrate in bilateral lung fields with alveolar component of pulmonary edema suggested.   Signed by: Michael Fuentes 1/13/2024 8:48 AM Dictation workstation:   [id number]    XR chest 1 view    Result Date: 1/12/2024  Interpreted By:  Sugey Tinoco, STUDY:  XR CHEST 1 VIEW 1/12/2024 3:49 pm   INDICATION: Signs/Symptoms:hypoxia   COMPARISON: 01/03/2024   ACCESSION NUMBER(S): [id number]   ORDERING CLINICIAN: CRISTAL MARCH   TECHNIQUE: AP erect view of the chest   FINDINGS: The cardiac size is mildly enlarged with biventricular ICD leads observed in right atrium, right ventricle, and coronary sinus.   Pulmonary edema is present with worsening of bilateral pulmonary infiltrates and with bilateral pleural effusion seen. Left effusion appears larger than the right with left effusion increased in size.       Pulmonary edema is now seen with increased size of left pleural effusion which is now moderate in amount. Small right pleural effusion is also present.   Signed by: Sugey Tinoco 1/12/2024 4:27 PM Dictation workstation:   [id number]    ECG 12 Lead    Result Date: 1/10/2024  Wide QRS tachycardia with Premature supraventricular complexes and with occasional Premature ventricular complexes Nonspecific intraventricular block Cannot rule out Anteroseptal infarct , age undetermined T wave abnormality, consider inferolateral ischemia Abnormal ECG When compared with ECG of 14-DEC-2023 19:10, (unconfirmed) Wide QRS tachycardia has replaced Sinus rhythm Vent. rate has increased BY  57 BPM See ED provider note for full interpretation and clinical correlation Confirmed by Alyssa Brown ([id number]) on 1/10/2024 4:16:56 PM    FL modified barium swallow study    Result Date: 1/9/2024  Interpreted By:  Chuck Montoya and Janezic Kimberly STUDY: FL MODIFIED BARIUM SWALLOW STUDY;; 1/8/2024 3:34 pm   INDICATION: Signs/Symptoms:dysphagia.   COMPARISON: None.   ACCESSION NUMBER(S): [id number]   ORDERING CLINICIAN: CRISTAL MARCH   TECHNIQUE: MBSS completed. Informed verbal consent obtained prior to completion of exam. Trials of thin, nectar thick, honey thick, puree,  and regular solids given. Total fluoroscopy time:  2 minutes 52 seconds Radiation exposure (Reference Air Kerma):   34.37 mGy Images:  9 series     SLP: Emiliana Collins M.A. [facility]-SLP, [facility] Phone/Pager: [phone] Option 2   SPEECH FINDINGS: Reason for referral: Aspiration due to comorbidities Patient hx: Per chart: This man who is debilitated from a stroke and a history of laryngeal cancer and repeat aspiration pneumonia has spent more time in the hospital than out of the hospital for the last 2 months.  He was most recently hospitalized here December 25 with COVID and aspiration pneumonia. Respiratory status: Nasal cannula 2L Previous diet: Regular/thin liquids   FINAL SPEECH RECOMMENDATIONS   Diet recommendations/feeding strategies: Puree/nectar thick liquids. Crush medication in puree or nectar thick liquids. Sit upright at 90 degrees for meals and medications.   Follow-up speech therapy recommended: Yes. Patient will benefit from continued dysphagia therapy for oropharyngeal exercises to allow for diet progression.   Short term goals: 1. Patient will tolerate recommended diet without pulmonary compromise 2. Patient will participate in oropharyngeal strengthening exercises. 3. SLP will assess for diet upgrade as appropriate. 4. Due to silent aspiration, patient will need a repeat MBS prior to liquid diet upgrade to thin.   Long term goals: Tolerated least restrictive diet without pulmonary compromise   Education provided: Reviewed results with nursing.   Treatment Provided today: No.     Repeat study/ dc plan: Repeat MBS prior to upgrade to thin liquids, due to silent aspriation with thin liquids.   Mechanics of the swallow summary:   Oral phase: 1. Patient is unable to masticate solids, thus manually removed. Attempt at mashing with tongue was not successful. 2. Repetitive tongue motion with puree. Oral holding with all liquids and puree prior to swallow onset. 3. Swallow initiation at the pyriforms with thin liquids. At the valleculae with nectar and honey thick, and puree. 4. Trace oral residue, clears with reswallow.    Pharyngeal phase: 1. Partial superior laryngeal elevation. 2. Partial anterior hyoid excursion. 3. Partial epiglottic inversion. 4. Narrow column of contrast above vocal cords with  honey thick liquids and puree. Contrast enters airway above vocal cords (just underneath epiglottis) with no effort to eject. Narrow column of contrast enters airway below vocal cords with no effort to eject with straw. Contrast enters airway above vocal cords with teaspoon trial without effort to eject. 5. Collection of residue thoughout pharynx, mild and clears with spontaneous reswallows. 6. Narrow column of contrast between tongue base and pharyngeal wall.     SLP impressions with severity rating:   Silent aspiration with thin liquids. Laryngeal penetration noted with puree and honey thick liquids. Tolerated nectar thick liquids during testing constraints without difficulty. Unable to masticate solids. Unclear if this is due to edentulous status, or other comorbidities. Patient would benefit from continuation of dysphagia therapy to improve overall oral and pharyngeal strength, improve mastication and upgrade diet to least restrictive without pulmonary compromise.   Thin Liquids (MBSS) Rosenbek's Penetration Aspiration Scale, Thin Liquids (MBSS): 8. SILENT ASPIRATION - contrast passes glottis, visible residue, NO pt response   Response to Aspiration, Thin Liquid (MBSS): absent response, silent aspiration,   Response to Pharyngeal Residue, Thin Liquid (MBSS): spontaneous clearing,   Nectar Thick Liquids (MBSS) Rosenbek's Penetration Aspiration Scale, Nectar thick liquids (MBSS): 1. NO ASPIRATION & NO PENETRATION - no aspiration, contrast does not enter airway   Response to Pharyngeal Residue, Nectar thick liquids (MBSS): spontaneous clearing,   Honey Thick Liquids (MBSS) Rosenbek's Penetration Aspiration Scale, Honey thick liquids (MBSS): 3. PENETRATION with LOW ASPIRATION risk - contrast remains above vocal cords, visible residue    Response to Pharyngeal Residue, Honey thick liquids (MBSS): spontaneous clearing,   Purees (MBSS) Rosenbek's Penetration Aspiration Scale, Purees (MBSS): 3. PENETRATION with LOW ASPIRATION risk - contrast remains above vocal cords, visible residue   Response to Pharyngeal Residue, Purees (MBSS): spontaneous clearing,     Speech Therapy section of this report signed by Emiliana Collins M.A. CCC-SLP, BCS-S on 1/8/2024 at 3:54 pm.   RADIOLOGY FINDINGS: There is laryngeal penetration and aspiration with thin liquid barium. There is laryngeal penetration without aspiration with nectar consistency, honey consistency, pudding consistency, and cookie consistency barium.   Radiology section of this report signed by Jan.       There is laryngeal penetration and aspiration with thin liquid barium. There is laryngeal penetration without aspiration with nectar consistency, honey consistency, pudding consistency, and cookie consistency barium.   MACRO: None   Signed by: Chuck Montoya 1/9/2024 5:02 PM Dictation workstation:   [id number]    ECG 12 lead    Result Date: 1/5/2024  Sinus bradycardia with frequent and consecutive Premature ventricular complexes and Fusion complexes Left axis deviation Nonspecific intraventricular block Inferior infarct , age undetermined Lateral injury pattern ** ** ACUTE MI / STEMI ** ** Abnormal ECG When compared with ECG of 27-DEC-2023 15:18, Significant changes have occurred See ED provider note for full interpretation and clinical correlation Confirmed by Yomi Lainez ([id number]) on 1/5/2024 9:13:12 PM    ECG 12 lead    Result Date: 1/5/2024  Undetermined rhythm Left bundle branch block Abnormal ECG When compared with ECG of 03-JAN-2024 11:50, (unconfirmed) Current undetermined rhythm precludes rhythm comparison, needs review Questionable change in QRS duration Criteria for Inferior infarct are no longer Present See ED provider note for full interpretation and clinical correlation  Confirmed by Yomi Lainez ([id number]) on 1/5/2024 9:12:07 PM    CT 3D reconstruction    Addendum Date: 1/5/2024    Interpreted By:  Chuck Argueta, ADDENDUM: Multiplanar 3D reformations are also generated and reviewed on independent workstation.   Signed by: Chuck Argueta 1/5/2024 2:19 PM   -------- ORIGINAL REPORT -------- Dictation workstation:   [id number]    Result Date: 1/5/2024  Interpreted By:  Chuck Argueta, STUDY: CT FACIAL BONES WO IV CONTRAST;  1/3/2024 1:14 pm   INDICATION: Signs/Symptoms:fall.   COMPARISON: None.   ACCESSION NUMBER(S): [id number]   ORDERING CLINICIAN: FIONA GOMEZ   TECHNIQUE: Contiguous axial CT sections were performed through the bones and orbits and supplemented with coronal and sagittal reformatted images. The study is limited by motion degradation.   FINDINGS: There is mild soft tissue swelling superior to the left orbit.   There is some deformity at the distal nasal bones greater on the right. This appearance is of indeterminate age and should be correlated to point tenderness. The remaining osseous structures demonstrate no sign of acute fracture allowing for motion degradation. There is no overt bone destruction or aggressive periosteal reaction. No lytic or blastic lesion is detected.   There are small fluid levels in both maxillary sinuses, greater on the left. There is a small fluid level in the right sphenoid compartment. There is mucoperiosteal thickening in scattered fluid within the ethmoid air cells, greater on the left posteriorly. The frontal sinuses and mastoid air cells are clear and symmetrically aerated. There may be mild mucoperiosteal thickening at the floor of the left maxillary sinus.   The nasal septum is at the midline. The ostiomeatal units are patent bilaterally.   The visualized orbital contents are unremarkable. There is no intraorbital air density or fluid collection. The medial and inferior orbital walls are intact.   There are  least 2 small foci of air density posterior to the right maxillary sinus though no apparent posterior wall fracture or defect. The significance of these findings is indeterminate.       Soft tissue swelling superior left orbit.   Mild deformity at the distal tip of the nasal bones greater on the right. This appearance is of uncertain age it should be correlated to point tenderness.   The remaining visualized osseous structures are intact.   Maxillary, ethmoid, and right sphenoid sinusitis with fluid levels.   Signed by: Chuck Argueta 1/5/2024 2:19 PM Dictation workstation:   [id number]    CT maxillofacial bones wo IV contrast    Addendum Date: 1/5/2024    Interpreted By:  Chuck Argueta, ADDENDUM: Multiplanar 3D reformations are also generated and reviewed on independent workstation.   Signed by: Chuck Argueta 1/5/2024 9:43 AM   -------- ORIGINAL REPORT -------- Dictation workstation:   [id number]    Result Date: 1/5/2024  Interpreted By:  Chuck Argueta, STUDY: CT FACIAL BONES WO IV CONTRAST;  1/3/2024 1:14 pm   INDICATION: Signs/Symptoms:fall.   COMPARISON: None.   ACCESSION NUMBER(S): [id number]   ORDERING CLINICIAN: FIONA GOMEZ   TECHNIQUE: Contiguous axial CT sections were performed through the bones and orbits and supplemented with coronal and sagittal reformatted images. The study is limited by motion degradation.   FINDINGS: There is mild soft tissue swelling superior to the left orbit.   There is some deformity at the distal nasal bones greater on the right. This appearance is of indeterminate age and should be correlated to point tenderness. The remaining osseous structures demonstrate no sign of acute fracture allowing for motion degradation. There is no overt bone destruction or aggressive periosteal reaction. No lytic or blastic lesion is detected.   There are small fluid levels in both maxillary sinuses, greater on the left. There is a small fluid level in the right  sphenoid compartment. There is mucoperiosteal thickening in scattered fluid within the ethmoid air cells, greater on the left posteriorly. The frontal sinuses and mastoid air cells are clear and symmetrically aerated. There may be mild mucoperiosteal thickening at the floor of the left maxillary sinus.   The nasal septum is at the midline. The ostiomeatal units are patent bilaterally.   The visualized orbital contents are unremarkable. There is no intraorbital air density or fluid collection. The medial and inferior orbital walls are intact.   There are least 2 small foci of air density posterior to the right maxillary sinus though no apparent posterior wall fracture or defect. The significance of these findings is indeterminate.       Soft tissue swelling superior left orbit.   Mild deformity at the distal tip of the nasal bones greater on the right. This appearance is of uncertain age it should be correlated to point tenderness.   The remaining visualized osseous structures are intact.   Maxillary, ethmoid, and right sphenoid sinusitis with fluid levels.   Signed by: Chuck Argueta 1/3/2024 1:56 PM Dictation workstation:   [id number]    ECG 12 lead    Result Date: 1/4/2024  Ventricular-paced rhythm with occasional and consecutive sinus complexes and with frequent and consecutive Premature ventricular complexes with junctional escape complexes Abnormal ECG When compared with ECG of 04-JAN-2024 04:21, (unconfirmed) Previous ECG has undetermined rhythm, needs review See ED provider note for full interpretation and clinical correlation Confirmed by Yomi Lainez ([id number]) on 1/4/2024 9:24:49 AM    CT head wo IV contrast    Addendum Date: 1/3/2024    Interpreted By:  Chuck Argueta, ADDENDUM: Subdural collection over the right convexity is new from 12/31/2022 though is of CSF attenuation. There is no evidence of dense acute hemorrhage at this site.   There is some asymmetry of the lateral ventricles with  the left larger than the right. This in part may be related to some residual mass effect from the right subdural collection though there is no significant midline shift or sulcal effacement.   Signed by: Chuck Argueta 1/3/2024 3:48 PM   -------- ORIGINAL REPORT -------- Dictation workstation:   [id number]    Result Date: 1/3/2024  Interpreted By:  Chuck Argueta, STUDY: CT HEAD WO IV CONTRAST;  1/3/2024 1:14 pm   INDICATION: Signs/Symptoms:fall, periorbital ecchymosis.   COMPARISON: 12/31/2022   ACCESSION NUMBER(S): [id number]   ORDERING CLINICIAN: FIONA GOMEZ   TECHNIQUE: Contiguous unenhanced axial CT sections are performed from the skull base to the vertex.   FINDINGS: The osseous structures are intact. There is small fluid levels in the maxillary sinuses, greater on the left as well as the right sphenoid compartment. There is partial opacification of the ethmoid air cells with mucoperiosteal thickening and fluid greater on the left. The visualized portions of the mastoid air cells are and frontal sinuses are clear.   There is severe generalized parenchymal volume loss with enlargement of the cortical sulci and CSF spaces. There is a CSF subdural collection overlying the right frontal convexity extending laterally over the temporoparietal convexity. This finding is new from the previous study and measures 8 mm in maximum thickness. There may be a smaller CF density extra-axial collection over the left frontal convexity anteriorly.   There is hypodensity in the deep cerebral white matter bilaterally compatible with small-vessel ischemia. There is no sign of parenchymal hematoma. There is no dense extra-axial fluid collection. There is no mass effect or midline shift. There is some asymmetry of the lateral ventricles with the left larger than the right.       Chronic appearing low density extra-axial fluid collections overlying the frontal convexities, greater on the right. These findings are new  from 12/31/2022.   Generalized parenchymal atrophy and small-vessel ischemic changes of the cerebral white matter.   Maxillary, ethmoid, and right sphenoid sinusitis with acute fluid levels.   No sign of acute intracranial hemorrhage or mass effect.   The visualized osseous structures are intact.   Signed by: Chuck Argueta 1/3/2024 1:42 PM Dictation workstation:   [id number]    CT chest abdomen pelvis wo IV contrast    Result Date: 1/3/2024  STUDY: CT Chest, Abdomen, and Pelvis without IV Contrast; 1/3/2024, 1:15PM. INDICATION: Shortness of breath and abdominal pain.  Leukocytosis. COMPARISON: CXR same day.  CT CAP 12/31/2022. ACCESSION NUMBER(S): [id number] ORDERING CLINICIAN: FIONA GOMEZ TECHNIQUE: CT of the chest, abdomen, and pelvis was performed.  Contiguous axial images were obtained at 3 mm slice thickness through the chest, abdomen, and pelvis.  Coronal and sagittal reconstructions at 3 mm slice thickness were performed.  No intravenous contrast was administered.  FINDINGS: Please note that the evaluation of vessels, lymph nodes and organs is limited without intravenous contrast. CHEST: MEDIASTINUM: The heart is enlarged in size without pericardial effusion. Biventricular AICD remains in place.  There is no thoracic aortic, aneurysm.  Scattered vascular calcifications are seen along the arterial tree. LUNGS/PLEURA: There are bilateral pleural effusions greater on the left than the right, these appear decreased when compared with the prior especially on the right.  Adjacent compressive and subsegmental atelectatic changes are noted as well as some areas of probable consolidation. There is a focus of patchy infiltrate in the right upper lobe anteriorly in proximity to the anterior aspect of the first rib.  This appears slightly more prominent.  Trachea and mainstem bronchi appear stable, borderline narrowed Groundglass infiltrates noted previously appear improved with mild residual. LYMPH NODES:  Mediastinal lymph nodes are mildly enlarged.  There is a pretracheal lymph node with the short axis diameter of 1.4 cm similar to the prior.  Detail is somewhat obscured, by artifact on the current study.  Patient's arms are included within the field-of-view. ABDOMEN:  LIVER: The liver is mildly enlarged, the right lobe measures 17 cm in CC dimension.  Assessment of parenchyma is limited by artifact.  Smooth surface contour.  Normal attenuation.  BILE DUCTS: No intrahepatic or extrahepatic biliary ductal dilatation.  GALLBLADDER: The gallbladder is surgically absent and this was also the case previously.  PANCREAS: No masses or ductal dilatation.  SPLEEN: The spleen is not clearly identified nor was it previously.  ADRENAL GLANDS: No thickening or nodules.  KIDNEYS AND URETERS: Kidneys are normal in size and location.  No renal or ureteral calculi.  There is a 2 cm cyst in the upper pole of the right kidney with small peripheral calcifications which are more prominent than on the prior.  No hydronephrosis of either kidney.  PELVIS:  BLADDER: There is increased bladder distention when compared with the prior. There is extrinsic pressure from the prostate and coarse prostate calcifications also present previously.  Increased attenuation in the dependent bladder appears to be likely artifactual.  REPRODUCTIVE ORGANS: Coarse prostate calcifications are noted also present previously.  BOWEL: There are multiple fluid and air-filled loops of small and large bowel as can be seen with adynamic ileus.  VESSELS: No acute abnormalities identified.  Abdominal aorta is normal in caliber.  There are vascular calcifications along the arterial tree.  PERITONEUM/RETROPERITONEUM/LYMPH NODES: There are mesenteric and pelvic reticulations.  There is a small amount of presacral and pelvic fluid.  No pneumoperitoneum. No lymphadenopathy.  ABDOMINAL WALL AND SOFT TISSUES: Abdominal wall edema is noted especially laterally more so on the  right and posteriorly.  BONES: No acute fracture or aggressive osseous lesion.  Lumbar dextroscoliosis is noted and there are degenerative changes of the spine and joints.    1.Bilateral pleural effusions greater on the left than the right, these appear decreased from the prior especially on the right. Adjacent compressive and subsegmental atelectatic changes are noted as well as some areas of consolidation. 2.Improving groundglass infiltrates in both lungs with mild residual. There is a small focus of infiltrate in the right upper lobe anteriorly which appears slightly more prominent.  Pulmonary findings suggest a combination of pneumonia and pulmonary vascular congestion. This could also be correlated clinically.  Underlying nodules could be obscured and follow-up is suggested. 3.Cardiomegaly with AICD in place also present previously. 4.Within the abdomen and pelvis there are multiple fluid and air-filled loops of small and large bowel as can be seen with adynamic ileus. There is a small amount of presacral and pelvic fluid. No pneumoperitoneum. 5.Abdominal wall edema. 6.  2 cm cyst in the upper pole of the right kidney with small peripheral calcifications which are more prominent than on the prior. Follow-up studies are suggested. Signed by Nancy Hagen DO    CT cervical spine wo IV contrast    Result Date: 1/3/2024  Interpreted By:  Chuck Argueta, STUDY: CT CERVICAL SPINE WO IV CONTRAST;  1/3/2024 1:14 pm   INDICATION: Signs/Symptoms:fall.   COMPARISON: None.   ACCESSION NUMBER(S): [id number]   ORDERING CLINICIAN: FIONA GOMEZ   TECHNIQUE: Contiguous axial CT sections are performed from the skullbase to the upper thoracic spine and supplemented with coronal and sagittal reformatted images. The study is limited by motion degradation.   FINDINGS: There is mild dextro convexity of the cervical spine and reversal of the cervical lordosis. There is anterior subluxation of C2 relative to C3 measuring 3 mm.  The facet joints align normally.   There are multilevel discogenic degenerative changes of the cervical spine with disc space narrowing from C3 through C6. There is degenerative endplate sclerosis and lucency is more pronounced at C5-6.   The cervical vertebral body heights are maintained. Allowing for motion degradation, there is no CT evidence of acute cervical spine fracture. There is no bone destruction or aggressive periosteal reaction. No lytic or blastic lesion is detected. The visualized surrounding osseous structures are intact.   The C1-2 relationship is within normal limits. There is C1-2 arthrosis anteriorly with joint space narrowing and marginal spurring.   The C2-3 disc space level demonstrates moderate facet arthrosis on the right and mild facet arthrosis on the left. There is bulging disc and uncovertebral arthrosis with mild narrowing of the left neural foramen and mild to moderate narrowing on the right. There is mild central canal narrowing with bulging disc and mass effect upon the ventral subarachnoid space.   The C3-4 disc space level demonstrates mild to moderate bilateral facet arthrosis. There is bulging disc and marginal osteophyte asymmetric to the right with severe uncovertebral arthrosis bilaterally. There is severe bilateral neural foraminal narrowing, greater on the right with moderate central canal narrowing.   The C4-5 disc space level demonstrates mild to moderate bilateral facet arthrosis, greater on the left. There is bulging disc and marginal osteophyte with severe bilateral uncovertebral arthrosis. There is severe narrowing of the right neural foramen and moderate to severe narrowing of the left neural foramen. There is superimposed extruded disc on the right with severe central canal narrowing and suspected mass effect upon the spinal cord. A similar finding was described on a MRI examination of 10/13/2022 though it is difficult to compare different modalities with respect to  the size and intensity of this finding.   The C5-6 disc space level demonstrates moderate bilateral facet arthrosis. There is bulging disc and marginal osteophyte with moderate central canal narrowing. There is severe bilateral neural foraminal narrowing.   The C6-7 disc space level demonstrates mild to moderate bilateral facet arthrosis. There is bulging disc and marginal osteophyte moderate narrowing left neural foramen and mild narrowing of the right neural foramen. There is mild to moderate central canal narrowing.   The C7-T1 disc space level demonstrates moderate bilateral facet arthrosis. There is bulging disc and marginal osteophyte with moderate bilateral neural foraminal narrowing. There is mild central canal narrowing.   There is no prevertebral soft tissue swelling or retropharyngeal air. Bilateral pleural effusions and dependent atelectasis is partially visualized, greater on the left. There are bilateral emphysematous changes with ground-glass density in both lung apices. There is superimposed focal opacity in the right apex anteriorly with a few peripheral air densities.       Severe multilevel cervical spondylosis with reversal of the cervical lordosis and mild dextro convexity of the cervical spine.   Multilevel bulging disc with facet and uncovertebral arthrosis. There is large superimposed extruded disc at C4-5 on the right. Severe central canal stenosis at this level asymmetric to the right. There is at least moderate central canal narrowing at C5-6 and C3-4. There is severe multilevel bilateral neural foraminal narrowing as detailed above.   Allowing for motion degradation, there is no CT evidence of acute fracture or traumatic subluxation.   Bilateral pleural effusions and dependent atelectasis are partially visualized, greater on the left. There are bilateral emphysematous changes with ground-glass density in the lung apices. Focal opacity with ill-defined margins is identified in the right  apex. Further workup with dedicated CT thorax is recommended.   Signed by: Chuck Argueta 1/3/2024 1:51 PM Dictation workstation:   [id number]    XR chest 1 view    Result Date: 1/3/2024  Interpreted By:  Anahi Fernando, STUDY: XR CHEST 1 VIEW;  1/3/2024 11:43 am   INDICATION: Signs/Symptoms:SOB.   COMPARISON: 12/27/2023   ACCESSION NUMBER(S): [id number]   ORDERING CLINICIAN: FIONA GOMEZ   FINDINGS: CARDIOMEDIASTINAL SILHOUETTE: The heart is enlarged. There is a left subclavian pacemaker-AICD with a single tip in the right atrium and two tips in the right ventricle.     LUNGS: There is improvement in bilateral perihilar alveolar opacities consistent with resolving pulmonary edema. There is underlying moderate vascular congestion, improved. There is no pleural fluid.   ABDOMEN: No remarkable upper abdominal findings.     BONES: No acute osseous changes.       1. Marked improvement bilateral patchy alveolar opacities consistent with resolving pneumonia. 2. Underlying moderate vascular congestion, improved.   MACRO: None   Signed by: Anahi Fernando 1/3/2024 12:00 PM Dictation workstation:   [id number]    ECG 12 lead    Result Date: 1/1/2024  Undetermined rhythm Left bundle branch block Abnormal ECG When compared with ECG of 27-DEC-2023 07:49, (unconfirmed) Current undetermined rhythm precludes rhythm comparison, needs review See ED provider note for full interpretation and clinical correlation Confirmed by Yomi Lainez ([id number]) on 1/1/2024 3:20:04 PM    XR chest 1 view    Result Date: 12/27/2023  Interpreted By:  Anahi Fernando, STUDY: XR CHEST 1 VIEW;  12/27/2023 8:43 am   INDICATION: Signs/Symptoms:Dypnea.   COMPARISON: 12/14/2023   ACCESSION NUMBER(S): [id number]   ORDERING CLINICIAN: SYLVIA MOORE   FINDINGS: CARDIOMEDIASTINAL SILHOUETTE: The heart is enlarged. There is a left subclavian pacemaker-AICD with a single tip in the right atrium and two tips in the right ventricle.   LUNGS: There  are progressive perihilar alveolar opacities, likely pulmonary edema but could also represent pneumonia. There is left basilar atelectasis and volume loss which has progressed. There is no pleural fluid.   ABDOMEN: No remarkable upper abdominal findings.     BONES: No acute osseous changes.       1. Progressive bilateral predominantly perihilar alveolar opacities consistent with pulmonary edema or pneumonia. 2. Progressive volume loss and atelectasis left lung base.   MACRO: None   Signed by: Anahi Fernando 12/27/2023 8:50 AM Dictation workstation:   [id number]    CT FACIAL BONE/TITO WO IVCON    Result Date: 12/22/2023  * * *Final Report* * * DATE OF EXAM: Dec 22 2023  5:57PM   [facility]   0507  -  CT FACIAL BONE/TITO WO IVCON  / ACCESSION #  [id number] PROCEDURE REASON: Maxillofacial pain      * * * * Physician Interpretation * * * *  EXAMINATION:  CT BRAIN WO IVCON, CT FACIAL BONE/TITO WO IVCON, CT CERVICAL SPINE WO IVCON CLINICAL HISTORY: Head trauma, moderate-severe (accession [id number]), Maxillofacial pain, Nasal fracture suspected (accession [id number]), Spine fracture, cervical, traumatic (accession [id number]) TECHNIQUE:  Serial axial images without IV contrast were obtained from the vertex to the foramen magnum.  CT of the cervical spine was also performed with axial sections from the skull base through the thoracic inlet. CT of the face was performed without IV contrast and multiplanar reconstructions were generated. MQ:  CTBWO_3 CT Dose-Length Product (DLP): 1565  mGy*cm CT Dose Reduction Employed: Iterative recon (accession [id number]), Automated exposure control(AEC) and iterative recon (accession [id number]) COMPARISON:  None. RESULT: CT HEAD: No acute intracranial hemorrhage or extra-axial fluid collection. No hydrocephalus, mass effect, or herniation. No acute ischemic infarct detected.  Mild background of white matter hypoattenuation consistent with chronic microvascular ischemic change. Unremarkable dural  venous sinus attenuation. No acute osseous abnormality. Clear visualized paranasal sinuses and tympanomastoid cavities. CT face: Soft Tissues:  Soft tissue swelling is present at the nose and along the right forehead/periorbital region. Facial bones:  Mildly comminuted bilateral nasal bone fractures are present. Orbits:  No evidence of an acute fracture.  The globes are intact.  The soft tissue planes of the orbits are maintained. Paranasal Sinuses:  The paranasal sinuses are clear. Foreign Bodies:  No evidence of radiopaque foreign bodies. Other:  No evidence of a remote fracture.  No lytic or blastic process seen in the facial bones. CT cervical spine: There is reversal of the normal cervical lordosis. There is no fracture or dislocation. Severe multilevel degenerative changes are present.  There is severe spinal canal stenosis at the C4-C5 level from a large posterior disc osteophyte complex.  Moderate spinal canal stenosis present at the C3-C4 level from a posterior disc osteophyte complex.  Severe bilateral neural foraminal narrowing is present at the C3-C4, C4-C5, and C5-C6 levels. Centrilobular emphysema.    IMPRESSION: 1.  No acute intracranial abnormality. 2.  No cervical spine fracture or traumatic subluxation.  Severe multilevel degenerative change. 3.  Nasal bone fractures with adjacent soft tissue swelling. : PSCB   Transcribe Date/Time: Dec 22 2023  6:05P Dictated by : MAIRA STEWART MD This examination was interpreted and the report reviewed and electronically signed by: MAIRA STEWART MD on Dec 22 2023  6:17PM  EST    CT CERVICAL SPINE WO IVCON    Result Date: 12/22/2023  * * *Final Report* * * DATE OF EXAM: Dec 22 2023  5:57PM   [facility]   0505  -  CT CERVICAL SPINE WO IVCON  / ACCESSION #  [id number] PROCEDURE REASON: Spine fracture, cervical, traumatic      * * * * Physician Interpretation * * * *  EXAMINATION:  CT BRAIN WO IVCON, CT FACIAL BONE/TITO WO IVCON, CT CERVICAL SPINE WO IVCON  CLINICAL HISTORY: Head trauma, moderate-severe (accession [id number]), Maxillofacial pain, Nasal fracture suspected (accession [id number]), Spine fracture, cervical, traumatic (accession [id number]) TECHNIQUE:  Serial axial images without IV contrast were obtained from the vertex to the foramen magnum.  CT of the cervical spine was also performed with axial sections from the skull base through the thoracic inlet. CT of the face was performed without IV contrast and multiplanar reconstructions were generated. MQ:  CTBWO_3 CT Dose-Length Product (DLP): 1565  mGy*cm CT Dose Reduction Employed: Iterative recon (accession [id number]), Automated exposure control(AEC) and iterative recon (accession [id number]) COMPARISON:  None. RESULT: CT HEAD: No acute intracranial hemorrhage or extra-axial fluid collection. No hydrocephalus, mass effect, or herniation. No acute ischemic infarct detected.  Mild background of white matter hypoattenuation consistent with chronic microvascular ischemic change. Unremarkable dural venous sinus attenuation. No acute osseous abnormality. Clear visualized paranasal sinuses and tympanomastoid cavities. CT face: Soft Tissues:  Soft tissue swelling is present at the nose and along the right forehead/periorbital region. Facial bones:  Mildly comminuted bilateral nasal bone fractures are present. Orbits:  No evidence of an acute fracture.  The globes are intact.  The soft tissue planes of the orbits are maintained. Paranasal Sinuses:  The paranasal sinuses are clear. Foreign Bodies:  No evidence of radiopaque foreign bodies. Other:  No evidence of a remote fracture.  No lytic or blastic process seen in the facial bones. CT cervical spine: There is reversal of the normal cervical lordosis. There is no fracture or dislocation. Severe multilevel degenerative changes are present.  There is severe spinal canal stenosis at the C4-C5 level from a large posterior disc osteophyte complex.  Moderate spinal canal  stenosis present at the C3-C4 level from a posterior disc osteophyte complex.  Severe bilateral neural foraminal narrowing is present at the C3-C4, C4-C5, and C5-C6 levels. Centrilobular emphysema.    IMPRESSION: 1.  No acute intracranial abnormality. 2.  No cervical spine fracture or traumatic subluxation.  Severe multilevel degenerative change. 3.  Nasal bone fractures with adjacent soft tissue swelling. : PSCB   Transcribe Date/Time: Dec 22 2023  6:05P Dictated by : MAIRA STEWART MD This examination was interpreted and the report reviewed and electronically signed by: MAIRA STEWART MD on Dec 22 2023  6:17PM  EST    CT BRAIN WO IVCON    Result Date: 12/22/2023  * * *Final Report* * * DATE OF EXAM: Dec 22 2023  5:57PM   ASC   0504  -  CT BRAIN WO IVCON   / ACCESSION #  [id number] PROCEDURE REASON: Head trauma, moderate-severe      * * * * Physician Interpretation * * * *  EXAMINATION:  CT BRAIN WO IVCON, CT FACIAL BONE/TITO WO IVCON, CT CERVICAL SPINE WO IVCON CLINICAL HISTORY: Head trauma, moderate-severe (accession [id number]), Maxillofacial pain, Nasal fracture suspected (accession [id number]), Spine fracture, cervical, traumatic (accession [id number]) TECHNIQUE:  Serial axial images without IV contrast were obtained from the vertex to the foramen magnum.  CT of the cervical spine was also performed with axial sections from the skull base through the thoracic inlet. CT of the face was performed without IV contrast and multiplanar reconstructions were generated. MQ:  CTBWO_3 CT Dose-Length Product (DLP): 1565  mGy*cm CT Dose Reduction Employed: Iterative recon (accession [id number]), Automated exposure control(AEC) and iterative recon (accession [id number]) COMPARISON:  None. RESULT: CT HEAD: No acute intracranial hemorrhage or extra-axial fluid collection. No hydrocephalus, mass effect, or herniation. No acute ischemic infarct detected.  Mild background of white matter hypoattenuation consistent with  chronic microvascular ischemic change. Unremarkable dural venous sinus attenuation. No acute osseous abnormality. Clear visualized paranasal sinuses and tympanomastoid cavities. CT face: Soft Tissues:  Soft tissue swelling is present at the nose and along the right forehead/periorbital region. Facial bones:  Mildly comminuted bilateral nasal bone fractures are present. Orbits:  No evidence of an acute fracture.  The globes are intact.  The soft tissue planes of the orbits are maintained. Paranasal Sinuses:  The paranasal sinuses are clear. Foreign Bodies:  No evidence of radiopaque foreign bodies. Other:  No evidence of a remote fracture.  No lytic or blastic process seen in the facial bones. CT cervical spine: There is reversal of the normal cervical lordosis. There is no fracture or dislocation. Severe multilevel degenerative changes are present.  There is severe spinal canal stenosis at the C4-C5 level from a large posterior disc osteophyte complex.  Moderate spinal canal stenosis present at the C3-C4 level from a posterior disc osteophyte complex.  Severe bilateral neural foraminal narrowing is present at the C3-C4, C4-C5, and C5-C6 levels. Centrilobular emphysema.    IMPRESSION: 1.  No acute intracranial abnormality. 2.  No cervical spine fracture or traumatic subluxation.  Severe multilevel degenerative change. 3.  Nasal bone fractures with adjacent soft tissue swelling. : PSCB   Transcribe Date/Time: Dec 22 2023  6:05P Dictated by : MAIRA STEWART MD This examination was interpreted and the report reviewed and electronically signed by: MAIRA STEWART MD on Dec 22 2023  6:17PM  EST     Assessment/Plan   Septic shock, on pressor support-interval worsening  Coronavirus infection-treated with at least 10 days of dexamethasone, no further isolation precautions indicated  Suspected aspiration pneumonia-treated  Left-sided pleural effusion, s/p thoracentesis  Leukocytosis-most likely  multifactorial-marginal decrease  C. difficile infection-positive PCR/negative EIA-no diarrhea  Acute on chronic respiratory failure-resolving, on 6 L     Oral vancomycin for a total of 14 days-IV Flagyl if patient is unable to take p.o-tentative stop date 1/19/2024-will extend based on stop date of Merrem  Meropenem-completed  Continue contact plus precautions for c.diff  Supplemental oxygen as needed  Monitor temperature and WBC  Hospice evaluation      Frank Escalante MD

## 2024-01-20 NOTE — PROGRESS NOTES
Death Note    Asystole on the monitor.  Unresponsive.  Pupils fixed and dilated.  No heart sounds.  No breath sounds.  No pulse.    Time of death:  1544      Chuck Mann MD

## 2024-01-21 NOTE — DISCHARGE SUMMARY
"Discharge Diagnosis  Septic shock  Aspiration pneumonia  MATEO  Fluid overload    Issues Requiring Follow-Up  N/A    Test Results Pending At Discharge  N/A    Hospital Course  72 yo M with PMH of CVA with right-sided paresis, HFrEF (30-35%), paroxysmal Afib, laryngeal CA with recurrent aspiration PNA, COPD, presented to the ED from SNF on 1/3 for hypoxemia.  Also with watery diarrhea.  C diff positive.  Treated accordingly for pneumonia and C diff colitis.     Decompensated and required vasopressor support and continuous BiPAP.  Declining renal function and worsening fluid overload.  After multiple GOC discussions with family, decision made to transition to hospice.  Patient is being \"discharged\" and readmitted as GIP for hospice / end-of-life care.    Pertinent Physical Exam At Time of Discharge  N/A    Home Medications  N/A    Outpatient Follow-Up  N/A      Chuck Mann MD  "

## 2024-01-21 NOTE — H&P
"Critical Care Medicine:  History & Physical    History of Present Illness     74 yo M with PMH of CVA with right-sided paresis, HFrEF (30-35%), paroxysmal Afib, laryngeal CA with recurrent aspiration PNA, COPD, presented to the ED from SNF on 1/3 for hypoxemia.  Also with watery diarrhea.  C diff positive.  Treated accordingly for pneumonia and C diff colitis.    Decompensated and required vasopressor support and continuous BiPAP.  Declining renal function and worsening fluid overload.  After multiple GOC discussions with family, decision made to transition to hospice.  Patient \"discharged\" and readmitted as GIP for hospice / end-of-life care.      ROS:  Unable to obtain a complete review of systems due to patient's altered mental status.    Objective   Previous History     Medical History  As above.    Surgical History  Past Surgical History:   Procedure Laterality Date    CARDIAC DEFIBRILLATOR PLACEMENT  05/2022    CHOLECYSTECTOMY      CT ABDOMEN PELVIS ANGIOGRAM W AND/OR WO IV CONTRAST  01/22/2022    CT ABDOMEN PELVIS ANGIOGRAM W AND/OR WO IV CONTRAST 1/22/2022 [facility]    IR INJECTION EPIDURAL STEROID      Dr. Torres    OTHER SURGICAL HISTORY      Splenectomy    OTHER SURGICAL HISTORY      Larynx surgery    OTHER SURGICAL HISTORY      chemotherapy    OTHER SURGICAL HISTORY      lymph node resection    OTHER SURGICAL HISTORY      heart stent x4    TONSILLECTOMY      TUMOR EXCISION      neck    TUMOR REMOVAL      throat     Allergies  No Known Allergies    Home Medications  Current Outpatient Medications   Medication Instructions    albuterol 2.5 mg, nebulization, Every 2 hour PRN    amiodarone (PACERONE) 200 mg, oral, 2 times daily    apixaban (ELIQUIS) 5 mg, oral, 2 times daily    atorvastatin (LIPITOR) 80 mg, oral, Nightly    Autolet lancing device 3 times daily    BD Cynthia 2nd Gen Pen Needle 32 gauge x 5/32\" needle 1 each, subcutaneous, 3 times daily    carbidopa-levodopa (Sinemet CR)  mg ER " "tablet 1 tablet, oral, 3 times daily    clopidogrel (PLAVIX) 75 mg, oral, Daily    cyclobenzaprine (Flexeril) 10 mg tablet 1 tablet, oral, Nightly    dapagliflozin propanediol (FARXIGA) 10 mg, oral, Daily    docusate sodium (COLACE) 100 mg, oral, 2 times daily PRN    fluticasone propion-salmeteroL (Advair Diskus) 250-50 mcg/dose diskus inhaler 1 puff, 2 times daily    FreeStyle Joan 2 [place] misc 1 each, subcutaneous, As needed    FreeStyle Joan 2 Sensor kit 1 each, subcutaneous, Every 14 days    gabapentin (Neurontin) 300 mg capsule 1 capsule, oral, Nightly    insulin glargine (LANTUS U-100 INSULIN) 30 Units, subcutaneous, Every evening    insulin lispro (HUMALOG) 0-15 Units, subcutaneous, 3 times daily with meals, Take as directed per insulin instructions.    insulin syr/ndl U100 half lexie (BD Veo Insulin Syr, half unit,) 0.3 mL 31 gauge x 15/64\" syringe 1 Needle, miscellaneous, Daily    insulin syringe-needle U-100 (BD Veo Insulin Syringe UF) 1 mL 31 gauge x 15/64\" syringe 3 times daily    insulin syringe-needle U-100 31G X 5/16\" 1 mL syringe 1 each, subcutaneous, Daily, As directed daily    ipratropium-albuteroL (Duo-Neb) 0.5-2.5 mg/3 mL nebulizer solution 3 mL, nebulization, Every 6 hours    Lactobacillus acidophilus (ACIDOPHILUS ORAL) As directed oral    levothyroxine (SYNTHROID, LEVOXYL) 25 mcg, oral, Daily (0630)    metoprolol succinate XL (TOPROL-XL) 25 mg, oral, 2 times daily, Do not crush or chew.    nicotine (Nicoderm CQ) 21 mg/24 hr patch 1 patch, transdermal, Every 24 hours    omeprazole (PRILOSEC) 40 mg, oral, Daily before breakfast    QUEtiapine (SEROQUEL) 25 mg, oral, Nightly    sertraline (Zoloft) 100 mg tablet 1 tablet, oral, Daily    SITagliptin phosphate (JANUVIA) 50 mg, oral, Daily     Social History  Social History    Tobacco Use      Smoking status: Every Day        Packs/day: 0.50        Years: 60.00        Additional pack years: 0.00        Total pack years: 30.00        Types: " Cigarettes        Passive exposure: Past      Smokeless tobacco: Never      reports no history of alcohol use.    reports no history of drug use.     Family History  family history includes Diabetes in his father and mother; Heart disease in his father and mother; htn in his father and mother.    Objective   There were no vitals filed for this visit.There is no height or weight on file to calculate BMI.        1/20/2024     1:30 PM 1/20/2024     1:45 PM 1/20/2024     2:00 PM 1/20/2024     2:15 PM 1/20/2024     2:30 PM 1/20/2024     2:45 PM 1/20/2024     3:00 PM   Vitals   Heart Rate 115 104 108 107 102 111 101   Resp 33 30 29 27 26 24 24        Vent settings:  FiO2 (%):  [40 %] 40 %  S RR:  [16] 16    Intake/Output Summary (Last 24 hours) at 1/21/2024 0757  Last data filed at 1/20/2024 1300  Gross per 24 hour   Intake --   Output 0 ml   Net 0 ml       Physical Exam  CV:        Normal S1, S2.  RRR.  No m/r/g.  Resp:     Rales and ronchi bilaterally.  GI:         +BS, abd soft, NT, ND.  Ext:        2+ pitting BLE edema    Medications     Scheduled Medications:      Continuous Medications:      PRN Medications:     Labs     Results from last 72 hours   Lab Units 01/19/24  0503   GLUCOSE mg/dL 287*   SODIUM mmol/L 146*   POTASSIUM mmol/L 3.7   CHLORIDE mmol/L 110*   CO2 mmol/L 25   BUN mg/dL 52*   CREATININE mg/dL 2.50*   EGFR mL/min/1.73m*2 27*   CALCIUM mg/dL 7.4*   MAGNESIUM mg/dL 2.30   PHOSPHORUS mg/dL 3.5                 Results from last 72 hours   Lab Units 01/19/24  0504   WBC AUTO x10*3/uL 12.2*   NRBC AUTO /100 WBCs 5.9*   RBC AUTO x10*6/uL 3.66*   HEMOGLOBIN g/dL 10.4*   HEMATOCRIT % 30.9*   MCV fL 84   MCH pg 28.4   MCHC g/dL 33.7   RDW % 20.0*   PLATELETS AUTO x10*3/uL 324         Lab Results   Component Value Date    BLOODCULT No growth at 4 days -  FINAL REPORT 01/03/2024    BLOODCULT No growth at 4 days -  FINAL REPORT 01/03/2024    GRAMSTAIN No polymorphonuclear leukocytes seen 01/13/2024    GRAMSTAIN  No organisms seen 01/13/2024     Lab Results   Component Value Date    URINECULTURE 20,000 - 80,000 Candida glabrata (A) 01/03/2024       Imaging and Diagnostic Studies     Recent imaging and diagnostic studies reviewed.        Assessment / Plan     MSOF    -End of life care / comfort measures.      Chuck Mann MD

## 2024-01-29 LAB
FUNGUS SPEC CULT: NORMAL
FUNGUS SPEC FUNGUS STN: NORMAL

## 2024-03-06 LAB
ACID FAST STN SPEC: NORMAL
MYCOBACTERIUM SPEC CULT: NORMAL

## 2024-05-29 DIAGNOSIS — I25.5 ISCHEMIC CARDIOMYOPATHY: ICD-10-CM

## 2024-05-29 DIAGNOSIS — Z95.810 PRESENCE OF AUTOMATIC CARDIOVERTER/DEFIBRILLATOR (AICD): ICD-10-CM

## 2024-07-08 DIAGNOSIS — Z95.810 PRESENCE OF AUTOMATIC CARDIOVERTER/DEFIBRILLATOR (AICD): ICD-10-CM

## 2024-07-08 DIAGNOSIS — I25.5 ISCHEMIC CARDIOMYOPATHY: ICD-10-CM
